# Patient Record
Sex: MALE | Race: OTHER | Employment: UNEMPLOYED | URBAN - METROPOLITAN AREA
[De-identification: names, ages, dates, MRNs, and addresses within clinical notes are randomized per-mention and may not be internally consistent; named-entity substitution may affect disease eponyms.]

---

## 2019-08-27 ENCOUNTER — APPOINTMENT (OUTPATIENT)
Dept: CT IMAGING | Age: 39
End: 2019-08-27

## 2019-08-27 ENCOUNTER — HOSPITAL ENCOUNTER (EMERGENCY)
Age: 39
Discharge: HOME | End: 2019-08-27
Attending: EMERGENCY MEDICINE

## 2019-08-27 VITALS
HEIGHT: 67 IN | BODY MASS INDEX: 31.39 KG/M2 | TEMPERATURE: 98.5 F | SYSTOLIC BLOOD PRESSURE: 128 MMHG | OXYGEN SATURATION: 98 % | DIASTOLIC BLOOD PRESSURE: 86 MMHG | WEIGHT: 200 LBS | HEART RATE: 72 BPM | RESPIRATION RATE: 18 BRPM

## 2019-08-27 DIAGNOSIS — K57.32 DIVERTICULITIS OF COLON: Primary | ICD-10-CM

## 2019-08-27 LAB
ALBUMIN SERPL-MCNC: 4.9 G/DL (ref 3.5–5.2)
ALP BLD-CCNC: 68 U/L (ref 40–129)
ALT SERPL-CCNC: 29 U/L (ref 0–40)
AMYLASE: 53 U/L (ref 20–100)
ANION GAP SERPL CALCULATED.3IONS-SCNC: 13 MMOL/L (ref 7–16)
AST SERPL-CCNC: 24 U/L (ref 0–39)
BACTERIA: NORMAL /HPF
BASOPHILS ABSOLUTE: 0.05 E9/L (ref 0–0.2)
BASOPHILS RELATIVE PERCENT: 0.4 % (ref 0–2)
BILIRUB SERPL-MCNC: 0.4 MG/DL (ref 0–1.2)
BILIRUBIN URINE: ABNORMAL
BLOOD, URINE: ABNORMAL
BUN BLDV-MCNC: 12 MG/DL (ref 6–20)
CALCIUM SERPL-MCNC: 10 MG/DL (ref 8.6–10.2)
CHLORIDE BLD-SCNC: 101 MMOL/L (ref 98–107)
CLARITY: CLEAR
CO2: 27 MMOL/L (ref 22–29)
COLOR: YELLOW
CREAT SERPL-MCNC: 0.9 MG/DL (ref 0.7–1.2)
EOSINOPHILS ABSOLUTE: 0.1 E9/L (ref 0.05–0.5)
EOSINOPHILS RELATIVE PERCENT: 0.7 % (ref 0–6)
EPITHELIAL CELLS, UA: NORMAL /HPF
GFR AFRICAN AMERICAN: >60
GFR NON-AFRICAN AMERICAN: >60 ML/MIN/1.73
GLUCOSE BLD-MCNC: 86 MG/DL (ref 74–99)
GLUCOSE URINE: NEGATIVE MG/DL
HCT VFR BLD CALC: 45.4 % (ref 37–54)
HEMOGLOBIN: 15.2 G/DL (ref 12.5–16.5)
IMMATURE GRANULOCYTES #: 0.08 E9/L
IMMATURE GRANULOCYTES %: 0.6 % (ref 0–5)
KETONES, URINE: NEGATIVE MG/DL
LACTIC ACID: 1.8 MMOL/L (ref 0.5–2.2)
LEUKOCYTE ESTERASE, URINE: NEGATIVE
LIPASE: 29 U/L (ref 13–60)
LYMPHOCYTES ABSOLUTE: 6.35 E9/L (ref 1.5–4)
LYMPHOCYTES RELATIVE PERCENT: 47.6 % (ref 20–42)
MCH RBC QN AUTO: 29.6 PG (ref 26–35)
MCHC RBC AUTO-ENTMCNC: 33.5 % (ref 32–34.5)
MCV RBC AUTO: 88.3 FL (ref 80–99.9)
MONOCYTES ABSOLUTE: 0.77 E9/L (ref 0.1–0.95)
MONOCYTES RELATIVE PERCENT: 5.8 % (ref 2–12)
NEUTROPHILS ABSOLUTE: 6 E9/L (ref 1.8–7.3)
NEUTROPHILS RELATIVE PERCENT: 44.9 % (ref 43–80)
NITRITE, URINE: NEGATIVE
PDW BLD-RTO: 14.7 FL (ref 11.5–15)
PH UA: 6 (ref 5–9)
PLATELET # BLD: 297 E9/L (ref 130–450)
PMV BLD AUTO: 9 FL (ref 7–12)
POTASSIUM SERPL-SCNC: 3.5 MMOL/L (ref 3.5–5)
PROTEIN UA: ABNORMAL MG/DL
RBC # BLD: 5.14 E12/L (ref 3.8–5.8)
RBC UA: NORMAL /HPF (ref 0–2)
SODIUM BLD-SCNC: 141 MMOL/L (ref 132–146)
SPECIFIC GRAVITY UA: >=1.03 (ref 1–1.03)
TOTAL PROTEIN: 8.3 G/DL (ref 6.4–8.3)
UROBILINOGEN, URINE: 0.2 E.U./DL
WBC # BLD: 13.4 E9/L (ref 4.5–11.5)
WBC UA: NORMAL /HPF (ref 0–5)

## 2019-08-27 PROCEDURE — 6370000000 HC RX 637 (ALT 250 FOR IP): Performed by: NURSE PRACTITIONER

## 2019-08-27 PROCEDURE — 74177 CT ABD & PELVIS W/CONTRAST: CPT

## 2019-08-27 PROCEDURE — 82150 ASSAY OF AMYLASE: CPT

## 2019-08-27 PROCEDURE — 83605 ASSAY OF LACTIC ACID: CPT

## 2019-08-27 PROCEDURE — 2580000003 HC RX 258: Performed by: NURSE PRACTITIONER

## 2019-08-27 PROCEDURE — 99284 EMERGENCY DEPT VISIT MOD MDM: CPT

## 2019-08-27 PROCEDURE — 96374 THER/PROPH/DIAG INJ IV PUSH: CPT

## 2019-08-27 PROCEDURE — 6360000004 HC RX CONTRAST MEDICATION: Performed by: RADIOLOGY

## 2019-08-27 PROCEDURE — 81001 URINALYSIS AUTO W/SCOPE: CPT

## 2019-08-27 PROCEDURE — 6360000002 HC RX W HCPCS: Performed by: NURSE PRACTITIONER

## 2019-08-27 PROCEDURE — 80053 COMPREHEN METABOLIC PANEL: CPT

## 2019-08-27 PROCEDURE — 83690 ASSAY OF LIPASE: CPT

## 2019-08-27 PROCEDURE — 96375 TX/PRO/DX INJ NEW DRUG ADDON: CPT

## 2019-08-27 PROCEDURE — 85025 COMPLETE CBC W/AUTO DIFF WBC: CPT

## 2019-08-27 RX ORDER — CEFDINIR 300 MG/1
300 CAPSULE ORAL 2 TIMES DAILY
Qty: 20 CAPSULE | Refills: 0 | Status: SHIPPED | OUTPATIENT
Start: 2019-08-27 | End: 2019-09-06

## 2019-08-27 RX ORDER — KETOROLAC TROMETHAMINE 30 MG/ML
15 INJECTION, SOLUTION INTRAMUSCULAR; INTRAVENOUS ONCE
Status: COMPLETED | OUTPATIENT
Start: 2019-08-27 | End: 2019-08-27

## 2019-08-27 RX ORDER — OXYCODONE HYDROCHLORIDE AND ACETAMINOPHEN 5; 325 MG/1; MG/1
1 TABLET ORAL ONCE
Status: COMPLETED | OUTPATIENT
Start: 2019-08-27 | End: 2019-08-27

## 2019-08-27 RX ORDER — MORPHINE SULFATE 2 MG/ML
2 INJECTION, SOLUTION INTRAMUSCULAR; INTRAVENOUS ONCE
Status: DISCONTINUED | OUTPATIENT
Start: 2019-08-27 | End: 2019-08-27

## 2019-08-27 RX ORDER — METRONIDAZOLE 500 MG/1
500 TABLET ORAL 3 TIMES DAILY
Qty: 30 TABLET | Refills: 0 | Status: SHIPPED | OUTPATIENT
Start: 2019-08-27 | End: 2019-09-06

## 2019-08-27 RX ORDER — 0.9 % SODIUM CHLORIDE 0.9 %
1000 INTRAVENOUS SOLUTION INTRAVENOUS ONCE
Status: COMPLETED | OUTPATIENT
Start: 2019-08-27 | End: 2019-08-27

## 2019-08-27 RX ORDER — HYDROCODONE BITARTRATE AND ACETAMINOPHEN 5; 325 MG/1; MG/1
1 TABLET ORAL EVERY 6 HOURS PRN
Qty: 12 TABLET | Refills: 0 | Status: SHIPPED | OUTPATIENT
Start: 2019-08-27 | End: 2019-08-30

## 2019-08-27 RX ORDER — ONDANSETRON 2 MG/ML
4 INJECTION INTRAMUSCULAR; INTRAVENOUS ONCE
Status: COMPLETED | OUTPATIENT
Start: 2019-08-27 | End: 2019-08-27

## 2019-08-27 RX ORDER — ONDANSETRON 4 MG/1
4 TABLET, ORALLY DISINTEGRATING ORAL EVERY 8 HOURS PRN
Qty: 10 TABLET | Refills: 0 | Status: SHIPPED | OUTPATIENT
Start: 2019-08-27 | End: 2021-10-31

## 2019-08-27 RX ADMIN — SODIUM CHLORIDE 1000 ML: 9 INJECTION, SOLUTION INTRAVENOUS at 17:56

## 2019-08-27 RX ADMIN — OXYCODONE HYDROCHLORIDE AND ACETAMINOPHEN 1 TABLET: 5; 325 TABLET ORAL at 19:43

## 2019-08-27 RX ADMIN — KETOROLAC TROMETHAMINE 15 MG: 30 INJECTION, SOLUTION INTRAMUSCULAR at 18:06

## 2019-08-27 RX ADMIN — ONDANSETRON 4 MG: 2 INJECTION INTRAMUSCULAR; INTRAVENOUS at 18:06

## 2019-08-27 RX ADMIN — IOPAMIDOL 110 ML: 755 INJECTION, SOLUTION INTRAVENOUS at 18:53

## 2019-08-27 ASSESSMENT — PAIN DESCRIPTION - LOCATION: LOCATION: ABDOMEN

## 2019-08-27 ASSESSMENT — PAIN DESCRIPTION - PAIN TYPE: TYPE: ACUTE PAIN

## 2019-08-27 ASSESSMENT — PAIN SCALES - GENERAL: PAINLEVEL_OUTOF10: 4

## 2019-08-27 NOTE — ED PROVIDER NOTES
age.  · HEENT:  NC/NT. PERRLA. Airway patent. · Neck:  Supple. No lymphadenopathy. · Respiratory: Lungs Clear to auscultation and breath sounds equal.  · CV:  Regular rate and rhythm. · GI:  General Appearance: rounded. Bowel sounds: normal bowel sounds. Distension:  Mild. Tenderness: moderate tenderness is present in the LUQ and LLQ, no rebound tenderness, guarding is present in the LLQ, abdominal rigidity is absent. Liver: non-tender. Spleen:  non-tender. Pulsatile Mass: absent. Hernia:  no inguinal or femoral hernias noted. · Back: CVA Tenderness: No.  · Integument:  Normal turgor. Warm, dry, without visible rash, unless noted elsewhere. · Neurological:  Orientation age-appropriate. Motor functions intact.     Lab / Imaging Results   (All laboratory and radiology results have been personally reviewed by myself)  Labs:  Results for orders placed or performed during the hospital encounter of 08/27/19   CBC Auto Differential   Result Value Ref Range    WBC 13.4 (H) 4.5 - 11.5 E9/L    RBC 5.14 3.80 - 5.80 E12/L    Hemoglobin 15.2 12.5 - 16.5 g/dL    Hematocrit 45.4 37.0 - 54.0 %    MCV 88.3 80.0 - 99.9 fL    MCH 29.6 26.0 - 35.0 pg    MCHC 33.5 32.0 - 34.5 %    RDW 14.7 11.5 - 15.0 fL    Platelets 023 204 - 900 E9/L    MPV 9.0 7.0 - 12.0 fL    Neutrophils % 44.9 43.0 - 80.0 %    Immature Granulocytes % 0.6 0.0 - 5.0 %    Lymphocytes % 47.6 (H) 20.0 - 42.0 %    Monocytes % 5.8 2.0 - 12.0 %    Eosinophils % 0.7 0.0 - 6.0 %    Basophils % 0.4 0.0 - 2.0 %    Neutrophils Absolute 6.00 1.80 - 7.30 E9/L    Immature Granulocytes # 0.08 E9/L    Lymphocytes Absolute 6.35 (H) 1.50 - 4.00 E9/L    Monocytes Absolute 0.77 0.10 - 0.95 E9/L    Eosinophils Absolute 0.10 0.05 - 0.50 E9/L    Basophils Absolute 0.05 0.00 - 0.20 E9/L   Comprehensive Metabolic Panel   Result Value Ref Range    Sodium 141 132 - 146 mmol/L    Potassium 3.5 3.5 - 5.0 (110 mLs Intravenous Given 8/27/19 1853)   oxyCODONE-acetaminophen (PERCOCET) 5-325 MG per tablet 1 tablet (1 tablet Oral Given 8/27/19 1943)        Re-Evaluations:  8/27/19      Time: 1915    Patients symptoms are improving. Consultations:             None    Procedures:   none    MDM: Patient presented as above. Patient states he does have a history of diverticulitis and feels that this pain is similar although perhaps early. White count elevated at 13,200 and other labs were unremarkable. CT scan showed mild diverticulosis on the left side with no diverticulitis, however clinically patient was presenting as diverticulitis. With the mildly elevated white count, it is believed that this is early diverticulitis. Will treat with oral Omnicef and Flagyl, as well as Zofran as needed for nausea, and Norco.  Patient was advised not to drive or operate machinery when taking Norco.  The patient states he does not have a family doctor, due to his health insurance status. I advised him when able, to call the [facility] Access line to establish a provider. I also advised him of the importance of becoming established with a GI doctor or surgeon for colonoscopy in the future. He and his girlfriend verbalized understanding. They are advised of the signs and symptoms which would require return to the ED. Discharged in stable and improved condition. Counseling:   I have spoken with the significant other and patient and discussed todays results, in addition to providing specific details for the plan of care and counseling regarding the diagnosis and prognosis and are agreeable with the plan. Assessment      1. Diverticulitis of colon      This patient's ED course included: a personal history and physicial examination, re-evaluation prior to disposition and IV medications  This patient has remained hemodynamically stable and improved during their ED course. Plan   Discharge to home.   Patient condition is

## 2021-10-31 ENCOUNTER — HOSPITAL ENCOUNTER (EMERGENCY)
Age: 41
Discharge: HOME | End: 2021-10-31
Attending: EMERGENCY MEDICINE
Payer: MEDICAID

## 2021-10-31 VITALS
DIASTOLIC BLOOD PRESSURE: 65 MMHG | BODY MASS INDEX: 31.39 KG/M2 | HEART RATE: 80 BPM | TEMPERATURE: 97 F | HEIGHT: 67 IN | OXYGEN SATURATION: 97 % | RESPIRATION RATE: 16 BRPM | SYSTOLIC BLOOD PRESSURE: 116 MMHG | WEIGHT: 200 LBS

## 2021-10-31 DIAGNOSIS — F41.9 ANXIETY: Primary | ICD-10-CM

## 2021-10-31 LAB
ALBUMIN SERPL-MCNC: 4.4 G/DL (ref 3.5–5.2)
ALP BLD-CCNC: 53 U/L (ref 40–129)
ALT SERPL-CCNC: 20 U/L (ref 0–40)
ANION GAP SERPL CALCULATED.3IONS-SCNC: 12 MMOL/L (ref 7–16)
AST SERPL-CCNC: 18 U/L (ref 0–39)
BASOPHILS ABSOLUTE: 0.04 E9/L (ref 0–0.2)
BASOPHILS RELATIVE PERCENT: 0.3 % (ref 0–2)
BILIRUB SERPL-MCNC: 0.3 MG/DL (ref 0–1.2)
BUN BLDV-MCNC: 15 MG/DL (ref 6–20)
CALCIUM SERPL-MCNC: 9.9 MG/DL (ref 8.6–10.2)
CHLORIDE BLD-SCNC: 104 MMOL/L (ref 98–107)
CO2: 21 MMOL/L (ref 22–29)
CREAT SERPL-MCNC: 0.9 MG/DL (ref 0.7–1.2)
EOSINOPHILS ABSOLUTE: 0.01 E9/L (ref 0.05–0.5)
EOSINOPHILS RELATIVE PERCENT: 0.1 % (ref 0–6)
GFR AFRICAN AMERICAN: >60
GFR NON-AFRICAN AMERICAN: >60 ML/MIN/1.73
GLUCOSE BLD-MCNC: 120 MG/DL (ref 74–99)
HCT VFR BLD CALC: 43.2 % (ref 37–54)
HEMOGLOBIN: 14.6 G/DL (ref 12.5–16.5)
IMMATURE GRANULOCYTES #: 0.05 E9/L
IMMATURE GRANULOCYTES %: 0.4 % (ref 0–5)
LYMPHOCYTES ABSOLUTE: 2.16 E9/L (ref 1.5–4)
LYMPHOCYTES RELATIVE PERCENT: 16.7 % (ref 20–42)
MCH RBC QN AUTO: 30.2 PG (ref 26–35)
MCHC RBC AUTO-ENTMCNC: 33.8 % (ref 32–34.5)
MCV RBC AUTO: 89.4 FL (ref 80–99.9)
MONOCYTES ABSOLUTE: 0.41 E9/L (ref 0.1–0.95)
MONOCYTES RELATIVE PERCENT: 3.2 % (ref 2–12)
NEUTROPHILS ABSOLUTE: 10.3 E9/L (ref 1.8–7.3)
NEUTROPHILS RELATIVE PERCENT: 79.3 % (ref 43–80)
PDW BLD-RTO: 13.3 FL (ref 11.5–15)
PLATELET # BLD: 234 E9/L (ref 130–450)
PMV BLD AUTO: 9 FL (ref 7–12)
POTASSIUM REFLEX MAGNESIUM: 4.1 MMOL/L (ref 3.5–5)
RBC # BLD: 4.83 E12/L (ref 3.8–5.8)
SODIUM BLD-SCNC: 137 MMOL/L (ref 132–146)
TOTAL PROTEIN: 7.5 G/DL (ref 6.4–8.3)
TSH SERPL DL<=0.05 MIU/L-ACNC: 2.13 UIU/ML (ref 0.27–4.2)
WBC # BLD: 13 E9/L (ref 4.5–11.5)

## 2021-10-31 PROCEDURE — 80053 COMPREHEN METABOLIC PANEL: CPT

## 2021-10-31 PROCEDURE — 96372 THER/PROPH/DIAG INJ SC/IM: CPT

## 2021-10-31 PROCEDURE — 84443 ASSAY THYROID STIM HORMONE: CPT

## 2021-10-31 PROCEDURE — 85025 COMPLETE CBC W/AUTO DIFF WBC: CPT

## 2021-10-31 PROCEDURE — 6360000002 HC RX W HCPCS: Performed by: STUDENT IN AN ORGANIZED HEALTH CARE EDUCATION/TRAINING PROGRAM

## 2021-10-31 PROCEDURE — 99284 EMERGENCY DEPT VISIT MOD MDM: CPT

## 2021-10-31 RX ORDER — HYDROXYZINE HYDROCHLORIDE 50 MG/ML
50 INJECTION, SOLUTION INTRAMUSCULAR ONCE
Status: COMPLETED | OUTPATIENT
Start: 2021-10-31 | End: 2021-10-31

## 2021-10-31 RX ORDER — HYDROXYZINE 50 MG/1
50 TABLET, FILM COATED ORAL EVERY 8 HOURS PRN
Qty: 30 TABLET | Refills: 0 | Status: SHIPPED | OUTPATIENT
Start: 2021-10-31 | End: 2021-11-10

## 2021-10-31 RX ADMIN — HYDROXYZINE HYDROCHLORIDE 50 MG: 50 INJECTION, SOLUTION INTRAMUSCULAR at 19:38

## 2021-10-31 ASSESSMENT — ENCOUNTER SYMPTOMS
WHEEZING: 0
SINUS PRESSURE: 0
SHORTNESS OF BREATH: 1
EYE REDNESS: 0
SORE THROAT: 0
ABDOMINAL PAIN: 0
NAUSEA: 0
DIARRHEA: 0
COUGH: 0
VOMITING: 0
BACK PAIN: 0
EYE DISCHARGE: 0
EYE PAIN: 0

## 2021-10-31 NOTE — ED PROVIDER NOTES
Patient presents with anxiety and panic attacks. He is currently asymptomatic. Patient states that he has been having these intermittently for over the past couple of days. He states that during these episodes he feels a little lightheaded and that his heart races. He also states that he will start to breathe heavily and sometimes have periods of emesis. Patient has not done anything to make these better nor worse. He mentions that he has had the symptoms before long time ago however they had resolved on their own. Patient states they have been under a lot of stress lately in addition his sister has been going through health issues of her own which she has been helping take care of her. Patient denies any fevers, chest pain, cough, neck pain, neck masses, or abdominal pain. He also endorses marijuana usage. The history is provided by the patient. No  was used. Review of Systems   Constitutional: Negative for chills and fever. HENT: Negative for ear pain, sinus pressure and sore throat. Eyes: Negative for pain, discharge and redness. Respiratory: Positive for shortness of breath. Negative for cough and wheezing. Cardiovascular: Negative for chest pain. Gastrointestinal: Negative for abdominal pain, diarrhea, nausea and vomiting. Genitourinary: Negative for dysuria and frequency. Musculoskeletal: Negative for arthralgias and back pain. Skin: Negative for rash and wound. Neurological: Positive for dizziness and light-headedness. Negative for weakness and headaches. Hematological: Negative for adenopathy. Psychiatric/Behavioral: The patient is nervous/anxious. All other systems reviewed and are negative. Physical Exam  Vitals and nursing note reviewed. Constitutional:       General: He is not in acute distress. Appearance: He is well-developed. He is obese. HENT:      Head: Normocephalic and atraumatic.    Eyes:      Conjunctiva/sclera: Conjunctivae normal.   Cardiovascular:      Rate and Rhythm: Normal rate and regular rhythm. Heart sounds: Normal heart sounds. No murmur heard. Pulmonary:      Effort: Pulmonary effort is normal. No respiratory distress. Breath sounds: Normal breath sounds. No wheezing or rales. Abdominal:      General: Bowel sounds are normal.      Palpations: Abdomen is soft. Tenderness: There is no abdominal tenderness. There is no guarding or rebound. Musculoskeletal:         General: No tenderness or deformity. Cervical back: Normal range of motion and neck supple. Skin:     General: Skin is warm and dry. Neurological:      General: No focal deficit present. Mental Status: He is alert and oriented to person, place, and time. Cranial Nerves: No cranial nerve deficit. Sensory: No sensory deficit. Motor: No weakness. Coordination: Coordination normal.          Procedures     MDM  Number of Diagnoses or Management Options  Anxiety  Diagnosis management comments: Patient presents with anxiety and panic attacks. Hydroxyzine was given with improvement. CBC was obtained and did show leukocytosis at 13.0 but was otherwise unremarkable. CMP was also unremarkable. TSH was obtained and was within normal limits. Patient was informed the results and plan and is agreeable. Patient be discharged with instructions to follow-up with his PCP for further evaluation and care. Patient's questions are answered at this time. Patient discharged home. Amount and/or Complexity of Data Reviewed  Clinical lab tests: reviewed         ED Course as of Oct 31 2031   [facility] Oct 31, 2021   2028 Patient reevaluated. He states that he does feel improved at this time. [BB]      ED Course User Index  [BB] Nubia Dickeyson, DO          ED Course as of Oct 31 2031   [facility] Oct 31, 2021   2028 Patient reevaluated. He states that he does feel improved at this time.     [BB]      ED Course User Index  [BB] Fidelia Mai DO       --------------------------------------------- PAST HISTORY ---------------------------------------------  Past Medical History:  has no past medical history on file. Past Surgical History:  has no past surgical history on file. Social History:  reports that he has been smoking cigarettes. He has been smoking about 0.50 packs per day. He has never used smokeless tobacco. He reports that he does not drink alcohol and does not use drugs. Family History: family history is not on file. The patients home medications have been reviewed. Allergies: Patient has no known allergies.     -------------------------------------------------- RESULTS -------------------------------------------------  Labs:  Results for orders placed or performed during the hospital encounter of 10/31/21   CBC Auto Differential   Result Value Ref Range    WBC 13.0 (H) 4.5 - 11.5 E9/L    RBC 4.83 3.80 - 5.80 E12/L    Hemoglobin 14.6 12.5 - 16.5 g/dL    Hematocrit 43.2 37.0 - 54.0 %    MCV 89.4 80.0 - 99.9 fL    MCH 30.2 26.0 - 35.0 pg    MCHC 33.8 32.0 - 34.5 %    RDW 13.3 11.5 - 15.0 fL    Platelets 425 622 - 216 E9/L    MPV 9.0 7.0 - 12.0 fL    Neutrophils % 79.3 43.0 - 80.0 %    Immature Granulocytes % 0.4 0.0 - 5.0 %    Lymphocytes % 16.7 (L) 20.0 - 42.0 %    Monocytes % 3.2 2.0 - 12.0 %    Eosinophils % 0.1 0.0 - 6.0 %    Basophils % 0.3 0.0 - 2.0 %    Neutrophils Absolute 10.30 (H) 1.80 - 7.30 E9/L    Immature Granulocytes # 0.05 E9/L    Lymphocytes Absolute 2.16 1.50 - 4.00 E9/L    Monocytes Absolute 0.41 0.10 - 0.95 E9/L    Eosinophils Absolute 0.01 (L) 0.05 - 0.50 E9/L    Basophils Absolute 0.04 0.00 - 0.20 E9/L   Comprehensive Metabolic Panel w/ Reflex to MG   Result Value Ref Range    Sodium 137 132 - 146 mmol/L    Potassium reflex Magnesium 4.1 3.5 - 5.0 mmol/L    Chloride 104 98 - 107 mmol/L    CO2 21 (L) 22 - 29 mmol/L    Anion Gap 12 7 - 16 mmol/L    Glucose 120 (H) 74 - 99 mg/dL importance of follow-up. New Prescriptions    HYDROXYZINE (ATARAX) 50 MG TABLET    Take 1 tablet by mouth every 8 hours as needed for Itching       Diagnosis:  1. Anxiety        Disposition:  Patient's disposition: Discharge to home  Patient's condition is stable.     Patient was seen and evaluated by both myself and Garrett Ortiz, [id number], DO  Resident  10/31/21 [id number]

## 2021-11-22 ENCOUNTER — APPOINTMENT (OUTPATIENT)
Dept: GENERAL RADIOLOGY | Age: 41
End: 2021-11-22
Payer: MEDICAID

## 2021-11-22 ENCOUNTER — HOSPITAL ENCOUNTER (EMERGENCY)
Age: 41
Discharge: HOME | End: 2021-11-22
Payer: MEDICAID

## 2021-11-22 ENCOUNTER — APPOINTMENT (OUTPATIENT)
Dept: ULTRASOUND IMAGING | Age: 41
End: 2021-11-22
Payer: MEDICAID

## 2021-11-22 VITALS
OXYGEN SATURATION: 99 % | HEIGHT: 67 IN | DIASTOLIC BLOOD PRESSURE: 82 MMHG | TEMPERATURE: 97.6 F | BODY MASS INDEX: 31.39 KG/M2 | SYSTOLIC BLOOD PRESSURE: 117 MMHG | RESPIRATION RATE: 14 BRPM | HEART RATE: 64 BPM | WEIGHT: 200 LBS

## 2021-11-22 DIAGNOSIS — M53.82 MUSCULOSKELETAL DISORDER INVOLVING STERNOCLEIDOMASTOID: Primary | ICD-10-CM

## 2021-11-22 PROCEDURE — 76536 US EXAM OF HEAD AND NECK: CPT

## 2021-11-22 PROCEDURE — 70360 X-RAY EXAM OF NECK: CPT

## 2021-11-22 PROCEDURE — 99284 EMERGENCY DEPT VISIT MOD MDM: CPT

## 2021-11-22 PROCEDURE — 93005 ELECTROCARDIOGRAM TRACING: CPT

## 2021-11-22 PROCEDURE — 6370000000 HC RX 637 (ALT 250 FOR IP): Performed by: PHYSICIAN ASSISTANT

## 2021-11-22 RX ORDER — METAXALONE 800 MG/1
800 TABLET ORAL 3 TIMES DAILY
Qty: 15 TABLET | Refills: 0 | Status: SHIPPED | OUTPATIENT
Start: 2021-11-22 | End: 2021-11-27

## 2021-11-22 RX ORDER — IBUPROFEN 800 MG/1
800 TABLET ORAL EVERY 6 HOURS PRN
Qty: 20 TABLET | Refills: 0 | Status: SHIPPED | OUTPATIENT
Start: 2021-11-22 | End: 2021-11-27

## 2021-11-22 RX ORDER — IBUPROFEN 800 MG/1
800 TABLET ORAL ONCE
Status: COMPLETED | OUTPATIENT
Start: 2021-11-22 | End: 2021-11-22

## 2021-11-22 RX ADMIN — IBUPROFEN 800 MG: 800 TABLET, FILM COATED ORAL at 23:17

## 2021-11-22 ASSESSMENT — PAIN SCALES - GENERAL
PAINLEVEL_OUTOF10: 6
PAINLEVEL_OUTOF10: 6

## 2021-11-22 ASSESSMENT — PAIN DESCRIPTION - PAIN TYPE: TYPE: ACUTE PAIN

## 2021-11-22 ASSESSMENT — PAIN DESCRIPTION - DESCRIPTORS: DESCRIPTORS: ACHING

## 2021-11-22 ASSESSMENT — PAIN DESCRIPTION - ORIENTATION: ORIENTATION: LEFT

## 2021-11-22 ASSESSMENT — PAIN DESCRIPTION - ONSET: ONSET: PROGRESSIVE

## 2021-11-22 ASSESSMENT — PAIN DESCRIPTION - LOCATION: LOCATION: NECK

## 2021-11-26 LAB
EKG ATRIAL RATE: 66 BPM
EKG P AXIS: 25 DEGREES
EKG P-R INTERVAL: 132 MS
EKG Q-T INTERVAL: 402 MS
EKG QRS DURATION: 92 MS
EKG QTC CALCULATION (BAZETT): 421 MS
EKG R AXIS: 23 DEGREES
EKG T AXIS: 38 DEGREES
EKG VENTRICULAR RATE: 66 BPM

## 2021-11-27 NOTE — ED PROVIDER NOTES
[facility]  Department of Emergency Medicine   ED  Encounter Note  Admit Date/RoomTime: 2021  9:15 PM  ED Room:     NAME: Francisco Huff  : 1980  MRN: [id number]     Chief Complaint:  Neck Swelling (left onset approx 30 minutes PTA)    History of Present Illness        Francisco Huff is a 42 y.o. old male who presents to the emergency department by private vehicle, for non-traumatic anterior left neck pain which began 1 hour(s) prior to arrival.   The pain was caused by no known cause--patient was sitting watching TV when he noticed some pain when he turned his head to the right and looked and saw some swelling. He has no prior neck problems. Since onset the symptoms have been remaining constant. The pain is associated with none and denies headaches, numbness or tingling of hands, muscle weakness or fever, body aches, chills, sore throat, cough, difficulty swallowing. His symptoms are aggraveated by turning right and relieved by nothing, as no treatment has been provided prior to this visit. ROS   Pertinent positives and negatives are stated within HPI, all other systems reviewed and are negative. Past Medical History:  has no past medical history on file. Surgical History:  has no past surgical history on file. Social History:  reports that he has been smoking cigarettes. He has been smoking about 0.50 packs per day. He has never used smokeless tobacco. He reports that he does not drink alcohol and does not use drugs. Family History: family history is not on file. Allergies: Patient has no known allergies. Physical Exam   Oxygen Saturation Interpretation: Normal.        ED Triage Vitals [21]   BP Temp Temp src Pulse Resp SpO2 Height Weight   (!) 145/101 97.6 °F (36.4 °C) -- 72 14 99 % 5' 7\" (1.702 m) 200 lb (90.7 kg)         Constitutional:  Alert, development consistent with age. HEENT:  NC/NT.   Airway normal soft tissue neck. ED Course / Medical Decision Making     Medications   ibuprofen (ADVIL;MOTRIN) tablet 800 mg (800 mg Oral Given 11/22/21 6401)        Re-examination:  11/28/21     patient is comfortable at rest.    Consult(s):   none. Procedure(s):   none. MDM:    Imaging was obtained based on moderate suspicion for thyroid nodule as per history/physical findings. Findings show no abnormality of the thyroid. There is some swelling reported at the sternocleidomastoid muscle on the ultrasound. Patient has no other upper respiratory type symptoms. At this point time advised patient to continue with NSAIDs and ice packs. Follow-up with primary care for recheck. Patient reports he does not have a primary care doctor and I gave him referral to the [address] [place] [address]. Plan of Care/Counseling:  Tom Brito PA-C reviewed today's visit with the patient in addition to providing specific details for the plan of care and counseling regarding the diagnosis and prognosis. Questions are answered at this time and are agreeable with the plan. Assessment     1. Musculoskeletal disorder involving sternocleidomastoid      Plan   Discharged home. Patient condition is good    New Medications     Discharge Medication List as of 11/22/2021 11:27 PM      START taking these medications    Details   ibuprofen (ADVIL;MOTRIN) 800 MG tablet Take 1 tablet by mouth every 6 hours as needed for Pain, Disp-20 tablet, R-0Print      metaxalone (SKELAXIN) 800 MG tablet Take 1 tablet by mouth 3 times daily for 5 days, Disp-15 tablet, R-0Print           Electronically signed by Tom Brito PA-C   DD: 11/28/21  **This report was transcribed using voice recognition software. Every effort was made to ensure accuracy; however, inadvertent computerized transcription errors may be present.   END OF ED PROVIDER NOTE       Tom Brito PA-C  11/28/21 [id number]

## 2024-04-03 ENCOUNTER — APPOINTMENT (OUTPATIENT)
Dept: GENERAL RADIOLOGY | Age: 44
End: 2024-04-03
Payer: MEDICAID

## 2024-04-03 ENCOUNTER — HOSPITAL ENCOUNTER (EMERGENCY)
Age: 44
Discharge: HOME | End: 2024-04-03
Attending: EMERGENCY MEDICINE
Payer: MEDICAID

## 2024-04-03 VITALS
TEMPERATURE: 98.2 F | OXYGEN SATURATION: 98 % | HEART RATE: 91 BPM | SYSTOLIC BLOOD PRESSURE: 145 MMHG | HEIGHT: 67 IN | DIASTOLIC BLOOD PRESSURE: 88 MMHG | BODY MASS INDEX: 31.39 KG/M2 | WEIGHT: 200 LBS | RESPIRATION RATE: 14 BRPM

## 2024-04-03 DIAGNOSIS — S39.012A STRAIN OF LUMBAR REGION, INITIAL ENCOUNTER: Primary | ICD-10-CM

## 2024-04-03 PROCEDURE — 73502 X-RAY EXAM HIP UNI 2-3 VIEWS: CPT

## 2024-04-03 PROCEDURE — 72110 X-RAY EXAM L-2 SPINE 4/>VWS: CPT

## 2024-04-03 PROCEDURE — 6360000002 HC RX W HCPCS: Performed by: EMERGENCY MEDICINE

## 2024-04-03 PROCEDURE — 99283 EMERGENCY DEPT VISIT LOW MDM: CPT

## 2024-04-03 PROCEDURE — 6370000000 HC RX 637 (ALT 250 FOR IP): Performed by: EMERGENCY MEDICINE

## 2024-04-03 PROCEDURE — 6370000000 HC RX 637 (ALT 250 FOR IP): Performed by: STUDENT IN AN ORGANIZED HEALTH CARE EDUCATION/TRAINING PROGRAM

## 2024-04-03 RX ORDER — HYDROCODONE BITARTRATE AND ACETAMINOPHEN 5; 325 MG/1; MG/1
1 TABLET ORAL ONCE
Status: COMPLETED | OUTPATIENT
Start: 2024-04-03 | End: 2024-04-03

## 2024-04-03 RX ORDER — METHOCARBAMOL 500 MG/1
1000 TABLET, FILM COATED ORAL ONCE
Status: COMPLETED | OUTPATIENT
Start: 2024-04-03 | End: 2024-04-03

## 2024-04-03 RX ORDER — KETOROLAC TROMETHAMINE 30 MG/ML
30 INJECTION, SOLUTION INTRAMUSCULAR; INTRAVENOUS ONCE
Status: DISCONTINUED | OUTPATIENT
Start: 2024-04-03 | End: 2024-04-03

## 2024-04-03 RX ORDER — ORPHENADRINE CITRATE 30 MG/ML
60 INJECTION INTRAMUSCULAR; INTRAVENOUS ONCE
Status: DISCONTINUED | OUTPATIENT
Start: 2024-04-03 | End: 2024-04-03

## 2024-04-03 RX ORDER — NAPROXEN 500 MG/1
500 TABLET ORAL 2 TIMES DAILY PRN
Qty: 28 TABLET | Refills: 0 | Status: SHIPPED | OUTPATIENT
Start: 2024-04-03

## 2024-04-03 RX ORDER — NAPROXEN 500 MG/1
500 TABLET ORAL ONCE
Status: COMPLETED | OUTPATIENT
Start: 2024-04-03 | End: 2024-04-03

## 2024-04-03 RX ADMIN — HYDROCODONE BITARTRATE AND ACETAMINOPHEN 1 TABLET: 5; 325 TABLET ORAL at 09:28

## 2024-04-03 RX ADMIN — HYDROCODONE BITARTRATE AND ACETAMINOPHEN 1 TABLET: 5; 325 TABLET ORAL at 07:41

## 2024-04-03 RX ADMIN — METHOCARBAMOL 1000 MG: 500 TABLET ORAL at 08:14

## 2024-04-03 RX ADMIN — NAPROXEN 500 MG: 500 TABLET ORAL at 09:28

## 2024-04-03 ASSESSMENT — ENCOUNTER SYMPTOMS
COUGH: 0
ABDOMINAL PAIN: 0
RHINORRHEA: 0
VOMITING: 0
NAUSEA: 0
SHORTNESS OF BREATH: 0
DIARRHEA: 0
CONSTIPATION: 0
BACK PAIN: 1

## 2024-04-03 ASSESSMENT — PAIN - FUNCTIONAL ASSESSMENT: PAIN_FUNCTIONAL_ASSESSMENT: 0-10

## 2024-04-03 ASSESSMENT — PAIN DESCRIPTION - DESCRIPTORS: DESCRIPTORS: SHARP

## 2024-04-03 ASSESSMENT — LIFESTYLE VARIABLES
HOW OFTEN DO YOU HAVE A DRINK CONTAINING ALCOHOL: NEVER
HOW MANY STANDARD DRINKS CONTAINING ALCOHOL DO YOU HAVE ON A TYPICAL DAY: PATIENT DOES NOT DRINK

## 2024-04-03 ASSESSMENT — PAIN SCALES - GENERAL: PAINLEVEL_OUTOF10: 10

## 2024-04-03 ASSESSMENT — PAIN DESCRIPTION - LOCATION: LOCATION: BACK

## 2024-04-03 NOTE — ED PROVIDER NOTES
[facility] EMERGENCY DEPARTMENT  EMERGENCY DEPARTMENT ENCOUNTER        Pt Name: Joseph Bond  MRN: [id number]  Birthdate 1980  Date of evaluation: 4/3/2024  Provider: Rianna Nick MD  PCP: No primary care provider on file.  Note Started: 7:46 AM EDT 4/3/24    CHIEF COMPLAINT       Chief Complaint   Patient presents with    Back Pain     PT reports lower back pain starting last night.  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        HISTORY OF PRESENT ILLNESS: 1 or more Elements   History From: Indra Bond is a 43 y.o. male who presents with right hip/buttock pain that started last night and persisted this morning prompting ED visit. Pain is sharp 10/10 does not radiate down leg. Denies numbness, difficulty with bladder and BM control. He does have weakness on the right LE mainly limited due to pain. Pain worse with movement of both hip joints. Has been taking ibuprofen 800 mg at most once a day for the past couple of days but doesn't relieve pain. He had the same pain 2 months ago which lasted for 1 week and partially relieved by ibuprofen but eventually spontaneously resolved. Denies falls and trauma.     Review of Systems   Constitutional:  Negative for chills and fever.   HENT:  Negative for rhinorrhea.    Respiratory:  Negative for cough and shortness of breath.    Cardiovascular:  Negative for chest pain and leg swelling.   Gastrointestinal:  Negative for abdominal pain, constipation, diarrhea, nausea and vomiting.   Genitourinary:  Negative for difficulty urinating and frequency.   Musculoskeletal:  Positive for arthralgias, back pain and gait problem. Negative for joint swelling.   Neurological:  Negative for dizziness, syncope, light-headedness and headaches.         Nursing Notes were all reviewed and agreed with or any disagreements were addressed in the HPI.    REVIEW OF SYSTEMS :      Positives and Pertinent negatives as per HPI.

## 2024-05-01 ENCOUNTER — HOSPITAL ENCOUNTER (EMERGENCY)
Age: 44
Discharge: HOME | End: 2024-05-01
Payer: MEDICAID

## 2024-05-01 VITALS
DIASTOLIC BLOOD PRESSURE: 100 MMHG | HEART RATE: 90 BPM | TEMPERATURE: 97.8 F | OXYGEN SATURATION: 98 % | SYSTOLIC BLOOD PRESSURE: 134 MMHG | RESPIRATION RATE: 18 BRPM

## 2024-05-01 DIAGNOSIS — S29.019A THORACIC MYOFASCIAL STRAIN, INITIAL ENCOUNTER: Primary | ICD-10-CM

## 2024-05-01 DIAGNOSIS — M62.838 SPASM OF MUSCLE: ICD-10-CM

## 2024-05-01 PROCEDURE — 6370000000 HC RX 637 (ALT 250 FOR IP): Performed by: PHYSICIAN ASSISTANT

## 2024-05-01 PROCEDURE — 99283 EMERGENCY DEPT VISIT LOW MDM: CPT

## 2024-05-01 RX ORDER — PREDNISONE 20 MG/1
40 TABLET ORAL DAILY
Qty: 10 TABLET | Refills: 0 | Status: SHIPPED | OUTPATIENT
Start: 2024-05-01 | End: 2024-05-06

## 2024-05-01 RX ORDER — HYDROCODONE BITARTRATE AND ACETAMINOPHEN 5; 325 MG/1; MG/1
1 TABLET ORAL EVERY 6 HOURS PRN
Qty: 12 TABLET | Refills: 0 | Status: SHIPPED | OUTPATIENT
Start: 2024-05-01 | End: 2024-05-04

## 2024-05-01 RX ORDER — CYCLOBENZAPRINE HCL 10 MG
10 TABLET ORAL ONCE
Status: COMPLETED | OUTPATIENT
Start: 2024-05-01 | End: 2024-05-01

## 2024-05-01 RX ORDER — KETOROLAC TROMETHAMINE 30 MG/ML
60 INJECTION, SOLUTION INTRAMUSCULAR; INTRAVENOUS ONCE
Status: DISCONTINUED | OUTPATIENT
Start: 2024-05-01 | End: 2024-05-01 | Stop reason: HOSPADM

## 2024-05-01 RX ORDER — PREDNISONE 20 MG/1
60 TABLET ORAL ONCE
Status: COMPLETED | OUTPATIENT
Start: 2024-05-01 | End: 2024-05-01

## 2024-05-01 RX ORDER — TIZANIDINE 4 MG/1
2-4 TABLET ORAL EVERY 8 HOURS PRN
Qty: 30 TABLET | Refills: 0 | Status: SHIPPED | OUTPATIENT
Start: 2024-05-01

## 2024-05-01 RX ORDER — OXYCODONE HYDROCHLORIDE AND ACETAMINOPHEN 5; 325 MG/1; MG/1
1 TABLET ORAL ONCE
Status: COMPLETED | OUTPATIENT
Start: 2024-05-01 | End: 2024-05-01

## 2024-05-01 RX ADMIN — PREDNISONE 60 MG: 20 TABLET ORAL at 08:31

## 2024-05-01 RX ADMIN — OXYCODONE HYDROCHLORIDE AND ACETAMINOPHEN 1 TABLET: 5; 325 TABLET ORAL at 08:30

## 2024-05-01 RX ADMIN — CYCLOBENZAPRINE 10 MG: 10 TABLET, FILM COATED ORAL at 08:31

## 2024-05-01 ASSESSMENT — LIFESTYLE VARIABLES
HOW MANY STANDARD DRINKS CONTAINING ALCOHOL DO YOU HAVE ON A TYPICAL DAY: PATIENT DOES NOT DRINK
HOW OFTEN DO YOU HAVE A DRINK CONTAINING ALCOHOL: NEVER

## 2024-05-01 ASSESSMENT — PAIN - FUNCTIONAL ASSESSMENT: PAIN_FUNCTIONAL_ASSESSMENT: 0-10

## 2024-05-01 ASSESSMENT — PAIN SCALES - GENERAL: PAINLEVEL_OUTOF10: 9

## 2024-05-01 NOTE — ED PROVIDER NOTES
increased back pain and shoulder blade pain , states he does a lot of strenuous work )    This is a 43-year-old male presents emergency department with left upper back pain that has been ongoing for the past couple days.  Patient denies any trauma.  He states he does do a lot of heavy lifting as he is the sole caregiver of a demented patient.  He has no chest pain or shortness of breath.  PERC negative.  Patient will be treated for muscle strain and spasm.  He was advised to get a massage if possible due to his tense muscles.  He was educated on signs symptoms that would require his emergent return to the ED.  He was in no distress at discharge and able to ambulate out of department no difficulty.  Vital stable.    Patient was given the following medications:  Medications   ketorolac (TORADOL) injection 60 mg (60 mg IntraMUSCular Not Given 5/1/24 0910)   oxyCODONE-acetaminophen (PERCOCET) 5-325 MG per tablet 1 tablet (1 tablet Oral Given 5/1/24 0830)   cyclobenzaprine (FLEXERIL) tablet 10 mg (10 mg Oral Given 5/1/24 0831)   predniSONE (DELTASONE) tablet 60 mg (60 mg Oral Given 5/1/24 0831)      Differential diagnoses included but not limited to thoracic strain, trapezius strain, shoulder tendinitis    Reassessment:  Patient continues to be non-toxic on re-evaluation.     Chronic Conditions:   No past medical history on file.    ED COURSE:      Any labs and imaging were reviewed by myself.     Consultations:  None  Discussion with Other Profesionals : None    COUNSELING:   I have spoken with the patient/caregiver and discussed today’s results, in addition to providing specific details for the plan of care and counseling regarding the diagnosis and prognosis and are agreeable with the plan. All results reviewed with pt and all questions answered.  I discussed the differential, results and discharge plan with the patient and/or family/friend/caregiver if present.  I emphasized the importance of follow-up with the

## 2024-06-22 ENCOUNTER — HOSPITAL ENCOUNTER (EMERGENCY)
Age: 44
Discharge: HOME | End: 2024-06-22
Payer: MEDICAID

## 2024-06-22 ENCOUNTER — APPOINTMENT (OUTPATIENT)
Dept: GENERAL RADIOLOGY | Age: 44
End: 2024-06-22
Payer: MEDICAID

## 2024-06-22 VITALS
SYSTOLIC BLOOD PRESSURE: 120 MMHG | DIASTOLIC BLOOD PRESSURE: 82 MMHG | OXYGEN SATURATION: 97 % | RESPIRATION RATE: 18 BRPM | HEART RATE: 80 BPM | TEMPERATURE: 98.3 F

## 2024-06-22 DIAGNOSIS — M54.50 ACUTE EXACERBATION OF CHRONIC LOW BACK PAIN: ICD-10-CM

## 2024-06-22 DIAGNOSIS — S22.32XA CLOSED FRACTURE OF ONE RIB OF LEFT SIDE, INITIAL ENCOUNTER: Primary | ICD-10-CM

## 2024-06-22 DIAGNOSIS — G89.29 ACUTE EXACERBATION OF CHRONIC LOW BACK PAIN: ICD-10-CM

## 2024-06-22 DIAGNOSIS — R93.89 ABNORMAL FINDING ON RADIOLOGY EXAM: ICD-10-CM

## 2024-06-22 DIAGNOSIS — M54.32 SCIATICA OF LEFT SIDE: ICD-10-CM

## 2024-06-22 PROCEDURE — 99283 EMERGENCY DEPT VISIT LOW MDM: CPT

## 2024-06-22 PROCEDURE — 72100 X-RAY EXAM L-S SPINE 2/3 VWS: CPT

## 2024-06-22 PROCEDURE — 6370000000 HC RX 637 (ALT 250 FOR IP): Performed by: PHYSICIAN ASSISTANT

## 2024-06-22 PROCEDURE — 71101 X-RAY EXAM UNILAT RIBS/CHEST: CPT

## 2024-06-22 RX ORDER — KETOROLAC TROMETHAMINE 30 MG/ML
30 INJECTION, SOLUTION INTRAMUSCULAR; INTRAVENOUS ONCE
Status: DISCONTINUED | OUTPATIENT
Start: 2024-06-22 | End: 2024-06-22 | Stop reason: HOSPADM

## 2024-06-22 RX ORDER — OXYCODONE HYDROCHLORIDE AND ACETAMINOPHEN 5; 325 MG/1; MG/1
1 TABLET ORAL ONCE
Status: COMPLETED | OUTPATIENT
Start: 2024-06-22 | End: 2024-06-22

## 2024-06-22 RX ORDER — OXYCODONE HYDROCHLORIDE AND ACETAMINOPHEN 5; 325 MG/1; MG/1
1 TABLET ORAL EVERY 6 HOURS PRN
Qty: 12 TABLET | Refills: 0 | Status: SHIPPED | OUTPATIENT
Start: 2024-06-22 | End: 2024-06-25

## 2024-06-22 RX ORDER — CYCLOBENZAPRINE HCL 10 MG
10 TABLET ORAL ONCE
Status: COMPLETED | OUTPATIENT
Start: 2024-06-22 | End: 2024-06-22

## 2024-06-22 RX ORDER — ACETAMINOPHEN 500 MG
500 TABLET ORAL EVERY 6 HOURS PRN
COMMUNITY

## 2024-06-22 RX ADMIN — CYCLOBENZAPRINE 10 MG: 10 TABLET, FILM COATED ORAL at 10:00

## 2024-06-22 RX ADMIN — OXYCODONE HYDROCHLORIDE AND ACETAMINOPHEN 1 TABLET: 5; 325 TABLET ORAL at 10:00

## 2024-06-22 ASSESSMENT — ENCOUNTER SYMPTOMS
CONSTIPATION: 0
SHORTNESS OF BREATH: 0
BACK PAIN: 1
COUGH: 0
COLOR CHANGE: 0
VOMITING: 0
DIARRHEA: 0
NAUSEA: 0
CHEST TIGHTNESS: 0
ABDOMINAL PAIN: 0

## 2024-06-22 ASSESSMENT — PAIN SCALES - GENERAL: PAINLEVEL_OUTOF10: 10

## 2024-06-22 NOTE — DISCHARGE INSTRUCTIONS
Please establish care with PCP and follow up to have CT imaging of the chest regarding right paratracheal prominence.

## 2024-06-22 NOTE — ED PROVIDER NOTES
Independent [facility] Visit.        Department of Emergency Medicine   ED  Provider Note  Admit Date/RoomTime: 6/22/2024 12:02 PM  ED Room: PR1/PR1  HPI:  6/22/24, Time: 10:05 AM EDT      The patient is a 43-year-old male with no significant past medical history presenting to the emergency department with left-sided lower back pain and left-sided rib pain.  Patient states he has chronic low back pain at baseline from constant heavy lifting.  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  He states over the last week the pain has been getting much worse and is now radiating into his left buttock and down his left leg.  He cannot think of any specific injury but has been having to pick her up off the floor.  Patient states he also felt something pop in the left side of his ribs from lifting.  He now has pain with movement on the left side.  He has been taking Tylenol and ibuprofen without relief.  Patient denies any chest pain, shortness of breath, fevers or chills, cough, lower extremity numbness/tingling/weakness, saddle anesthesia.    The history is provided by the patient. No  was used.           REVIEW OF SYSTEMS:  Review of Systems   Constitutional:  Negative for activity change, chills, fatigue and fever.   Respiratory:  Negative for cough, chest tightness and shortness of breath.    Cardiovascular:  Negative for chest pain, palpitations and leg swelling.   Gastrointestinal:  Negative for abdominal pain, constipation, diarrhea, nausea and vomiting.   Genitourinary:  Negative for dysuria, flank pain, frequency and hematuria.   Musculoskeletal:  Positive for back pain. Negative for neck pain and neck stiffness.        Left sided rib pain   Skin:  Negative for color change, pallor and rash.   Neurological:  Negative for dizziness, light-headedness and headaches.   Psychiatric/Behavioral:  Negative for agitation, behavioral problems and confusion.       Pertinent

## 2024-06-24 ENCOUNTER — APPOINTMENT (OUTPATIENT)
Dept: CT IMAGING | Age: 44
End: 2024-06-24
Payer: MEDICAID

## 2024-06-24 ENCOUNTER — HOSPITAL ENCOUNTER (EMERGENCY)
Age: 44
Discharge: HOME | End: 2024-06-25
Attending: STUDENT IN AN ORGANIZED HEALTH CARE EDUCATION/TRAINING PROGRAM
Payer: MEDICAID

## 2024-06-24 DIAGNOSIS — R59.1 LYMPHADENOPATHY: ICD-10-CM

## 2024-06-24 DIAGNOSIS — J90 PLEURAL EFFUSION: ICD-10-CM

## 2024-06-24 DIAGNOSIS — S22.32XA CLOSED FRACTURE OF ONE RIB OF LEFT SIDE, INITIAL ENCOUNTER: Primary | ICD-10-CM

## 2024-06-24 DIAGNOSIS — R31.29 MICROSCOPIC HEMATURIA: ICD-10-CM

## 2024-06-24 LAB
ALBUMIN SERPL-MCNC: 4.6 G/DL (ref 3.5–5.2)
ALP SERPL-CCNC: 150 U/L (ref 40–129)
ALT SERPL-CCNC: 58 U/L (ref 0–40)
ANION GAP SERPL CALCULATED.3IONS-SCNC: 15 MMOL/L (ref 7–16)
AST SERPL-CCNC: 70 U/L (ref 0–39)
BACTERIA URNS QL MICRO: ABNORMAL
BASOPHILS # BLD: 0.05 K/UL (ref 0–0.2)
BASOPHILS NFR BLD: 1 % (ref 0–2)
BILIRUB SERPL-MCNC: 0.5 MG/DL (ref 0–1.2)
BILIRUB UR QL STRIP: NEGATIVE
BUN SERPL-MCNC: 14 MG/DL (ref 6–20)
CALCIUM SERPL-MCNC: 10 MG/DL (ref 8.6–10.2)
CHLORIDE SERPL-SCNC: 97 MMOL/L (ref 98–107)
CLARITY UR: CLEAR
CO2 SERPL-SCNC: 25 MMOL/L (ref 22–29)
COLOR UR: YELLOW
CREAT SERPL-MCNC: 1 MG/DL (ref 0.7–1.2)
EOSINOPHIL # BLD: 0.02 K/UL (ref 0.05–0.5)
EOSINOPHILS RELATIVE PERCENT: 0 % (ref 0–6)
EPI CELLS #/AREA URNS HPF: ABNORMAL /HPF
ERYTHROCYTE [DISTWIDTH] IN BLOOD BY AUTOMATED COUNT: 14 % (ref 11.5–15)
GFR, ESTIMATED: >90 ML/MIN/1.73M2
GLUCOSE SERPL-MCNC: 93 MG/DL (ref 74–99)
GLUCOSE UR STRIP-MCNC: NEGATIVE MG/DL
HCT VFR BLD AUTO: 38.4 % (ref 37–54)
HGB BLD-MCNC: 12.6 G/DL (ref 12.5–16.5)
HGB UR QL STRIP.AUTO: ABNORMAL
IMM GRANULOCYTES # BLD AUTO: 0.1 K/UL (ref 0–0.58)
IMM GRANULOCYTES NFR BLD: 1 % (ref 0–5)
KETONES UR STRIP-MCNC: NEGATIVE MG/DL
LEUKOCYTE ESTERASE UR QL STRIP: NEGATIVE
LIPASE SERPL-CCNC: 14 U/L (ref 13–60)
LYMPHOCYTES NFR BLD: 2.26 K/UL (ref 1.5–4)
LYMPHOCYTES RELATIVE PERCENT: 21 % (ref 20–42)
MCH RBC QN AUTO: 28.8 PG (ref 26–35)
MCHC RBC AUTO-ENTMCNC: 32.8 G/DL (ref 32–34.5)
MCV RBC AUTO: 87.7 FL (ref 80–99.9)
MONOCYTES NFR BLD: 0.98 K/UL (ref 0.1–0.95)
MONOCYTES NFR BLD: 9 % (ref 2–12)
NEUTROPHILS NFR BLD: 68 % (ref 43–80)
NEUTS SEG NFR BLD: 7.32 K/UL (ref 1.8–7.3)
NITRITE UR QL STRIP: NEGATIVE
PH UR STRIP: 6 [PH] (ref 5–9)
PLATELET # BLD AUTO: 102 K/UL (ref 130–450)
PMV BLD AUTO: 9.8 FL (ref 7–12)
POTASSIUM SERPL-SCNC: 4 MMOL/L (ref 3.5–5)
PROT SERPL-MCNC: 8.3 G/DL (ref 6.4–8.3)
PROT UR STRIP-MCNC: NEGATIVE MG/DL
RBC # BLD AUTO: 4.38 M/UL (ref 3.8–5.8)
RBC #/AREA URNS HPF: ABNORMAL /HPF
SODIUM SERPL-SCNC: 137 MMOL/L (ref 132–146)
SP GR UR STRIP: 1.02 (ref 1–1.03)
TROPONIN I SERPL HS-MCNC: <6 NG/L (ref 0–11)
UROBILINOGEN UR STRIP-ACNC: 0.2 EU/DL (ref 0–1)
WBC #/AREA URNS HPF: ABNORMAL /HPF
WBC OTHER # BLD: 10.7 K/UL (ref 4.5–11.5)

## 2024-06-24 PROCEDURE — 80053 COMPREHEN METABOLIC PANEL: CPT

## 2024-06-24 PROCEDURE — 74177 CT ABD & PELVIS W/CONTRAST: CPT

## 2024-06-24 PROCEDURE — 71275 CT ANGIOGRAPHY CHEST: CPT

## 2024-06-24 PROCEDURE — 96374 THER/PROPH/DIAG INJ IV PUSH: CPT

## 2024-06-24 PROCEDURE — 84484 ASSAY OF TROPONIN QUANT: CPT

## 2024-06-24 PROCEDURE — 96372 THER/PROPH/DIAG INJ SC/IM: CPT

## 2024-06-24 PROCEDURE — 99285 EMERGENCY DEPT VISIT HI MDM: CPT

## 2024-06-24 PROCEDURE — 6360000002 HC RX W HCPCS: Performed by: NURSE PRACTITIONER

## 2024-06-24 PROCEDURE — 6360000004 HC RX CONTRAST MEDICATION: Performed by: RADIOLOGY

## 2024-06-24 PROCEDURE — 83690 ASSAY OF LIPASE: CPT

## 2024-06-24 PROCEDURE — 96375 TX/PRO/DX INJ NEW DRUG ADDON: CPT

## 2024-06-24 PROCEDURE — 81001 URINALYSIS AUTO W/SCOPE: CPT

## 2024-06-24 PROCEDURE — 85025 COMPLETE CBC W/AUTO DIFF WBC: CPT

## 2024-06-24 PROCEDURE — 93005 ELECTROCARDIOGRAM TRACING: CPT | Performed by: NURSE PRACTITIONER

## 2024-06-24 RX ORDER — KETOROLAC TROMETHAMINE 30 MG/ML
15 INJECTION, SOLUTION INTRAMUSCULAR; INTRAVENOUS ONCE
Status: COMPLETED | OUTPATIENT
Start: 2024-06-24 | End: 2024-06-24

## 2024-06-24 RX ORDER — ONDANSETRON 2 MG/ML
4 INJECTION INTRAMUSCULAR; INTRAVENOUS ONCE
Status: COMPLETED | OUTPATIENT
Start: 2024-06-24 | End: 2024-06-24

## 2024-06-24 RX ORDER — MORPHINE SULFATE 4 MG/ML
4 INJECTION, SOLUTION INTRAMUSCULAR; INTRAVENOUS ONCE
Status: COMPLETED | OUTPATIENT
Start: 2024-06-24 | End: 2024-06-24

## 2024-06-24 RX ADMIN — KETOROLAC TROMETHAMINE 15 MG: 30 INJECTION, SOLUTION INTRAMUSCULAR at 19:17

## 2024-06-24 RX ADMIN — MORPHINE SULFATE 4 MG: 4 INJECTION, SOLUTION INTRAMUSCULAR; INTRAVENOUS at 21:29

## 2024-06-24 RX ADMIN — ONDANSETRON 4 MG: 2 INJECTION INTRAMUSCULAR; INTRAVENOUS at 19:17

## 2024-06-24 RX ADMIN — IOPAMIDOL 75 ML: 755 INJECTION, SOLUTION INTRAVENOUS at 23:09

## 2024-06-24 ASSESSMENT — LIFESTYLE VARIABLES: HOW OFTEN DO YOU HAVE A DRINK CONTAINING ALCOHOL: NEVER

## 2024-06-24 NOTE — ED PROVIDER NOTES
Shared [facility]-ED Attending Visit.  CC: No         [facility] EMERGENCY DEPARTMENT  ED  Encounter Note  Admit Date/RoomTime: 2024  5:45 PM  ED Room:   NAME: Joseph Bond  : 1980  MRN: [id number]  PCP: No primary care provider on file.    CHIEF COMPLAINT     Shortness of Breath, Rib Pain (injury) (Pt states he was just discharged from hospital. Broken ribs and back pain. Shortness of breath started this morning. Pain is worsening. ), and Back Pain    HISTORY OF PRESENT ILLNESS        Joseph Bond is a 43 y.o. male who presents to the ED via private vehicle with left-sided anterior rib pain as well as left mid and lower back pain that has been getting progressively worse.  He was seen in the emergency department 2 days ago for this and diagnosed with a fractured rib and chronic low back pain.  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  He did not have any other injury other than lifting and caregiving.  He does report he now has increasing shortness of breath.  He did take the Percocet that was prescribed to him however it has been ineffective.  He does report in the emergency department this last visit there was an abnormality on his chest x-ray recommending a CT scan which at the time he declined because he had to return home to continue his caregiver role.  Pain is moderate persistent because his daughter is able to help him now he is presented for evaluation  REVIEW OF SYSTEMS     Pertinent positives and negatives are stated within HPI, all other systems reviewed and are negative.    Past Medical History:  has no past medical history on file.  Surgical History:  has no past surgical history on file.  Social History:  reports that he has quit smoking. His smoking use included cigarettes. He started smoking about 10 years ago. He has a 2.9 pack-year smoking history. He has never used smokeless tobacco.

## 2024-06-25 VITALS
RESPIRATION RATE: 16 BRPM | DIASTOLIC BLOOD PRESSURE: 94 MMHG | OXYGEN SATURATION: 99 % | WEIGHT: 192 LBS | HEIGHT: 67 IN | TEMPERATURE: 97.8 F | BODY MASS INDEX: 30.13 KG/M2 | HEART RATE: 99 BPM | SYSTOLIC BLOOD PRESSURE: 112 MMHG

## 2024-06-25 LAB
EKG ATRIAL RATE: 102 BPM
EKG P AXIS: 17 DEGREES
EKG P-R INTERVAL: 114 MS
EKG Q-T INTERVAL: 358 MS
EKG QRS DURATION: 88 MS
EKG QTC CALCULATION (BAZETT): 466 MS
EKG R AXIS: 15 DEGREES
EKG T AXIS: 16 DEGREES
EKG VENTRICULAR RATE: 102 BPM

## 2024-06-25 PROCEDURE — 6370000000 HC RX 637 (ALT 250 FOR IP): Performed by: NURSE PRACTITIONER

## 2024-06-25 PROCEDURE — 93010 ELECTROCARDIOGRAM REPORT: CPT | Performed by: INTERNAL MEDICINE

## 2024-06-25 RX ORDER — OXYCODONE HYDROCHLORIDE AND ACETAMINOPHEN 5; 325 MG/1; MG/1
2 TABLET ORAL ONCE
Status: COMPLETED | OUTPATIENT
Start: 2024-06-25 | End: 2024-06-25

## 2024-06-25 RX ORDER — LIDOCAINE 50 MG/G
1 PATCH TOPICAL DAILY
Qty: 10 PATCH | Refills: 0 | Status: SHIPPED | OUTPATIENT
Start: 2024-06-25 | End: 2024-07-05

## 2024-06-25 RX ORDER — OXYCODONE HYDROCHLORIDE AND ACETAMINOPHEN 5; 325 MG/1; MG/1
1 TABLET ORAL EVERY 6 HOURS PRN
Qty: 12 TABLET | Refills: 0 | Status: SHIPPED | OUTPATIENT
Start: 2024-06-25 | End: 2024-06-28

## 2024-06-25 RX ORDER — LIDOCAINE 4 G/G
1 PATCH TOPICAL ONCE
Status: DISCONTINUED | OUTPATIENT
Start: 2024-06-25 | End: 2024-06-25 | Stop reason: HOSPADM

## 2024-06-25 RX ADMIN — OXYCODONE HYDROCHLORIDE AND ACETAMINOPHEN 2 TABLET: 5; 325 TABLET ORAL at 01:01

## 2024-06-25 ASSESSMENT — PAIN SCALES - GENERAL: PAINLEVEL_OUTOF10: 8

## 2024-06-25 ASSESSMENT — PAIN DESCRIPTION - DESCRIPTORS: DESCRIPTORS: ACHING;STABBING;SHOOTING

## 2024-06-25 ASSESSMENT — PAIN DESCRIPTION - LOCATION: LOCATION: BACK;RIB CAGE

## 2024-06-25 ASSESSMENT — PAIN DESCRIPTION - ORIENTATION: ORIENTATION: LEFT

## 2024-06-25 NOTE — DISCHARGE INSTRUCTIONS
Continue incentive spirometer  Very important you follow up with primary care regarding lymph nodes  Ct chest as follows:  IMPRESSION:  1. No evidence of pulmonary embolism.  2. Extensive mediastinal/paratracheal adenopathy measuring up to 3.0 cm.  3. Fracture left 8th rib.  4. Small left pleural effusion.

## 2024-07-01 NOTE — PROGRESS NOTES
[facility]  FAMILY MEDICINE RESIDENCY PROGRAM  DATE OF VISIT : 24       PATIENT:Joseph Bond    AGE:43 y.o.     :1980      MRN:[id number]   ___________________________________________________________________________________________________________________________________________________    ASSESSMENT AND PLAN    Lymphadenopathy  CTA chest with extensive mediastinal/paratracheal adenopathy measuring up to 3.0 cm.  Given this abnormal finding, I believe further workup is necessary including a biopsy.  Will refer to pulmonology, [facility]    Enlarged prostate  CT abdomen pelvis with prostatomegaly.  Patient endorsed having changes in urinary habits and frequency.  Given this abnormal finding PSA was ordered.  Patient also to be referred to urology for further evaluation and treatment recommendations.  PSA screening 150  Will refer to urology, Ryan Santana MD, Urology, [place]    Closed fracture of one rib of left side, initial encounter  Reassured patient.  Recommended that he continue lidocaine patches and over-the-counter pain analgesics such as Tylenol and/or ibuprofen.  Will prescribe patient naproxen (NAPROSYN) 250 MG tablet; Take 1 tablet by mouth 2 times daily (with meals)  Dispense: 60 tablet; Refill: 0    Lipid screening  Lipids abnormal  Order lipid panel.      Constipation, unspecified constipation type  CT abdomen pelvis with moderate fecal retention.  Recommended that the patient follow a healthier diet with increased water, fruits, vegetables.  Will order docusate sodium (COLACE) 100 MG capsule; Take 1 capsule by mouth daily  Dispense: 30 capsule; Refill: 0    Primary hypertension  Blood pressure not controlled at this time.  Patient declining medication management at this time.  Will recheck patient's blood pressure in 1 month.  If still elevated then will start on amlodipine or hydrochlorothiazide.  Advised to follow a

## 2024-07-02 ENCOUNTER — OFFICE VISIT (OUTPATIENT)
Dept: FAMILY MEDICINE CLINIC | Age: 44
End: 2024-07-02
Payer: MEDICAID

## 2024-07-02 VITALS
OXYGEN SATURATION: 96 % | SYSTOLIC BLOOD PRESSURE: 159 MMHG | RESPIRATION RATE: 18 BRPM | HEIGHT: 67 IN | DIASTOLIC BLOOD PRESSURE: 95 MMHG | BODY MASS INDEX: 30.76 KG/M2 | WEIGHT: 196 LBS | TEMPERATURE: 97.7 F | HEART RATE: 92 BPM

## 2024-07-02 DIAGNOSIS — Z13.220 LIPID SCREENING: ICD-10-CM

## 2024-07-02 DIAGNOSIS — Z87.891 HISTORY OF TOBACCO USE: ICD-10-CM

## 2024-07-02 DIAGNOSIS — K59.00 CONSTIPATION, UNSPECIFIED CONSTIPATION TYPE: Primary | ICD-10-CM

## 2024-07-02 DIAGNOSIS — E55.9 VITAMIN D DEFICIENCY: ICD-10-CM

## 2024-07-02 DIAGNOSIS — N40.0 ENLARGED PROSTATE: ICD-10-CM

## 2024-07-02 DIAGNOSIS — F19.91 HISTORY OF DRUG USE: ICD-10-CM

## 2024-07-02 DIAGNOSIS — R53.83 OTHER FATIGUE: ICD-10-CM

## 2024-07-02 DIAGNOSIS — E78.89 LIPIDS ABNORMAL: ICD-10-CM

## 2024-07-02 DIAGNOSIS — D69.6 THROMBOCYTOPENIA (HCC): ICD-10-CM

## 2024-07-02 DIAGNOSIS — R59.1 LYMPHADENOPATHY: ICD-10-CM

## 2024-07-02 DIAGNOSIS — R74.8 ELEVATED LIVER ENZYMES: ICD-10-CM

## 2024-07-02 DIAGNOSIS — I10 PRIMARY HYPERTENSION: ICD-10-CM

## 2024-07-02 DIAGNOSIS — Z76.89 ENCOUNTER TO ESTABLISH CARE: ICD-10-CM

## 2024-07-02 DIAGNOSIS — S22.31XA CLOSED FRACTURE OF ONE RIB OF RIGHT SIDE, INITIAL ENCOUNTER: ICD-10-CM

## 2024-07-02 LAB
CHOLESTEROL, TOTAL: 176 MG/DL
FOLATE: 15.6 NG/ML (ref 4.8–24.2)
HCT VFR BLD CALC: 36.7 % (ref 37–54)
HDLC SERPL-MCNC: 46 MG/DL
HEMOGLOBIN: 12.1 G/DL (ref 12.5–16.5)
LDL CHOLESTEROL: 103 MG/DL
MCH RBC QN AUTO: 28.8 PG (ref 26–35)
MCHC RBC AUTO-ENTMCNC: 33 G/DL (ref 32–34.5)
MCV RBC AUTO: 87.4 FL (ref 80–99.9)
PDW BLD-RTO: 13.5 % (ref 11.5–15)
PLATELET # BLD: 138 K/UL (ref 130–450)
PMV BLD AUTO: 9.3 FL (ref 7–12)
PROSTATE SPECIFIC ANTIGEN: 149.7 NG/ML (ref 0–4)
RBC # BLD: 4.2 M/UL (ref 3.8–5.8)
TRIGL SERPL-MCNC: 133 MG/DL
TSH SERPL DL<=0.05 MIU/L-ACNC: 2.84 UIU/ML (ref 0.27–4.2)
VITAMIN B-12: 470 PG/ML (ref 211–946)
VITAMIN D 25-HYDROXY: 15.2 NG/ML (ref 30–100)
VLDLC SERPL CALC-MCNC: 27 MG/DL
WBC # BLD: 7.6 K/UL (ref 4.5–11.5)

## 2024-07-02 PROCEDURE — 36415 COLL VENOUS BLD VENIPUNCTURE: CPT | Performed by: FAMILY MEDICINE

## 2024-07-02 PROCEDURE — G8417 CALC BMI ABV UP PARAM F/U: HCPCS

## 2024-07-02 PROCEDURE — 1036F TOBACCO NON-USER: CPT

## 2024-07-02 PROCEDURE — 3080F DIAST BP >= 90 MM HG: CPT

## 2024-07-02 PROCEDURE — 99213 OFFICE O/P EST LOW 20 MIN: CPT

## 2024-07-02 PROCEDURE — 3077F SYST BP >= 140 MM HG: CPT

## 2024-07-02 PROCEDURE — G8427 DOCREV CUR MEDS BY ELIG CLIN: HCPCS

## 2024-07-02 RX ORDER — NAPROXEN 250 MG/1
250 TABLET ORAL 2 TIMES DAILY WITH MEALS
Qty: 60 TABLET | Refills: 0 | Status: ON HOLD | OUTPATIENT
Start: 2024-07-02

## 2024-07-02 RX ORDER — DOCUSATE SODIUM 100 MG/1
100 CAPSULE, LIQUID FILLED ORAL DAILY
Qty: 30 CAPSULE | Refills: 0 | Status: ON HOLD | OUTPATIENT
Start: 2024-07-02 | End: 2024-08-01

## 2024-07-02 SDOH — ECONOMIC STABILITY: FOOD INSECURITY: WITHIN THE PAST 12 MONTHS, THE FOOD YOU BOUGHT JUST DIDN'T LAST AND YOU DIDN'T HAVE MONEY TO GET MORE.: OFTEN TRUE

## 2024-07-02 SDOH — ECONOMIC STABILITY: FOOD INSECURITY: WITHIN THE PAST 12 MONTHS, YOU WORRIED THAT YOUR FOOD WOULD RUN OUT BEFORE YOU GOT MONEY TO BUY MORE.: OFTEN TRUE

## 2024-07-02 SDOH — HEALTH STABILITY: PHYSICAL HEALTH: ON AVERAGE, HOW MANY DAYS PER WEEK DO YOU ENGAGE IN MODERATE TO STRENUOUS EXERCISE (LIKE A BRISK WALK)?: 0 DAYS

## 2024-07-02 SDOH — ECONOMIC STABILITY: HOUSING INSECURITY
IN THE LAST 12 MONTHS, WAS THERE A TIME WHEN YOU DID NOT HAVE A STEADY PLACE TO SLEEP OR SLEPT IN A SHELTER (INCLUDING NOW)?: NO

## 2024-07-02 SDOH — ECONOMIC STABILITY: INCOME INSECURITY: HOW HARD IS IT FOR YOU TO PAY FOR THE VERY BASICS LIKE FOOD, HOUSING, MEDICAL CARE, AND HEATING?: HARD

## 2024-07-02 ASSESSMENT — PATIENT HEALTH QUESTIONNAIRE - PHQ9
SUM OF ALL RESPONSES TO PHQ QUESTIONS 1-9: 1
SUM OF ALL RESPONSES TO PHQ QUESTIONS 1-9: 1
2. FEELING DOWN, DEPRESSED OR HOPELESS: SEVERAL DAYS
1. LITTLE INTEREST OR PLEASURE IN DOING THINGS: NOT AT ALL
SUM OF ALL RESPONSES TO PHQ9 QUESTIONS 1 & 2: 1
SUM OF ALL RESPONSES TO PHQ QUESTIONS 1-9: 1
SUM OF ALL RESPONSES TO PHQ QUESTIONS 1-9: 1

## 2024-07-02 NOTE — PATIENT INSTRUCTIONS
2 business day notice      No cost   May be limited due to funding  Phone:  [phone] ([facility])                             INDIVIDUALS WITH MEDICAID MANAGED CARE PLANS ([facility], [place], [employer], [facility], [facility], [facility] [employer], [employer], [facility]): call the member services phone number on your medical card and request transportation at least 3 days in advance                           INDIVIDUALS WITH SOME MEDICARE ADVANTAGE PLANS OR MEDICARE AND MEDICAID PLANS ALSO HAVE TRANSPORTATION AVAILABLE: CALL THE MEMBER SERVICES NUMBER ON YOUR INSURANCE CARD AND INQUIRE IF HAVE TRANSPORTATION BENEFIT

## 2024-07-02 NOTE — PROGRESS NOTES
Attending Physician Statement    S:   Chief Complaint   Patient presents with    Establish Care    Rib Injury    Back Pain      PMH - substance abuse (quit 5 years ago)   No other chronic medical conditions   Rib fracture, nondisplaced after injury, also some chronic low back pain.  Has significant pain persistent   CT in ED revealed the rib fracture, but also adenopathy  O: Blood pressure (!) 159/95, pulse 92, temperature 97.7 °F (36.5 °C), temperature source Temporal, resp. rate 18, height 1.702 m (5' 7\"), weight 88.9 kg (196 lb), SpO2 96 %.   Exam:   Heart - RRR   Lungs - clear   Chest wall - left sided tenderness anterior axillary near 8th rib   Abdomen- nontender  A: As above  P:  Encouraged to use NSAID (naproxen) for pain   Refer to pulmonology   Labs   Follow-up as ordered    I have discussed the case, including pertinent history and exam findings with the resident.  I also have personally seen and examined the patient.  I agree with the documented assessment and plan.    Jerod Kruse MD

## 2024-07-03 LAB — HEPATITIS C ANTIBODY: NONREACTIVE

## 2024-07-05 ENCOUNTER — TELEPHONE (OUTPATIENT)
Dept: FAMILY MEDICINE CLINIC | Age: 44
End: 2024-07-05

## 2024-07-05 NOTE — TELEPHONE ENCOUNTER
Called patient , explained to him that he would need to be seen  in person , he is requesting percocet.  He  has an appointment at the end of the month I offered a sooner appointment  but he declined , I explained that residents  do not do virtual appointments.  Please advise.

## 2024-07-05 NOTE — TELEPHONE ENCOUNTER
----- Message from Jv Singh sent at 7/5/2024  8:52 AM EDT -----  Regarding: ECC Appointment Request  ECC Appointment Request    Patient needs appointment for ECC Appointment Type: Existing Condition Follow Up.    Patient Requested Dates(s):July 5 2024  Patient Requested Time:Anytime in this day  Provider Name:Ty Shaikh,    Reason for Appointment Request: Established Patient - Available appointments did not meet patient need  Additional  Information: Patient want a today appointment  a virtual visit regarding his medication that he would like to get a new prescription a strong medication cause he's taking right now is not working to him and also want  to send it to his preferred pharmacy [facility]  --------------------------------------------------------------------------------------------------------------------------    Relationship to Patient: Self     Call Back Information: OK to leave message on voicemail  Preferred Call Back Number: [phone]

## 2024-07-09 ENCOUNTER — APPOINTMENT (OUTPATIENT)
Dept: CT IMAGING | Age: 44
DRG: 500 | End: 2024-07-09
Payer: MEDICAID

## 2024-07-09 ENCOUNTER — APPOINTMENT (OUTPATIENT)
Dept: GENERAL RADIOLOGY | Age: 44
DRG: 500 | End: 2024-07-09
Payer: MEDICAID

## 2024-07-09 ENCOUNTER — HOSPITAL ENCOUNTER (INPATIENT)
Age: 44
LOS: 8 days | Discharge: HOME | DRG: 500 | End: 2024-07-18
Attending: EMERGENCY MEDICINE | Admitting: FAMILY MEDICINE
Payer: MEDICAID

## 2024-07-09 DIAGNOSIS — R59.0 RETROPERITONEAL LYMPHADENOPATHY: ICD-10-CM

## 2024-07-09 DIAGNOSIS — C79.51 PROSTATE CANCER METASTATIC TO BONE (HCC): Primary | ICD-10-CM

## 2024-07-09 DIAGNOSIS — C61 PROSTATE CANCER METASTATIC TO BONE (HCC): Primary | ICD-10-CM

## 2024-07-09 DIAGNOSIS — R93.89 ABNORMAL CT SCAN: ICD-10-CM

## 2024-07-09 DIAGNOSIS — R59.0 MEDIASTINAL LYMPHADENOPATHY: ICD-10-CM

## 2024-07-09 DIAGNOSIS — R59.1 LYMPHADENOPATHY: ICD-10-CM

## 2024-07-09 LAB
ALBUMIN SERPL-MCNC: 4.9 G/DL (ref 3.5–5.2)
ALP SERPL-CCNC: 200 U/L (ref 40–129)
ALT SERPL-CCNC: 36 U/L (ref 0–40)
ANION GAP SERPL CALCULATED.3IONS-SCNC: 17 MMOL/L (ref 7–16)
AST SERPL-CCNC: 76 U/L (ref 0–39)
BASOPHILS # BLD: 0.09 K/UL (ref 0–0.2)
BASOPHILS NFR BLD: 1 % (ref 0–2)
BILIRUB SERPL-MCNC: 0.3 MG/DL (ref 0–1.2)
BUN SERPL-MCNC: 28 MG/DL (ref 6–20)
CALCIUM SERPL-MCNC: 10.5 MG/DL (ref 8.6–10.2)
CHLORIDE SERPL-SCNC: 100 MMOL/L (ref 98–107)
CO2 SERPL-SCNC: 23 MMOL/L (ref 22–29)
CREAT SERPL-MCNC: 0.9 MG/DL (ref 0.7–1.2)
EOSINOPHIL # BLD: 0.06 K/UL (ref 0.05–0.5)
EOSINOPHILS RELATIVE PERCENT: 1 % (ref 0–6)
ERYTHROCYTE [DISTWIDTH] IN BLOOD BY AUTOMATED COUNT: 14.4 % (ref 11.5–15)
GFR, ESTIMATED: >90 ML/MIN/1.73M2
GLUCOSE SERPL-MCNC: 88 MG/DL (ref 74–99)
HCT VFR BLD AUTO: 37.2 % (ref 37–54)
HGB BLD-MCNC: 12.3 G/DL (ref 12.5–16.5)
IMM GRANULOCYTES # BLD AUTO: 0.39 K/UL (ref 0–0.58)
IMM GRANULOCYTES NFR BLD: 4 % (ref 0–5)
LACTATE BLDV-SCNC: 1.6 MMOL/L (ref 0.5–2.2)
LIPASE SERPL-CCNC: 20 U/L (ref 13–60)
LYMPHOCYTES NFR BLD: 3.27 K/UL (ref 1.5–4)
LYMPHOCYTES RELATIVE PERCENT: 30 % (ref 20–42)
MCH RBC QN AUTO: 28.6 PG (ref 26–35)
MCHC RBC AUTO-ENTMCNC: 33.1 G/DL (ref 32–34.5)
MCV RBC AUTO: 86.5 FL (ref 80–99.9)
MONOCYTES NFR BLD: 0.72 K/UL (ref 0.1–0.95)
MONOCYTES NFR BLD: 7 % (ref 2–12)
NEUTROPHILS NFR BLD: 59 % (ref 43–80)
NEUTS SEG NFR BLD: 6.39 K/UL (ref 1.8–7.3)
PLATELET # BLD AUTO: 116 K/UL (ref 130–450)
PMV BLD AUTO: 10.4 FL (ref 7–12)
POTASSIUM SERPL-SCNC: 4 MMOL/L (ref 3.5–5)
PROT SERPL-MCNC: 8.5 G/DL (ref 6.4–8.3)
RBC # BLD AUTO: 4.3 M/UL (ref 3.8–5.8)
SODIUM SERPL-SCNC: 140 MMOL/L (ref 132–146)
TROPONIN I SERPL HS-MCNC: <6 NG/L (ref 0–11)
WBC OTHER # BLD: 10.9 K/UL (ref 4.5–11.5)

## 2024-07-09 PROCEDURE — 83605 ASSAY OF LACTIC ACID: CPT

## 2024-07-09 PROCEDURE — 72131 CT LUMBAR SPINE W/O DYE: CPT

## 2024-07-09 PROCEDURE — 96361 HYDRATE IV INFUSION ADD-ON: CPT

## 2024-07-09 PROCEDURE — 83690 ASSAY OF LIPASE: CPT

## 2024-07-09 PROCEDURE — 2580000003 HC RX 258: Performed by: NURSE PRACTITIONER

## 2024-07-09 PROCEDURE — 99285 EMERGENCY DEPT VISIT HI MDM: CPT

## 2024-07-09 PROCEDURE — 71045 X-RAY EXAM CHEST 1 VIEW: CPT

## 2024-07-09 PROCEDURE — 80307 DRUG TEST PRSMV CHEM ANLYZR: CPT

## 2024-07-09 PROCEDURE — 93005 ELECTROCARDIOGRAM TRACING: CPT | Performed by: NURSE PRACTITIONER

## 2024-07-09 PROCEDURE — 6370000000 HC RX 637 (ALT 250 FOR IP): Performed by: NURSE PRACTITIONER

## 2024-07-09 PROCEDURE — 71275 CT ANGIOGRAPHY CHEST: CPT

## 2024-07-09 PROCEDURE — 80053 COMPREHEN METABOLIC PANEL: CPT

## 2024-07-09 PROCEDURE — 85025 COMPLETE CBC W/AUTO DIFF WBC: CPT

## 2024-07-09 PROCEDURE — 96360 HYDRATION IV INFUSION INIT: CPT

## 2024-07-09 PROCEDURE — 84484 ASSAY OF TROPONIN QUANT: CPT

## 2024-07-09 PROCEDURE — 51798 US URINE CAPACITY MEASURE: CPT

## 2024-07-09 PROCEDURE — 81001 URINALYSIS AUTO W/SCOPE: CPT

## 2024-07-09 RX ORDER — 0.9 % SODIUM CHLORIDE 0.9 %
1000 INTRAVENOUS SOLUTION INTRAVENOUS ONCE
Status: COMPLETED | OUTPATIENT
Start: 2024-07-09 | End: 2024-07-10

## 2024-07-09 RX ORDER — OXYCODONE HYDROCHLORIDE AND ACETAMINOPHEN 5; 325 MG/1; MG/1
1 TABLET ORAL ONCE
Status: COMPLETED | OUTPATIENT
Start: 2024-07-09 | End: 2024-07-09

## 2024-07-09 RX ADMIN — SODIUM CHLORIDE 1000 ML: 9 INJECTION, SOLUTION INTRAVENOUS at 20:46

## 2024-07-09 RX ADMIN — OXYCODONE HYDROCHLORIDE AND ACETAMINOPHEN 1 TABLET: 5; 325 TABLET ORAL at 20:46

## 2024-07-09 ASSESSMENT — PAIN - FUNCTIONAL ASSESSMENT: PAIN_FUNCTIONAL_ASSESSMENT: 0-10

## 2024-07-09 ASSESSMENT — PAIN DESCRIPTION - ORIENTATION
ORIENTATION: LEFT
ORIENTATION: LEFT

## 2024-07-09 ASSESSMENT — PAIN DESCRIPTION - LOCATION
LOCATION: RIB CAGE;PELVIS
LOCATION: BACK

## 2024-07-09 ASSESSMENT — PAIN DESCRIPTION - DESCRIPTORS
DESCRIPTORS: THROBBING
DESCRIPTORS: ACHING

## 2024-07-09 ASSESSMENT — PAIN SCALES - GENERAL
PAINLEVEL_OUTOF10: 10
PAINLEVEL_OUTOF10: 10

## 2024-07-09 NOTE — ED PROVIDER NOTES
ED physician   HPI:  7/9/24, Time: 7:23 PM EDT         Joseph Bond is a 43 y.o. male presenting to the ED for continued pain to his ribs as well as lower lumbar back and pelvic region.  Patient was actually seen in the emergency department on 6/22 as well as 6/24 and was found to have a rib fracture.  Patient reports that he was on pain meds but now his doctor has him on Naprosyn which is not at all controlling his pain.  He also reports that he is been having issues urinating stating that he was told that he has an enlarged prostate but is not on any meds for this and at times having trouble initiating a stream.  Patient denies any new fall or injury.  Patient noted to be tachycardic heart rate 116.  Does report having chest pain as well which he attributes it to his fractured rib.  Patient denies any actual vomiting or diarrhea.  States that he does have upcoming appointment with pulmonology as well as specialists but that it is not till the end of July and he states he has been taking the over-the-counter meds without any effect related to all of this pain.  Patient denies any fall or injury.  States the initial injury was from lifting his girlfriend up who is bedbound but currently now in a nursing home because he cannot take care of her.  Patient denies any saddle anesthesia or any unexplained incontinence.  Patient also without fevers or chills.  No cough or congestion.    Review of Systems:   A complete review of systems was performed and pertinent positives and negatives are stated within HPI, all other systems reviewed and are negative.          --------------------------------------------- PAST HISTORY ---------------------------------------------  Past Medical History:  has a past medical history of Diverticulosis, History of opioid abuse (HCC), and Hypertension.    Past Surgical History:  has no past surgical history on file.    Social History:  reports that he has quit smoking. His smoking use  (HCC), and Hypertension.    CONSULTS: PCP    Discussion with Other Profesionals : None    Social Determinants : None    Records Reviewed : Source patient and Inpatient Notes epic medical records        Disposition Considerations (Tests not ordered but considered, Shared Decision Making, Pt Expectation of Test or Tx.):   Appropriate for outpatient management yes and Evaluation by myself and discharge recommended.      I am the Primary Clinician of Record.    Counseling:   The emergency provider has spoken with the patient and discussed today’s results, in addition to providing specific details for the plan of care and counseling regarding the diagnosis and prognosis.  Questions are answered at this time and they are agreeable with the plan.      --------------------------------- IMPRESSION AND DISPOSITION ---------------------------------    IMPRESSION  1. Retroperitoneal lymphadenopathy    2. Mediastinal lymphadenopathy    3. Abnormal CT scan        DISPOSITION  Disposition: Admission to medical surgical unit  Patient condition is stable      NOTE: This report was transcribed using voice recognition software. Every effort was made to ensure accuracy; however, inadvertent computerized transcription errors may be present     ATTENDING PROVIDER ATTESTATION:     I have personally performed and/or participated in the history, exam, medical decision making, and procedures and agree with all pertinent clinical information.      I have also reviewed and agree with the past medical, family and social history unless otherwise noted.    My findings/Plan: I was the primary provider for patient.  Patient with history of diverticulosis history of opiate abuse history of hypertension presenting here because of rib pain and lower back pain.  Patient reports being seen for rib pain and rib fracture on 622 and 624.  Patient reporting no abdominal pain he does report some pelvic pain.  Patient also reports lower back pain.  Patient

## 2024-07-10 PROBLEM — M54.42 CHRONIC MIDLINE LOW BACK PAIN WITH LEFT-SIDED SCIATICA: Status: ACTIVE | Noted: 2024-07-10

## 2024-07-10 PROBLEM — R97.20 ELEVATED PSA: Status: ACTIVE | Noted: 2024-07-10

## 2024-07-10 PROBLEM — R39.11 URINARY HESITANCY: Status: ACTIVE | Noted: 2024-07-10

## 2024-07-10 PROBLEM — G89.29 CHRONIC MIDLINE LOW BACK PAIN WITH LEFT-SIDED SCIATICA: Status: ACTIVE | Noted: 2024-07-10

## 2024-07-10 PROBLEM — R59.0 RETROPERITONEAL LYMPHADENOPATHY: Status: ACTIVE | Noted: 2024-07-10

## 2024-07-10 PROBLEM — R10.2 PELVIC PAIN: Status: ACTIVE | Noted: 2024-07-10

## 2024-07-10 PROBLEM — M85.88 OSTEOPENIA OF LUMBAR SPINE: Status: ACTIVE | Noted: 2024-07-10

## 2024-07-10 PROBLEM — M54.40 ACUTE MIDLINE LOW BACK PAIN WITH SCIATICA: Status: ACTIVE | Noted: 2024-07-10

## 2024-07-10 LAB
25(OH)D3 SERPL-MCNC: 14.7 NG/ML (ref 30–100)
AMPHET UR QL SCN: NEGATIVE
ANION GAP SERPL CALCULATED.3IONS-SCNC: 14 MMOL/L (ref 7–16)
BACTERIA URNS QL MICRO: ABNORMAL
BARBITURATES UR QL SCN: NEGATIVE
BASOPHILS # BLD: 0.08 K/UL (ref 0–0.2)
BASOPHILS NFR BLD: 1 % (ref 0–2)
BENZODIAZ UR QL: NEGATIVE
BILIRUB UR QL STRIP: NEGATIVE
BUN SERPL-MCNC: 22 MG/DL (ref 6–20)
BUPRENORPHINE UR QL: NEGATIVE
CA-I BLD-SCNC: 1.26 MMOL/L (ref 1.15–1.33)
CALCIUM SERPL-MCNC: 10.1 MG/DL (ref 8.6–10.2)
CALCIUM UR-MCNC: 33.5 MG/DL
CANNABINOIDS UR QL SCN: NEGATIVE
CHLORIDE SERPL-SCNC: 101 MMOL/L (ref 98–107)
CLARITY UR: CLEAR
CO2 SERPL-SCNC: 25 MMOL/L (ref 22–29)
COCAINE UR QL SCN: NEGATIVE
COLOR UR: YELLOW
CREAT SERPL-MCNC: 0.8 MG/DL (ref 0.7–1.2)
CREAT UR-MCNC: 90.8 MG/DL (ref 40–278)
EKG ATRIAL RATE: 107 BPM
EKG P AXIS: 36 DEGREES
EKG P-R INTERVAL: 128 MS
EKG Q-T INTERVAL: 334 MS
EKG QRS DURATION: 88 MS
EKG QTC CALCULATION (BAZETT): 445 MS
EKG R AXIS: 35 DEGREES
EKG T AXIS: 25 DEGREES
EKG VENTRICULAR RATE: 107 BPM
EOSINOPHIL # BLD: 0.05 K/UL (ref 0.05–0.5)
EOSINOPHILS RELATIVE PERCENT: 1 % (ref 0–6)
ERYTHROCYTE [DISTWIDTH] IN BLOOD BY AUTOMATED COUNT: 14.5 % (ref 11.5–15)
FENTANYL UR QL: NEGATIVE
GFR, ESTIMATED: >90 ML/MIN/1.73M2
GGT SERPL-CCNC: 48 U/L (ref 10–71)
GLUCOSE SERPL-MCNC: 92 MG/DL (ref 74–99)
GLUCOSE UR STRIP-MCNC: NEGATIVE MG/DL
HCT VFR BLD AUTO: 33.7 % (ref 37–54)
HGB BLD-MCNC: 11.2 G/DL (ref 12.5–16.5)
HGB UR QL STRIP.AUTO: ABNORMAL
IMM GRANULOCYTES # BLD AUTO: 0.33 K/UL (ref 0–0.58)
IMM GRANULOCYTES NFR BLD: 4 % (ref 0–5)
KETONES UR STRIP-MCNC: NEGATIVE MG/DL
LDH SERPL-CCNC: 615 U/L (ref 135–225)
LEUKOCYTE ESTERASE UR QL STRIP: NEGATIVE
LYMPHOCYTES NFR BLD: 2.2 K/UL (ref 1.5–4)
LYMPHOCYTES RELATIVE PERCENT: 26 % (ref 20–42)
MCH RBC QN AUTO: 29 PG (ref 26–35)
MCHC RBC AUTO-ENTMCNC: 33.2 G/DL (ref 32–34.5)
MCV RBC AUTO: 87.3 FL (ref 80–99.9)
METHADONE UR QL: NEGATIVE
MICROALBUMIN UR-MCNC: <12 MG/L (ref 0–19)
MICROALBUMIN/CREAT UR-RTO: NORMAL MCG/MG CREAT (ref 0–30)
MONOCYTES NFR BLD: 0.52 K/UL (ref 0.1–0.95)
MONOCYTES NFR BLD: 6 % (ref 2–12)
MUCOUS THREADS URNS QL MICRO: PRESENT
NEUTROPHILS NFR BLD: 62 % (ref 43–80)
NEUTS SEG NFR BLD: 5.16 K/UL (ref 1.8–7.3)
NITRITE UR QL STRIP: NEGATIVE
OPIATES UR QL SCN: NEGATIVE
OXYCODONE UR QL SCN: POSITIVE
PCP UR QL SCN: NEGATIVE
PH UR STRIP: 5.5 [PH] (ref 5–9)
PLATELET # BLD AUTO: 100 K/UL (ref 130–450)
PMV BLD AUTO: 10.4 FL (ref 7–12)
POTASSIUM SERPL-SCNC: 3.6 MMOL/L (ref 3.5–5)
PROT UR STRIP-MCNC: ABNORMAL MG/DL
PTH-INTACT SERPL-MCNC: 20.1 PG/ML (ref 15–65)
RBC # BLD AUTO: 3.86 M/UL (ref 3.8–5.8)
RBC #/AREA URNS HPF: ABNORMAL /HPF
SODIUM SERPL-SCNC: 140 MMOL/L (ref 132–146)
SP GR UR STRIP: >1.03 (ref 1–1.03)
TEST INFORMATION: ABNORMAL
UROBILINOGEN UR STRIP-ACNC: 0.2 EU/DL (ref 0–1)
WBC #/AREA URNS HPF: ABNORMAL /HPF
WBC OTHER # BLD: 8.3 K/UL (ref 4.5–11.5)

## 2024-07-10 PROCEDURE — 82787 IGG 1 2 3 OR 4 EACH: CPT

## 2024-07-10 PROCEDURE — 84155 ASSAY OF PROTEIN SERUM: CPT

## 2024-07-10 PROCEDURE — 82784 ASSAY IGA/IGD/IGG/IGM EACH: CPT

## 2024-07-10 PROCEDURE — 82330 ASSAY OF CALCIUM: CPT

## 2024-07-10 PROCEDURE — 97161 PT EVAL LOW COMPLEX 20 MIN: CPT

## 2024-07-10 PROCEDURE — 84075 ASSAY ALKALINE PHOSPHATASE: CPT

## 2024-07-10 PROCEDURE — 97530 THERAPEUTIC ACTIVITIES: CPT

## 2024-07-10 PROCEDURE — 87389 HIV-1 AG W/HIV-1&-2 AB AG IA: CPT

## 2024-07-10 PROCEDURE — 6370000000 HC RX 637 (ALT 250 FOR IP): Performed by: NURSE PRACTITIONER

## 2024-07-10 PROCEDURE — 1200000000 HC SEMI PRIVATE

## 2024-07-10 PROCEDURE — 82977 ASSAY OF GGT: CPT

## 2024-07-10 PROCEDURE — 86360 T CELL ABSOLUTE COUNT/RATIO: CPT

## 2024-07-10 PROCEDURE — 51798 US URINE CAPACITY MEASURE: CPT

## 2024-07-10 PROCEDURE — 83615 LACTATE (LD) (LDH) ENZYME: CPT

## 2024-07-10 PROCEDURE — 82043 UR ALBUMIN QUANTITATIVE: CPT

## 2024-07-10 PROCEDURE — 2580000003 HC RX 258

## 2024-07-10 PROCEDURE — 6370000000 HC RX 637 (ALT 250 FOR IP)

## 2024-07-10 PROCEDURE — 86359 T CELLS TOTAL COUNT: CPT

## 2024-07-10 PROCEDURE — 83970 ASSAY OF PARATHORMONE: CPT

## 2024-07-10 PROCEDURE — 84080 ASSAY ALKALINE PHOSPHATASES: CPT

## 2024-07-10 PROCEDURE — 97165 OT EVAL LOW COMPLEX 30 MIN: CPT

## 2024-07-10 PROCEDURE — 97535 SELF CARE MNGMENT TRAINING: CPT

## 2024-07-10 PROCEDURE — 93010 ELECTROCARDIOGRAM REPORT: CPT | Performed by: INTERNAL MEDICINE

## 2024-07-10 PROCEDURE — 99223 1ST HOSP IP/OBS HIGH 75: CPT | Performed by: FAMILY MEDICINE

## 2024-07-10 PROCEDURE — 80074 ACUTE HEPATITIS PANEL: CPT

## 2024-07-10 PROCEDURE — 85025 COMPLETE CBC W/AUTO DIFF WBC: CPT

## 2024-07-10 PROCEDURE — 99222 1ST HOSP IP/OBS MODERATE 55: CPT | Performed by: NEUROLOGICAL SURGERY

## 2024-07-10 PROCEDURE — 84165 PROTEIN E-PHORESIS SERUM: CPT

## 2024-07-10 PROCEDURE — 80048 BASIC METABOLIC PNL TOTAL CA: CPT

## 2024-07-10 PROCEDURE — 82306 VITAMIN D 25 HYDROXY: CPT

## 2024-07-10 PROCEDURE — 36415 COLL VENOUS BLD VENIPUNCTURE: CPT

## 2024-07-10 PROCEDURE — 82340 ASSAY OF CALCIUM IN URINE: CPT

## 2024-07-10 PROCEDURE — 82570 ASSAY OF URINE CREATININE: CPT

## 2024-07-10 RX ORDER — SODIUM CHLORIDE 9 MG/ML
INJECTION, SOLUTION INTRAVENOUS PRN
Status: DISCONTINUED | OUTPATIENT
Start: 2024-07-10 | End: 2024-07-18 | Stop reason: HOSPADM

## 2024-07-10 RX ORDER — ONDANSETRON 2 MG/ML
4 INJECTION INTRAMUSCULAR; INTRAVENOUS EVERY 6 HOURS PRN
Status: DISCONTINUED | OUTPATIENT
Start: 2024-07-10 | End: 2024-07-18 | Stop reason: HOSPADM

## 2024-07-10 RX ORDER — SODIUM CHLORIDE 0.9 % (FLUSH) 0.9 %
5-40 SYRINGE (ML) INJECTION EVERY 12 HOURS SCHEDULED
Status: DISCONTINUED | OUTPATIENT
Start: 2024-07-10 | End: 2024-07-18 | Stop reason: HOSPADM

## 2024-07-10 RX ORDER — OXYCODONE HYDROCHLORIDE AND ACETAMINOPHEN 5; 325 MG/1; MG/1
1 TABLET ORAL ONCE
Status: COMPLETED | OUTPATIENT
Start: 2024-07-10 | End: 2024-07-10

## 2024-07-10 RX ORDER — SODIUM CHLORIDE 0.9 % (FLUSH) 0.9 %
5-40 SYRINGE (ML) INJECTION PRN
Status: DISCONTINUED | OUTPATIENT
Start: 2024-07-10 | End: 2024-07-18 | Stop reason: HOSPADM

## 2024-07-10 RX ORDER — OXYCODONE HYDROCHLORIDE AND ACETAMINOPHEN 5; 325 MG/1; MG/1
1 TABLET ORAL EVERY 4 HOURS PRN
Status: DISCONTINUED | OUTPATIENT
Start: 2024-07-10 | End: 2024-07-11

## 2024-07-10 RX ORDER — ENOXAPARIN SODIUM 100 MG/ML
40 INJECTION SUBCUTANEOUS DAILY
Status: DISCONTINUED | OUTPATIENT
Start: 2024-07-10 | End: 2024-07-18 | Stop reason: HOSPADM

## 2024-07-10 RX ORDER — POLYETHYLENE GLYCOL 3350 17 G/17G
17 POWDER, FOR SOLUTION ORAL DAILY PRN
Status: DISCONTINUED | OUTPATIENT
Start: 2024-07-10 | End: 2024-07-13

## 2024-07-10 RX ORDER — OXYCODONE HYDROCHLORIDE AND ACETAMINOPHEN 5; 325 MG/1; MG/1
1 TABLET ORAL EVERY 4 HOURS PRN
Status: DISCONTINUED | OUTPATIENT
Start: 2024-07-10 | End: 2024-07-10

## 2024-07-10 RX ORDER — METHOCARBAMOL 500 MG/1
500 TABLET, FILM COATED ORAL 4 TIMES DAILY
Status: DISCONTINUED | OUTPATIENT
Start: 2024-07-10 | End: 2024-07-18 | Stop reason: HOSPADM

## 2024-07-10 RX ORDER — ONDANSETRON 4 MG/1
4 TABLET, ORALLY DISINTEGRATING ORAL EVERY 8 HOURS PRN
Status: DISCONTINUED | OUTPATIENT
Start: 2024-07-10 | End: 2024-07-18 | Stop reason: HOSPADM

## 2024-07-10 RX ORDER — HYDROMORPHONE HYDROCHLORIDE 2 MG/1
1 TABLET ORAL EVERY 4 HOURS PRN
Status: DISCONTINUED | OUTPATIENT
Start: 2024-07-10 | End: 2024-07-18 | Stop reason: HOSPADM

## 2024-07-10 RX ADMIN — SODIUM CHLORIDE, PRESERVATIVE FREE 10 ML: 5 INJECTION INTRAVENOUS at 10:27

## 2024-07-10 RX ADMIN — OXYCODONE HYDROCHLORIDE AND ACETAMINOPHEN 1 TABLET: 5; 325 TABLET ORAL at 09:50

## 2024-07-10 RX ADMIN — METHOCARBAMOL 500 MG: 500 TABLET ORAL at 21:11

## 2024-07-10 RX ADMIN — POLYETHYLENE GLYCOL 3350 17 G: 17 POWDER, FOR SOLUTION ORAL at 10:41

## 2024-07-10 RX ADMIN — OXYCODONE HYDROCHLORIDE AND ACETAMINOPHEN 1 TABLET: 5; 325 TABLET ORAL at 04:43

## 2024-07-10 RX ADMIN — OXYCODONE HYDROCHLORIDE AND ACETAMINOPHEN 1 TABLET: 5; 325 TABLET ORAL at 14:00

## 2024-07-10 RX ADMIN — SODIUM CHLORIDE, PRESERVATIVE FREE 10 ML: 5 INJECTION INTRAVENOUS at 21:11

## 2024-07-10 RX ADMIN — METHOCARBAMOL 500 MG: 500 TABLET ORAL at 16:25

## 2024-07-10 RX ADMIN — HYDROMORPHONE HYDROCHLORIDE 1 MG: 2 TABLET ORAL at 16:25

## 2024-07-10 RX ADMIN — OXYCODONE HYDROCHLORIDE AND ACETAMINOPHEN 1 TABLET: 5; 325 TABLET ORAL at 18:49

## 2024-07-10 RX ADMIN — OXYCODONE HYDROCHLORIDE AND ACETAMINOPHEN 1 TABLET: 5; 325 TABLET ORAL at 00:49

## 2024-07-10 RX ADMIN — OXYCODONE HYDROCHLORIDE AND ACETAMINOPHEN 1 TABLET: 5; 325 TABLET ORAL at 23:00

## 2024-07-10 RX ADMIN — HYDROMORPHONE HYDROCHLORIDE 1 MG: 2 TABLET ORAL at 21:11

## 2024-07-10 RX ADMIN — DICLOFENAC SODIUM 2 G: 10 GEL TOPICAL at 21:12

## 2024-07-10 ASSESSMENT — PAIN DESCRIPTION - LOCATION
LOCATION: RIB CAGE;PELVIS
LOCATION: PELVIS;RIB CAGE
LOCATION: BACK;PELVIS;RIB CAGE
LOCATION: BACK;PELVIS;RIB CAGE
LOCATION: CHEST;PELVIS
LOCATION: BACK;PELVIS;RIB CAGE
LOCATION: PELVIS;RIB CAGE;BACK
LOCATION: BACK;PELVIS;RIB CAGE

## 2024-07-10 ASSESSMENT — PAIN SCALES - GENERAL
PAINLEVEL_OUTOF10: 8
PAINLEVEL_OUTOF10: 9
PAINLEVEL_OUTOF10: 5
PAINLEVEL_OUTOF10: 10
PAINLEVEL_OUTOF10: 0
PAINLEVEL_OUTOF10: 10
PAINLEVEL_OUTOF10: 7
PAINLEVEL_OUTOF10: 0
PAINLEVEL_OUTOF10: 10
PAINLEVEL_OUTOF10: 6
PAINLEVEL_OUTOF10: 10
PAINLEVEL_OUTOF10: 9
PAINLEVEL_OUTOF10: 6
PAINLEVEL_OUTOF10: 9
PAINLEVEL_OUTOF10: 7
PAINLEVEL_OUTOF10: 9
PAINLEVEL_OUTOF10: 10

## 2024-07-10 ASSESSMENT — PAIN DESCRIPTION - FREQUENCY
FREQUENCY: CONTINUOUS

## 2024-07-10 ASSESSMENT — PAIN DESCRIPTION - PAIN TYPE
TYPE: ACUTE PAIN

## 2024-07-10 ASSESSMENT — PAIN DESCRIPTION - DESCRIPTORS
DESCRIPTORS: SHOOTING;ACHING;BURNING
DESCRIPTORS: ACHING;SHOOTING;THROBBING
DESCRIPTORS: ACHING;SHOOTING;THROBBING
DESCRIPTORS: ACHING;SHOOTING;SORE
DESCRIPTORS: SHOOTING;ACHING;DISCOMFORT
DESCRIPTORS: ACHING;SHOOTING;THROBBING
DESCRIPTORS: ACHING;SHOOTING;SORE

## 2024-07-10 ASSESSMENT — PAIN DESCRIPTION - ONSET
ONSET: ON-GOING

## 2024-07-10 ASSESSMENT — PAIN DESCRIPTION - DIRECTION
RADIATING_TOWARDS: BILATERAL LEGS
RADIATING_TOWARDS: BILATERAL LEGS

## 2024-07-10 ASSESSMENT — PAIN DESCRIPTION - ORIENTATION
ORIENTATION: LEFT

## 2024-07-10 NOTE — PROGRESS NOTES
4 Eyes Skin Assessment     NAME:  Joseph Bond  YOB: 1980  MEDICAL RECORD NUMBER:  [id number]    The patient is being assessed for  Admission    I agree that at least one RN has performed a thorough Head to Toe Skin Assessment on the patient. ALL assessment sites listed below have been assessed.      Areas assessed by both nurses:    Head, Face, Ears, Shoulders, Back, Chest, Arms, Elbows, Hands, Sacrum. Buttock, Coccyx, Ischium, and Legs. Feet and Heels        Does the Patient have a Wound? No noted wound(s)       Simon Prevention initiated by RN: No  Wound Care Orders initiated by RN: No    Pressure Injury (Stage 3,4, Unstageable, DTI, NWPT, and Complex wounds) if present, place Wound referral order by RN under : No    New Ostomies, if present place, Ostomy referral order under : No     Nurse 1 eSignature: Electronically signed by Cielo Tavares RN on 7/10/24 at 6:59 AM EDT    **SHARE this note so that the co-signing nurse can place an eSignature**    Nurse 2 eSignature: Electronically signed by Christel Zuñiga on 7/10/24 at 7:00 AM EDT

## 2024-07-10 NOTE — CARE COORDINATION
Patient presented to Ed with c/o continued rib pain. Lumbar and pelvic region. Alk Phos over 200 Imaging showed mediastinal lymphadenopathy. And CT spine Findings aresuggestive of diffuse osseous metastatic disease process.  MRI ordered.. Urology  and pulmonary consulted. Oncology consulted. Met with patient at bedside to discuss transition of care. Patient lives alone on a 1st floor apartment. Patient was independent PTA.PT 18/24 OT 19/24  DR Warner s his PCP  NO discharge needs identified at this time. Patient will call his step daughter for a rode when medically cleared. If she s unable to transport can call [employer] at 1 [phone]. CM/S khang continue to follow.  Case Management Assessment  Initial Evaluation    Date/Time of Evaluation: 7/10/2024 4:01 PM  Assessment Completed by: Kirsten Matos RN    If patient is discharged prior to next notation, then this note serves as note for discharge by case management.    Patient Name: Joseph Bond                   YOB: 1980  Diagnosis: Mediastinal lymphadenopathy [R59.0]  Abnormal CT scan [R93.89]  Retroperitoneal lymphadenopathy [R59.0]                   Date / Time: 7/9/2024  7:14 PM    Patient Admission Status: Inpatient   Readmission Risk (Low < 19, Mod (19-27), High > 27): Readmission Risk Score: 10    Current PCP: Ty Shaikh MD  PCP verified by ? Yes    Chart Reviewed: Yes      History Provided by: Patient  Patient Orientation: Alert and Oriented    Patient Cognition: Alert    Hospitalization in the last 30 days (Readmission):  No    If yes, Readmission Assessment in  Navigator will be completed.    Advance Directives:      Code Status: Full Code   Patient's Primary Decision Maker is: Legal Next of Kin      Discharge Planning:    Patient lives with: Alone Type of Home: Apartment  Primary Care Giver: Self  Patient Support Systems include: Family Members   Current Financial resources:    Current community resources:     Current services prior to admission: None            Current DME:              Type of Home Care services:  None    ADLS  Prior functional level: Independent in ADLs/IADLs  Current functional level: Independent in ADLs/IADLs    PT AM-PAC: 17 /24  OT AM-PAC: 19 /24    Family can provide assistance at DC: No  Would you like Case Management to discuss the discharge plan with any other family members/significant others, and if so, who? No  Plans to Return to Present Housing: Unknown at present  Other Identified Issues/Barriers to RETURNING to current housing:   Potential Assistance needed at discharge: N/A            Potential DME:    Patient expects to discharge to: Apartment  Plan for transportation at discharge:      Financial    Payor: [employer] [place] MEDICAID / Plan: [employer] [place] MEDICA / Product Type: *No Product type* /     Does insurance require precert for SNF: Yes    Potential assistance Purchasing Medications: No  Meds-to-Beds request:        [facility] [address] [place] [address] - [address] - P [phone] - F [phone]  [address]  [facility] [address]  Phone: [phone] Fax: [phone]      Notes:    Factors facilitating achievement of predicted outcomes: Pleasant    Barriers to discharge: Limited family support    Additional Case Management Notes:   Patient presented to Ed with c/o continued rib pain. Lumbar and pelvic region. Alk Phos over 200 Imaging showed mediastinal lymphadenopathy. And CT spine Findings aresuggestive of diffuse osseous metastatic disease process.  MRI ordered.. Urology  and pulmonary consulted. Oncology consulted. Met with patient at bedside to discuss transition of care. Patient lives alone on a 1st floor apartment. Patient was independent PTA.PT 18/24 OT 19/24  DR Warner s his PCP  NO discharge needs identified at this time. Patient will call his step daughter for a rode when medically cleared. If she s unable to transport can call [employer]

## 2024-07-10 NOTE — PROGRESS NOTES
Occupational Therapy  OCCUPATIONAL THERAPY INITIAL EVALUATION    [facility]  [address] [place], [place]      Date:7/10/2024                                                Patient Name: Joseph Bond  MRN: [id number]  : 1980  Room: [address] [place] [address]    Evaluating OT:Alpa Charlton OTR/L #[id number]    Referring Provider: aNsima Phelps Chi, MD   Specific Provider Orders/Date: OT eval and treat 07/10/24     Diagnosis: Mediastinal lymphadenopathy [R59.0]  Abnormal CT scan [R93.89]  Retroperitoneal lymphadenopathy [R59.0]   Pt admitted to hospital on 24 for low back and pelvic pain    Pertinent Medical History:  has a past medical history of Diverticulosis, History of opioid abuse (HCC), and Hypertension.       Precautions:  Fall Risk, recent L 8th rib fx (- admission)    Assessment of current deficits    [x] Functional mobility  [x]ADLs  [x] Strength               []Cognition    [x] Functional transfers   [x] IADLs         [x] Safety Awareness   [x]Endurance    [] Fine Coordination              [x] Balance      [] Vision/perception   []Sensation     []Gross Motor Coordination  [] ROM  [] Delirium                   [] Motor Control     OT PLAN OF CARE   OT POC based on physician orders, patient diagnosis and results of clinical assessment    Frequency/Duration 1-3 days/wk for 2 weeks PRN   Specific OT Treatment Interventions to include:   * Instruction/training on adapted ADL techniques and AE recommendations to increase functional independence within precautions       * Training on energy conservation strategies, correct breathing pattern and techniques to improve independence/tolerance for self-care routine  * Functional transfer/mobility training/DME recommendations for increased independence, safety, and fall prevention  * Patient/Family education to increase follow through with safety techniques and functional independence  * Recommendation of

## 2024-07-10 NOTE — PROGRESS NOTES
[facility]  Family Medicine Attending    Hospital Course: 43 y.o. male with PMHx of polysubstance abuse including opioids and tobacco, abstinent for 5 years, diverticulitis who presents with rib pain and LLE pain. He also has recently noted difficulty w urination. ALP elevated 200, AST 76, Calcium 10.5 Hgb 12.3, Platelets 116.  . CT chest concerning for mediastinal LAD. CT Lumbar concerning for metastatic disease & retroperitoneal LAD.     S: Mild improvement in pain with percocet.     O: VS- Blood pressure 115/84, pulse 77, temperature 96.9 °F (36.1 °C), temperature source Temporal, resp. rate 18, height 1.702 m (5' 7\"), weight 88.5 kg (195 lb), SpO2 100 %.  Exam is as noted by resident with the following changes, additions or corrections:  Gen: NAD AAOx3  HEENT NCAT EOMI Anicterici. No facial droop.   CV RRR   Resp CTAB tender over left chest wall ~ribs 4-6.   Abd S NT ND no HSM.   Back: TTP over lower lumbar area. Limited ROM.     Impressions:   Principal Problem:    Retroperitoneal lymphadenopathy  Active Problems:    Chronic midline low back pain with left-sided sciatica    Elevated PSA    Urinary hesitancy    Pelvic pain    Osteopenia of lumbar spine  Resolved Problems:    * No resolved hospital problems. *      Plan:     1) Enlarged Prostate w/ elevated PSA - concerning for prostate cancer w metastatic lesions on CT. Pending Lumbar MRI w/wo contrast. Consult Urology/pulm for mediastinal LAD.     2) Elevated calcium - PTHrp,    See resident note for further details.        Attending Physician Statement  I have reviewed the chart and seen the patient with the resident(s).  I personally reviewed images, EKG's and similar tests, if present.  I personally reviewed and performed key elements of the history and exam.  I have reviewed and confirmed the Family Medicine admitting team resident history and exam with changes as indicated above.  I agree with the assessment, plan, and orders as

## 2024-07-10 NOTE — CONSULTS
[facility]  Hematology/Oncology  Consult  Dr. Aaron Matson M.D.    Patient Name: Joseph Bond  YOB: 1980  PCP: Ty Shaikh MD   Referring Provider:      Reason for Consultation:   Chief Complaint   Patient presents with    Rib Pain (injury)     Pain in rib, pelvis, and chest pain that is chronic    Hip Pain        History of Present Illness:  43 years old male came to the ER complaining of low back pain radiating down the left leg.  Labs on admission were remarkable for calcium of 10.5 alk phos 200 platelet count 116, .  CT lumbar spine showed findings suggestive of diffuse osseous metastasis along with retroperitoneal lymphadenopathy.  CT chest revealed mediastinal lymphadenopathy up to 2.5 cm paratracheal AP window subcarinal lymph nodes.  CT abdomen pelvis from 2 weeks ago revealed aortocaval lymphadenopathy prostatomegaly.    Review of systems: Over 10 systems were reviewed and all were negative except as mention above.      Diagnostic Data:     Past Medical History:   Diagnosis Date    Diverticulosis     History of opioid abuse (HCC)     Hypertension        Patient Active Problem List    Diagnosis Date Noted    Retroperitoneal lymphadenopathy 07/10/2024    Chronic midline low back pain with left-sided sciatica 07/10/2024    Elevated PSA 07/10/2024    Urinary hesitancy 07/10/2024    Pelvic pain 07/10/2024    Osteopenia of lumbar spine 07/10/2024        No past surgical history on file.    Family History  No family history on file.    Social History  Social History     Socioeconomic History    Marital status: Single     Spouse name: Not on file    Number of children: Not on file    Years of education: Not on file    Highest education level: Not on file   Occupational History    Not on file   Tobacco Use    Smoking status: Former     Average packs/day: 0.6 packs/day for 10.3 years (5.7 ttl pk-yrs)     Types: Cigarettes     Start date: 4/3/2014    Smokeless  use.    Home Medications  Prior to Admission medications    Medication Sig Start Date End Date Taking? Authorizing Provider   naproxen (NAPROSYN) 250 MG tablet Take 1 tablet by mouth 2 times daily (with meals) 7/2/24   Ty Shaikh MD   docusate sodium (COLACE) 100 MG capsule Take 1 capsule by mouth daily 7/2/24 8/1/24  Ty Shaikh MD   acetaminophen (TYLENOL) 500 MG tablet Take 1 tablet by mouth every 6 hours as needed for Pain    ProviderJohnson MD       Allergies  No Known Allergies        Objective  /84   Pulse 77   Temp 96.9 °F (36.1 °C) (Temporal)   Resp 18   Ht 1.702 m (5' 7\")   Wt 88.5 kg (195 lb)   SpO2 100%   BMI 30.54 kg/m²     Physical Exam:   Performance Status:  General: AAO to person, place, time, in no acute distress, pleasant   Head and neck : PERRLA, EOMI . Sclera non icteric.  Oropharynx : Clear  Neck: no JVD,  no adenopathy, no carotid bruit  LYMPHATICS : no peripheral lymphadenopathy  Heart: Regular rate and regular rhythm, no murmur  Lungs: Clear to auscultation   Extremities: No edema,no cyanosis, no clubbing.   Abdomen: Soft, non-tender;no masses, no organomegaly  Skin:  No rash.      Neurologic:Cranial nerves grossly intact. No focal motor or sensory deficits .    Recent Laboratory Data-   Lab Results   Component Value Date    WBC 8.3 07/10/2024    HGB 11.2 (L) 07/10/2024    HCT 33.7 (L) 07/10/2024    MCV 87.3 07/10/2024     (L) 07/10/2024    LYMPHOPCT 26 07/10/2024    RBC 3.86 07/10/2024    MCH 29.0 07/10/2024    MCHC 33.2 07/10/2024    RDW 14.5 07/10/2024    NEUTOPHILPCT 62 07/10/2024    MONOPCT 6 07/10/2024    EOSPCT 1 07/10/2024    BASOPCT 1 07/10/2024    NEUTROABS 5.16 07/10/2024    LYMPHSABS 2.20 07/10/2024    MONOSABS 0.52 07/10/2024    EOSABS 0.05 07/10/2024    BASOSABS 0.08 07/10/2024       Lab Results   Component Value Date     07/10/2024    K 3.6 07/10/2024     07/10/2024    CO2 25 07/10/2024    BUN 22 (H) 07/10/2024

## 2024-07-10 NOTE — CONSULTS
[facility]  Department of Internal Medicine  Division of Pulmonary, Critical Care and Sleep Medicine  Consult Note    Ed Cornejo DO, MPH, [facility], FACOI, FACP  Kassandra Kendrick DO, [facility]  Parish Zaldivar MD, MS Gladis Mcadams, APRN-CNP    Patient: Joseph Bond  MRN: [id number]  : 1980    Encounter Time: 3:26 PM     Date of Admission: 2024  7:14 PM    Primary Care Physician: Ty Shaikh MD    Reason for Consultation: Adenopathy     HISTORY OF PRESENT ILLNESS : Joseph Bond 43 y.o. male was seen in consultation regarding the above chief compliant.    Patient was in the ED on  for rib pain and low back pain.  Onset several weeks ago, progressively getting worse.  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  He had to lift her for bathing, feeding and transferring to bed.  Recently, he is unable to handle that and she is currently in nursing home.  2024, patient woke up at 3 AM because of worsening lower back pain and LLE pain.  Pain is 10/10, radiating down LLE towards toes.  In the morning, he had tried to call urology to set up for an appointment per PCPs recommendation.  When he told nurse over the phone about radicular pain, he was advised to go to the emergency department. He was referred to pulmonology for mediastinal lymphadenopathy and urology for Prostamegaly seen on CT abdomen pelvis and also PSA of 155.     Abuse history include opioid use and Xanax when he was younger, abstain for 5 years.  Also stopped smoking tobacco at that time.  Prior to that he had 1 PPD smoking history.  Never had a colonoscopy, not even after diverticulitis when he was diagnosed at 32.       PAST MEDICAL HISTORY:  has a past medical history of Diverticulosis, History of opioid abuse (HCC), and Hypertension.    SURGICAL HISTORY:  has no past  inferior to the aortic bifurcation.     1. Patchy nodular osteopenia through the lower thoracic and lumbar spinal segments, most significantly through the L3 and L5 segments. No overt pathological fracture. No significant soft tissue mass. Findings are suggestive of diffuse osseous metastatic disease process. Recommend additional characterization by MRI. 2. Retroperitoneal adenopathy. RECOMMENDATIONS: MRI of the lumbar spine recommended.     CTA CHEST W CONTRAST    Result Date: 7/10/2024  EXAMINATION: CTA OF THE CHEST 7/9/2024 11:26 pm TECHNIQUE: CTA of the chest was performed after the administration of intravenous contrast.  Multiplanar reformatted images are provided for review.  MIP images are provided for review. Automated exposure control, iterative reconstruction, and/or weight based adjustment of the mA/kV was utilized to reduce the radiation dose to as low as reasonably achievable. COMPARISON: None. HISTORY: ORDERING SYSTEM PROVIDED HISTORY: r/o PE, rib fx, now sob and tachycardic TECHNOLOGIST PROVIDED HISTORY: Reason for exam:->r/o PE, rib fx, now sob and tachycardic Additional Contrast?->1 What reading provider will be dictating this exam?->CRC FINDINGS: Pulmonary Arteries: Pulmonary arteries are adequately opacified for evaluation.  No evidence of intraluminal filling defect to suggest pulmonary embolism.  Main pulmonary artery is normal in caliber. Mediastinum: Mediastinal lymphadenopathy up to 2.5 cm right paratracheal, 12 mm AP window, and 17 mm subcarinal.  The heart and pericardium demonstrate no acute abnormality.  There is no acute abnormality of the thoracic aorta. Lungs/pleura: The lungs are without acute process.  No focal consolidation or pulmonary edema.  No evidence of pleural effusion or pneumothorax. Upper Abdomen: Limited images of the upper abdomen are unremarkable. Soft Tissues/Bones: No acute bone or soft tissue abnormality. Subacute/Chronic mild fracture deformity anterior left 8th

## 2024-07-10 NOTE — PROGRESS NOTES
Physical Therapy Initial Evaluation    Name: Joseph Bond  : 1980  MRN: [id number]      Date of Service: 7/10/2024    Evaluating PT:  Jasbir Villegas, PT [id number]    Referring provider/PT Order:  PT Eval and Treat   07/10/24 0600  PT evaluation and treat  Start:  07/10/24 06,   End:  07/10/24 06,   ONE TIME,   Standing Count:  1 Occurrences,   R         Nasima Phelps Chi, MD     Room #:  8421/8421-A  Diagnosis:  Mediastinal lymphadenopathy [R59.0]  Abnormal CT scan [R93.89]  Retroperitoneal lymphadenopathy [R59.0]  PMHx/PSHx:     has a past medical history of Diverticulosis, History of opioid abuse (HCC), and Hypertension.    has no past surgical history on file.     Procedure/Surgery:  None  Precautions:  FWB, L 8th Rib Fracture  Equipment Needs: None    SUBJECTIVE:    Patient lives alone  in a  1st floor apartment  with 4 steps to enter without Rail.  Bed is on 1 floor and bath is on 1 floor.  Patient ambulated independently  PTA. Equipment owned: None    OBJECTIVE:   Initial Evaluation  Date: 7/10/24 Treatment Short Term/ Long Term   Goals   AM-PAC 6 Clicks      Was pt agreeable to Eval/treatment? Yes      Does pt have pain? 10/10  L ribs  LBP and L Pelvic/Hip     Bed Mobility  Rolling: NT  Supine to sit:   NT   Sit to supine: NT   Scooting: NT   Rolling: Ind  Supine to sit: Ind  Sit to supine: Ind  Scooting: Ind   Transfers Sit to stand: SBA to None  Stand to sit: SBA  Stand pivot: SBA with None  Sit to stand: Ind to None  Stand to sit: Ind   Stand pivot: Ind with None   Ambulation    100 feet x2 with None   SBA for safety  150+ feet with Ind    Stair negotiation: ascended and descended  NT  4 steps with Ind and 0 rail     Strength/ROM:   See OT note for BUE ROM and strength  RLE grossly 5-/5  LLE grossly 4+/5  RLE AROM WFL  LLE AROM WFL    Balance:     Static Sitting: SBA  Dynamic Sitting: SBA  Static Standing: SBA with None  Dynamic Standing: SBA with None      Patient is Alert & Oriented x  home and/or into the community when appropriate  [] Positioning to prevent skin breakdown and contractures  [x] Safety and Education Training   [] Patient/Caregiver Education   [] HEP  [] Gait Team to be added to POC  [] Other     PT long term treatment goals are located in above grid    Frequency of treatments: 2-5x/week x 1-2 weeks.    Time in  0955  Time out  1020    Total Treatment Time  10 minutes     Evaluation Time includes thorough review of current medical information, gathering information on past medical history/social history and prior level of function, completion of standardized testing/informal observation of tasks, assessment of data and education on plan of care and goals.    CPT codes:  [x] Low Complexity PT evaluation 32252  [] Moderate Complexity PT evaluation 80014  [] High Complexity PT evaluation 97301  [] PT Re-evaluation 11706  [] Gait training 87126  minutes  [] Manual therapy 25988  minutes  [x] Therapeutic activities 60008 10 minutes  [] Therapeutic exercises 95139  minutes  [] Neuromuscular reeducation 77356 minutes     Jamila Galindo, SPT  Jasbir Villegas, PT [id number]

## 2024-07-10 NOTE — H&P
[facility]  Family Medicine Residency Program  History and Physical    Patient:  Joseph Bond 43 y.o. male MRN: [id number]     Date of Service: 7/10/2024    Hospital Day: 2      Chief complaint: had concerns including Rib Pain (injury) (Pain in rib, pelvis, and chest pain that is chronic) and Hip Pain.    History of Present Illness   The patient is a 43 y.o. male with PMHx of polysubstance abuse including opioids and tobacco, abstinent for 5 years, diverticulitis who presents with rib pain and LLE pain. History was obtained from Patient.  He is coming from home.    Prior to admission:  Patient was in the ED on 6/24 for rib pain and low back pain.  Onset several weeks ago, progressively getting worse.  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  He had to lift her for bathing, feeding and transferring to bed.  Recently, he is unable to handle that and she is currently in nursing home.  7/8/2024, patient woke up at 3 AM because of worsening lower back pain and LLE pain.  Pain is 10/10, radiating down LLE towards toes.  In the morning, he had tried to call urology to set up for an appointment per PCPs recommendation.  When he told nurse over the phone about radicular pain, he was advised to go to the emergency department.    Patient also complains of weak urinary stream, onset several months.  He also reports pelvic pain.  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 Of note, just established with PCP a week ago.  He was referred to pulmonology for mediastinal lymphadenopathy and urology for Prosta megaly seen on CT abdomen pelvis and also PSA of 155.    Abuse history include opioid use and Xanax when he was younger, abstain for 5 years.  Also stopped smoking tobacco at that time.  Prior to that he had 1 PPD smoking history.  Never had a colonoscopy, not even after  FINDINGS: Pulmonary Arteries: Pulmonary arteries are adequately opacified for evaluation.  No evidence of intraluminal filling defect to suggest pulmonary embolism.  Main pulmonary artery is normal in caliber. Mediastinum: Extensive mediastinal/paratracheal adenopathy measuring up to 3.0 cm..  The heart and pericardium demonstrate no acute abnormality.  There is no acute abnormality of the thoracic aorta. Lungs/pleura: The lungs are without acute process.  No focal consolidation or pulmonary edema.  Small left pleural effusion.  No pneumothorax. Upper Abdomen: Limited images of the upper abdomen are unremarkable. Soft Tissues/Bones: Fracture left 8th rib.     1. No evidence of pulmonary embolism. 2. Extensive mediastinal/paratracheal adenopathy measuring up to 3.0 cm. 3. Fracture left 8th rib. 4. Small left pleural effusion.     XR LUMBAR SPINE (2-3 VIEWS)    Result Date: 6/22/2024  EXAMINATION: 4 XRAY VIEWS OF THE LEFT RIBS WITH FRONTAL XRAY VIEW OF THE CHEST; 3 XRAY VIEWS OF THE LUMBAR SPINE 6/22/2024 11:01 am COMPARISON: None. HISTORY: ORDERING SYSTEM PROVIDED HISTORY: left sided rib pain TECHNOLOGIST PROVIDED HISTORY: Reason for exam:->left sided rib pain; ORDERING SYSTEM PROVIDED HISTORY: low back pain TECHNOLOGIST PROVIDED HISTORY: Reason for exam:->low back pain FINDINGS: AP, lateral, and spot views of the lumbar spine reveal minimal multilevel degenerative changes seen throughout the lumbar spine.  Vertebral body heights and disc spaces are preserved.  Facets are well aligned.  Pedicles are intact. Frontal view of the chest reveals cardiac and mediastinal silhouettes within normal limits.  There is prominence identified the right paratracheal region. Consider CT imaging of the chest for further evaluation if clinically indicated.  Nondisplaced fracture involving the left anterior 8th rib.     1. Minimal multilevel degenerative changes seen throughout the lumbar spine with no acute fracture or malalignment.

## 2024-07-10 NOTE — CONSULTS
Non-contributory to this Urological problem  family history is not on file.    Review of Systems:  Constitutional: No fever or chills   Respiratory: negative for cough and hemoptysis  Cardiovascular: negative for chest pain and dyspnea  Gastrointestinal: negative for abdominal pain, diarrhea, nausea and vomiting   Derm: negative for rash and skin lesion(s)  Neurological: negative for seizures and tremors  Musculoskeletal: Negative    Psychiatric: Negative   : As above in the HPI, otherwise negative  All other reviews are negative    Physical Exam:     Vitals:  /84   Pulse 77   Temp 96.9 °F (36.1 °C) (Temporal)   Resp 18   Ht 1.702 m (5' 7\")   Wt 88.5 kg (195 lb)   SpO2 100%   BMI 30.54 kg/m²     General:  Awake, alert, oriented X 3. No apparent distress.  HEENT:  Normocephalic, atraumatic.  Lungs:  Respirations symmetric and non-labored.  Abdomen:  soft, nontender, no masses  Extremities:  No clubbing, cyanosis, or edema  Skin:  Warm and dry, no open lesions or rashes  Neuro: There are no motor or sensory deficits in the 4 quadrant extremities   Rectal: deferred  Genitourinary: No Dia    Labs:     Recent Labs     07/09/24  1930 07/10/24  0748   WBC 10.9 8.3   RBC 4.30 3.86   HGB 12.3* 11.2*   HCT 37.2 33.7*   MCV 86.5 87.3   MCH 28.6 29.0   MCHC 33.1 33.2   RDW 14.4 14.5   * 100*   MPV 10.4 10.4         Recent Labs     07/09/24  1930 07/10/24  0748   CREATININE 0.9 0.8       Lab Results   Component Value Date    .70 (H) 07/02/2024       Imaging:   XAMINATION:  CT OF THE ABDOMEN AND PELVIS WITH CONTRAST 6/24/2024 11:09 pm     TECHNIQUE:  CT of the abdomen and pelvis was performed with the administration of  intravenous contrast. Multiplanar reformatted images are provided for review.  Automated exposure control, iterative reconstruction, and/or weight based  adjustment of the mA/kV was utilized to reduce the radiation dose to as low  as reasonably achievable.     COMPARISON:  None.    segments, most significantly through the L3  and L5 segments.  No overt pathological fracture.  No significant soft tissue  mass.  Findings are suggestive of diffuse osseous metastatic disease process.  Recommend additional characterization by MRI.  Mild superior endplate  Schmorl's nodes at L1, L3 and L5 noted.     DEGENERATIVE CHANGES: No significant degenerative changes of the lumbar spine.     SOFT TISSUES/RETROPERITONEUM: Periaortic lymph adenopathy up to 2.7 cm left  Morena renal and 1.7 cm inferior to the aortic bifurcation.     IMPRESSION:  1. Patchy nodular osteopenia through the lower thoracic and lumbar spinal  segments, most significantly through the L3 and L5 segments. No overt  pathological fracture. No significant soft tissue mass. Findings are  suggestive of diffuse osseous metastatic disease process. Recommend  additional characterization by MRI.  2. Retroperitoneal adenopathy.     RECOMMENDATIONS:  MRI of the lumbar spine recommended.        Assessment/plan:  Elevated PSA  CT imaging suggestive of diffuse osseous metastatic disease  Prostatomegaly    Likely metastatic prostate cancer  Recommend oncology consultation  Recommend neurosurgery consultation with concern for cauda equina syndrome  Check a bone scan  Check a PVR  Will follow      Electronically signed by Ricci Mayberry PA-C on 7/10/2024 at 1:55 PM  [facility]   Agree with above assessment and plan

## 2024-07-10 NOTE — PLAN OF CARE
Problem: Musculoskeletal - Adult  Goal: Return mobility to safest level of function  7/10/2024 1119 by Jesus Mariee RN  Outcome: Progressing     Problem: Musculoskeletal - Adult  Goal: Maintain proper alignment of affected body part  Outcome: Progressing     Problem: Pain  Goal: Verbalizes/displays adequate comfort level or baseline comfort level  Outcome: Progressing      Pt is a 38 y/o F nonsmoker PMHx thyroid nodule, gallstones, s/p thyroidectomy 3 days ago p/w tingling, numbness and cramping today.  Pt noted gradual onset numbness to face and muscle cramps to bilateral forearms, hands, arms, chest wall this morning.  Pt notes not having similar symptoms before.  Pt denies any fevers, chills, palpitations, lightheadedness, headache, visual/auditory disturbances.  Pt notes she has been compliant with calcium supplement since surgery with Dr. Hogan.  In ED, pt found to have hypocalcemia with serum calcium of 7.2 mg/dL.  Dr. Hogan was notified who recommended supplemental calcium and calcitriol.  Pt sent to CDU for calcium repletion, reassessment, and repeat labs.  Presently, pt notes significant improvement in muscle cramps and numbness.

## 2024-07-11 ENCOUNTER — APPOINTMENT (OUTPATIENT)
Dept: NUCLEAR MEDICINE | Age: 44
DRG: 500 | End: 2024-07-11
Payer: MEDICAID

## 2024-07-11 ENCOUNTER — ANESTHESIA (OUTPATIENT)
Dept: ENDOSCOPY | Age: 44
End: 2024-07-11
Payer: MEDICAID

## 2024-07-11 ENCOUNTER — ANESTHESIA EVENT (OUTPATIENT)
Dept: ENDOSCOPY | Age: 44
End: 2024-07-11
Payer: MEDICAID

## 2024-07-11 LAB
ANION GAP SERPL CALCULATED.3IONS-SCNC: 15 MMOL/L (ref 7–16)
BASOPHILS # BLD: 0.06 K/UL (ref 0–0.2)
BASOPHILS NFR BLD: 1 % (ref 0–2)
BILIRUB UR QL STRIP: NEGATIVE
BUN SERPL-MCNC: 17 MG/DL (ref 6–20)
CALCIUM SERPL-MCNC: 9.7 MG/DL (ref 8.6–10.2)
CHLORIDE SERPL-SCNC: 101 MMOL/L (ref 98–107)
CLARITY UR: CLEAR
CO2 SERPL-SCNC: 23 MMOL/L (ref 22–29)
COLOR UR: YELLOW
CREAT SERPL-MCNC: 0.8 MG/DL (ref 0.7–1.2)
EOSINOPHIL # BLD: 0.06 K/UL (ref 0.05–0.5)
EOSINOPHILS RELATIVE PERCENT: 1 % (ref 0–6)
ERYTHROCYTE [DISTWIDTH] IN BLOOD BY AUTOMATED COUNT: 14.6 % (ref 11.5–15)
GFR, ESTIMATED: >90 ML/MIN/1.73M2
GLUCOSE SERPL-MCNC: 89 MG/DL (ref 74–99)
GLUCOSE UR STRIP-MCNC: NEGATIVE MG/DL
HAV IGM SERPL QL IA: NONREACTIVE
HBV CORE IGM SERPL QL IA: NONREACTIVE
HBV SURFACE AG SERPL QL IA: NONREACTIVE
HCT VFR BLD AUTO: 30.9 % (ref 37–54)
HCV AB SERPL QL IA: NONREACTIVE
HGB BLD-MCNC: 10 G/DL (ref 12.5–16.5)
HGB UR QL STRIP.AUTO: ABNORMAL
HIV 1+2 AB+HIV1 P24 AG SERPL QL IA: NONREACTIVE
IGG SERPL-MCNC: 908 MG/DL (ref 700–1600)
IMM GRANULOCYTES # BLD AUTO: 0.31 K/UL (ref 0–0.58)
IMM GRANULOCYTES NFR BLD: 4 % (ref 0–5)
INR PPP: 1.2
KETONES UR STRIP-MCNC: NEGATIVE MG/DL
LEUKOCYTE ESTERASE UR QL STRIP: NEGATIVE
LYMPHOCYTES NFR BLD: 1.89 K/UL (ref 1.5–4)
LYMPHOCYTES RELATIVE PERCENT: 26 % (ref 20–42)
MCH RBC QN AUTO: 27.9 PG (ref 26–35)
MCHC RBC AUTO-ENTMCNC: 32.4 G/DL (ref 32–34.5)
MCV RBC AUTO: 86.1 FL (ref 80–99.9)
MONOCYTES NFR BLD: 0.54 K/UL (ref 0.1–0.95)
MONOCYTES NFR BLD: 8 % (ref 2–12)
NEUTROPHILS NFR BLD: 60 % (ref 43–80)
NEUTS SEG NFR BLD: 4.33 K/UL (ref 1.8–7.3)
NITRITE UR QL STRIP: NEGATIVE
PATH REV BLD -IMP: NORMAL
PH UR STRIP: 6 [PH] (ref 5–9)
PLATELET # BLD AUTO: 91 K/UL (ref 130–450)
PLATELET CONFIRMATION: NORMAL
PMV BLD AUTO: 9.9 FL (ref 7–12)
POTASSIUM SERPL-SCNC: 3.8 MMOL/L (ref 3.5–5)
PROT UR STRIP-MCNC: NEGATIVE MG/DL
PROTHROMBIN TIME: 13.1 SEC (ref 9.3–12.4)
PSA SERPL-MCNC: 143.3 NG/ML (ref 0–4)
RBC # BLD AUTO: 3.59 M/UL (ref 3.8–5.8)
RBC #/AREA URNS HPF: ABNORMAL /HPF
SODIUM SERPL-SCNC: 139 MMOL/L (ref 132–146)
SP GR UR STRIP: 1.02 (ref 1–1.03)
UROBILINOGEN UR STRIP-ACNC: 0.2 EU/DL (ref 0–1)
WBC #/AREA URNS HPF: ABNORMAL /HPF
WBC OTHER # BLD: 7.2 K/UL (ref 4.5–11.5)

## 2024-07-11 PROCEDURE — A9503 TC99M MEDRONATE: HCPCS | Performed by: RADIOLOGY

## 2024-07-11 PROCEDURE — 2580000003 HC RX 258: Performed by: INTERNAL MEDICINE

## 2024-07-11 PROCEDURE — 2580000003 HC RX 258

## 2024-07-11 PROCEDURE — 2709999900 HC NON-CHARGEABLE SUPPLY: Performed by: INTERNAL MEDICINE

## 2024-07-11 PROCEDURE — 6370000000 HC RX 637 (ALT 250 FOR IP): Performed by: INTERNAL MEDICINE

## 2024-07-11 PROCEDURE — 3700000001 HC ADD 15 MINUTES (ANESTHESIA): Performed by: INTERNAL MEDICINE

## 2024-07-11 PROCEDURE — 7100000001 HC PACU RECOVERY - ADDTL 15 MIN: Performed by: INTERNAL MEDICINE

## 2024-07-11 PROCEDURE — 88173 CYTOPATH EVAL FNA REPORT: CPT

## 2024-07-11 PROCEDURE — 6360000002 HC RX W HCPCS: Performed by: NURSE ANESTHETIST, CERTIFIED REGISTERED

## 2024-07-11 PROCEDURE — 3609020000 HC BRONCHOSCOPY W/EBUS FNA: Performed by: INTERNAL MEDICINE

## 2024-07-11 PROCEDURE — 7100000000 HC PACU RECOVERY - FIRST 15 MIN: Performed by: INTERNAL MEDICINE

## 2024-07-11 PROCEDURE — 6370000000 HC RX 637 (ALT 250 FOR IP)

## 2024-07-11 PROCEDURE — 85610 PROTHROMBIN TIME: CPT

## 2024-07-11 PROCEDURE — 99232 SBSQ HOSP IP/OBS MODERATE 35: CPT

## 2024-07-11 PROCEDURE — 88342 IMHCHEM/IMCYTCHM 1ST ANTB: CPT

## 2024-07-11 PROCEDURE — 85025 COMPLETE CBC W/AUTO DIFF WBC: CPT

## 2024-07-11 PROCEDURE — 1200000000 HC SEMI PRIVATE

## 2024-07-11 PROCEDURE — 88172 CYTP DX EVAL FNA 1ST EA SITE: CPT

## 2024-07-11 PROCEDURE — 0BD78ZX EXTRACTION OF LEFT MAIN BRONCHUS, VIA NATURAL OR ARTIFICIAL OPENING ENDOSCOPIC, DIAGNOSTIC: ICD-10-PCS | Performed by: INTERNAL MEDICINE

## 2024-07-11 PROCEDURE — 86481 TB AG RESPONSE T-CELL SUSP: CPT

## 2024-07-11 PROCEDURE — 3700000000 HC ANESTHESIA ATTENDED CARE: Performed by: INTERNAL MEDICINE

## 2024-07-11 PROCEDURE — 2500000003 HC RX 250 WO HCPCS: Performed by: NURSE ANESTHETIST, CERTIFIED REGISTERED

## 2024-07-11 PROCEDURE — 3430000000 HC RX DIAGNOSTIC RADIOPHARMACEUTICAL: Performed by: RADIOLOGY

## 2024-07-11 PROCEDURE — 3609027000 HC BRONCHOSCOPY: Performed by: INTERNAL MEDICINE

## 2024-07-11 PROCEDURE — 36415 COLL VENOUS BLD VENIPUNCTURE: CPT

## 2024-07-11 PROCEDURE — 82542 COL CHROMOTOGRAPHY QUAL/QUAN: CPT

## 2024-07-11 PROCEDURE — G0103 PSA SCREENING: HCPCS

## 2024-07-11 PROCEDURE — 88305 TISSUE EXAM BY PATHOLOGIST: CPT

## 2024-07-11 PROCEDURE — 78306 BONE IMAGING WHOLE BODY: CPT

## 2024-07-11 PROCEDURE — 80048 BASIC METABOLIC PNL TOTAL CA: CPT

## 2024-07-11 PROCEDURE — 81001 URINALYSIS AUTO W/SCOPE: CPT

## 2024-07-11 PROCEDURE — 88341 IMHCHEM/IMCYTCHM EA ADD ANTB: CPT

## 2024-07-11 PROCEDURE — 99232 SBSQ HOSP IP/OBS MODERATE 35: CPT | Performed by: INTERNAL MEDICINE

## 2024-07-11 RX ORDER — MIDAZOLAM HYDROCHLORIDE 1 MG/ML
INJECTION INTRAMUSCULAR; INTRAVENOUS PRN
Status: DISCONTINUED | OUTPATIENT
Start: 2024-07-11 | End: 2024-07-11 | Stop reason: SDUPTHER

## 2024-07-11 RX ORDER — DEXAMETHASONE SODIUM PHOSPHATE 10 MG/ML
INJECTION INTRAMUSCULAR; INTRAVENOUS PRN
Status: DISCONTINUED | OUTPATIENT
Start: 2024-07-11 | End: 2024-07-11 | Stop reason: SDUPTHER

## 2024-07-11 RX ORDER — OXYCODONE HYDROCHLORIDE 10 MG/1
10 TABLET ORAL
Status: DISCONTINUED | OUTPATIENT
Start: 2024-07-11 | End: 2024-07-18 | Stop reason: HOSPADM

## 2024-07-11 RX ORDER — BICALUTAMIDE 50 MG/1
50 TABLET, FILM COATED ORAL DAILY
Status: DISCONTINUED | OUTPATIENT
Start: 2024-07-11 | End: 2024-07-18 | Stop reason: HOSPADM

## 2024-07-11 RX ORDER — SODIUM CHLORIDE 9 MG/ML
INJECTION, SOLUTION INTRAVENOUS CONTINUOUS
Status: DISCONTINUED | OUTPATIENT
Start: 2024-07-11 | End: 2024-07-11

## 2024-07-11 RX ORDER — ONDANSETRON 2 MG/ML
INJECTION INTRAMUSCULAR; INTRAVENOUS PRN
Status: DISCONTINUED | OUTPATIENT
Start: 2024-07-11 | End: 2024-07-11 | Stop reason: SDUPTHER

## 2024-07-11 RX ORDER — FENTANYL CITRATE 50 UG/ML
INJECTION, SOLUTION INTRAMUSCULAR; INTRAVENOUS PRN
Status: DISCONTINUED | OUTPATIENT
Start: 2024-07-11 | End: 2024-07-11 | Stop reason: SDUPTHER

## 2024-07-11 RX ORDER — PROPOFOL 10 MG/ML
INJECTION, EMULSION INTRAVENOUS PRN
Status: DISCONTINUED | OUTPATIENT
Start: 2024-07-11 | End: 2024-07-11 | Stop reason: SDUPTHER

## 2024-07-11 RX ORDER — ROCURONIUM BROMIDE 10 MG/ML
INJECTION, SOLUTION INTRAVENOUS PRN
Status: DISCONTINUED | OUTPATIENT
Start: 2024-07-11 | End: 2024-07-11 | Stop reason: SDUPTHER

## 2024-07-11 RX ORDER — ERGOCALCIFEROL 1.25 MG/1
50000 CAPSULE ORAL WEEKLY
Status: DISCONTINUED | OUTPATIENT
Start: 2024-07-11 | End: 2024-07-18 | Stop reason: HOSPADM

## 2024-07-11 RX ORDER — SUCCINYLCHOLINE/SOD CL,ISO/PF 100 MG/5ML
SYRINGE (ML) INTRAVENOUS PRN
Status: DISCONTINUED | OUTPATIENT
Start: 2024-07-11 | End: 2024-07-11 | Stop reason: SDUPTHER

## 2024-07-11 RX ORDER — TC 99M MEDRONATE 20 MG/10ML
27 INJECTION, POWDER, LYOPHILIZED, FOR SOLUTION INTRAVENOUS ONCE
Status: COMPLETED | OUTPATIENT
Start: 2024-07-11 | End: 2024-07-11

## 2024-07-11 RX ORDER — LIDOCAINE HYDROCHLORIDE 20 MG/ML
INJECTION, SOLUTION INTRAVENOUS PRN
Status: DISCONTINUED | OUTPATIENT
Start: 2024-07-11 | End: 2024-07-11 | Stop reason: SDUPTHER

## 2024-07-11 RX ORDER — CHOLECALCIFEROL (VITAMIN D3) 50 MCG
2000 TABLET ORAL DAILY
Status: DISCONTINUED | OUTPATIENT
Start: 2024-07-11 | End: 2024-07-18 | Stop reason: HOSPADM

## 2024-07-11 RX ADMIN — FENTANYL CITRATE 100 MCG: 50 INJECTION, SOLUTION INTRAMUSCULAR; INTRAVENOUS at 15:47

## 2024-07-11 RX ADMIN — METHOCARBAMOL 500 MG: 500 TABLET ORAL at 08:57

## 2024-07-11 RX ADMIN — DEXAMETHASONE SODIUM PHOSPHATE 10 MG: 10 INJECTION INTRAMUSCULAR; INTRAVENOUS at 15:50

## 2024-07-11 RX ADMIN — OXYCODONE HYDROCHLORIDE 10 MG: 10 TABLET ORAL at 11:31

## 2024-07-11 RX ADMIN — HYDROMORPHONE HYDROCHLORIDE 1 MG: 2 TABLET ORAL at 02:37

## 2024-07-11 RX ADMIN — Medication 2000 UNITS: at 11:31

## 2024-07-11 RX ADMIN — METHOCARBAMOL 500 MG: 500 TABLET ORAL at 13:10

## 2024-07-11 RX ADMIN — PHENYLEPHRINE HYDROCHLORIDE 200 MCG: 10 INJECTION INTRAVENOUS at 15:51

## 2024-07-11 RX ADMIN — MIDAZOLAM 2 MG: 1 INJECTION INTRAMUSCULAR; INTRAVENOUS at 15:40

## 2024-07-11 RX ADMIN — OXYCODONE HYDROCHLORIDE 10 MG: 10 TABLET ORAL at 18:02

## 2024-07-11 RX ADMIN — ERGOCALCIFEROL 50000 UNITS: 1.25 CAPSULE ORAL at 20:49

## 2024-07-11 RX ADMIN — ROCURONIUM BROMIDE 20 MG: 10 INJECTION, SOLUTION INTRAVENOUS at 15:51

## 2024-07-11 RX ADMIN — SODIUM CHLORIDE, PRESERVATIVE FREE 10 ML: 5 INJECTION INTRAVENOUS at 08:58

## 2024-07-11 RX ADMIN — SODIUM CHLORIDE: 9 INJECTION, SOLUTION INTRAVENOUS at 15:40

## 2024-07-11 RX ADMIN — LIDOCAINE HYDROCHLORIDE 100 MG: 20 INJECTION, SOLUTION INTRAVENOUS at 15:47

## 2024-07-11 RX ADMIN — SUGAMMADEX 266 MG: 100 INJECTION, SOLUTION INTRAVENOUS at 16:15

## 2024-07-11 RX ADMIN — OXYCODONE HYDROCHLORIDE 10 MG: 10 TABLET ORAL at 20:50

## 2024-07-11 RX ADMIN — BICALUTAMIDE 50 MG: 50 TABLET, FILM COATED ORAL at 10:16

## 2024-07-11 RX ADMIN — DICLOFENAC SODIUM 2 G: 10 GEL TOPICAL at 20:51

## 2024-07-11 RX ADMIN — TC 99M MEDRONATE 27 MILLICURIE: 20 INJECTION, POWDER, LYOPHILIZED, FOR SOLUTION INTRAVENOUS at 09:59

## 2024-07-11 RX ADMIN — OXYCODONE HYDROCHLORIDE AND ACETAMINOPHEN 1 TABLET: 5; 325 TABLET ORAL at 08:57

## 2024-07-11 RX ADMIN — PROPOFOL 180 MG: 10 INJECTION, EMULSION INTRAVENOUS at 15:47

## 2024-07-11 RX ADMIN — METHOCARBAMOL 500 MG: 500 TABLET ORAL at 18:02

## 2024-07-11 RX ADMIN — SODIUM CHLORIDE, PRESERVATIVE FREE 10 ML: 5 INJECTION INTRAVENOUS at 20:50

## 2024-07-11 RX ADMIN — DICLOFENAC SODIUM 2 G: 10 GEL TOPICAL at 08:58

## 2024-07-11 RX ADMIN — METHOCARBAMOL 500 MG: 500 TABLET ORAL at 20:50

## 2024-07-11 RX ADMIN — SODIUM CHLORIDE: 9 INJECTION, SOLUTION INTRAVENOUS at 10:22

## 2024-07-11 RX ADMIN — OXYCODONE HYDROCHLORIDE AND ACETAMINOPHEN 1 TABLET: 5; 325 TABLET ORAL at 04:56

## 2024-07-11 RX ADMIN — Medication 140 MG: at 15:47

## 2024-07-11 RX ADMIN — ONDANSETRON HYDROCHLORIDE 4 MG: 2 SOLUTION INTRAMUSCULAR; INTRAVENOUS at 16:12

## 2024-07-11 ASSESSMENT — PAIN DESCRIPTION - DESCRIPTORS
DESCRIPTORS: ACHING;SORE;THROBBING

## 2024-07-11 ASSESSMENT — PAIN - FUNCTIONAL ASSESSMENT
PAIN_FUNCTIONAL_ASSESSMENT: PREVENTS OR INTERFERES SOME ACTIVE ACTIVITIES AND ADLS
PAIN_FUNCTIONAL_ASSESSMENT: ADULT NONVERBAL PAIN SCALE (NPVS)

## 2024-07-11 ASSESSMENT — PAIN SCALES - GENERAL
PAINLEVEL_OUTOF10: 6
PAINLEVEL_OUTOF10: 9
PAINLEVEL_OUTOF10: 9
PAINLEVEL_OUTOF10: 10
PAINLEVEL_OUTOF10: 10
PAINLEVEL_OUTOF10: 9
PAINLEVEL_OUTOF10: 8
PAINLEVEL_OUTOF10: 9

## 2024-07-11 ASSESSMENT — PAIN DESCRIPTION - ONSET
ONSET: ON-GOING

## 2024-07-11 ASSESSMENT — PAIN DESCRIPTION - PAIN TYPE
TYPE: ACUTE PAIN

## 2024-07-11 ASSESSMENT — PAIN DESCRIPTION - FREQUENCY
FREQUENCY: CONTINUOUS

## 2024-07-11 ASSESSMENT — PAIN DESCRIPTION - ORIENTATION
ORIENTATION: LEFT

## 2024-07-11 ASSESSMENT — PAIN DESCRIPTION - LOCATION
LOCATION: BACK;RIB CAGE;PELVIS
LOCATION: BACK;RIB CAGE;PELVIS
LOCATION: BACK;PELVIS;RIB CAGE
LOCATION: BACK;RIB CAGE;PELVIS

## 2024-07-11 NOTE — CARE COORDINATION
CM update note.  Patient plans to return to home when medically ready.  His step daughter will provide transport.  If she is unable, will need to use [employer] at [phone].   Bronchoscopy planned for today.  MRI Lumbar ordered.  NM Bone scan completed.  CM/TREY to follow.   Edwardo Leiva RN [phone].

## 2024-07-11 NOTE — PROGRESS NOTES
[facility]  Department of Internal Medicine  Division of Pulmonary, Critical Care and Sleep Medicine  Progress     Patient: Joseph Bond  MRN: [id number]  : 1980    Encounter Time: 12:08 PM     Date of Admission: 2024  7:14 PM    Primary Care Physician: Ty Shaikh MD    Reason for Consultation: Adenopathy  SUBJECTIVE:    Bronchoscopy today  HIV and Hep negative  PSA elevated  Bone scan bending      OBJECTIVE:     PHYSICAL EXAM:   VITALS:   Vitals:    07/10/24 2300 24 0237 24 0456 24 0738   BP:    111/72   Pulse:    85   Resp: 16 16 16 18   Temp:    98.3 °F (36.8 °C)   TempSrc:    Oral   SpO2:    97%   Weight:       Height:            Intake/Output Summary (Last 24 hours) at 2024 1208  Last data filed at 7/10/2024 2302  Gross per 24 hour   Intake 240 ml   Output 200 ml   Net 40 ml        CONSTITUTIONAL:   A&O x 3, NAD  SKIN:     No rash, No suspicious lesions, No skin discoloration  HEENT:     EOMI, MMM, No thrush  NECK:    No bruits, No JVP appreciated  CV:      Sinus,  No murmur, No rubs, No gallops  PULMONARY:   Couse BS,  No Wheezing, No Rales, No Rhonchi      No noted egophony  ABDOMEN:     Soft, non-tender. BS normal. No R/R/G  EXT:    No deformities .  No clubbing.       No lower extremity edema, No venous stasis  PULSE:   Appears equal and palpable.  PSYCHIATRIC:  Seems appropriate, No acute psychosis  MS:    No fractures, No gross weakness  NEUROLOGIC:   The clinical assessment is non-focal     DATA: IMAGING & TESTING:     LABORATORY TESTS:    CBC:   Lab Results   Component Value Date/Time    WBC 7.2 2024 04:34 AM    RBC 3.59 2024 04:34 AM    HGB 10.0 2024 04:34 AM    HCT 30.9 2024 04:34 AM    MCV 86.1 2024 04:34 AM    MCH 27.9 2024 04:34 AM    MCHC 32.4 2024 04:34 AM    RDW 14.6 2024 04:34 AM    PLT 91 2024 04:34 AM    MPV 9.9 2024 04:34 AM     CMP:    Lab Results    Component Value Date/Time     07/11/2024 04:34 AM    K 3.8 07/11/2024 04:34 AM    K 4.1 10/31/2021 07:33 PM     07/11/2024 04:34 AM    CO2 23 07/11/2024 04:34 AM    BUN 17 07/11/2024 04:34 AM    CREATININE 0.8 07/11/2024 04:34 AM    GFRAA >60 10/31/2021 07:33 PM    LABGLOM >90 07/11/2024 04:34 AM    GLUCOSE 89 07/11/2024 04:34 AM    CALCIUM 9.7 07/11/2024 04:34 AM    BILITOT 0.3 07/09/2024 07:30 PM    ALKPHOS 200 07/09/2024 07:30 PM    AST 76 07/09/2024 07:30 PM    ALT 36 07/09/2024 07:30 PM     Hepatic Function Panel:    Lab Results   Component Value Date/Time    ALKPHOS 200 07/09/2024 07:30 PM    ALT 36 07/09/2024 07:30 PM    AST 76 07/09/2024 07:30 PM    BILITOT 0.3 07/09/2024 07:30 PM       IMAGING:  Imaging tests were completed and reviewed and discussed radiology and care team involved and reveals   CT LUMBAR SPINE WO CONTRAST  Result Date: 7/10/2024  FINDINGS: BONES/ALIGNMENT: There is normal alignment of the spine. The vertebral body heights are maintained.  Patchy nodular osteopenia is present through the lower thoracic and lumbar spinal segments, most significantly through the L3 and L5 segments.  No overt pathological fracture.  No significant soft tissue mass.  Findings are suggestive of diffuse osseous metastatic disease process. Recommend additional characterization by MRI.  Mild superior endplate Schmorl's nodes at L1, L3 and L5 noted. DEGENERATIVE CHANGES: No significant degenerative changes of the lumbar spine. SOFT TISSUES/RETROPERITONEUM: Periaortic lymph adenopathy up to 2.7 cm left Morena renal and 1.7 cm inferior to the aortic bifurcation.     1. Patchy nodular osteopenia through the lower thoracic and lumbar spinal segments, most significantly through the L3 and L5 segments. No overt pathological fracture. No significant soft tissue mass. Findings are suggestive of diffuse osseous metastatic disease process.     CTA CHEST W CONTRAST  Result Date: 7/10/2024  FINDINGS: Pulmonary

## 2024-07-11 NOTE — CONSULTS
[facility]              [address] [place] [address]                              CONSULTATION      PATIENT NAME: BRIAN ANTHONY              : 1980  MED REC NO: [id number]                        ROOM: 8421  ACCOUNT NO: [id number]                       ADMIT DATE: 2024  PROVIDER: Ann Silva MD    NEUROSURGERY CONSULT    CONSULT DATE: 07/10/2024      REASON FOR CONSULT:  Back pain.    HISTORY OF PRESENT ILLNESS:  The patient is a 43-year-old gentleman who presented to the hospital with back pain for several weeks now.  He describes the pain as a sharp stabbing pain, states that it occasionally radiates into his left lower extremity.  Denies any numbness or tingling.  Admits to left lower extremity weakness.  Denies any loss of control of bowel or bladder function.  He subsequently had a CT scan of his lumbar spine that showed possible metastatic lesions to the spine for which Neurosurgery Service was consulted.    PAST MEDICAL HISTORY:  Positive for history of opioid abuse, hypertension, diverticulosis.    PAST SURGICAL HISTORY:  Negative.    SOCIAL HISTORY:  He is an ex-smoker and does not consume any alcoholic beverages.    ALLERGIES:  HE HAS NO KNOWN DRUG ALLERGIES.      HOME MEDICATIONS:  Include naproxen and Colace.    REVIEW OF SYSTEMS:  HEENT:  Negative for headache, double vision, or blurred vision.  CARDIOVASCULAR:  Positive for some chest pain.  RESPIRATORY:  Positive for intermittent shortness of breath.  GASTROINTESTINAL:  Positive for constipation.  GENITOURINARY:  Negative for hematuria or dysuria.  HEMATOLOGIC:  Negative for easy bleeding or bruising.  INFECTIOUS:  Negative for any recent infection.  MUSCULOSKELETAL:  Negative for joint stiffness or swelling.  PSYCHIATRIC:  Negative for anxiety or depression.  NEUROLOGIC:  Negative for seizure or stroke.  ENDOCRINE:  Negative for thyroid disorder or diabetes.    PHYSICAL

## 2024-07-11 NOTE — ANESTHESIA PRE PROCEDURE
and Nursing notes reviewed   no history of anesthetic complications:   Airway: Mallampati: II          Dental: normal exam         Pulmonary:normal exam  breath sounds clear to auscultation                            ROS comment: H/O smoking   Cardiovascular:    (+) hypertension:      ECG reviewed               Beta Blocker:  Not on Beta Blocker         Neuro/Psych:   (+) neuromuscular disease:, psychiatric history:depression/anxiety             GI/Hepatic/Renal:            ROS comment: diverticuli.   Endo/Other:    (+) : arthritis: OA., electrolyte abnormalities.                 Abdominal: normal exam            Vascular:          Other Findings:         Anesthesia Plan      general     ASA 2       Induction: intravenous.  continuous noninvasive hemodynamic monitor  MIPS: Prophylactic antiemetics administered.  Anesthetic plan and risks discussed with patient.      Plan discussed with attending.                  Jena Munroe, APRN - CRNA   7/11/2024

## 2024-07-11 NOTE — OP NOTE
[facility]  Department of Pulmonary, Critical Care and Sleep Medicine  [facility]  Department of Internal Medicine    BRONCHOSCOPY PROCEDURE NOTE    DATE OF PROCEDURE: 7/11/2024      INDICATIONS & HISTORY:  Joseph Bond is a 43 y.o. male with abnormal imaging.  Joseph Bond has a abnormal imaging test as was sent for evaluation in which we discussed treatments including bronchoscopy with/out biopsies. We discussed the risks, benefits, complications, treatment options and expected outcomes to the patient and/or appropriate POA/care givers.  The possibilities of reaction to medication, pulmonary aspiration, perforation of an organ, bleeding, failure to diagnose a condition and creating a complication requiring transfusion or operation were discussed with the patient/POA who freely signed the informed consent.      PREOPERATIVE DIAGNOSIS:    [x] Lung Cancer Evaluation,  [x] Adenopathy,  [x] Abnormal Imaging CXR/CT scan,     POSTOPERATIVE DIAGNOSES:  [x] Normal endobronchial exam,  [x] Normal Anatomy,  [x] Adenopathy,        PROCEDURE PERFORMED:   [x] Diagnotic, Fiberoptic Bronchoscopy  [] Threapeutic, Fiberoptic Bronchoscopy     [] Electro-navigational Bronchoscopy  [] Endobronchial Ultrasound  [x] EBUS Lymph Node Biospy  [x] BARAJAS lymph node sampling  [] Bronchoalveolar Lavage [BAL]  [] Transbronchial Biopsy  [] Endobronchial Biopsy,   [] Bronchial Wash   [] Bronchial [place] Sample   [] Other       SURGEON:    Ed Cornejo DO, DO, MPH, FCCP, FACP, MACOI    ASSISTANT:   Procedural Nursing & Technical Team    SEDATION:  [x]  General Anesthesia  []  Regional Anesthesia  []  LMAC via Anesthesiology Team,     ANESTHESIA:    [] Lidocaine 2% via intranasal instillation,   [] Epinephrine 1:10,000 diluted endobronchial administration    ANESTHESIOLOGIST:  Per EMR note    SPECIMENS:   [x] Bronchial sample(s) for :    [x] Biospies for path and

## 2024-07-11 NOTE — ANESTHESIA POSTPROCEDURE EVALUATION
Department of Anesthesiology  Postprocedure Note    Patient: Joseph Bond  MRN: [id number]  YOB: 1980  Date of evaluation: 7/11/2024    Procedure Summary       Date: 07/11/24 Room / Location: [place] [address] / [facility]    Anesthesia Start: 1540 Anesthesia Stop: 1629    Procedures:       BRONCHOSCOPY ENDOBRONCHIAL ULTRASOUND FINE NEEDLE ASPIRATION      BRONCHOSCOPY DIAGNOSTIC OR CELL WASH ONLY Diagnosis:       Lymphadenopathy      (Lymphadenopathy [R59.1])    Surgeons: Ed Cornejo DO Responsible Provider: Ramiro Melissa MD    Anesthesia Type: General ASA Status: 2            Anesthesia Type: General    Sherita Phase I: Sherita Score: 10    Sherita Phase II:      Anesthesia Post Evaluation    Patient location during evaluation: PACU  Patient participation: complete - patient participated  Level of consciousness: awake and alert  Airway patency: patent  Nausea & Vomiting: no nausea and no vomiting  Cardiovascular status: hemodynamically stable  Respiratory status: acceptable  Hydration status: euvolemic  There was medical reason for not using a multimodal analgesia pain management approach.Pain management: adequate    No notable events documented.

## 2024-07-11 NOTE — PROGRESS NOTES
[facility] - Family Medicine Inpatient   Resident Progress Note    S:  Hospital day: 1   Brief Synopsis: Joseph Bond is a 43 y.o. male with a PMH of  polysubstance abuse including opioids and tobacco, abstinent for 5 years, diverticulitis who presents with rib pain and LLE pain. Patient was in the ED on 6/24 for rib pain and low back pain.  Onset several weeks ago, progressively getting worse.  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  He had to lift her for bathing, feeding and transferring to bed.  Recently, he is unable to handle that and she is currently in nursing home.  7/8/2024, patient woke up at 3 AM because of worsening lower back pain and LLE pain.  Pain is 10/10, radiating down LLE towards toes.  In the morning, he had tried to call urology to set up for an appointment per PCPs recommendation.  When he told nurse over the phone about radicular pain, he was advised to go to the emergency department. Patient also complains of weak urinary stream, onset several months. He also reports pelvic pain. 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 Of note, just established with PCP a week ago. He was referred to pulmonology for mediastinal lymphadenopathy and urology for Prosta megaly seen on CT abdomen pelvis and also PSA of 155. Abuse history include opioid use and Xanax when he was younger, abstain for 5 years. Also stopped smoking tobacco at that time. Prior to that he had 1 PPD smoking history. Never had a colonoscopy, not even after diverticulitis when he was diagnosed at 32. CMP significant for elevated alk phos at 200 and AST 76.  BUN 28.  Calcium 10.5, albumin 4.9.  CBC significant for hemoglobin 12.3, platelets 116. UDS positive for oxycodone that he was given in the ED. UA with high specific gravity and trace protein, RBC 10-20CT lumbar spine ordered for lumbar back pain, consistent

## 2024-07-11 NOTE — PLAN OF CARE
Problem: Pain  Goal: Verbalizes/displays adequate comfort level or baseline comfort level  7/11/2024 0932 by Halle Villalobos RN  Outcome: Not Progressing  7/11/2024 0049 by Anuja Rico RN  Outcome: Progressing     Problem: Pain  Goal: Verbalizes/displays adequate comfort level or baseline comfort level  7/11/2024 0932 by Halle Villalobos RN  Outcome: Not Progressing  7/11/2024 0049 by Anuja Rico RN  Outcome: Progressing

## 2024-07-11 NOTE — PROGRESS NOTES
SUBJECTIVE:    Pvr  minimal  Bone scan pending    OBJECTIVE:    /72   Pulse 85   Temp 98.3 °F (36.8 °C) (Oral)   Resp 18   Ht 1.702 m (5' 7\")   Wt 88.5 kg (195 lb)   SpO2 97%   BMI 30.54 kg/m²     PHYSICAL EXAMINATION:  Skin: dry, without rashes  Respirations: non-labored, intact  Abdomen: soft, non-tender, non-distended  Prostate rock hard and nodular      Lab Results   Component Value Date    WBC 7.2 07/11/2024    HGB 10.0 (L) 07/11/2024    HCT 30.9 (L) 07/11/2024    MCV 86.1 07/11/2024    PLT 91 (L) 07/11/2024       Lab Results   Component Value Date    CREATININE 0.8 07/11/2024       Lab Results   Component Value Date    .70 (H) 07/02/2024       REVIEW OF SYSTEMS:    CONSTITUTIONAL: negative  HEENT: negative  HEMATOLOGIC: negative  ENDOCRINE: negative  RESPIRATORY: negative  CV: negative  GI: negative  NEURO: negative  ORTHOPEDICS: negative  PSYCHIATRIC: negative  : as above    PAST FAMILY HISTORY:  No family history on file.  PAST SOCIAL HISTORY:    Social History     Socioeconomic History    Marital status: Single     Spouse name: None    Number of children: None    Years of education: None    Highest education level: None   Tobacco Use    Smoking status: Former     Average packs/day: 0.6 packs/day for 10.3 years (5.7 ttl pk-yrs)     Types: Cigarettes     Start date: 4/3/2014    Smokeless tobacco: Never   Substance and Sexual Activity    Alcohol use: No    Drug use: No    Sexual activity: Not Currently     Partners: Female     Social Determinants of Health     Financial Resource Strain: High Risk (7/2/2024)    Overall Financial Resource Strain (CARDIA)     Difficulty of Paying Living Expenses: Hard   Food Insecurity: Patient Declined (7/10/2024)    Hunger Vital Sign     Worried About Running Out of Food in the Last Year: Patient declined     Ran Out of Food in the Last Year: Patient declined   Recent Concern:

## 2024-07-12 ENCOUNTER — APPOINTMENT (OUTPATIENT)
Dept: MRI IMAGING | Age: 44
DRG: 500 | End: 2024-07-12
Payer: MEDICAID

## 2024-07-12 LAB
ALBUMIN SERPL-MCNC: 3.3 G/DL (ref 3.5–4.7)
ALPHA1 GLOB SERPL ELPH-MCNC: 0.4 G/DL (ref 0.2–0.4)
ALPHA2 GLOB SERPL ELPH-MCNC: 1 G/DL (ref 0.5–1)
ANION GAP SERPL CALCULATED.3IONS-SCNC: 14 MMOL/L (ref 7–16)
ANNOTATION COMMENT IMP: NORMAL
B-GLOBULIN SERPL ELPH-MCNC: 1.3 G/DL (ref 0.8–1.3)
BASOPHILS # BLD: 0.03 K/UL (ref 0–0.2)
BASOPHILS NFR BLD: 0 % (ref 0–2)
BUN SERPL-MCNC: 15 MG/DL (ref 6–20)
CALCIUM SERPL-MCNC: 9.8 MG/DL (ref 8.6–10.2)
CD3 CELLS # BLD: 1539 CELLS/UL (ref 570–2400)
CD3+CD4+ CELLS # BLD: 1173 CELLS/UL (ref 430–1800)
CD3+CD4+ CELLS/CD3+CD8+ CLL BLD: 3.59 RATIO (ref 0.8–3.9)
CD3+CD8+ CELLS # BLD: 327 CELLS/UL (ref 210–1200)
CHLORIDE SERPL-SCNC: 99 MMOL/L (ref 98–107)
CO2 SERPL-SCNC: 23 MMOL/L (ref 22–29)
CREAT SERPL-MCNC: 0.7 MG/DL (ref 0.7–1.2)
EOSINOPHIL # BLD: 0 K/UL (ref 0.05–0.5)
EOSINOPHILS RELATIVE PERCENT: 0 % (ref 0–6)
ERYTHROCYTE [DISTWIDTH] IN BLOOD BY AUTOMATED COUNT: 14.2 % (ref 11.5–15)
GAMMA GLOB SERPL ELPH-MCNC: 1 G/DL (ref 0.7–1.6)
GFR, ESTIMATED: >90 ML/MIN/1.73M2
GLUCOSE SERPL-MCNC: 137 MG/DL (ref 74–99)
HCT VFR BLD AUTO: 33.6 % (ref 37–54)
HGB BLD-MCNC: 11.3 G/DL (ref 12.5–16.5)
IMM GRANULOCYTES # BLD AUTO: 0.2 K/UL (ref 0–0.58)
IMM GRANULOCYTES NFR BLD: 2 % (ref 0–5)
LYMPHOCYTES NFR BLD: 1.14 K/UL (ref 1.5–4)
LYMPHOCYTES RELATIVE PERCENT: 13 % (ref 20–42)
MCH RBC QN AUTO: 29.2 PG (ref 26–35)
MCHC RBC AUTO-ENTMCNC: 33.6 G/DL (ref 32–34.5)
MCV RBC AUTO: 86.8 FL (ref 80–99.9)
MONOCYTES NFR BLD: 0.33 K/UL (ref 0.1–0.95)
MONOCYTES NFR BLD: 4 % (ref 2–12)
NEUTROPHILS NFR BLD: 81 % (ref 43–80)
NEUTS SEG NFR BLD: 7.11 K/UL (ref 1.8–7.3)
PATHOLOGIST: ABNORMAL
PLATELET # BLD AUTO: 101 K/UL (ref 130–450)
PMV BLD AUTO: 10.4 FL (ref 7–12)
POTASSIUM SERPL-SCNC: 4.2 MMOL/L (ref 3.5–5)
PROT PATTERN SERPL ELPH-IMP: ABNORMAL
PROT SERPL-MCNC: 7.1 G/DL (ref 6.4–8.3)
RBC # BLD AUTO: 3.87 M/UL (ref 3.8–5.8)
SODIUM SERPL-SCNC: 136 MMOL/L (ref 132–146)
WBC OTHER # BLD: 8.8 K/UL (ref 4.5–11.5)

## 2024-07-12 PROCEDURE — 6360000004 HC RX CONTRAST MEDICATION: Performed by: RADIOLOGY

## 2024-07-12 PROCEDURE — 72158 MRI LUMBAR SPINE W/O & W/DYE: CPT

## 2024-07-12 PROCEDURE — 6370000000 HC RX 637 (ALT 250 FOR IP): Performed by: INTERNAL MEDICINE

## 2024-07-12 PROCEDURE — A9579 GAD-BASE MR CONTRAST NOS,1ML: HCPCS | Performed by: RADIOLOGY

## 2024-07-12 PROCEDURE — 36415 COLL VENOUS BLD VENIPUNCTURE: CPT

## 2024-07-12 PROCEDURE — 1200000000 HC SEMI PRIVATE

## 2024-07-12 PROCEDURE — 2580000003 HC RX 258: Performed by: INTERNAL MEDICINE

## 2024-07-12 PROCEDURE — 80048 BASIC METABOLIC PNL TOTAL CA: CPT

## 2024-07-12 PROCEDURE — 99232 SBSQ HOSP IP/OBS MODERATE 35: CPT | Performed by: INTERNAL MEDICINE

## 2024-07-12 PROCEDURE — 99232 SBSQ HOSP IP/OBS MODERATE 35: CPT

## 2024-07-12 PROCEDURE — 85025 COMPLETE CBC W/AUTO DIFF WBC: CPT

## 2024-07-12 RX ADMIN — OXYCODONE HYDROCHLORIDE 10 MG: 10 TABLET ORAL at 08:25

## 2024-07-12 RX ADMIN — Medication 2000 UNITS: at 08:26

## 2024-07-12 RX ADMIN — OXYCODONE HYDROCHLORIDE 10 MG: 10 TABLET ORAL at 04:45

## 2024-07-12 RX ADMIN — GADOTERIDOL 18 ML: 279.3 INJECTION, SOLUTION INTRAVENOUS at 15:49

## 2024-07-12 RX ADMIN — OXYCODONE HYDROCHLORIDE 10 MG: 10 TABLET ORAL at 20:36

## 2024-07-12 RX ADMIN — METHOCARBAMOL 500 MG: 500 TABLET ORAL at 08:26

## 2024-07-12 RX ADMIN — METHOCARBAMOL 500 MG: 500 TABLET ORAL at 16:40

## 2024-07-12 RX ADMIN — SODIUM CHLORIDE, PRESERVATIVE FREE 5 ML: 5 INJECTION INTRAVENOUS at 08:30

## 2024-07-12 RX ADMIN — SODIUM CHLORIDE, PRESERVATIVE FREE 10 ML: 5 INJECTION INTRAVENOUS at 20:37

## 2024-07-12 RX ADMIN — OXYCODONE HYDROCHLORIDE 10 MG: 10 TABLET ORAL at 00:33

## 2024-07-12 RX ADMIN — OXYCODONE HYDROCHLORIDE 10 MG: 10 TABLET ORAL at 12:37

## 2024-07-12 RX ADMIN — DICLOFENAC SODIUM 2 G: 10 GEL TOPICAL at 20:39

## 2024-07-12 RX ADMIN — METHOCARBAMOL 500 MG: 500 TABLET ORAL at 20:36

## 2024-07-12 RX ADMIN — OXYCODONE HYDROCHLORIDE 10 MG: 10 TABLET ORAL at 16:40

## 2024-07-12 RX ADMIN — METHOCARBAMOL 500 MG: 500 TABLET ORAL at 12:37

## 2024-07-12 ASSESSMENT — PAIN DESCRIPTION - FREQUENCY
FREQUENCY: CONTINUOUS

## 2024-07-12 ASSESSMENT — PAIN DESCRIPTION - LOCATION
LOCATION: BACK;RIB CAGE;PELVIS

## 2024-07-12 ASSESSMENT — PAIN DESCRIPTION - ORIENTATION
ORIENTATION: LEFT

## 2024-07-12 ASSESSMENT — PAIN DESCRIPTION - DESCRIPTORS
DESCRIPTORS: ACHING;SORE
DESCRIPTORS: ACHING;SORE
DESCRIPTORS: ACHING
DESCRIPTORS: ACHING;DISCOMFORT

## 2024-07-12 ASSESSMENT — PAIN DESCRIPTION - ONSET
ONSET: ON-GOING

## 2024-07-12 ASSESSMENT — PAIN DESCRIPTION - PAIN TYPE
TYPE: ACUTE PAIN

## 2024-07-12 ASSESSMENT — PAIN - FUNCTIONAL ASSESSMENT
PAIN_FUNCTIONAL_ASSESSMENT: PREVENTS OR INTERFERES WITH ALL ACTIVE AND SOME PASSIVE ACTIVITIES
PAIN_FUNCTIONAL_ASSESSMENT: PREVENTS OR INTERFERES SOME ACTIVE ACTIVITIES AND ADLS
PAIN_FUNCTIONAL_ASSESSMENT: PREVENTS OR INTERFERES SOME ACTIVE ACTIVITIES AND ADLS

## 2024-07-12 ASSESSMENT — PAIN SCALES - GENERAL
PAINLEVEL_OUTOF10: 7
PAINLEVEL_OUTOF10: 10
PAINLEVEL_OUTOF10: 8
PAINLEVEL_OUTOF10: 10
PAINLEVEL_OUTOF10: 8
PAINLEVEL_OUTOF10: 8

## 2024-07-12 NOTE — PROGRESS NOTES
7/12/2024 3:39 PM  Joseph Bond  [id number]    Subjective:    He is awake and alert  I discussed the Casodex with him and it's role of blocking androgen to help stop the spread of cancer  He is not interested in medications at this time   He still has back pain  Feels that he is urinating with a better stream   He is awaiting MRI     Review of Systems  Constitutional: No fever or chills   Respiratory: negative for cough and hemoptysis  Cardiovascular: negative for chest pain and dyspnea  Gastrointestinal: negative for abdominal pain, diarrhea, nausea and vomiting   : See above  Derm: negative for rash and skin lesion(s)  Neurological: negative for seizures and tremors  Musculoskeletal: positive for back pain   Psychiatric: Negative   All other reviews are negative      Scheduled Meds:   bicalutamide  50 mg Oral Daily    vitamin D  50,000 Units Oral Weekly    vitamin D  2,000 Units Oral Daily    oxyCODONE  10 mg Oral 6 times per day    sodium chloride flush  5-40 mL IntraVENous 2 times per day    enoxaparin  40 mg SubCUTAneous Daily    diclofenac sodium  2 g Topical BID    methocarbamol  500 mg Oral 4x Daily       Objective:  Vitals:    07/12/24 0754   BP: 139/87   Pulse: 87   Resp: 17   Temp: 96.9 °F (36.1 °C)   SpO2: 97%         Allergies: Patient has no known allergies.    General Appearance: alert and oriented to person, place and time and in no acute distress  Skin: no rash or erythema  Head: normocephalic and atraumatic  Pulmonary/Chest: normal air movement, no respiratory distress  Abdomen: soft, non-tender, non-distended  Genitourinary: no jett   Extremities: no cyanosis, clubbing or edema         Labs:     Recent Labs     07/12/24  0455      K 4.2   CL 99   CO2 23   BUN 15   CREATININE 0.7   GLUCOSE 137*   CALCIUM 9.8       Lab Results   Component Value Date/Time    HGB 11.3 07/12/2024 04:55 AM    HCT 33.6 07/12/2024 04:55 AM       Lab Results   Component Value Date    .30 (H) 07/11/2024

## 2024-07-12 NOTE — CARE COORDINATION
CM update note.   Discharge plan is home when medically ready.  He reports that his car is in the ED parking lot and he plans to drive himself home on day of discharge.   S/p bronchoscopy on 7/11.  PFT ordered.  MRI pending.  Possible prostate biopsy on Monday.  Patient wants to think it over.  CM/SW to follow.  Edwardo Leiva RN [phone].

## 2024-07-12 NOTE — PROGRESS NOTES
[facility]  Family Medicine Attending    Hospital Course: 43 y.o. male with PMHx of polysubstance abuse including opioids and tobacco, abstinent for 5 years, diverticulitis who presents with rib pain and LLE pain. He also has recently noted difficulty w urination. ALP elevated 200, AST 76, Calcium 10.5 Hgb 12.3, Platelets 116.  . CT chest concerning for mediastinal LAD. CT Lumbar concerning for metastatic disease & retroperitoneal LAD. EBUS done 7/11 w/ +Cancer on initial path.     S: Resting in bed comfortable, reports pain control is better.     O: VS- Blood pressure 139/87, pulse 87, temperature 96.9 °F (36.1 °C), temperature source Temporal, resp. rate 17, height 1.702 m (5' 7\"), weight 88.5 kg (195 lb), SpO2 97 %.  Exam is as noted by resident with the following changes, additions or corrections:  Gen: NAD AAOx3  HEENT NCAT EOMI Anicterici. No facial droop.   CV RRR   Resp CTAB tender over left chest wall ~ribs 4-6.   Abd S NT ND   Back: TTP over lower lumbar area. Limited ROM.   Ext: Left Lower 4/5 strength prox, 5/5 distal. RLE 5/5 prox-distal. Less pain in left hip.     Whole Body bone scan 7/11:   IMPRESSION:  Extensive osseous metastatic uptake involving the spine, ribcage, pelvis,  humeri and femurs.    Impressions:   Principal Problem:    Retroperitoneal lymphadenopathy  Active Problems:    Chronic midline low back pain with left-sided sciatica    Elevated PSA    Urinary hesitancy    Pelvic pain    Osteopenia of lumbar spine    Acute midline low back pain with sciatica  Resolved Problems:    * No resolved hospital problems. *      Plan:     1) Enlarged Prostate w/ elevated PSA - concerning for prostate cancer w metastatic lesions on CT. Pending Lumbar MRI w/wo contrast. Casodex per Urology but patient wants to hold off until final path is reported it seems.     2) Elevated calcium - improved.    3) Mediastinal LAD - Pulm consulted, labs, s/p bronch per pulm w/ bx. Initial path

## 2024-07-12 NOTE — PROGRESS NOTES
[facility]  Department of Internal Medicine  Division of Pulmonary, Critical Care and Sleep Medicine  Progress     Patient: Joseph Bond  MRN: [id number]  : 1980    Encounter Time: 9:41 AM     Date of Admission: 2024  7:14 PM    Primary Care Physician: Ty Shaikh MD    Reason for Consultation: Adenopathy  SUBJECTIVE:    Bronchoscopy today  HIV and Hep negative  PSA elevated  Bone scan bending      OBJECTIVE:     PHYSICAL EXAM:   VITALS:   Vitals:    24 0033 24 0445 24 0754   BP: 122/77   139/87   Pulse: (!) 103   87   Resp: 16 16 18 17   Temp: (!) 96.1 °F (35.6 °C)   96.9 °F (36.1 °C)   TempSrc: Temporal   Temporal   SpO2: 94%   97%   Weight:       Height:            Intake/Output Summary (Last 24 hours) at 2024 0941  Last data filed at 2024 0626  Gross per 24 hour   Intake 1427.29 ml   Output 450 ml   Net 977.29 ml          CONSTITUTIONAL:   A&O x 3, NAD  SKIN:     No rash, No suspicious lesions, No skin discoloration  HEENT:     EOMI, MMM, No thrush  NECK:    No bruits, No JVP appreciated  CV:      Sinus,  No murmur, No rubs, No gallops  PULMONARY:   Couse BS,  No Wheezing, No Rales, No Rhonchi      No noted egophony  ABDOMEN:     Soft, non-tender. BS normal. No R/R/G  EXT:    No deformities .  No clubbing.       No lower extremity edema, No venous stasis  PULSE:   Appears equal and palpable.  PSYCHIATRIC:  Seems appropriate, No acute psychosis  MS:    No fractures, No gross weakness  NEUROLOGIC:   The clinical assessment is non-focal     DATA: IMAGING & TESTING:     LABORATORY TESTS:    CBC:   Lab Results   Component Value Date/Time    WBC 8.8 2024 04:55 AM    RBC 3.87 2024 04:55 AM    HGB 11.3 2024 04:55 AM    HCT 33.6 2024 04:55 AM    MCV 86.8 2024 04:55 AM    MCH 29.2 2024 04:55 AM    MCHC 33.6 2024 04:55 AM    RDW 14.2 2024 04:55 AM     2024 04:55

## 2024-07-12 NOTE — PROGRESS NOTES
[facility] - Family Medicine Inpatient   Resident Progress Note    S:  Hospital day: 2   Brief Synopsis: Joseph Bond is a 43 y.o. male with a PMH of  polysubstance abuse including opioids and tobacco, abstinent for 5 years, diverticulitis who presents with rib pain and LLE pain. Patient was in the ED on 6/24 for rib pain and low back pain.  Onset several weeks ago, progressively getting worse.  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  He had to lift her for bathing, feeding and transferring to bed.  Recently, he is unable to handle that and she is currently in nursing home.  7/8/2024, patient woke up at 3 AM because of worsening lower back pain and LLE pain.  Pain is 10/10, radiating down LLE towards toes.  In the morning, he had tried to call urology to set up for an appointment per PCPs recommendation.  When he told nurse over the phone about radicular pain, he was advised to go to the emergency department. Patient also complains of weak urinary stream, onset several months. He also reports pelvic pain. 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 Of note, just established with PCP a week ago. He was referred to pulmonology for mediastinal lymphadenopathy and urology for Prosta megaly seen on CT abdomen pelvis and also PSA of 155. Abuse history include opioid use and Xanax when he was younger, abstain for 5 years. Also stopped smoking tobacco at that time. Prior to that he had 1 PPD smoking history. Never had a colonoscopy, not even after diverticulitis when he was diagnosed at 32. CMP significant for elevated alk phos at 200 and AST 76.  BUN 28.  Calcium 10.5, albumin 4.9. CBC significant for hemoglobin 12.3, platelets 116. UDS positive for oxycodone that he was given in the ED. UA with high specific gravity and trace protein, RBC 10-20CT lumbar spine ordered for lumbar back pain, consistent

## 2024-07-12 NOTE — PLAN OF CARE
Problem: Musculoskeletal - Adult  Goal: Return mobility to safest level of function  7/12/2024 1112 by Halle Villalobos RN  Outcome: Progressing  7/12/2024 0355 by Anuja Rico RN  Outcome: Progressing  Goal: Maintain proper alignment of affected body part  7/12/2024 1112 by Halle Villalobos RN  Outcome: Progressing  7/12/2024 0355 by Anuja Rico RN  Outcome: Progressing     Problem: Pain  Goal: Verbalizes/displays adequate comfort level or baseline comfort level  7/12/2024 1112 by Halle Villalobos RN  Outcome: Progressing  7/12/2024 0355 by Anuja Rico RN  Outcome: Progressing

## 2024-07-12 NOTE — PROGRESS NOTES
Pt refusing Casodex. States that he doesn't feel like he needs a chemotherapy medication, and that nobody talked to him about chemotherapy, only that \"inflammation\" was mentioned regarding this medication.

## 2024-07-13 LAB
ALK PHOS BONE SPECIFIC: 127 U/L (ref 0–55)
ALK PHOS OTHER CALC: 0 U/L
ALK PHOSPHATASE: 199 U/L (ref 40–120)
ALKALINE PHOSPHATASE LIVER FRACTION: 72 U/L (ref 0–94)
ANION GAP SERPL CALCULATED.3IONS-SCNC: 17 MMOL/L (ref 7–16)
BASOPHILS # BLD: 0.04 K/UL (ref 0–0.2)
BASOPHILS # BLD: 0.05 K/UL (ref 0–0.2)
BASOPHILS NFR BLD: 0 % (ref 0–2)
BASOPHILS NFR BLD: 0 % (ref 0–2)
BUN SERPL-MCNC: 20 MG/DL (ref 6–20)
CALCIUM SERPL-MCNC: 9.5 MG/DL (ref 8.6–10.2)
CHLORIDE SERPL-SCNC: 98 MMOL/L (ref 98–107)
CO2 SERPL-SCNC: 21 MMOL/L (ref 22–29)
CREAT SERPL-MCNC: 0.8 MG/DL (ref 0.7–1.2)
EOSINOPHIL # BLD: 0.03 K/UL (ref 0.05–0.5)
EOSINOPHIL # BLD: 0.03 K/UL (ref 0.05–0.5)
EOSINOPHILS RELATIVE PERCENT: 0 % (ref 0–6)
EOSINOPHILS RELATIVE PERCENT: 0 % (ref 0–6)
ERYTHROCYTE [DISTWIDTH] IN BLOOD BY AUTOMATED COUNT: 14.6 % (ref 11.5–15)
ERYTHROCYTE [DISTWIDTH] IN BLOOD BY AUTOMATED COUNT: 14.6 % (ref 11.5–15)
GFR, ESTIMATED: >90 ML/MIN/1.73M2
GLUCOSE SERPL-MCNC: 86 MG/DL (ref 74–99)
HCT VFR BLD AUTO: 33 % (ref 37–54)
HCT VFR BLD AUTO: 34.9 % (ref 37–54)
HGB BLD-MCNC: 10.5 G/DL (ref 12.5–16.5)
HGB BLD-MCNC: 11.1 G/DL (ref 12.5–16.5)
IGG 1: 478 MG/DL (ref 240–1118)
IGG 2: 228 MG/DL (ref 124–549)
IGG 3: 16 MG/DL (ref 21–134)
IGG4 SER-MCNC: 61 MG/DL (ref 1–123)
IMM GRANULOCYTES # BLD AUTO: 0.31 K/UL (ref 0–0.58)
IMM GRANULOCYTES # BLD AUTO: 0.32 K/UL (ref 0–0.58)
IMM GRANULOCYTES NFR BLD: 3 % (ref 0–5)
IMM GRANULOCYTES NFR BLD: 3 % (ref 0–5)
LYMPHOCYTES NFR BLD: 3.06 K/UL (ref 1.5–4)
LYMPHOCYTES NFR BLD: 3.16 K/UL (ref 1.5–4)
LYMPHOCYTES RELATIVE PERCENT: 27 % (ref 20–42)
LYMPHOCYTES RELATIVE PERCENT: 30 % (ref 20–42)
MCH RBC QN AUTO: 27.7 PG (ref 26–35)
MCH RBC QN AUTO: 28.2 PG (ref 26–35)
MCHC RBC AUTO-ENTMCNC: 31.8 G/DL (ref 32–34.5)
MCHC RBC AUTO-ENTMCNC: 31.8 G/DL (ref 32–34.5)
MCV RBC AUTO: 87 FL (ref 80–99.9)
MCV RBC AUTO: 88.5 FL (ref 80–99.9)
MONOCYTES NFR BLD: 0.46 K/UL (ref 0.1–0.95)
MONOCYTES NFR BLD: 0.89 K/UL (ref 0.1–0.95)
MONOCYTES NFR BLD: 5 % (ref 2–12)
MONOCYTES NFR BLD: 8 % (ref 2–12)
NEUTROPHILS NFR BLD: 61 % (ref 43–80)
NEUTROPHILS NFR BLD: 62 % (ref 43–80)
NEUTS SEG NFR BLD: 6.18 K/UL (ref 1.8–7.3)
NEUTS SEG NFR BLD: 7.21 K/UL (ref 1.8–7.3)
PLATELET # BLD AUTO: 87 K/UL (ref 130–450)
PLATELET # BLD AUTO: 88 K/UL (ref 130–450)
PLATELET CONFIRMATION: NORMAL
PLATELET CONFIRMATION: NORMAL
PMV BLD AUTO: 10 FL (ref 7–12)
PMV BLD AUTO: 10.9 FL (ref 7–12)
POTASSIUM SERPL-SCNC: 4.7 MMOL/L (ref 3.5–5)
RBC # BLD AUTO: 3.73 M/UL (ref 3.8–5.8)
RBC # BLD AUTO: 4.01 M/UL (ref 3.8–5.8)
SODIUM SERPL-SCNC: 136 MMOL/L (ref 132–146)
WBC OTHER # BLD: 10.1 K/UL (ref 4.5–11.5)
WBC OTHER # BLD: 11.7 K/UL (ref 4.5–11.5)

## 2024-07-13 PROCEDURE — 6370000000 HC RX 637 (ALT 250 FOR IP)

## 2024-07-13 PROCEDURE — 99232 SBSQ HOSP IP/OBS MODERATE 35: CPT | Performed by: NEUROLOGICAL SURGERY

## 2024-07-13 PROCEDURE — 36415 COLL VENOUS BLD VENIPUNCTURE: CPT

## 2024-07-13 PROCEDURE — 85025 COMPLETE CBC W/AUTO DIFF WBC: CPT

## 2024-07-13 PROCEDURE — 6370000000 HC RX 637 (ALT 250 FOR IP): Performed by: INTERNAL MEDICINE

## 2024-07-13 PROCEDURE — 80048 BASIC METABOLIC PNL TOTAL CA: CPT

## 2024-07-13 PROCEDURE — 1200000000 HC SEMI PRIVATE

## 2024-07-13 PROCEDURE — 2580000003 HC RX 258: Performed by: INTERNAL MEDICINE

## 2024-07-13 PROCEDURE — 99232 SBSQ HOSP IP/OBS MODERATE 35: CPT | Performed by: FAMILY MEDICINE

## 2024-07-13 RX ORDER — POLYETHYLENE GLYCOL 3350 17 G/17G
17 POWDER, FOR SOLUTION ORAL DAILY
Status: DISCONTINUED | OUTPATIENT
Start: 2024-07-14 | End: 2024-07-14

## 2024-07-13 RX ORDER — LIDOCAINE 4 G/G
1 PATCH TOPICAL DAILY
Status: DISCONTINUED | OUTPATIENT
Start: 2024-07-14 | End: 2024-07-18 | Stop reason: HOSPADM

## 2024-07-13 RX ORDER — LIDOCAINE 4 G/G
1 PATCH TOPICAL DAILY
Status: DISCONTINUED | OUTPATIENT
Start: 2024-07-14 | End: 2024-07-13

## 2024-07-13 RX ORDER — SENNOSIDES A AND B 8.6 MG/1
1 TABLET, FILM COATED ORAL NIGHTLY
Status: DISCONTINUED | OUTPATIENT
Start: 2024-07-13 | End: 2024-07-14

## 2024-07-13 RX ADMIN — METHOCARBAMOL 500 MG: 500 TABLET ORAL at 08:56

## 2024-07-13 RX ADMIN — SENNOSIDES 8.6 MG: 8.6 TABLET, FILM COATED ORAL at 22:50

## 2024-07-13 RX ADMIN — OXYCODONE HYDROCHLORIDE 10 MG: 10 TABLET ORAL at 06:04

## 2024-07-13 RX ADMIN — SODIUM CHLORIDE, PRESERVATIVE FREE 10 ML: 5 INJECTION INTRAVENOUS at 22:51

## 2024-07-13 RX ADMIN — OXYCODONE HYDROCHLORIDE 10 MG: 10 TABLET ORAL at 00:54

## 2024-07-13 RX ADMIN — METHOCARBAMOL 500 MG: 500 TABLET ORAL at 18:35

## 2024-07-13 RX ADMIN — Medication 2000 UNITS: at 08:56

## 2024-07-13 RX ADMIN — OXYCODONE HYDROCHLORIDE 10 MG: 10 TABLET ORAL at 14:39

## 2024-07-13 RX ADMIN — OXYCODONE HYDROCHLORIDE 10 MG: 10 TABLET ORAL at 10:05

## 2024-07-13 RX ADMIN — METHOCARBAMOL 500 MG: 500 TABLET ORAL at 14:39

## 2024-07-13 RX ADMIN — POLYETHYLENE GLYCOL 3350 17 G: 17 POWDER, FOR SOLUTION ORAL at 09:07

## 2024-07-13 RX ADMIN — OXYCODONE HYDROCHLORIDE 10 MG: 10 TABLET ORAL at 22:49

## 2024-07-13 RX ADMIN — OXYCODONE HYDROCHLORIDE 10 MG: 10 TABLET ORAL at 18:35

## 2024-07-13 RX ADMIN — METHOCARBAMOL 500 MG: 500 TABLET ORAL at 22:49

## 2024-07-13 ASSESSMENT — PAIN SCALES - GENERAL
PAINLEVEL_OUTOF10: 9
PAINLEVEL_OUTOF10: 10
PAINLEVEL_OUTOF10: 10
PAINLEVEL_OUTOF10: 8
PAINLEVEL_OUTOF10: 9
PAINLEVEL_OUTOF10: 10
PAINLEVEL_OUTOF10: 8
PAINLEVEL_OUTOF10: 9
PAINLEVEL_OUTOF10: 10
PAINLEVEL_OUTOF10: 8

## 2024-07-13 ASSESSMENT — PAIN DESCRIPTION - DESCRIPTORS
DESCRIPTORS: ACHING;DISCOMFORT;SORE
DESCRIPTORS: ACHING;SORE;DISCOMFORT
DESCRIPTORS: ACHING;DISCOMFORT;SORE
DESCRIPTORS: ACHING;TENDER
DESCRIPTORS: ACHING;DISCOMFORT;TENDER
DESCRIPTORS: ACHING;DISCOMFORT;SORE
DESCRIPTORS: ACHING;SORE
DESCRIPTORS: ACHING;THROBBING;TENDER;SHARP

## 2024-07-13 ASSESSMENT — PAIN DESCRIPTION - LOCATION
LOCATION: PELVIS;CHEST
LOCATION: PELVIS;RIB CAGE
LOCATION: PELVIS
LOCATION: LEG;RIB CAGE
LOCATION: LEG;RIB CAGE
LOCATION: PELVIS;RIB CAGE
LOCATION: LEG;RIB CAGE

## 2024-07-13 ASSESSMENT — PAIN DESCRIPTION - ORIENTATION
ORIENTATION: LEFT

## 2024-07-13 ASSESSMENT — PAIN - FUNCTIONAL ASSESSMENT
PAIN_FUNCTIONAL_ASSESSMENT: PREVENTS OR INTERFERES SOME ACTIVE ACTIVITIES AND ADLS

## 2024-07-13 NOTE — PLAN OF CARE
Problem: Musculoskeletal - Adult  Goal: Return mobility to safest level of function  7/13/2024 0026 by Lorena Gomez RN  Outcome: Progressing  7/12/2024 1112 by Halle Villalobos RN  Outcome: Progressing  Goal: Maintain proper alignment of affected body part  7/12/2024 1112 by Halle Villalobos RN  Outcome: Progressing     Problem: Pain  Goal: Verbalizes/displays adequate comfort level or baseline comfort level  7/13/2024 0026 by Lorena Gomez RN  Outcome: Progressing  7/12/2024 1112 by Halle Villalobos RN  Outcome: Progressing

## 2024-07-13 NOTE — PROGRESS NOTES
[facility]  [address] [id number]  [phone]  FAX [phone]      CHIEF UROLOGIC COMPLAINT: Presumed metastatic prostate cancer    HISTORY OF PRESENT ILLNESS:  Patient without new complaints.  Struggling with which approach he would like to take whether naturopathic or traditional medical approach.    REVIEW OF SYSTEMS:   CONSTITUTIONAL: negative  HEENT: negative  HEMATOLOGIC: negative  ENDOCRINE: negative  RESPIRATORY: negative  CV: negative  GI: negative  NEURO: negative  ORTHOPEDICS: Severe back pain  PSYCHIATRIC: negative  : as above    PAST FAMILY HISTORY:  No family history on file.  PAST SOCIAL HISTORY:    Social History     Socioeconomic History    Marital status: Single     Spouse name: None    Number of children: None    Years of education: None    Highest education level: None   Tobacco Use    Smoking status: Former     Average packs/day: 0.6 packs/day for 10.3 years (5.7 ttl pk-yrs)     Types: Cigarettes     Start date: 4/3/2014    Smokeless tobacco: Never   Substance and Sexual Activity    Alcohol use: No    Drug use: No    Sexual activity: Not Currently     Partners: Female     Social Determinants of Health     Financial Resource Strain: High Risk (7/2/2024)    Overall Financial Resource Strain (CARDIA)     Difficulty of Paying Living Expenses: Hard   Food Insecurity: Patient Declined (7/10/2024)    Hunger Vital Sign     Worried About Running Out of Food in the Last Year: Patient declined     Ran Out of Food in the Last Year: Patient declined   Recent Concern: Food Insecurity - Food Insecurity Present (7/2/2024)    Hunger Vital Sign     Worried About Running Out of Food in the Last Year: Often true     Ran Out of Food in the Last Year: Often true   Transportation Needs: Patient Declined (7/10/2024)    PRAPARE - Transportation     Lack of Transportation (Medical): Patient declined     Lack of Transportation (Non-Medical): Patient declined

## 2024-07-13 NOTE — PROGRESS NOTES
Department of Neurosurgery  Attending Progress Note    CHIEF COMPLAINT:    SUBJECTIVE:  patient seen today for spine metastasis    ROS:  HEENT: negative for headache  CVS: negative for chest pain  Resp: negative for SOB    OBJECTIVE  Physical  VITALS:  /75   Pulse 78   Temp 96.8 °F (36 °C) (Temporal)   Resp 18   Ht 1.702 m (5' 7\")   Wt 88.5 kg (195 lb)   SpO2 97%   BMI 30.54 kg/m²   NEUROLOGIC:  Mental Status Exam:  Level of Alertness:   awake  Orientation:   person, place, time  Motor Exam:  Motor exam is 5 out of 5 all extremities with the exception of 4/5 in LLE  Sensory:  Sensory intact    Data  CBC:   Lab Results   Component Value Date/Time    WBC 11.7 07/13/2024 04:28 AM    RBC 3.73 07/13/2024 04:28 AM    HGB 10.5 07/13/2024 04:28 AM    HCT 33.0 07/13/2024 04:28 AM    MCV 88.5 07/13/2024 04:28 AM    MCH 28.2 07/13/2024 04:28 AM    MCHC 31.8 07/13/2024 04:28 AM    RDW 14.6 07/13/2024 04:28 AM    PLT 87 07/13/2024 04:28 AM    MPV 10.9 07/13/2024 04:28 AM     BMP:    Lab Results   Component Value Date/Time     07/13/2024 04:28 AM    K 4.7 07/13/2024 04:28 AM    K 4.1 10/31/2021 07:33 PM    CL 98 07/13/2024 04:28 AM    CO2 21 07/13/2024 04:28 AM    BUN 20 07/13/2024 04:28 AM    CREATININE 0.8 07/13/2024 04:28 AM    CALCIUM 9.5 07/13/2024 04:28 AM    GFRAA >60 10/31/2021 07:33 PM    LABGLOM >90 07/13/2024 04:28 AM    GLUCOSE 86 07/13/2024 04:28 AM     Current Inpatient Medications  Current Facility-Administered Medications: bicalutamide (CASODEX) chemo tablet 50 mg, 50 mg, Oral, Daily  vitamin D (ERGOCALCIFEROL) capsule 50,000 Units, 50,000 Units, Oral, Weekly  vitamin D (CHOLECALCIFEROL) tablet 2,000 Units, 2,000 Units, Oral, Daily  oxyCODONE HCl (OXY-IR) immediate release tablet 10 mg, 10 mg, Oral, 6 times per day  sodium chloride flush 0.9 % injection 5-40 mL, 5-40 mL, IntraVENous, 2 times per day  sodium chloride flush 0.9 % injection 5-40 mL, 5-40 mL, IntraVENous, PRN  0.9 % sodium chloride

## 2024-07-13 NOTE — PROGRESS NOTES
[facility]  Family Medicine Attending    Hospital Course: 43 y.o. male with PMHx of polysubstance abuse including opioids and tobacco, abstinent for 5 years, diverticulitis who presents with rib pain and LLE pain. He also has recently noted difficulty w urination. ALP elevated 200, AST 76, Calcium 10.5 Hgb 12.3, Platelets 116.  . CT chest concerning for mediastinal LAD. CT Lumbar concerning for metastatic disease & retroperitoneal LAD.     S: Today, pain is controlled overall but still bothersome.  No new symptoms or concerns.  No questions at this time.  Considering his options but has been declining diagnostic and treatment options.      O: VS- Blood pressure 109/75, pulse 78, temperature 96.8 °F (36 °C), temperature source Temporal, resp. rate 18, height 1.702 m (5' 7\"), weight 88.5 kg (195 lb), SpO2 97 %.  Exam is as noted by resident with the following changes, additions or corrections:  Gen: NAD AAOx3  HEENT NCAT EOMI.    CV RRR   Resp CTAB tender over left chest wall mid ribs.   Ext: no edema, no tenderness     Impressions:   Principal Problem:    Retroperitoneal lymphadenopathy  Active Problems:    Chronic midline low back pain with left-sided sciatica    Elevated PSA    Urinary hesitancy    Pelvic pain    Osteopenia of lumbar spine    Acute midline low back pain with sciatica  Resolved Problems:    * No resolved hospital problems. *      Plan:     1) Enlarged Prostate w/ elevated PSA - concerning for prostate cancer w metastatic lesions on CT. MRI with diffuse mets.  Casodex per Urology; patient declines.     Neurosurgery recommendations appreciated.    Urology recommendations appreciated.      2) Elevated calcium - improved. Follow PTHrp    3) Mediastinal LAD - Pulm consulted, labs, bronch per pulm w/ bx.     Pulm management appreciated.  Await path.     4) Vitamin D - replacement.     See resident note for further details.        Attending Physician Statement  I have reviewed the

## 2024-07-13 NOTE — PROGRESS NOTES
[facility] - Family Medicine Inpatient   Resident Progress Note    S:  Hospital day: 3   Brief Synopsis: Joseph Bond is a 43 y.o. male with a PMH of polysubstance use who presented to the ED with severe low back and rib pain. Work-up revealed prostatomegaly and concern for metastases in the lumbar spine. Urology was consulted, and REAGAN performed was consistent with prostate cancer. Patient was initially started on Casodex but declined d/t preference for naturopathic remedies. NM Bone scan showed extensive mets in spine, ribs, humeri, femurs, and pelvis. NSY was consulted and recommended radiotherapy, which the patient declined and preferred homeopathic methods. Oncology was consulted, and consulted pulmonology for EBUS for mediastinal lymphadenopathy. On-site pathology of EBUS bx was positive for cancer. Patient also had lumbar MRI that showed extensive metastases    Overnight/interim:   Patient reports that since his MRI yesterday, he has had worsening of the pain in his back and hip. He states he often has worsening pain when he receives contrast  Denies fever/chills/chest pain/shortness of breath  He states he has not had a bowel movement in a day and is amenable to trying MiraLax   States he is not having any difficulty urinating, no dysuria/hematuria          Cont meds:    sodium chloride       Scheduled meds:    bicalutamide  50 mg Oral Daily    vitamin D  50,000 Units Oral Weekly    vitamin D  2,000 Units Oral Daily    oxyCODONE  10 mg Oral 6 times per day    sodium chloride flush  5-40 mL IntraVENous 2 times per day    enoxaparin  40 mg SubCUTAneous Daily    diclofenac sodium  2 g Topical BID    methocarbamol  500 mg Oral 4x Daily     PRN meds: sodium chloride flush, sodium chloride, ondansetron **OR** ondansetron, polyethylene glycol, HYDROmorphone, iopamidol     I reviewed the patient's past medical and surgical history, Medications and Allergies.    O:  /75   Pulse 78   Temp 96.8 °F (36 °C)  develop infectious sxs     Anemia/thrombocytopenia  Monitor CBC, Hb stable today    Hx HTN  Monitor BP while in patient  Current controlled off meds    Vitamin D deficiency  Vitamin D supplementation    DVT/GI ppx: Lovenox / not indicated        Electronically signed by Herbert Barreto MD on 7/13/2024 at 10:14 AM  Attending physician: Dr. Damon

## 2024-07-13 NOTE — PLAN OF CARE
Problem: Musculoskeletal - Adult  Goal: Return mobility to safest level of function  7/13/2024 1019 by Coco Thorne, RN  Outcome: Progressing  7/13/2024 0026 by Lorena Gomez, RN  Outcome: Progressing  Goal: Maintain proper alignment of affected body part  Outcome: Progressing     Problem: Pain  Goal: Verbalizes/displays adequate comfort level or baseline comfort level  7/13/2024 1019 by Coco Thorne RN  Outcome: Progressing  7/13/2024 0026 by Lorena Gomez, RN  Outcome: Progressing

## 2024-07-14 LAB
ANION GAP SERPL CALCULATED.3IONS-SCNC: 16 MMOL/L (ref 7–16)
BASOPHILS # BLD: 0.08 K/UL (ref 0–0.2)
BASOPHILS NFR BLD: 1 % (ref 0–2)
BUN SERPL-MCNC: 21 MG/DL (ref 6–20)
CALCIUM SERPL-MCNC: 9.8 MG/DL (ref 8.6–10.2)
CHLORIDE SERPL-SCNC: 99 MMOL/L (ref 98–107)
CO2 SERPL-SCNC: 22 MMOL/L (ref 22–29)
CREAT SERPL-MCNC: 0.8 MG/DL (ref 0.7–1.2)
EOSINOPHIL # BLD: 0.07 K/UL (ref 0.05–0.5)
EOSINOPHILS RELATIVE PERCENT: 1 % (ref 0–6)
ERYTHROCYTE [DISTWIDTH] IN BLOOD BY AUTOMATED COUNT: 14.8 % (ref 11.5–15)
GFR, ESTIMATED: >90 ML/MIN/1.73M2
GLUCOSE SERPL-MCNC: 83 MG/DL (ref 74–99)
HCT VFR BLD AUTO: 34.3 % (ref 37–54)
HGB BLD-MCNC: 10.8 G/DL (ref 12.5–16.5)
IMM GRANULOCYTES # BLD AUTO: 0.37 K/UL (ref 0–0.58)
IMM GRANULOCYTES NFR BLD: 4 % (ref 0–5)
LYMPHOCYTES NFR BLD: 3.27 K/UL (ref 1.5–4)
LYMPHOCYTES RELATIVE PERCENT: 35 % (ref 20–42)
MCH RBC QN AUTO: 27.8 PG (ref 26–35)
MCHC RBC AUTO-ENTMCNC: 31.5 G/DL (ref 32–34.5)
MCV RBC AUTO: 88.2 FL (ref 80–99.9)
MONOCYTES NFR BLD: 0.64 K/UL (ref 0.1–0.95)
MONOCYTES NFR BLD: 7 % (ref 2–12)
NEUTROPHILS NFR BLD: 52 % (ref 43–80)
NEUTS SEG NFR BLD: 4.87 K/UL (ref 1.8–7.3)
PLATELET # BLD AUTO: 79 K/UL (ref 130–450)
PLATELET CONFIRMATION: NORMAL
PMV BLD AUTO: 10.6 FL (ref 7–12)
POTASSIUM SERPL-SCNC: 4.1 MMOL/L (ref 3.5–5)
RBC # BLD AUTO: 3.89 M/UL (ref 3.8–5.8)
SODIUM SERPL-SCNC: 137 MMOL/L (ref 132–146)
T SPOT TB TEST: NORMAL
WBC OTHER # BLD: 9.3 K/UL (ref 4.5–11.5)

## 2024-07-14 PROCEDURE — 6370000000 HC RX 637 (ALT 250 FOR IP)

## 2024-07-14 PROCEDURE — 99232 SBSQ HOSP IP/OBS MODERATE 35: CPT | Performed by: FAMILY MEDICINE

## 2024-07-14 PROCEDURE — 80048 BASIC METABOLIC PNL TOTAL CA: CPT

## 2024-07-14 PROCEDURE — 2580000003 HC RX 258: Performed by: INTERNAL MEDICINE

## 2024-07-14 PROCEDURE — 6370000000 HC RX 637 (ALT 250 FOR IP): Performed by: INTERNAL MEDICINE

## 2024-07-14 PROCEDURE — 1200000000 HC SEMI PRIVATE

## 2024-07-14 PROCEDURE — 36415 COLL VENOUS BLD VENIPUNCTURE: CPT

## 2024-07-14 PROCEDURE — 85025 COMPLETE CBC W/AUTO DIFF WBC: CPT

## 2024-07-14 RX ORDER — POLYETHYLENE GLYCOL 3350 17 G/17G
17 POWDER, FOR SOLUTION ORAL 2 TIMES DAILY
Status: DISCONTINUED | OUTPATIENT
Start: 2024-07-14 | End: 2024-07-18 | Stop reason: HOSPADM

## 2024-07-14 RX ORDER — SENNOSIDES A AND B 8.6 MG/1
1 TABLET, FILM COATED ORAL 2 TIMES DAILY
Status: DISCONTINUED | OUTPATIENT
Start: 2024-07-14 | End: 2024-07-18 | Stop reason: HOSPADM

## 2024-07-14 RX ADMIN — SENNOSIDES 8.6 MG: 8.6 TABLET, FILM COATED ORAL at 09:00

## 2024-07-14 RX ADMIN — METHOCARBAMOL 500 MG: 500 TABLET ORAL at 20:34

## 2024-07-14 RX ADMIN — OXYCODONE HYDROCHLORIDE 10 MG: 10 TABLET ORAL at 08:38

## 2024-07-14 RX ADMIN — OXYCODONE HYDROCHLORIDE 10 MG: 10 TABLET ORAL at 16:31

## 2024-07-14 RX ADMIN — OXYCODONE HYDROCHLORIDE 10 MG: 10 TABLET ORAL at 23:57

## 2024-07-14 RX ADMIN — METHOCARBAMOL 500 MG: 500 TABLET ORAL at 16:31

## 2024-07-14 RX ADMIN — METHOCARBAMOL 500 MG: 500 TABLET ORAL at 08:38

## 2024-07-14 RX ADMIN — SENNOSIDES 8.6 MG: 8.6 TABLET, FILM COATED ORAL at 20:32

## 2024-07-14 RX ADMIN — OXYCODONE HYDROCHLORIDE 10 MG: 10 TABLET ORAL at 02:58

## 2024-07-14 RX ADMIN — Medication 2000 UNITS: at 08:38

## 2024-07-14 RX ADMIN — OXYCODONE HYDROCHLORIDE 10 MG: 10 TABLET ORAL at 20:32

## 2024-07-14 RX ADMIN — METHOCARBAMOL 500 MG: 500 TABLET ORAL at 12:28

## 2024-07-14 RX ADMIN — OXYCODONE HYDROCHLORIDE 10 MG: 10 TABLET ORAL at 12:28

## 2024-07-14 RX ADMIN — SODIUM CHLORIDE, PRESERVATIVE FREE 10 ML: 5 INJECTION INTRAVENOUS at 08:42

## 2024-07-14 RX ADMIN — POLYETHYLENE GLYCOL 3350 17 G: 17 POWDER, FOR SOLUTION ORAL at 08:38

## 2024-07-14 RX ADMIN — POLYETHYLENE GLYCOL 3350 17 G: 17 POWDER, FOR SOLUTION ORAL at 20:32

## 2024-07-14 ASSESSMENT — PAIN DESCRIPTION - PAIN TYPE: TYPE: CHRONIC PAIN

## 2024-07-14 ASSESSMENT — PAIN SCALES - GENERAL
PAINLEVEL_OUTOF10: 9
PAINLEVEL_OUTOF10: 8
PAINLEVEL_OUTOF10: 7
PAINLEVEL_OUTOF10: 9
PAINLEVEL_OUTOF10: 9
PAINLEVEL_OUTOF10: 10
PAINLEVEL_OUTOF10: 9
PAINLEVEL_OUTOF10: 8

## 2024-07-14 ASSESSMENT — PAIN DESCRIPTION - ORIENTATION
ORIENTATION: RIGHT;LEFT
ORIENTATION: LEFT

## 2024-07-14 ASSESSMENT — PAIN DESCRIPTION - FREQUENCY: FREQUENCY: CONTINUOUS

## 2024-07-14 ASSESSMENT — PAIN DESCRIPTION - LOCATION
LOCATION: LEG;PELVIS;RIB CAGE
LOCATION: PELVIS
LOCATION: LEG;PELVIS
LOCATION: PELVIS;RIB CAGE
LOCATION: HIP;RIB CAGE

## 2024-07-14 ASSESSMENT — PAIN DESCRIPTION - DESCRIPTORS
DESCRIPTORS: ACHING;SORE;SHARP
DESCRIPTORS: ACHING;DISCOMFORT;SORE
DESCRIPTORS: ACHING;DISCOMFORT;SORE
DESCRIPTORS: ACHING;TENDER;DISCOMFORT

## 2024-07-14 ASSESSMENT — PAIN - FUNCTIONAL ASSESSMENT
PAIN_FUNCTIONAL_ASSESSMENT: ACTIVITIES ARE NOT PREVENTED
PAIN_FUNCTIONAL_ASSESSMENT: PREVENTS OR INTERFERES SOME ACTIVE ACTIVITIES AND ADLS

## 2024-07-14 ASSESSMENT — PAIN DESCRIPTION - ONSET: ONSET: ON-GOING

## 2024-07-14 NOTE — PLAN OF CARE
Problem: Musculoskeletal - Adult  Goal: Return mobility to safest level of function  7/14/2024 0934 by Coco Thorne, RN  Outcome: Progressing  7/14/2024 0153 by Lorena Gomez RN  Outcome: Progressing  Goal: Maintain proper alignment of affected body part  Outcome: Progressing     Problem: Pain  Goal: Verbalizes/displays adequate comfort level or baseline comfort level  7/14/2024 0934 by Coco Thorne RN  Outcome: Progressing  7/14/2024 0153 by Lorena Gomez, RN  Outcome: Progressing

## 2024-07-14 NOTE — PROGRESS NOTES
[facility]  Family Medicine Attending    Hospital Course: 43 y.o. male with PMHx of polysubstance abuse including opioids and tobacco, abstinent for 5 years, diverticulitis who presents with rib pain and LLE pain. He also has recently noted difficulty w urination. ALP elevated 200, AST 76, Calcium 10.5 Hgb 12.3, Platelets 116.  . CT chest concerning for mediastinal LAD. CT Lumbar concerning for metastatic disease & retroperitoneal LAD.     S: Today, pain remains controlled overall.  No new symptoms or concerns.  Has continued to decline medical interventions.      O: VS- Blood pressure 125/86, pulse 88, temperature 97.2 °F (36.2 °C), temperature source Temporal, resp. rate 16, height 1.702 m (5' 7\"), weight 88.5 kg (195 lb), SpO2 98 %.  Exam is as noted by resident with the following changes, additions or corrections:  Gen: NAD AAOx3  HEENT NCAT EOMI.    CV RRR   Resp CTAB   Ext: no edema, no tenderness     Impressions:   Principal Problem:    Retroperitoneal lymphadenopathy  Active Problems:    Chronic midline low back pain with left-sided sciatica    Elevated PSA    Urinary hesitancy    Pelvic pain    Osteopenia of lumbar spine    Acute midline low back pain with sciatica  Resolved Problems:    * No resolved hospital problems. *      Plan:     1) Enlarged Prostate w/ elevated PSA - concerning for prostate cancer w metastatic lesions on CT. MRI with diffuse mets.  Casodex per Urology; patient declines.     Neurosurgery recommendations appreciated.    Urology recommendations appreciated.     We discussed again the recommendations from his specialists for the evaluation and management of his likely metastatic malignancy.  Discussed that, without treatments that have been shown to have efficacy, the likely cancer is expected to spread and worsen, leading to death.  He expressed understanding but does not wish to pursue biopsy, medication, or radiation treatments.  We discussed the role for

## 2024-07-14 NOTE — PROGRESS NOTES
[facility] - Family Medicine Inpatient   Resident Progress Note    S:  Hospital day: 4   Brief Synopsis: Joseph Bond is a 43 y.o. male with a PMH of  polysubstance abuse including opioids and tobacco, abstinent for 5 years, diverticulitis who presents with rib pain and LLE pain. Patient was in the ED on 6/24 for rib pain and low back pain.  Onset several weeks ago, progressively getting worse.  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  He had to lift her for bathing, feeding and transferring to bed.  Recently, he is unable to handle that and she is currently in nursing home.  7/8/2024, patient woke up at 3 AM because of worsening lower back pain and LLE pain.  Pain is 10/10, radiating down LLE towards toes.  In the morning, he had tried to call urology to set up for an appointment per PCPs recommendation.  When he told nurse over the phone about radicular pain, he was advised to go to the emergency department. Patient also complains of weak urinary stream, onset several months. He also reports pelvic pain. 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 Of note, just established with PCP a week ago. He was referred to pulmonology for mediastinal lymphadenopathy and urology for Prosta megaly seen on CT abdomen pelvis and also PSA of 155. Abuse history include opioid use and Xanax when he was younger, abstain for 5 years. Also stopped smoking tobacco at that time. Prior to that he had 1 PPD smoking history. Never had a colonoscopy, not even after diverticulitis when he was diagnosed at 32. CMP significant for elevated alk phos at 200 and AST 76.  BUN 28.  Calcium 10.5, albumin 4.9. CBC significant for hemoglobin 12.3, platelets 116. UDS positive for oxycodone that he was given in the ED. UA with high specific gravity and trace protein, RBC 10-20CT lumbar spine ordered for lumbar back pain, consistent

## 2024-07-14 NOTE — PROGRESS NOTES
[facility]  [address] [id number]  [phone]  FAX [phone]      CHIEF UROLOGIC COMPLAINT: Probable metastatic prostate cancer    HISTORY OF PRESENT ILLNESS:  Patient without new complaints.  Having severe back pain.    REVIEW OF SYSTEMS:   CONSTITUTIONAL: negative  HEENT: negative  HEMATOLOGIC: negative  ENDOCRINE: negative  RESPIRATORY: negative  CV: negative  GI: negative  NEURO: negative  ORTHOPEDICS: As above  PSYCHIATRIC: negative  : as above    PAST FAMILY HISTORY:  No family history on file.  PAST SOCIAL HISTORY:    Social History     Socioeconomic History    Marital status: Single     Spouse name: None    Number of children: None    Years of education: None    Highest education level: None   Tobacco Use    Smoking status: Former     Average packs/day: 0.6 packs/day for 10.3 years (5.7 ttl pk-yrs)     Types: Cigarettes     Start date: 4/3/2014    Smokeless tobacco: Never   Substance and Sexual Activity    Alcohol use: No    Drug use: No    Sexual activity: Not Currently     Partners: Female     Social Determinants of Health     Financial Resource Strain: High Risk (7/2/2024)    Overall Financial Resource Strain (CARDIA)     Difficulty of Paying Living Expenses: Hard   Food Insecurity: Patient Declined (7/10/2024)    Hunger Vital Sign     Worried About Running Out of Food in the Last Year: Patient declined     Ran Out of Food in the Last Year: Patient declined   Recent Concern: Food Insecurity - Food Insecurity Present (7/2/2024)    Hunger Vital Sign     Worried About Running Out of Food in the Last Year: Often true     Ran Out of Food in the Last Year: Often true   Transportation Needs: Patient Declined (7/10/2024)    PRAPARE - Transportation     Lack of Transportation (Medical): Patient declined     Lack of Transportation (Non-Medical): Patient declined   Recent Concern: Transportation Needs - Unmet Transportation Needs (7/2/2024)    PRAPARE -

## 2024-07-15 LAB
ANION GAP SERPL CALCULATED.3IONS-SCNC: 17 MMOL/L (ref 7–16)
BASOPHILS # BLD: 0.06 K/UL (ref 0–0.2)
BASOPHILS NFR BLD: 1 % (ref 0–2)
BUN SERPL-MCNC: 20 MG/DL (ref 6–20)
CALCIUM SERPL-MCNC: 10.1 MG/DL (ref 8.6–10.2)
CHLORIDE SERPL-SCNC: 98 MMOL/L (ref 98–107)
CO2 SERPL-SCNC: 24 MMOL/L (ref 22–29)
CREAT SERPL-MCNC: 0.9 MG/DL (ref 0.7–1.2)
EOSINOPHIL # BLD: 0.06 K/UL (ref 0.05–0.5)
EOSINOPHILS RELATIVE PERCENT: 1 % (ref 0–6)
ERYTHROCYTE [DISTWIDTH] IN BLOOD BY AUTOMATED COUNT: 14.6 % (ref 11.5–15)
GFR, ESTIMATED: >90 ML/MIN/1.73M2
GLUCOSE BLD-MCNC: 93 MG/DL (ref 74–99)
GLUCOSE SERPL-MCNC: 98 MG/DL (ref 74–99)
HCT VFR BLD AUTO: 32.7 % (ref 37–54)
HGB BLD-MCNC: 10.6 G/DL (ref 12.5–16.5)
IMM GRANULOCYTES # BLD AUTO: 0.37 K/UL (ref 0–0.58)
IMM GRANULOCYTES NFR BLD: 5 % (ref 0–5)
LYMPHOCYTES NFR BLD: 2.74 K/UL (ref 1.5–4)
LYMPHOCYTES RELATIVE PERCENT: 34 % (ref 20–42)
MCH RBC QN AUTO: 28.2 PG (ref 26–35)
MCHC RBC AUTO-ENTMCNC: 32.4 G/DL (ref 32–34.5)
MCV RBC AUTO: 87 FL (ref 80–99.9)
MONOCYTES NFR BLD: 0.58 K/UL (ref 0.1–0.95)
MONOCYTES NFR BLD: 7 % (ref 2–12)
NEUTROPHILS NFR BLD: 52 % (ref 43–80)
NEUTS SEG NFR BLD: 4.17 K/UL (ref 1.8–7.3)
PLATELET # BLD AUTO: 84 K/UL (ref 130–450)
PLATELET CONFIRMATION: NORMAL
PMV BLD AUTO: 11.1 FL (ref 7–12)
POTASSIUM SERPL-SCNC: 4 MMOL/L (ref 3.5–5)
RBC # BLD AUTO: 3.76 M/UL (ref 3.8–5.8)
SODIUM SERPL-SCNC: 139 MMOL/L (ref 132–146)
WBC OTHER # BLD: 8 K/UL (ref 4.5–11.5)

## 2024-07-15 PROCEDURE — 6370000000 HC RX 637 (ALT 250 FOR IP): Performed by: INTERNAL MEDICINE

## 2024-07-15 PROCEDURE — 94729 DIFFUSING CAPACITY: CPT

## 2024-07-15 PROCEDURE — 82962 GLUCOSE BLOOD TEST: CPT

## 2024-07-15 PROCEDURE — 99223 1ST HOSP IP/OBS HIGH 75: CPT | Performed by: NURSE PRACTITIONER

## 2024-07-15 PROCEDURE — 94375 RESPIRATORY FLOW VOLUME LOOP: CPT

## 2024-07-15 PROCEDURE — 2580000003 HC RX 258: Performed by: INTERNAL MEDICINE

## 2024-07-15 PROCEDURE — 85025 COMPLETE CBC W/AUTO DIFF WBC: CPT

## 2024-07-15 PROCEDURE — 94726 PLETHYSMOGRAPHY LUNG VOLUMES: CPT

## 2024-07-15 PROCEDURE — 80048 BASIC METABOLIC PNL TOTAL CA: CPT

## 2024-07-15 PROCEDURE — 99232 SBSQ HOSP IP/OBS MODERATE 35: CPT | Performed by: FAMILY MEDICINE

## 2024-07-15 PROCEDURE — 6370000000 HC RX 637 (ALT 250 FOR IP)

## 2024-07-15 PROCEDURE — 1200000000 HC SEMI PRIVATE

## 2024-07-15 PROCEDURE — 36415 COLL VENOUS BLD VENIPUNCTURE: CPT

## 2024-07-15 RX ORDER — TIZANIDINE 4 MG/1
2 TABLET ORAL ONCE
Status: COMPLETED | OUTPATIENT
Start: 2024-07-15 | End: 2024-07-15

## 2024-07-15 RX ADMIN — TIZANIDINE 2 MG: 4 TABLET ORAL at 23:57

## 2024-07-15 RX ADMIN — DICLOFENAC SODIUM 2 G: 10 GEL TOPICAL at 20:25

## 2024-07-15 RX ADMIN — OXYCODONE HYDROCHLORIDE 10 MG: 10 TABLET ORAL at 16:57

## 2024-07-15 RX ADMIN — SENNOSIDES 8.6 MG: 8.6 TABLET, FILM COATED ORAL at 20:25

## 2024-07-15 RX ADMIN — SODIUM CHLORIDE, PRESERVATIVE FREE 10 ML: 5 INJECTION INTRAVENOUS at 20:26

## 2024-07-15 RX ADMIN — POLYETHYLENE GLYCOL 3350 17 G: 17 POWDER, FOR SOLUTION ORAL at 08:06

## 2024-07-15 RX ADMIN — SODIUM CHLORIDE, PRESERVATIVE FREE 10 ML: 5 INJECTION INTRAVENOUS at 08:04

## 2024-07-15 RX ADMIN — Medication 2000 UNITS: at 08:07

## 2024-07-15 RX ADMIN — METHOCARBAMOL 500 MG: 500 TABLET ORAL at 20:25

## 2024-07-15 RX ADMIN — OXYCODONE HYDROCHLORIDE 10 MG: 10 TABLET ORAL at 23:57

## 2024-07-15 RX ADMIN — OXYCODONE HYDROCHLORIDE 10 MG: 10 TABLET ORAL at 08:07

## 2024-07-15 RX ADMIN — OXYCODONE HYDROCHLORIDE 10 MG: 10 TABLET ORAL at 12:28

## 2024-07-15 RX ADMIN — OXYCODONE HYDROCHLORIDE 10 MG: 10 TABLET ORAL at 20:25

## 2024-07-15 RX ADMIN — METHOCARBAMOL 500 MG: 500 TABLET ORAL at 12:28

## 2024-07-15 RX ADMIN — POLYETHYLENE GLYCOL 3350 17 G: 17 POWDER, FOR SOLUTION ORAL at 20:25

## 2024-07-15 RX ADMIN — METHOCARBAMOL 500 MG: 500 TABLET ORAL at 08:07

## 2024-07-15 RX ADMIN — OXYCODONE HYDROCHLORIDE 10 MG: 10 TABLET ORAL at 03:43

## 2024-07-15 RX ADMIN — SENNOSIDES 8.6 MG: 8.6 TABLET, FILM COATED ORAL at 08:07

## 2024-07-15 RX ADMIN — METHOCARBAMOL 500 MG: 500 TABLET ORAL at 16:57

## 2024-07-15 ASSESSMENT — PAIN - FUNCTIONAL ASSESSMENT: PAIN_FUNCTIONAL_ASSESSMENT: ACTIVITIES ARE NOT PREVENTED

## 2024-07-15 ASSESSMENT — PAIN SCALES - GENERAL
PAINLEVEL_OUTOF10: 8
PAINLEVEL_OUTOF10: 6
PAINLEVEL_OUTOF10: 10

## 2024-07-15 ASSESSMENT — PAIN DESCRIPTION - LOCATION: LOCATION: HIP;RIB CAGE

## 2024-07-15 ASSESSMENT — PAIN DESCRIPTION - ONSET: ONSET: ON-GOING

## 2024-07-15 ASSESSMENT — PAIN DESCRIPTION - ORIENTATION: ORIENTATION: LEFT

## 2024-07-15 ASSESSMENT — PAIN DESCRIPTION - PAIN TYPE: TYPE: CHRONIC PAIN

## 2024-07-15 ASSESSMENT — PAIN DESCRIPTION - FREQUENCY: FREQUENCY: CONTINUOUS

## 2024-07-15 NOTE — PROGRESS NOTES
[facility] - Family Medicine Inpatient   Resident Progress Note    S:  Hospital day: 5   Brief Synopsis: Joseph Bond is a 43 y.o. male with a PMH of  polysubstance abuse including opioids and tobacco, abstinent for 5 years, diverticulitis who presents with rib pain and LLE pain. Patient was in the ED on 6/24 for rib pain and low back pain.  Onset several weeks ago, progressively getting worse.  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  He had to lift her for bathing, feeding and transferring to bed.  Recently, he is unable to handle that and she is currently in nursing home.  7/8/2024, patient woke up at 3 AM because of worsening lower back pain and LLE pain.  Pain is 10/10, radiating down LLE towards toes.  In the morning, he had tried to call urology to set up for an appointment per PCPs recommendation.  When he told nurse over the phone about radicular pain, he was advised to go to the emergency department. Patient also complains of weak urinary stream, onset several months. He also reports pelvic pain. 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 Of note, just established with PCP a week ago. He was referred to pulmonology for mediastinal lymphadenopathy and urology for Prosta megaly seen on CT abdomen pelvis and also PSA of 155. Abuse history include opioid use and Xanax when he was younger, abstain for 5 years. Also stopped smoking tobacco at that time. Prior to that he had 1 PPD smoking history. Never had a colonoscopy, not even after diverticulitis when he was diagnosed at 32. CMP significant for elevated alk phos at 200 and AST 76.  BUN 28.  Calcium 10.5, albumin 4.9. CBC significant for hemoglobin 12.3, platelets 116. UDS positive for oxycodone that he was given in the ED. UA with high specific gravity and trace protein, RBC 10-20CT lumbar spine ordered for lumbar back pain, consistent      PRN meds: sodium chloride flush, sodium chloride, ondansetron **OR** ondansetron, HYDROmorphone, iopamidol     I reviewed the patient's past medical and surgical history, Medications and Allergies.    O:  /83   Pulse 96   Temp 97.1 °F (36.2 °C) (Temporal)   Resp 18   Ht 1.702 m (5' 7\")   Wt 88.5 kg (195 lb)   SpO2 97%   BMI 30.54 kg/m²   24 hour I&O: I/O last 3 completed shifts:  In: -   Out: 200 [Urine:200]  No intake/output data recorded.       Physical Exam  Constitutional:       Appearance: Normal appearance.   HENT:      Head: Normocephalic and atraumatic.      Mouth/Throat:      Mouth: Mucous membranes are moist.      Pharynx: Oropharynx is clear.   Eyes:      Extraocular Movements: Extraocular movements intact.      Conjunctiva/sclera: Conjunctivae normal.      Pupils: Pupils are equal, round, and reactive to light.   Cardiovascular:      Rate and Rhythm: Normal rate and regular rhythm.      Pulses: Normal pulses.      Heart sounds: Normal heart sounds.   Pulmonary:      Effort: Pulmonary effort is normal.      Breath sounds: Normal breath sounds.   Abdominal:      General: Bowel sounds are normal.      Palpations: Abdomen is soft.      Tenderness: There is no abdominal tenderness.   Musculoskeletal:         General: Tenderness (along 9-10th ribs, R chest wall) present. No deformity or signs of injury.      Lumbar back: Bony tenderness (L3-L5) present. Decreased range of motion.      Left hip: Bony tenderness present.   Skin:     General: Skin is warm and dry.   Neurological:      Mental Status: He is alert.      Cranial Nerves: No cranial nerve deficit.      Sensory: No sensory deficit.      Motor: Weakness (3/5 strength in his LLE, 5/5 strength in other extremities) present.           Labs:  Na/K/Cl/CO2:  139/4.0/98/24 (07/15 0534)  BUN/Cr/glu/ALT/AST/amyl/lip:  20/0.9/--/--/--/--/-- (07/15 0534)  WBC/Hgb/Hct/Plts:  8.0/10.6/32.7/84 (07/15 0534)  estimated creatinine clearance is 111 mL/min

## 2024-07-15 NOTE — CARE COORDINATION
Social Work/Case Management Transition of Care Planning (Nguyen Bahena [phone]):  Per report and chart review, patient declined biopsy of the prostate.  Palliative care has been consulted for goals of care. Met with patient at bedside. He asked if SW was going to set up a caregiver for him at home.  Patient is ambulating independently in his room.  Explained if skilled services were needed, HHC could be arranged.  Patient denied need for HHC.  Discussed disability application process and instructed patient on how to follow up with this.  Discharge plan is to home with palliative care.  Referral information provided to [facility].  Await response.  Patient will drive himself home. CM/TREY will  follow. Nguyen Bahena, BERNADETTE  7/15/2024

## 2024-07-15 NOTE — PROGRESS NOTES
7/15/2024 1:40 PM  Joseph Bond  [id number]    Subjective:    Awake and alert  Sitting up in chair  States palliative care was recently in the room.  He denies any new complaints today and is still undecided on whether or not he would like to pursue biopsy.  We discussed all options and that there was no way to be definitive without a biopsy.  I did review his results of the bone scan that suggest metastatic disease.  He is still questioning why there is no definitive answer    Review of Systems  Constitutional: No fever or chills   Respiratory: negative for cough and hemoptysis  Cardiovascular: negative for chest pain and dyspnea  Gastrointestinal: negative for abdominal pain, diarrhea, nausea and vomiting   : See above  Derm: negative for rash and skin lesion(s)  Neurological: negative for seizures and tremors  Musculoskeletal: Negative    Psychiatric: Negative   All other reviews are negative      Scheduled Meds:   senna  1 tablet Oral BID    polyethylene glycol  17 g Oral BID    lidocaine  1 patch TransDERmal Daily    bicalutamide  50 mg Oral Daily    vitamin D  50,000 Units Oral Weekly    vitamin D  2,000 Units Oral Daily    oxyCODONE  10 mg Oral 6 times per day    sodium chloride flush  5-40 mL IntraVENous 2 times per day    enoxaparin  40 mg SubCUTAneous Daily    diclofenac sodium  2 g Topical BID    methocarbamol  500 mg Oral 4x Daily       Objective:  Vitals:    07/15/24 0715   BP: 112/68   Pulse: 91   Resp: 16   Temp: 97.1 °F (36.2 °C)   SpO2: 96%         Allergies: Patient has no known allergies.    General Appearance: alert and oriented to person, place and time and in no acute distress  Skin: no rash or erythema  Head: normocephalic and atraumatic  Pulmonary/Chest: normal air movement, no respiratory distress  Abdomen: soft, non-tender, non-distended  Genitourinary: No Dia  Extremities: no cyanosis, clubbing or edema         Labs:     Recent Labs     07/15/24  0534      K 4.0   CL 98    CO2 24   BUN 20   CREATININE 0.9   GLUCOSE 98   CALCIUM 10.1       Lab Results   Component Value Date/Time    HGB 10.6 07/15/2024 05:34 AM    HCT 32.7 07/15/2024 05:34 AM       Lab Results   Component Value Date    .30 (H) 07/11/2024    .70 (H) 07/02/2024         Assessment/Plan:  Probable metastatic prostate cancer    Patient is still on decide on whether or not he would like to pursue biopsy or treatment at this time.  Palliative care has been in to address goals of care.  Still recommend social work to see patient as he has financial questions and concerns.      Ricci Mayberry PA-C   [facility]  Urology

## 2024-07-15 NOTE — PROGRESS NOTES
[facility]  Hematology/Oncology  Consult  Dr. Aaron Matson M.D.    Patient Name: Joseph Bond  YOB: 1980  PCP: Ty Shaikh MD   Referring Provider:      Reason for Consultation:   Chief Complaint   Patient presents with    Rib Pain (injury)     Pain in rib, pelvis, and chest pain that is chronic    Hip Pain        History of Present Illness:  43 years old male came to the ER complaining of low back pain radiating down the left leg.  Labs on admission were remarkable for calcium of 10.5 alk phos 200 platelet count 116, .  CT lumbar spine showed findings suggestive of diffuse osseous metastasis along with retroperitoneal lymphadenopathy.  CT chest revealed mediastinal lymphadenopathy up to 2.5 cm paratracheal AP window subcarinal lymph nodes.  CT abdomen pelvis from 2 weeks ago revealed aortocaval lymphadenopathy prostatomegaly.    Review of systems: Over 10 systems were reviewed and all were negative except as mention above.      Diagnostic Data:     Past Medical History:   Diagnosis Date    Diverticulosis     History of opioid abuse (HCC)     Hypertension        Patient Active Problem List    Diagnosis Date Noted    Retroperitoneal lymphadenopathy 07/10/2024    Chronic midline low back pain with left-sided sciatica 07/10/2024    Elevated PSA 07/10/2024    Urinary hesitancy 07/10/2024    Pelvic pain 07/10/2024    Osteopenia of lumbar spine 07/10/2024    Acute midline low back pain with sciatica 07/10/2024        Past Surgical History:   Procedure Laterality Date    BRONCHOSCOPY N/A 7/11/2024    BRONCHOSCOPY ENDOBRONCHIAL ULTRASOUND FINE NEEDLE ASPIRATION [place] [facility] [place]    BRONCHOSCOPY  7/11/2024    BRONCHOSCOPY DIAGNOSTIC OR CELL WASH ONLY [place]jo DO [place] [facility] [place]       Family History  No family history on file.    Social History  Social History     Socioeconomic History    Marital status: Single  EOSPCT 1 07/15/2024    BASOPCT 1 07/15/2024    NEUTROABS 4.17 07/15/2024    LYMPHSABS 2.74 07/15/2024    MONOSABS 0.58 07/15/2024    EOSABS 0.06 07/15/2024    BASOSABS 0.06 07/15/2024       Lab Results   Component Value Date     07/15/2024    K 4.0 07/15/2024    CL 98 07/15/2024    CO2 24 07/15/2024    BUN 20 07/15/2024    CREATININE 0.9 07/15/2024    GLUCOSE 98 07/15/2024    CALCIUM 10.1 07/15/2024    BILITOT 0.3 07/09/2024    ALKPHOS 199 (H) 07/10/2024    AST 76 (H) 07/09/2024    ALT 36 07/09/2024    LABGLOM >90 07/15/2024    GFRAA >60 10/31/2021       No results found for: \"IRON\", \"TIBC\", \"FERRITIN\"        Radiology-    MRI LUMBAR SPINE W WO CONTRAST   Final Result   1. Extensive metastatic disease throughout the visualized bones of the spine.   2. Mild pathological fracture along the superior endplate of the L2 vertebral   body.   3. Small volume tumor extending from the posterior wall of the T12 and L4   vertebral bodies in the anterior epidural space.   4. No significant central canal or neural foraminal stenoses.   5. Enlarged retroperitoneal lymph nodes, likely metastatic.         NM BONE SCAN WHOLE BODY   Final Result   Extensive osseous metastatic uptake involving the spine, ribcage, pelvis,   humeri and femurs.         CTA CHEST W CONTRAST   Final Result   1. No evidence of pulmonary embolism or acute pulmonary abnormality.   2. Mediastinal lymphadenopathy.         CT LUMBAR SPINE WO CONTRAST   Final Result   1. Patchy nodular osteopenia through the lower thoracic and lumbar spinal   segments, most significantly through the L3 and L5 segments. No overt   pathological fracture. No significant soft tissue mass. Findings are   suggestive of diffuse osseous metastatic disease process. Recommend   additional characterization by MRI.   2. Retroperitoneal adenopathy.      RECOMMENDATIONS:   MRI of the lumbar spine recommended.         XR CHEST PORTABLE   Final Result   1. No acute cardiopulmonary process.

## 2024-07-15 NOTE — CONSULTS
Palliative Care Department  [phone]  Palliative Care Initial Consult  Provider Kylee Tellez, APRN - CNP      PATIENT: Joseph Bond  : 1980  MRN: [id number]  ADMISSION DATE: 2024  7:14 PM  Referring Provider: Srini Smith MD     Palliative Medicine was consulted on hospital day 6 for assistance with Goals of care: Help establish care with naturopathic doctor,  Symptom management     HPI:     Clinical Summary:Joseph Bond is a 43 y.o. y/o male with a history of polysubstance abuse including opioids and tobacco, abstinent for 5 years, diverticulitis who presents with rib pain and LLE pain  who presented to [facility] on 2024 with rib and back pain.  He was recently found to have an elevated PSA and enlarged prostate.  CT of the chest showed mediastinal lymphadenopathy.  MRI of the spine showed extensive metastatic disease and a mild pathologic fracture of the L2 vertebrae and the small tumor extending from the posterior wall of the T12 and L4 vertebral bodies, and enlarged retroperitoneal lymph nodes.  Bone scan showed extensive osseous metastatic uptake involving the spine, rib cage, pelvis, humerus and femurs.  Patient is refusing biopsy or oncology treatment and wants naturopathic remedies.    ASSESSMENT/PLAN:     Pertinent Hospital Diagnoses     Probable metastatic prostate cancer  Bone metastasis  Pain due to neoplasm    Symptom management    Pain due to neoplasm  -Oxycodone 10 mg every 4 hours as needed  -Robaxin 500 mg 4 times daily    Palliative Care Encounter / Counseling Regarding Goals of Care  Please see detailed goals of care discussion as below  At this time, Joseph Bond, Does have capacity for medical decision-making.  Capacity is time limited and situation/question specific  Outcome of goals of care meeting:  Goals are to live longer and get better  He believes that holistic medicine will cure his cancer  He is adamant for CODE STATUS to remain a full code  Code  status Full Code  Advanced Directives: no POA or living will in [facility]  Surrogate/Legal NOK:  Gaurav Womack (cousin) [phone]    Spiritual assessment: no spiritual distress identified  Bereavement and grief: to be determined  Referrals to: none today    Thank you for the opportunity to participate in the care of Joseph Bond.     Kylee Tellez, APRN - CNP   Palliative Medicine     SUBJECTIVE:     Details of Conversation: Chart reviewed and met with the patient at the bedside.  He is alert and oriented and able to express his wishes.  I introduced palliative medicine.  He explains that the doctor's believe he has prostate cancer.  He understands that it is likely in his bones.  He expresses that he does not want radiation or chemotherapy as he does not believe his body would handle it.  He stated that holistic medicine has cured people of their cancer and that is what he plans to do.  Much explanation that there is no evidence to holistic medicine and my concern that he is going to return home decline and come back into the hospital in a worse condition.  He expresses that his main goal is to get better and live longer.  We discussed CODE STATUS he expressed that he would absolutely want to be resuscitated.  I explained that that contradicts natural and holistic medicine and that he would likely be connected to a mechanical ventilator and artificial nutrition and chance of any meaningful recovery would be very poor.  I encouraged him to talk with all the doctors especially the oncologist about the treatments and medications.  Considering his goals are to live longer and improve his health they do not align with foregoing treatment.  He does believe in these holistic medicines, which I explained can be taken in conjunction with oncology treatments.  I explained his prognosis will be poor if he foregoes treatment..    He does express some financial stressors.  He denies finances being a reason that he would not seek

## 2024-07-15 NOTE — PROGRESS NOTES
Palliative Medicine Social Work     Patient Name: Joseph Bond    Age: 43 y.o.    Marital Status: single     Status: no    Next of Kin:Gaurav Womack cousin [phone]    Additional Support: brothers live in [place], adult step children. Pt reports limited contact with his siblings and even less with his step children    Minor Children: no    Advanced Directives: none prior, information provided for pt to review.    Confirm Code Status: full    Current Goals of Care: pt is would like time to consider the medical options which have been presented to him. He is unsure if he wants a biopsy. He is also consider naturopathic treatments. We discussed how these may compliment or complicate each other.     Mental Health History: denies    Substance Abuse:history of polysubstance abuse 5 years ago    Indications of Abuse/Neglect: no    Financial Concerns: pt has no income since his significant other was placed in a nursing facility. He has enough saved to pay for [employer].  We discussed applying for SSD but that would require a definitive diagnosis to get a compassionate approval for what is appearing like metastatic cancer    Living Situation: lives in an apartment, was independent and drives    Physical Care Needs Met: yes    Emotional Needs Met: time spent exploring pts thoughts and feelings, providing support through active listening and validation of feelings.    Assessment: 42 yo single male admitted with  rib pain and LLE pain. He also has recently noted difficulty w urination. Work up reveals he likely metastatic malignancy but isundecided on whether or not he would like to pursue biopsy. He is also considering homeopathic treatment as well.  We discussed advance directives and SSD.

## 2024-07-15 NOTE — PROGRESS NOTES
[facility]  Family Medicine Attending    Hospital Course: 43 y.o. male with PMHx of polysubstance abuse including opioids and tobacco, abstinent for 5 years, diverticulitis who presents with rib pain and LLE pain. He also has recently noted difficulty w urination. ALP elevated 200, AST 76, Calcium 10.5 Hgb 12.3, Platelets 116.  . CT chest concerning for mediastinal LAD. CT Lumbar concerning for metastatic disease & retroperitoneal LAD.     S: Today, pain remains controlled overall.  He notes that at rest his pain is 4/10, but increases significantly with movement of his legs.  He has to help move his right leg with his upper extremities.  No new symptoms or concerns.  Has continued to decline medical interventions.      New information.  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  He was never given anything to monitor potential exposure to radiation while there.  He last worked for [employer] about 5 years ago.     He denies any other industrial exposures.  He states that there is essentially no family history of cancer.      O: VS- Blood pressure 112/68, pulse 91, temperature 97.1 °F (36.2 °C), temperature source Temporal, resp. rate 16, height 1.702 m (5' 7\"), weight 88.5 kg (195 lb), SpO2 96 %.  Exam is as noted by resident with the following changes, additions or corrections:  Gen: NAD AAOx3  HEENT NCAT EOMI.    CV regular rate and rhythm   Resp no tachypnea  Ext: no edema      .3  Peripheral smear states unclear reason for low platelets and anemia.    Impressions:   Principal Problem:    Retroperitoneal lymphadenopathy  Active Problems:    Chronic midline low back pain with left-sided sciatica    Elevated PSA    Urinary hesitancy    Pelvic pain    Osteopenia of lumbar spine    Acute midline low back pain with  sciatica  Resolved Problems:    * No resolved hospital problems. *      Plan:     1) Enlarged Prostate w/ elevated PSA - concerning for prostate cancer w metastatic lesions on CT. MRI with diffuse mets.  Casodex per Urology-patient declines, without knowing a true diagnosis.  Discussed the option of prostate biopsy again with the patient.  Requested that he reconsider so that he might get a true diagnosis.  Still also awaiting the path report from the lymph node biopsy.  Patient preferring natural remedies--he is very nervous about side effects from chemotherapy.  Discussed treatment at length and how even natural remedies can have side effects.  Explained that if this is cancer, our goal may not even be cure, but to prolong life.  The sooner we define the cancer and start treatment the longer he might survive.      Neurosurgery recommendations appreciated.    Urology recommendations appreciated.     Over the weekend the FM team discussed the recommendations from his specialists for the evaluation and management of his likely metastatic malignancy.  Discussed that, without treatments that have been shown to have efficacy, the likely cancer is expected to spread and worsen, leading to death.  He expressed understanding but does not wish to pursue biopsy, medication, or radiation treatments.  We discussed the role for natural remedies he prefers in combination with evidence-based therapies as appropriate, but he is declining the evaluations and treatments that have been advised at this time.      I asked him today what he understood.   He is still struggling with not having a definite diagnosis.  I have asked that he reconsider the prostate biopsy.       Plan for SW to discuss resources; he is a caregiver for SO.  He is worried about losing his apartment.  He is concerned about how he will get around.  Feels he will likely need a wheelchair.  He has some family support, but it is unclear how much help he will

## 2024-07-15 NOTE — PLAN OF CARE
Final Pathology results pending. Pulmonary will see PRN.     Please call with any acute isssues.     Kassandra Kendrick, DO

## 2024-07-16 LAB
ANION GAP SERPL CALCULATED.3IONS-SCNC: 15 MMOL/L (ref 7–16)
ANION GAP SERPL CALCULATED.3IONS-SCNC: 15 MMOL/L (ref 7–16)
BASOPHILS # BLD: 0.06 K/UL (ref 0–0.2)
BASOPHILS NFR BLD: 1 % (ref 0–2)
BUN SERPL-MCNC: 21 MG/DL (ref 6–20)
BUN SERPL-MCNC: 21 MG/DL (ref 6–20)
CA-I BLD-SCNC: 1.24 MMOL/L (ref 1.15–1.33)
CALCIUM SERPL-MCNC: 10.1 MG/DL (ref 8.6–10.2)
CALCIUM SERPL-MCNC: 9.7 MG/DL (ref 8.6–10.2)
CHLORIDE SERPL-SCNC: 94 MMOL/L (ref 98–107)
CHLORIDE SERPL-SCNC: 97 MMOL/L (ref 98–107)
CO2 SERPL-SCNC: 23 MMOL/L (ref 22–29)
CO2 SERPL-SCNC: 27 MMOL/L (ref 22–29)
CREAT SERPL-MCNC: 0.9 MG/DL (ref 0.7–1.2)
CREAT SERPL-MCNC: 1 MG/DL (ref 0.7–1.2)
EOSINOPHIL # BLD: 0.06 K/UL (ref 0.05–0.5)
EOSINOPHILS RELATIVE PERCENT: 1 % (ref 0–6)
ERYTHROCYTE [DISTWIDTH] IN BLOOD BY AUTOMATED COUNT: 14.5 % (ref 11.5–15)
GFR, ESTIMATED: >90 ML/MIN/1.73M2
GFR, ESTIMATED: >90 ML/MIN/1.73M2
GLUCOSE SERPL-MCNC: 106 MG/DL (ref 74–99)
GLUCOSE SERPL-MCNC: 92 MG/DL (ref 74–99)
HCT VFR BLD AUTO: 34.9 % (ref 37–54)
HGB BLD-MCNC: 11.7 G/DL (ref 12.5–16.5)
IMM GRANULOCYTES # BLD AUTO: 0.31 K/UL (ref 0–0.58)
IMM GRANULOCYTES NFR BLD: 4 % (ref 0–5)
LYMPHOCYTES NFR BLD: 2.91 K/UL (ref 1.5–4)
LYMPHOCYTES RELATIVE PERCENT: 35 % (ref 20–42)
MAGNESIUM SERPL-MCNC: 2.3 MG/DL (ref 1.6–2.6)
MCH RBC QN AUTO: 29 PG (ref 26–35)
MCHC RBC AUTO-ENTMCNC: 33.5 G/DL (ref 32–34.5)
MCV RBC AUTO: 86.4 FL (ref 80–99.9)
MONOCYTES NFR BLD: 0.64 K/UL (ref 0.1–0.95)
MONOCYTES NFR BLD: 8 % (ref 2–12)
NEUTROPHILS NFR BLD: 53 % (ref 43–80)
NEUTS SEG NFR BLD: 4.42 K/UL (ref 1.8–7.3)
NON-GYN CYTOLOGY REPORT: NORMAL
PHOSPHATE SERPL-MCNC: 4.6 MG/DL (ref 2.5–4.5)
PHOSPHATE SERPL-MCNC: 5.2 MG/DL (ref 2.5–4.5)
PHOSPHATE SERPL-MCNC: 5.7 MG/DL (ref 2.5–4.5)
PLATELET # BLD AUTO: 79 K/UL (ref 130–450)
PLATELET CONFIRMATION: NORMAL
PMV BLD AUTO: 10.5 FL (ref 7–12)
POTASSIUM SERPL-SCNC: 4.4 MMOL/L (ref 3.5–5)
POTASSIUM SERPL-SCNC: 4.5 MMOL/L (ref 3.5–5)
RBC # BLD AUTO: 4.04 M/UL (ref 3.8–5.8)
SODIUM SERPL-SCNC: 135 MMOL/L (ref 132–146)
SODIUM SERPL-SCNC: 136 MMOL/L (ref 132–146)
URATE SERPL-MCNC: 8.9 MG/DL (ref 3.4–7)
URATE SERPL-MCNC: 9.3 MG/DL (ref 3.4–7)
WBC OTHER # BLD: 8.4 K/UL (ref 4.5–11.5)

## 2024-07-16 PROCEDURE — 6370000000 HC RX 637 (ALT 250 FOR IP): Performed by: INTERNAL MEDICINE

## 2024-07-16 PROCEDURE — 80048 BASIC METABOLIC PNL TOTAL CA: CPT

## 2024-07-16 PROCEDURE — 99232 SBSQ HOSP IP/OBS MODERATE 35: CPT | Performed by: NURSE PRACTITIONER

## 2024-07-16 PROCEDURE — 6360000002 HC RX W HCPCS: Performed by: INTERNAL MEDICINE

## 2024-07-16 PROCEDURE — 6370000000 HC RX 637 (ALT 250 FOR IP)

## 2024-07-16 PROCEDURE — 82330 ASSAY OF CALCIUM: CPT

## 2024-07-16 PROCEDURE — 1200000000 HC SEMI PRIVATE

## 2024-07-16 PROCEDURE — 83735 ASSAY OF MAGNESIUM: CPT

## 2024-07-16 PROCEDURE — 99232 SBSQ HOSP IP/OBS MODERATE 35: CPT | Performed by: FAMILY MEDICINE

## 2024-07-16 PROCEDURE — 85025 COMPLETE CBC W/AUTO DIFF WBC: CPT

## 2024-07-16 PROCEDURE — 84550 ASSAY OF BLOOD/URIC ACID: CPT

## 2024-07-16 PROCEDURE — 2580000003 HC RX 258: Performed by: INTERNAL MEDICINE

## 2024-07-16 PROCEDURE — 36415 COLL VENOUS BLD VENIPUNCTURE: CPT

## 2024-07-16 PROCEDURE — 2580000003 HC RX 258

## 2024-07-16 PROCEDURE — 84100 ASSAY OF PHOSPHORUS: CPT

## 2024-07-16 RX ORDER — SODIUM CHLORIDE 9 MG/ML
INJECTION, SOLUTION INTRAVENOUS CONTINUOUS
Status: DISCONTINUED | OUTPATIENT
Start: 2024-07-16 | End: 2024-07-18 | Stop reason: HOSPADM

## 2024-07-16 RX ORDER — TIZANIDINE 4 MG/1
2 TABLET ORAL ONCE
Status: COMPLETED | OUTPATIENT
Start: 2024-07-16 | End: 2024-07-16

## 2024-07-16 RX ADMIN — OXYCODONE HYDROCHLORIDE 10 MG: 10 TABLET ORAL at 16:07

## 2024-07-16 RX ADMIN — TIZANIDINE 2 MG: 4 TABLET ORAL at 23:52

## 2024-07-16 RX ADMIN — OXYCODONE HYDROCHLORIDE 10 MG: 10 TABLET ORAL at 04:02

## 2024-07-16 RX ADMIN — Medication 2000 UNITS: at 08:23

## 2024-07-16 RX ADMIN — SODIUM CHLORIDE: 9 INJECTION, SOLUTION INTRAVENOUS at 19:04

## 2024-07-16 RX ADMIN — METHOCARBAMOL 500 MG: 500 TABLET ORAL at 16:07

## 2024-07-16 RX ADMIN — OXYCODONE HYDROCHLORIDE 10 MG: 10 TABLET ORAL at 23:52

## 2024-07-16 RX ADMIN — SENNOSIDES 8.6 MG: 8.6 TABLET, FILM COATED ORAL at 20:30

## 2024-07-16 RX ADMIN — SODIUM CHLORIDE, PRESERVATIVE FREE 10 ML: 5 INJECTION INTRAVENOUS at 08:24

## 2024-07-16 RX ADMIN — METHOCARBAMOL 500 MG: 500 TABLET ORAL at 08:19

## 2024-07-16 RX ADMIN — OXYCODONE HYDROCHLORIDE 10 MG: 10 TABLET ORAL at 12:02

## 2024-07-16 RX ADMIN — POLYETHYLENE GLYCOL 3350 17 G: 17 POWDER, FOR SOLUTION ORAL at 20:30

## 2024-07-16 RX ADMIN — SENNOSIDES 8.6 MG: 8.6 TABLET, FILM COATED ORAL at 08:20

## 2024-07-16 RX ADMIN — METHOCARBAMOL 500 MG: 500 TABLET ORAL at 12:02

## 2024-07-16 RX ADMIN — OXYCODONE HYDROCHLORIDE 10 MG: 10 TABLET ORAL at 08:19

## 2024-07-16 RX ADMIN — ENOXAPARIN SODIUM 40 MG: 100 INJECTION SUBCUTANEOUS at 08:23

## 2024-07-16 RX ADMIN — METHOCARBAMOL 500 MG: 500 TABLET ORAL at 20:30

## 2024-07-16 RX ADMIN — HYDROMORPHONE HYDROCHLORIDE 1 MG: 2 TABLET ORAL at 10:53

## 2024-07-16 RX ADMIN — OXYCODONE HYDROCHLORIDE 10 MG: 10 TABLET ORAL at 20:30

## 2024-07-16 RX ADMIN — DICLOFENAC SODIUM 2 G: 10 GEL TOPICAL at 20:30

## 2024-07-16 RX ADMIN — POLYETHYLENE GLYCOL 3350 17 G: 17 POWDER, FOR SOLUTION ORAL at 08:20

## 2024-07-16 ASSESSMENT — PAIN SCALES - GENERAL
PAINLEVEL_OUTOF10: 8
PAINLEVEL_OUTOF10: 9

## 2024-07-16 ASSESSMENT — PAIN DESCRIPTION - ORIENTATION
ORIENTATION: LOWER
ORIENTATION: LOWER

## 2024-07-16 ASSESSMENT — PAIN DESCRIPTION - LOCATION
LOCATION: BACK;RIB CAGE
LOCATION: BACK

## 2024-07-16 ASSESSMENT — PAIN DESCRIPTION - ONSET: ONSET: ON-GOING

## 2024-07-16 ASSESSMENT — PAIN DESCRIPTION - DESCRIPTORS
DESCRIPTORS: ACHING;DULL;DISCOMFORT
DESCRIPTORS: ACHING;DISCOMFORT;SORE

## 2024-07-16 ASSESSMENT — PAIN DESCRIPTION - PAIN TYPE: TYPE: CHRONIC PAIN

## 2024-07-16 ASSESSMENT — PAIN - FUNCTIONAL ASSESSMENT: PAIN_FUNCTIONAL_ASSESSMENT: ACTIVITIES ARE NOT PREVENTED

## 2024-07-16 ASSESSMENT — PAIN SCALES - WONG BAKER: WONGBAKER_NUMERICALRESPONSE: HURTS A LITTLE BIT

## 2024-07-16 ASSESSMENT — PAIN DESCRIPTION - FREQUENCY: FREQUENCY: CONTINUOUS

## 2024-07-16 NOTE — CARE COORDINATION
Social Work/Case Management Transition of Care Planning (Nguyen Bahena -[phone]):  Per report and chart review, patient was seen by palliative care.  He remains a full code.  He has not agreed to any treatment for probable metastatic prostate cancer with bone metastasis.  Referral was made to [facility] for palliative care.  Discharge plan is to home with [facility] for palliative care.  Patient will drive himself home or have family drive him home. CM/SW will  follow.   BERNADETTE Doyle  7/16/2024

## 2024-07-16 NOTE — PROGRESS NOTES
SUBJECTIVE:  pt is now considering rx and will consider a bx which is needed to establish dx and allow for rx  Pt voiding with some ease  OBJECTIVE:    /81   Pulse 77   Temp 97.4 °F (36.3 °C) (Temporal)   Resp 18   Ht 1.702 m (5' 7\")   Wt 88.5 kg (195 lb)   SpO2 96%   BMI 30.54 kg/m²     PHYSICAL EXAMINATION:  Skin: dry, without rashes  Respirations: non-labored, intact  Abdomen: soft, non-tender, non-distended      Lab Results   Component Value Date    WBC 8.4 07/16/2024    HGB 11.7 (L) 07/16/2024    HCT 34.9 (L) 07/16/2024    MCV 86.4 07/16/2024    PLT 79 (L) 07/16/2024       Lab Results   Component Value Date    CREATININE 1.0 07/16/2024       Lab Results   Component Value Date    .30 (H) 07/11/2024    .70 (H) 07/02/2024       REVIEW OF SYSTEMS:    CONSTITUTIONAL: negative  HEENT: negative  HEMATOLOGIC: negative  ENDOCRINE: negative  RESPIRATORY: negative  CV: negative  GI: negative  NEURO: negative  ORTHOPEDICS: negative  PSYCHIATRIC: negative  : as above    PAST FAMILY HISTORY:  No family history on file.  PAST SOCIAL HISTORY:    Social History     Socioeconomic History    Marital status: Single     Spouse name: None    Number of children: None    Years of education: None    Highest education level: None   Tobacco Use    Smoking status: Former     Average packs/day: 0.6 packs/day for 10.3 years (5.7 ttl pk-yrs)     Types: Cigarettes     Start date: 4/3/2014    Smokeless tobacco: Never   Substance and Sexual Activity    Alcohol use: No    Drug use: No    Sexual activity: Not Currently     Partners: Female     Social Determinants of Health     Financial Resource Strain: High Risk (7/2/2024)    Overall Financial Resource Strain (CARDIA)     Difficulty of Paying Living Expenses: Hard   Food Insecurity: Patient Declined (7/10/2024)    Hunger Vital Sign     Worried About Running Out of Food in the Last Year:  Patient declined     Ran Out of Food in the Last Year: Patient declined   Recent Concern: Food Insecurity - Food Insecurity Present (7/2/2024)    Hunger Vital Sign     Worried About Running Out of Food in the Last Year: Often true     Ran Out of Food in the Last Year: Often true   Transportation Needs: Patient Declined (7/10/2024)    PRAPARE - Transportation     Lack of Transportation (Medical): Patient declined     Lack of Transportation (Non-Medical): Patient declined   Recent Concern: Transportation Needs - Unmet Transportation Needs (7/2/2024)    PRAPARE - Transportation     Lack of Transportation (Non-Medical): Yes   Physical Activity: Unknown (7/2/2024)    Exercise Vital Sign     Days of Exercise per Week: 0 days   Housing Stability: Patient Declined (7/10/2024)    Housing Stability Vital Sign     Unable to Pay for Housing in the Last Year: Patient declined     Number of Places Lived in the Last Year: 1     Unstable Housing in the Last Year: Patient declined       Scheduled Meds:   senna  1 tablet Oral BID    polyethylene glycol  17 g Oral BID    lidocaine  1 patch TransDERmal Daily    bicalutamide  50 mg Oral Daily    vitamin D  50,000 Units Oral Weekly    vitamin D  2,000 Units Oral Daily    oxyCODONE  10 mg Oral 6 times per day    sodium chloride flush  5-40 mL IntraVENous 2 times per day    enoxaparin  40 mg SubCUTAneous Daily    diclofenac sodium  2 g Topical BID    methocarbamol  500 mg Oral 4x Daily     Continuous Infusions:   sodium chloride       PRN Meds:.sodium chloride flush, sodium chloride, ondansetron **OR** ondansetron, HYDROmorphone, iopamidol    ASSESSMENT:      Patient Active Problem List   Diagnosis    Retroperitoneal lymphadenopathy    Chronic midline low back pain with left-sided sciatica    Elevated PSA    Urinary hesitancy    Pelvic pain    Osteopenia of lumbar spine    Acute midline low back pain with sciatica       PLAN:  Plan bx of prostate under sedation thurs if pt allows    Estuardo

## 2024-07-16 NOTE — PROGRESS NOTES
cranial nerve deficit.      Sensory: No sensory deficit.      Motor: Weakness (3/5 strength in his LLE, 5/5 strength in other extremities) present.       Labs:  Na/K/Cl/CO2:  139/4.0/98/24 (07/15 0534)  BUN/Cr/glu/ALT/AST/amyl/lip:  20/0.9/--/--/--/--/-- (07/15 0534)  WBC/Hgb/Hct/Plts:  8.0/10.6/32.7/84 (07/15 0534)  estimated creatinine clearance is 111 mL/min (based on SCr of 0.9 mg/dL).  Other pertinent labs as noted below    Radiology:  MRI LUMBAR SPINE W WO CONTRAST   Final Result   1. Extensive metastatic disease throughout the visualized bones of the spine.   2. Mild pathological fracture along the superior endplate of the L2 vertebral   body.   3. Small volume tumor extending from the posterior wall of the T12 and L4   vertebral bodies in the anterior epidural space.   4. No significant central canal or neural foraminal stenoses.   5. Enlarged retroperitoneal lymph nodes, likely metastatic.         NM BONE SCAN WHOLE BODY   Final Result   Extensive osseous metastatic uptake involving the spine, ribcage, pelvis,   humeri and femurs.         CTA CHEST W CONTRAST   Final Result   1. No evidence of pulmonary embolism or acute pulmonary abnormality.   2. Mediastinal lymphadenopathy.         CT LUMBAR SPINE WO CONTRAST   Final Result   1. Patchy nodular osteopenia through the lower thoracic and lumbar spinal   segments, most significantly through the L3 and L5 segments. No overt   pathological fracture. No significant soft tissue mass. Findings are   suggestive of diffuse osseous metastatic disease process. Recommend   additional characterization by MRI.   2. Retroperitoneal adenopathy.      RECOMMENDATIONS:   MRI of the lumbar spine recommended.         XR CHEST PORTABLE   Final Result   1. No acute cardiopulmonary process.   2. Nonspecific right paratracheal mediastinal lymphadenopathy, unchanged.               Resident Assessment and Plan     Prostatomegaly with concern for prostate CA w/ mets  Osteopenia and

## 2024-07-16 NOTE — PROGRESS NOTES
Palliative Care Department  [phone]  Palliative Care Progress Note  Provider Kylee Tellez, APRN - CNP      PATIENT: Joseph Bond  : 1980  MRN: [id number]  ADMISSION DATE: 2024  7:14 PM  Referring Provider: Srini Smith MD     Palliative Medicine was consulted on hospital day 6 for assistance with Goals of care: Help establish care with naturopathic doctor,  Symptom management     HPI:     Clinical Summary:Joseph Bond is a 43 y.o. y/o male with a history of polysubstance abuse including opioids and tobacco, abstinent for 5 years, diverticulitis who presents with rib pain and LLE pain  who presented to [facility] on 2024 with rib and back pain.  He was recently found to have an elevated PSA and enlarged prostate.  CT of the chest showed mediastinal lymphadenopathy.  MRI of the spine showed extensive metastatic disease and a mild pathologic fracture of the L2 vertebrae and the small tumor extending from the posterior wall of the T12 and L4 vertebral bodies, and enlarged retroperitoneal lymph nodes.  Bone scan showed extensive osseous metastatic uptake involving the spine, rib cage, pelvis, humerus and femurs.  Patient is refusing biopsy or oncology treatment and wants naturopathic remedies.    ASSESSMENT/PLAN:     Pertinent Hospital Diagnoses     Probable metastatic prostate cancer  Bone metastasis  Pain due to neoplasm    Symptom management    Pain due to neoplasm  -Oxycodone 10 mg every 4 hours as needed  -Robaxin 500 mg 4 times daily    Palliative Care Encounter / Counseling Regarding Goals of Care  Please see detailed goals of care discussion as below  At this time, Joseph Bond, Does have capacity for medical decision-making.  Capacity is time limited and situation/question specific  Outcome of goals of care meeting:  Goals are to live longer and get better  He is considering biopsy for definitive diagnosis  CODE STATUS to remain a full code  Code status Full

## 2024-07-16 NOTE — PROGRESS NOTES
[facility]  Family Medicine Attending    Hospital Course: 43 y.o. male with PMHx of polysubstance abuse including opioids and tobacco, abstinent for 5 years, diverticulitis who presents with rib pain and LLE pain. He also has recently noted difficulty w urination. ALP elevated 200, AST 76, Calcium 10.5 Hgb 12.3, Platelets 116.  . CT chest concerning for mediastinal LAD. CT Lumbar concerning for metastatic disease & retroperitoneal LAD.     S:   7/15:  pain remains controlled overall.  He notes that at rest his pain is 4/10, but increases significantly with movement of his legs.  He has to help move his right leg with his upper extremities.  No new symptoms or concerns.  Has continued to decline medical interventions.    New information.  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  He was never given anything to monitor potential exposure to radiation while there.  He last worked for [employer] about 5 years ago. He denies any other industrial exposures.  He states that there is essentially no family history of cancer.      7/16: patient still having pain.  No new complaints.  Patient agrees at this time to undergo prostate biopsy.  He is requesting to have his family updated with results and possible management/outcomes.    O: VS- Blood pressure 121/81, pulse 77, temperature 97.4 °F (36.3 °C), temperature source Temporal, resp. rate 18, height 1.702 m (5' 7\"), weight 88.5 kg (195 lb), SpO2 96 %.  Exam is as noted by resident with the following changes, additions or corrections:  Gen: NAD AAOx3  HEENT NCAT EOMI.    CV regular rate and rhythm   Resp no tachypnea  Ext: no edema      .3  Peripheral smear states unclear reason for low platelets and anemia.    Impressions:   Principal Problem:    Retroperitoneal

## 2024-07-16 NOTE — PROGRESS NOTES
2. Nonspecific right paratracheal mediastinal lymphadenopathy, unchanged.               ASSESSMENT/PLAN :  43 years old male with elevated , prostatomegaly, retroperitoneal mediastinal lymphadenopathy diffuse bone metastasis     Suggestive of metastatic prostate CA.  Pulmonology consulted for EBUS/biopsy of mediastinal lymph nodes.  MRI lumbar spine bone scan has been ordered.  Will continue to follow.  Therapy recommendations based on biopsy results.    7/15/24  - Elevated , prostatomegaly, retroperitoneal mediastinal lymphadenopathy diffuse bone metastasis  - Suggestive of metastatic prostate CA  - Pulmonology following. S/p EBUS/biopsy of mediastinal lymph nodes. Pathology pending.  - MRI lumbar spine showed Extensive metastatic disease throughout the visualized bones of the spine. Mild pathological fracture along the superior endplate of the L2 vertebral body. Small volume tumor extending from the posterior wall of the T12 and L4 vertebral bodies in the anterior epidural space. No significant central canal or neural foraminal stenoses Enlarged retroperitoneal lymph nodes, likely metastatic.   - Bone scan showed extensive osseous metastatic uptake involving the spine, ribcage, pelvis,humeri and femurs  - Likely outpt triple therapy with ADT, Nubeqa, Taxotere. Xgeva and Ca and Vit D as well. Pending pathologic confirmation  - We will follow    7/16/24  - Elevated , prostatomegaly, retroperitoneal mediastinal lymphadenopathy diffuse bone metastasis  - Suggestive of metastatic prostate CA  - Pulmonology following. S/p EBUS/biopsy of mediastinal lymph nodes. Pathology pending.  - MRI lumbar spine and bone scan as above.   - Likely outpt triple therapy with ADT, Nubeqa, Taxotere. Xgeva and Ca and Vit D as well. Pending pathologic confirmation  - We will follow    Electronically signed by FANG Aguirre CNP on 7/16/2024 at 12:44 PM  Pt seen and examined. Note updated  Jose Marin,

## 2024-07-17 LAB
ANION GAP SERPL CALCULATED.3IONS-SCNC: 14 MMOL/L (ref 7–16)
BASOPHILS # BLD: 0.04 K/UL (ref 0–0.2)
BASOPHILS NFR BLD: 1 % (ref 0–2)
BUN SERPL-MCNC: 21 MG/DL (ref 6–20)
CALCIUM SERPL-MCNC: 10.1 MG/DL (ref 8.6–10.2)
CHLORIDE SERPL-SCNC: 97 MMOL/L (ref 98–107)
CO2 SERPL-SCNC: 26 MMOL/L (ref 22–29)
CREAT SERPL-MCNC: 0.9 MG/DL (ref 0.7–1.2)
EOSINOPHIL # BLD: 0.09 K/UL (ref 0.05–0.5)
EOSINOPHILS RELATIVE PERCENT: 1 % (ref 0–6)
ERYTHROCYTE [DISTWIDTH] IN BLOOD BY AUTOMATED COUNT: 14.5 % (ref 11.5–15)
GFR, ESTIMATED: >90 ML/MIN/1.73M2
GLUCOSE BLD-MCNC: 129 MG/DL (ref 74–99)
GLUCOSE SERPL-MCNC: 100 MG/DL (ref 74–99)
HCT VFR BLD AUTO: 31.8 % (ref 37–54)
HGB BLD-MCNC: 10.3 G/DL (ref 12.5–16.5)
IMM GRANULOCYTES # BLD AUTO: 0.27 K/UL (ref 0–0.58)
IMM GRANULOCYTES NFR BLD: 4 % (ref 0–5)
LYMPHOCYTES NFR BLD: 3.03 K/UL (ref 1.5–4)
LYMPHOCYTES RELATIVE PERCENT: 40 % (ref 20–42)
MAGNESIUM SERPL-MCNC: 2.2 MG/DL (ref 1.6–2.6)
MAGNESIUM SERPL-MCNC: 2.3 MG/DL (ref 1.6–2.6)
MCH RBC QN AUTO: 27.8 PG (ref 26–35)
MCHC RBC AUTO-ENTMCNC: 32.4 G/DL (ref 32–34.5)
MCV RBC AUTO: 85.7 FL (ref 80–99.9)
MONOCYTES NFR BLD: 0.6 K/UL (ref 0.1–0.95)
MONOCYTES NFR BLD: 8 % (ref 2–12)
NEUTROPHILS NFR BLD: 47 % (ref 43–80)
NEUTS SEG NFR BLD: 3.61 K/UL (ref 1.8–7.3)
PHOSPHATE SERPL-MCNC: 5.5 MG/DL (ref 2.5–4.5)
PLATELET # BLD AUTO: 64 K/UL (ref 130–450)
PLATELET CONFIRMATION: NORMAL
PMV BLD AUTO: 10 FL (ref 7–12)
POTASSIUM SERPL-SCNC: 4.4 MMOL/L (ref 3.5–5)
PTH RELATED PEPTIDE: <2 PMOL/L (ref 0–2.3)
RBC # BLD AUTO: 3.71 M/UL (ref 3.8–5.8)
SODIUM SERPL-SCNC: 137 MMOL/L (ref 132–146)
URATE SERPL-MCNC: 8.2 MG/DL (ref 3.4–7)
URATE SERPL-MCNC: 8.7 MG/DL (ref 3.4–7)
URATE SERPL-MCNC: 8.8 MG/DL (ref 3.4–7)
URATE SERPL-MCNC: 8.9 MG/DL (ref 3.4–7)
WBC OTHER # BLD: 7.6 K/UL (ref 4.5–11.5)

## 2024-07-17 PROCEDURE — 36415 COLL VENOUS BLD VENIPUNCTURE: CPT

## 2024-07-17 PROCEDURE — 99232 SBSQ HOSP IP/OBS MODERATE 35: CPT

## 2024-07-17 PROCEDURE — 82962 GLUCOSE BLOOD TEST: CPT

## 2024-07-17 PROCEDURE — 6370000000 HC RX 637 (ALT 250 FOR IP): Performed by: INTERNAL MEDICINE

## 2024-07-17 PROCEDURE — 84100 ASSAY OF PHOSPHORUS: CPT

## 2024-07-17 PROCEDURE — 99231 SBSQ HOSP IP/OBS SF/LOW 25: CPT | Performed by: NURSE PRACTITIONER

## 2024-07-17 PROCEDURE — 85025 COMPLETE CBC W/AUTO DIFF WBC: CPT

## 2024-07-17 PROCEDURE — 83735 ASSAY OF MAGNESIUM: CPT

## 2024-07-17 PROCEDURE — 6370000000 HC RX 637 (ALT 250 FOR IP)

## 2024-07-17 PROCEDURE — 1200000000 HC SEMI PRIVATE

## 2024-07-17 PROCEDURE — 84550 ASSAY OF BLOOD/URIC ACID: CPT

## 2024-07-17 PROCEDURE — 80048 BASIC METABOLIC PNL TOTAL CA: CPT

## 2024-07-17 RX ORDER — TIZANIDINE 4 MG/1
2 TABLET ORAL ONCE
Status: COMPLETED | OUTPATIENT
Start: 2024-07-17 | End: 2024-07-18

## 2024-07-17 RX ADMIN — HYDROMORPHONE HYDROCHLORIDE 1 MG: 2 TABLET ORAL at 10:28

## 2024-07-17 RX ADMIN — SENNOSIDES 8.6 MG: 8.6 TABLET, FILM COATED ORAL at 08:30

## 2024-07-17 RX ADMIN — OXYCODONE HYDROCHLORIDE 10 MG: 10 TABLET ORAL at 08:31

## 2024-07-17 RX ADMIN — METHOCARBAMOL 500 MG: 500 TABLET ORAL at 20:28

## 2024-07-17 RX ADMIN — SENNOSIDES 8.6 MG: 8.6 TABLET, FILM COATED ORAL at 20:28

## 2024-07-17 RX ADMIN — OXYCODONE HYDROCHLORIDE 10 MG: 10 TABLET ORAL at 04:10

## 2024-07-17 RX ADMIN — HYDROMORPHONE HYDROCHLORIDE 1 MG: 2 TABLET ORAL at 18:31

## 2024-07-17 RX ADMIN — POLYETHYLENE GLYCOL 3350 17 G: 17 POWDER, FOR SOLUTION ORAL at 20:28

## 2024-07-17 RX ADMIN — OXYCODONE HYDROCHLORIDE 10 MG: 10 TABLET ORAL at 20:28

## 2024-07-17 RX ADMIN — Medication 2000 UNITS: at 08:30

## 2024-07-17 RX ADMIN — OXYCODONE HYDROCHLORIDE 10 MG: 10 TABLET ORAL at 12:54

## 2024-07-17 RX ADMIN — METHOCARBAMOL 500 MG: 500 TABLET ORAL at 16:26

## 2024-07-17 RX ADMIN — OXYCODONE HYDROCHLORIDE 10 MG: 10 TABLET ORAL at 16:26

## 2024-07-17 RX ADMIN — METHOCARBAMOL 500 MG: 500 TABLET ORAL at 08:29

## 2024-07-17 RX ADMIN — METHOCARBAMOL 500 MG: 500 TABLET ORAL at 12:54

## 2024-07-17 RX ADMIN — POLYETHYLENE GLYCOL 3350 17 G: 17 POWDER, FOR SOLUTION ORAL at 08:32

## 2024-07-17 RX ADMIN — DICLOFENAC SODIUM 2 G: 10 GEL TOPICAL at 08:38

## 2024-07-17 RX ADMIN — DICLOFENAC SODIUM 2 G: 10 GEL TOPICAL at 20:29

## 2024-07-17 ASSESSMENT — PAIN DESCRIPTION - ORIENTATION
ORIENTATION: MID;LOWER;LEFT
ORIENTATION: LEFT
ORIENTATION: LEFT;MID;LOWER
ORIENTATION: LEFT

## 2024-07-17 ASSESSMENT — PAIN DESCRIPTION - LOCATION
LOCATION: RIB CAGE;PELVIS;BACK
LOCATION: BACK;RIB CAGE
LOCATION: RIB CAGE;PELVIS
LOCATION: BACK;RIB CAGE;PELVIS

## 2024-07-17 ASSESSMENT — PAIN DESCRIPTION - DESCRIPTORS
DESCRIPTORS: ACHING;STABBING;THROBBING
DESCRIPTORS: ACHING;DISCOMFORT;SORE
DESCRIPTORS: SHARP;THROBBING;ACHING;DISCOMFORT

## 2024-07-17 ASSESSMENT — PAIN SCALES - GENERAL
PAINLEVEL_OUTOF10: 8
PAINLEVEL_OUTOF10: 9
PAINLEVEL_OUTOF10: 10
PAINLEVEL_OUTOF10: 8

## 2024-07-17 ASSESSMENT — PAIN DESCRIPTION - FREQUENCY: FREQUENCY: CONTINUOUS

## 2024-07-17 ASSESSMENT — PAIN DESCRIPTION - ONSET: ONSET: ON-GOING

## 2024-07-17 ASSESSMENT — PAIN DESCRIPTION - PAIN TYPE: TYPE: CHRONIC PAIN

## 2024-07-17 ASSESSMENT — PAIN - FUNCTIONAL ASSESSMENT: PAIN_FUNCTIONAL_ASSESSMENT: ACTIVITIES ARE NOT PREVENTED

## 2024-07-17 NOTE — PROGRESS NOTES
[facility] - Family Medicine Inpatient   Resident Progress Note    S:  Hospital day: 7   Brief Synopsis: Joseph Bond is a 43 y.o. male with a PMH of  polysubstance abuse including opioids and tobacco, abstinent for 5 years, diverticulitis who presents with rib pain and LLE pain. Patient was in the ED on 6/24 for rib pain and low back pain.  Onset several weeks ago, progressively getting worse.  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  He had to lift her for bathing, feeding and transferring to bed.  Recently, he is unable to handle that and she is currently in nursing home.  7/8/2024, patient woke up at 3 AM because of worsening lower back pain and LLE pain.  Pain is 10/10, radiating down LLE towards toes.  In the morning, he had tried to call urology to set up for an appointment per PCPs recommendation.  When he told nurse over the phone about radicular pain, he was advised to go to the emergency department. Patient also complains of weak urinary stream, onset several months. He also reports pelvic pain. 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 Of note, just established with PCP a week ago. He was referred to pulmonology for mediastinal lymphadenopathy and urology for Prostamegaly seen on CT abdomen pelvis and also PSA of 155. Abuse history include opioid use and Xanax when he was younger, abstain for 5 years. Also stopped smoking tobacco at that time. Prior to that he had 1 PPD smoking history. Never had a colonoscopy, not even after diverticulitis when he was diagnosed at 32. CMP significant for elevated alk phos at 200 and AST 76.  BUN 28.  Calcium 10.5, albumin 4.9. CBC significant for hemoglobin 12.3, platelets 116. UDS positive for oxycodone that he was given in the ED. UA with high specific gravity and trace protein, RBC 10-20CT lumbar spine ordered for lumbar back pain, consistent  Pathology + adenocarcinoma, consistent with carcinoma of prostatic origin   Urology following. . No biopsy needed at this time, declined advanced prostate cancer medications, in favor of homeopathic methods  HemeOnc following, recommended treatment with Taxotere GnRH agonist Nubeqa along with Xgeva as outpatient, patient still deciding on treatment options, recommended palliative radiation outpatient for impending cord compression for T12 L4 metastasis with epidural extension   Palliative care consulted for goals of care  Continue Oxycodone 10 mg q4, Robaxin 500 mg QID, Dilaudid for breakthrough pain control, Lidocaine patch    Hyperphosphatemia/Hyperuricemia   Likely 2/2 TLS   S/P one dose of Casodex   Continue IVF NS at 100ml/hr   Monitor daily labs     Leukocytosis - resolved  Monitor CBC. Initiate infectious workup if indicated    Anemia/thrombocytopenia  Monitor CBC, Hb stable    Hypercalcemia, elevated BUN  GGT, Ionized Ca, PTH, wnl  Alk phos isoenzymes with bone specific predominance   PTHrP negative     Hx HTN  Monitor BP while in patient  Currently controlled off meds    Vitamin D deficiency  Continue Vitamin D supplementation    DVT/GI ppx: Lovenox/Diet   Diet:Adult Regular   Dispo: continue present management   PT/OT: not indicated at this time   Pain Control: Oxycodone 10mg q 4, Robaxin 500mg QID, IV Dilaudid for breakthrough pain   Telemetry Day: not indicated at this time     Electronically signed by Courtney Rebollar MD on 7/17/2024 at 8:37 AM  Attending physician: Dr. Lan

## 2024-07-17 NOTE — PROGRESS NOTES
[facility]  Hematology/Oncology  Consult  Dr. Aaron Matsno M.D.    Patient Name: Joseph Bond  YOB: 1980  PCP: Ty Shaikh MD   Referring Provider:      Reason for Consultation:   Chief Complaint   Patient presents with    Rib Pain (injury)     Pain in rib, pelvis, and chest pain that is chronic    Hip Pain        History of Present Illness:  43 years old male came to the ER complaining of low back pain radiating down the left leg.  Labs on admission were remarkable for calcium of 10.5 alk phos 200 platelet count 116, .  CT lumbar spine showed findings suggestive of diffuse osseous metastasis along with retroperitoneal lymphadenopathy.  CT chest revealed mediastinal lymphadenopathy up to 2.5 cm paratracheal AP window subcarinal lymph nodes.  CT abdomen pelvis from 2 weeks ago revealed aortocaval lymphadenopathy prostatomegaly.    Review of systems: Over 10 systems were reviewed and all were negative except as mention above.      Diagnostic Data:     Past Medical History:   Diagnosis Date    Diverticulosis     History of opioid abuse (HCC)     Hypertension        Patient Active Problem List    Diagnosis Date Noted    Retroperitoneal lymphadenopathy 07/10/2024    Chronic midline low back pain with left-sided sciatica 07/10/2024    Elevated PSA 07/10/2024    Urinary hesitancy 07/10/2024    Pelvic pain 07/10/2024    Osteopenia of lumbar spine 07/10/2024    Acute midline low back pain with sciatica 07/10/2024        Past Surgical History:   Procedure Laterality Date    BRONCHOSCOPY N/A 7/11/2024    BRONCHOSCOPY ENDOBRONCHIAL ULTRASOUND FINE NEEDLE ASPIRATION performed by Ed Cornejo DO at [facility] ENDOSCOPY    BRONCHOSCOPY  7/11/2024    BRONCHOSCOPY DIAGNOSTIC OR CELL WASH ONLY performed by Ed Cornejo DO at [facility] ENDOSCOPY       Family History  No family history on file.    Social History  Social History     Socioeconomic History    Marital status: Single

## 2024-07-17 NOTE — PROGRESS NOTES
[facility]  Family Medicine Attending    Hospital Course: 43 y.o. male with PMHx of polysubstance abuse including opioids and tobacco, abstinent for 5 years, diverticulitis who presents with rib pain and LLE pain. He also has recently noted difficulty w urination. ALP elevated 200, AST 76, Calcium 10.5 Hgb 12.3, Platelets 116.  . CT chest concerning for mediastinal LAD. CT Lumbar concerning for metastatic disease & retroperitoneal LAD.     S:   7/15:  pain remains controlled overall.  He notes that at rest his pain is 4/10, but increases significantly with movement of his legs.  He has to help move his right leg with his upper extremities.  No new symptoms or concerns.  Has continued to decline medical interventions.    New information.  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  He was never given anything to monitor potential exposure to radiation while there.  He last worked for [employer] about 5 years ago. He denies any other industrial exposures.  He states that there is essentially no family history of cancer.      7/16: patient still having pain.  No new complaints.  Patient agrees at this time to undergo prostate biopsy.  He is requesting to have his family updated with results and possible management/outcomes.    7/17:  discussed biopsy results with patient.  Talked about need for more information from oncology regarding treatment options and side effects of medication.  Patient continues to c/o back pain.    O: VS- Blood pressure (!) 144/85, pulse (!) 103, temperature 98 °F (36.7 °C), temperature source Temporal, resp. rate 18, height 1.702 m (5' 7\"), weight 88.5 kg (195 lb), SpO2 98 %.  Exam is as noted by resident with the following changes, additions or corrections:  Gen: NAD AAOx3  HEENT NCAT EOMI.    CV regular rate

## 2024-07-17 NOTE — PLAN OF CARE
Problem: Musculoskeletal - Adult  Goal: Return mobility to safest level of function  Outcome: Progressing  Goal: Maintain proper alignment of affected body part  Outcome: Progressing     Problem: Pain  Goal: Verbalizes/displays adequate comfort level or baseline comfort level  Outcome: Progressing     Problem: Safety - Adult  Goal: Free from fall injury  Outcome: Progressing

## 2024-07-17 NOTE — CARE COORDINATION
Social Work/Case Management Transition of Care Planning ([facility] [phone]):  Per report and chart review, patient is now willing to have biopsy of the prostate.  This is scheduled for 7/18.  Urology is following.  Hem/Onc is following as well. Met with patient at bedside.  He stated he was told there is no need for prostate biopsy as his cancer has already spread and cannot be treated.  Updated urology NP.  Discharge plan is to home with [place] for palliative care.  Patient will drive himself home or have family drive him home. CM/SW will  follow.

## 2024-07-17 NOTE — PROGRESS NOTES
Discussed biopsy results with patient. Consistent with metastatic prostate cancer.     All questions answered. Currently with no active pulmonary issues.     Pulmonary will sign off.     Kassandra Kendrick, DO      INTERPRETATION     EBUS FINE NEEDLE ASPIRATE, R4:   Satisfactory for evaluation.   POSITIVE FOR MALIGNANCY.   Adenocarcinoma, consistent with carcinoma of prostatic origin, see   comment.   Cellblock is similar.     Comment:   Clusters of malignant cells with round to irregular nuclear contours,   finely distributed chromatin and occasional prominent nucleoli are   present arranged in acinar to cribriform clusters.  On   immunohistochemical stains the tumor cells are strongly positive for   nuclear NKX3.1 expression and PSA immunoreactivity, consistent with   carcinoma of prostatic origin.  Additionally, tumor cells also   demonstrate patchy chromogranin and synaptophysin immunoreactivity,   indicating neuroendocrine expression by immunohistochemistry.   Intradepartmental consultation obtained.

## 2024-07-17 NOTE — PROGRESS NOTES
Palliative Care Department  [phone]  Palliative Care Progress Note  Provider Kylee Tellez, APRN - CNP      PATIENT: Joseph Bond  : 1980  MRN: [id number]  ADMISSION DATE: 2024  7:14 PM  Referring Provider: Srini Smith MD     Palliative Medicine was consulted on hospital day 6 for assistance with Goals of care: Help establish care with naturopathic doctor,  Symptom management     HPI:     Clinical Summary:Joseph Bond is a 43 y.o. y/o male with a history of polysubstance abuse including opioids and tobacco, abstinent for 5 years, diverticulitis who presents with rib pain and LLE pain  who presented to [facility] on 2024 with rib and back pain.  He was recently found to have an elevated PSA and enlarged prostate.  CT of the chest showed mediastinal lymphadenopathy.  MRI of the spine showed extensive metastatic disease and a mild pathologic fracture of the L2 vertebrae and the small tumor extending from the posterior wall of the T12 and L4 vertebral bodies, and enlarged retroperitoneal lymph nodes.  Bone scan showed extensive osseous metastatic uptake involving the spine, rib cage, pelvis, humerus and femurs.  Patient is refusing biopsy or oncology treatment and wants naturopathic remedies.    ASSESSMENT/PLAN:     Pertinent Hospital Diagnoses     Probable metastatic prostate cancer  Bone metastasis  Pain due to neoplasm    Symptom management    Pain due to neoplasm  -Oxycodone 10 mg every 4 hours as needed  -Robaxin 500 mg 4 times daily    Palliative Care Encounter / Counseling Regarding Goals of Care  Please see detailed goals of care discussion as below  At this time, Joseph Bond, Does have capacity for medical decision-making.  Capacity is time limited and situation/question specific  Outcome of goals of care meeting:  Goals are to live longer and get better  He is considering biopsy for definitive diagnosis  CODE STATUS to remain a full code  Code status Full

## 2024-07-17 NOTE — PROGRESS NOTES
Comprehensive Nutrition Assessment    Type and Reason for Visit:  Initial, RD Nutrition Re-Screen/LOS    Nutrition Assessment:    Pt assessed per LOS protocol. Chart reviewed. Pt. tolerating diet currently eating  % at most meals this admit and appears stable from a nutritional standpoint. No nutrition intervention indicated at this time. Will follow per policy. Consult RD if needed.    Michael Rubio RD  Contact: ext [phone]

## 2024-07-17 NOTE — PROGRESS NOTES
Loss peripheral IV access -- patient is a hard stick -- awaiting ICU to come place IV with US if able tonight. Notified FM resident that patient is not able to get the rasburicase dose until new line is placed & IVF are stopped at the moment.

## 2024-07-17 NOTE — PROGRESS NOTES
7/17/2024 2:47 PM  Joseph Bond  [id number]    Subjective:    Awake and alert  His pathology from his bronch is consistent with prostate origin  We discussed cancelling biopsy at this time since we have tissue diagnosis  I also talked to his brother on the phone with the patient's permission  He is still not interested in medications and prefers naturopathic remedies    Review of Systems  Constitutional: No fever or chills   Respiratory: negative for cough and hemoptysis  Cardiovascular: negative for chest pain and dyspnea  Gastrointestinal: negative for abdominal pain, diarrhea, nausea and vomiting   : See above  Derm: negative for rash and skin lesion(s)  Neurological: negative for seizures and tremors  Musculoskeletal: Negative    Psychiatric: Negative   All other reviews are negative      Scheduled Meds:   rasburicase (ELITEK) 3 mg in sodium chloride 0.9 % 50 mL IVPB  3 mg IntraVENous Once    senna  1 tablet Oral BID    polyethylene glycol  17 g Oral BID    lidocaine  1 patch TransDERmal Daily    bicalutamide  50 mg Oral Daily    vitamin D  50,000 Units Oral Weekly    vitamin D  2,000 Units Oral Daily    oxyCODONE  10 mg Oral 6 times per day    sodium chloride flush  5-40 mL IntraVENous 2 times per day    enoxaparin  40 mg SubCUTAneous Daily    diclofenac sodium  2 g Topical BID    methocarbamol  500 mg Oral 4x Daily       Objective:  Vitals:    07/17/24 1324   BP:    Pulse:    Resp: 16   Temp:    SpO2:          Allergies: Patient has no known allergies.    General Appearance: alert and oriented to person, place and time and in no acute distress  Skin: no rash or erythema  Head: normocephalic and atraumatic  Pulmonary/Chest: normal air movement, no respiratory distress  Abdomen: soft, non-tender, non-distended  Genitourinary: no jett   Extremities: no cyanosis, clubbing or edema         Labs:     Recent Labs     07/17/24  0258      K 4.4   CL 97*   CO2 26   BUN 21*   CREATININE 0.9   GLUCOSE 100*

## 2024-07-17 NOTE — DISCHARGE INSTRUCTIONS
Call upon discharge to schedule a follow-up visit with Nydia Davila/Luis/Tavares ([facility]) at [phone]  Call upon discharge to schedule a follow-up visit with Nydia Matson/Hemal at [phone]     =====================================  [facility]  DISCHARGE INSTRUCTIONS  =====================================    Take your medications as directed in this summary    Future scheduled appointments are listed below or recommended appointments are mentioned above.    Future Appointments   Date Time [facility] Center   7/23/2024  9:40 AM Kassandra Kendrick,  ACC Pulm [facility]   7/23/2024  3:40 PM Nasima Phelps Chi, MD [facility]   7/30/2024 10:00 AM Ty Shaikh MD [facility]   8/16/2024  9:45 AM Holly Doyle PA [facility]       Call [phone]  to confirm or schedule your appointment with Dr. Nasima Phelps,  as soon as possible and/or if there are any appointment changes or other issues.    It is important that you follow up with Dr. Nasima Phelps for better monitoring of the reason of your hospitalization. Follow up with the specialists that saw you during your stay as well. If you have any questions call your PCP at [phone].    Once discharged from [facility], you can :     Return to work : Yes, you may return to work  Activity : As tolerated  Stairs : As tolerated  Exercise : As tolerated  Lifting : As tolerated   Sexual activity : Yes  Driving : With seat belt on. NO driving on narcotic pain medication if prescribed   Medications : Always take your medications as prescribed  Wound Care: none needed  Diet : You are asked to make an attempt to improve diet and exercise patterns to aid in medical management of your medical condition/problem.     Call [facility] with any further questions. Return to Emergency Department with any worsening of your condition and/or fever greater than 101 degrees, new weakness, shortness of breath or  chest pain.

## 2024-07-18 VITALS
WEIGHT: 195 LBS | TEMPERATURE: 96 F | BODY MASS INDEX: 30.61 KG/M2 | SYSTOLIC BLOOD PRESSURE: 141 MMHG | OXYGEN SATURATION: 96 % | HEIGHT: 67 IN | DIASTOLIC BLOOD PRESSURE: 84 MMHG | HEART RATE: 108 BPM | RESPIRATION RATE: 18 BRPM

## 2024-07-18 LAB
ANION GAP SERPL CALCULATED.3IONS-SCNC: 20 MMOL/L (ref 7–16)
BASOPHILS # BLD: 0.05 K/UL (ref 0–0.2)
BASOPHILS NFR BLD: 1 % (ref 0–2)
BUN SERPL-MCNC: 18 MG/DL (ref 6–20)
CALCIUM SERPL-MCNC: 10.2 MG/DL (ref 8.6–10.2)
CHLORIDE SERPL-SCNC: 97 MMOL/L (ref 98–107)
CO2 SERPL-SCNC: 22 MMOL/L (ref 22–29)
CREAT SERPL-MCNC: 1 MG/DL (ref 0.7–1.2)
EOSINOPHIL # BLD: 0.07 K/UL (ref 0.05–0.5)
EOSINOPHILS RELATIVE PERCENT: 1 % (ref 0–6)
ERYTHROCYTE [DISTWIDTH] IN BLOOD BY AUTOMATED COUNT: 14.5 % (ref 11.5–15)
GFR, ESTIMATED: >90 ML/MIN/1.73M2
GLUCOSE SERPL-MCNC: 111 MG/DL (ref 74–99)
HCT VFR BLD AUTO: 32.7 % (ref 37–54)
HGB BLD-MCNC: 10.6 G/DL (ref 12.5–16.5)
IMM GRANULOCYTES # BLD AUTO: 0.27 K/UL (ref 0–0.58)
IMM GRANULOCYTES NFR BLD: 4 % (ref 0–5)
LYMPHOCYTES NFR BLD: 2.29 K/UL (ref 1.5–4)
LYMPHOCYTES RELATIVE PERCENT: 33 % (ref 20–42)
MAGNESIUM SERPL-MCNC: 2.2 MG/DL (ref 1.6–2.6)
MCH RBC QN AUTO: 28 PG (ref 26–35)
MCHC RBC AUTO-ENTMCNC: 32.4 G/DL (ref 32–34.5)
MCV RBC AUTO: 86.3 FL (ref 80–99.9)
MONOCYTES NFR BLD: 0.45 K/UL (ref 0.1–0.95)
MONOCYTES NFR BLD: 7 % (ref 2–12)
NEUTROPHILS NFR BLD: 55 % (ref 43–80)
NEUTS SEG NFR BLD: 3.77 K/UL (ref 1.8–7.3)
PHOSPHATE SERPL-MCNC: 5.2 MG/DL (ref 2.5–4.5)
PLATELET # BLD AUTO: 74 K/UL (ref 130–450)
PLATELET CONFIRMATION: NORMAL
PMV BLD AUTO: 11.5 FL (ref 7–12)
POTASSIUM SERPL-SCNC: 3.9 MMOL/L (ref 3.5–5)
RBC # BLD AUTO: 3.79 M/UL (ref 3.8–5.8)
SODIUM SERPL-SCNC: 139 MMOL/L (ref 132–146)
URATE SERPL-MCNC: 9 MG/DL (ref 3.4–7)
WBC OTHER # BLD: 6.9 K/UL (ref 4.5–11.5)

## 2024-07-18 PROCEDURE — 6370000000 HC RX 637 (ALT 250 FOR IP): Performed by: INTERNAL MEDICINE

## 2024-07-18 PROCEDURE — 84100 ASSAY OF PHOSPHORUS: CPT

## 2024-07-18 PROCEDURE — 80048 BASIC METABOLIC PNL TOTAL CA: CPT

## 2024-07-18 PROCEDURE — 99238 HOSP IP/OBS DSCHRG MGMT 30/<: CPT | Performed by: FAMILY MEDICINE

## 2024-07-18 PROCEDURE — 83735 ASSAY OF MAGNESIUM: CPT

## 2024-07-18 PROCEDURE — 6370000000 HC RX 637 (ALT 250 FOR IP)

## 2024-07-18 PROCEDURE — 99231 SBSQ HOSP IP/OBS SF/LOW 25: CPT | Performed by: NURSE PRACTITIONER

## 2024-07-18 PROCEDURE — 85025 COMPLETE CBC W/AUTO DIFF WBC: CPT

## 2024-07-18 PROCEDURE — 36415 COLL VENOUS BLD VENIPUNCTURE: CPT

## 2024-07-18 PROCEDURE — 84550 ASSAY OF BLOOD/URIC ACID: CPT

## 2024-07-18 RX ORDER — OXYCODONE HYDROCHLORIDE 10 MG/1
10 TABLET ORAL EVERY 4 HOURS
Qty: 30 TABLET | Refills: 0 | Status: SHIPPED | OUTPATIENT
Start: 2024-07-18 | End: 2024-07-18

## 2024-07-18 RX ORDER — CHOLECALCIFEROL (VITAMIN D3) 50 MCG
2000 TABLET ORAL DAILY
Qty: 30 TABLET | Refills: 0 | Status: SHIPPED | OUTPATIENT
Start: 2024-07-19 | End: 2024-08-18

## 2024-07-18 RX ORDER — POLYETHYLENE GLYCOL 3350 17 G/17G
17 POWDER, FOR SOLUTION ORAL 2 TIMES DAILY
Qty: 60 PACKET | Refills: 0 | Status: SHIPPED | OUTPATIENT
Start: 2024-07-18 | End: 2024-08-17

## 2024-07-18 RX ORDER — LIDOCAINE 4 G/G
10 PATCH TOPICAL DAILY
Qty: 10 PATCH | Refills: 0 | Status: SHIPPED | OUTPATIENT
Start: 2024-07-19

## 2024-07-18 RX ORDER — ERGOCALCIFEROL 1.25 MG/1
50000 CAPSULE ORAL WEEKLY
Qty: 5 CAPSULE | Refills: 0 | Status: SHIPPED | OUTPATIENT
Start: 2024-07-25 | End: 2024-08-16

## 2024-07-18 RX ORDER — SENNOSIDES A AND B 8.6 MG/1
1 TABLET, FILM COATED ORAL 2 TIMES DAILY
Qty: 60 TABLET | Refills: 0 | Status: SHIPPED | OUTPATIENT
Start: 2024-07-18 | End: 2024-08-17

## 2024-07-18 RX ORDER — HYDROMORPHONE HYDROCHLORIDE 2 MG/1
1 TABLET ORAL EVERY 4 HOURS PRN
Qty: 15 TABLET | Refills: 0 | Status: SHIPPED | OUTPATIENT
Start: 2024-07-18 | End: 2024-07-23

## 2024-07-18 RX ORDER — METHOCARBAMOL 500 MG/1
500 TABLET, FILM COATED ORAL 4 TIMES DAILY
Qty: 24 TABLET | Refills: 0 | Status: SHIPPED | OUTPATIENT
Start: 2024-07-18 | End: 2024-07-24 | Stop reason: SDUPTHER

## 2024-07-18 RX ORDER — OXYCODONE HYDROCHLORIDE 10 MG/1
10 TABLET ORAL EVERY 4 HOURS
Qty: 36 TABLET | Refills: 0 | Status: SHIPPED | OUTPATIENT
Start: 2024-07-18 | End: 2024-07-23 | Stop reason: SDUPTHER

## 2024-07-18 RX ORDER — METHOCARBAMOL 500 MG/1
500 TABLET, FILM COATED ORAL 4 TIMES DAILY
Qty: 20 TABLET | Refills: 0 | Status: SHIPPED | OUTPATIENT
Start: 2024-07-18 | End: 2024-07-18

## 2024-07-18 RX ORDER — HYDROMORPHONE HYDROCHLORIDE 2 MG/1
1 TABLET ORAL EVERY 4 HOURS PRN
Qty: 15 TABLET | Refills: 0 | Status: SHIPPED | OUTPATIENT
Start: 2024-07-18 | End: 2024-07-18

## 2024-07-18 RX ADMIN — TIZANIDINE 2 MG: 4 TABLET ORAL at 00:04

## 2024-07-18 RX ADMIN — OXYCODONE HYDROCHLORIDE 10 MG: 10 TABLET ORAL at 12:14

## 2024-07-18 RX ADMIN — METHOCARBAMOL 500 MG: 500 TABLET ORAL at 08:41

## 2024-07-18 RX ADMIN — POLYETHYLENE GLYCOL 3350 17 G: 17 POWDER, FOR SOLUTION ORAL at 08:41

## 2024-07-18 RX ADMIN — METHOCARBAMOL 500 MG: 500 TABLET ORAL at 12:14

## 2024-07-18 RX ADMIN — SENNOSIDES 8.6 MG: 8.6 TABLET, FILM COATED ORAL at 08:41

## 2024-07-18 RX ADMIN — OXYCODONE HYDROCHLORIDE 10 MG: 10 TABLET ORAL at 17:06

## 2024-07-18 RX ADMIN — OXYCODONE HYDROCHLORIDE 10 MG: 10 TABLET ORAL at 08:42

## 2024-07-18 RX ADMIN — OXYCODONE HYDROCHLORIDE 10 MG: 10 TABLET ORAL at 00:04

## 2024-07-18 RX ADMIN — HYDROMORPHONE HYDROCHLORIDE 1 MG: 2 TABLET ORAL at 07:15

## 2024-07-18 RX ADMIN — Medication 2000 UNITS: at 08:41

## 2024-07-18 RX ADMIN — METHOCARBAMOL 500 MG: 500 TABLET ORAL at 17:07

## 2024-07-18 RX ADMIN — ERGOCALCIFEROL 50000 UNITS: 1.25 CAPSULE ORAL at 08:44

## 2024-07-18 RX ADMIN — OXYCODONE HYDROCHLORIDE 10 MG: 10 TABLET ORAL at 04:06

## 2024-07-18 RX ADMIN — HYDROMORPHONE HYDROCHLORIDE 1 MG: 2 TABLET ORAL at 14:34

## 2024-07-18 ASSESSMENT — PAIN DESCRIPTION - LOCATION
LOCATION: RIB CAGE;BACK;PELVIS
LOCATION: BACK;PELVIS;RIB CAGE
LOCATION: RIB CAGE;BACK

## 2024-07-18 ASSESSMENT — PAIN SCALES - GENERAL
PAINLEVEL_OUTOF10: 10
PAINLEVEL_OUTOF10: 7
PAINLEVEL_OUTOF10: 9
PAINLEVEL_OUTOF10: 9
PAINLEVEL_OUTOF10: 7
PAINLEVEL_OUTOF10: 9

## 2024-07-18 ASSESSMENT — PAIN DESCRIPTION - ORIENTATION
ORIENTATION: RIGHT;LEFT;LOWER
ORIENTATION: RIGHT;LEFT;LOWER;MID
ORIENTATION: LEFT;LOWER

## 2024-07-18 ASSESSMENT — PAIN DESCRIPTION - DESCRIPTORS
DESCRIPTORS: THROBBING;SHARP
DESCRIPTORS: SHARP;STABBING;THROBBING

## 2024-07-18 NOTE — PROGRESS NOTES
Coordination of care discussion and chart review with PM team. LSw met with pt to discuss ongoing goals of care. Pt has information for SSD and plans to apply. He still wants to seek a homeopathic approach for cancer. He is unsure about getting chemotherapy or radiation. Supportive listening provided.

## 2024-07-18 NOTE — PROGRESS NOTES
[facility]  Family Medicine Attending    Hospital Course: 43 y.o. male with PMHx of polysubstance abuse including opioids and tobacco, abstinent for 5 years, diverticulitis who presents with rib pain and LLE pain. He also has recently noted difficulty w urination. ALP elevated 200, AST 76, Calcium 10.5 Hgb 12.3, Platelets 116.  . CT chest concerning for mediastinal LAD. CT Lumbar with metastatic disease & retroperitoneal LAD.     S:   7/15:  pain remains controlled overall.  He notes that at rest his pain is 4/10, but increases significantly with movement of his legs.  He has to help move his right leg with his upper extremities.  No new symptoms or concerns.  Has continued to decline medical interventions.    New information.  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  He was never given anything to monitor potential exposure to radiation while there.  He last worked for [employer] about 5 years ago. He denies any other industrial exposures.  He states that there is essentially no family history of cancer.      7/16: patient still having pain.  No new complaints.  Patient agrees at this time to undergo prostate biopsy.  He is requesting to have his family updated with results and possible management/outcomes.    7/17:  discussed biopsy results with patient.  Talked about need for more information from oncology regarding treatment options and side effects of medication.  Patient continues to c/o back pain.    7/18: Patient is not yet ready to make a decision on his treatment.  He plans to speak with his family more prior to determining whether he will undergo chemo and radiation versus what he considers more natural treatment.  I did discuss with him that herbals and vitamins do not have a lot of objective data to support their  ability to improve cancer.  I also expressed concerns about cord compression if he does not undergo spinal radiation palliative.  Patient understands these risks.  He request that we send him home with a wheelchair and walker.  He will have family at home to help him.  We request that he follow-up in our office as well as with oncology in the next week.  He has been seen by palliative care and they will also follow him through home health.  In the meantime we will cover his pain with oxycodone and Robaxin as suggested by palliative care.    O: VS- Blood pressure (!) 141/84, pulse (!) 108, temperature (!) 96 °F (35.6 °C), temperature source Temporal, resp. rate 16, height 1.702 m (5' 7\"), weight 88.5 kg (195 lb), SpO2 96 %.  Exam is as noted by resident with the following changes, additions or corrections:  Gen: NAD AAOx3  HEENT NCAT EOMI.    CV regular rate and rhythm   Resp no tachypnea  Ext: no edema      .3  Peripheral smear states unclear reason for low platelets and anemia.    Impressions:   Principal Problem:    Retroperitoneal lymphadenopathy  Active Problems:    Chronic midline low back pain with left-sided sciatica    Elevated PSA    Urinary hesitancy    Pelvic pain    Osteopenia of lumbar spine    Acute midline low back pain with sciatica  Resolved Problems:    * No resolved hospital problems. *      Plan:     1) Metastatic Prostate Cancer -   Plan for prostate bx on Thursday per note- unnecessary per oncology    HemeOnc consulted-needs outpatient chemotherapy; patient would like further information on medications.  Palliative Care consulted.      2) Concern for tumor lysis syndrome-elevated phosphorus and uric acid; IVF elitek ordered, however patient currently without IV access.  Patient declining IV at this time.    3) Mediastinal LAD - Pulm consulted, labs, bronch per pulm w/ bx. Bx consistent with prostate ca mets.    4) Vitamin D - replacement.     5)  thrombocytopenia and anemia-monitor; heme

## 2024-07-18 NOTE — PROGRESS NOTES
Palliative Care Department  [phone]  Palliative Care Progress Note  Provider Kylee Tellez, APRN - CNP      PATIENT: Joseph Bond  : 1980  MRN: [id number]  ADMISSION DATE: 2024  7:14 PM  Referring Provider: Srini Smith MD     Palliative Medicine was consulted on hospital day 6 for assistance with Goals of care: Help establish care with naturopathic doctor,  Symptom management     HPI:     Clinical Summary:Joseph Bond is a 43 y.o. y/o male with a history of polysubstance abuse including opioids and tobacco, abstinent for 5 years, diverticulitis who presents with rib pain and LLE pain  who presented to [facility] on 2024 with rib and back pain.  He was recently found to have an elevated PSA and enlarged prostate.  CT of the chest showed mediastinal lymphadenopathy.  MRI of the spine showed extensive metastatic disease and a mild pathologic fracture of the L2 vertebrae and the small tumor extending from the posterior wall of the T12 and L4 vertebral bodies, and enlarged retroperitoneal lymph nodes.  Bone scan showed extensive osseous metastatic uptake involving the spine, rib cage, pelvis, humerus and femurs.  Patient is refusing biopsy or oncology treatment and wants naturopathic remedies.    ASSESSMENT/PLAN:     Pertinent Hospital Diagnoses     Probable metastatic prostate cancer  Bone metastasis  Pain due to neoplasm    Symptom management    Pain due to neoplasm  -Oxycodone 10 mg every 4 hours as needed  -Robaxin 500 mg 4 times daily    Palliative Care Encounter / Counseling Regarding Goals of Care  Please see detailed goals of care discussion as below  At this time, Joseph Bond, Does have capacity for medical decision-making.  Capacity is time limited and situation/question specific  Outcome of goals of care meeting:  Goals are to live longer and get better  CODE STATUS to remain a full code  Wishes to pursue homeopathic medicine  Code status Full Code  Advanced  transcription errors may be present.

## 2024-07-18 NOTE — PROGRESS NOTES
[facility]   Resident Progress Note    S:  Hospital day: 8   Brief Synopsis: Joseph Bond is a 43 y.o. male with a PMH of  polysubstance abuse including opioids and tobacco, abstinent for 5 years, diverticulitis who presents with rib pain and LLE pain. Patient was in the ED on 6/24 for rib pain and low back pain.  Onset several weeks ago, progressively getting worse.  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  He had to lift her for bathing, feeding and transferring to bed.  Recently, he is unable to handle that and she is currently in nursing home.  7/8/2024, patient woke up at 3 AM because of worsening lower back pain and LLE pain.  Pain is 10/10, radiating down LLE towards toes.  In the morning, he had tried to call urology to set up for an appointment per PCPs recommendation.  When he told nurse over the phone about radicular pain, he was advised to go to the emergency department. Patient also complains of weak urinary stream, onset several months. He also reports pelvic pain. 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 Of note, just established with PCP a week ago. He was referred to pulmonology for mediastinal lymphadenopathy and urology for Prostamegaly seen on CT abdomen pelvis and also PSA of 155. Abuse history include opioid use and Xanax when he was younger, abstain for 5 years. Also stopped smoking tobacco at that time. Prior to that he had 1 PPD smoking history. Never had a colonoscopy, not even after diverticulitis when he was diagnosed at 32. CMP significant for elevated alk phos at 200 and AST 76.  BUN 28.  Calcium 10.5, albumin 4.9. CBC significant for hemoglobin 12.3, platelets 116. UDS positive for oxycodone that he was given in the ED. UA with high specific gravity and trace protein, RBC 10-20CT lumbar spine ordered for lumbar back pain, consistent

## 2024-07-18 NOTE — CARE COORDINATION
Social Work/Case Management Transition of Care Planning ([place] [facility] [place] [phone]):  Per report and chart review, biopsy of the prostate was cancelled as the pathology from his bronch is consistent with prostate origin.  Patient is uncertain if he wants any aggressive treatment.  He prefers naturopathy remedies but will discuss with his family. Met with patient at bedside.  He is very indecisive about what treatment he wants.   Discharge plan is to home with [facility] for palliative care.  Patient will drive himself home or have family drive him home. CM/SW will  follow.  BERNADETTE Doyle  7/18/2024      Update:  Per nursing, the resident stated patient will need a wheelchair and a FWW.  Met with patient at bedside.  Explained to him the insurance will only pay for one or the other.  Explained the wheelchair is more expensive than a walker which can be purchased in many placed.  Patient stated he would rather have the walker.  No DME preference.  Call to [facility].  FWW will be delivered to the room prior to discharge. Patient indicated his family will provide transport home tonight.  BERNADETTE Doyle  7/18/2024

## 2024-07-19 NOTE — PROGRESS NOTES
CLINICAL PHARMACY NOTE: MEDS TO BEDS    Total # of Prescriptions Filled: 9   The following medications were delivered to the patient:  Vitamin d2 1.25 mg  Methocarbamol 500 mg  Senna 8.6 mg  Hydromorphone 2 mg  Aspercreme arthritis cream Oxycodone 10 mg  Lidocaine pain 4% patch  Vitamin d3 50 mcg  Peg 3350 17 gm    Additional Documentation:   Delivered to pt

## 2024-07-22 NOTE — PROCEDURES
[facility]              [address] [place] [address]                           PULMONARY FUNCTION      PATIENT NAME: BRIAN ANTHONY              : 1980  MED REC NO: [id number]                        ROOM: [id number]  ACCOUNT NO: [id number]                       ADMIT DATE: 2024  PROVIDER: Ed Cornejo DO      DATE OF PROCEDURE: 2024    INDICATIONS:  A 43-year-old male, 67 inches, 197 pounds, 29 pack year smoking history.    FINDINGS:  Pulmonary function test showed forced vital capacity of 3.13 L, 67% of predicted, and FEV1 of 2.17 L, 58% of predicted with an FEV1/FVC ratio of 69%.  Expiratory flow is 2.86 L/second with a maximum voluntary ventilation of 69 L/minute, 46% of predicted.    Static lung volumes with slow vital capacity of 2.79 L, 60% of predicted.  Inspiratory capacity of 2.40 L, 77% of predicted.  Expiratory reserve volume 0.39 L, 25% of predicted.  Total lung capacity of 3.64 L, 115% of predicted.  Residual volume is 3.24 L, 183% of predicted.  Total lung capacity is 6.03 L, 95% of predicted with an RV to TLC ratio of 192%.  Diffusing capacity is normal at 22.18 mL/minute/mmHg, which is 80% of predicted.    IMPRESSION:  There is moderate airflow obstruction, GOLD II chronic obstructive pulmonary disease with evidence of hyperinflation is noted by increased residual volume and RV to TLC ratio.  There is no restrictive process noted.  Diffusing capacity is normal, indicates no obstruction at the alveolar capillary bed.  Clinical correlation needed.          ED CORNEJO DO      D:  2024 18:09:27     T:  2024 20:45:06     SUZETTE/TEJAS  Job #:  [id number]     Doc#:  [id number]

## 2024-07-23 ENCOUNTER — OFFICE VISIT (OUTPATIENT)
Dept: PULMONOLOGY | Age: 44
End: 2024-07-23
Payer: MEDICAID

## 2024-07-23 ENCOUNTER — OFFICE VISIT (OUTPATIENT)
Dept: FAMILY MEDICINE CLINIC | Age: 44
End: 2024-07-23
Payer: MEDICAID

## 2024-07-23 VITALS
TEMPERATURE: 98.1 F | SYSTOLIC BLOOD PRESSURE: 132 MMHG | HEIGHT: 68 IN | WEIGHT: 193.4 LBS | RESPIRATION RATE: 18 BRPM | BODY MASS INDEX: 29.31 KG/M2 | DIASTOLIC BLOOD PRESSURE: 89 MMHG | OXYGEN SATURATION: 97 % | HEART RATE: 99 BPM

## 2024-07-23 VITALS
HEART RATE: 105 BPM | SYSTOLIC BLOOD PRESSURE: 124 MMHG | WEIGHT: 193 LBS | DIASTOLIC BLOOD PRESSURE: 80 MMHG | HEIGHT: 68 IN | TEMPERATURE: 100 F | RESPIRATION RATE: 18 BRPM | BODY MASS INDEX: 29.25 KG/M2 | OXYGEN SATURATION: 99 %

## 2024-07-23 DIAGNOSIS — T40.2X5A THERAPEUTIC OPIOID-INDUCED CONSTIPATION (OIC): ICD-10-CM

## 2024-07-23 DIAGNOSIS — C79.51 PROSTATE CANCER METASTATIC TO BONE (HCC): Primary | ICD-10-CM

## 2024-07-23 DIAGNOSIS — F41.9 ANXIETY: ICD-10-CM

## 2024-07-23 DIAGNOSIS — C61 PROSTATE CANCER (HCC): Primary | ICD-10-CM

## 2024-07-23 DIAGNOSIS — C61 PROSTATE CANCER METASTATIC TO BONE (HCC): Primary | ICD-10-CM

## 2024-07-23 DIAGNOSIS — K59.03 THERAPEUTIC OPIOID-INDUCED CONSTIPATION (OIC): ICD-10-CM

## 2024-07-23 DIAGNOSIS — K64.9 HEMORRHOIDS, UNSPECIFIED HEMORRHOID TYPE: ICD-10-CM

## 2024-07-23 DIAGNOSIS — Z09 HOSPITAL DISCHARGE FOLLOW-UP: ICD-10-CM

## 2024-07-23 PROCEDURE — G8428 CUR MEDS NOT DOCUMENT: HCPCS | Performed by: INTERNAL MEDICINE

## 2024-07-23 PROCEDURE — 1036F TOBACCO NON-USER: CPT | Performed by: INTERNAL MEDICINE

## 2024-07-23 PROCEDURE — G8417 CALC BMI ABV UP PARAM F/U: HCPCS | Performed by: INTERNAL MEDICINE

## 2024-07-23 PROCEDURE — 1111F DSCHRG MED/CURRENT MED MERGE: CPT

## 2024-07-23 PROCEDURE — 99215 OFFICE O/P EST HI 40 MIN: CPT

## 2024-07-23 PROCEDURE — 99213 OFFICE O/P EST LOW 20 MIN: CPT | Performed by: INTERNAL MEDICINE

## 2024-07-23 PROCEDURE — 1111F DSCHRG MED/CURRENT MED MERGE: CPT | Performed by: INTERNAL MEDICINE

## 2024-07-23 RX ORDER — BENZOCAINE/MENTHOL 6 MG-10 MG
LOZENGE MUCOUS MEMBRANE
Qty: 30 G | Refills: 1 | Status: SHIPPED
Start: 2024-07-23 | End: 2024-07-24

## 2024-07-23 RX ORDER — HYDROXYZINE HYDROCHLORIDE 25 MG/1
25 TABLET, FILM COATED ORAL NIGHTLY PRN
Qty: 30 TABLET | Refills: 0 | Status: SHIPPED | OUTPATIENT
Start: 2024-07-23 | End: 2024-08-22

## 2024-07-23 RX ORDER — OXYCODONE HYDROCHLORIDE 10 MG/1
10 TABLET ORAL EVERY 4 HOURS
Qty: 36 TABLET | Refills: 0 | Status: SHIPPED | OUTPATIENT
Start: 2024-07-23 | End: 2024-07-29

## 2024-07-23 RX ORDER — HYDROMORPHONE HYDROCHLORIDE 2 MG/1
1 TABLET ORAL EVERY 4 HOURS PRN
Qty: 15 TABLET | Refills: 0 | Status: SHIPPED | OUTPATIENT
Start: 2024-07-23 | End: 2024-07-29

## 2024-07-23 NOTE — PROGRESS NOTES
S: 43 y.o. male with metastatic prostate cancer.  Up to this point, he has avoided chemo/radiation treatment.  His family joins him today.  His pain is still present.  He is not sleeping well.  He continues to have constipation.  Using senna and miralax.  Has not tried colace.  Has not seen oncology or urology since discharge from the hospital.  Palliative home care has been consulted for symptom management. Patient presents for pain medication.  We will contact [facility] to confirm they will be prescribing after Monday.  O: VS: /80 (Site: Left Upper Arm, Position: Sitting, Cuff Size: Medium Adult)   Pulse (!) 105   Temp 100 °F (37.8 °C) (Temporal)   Resp 18   Ht 1.715 m (5' 7.5\")   Wt 87.5 kg (193 lb)   SpO2 99%   BMI 29.78 kg/m²    General: NAD, appropriate affect and grooming   Ext:  No edema  Impression/Plan:     Metastatic prostate cancer  Recommend follow up with oncology  Will give short term opiates for symptoms control  Palliative care to take over symptom control from here    2.  Insomnia-due to anxiety about illness-hydroxyzine.        Attending Physician Statement  I have discussed the case, including pertinent history and exam findings with the resident.  I agree with the documented assessment and plan.

## 2024-07-24 ENCOUNTER — TELEPHONE (OUTPATIENT)
Dept: FAMILY MEDICINE CLINIC | Age: 44
End: 2024-07-24

## 2024-07-24 DIAGNOSIS — C79.51 PROSTATE CANCER METASTATIC TO BONE (HCC): Primary | ICD-10-CM

## 2024-07-24 DIAGNOSIS — C61 PROSTATE CANCER METASTATIC TO BONE (HCC): Primary | ICD-10-CM

## 2024-07-24 RX ORDER — NALOXONE HYDROCHLORIDE 4 MG/.1ML
1 SPRAY NASAL PRN
COMMUNITY
Start: 2024-07-23

## 2024-07-24 RX ORDER — METHOCARBAMOL 500 MG/1
500 TABLET, FILM COATED ORAL 4 TIMES DAILY
Qty: 24 TABLET | Refills: 0 | Status: SHIPPED | OUTPATIENT
Start: 2024-07-24 | End: 2024-07-30

## 2024-07-26 NOTE — PROGRESS NOTES
Patient was referred to oncology during office visit.  Patient can follow up with pulmonology on an as needed basis.    
appropriate    DATA: Spirometry not completed    IMPRESSION:       Metastatic prostate cancer status post bronchoscopy with EBUS and biopsy.               PLAN:      Urgent referral placed for oncology follow-up.  The patient will be seeing his primary care doctor today.  Currently he has no pulmonary symptoms.  We will continue to follow-up with him as a as needed basis only.      I hope this updates you on my evaluation and clinical thinking. Thank you for allowing me to participate in his care.     Sincerely,        Kassandra Kendrick   [facility]  Office: [phone]  Fax: [phone]

## 2024-07-29 ENCOUNTER — HOSPITAL ENCOUNTER (EMERGENCY)
Age: 44
Discharge: HOME | End: 2024-07-29
Attending: STUDENT IN AN ORGANIZED HEALTH CARE EDUCATION/TRAINING PROGRAM
Payer: MEDICAID

## 2024-07-29 ENCOUNTER — APPOINTMENT (OUTPATIENT)
Dept: ULTRASOUND IMAGING | Age: 44
End: 2024-07-29
Payer: MEDICAID

## 2024-07-29 VITALS
TEMPERATURE: 98.2 F | DIASTOLIC BLOOD PRESSURE: 87 MMHG | HEART RATE: 95 BPM | SYSTOLIC BLOOD PRESSURE: 120 MMHG | HEIGHT: 67 IN | BODY MASS INDEX: 30.23 KG/M2 | OXYGEN SATURATION: 96 % | RESPIRATION RATE: 18 BRPM

## 2024-07-29 DIAGNOSIS — M79.605 LEFT LEG PAIN: Primary | ICD-10-CM

## 2024-07-29 DIAGNOSIS — M62.838 SPASM OF MUSCLE: ICD-10-CM

## 2024-07-29 PROCEDURE — 93971 EXTREMITY STUDY: CPT

## 2024-07-29 PROCEDURE — 6370000000 HC RX 637 (ALT 250 FOR IP)

## 2024-07-29 PROCEDURE — 99284 EMERGENCY DEPT VISIT MOD MDM: CPT

## 2024-07-29 RX ORDER — HYDROMORPHONE HYDROCHLORIDE 2 MG/1
1 TABLET ORAL ONCE
Status: DISCONTINUED | OUTPATIENT
Start: 2024-07-29 | End: 2024-07-29

## 2024-07-29 RX ORDER — HYDROMORPHONE HYDROCHLORIDE 2 MG/1
0.5 TABLET ORAL ONCE
Status: DISCONTINUED | OUTPATIENT
Start: 2024-07-29 | End: 2024-07-30 | Stop reason: HOSPADM

## 2024-07-29 RX ORDER — ORPHENADRINE CITRATE 30 MG/ML
60 INJECTION INTRAMUSCULAR; INTRAVENOUS ONCE
Status: DISCONTINUED | OUTPATIENT
Start: 2024-07-29 | End: 2024-07-29

## 2024-07-29 RX ORDER — HYDROCODONE BITARTRATE AND ACETAMINOPHEN 5; 325 MG/1; MG/1
1 TABLET ORAL ONCE
Status: COMPLETED | OUTPATIENT
Start: 2024-07-29 | End: 2024-07-29

## 2024-07-29 RX ORDER — CYCLOBENZAPRINE HCL 10 MG
10 TABLET ORAL ONCE
Status: COMPLETED | OUTPATIENT
Start: 2024-07-29 | End: 2024-07-29

## 2024-07-29 RX ADMIN — HYDROCODONE BITARTRATE AND ACETAMINOPHEN 1 TABLET: 5; 325 TABLET ORAL at 22:27

## 2024-07-29 RX ADMIN — CYCLOBENZAPRINE 10 MG: 10 TABLET, FILM COATED ORAL at 20:46

## 2024-07-29 ASSESSMENT — PAIN DESCRIPTION - ORIENTATION
ORIENTATION: UPPER
ORIENTATION: RIGHT

## 2024-07-29 ASSESSMENT — PAIN DESCRIPTION - LOCATION
LOCATION: NECK
LOCATION: NECK
LOCATION: BACK;LEG

## 2024-07-29 ASSESSMENT — PAIN SCALES - GENERAL
PAINLEVEL_OUTOF10: 10
PAINLEVEL_OUTOF10: 8

## 2024-07-29 ASSESSMENT — PAIN DESCRIPTION - DESCRIPTORS
DESCRIPTORS: SHARP
DESCRIPTORS: ACHING;SORE;TENDER
DESCRIPTORS: ACHING

## 2024-07-29 ASSESSMENT — PAIN - FUNCTIONAL ASSESSMENT: PAIN_FUNCTIONAL_ASSESSMENT: 0-10

## 2024-07-29 NOTE — PROGRESS NOTES
[facility]  FAMILY MEDICINE RESIDENCY PROGRAM  DATE OF VISIT : 24       PATIENT:Jospeh Bond    AGE:43 y.o.     :1980      MRN:[id number]   ___________________________________________________________________________________________________________________________________________________    ASSESSMENT AND PLAN    Prostate cancer metastatic to bone (HCC)  Chronic midline low back pain with left-sided sciatica  Very aggressive cancer with mets.   Advised pt to follow up with Oncology, NS, Pulm, and Urology. Pending pt decision on treatment course, chemoradiation palliative vs homeopathic approach.   Referral to Giulia Osorio DO, Palliative Medicine, [place]   Continue Oxycodone for pain control until [facility] starts. They will manage his pain meds.  Norco is present in the home in case of any accidental overdose. Pt aware and knows when to use.   oxycodone HCl (OXY-IR) 10 MG immediate release tablet; Take 1 tablet by mouth every 4 hours for 6 days. Max Daily Amount: 60 mg  Dispense: 36 tablet; Refill: 0  methocarbamol (ROBAXIN) 500 MG tablet; Take 1 tablet by mouth 4 times daily for 6 days  Dispense: 24 tablet; Refill: 0    Anxiety  Secondary to cancer diagnosis. Trial of Lexapro prescribed. Escitalopram (LEXAPRO) 5 MG tablet; Take 1 tablet by mouth daily  Dispense: 30 tablet; Refill: 0  Continue Vistaril 25 mg PRN.      Return to Office: No follow-ups on file.     Ty Shaikh MD PGY-3  This case was discussed with Dr. Jauregui    ______________________________________________________________________________________________________________________    CHIEF COMPLAINT  Chief Complaint   Patient presents with    Medication Refill     Needs medication for the next 2 days until [facility]     Follow-up     ED Follow Up      HPI:  History obtained from the patient. Joseph Bond  is a 43 y.o.  male who presents for follow up. Patient recently diagnosed

## 2024-07-30 ENCOUNTER — OFFICE VISIT (OUTPATIENT)
Dept: FAMILY MEDICINE CLINIC | Age: 44
End: 2024-07-30
Payer: MEDICAID

## 2024-07-30 VITALS
DIASTOLIC BLOOD PRESSURE: 81 MMHG | RESPIRATION RATE: 18 BRPM | BODY MASS INDEX: 30.45 KG/M2 | HEART RATE: 96 BPM | OXYGEN SATURATION: 98 % | HEIGHT: 67 IN | WEIGHT: 194 LBS | TEMPERATURE: 98.1 F | SYSTOLIC BLOOD PRESSURE: 120 MMHG

## 2024-07-30 DIAGNOSIS — C79.51 PROSTATE CANCER METASTATIC TO BONE (HCC): Primary | ICD-10-CM

## 2024-07-30 DIAGNOSIS — F41.9 ANXIETY: ICD-10-CM

## 2024-07-30 DIAGNOSIS — G89.29 CHRONIC MIDLINE LOW BACK PAIN WITH LEFT-SIDED SCIATICA: ICD-10-CM

## 2024-07-30 DIAGNOSIS — M54.42 CHRONIC MIDLINE LOW BACK PAIN WITH LEFT-SIDED SCIATICA: ICD-10-CM

## 2024-07-30 DIAGNOSIS — C61 PROSTATE CANCER METASTATIC TO BONE (HCC): Primary | ICD-10-CM

## 2024-07-30 PROCEDURE — 99214 OFFICE O/P EST MOD 30 MIN: CPT

## 2024-07-30 RX ORDER — METHOCARBAMOL 500 MG/1
500 TABLET, FILM COATED ORAL 4 TIMES DAILY
Qty: 24 TABLET | Refills: 0 | Status: SHIPPED
Start: 2024-07-30 | End: 2024-08-05

## 2024-07-30 RX ORDER — HYDROMORPHONE HYDROCHLORIDE 2 MG/1
1 TABLET ORAL EVERY 4 HOURS PRN
Qty: 15 TABLET | Refills: 0 | Status: CANCELLED | OUTPATIENT
Start: 2024-07-30 | End: 2024-08-05

## 2024-07-30 RX ORDER — OXYCODONE HYDROCHLORIDE 10 MG/1
10 TABLET ORAL EVERY 4 HOURS
Qty: 36 TABLET | Refills: 0 | Status: SHIPPED | OUTPATIENT
Start: 2024-07-30 | End: 2024-08-05

## 2024-07-30 RX ORDER — ESCITALOPRAM OXALATE 5 MG/1
5 TABLET ORAL DAILY
Qty: 30 TABLET | Refills: 0 | Status: SHIPPED | OUTPATIENT
Start: 2024-07-30 | End: 2024-08-29

## 2024-07-30 NOTE — PROGRESS NOTES
Attending Physician Statement    S:   Chief Complaint   Patient presents with    Medication Refill     Needs medication for the next 2 days until [facility]     Follow-up     ED Follow Up      Patient is a 43 year old male with metastatic prostate cancer. Follows with Dr. Matson. Had genetic testing done. He has appointments scheduled. He has [facility].     He is haivng anxiety and depression     O: Blood pressure 120/81, pulse 96, temperature 98.1 °F (36.7 °C), temperature source Temporal, resp. rate 18, height 1.702 m (5' 7\"), weight 88 kg (194 lb), SpO2 98 %.   Exam:   Heart - RRR   Lungs - clear     A: Metastatic prostate cancer to bone, back pain, anxiety   P:  Start lexapro    Referral to palliative care    Keep follow up with specialists    Follow-up as ordered    Attending Attestation   I have discussed the case, including pertinent history and exam findings with the resident.  I also have personally seen and examined the patient.  I agree with the documented assessment and plan.

## 2024-07-30 NOTE — ED PROVIDER NOTES
Professionals:  Consultation with None .      1. Left leg pain    2. Spasm of muscle        Patient has been closely monitored with multiple reevaluations and has remained hemodynamically stable. The patient has clinically improved and through shared decision making, is to be discharged to home.  Patient is understanding and agreeable with this plan.  The patient has been instructed to follow-up with PCP as soon as possible and are provided with strict return precautions as to which should return to the nearest available emergency department- should symptoms change or worsen. Patient acknowledges understanding of all of these instructions and is to be discharged at this time. Patient is agreeable to plan and all questions have been answered at this time.    Discharge Medications:  New Prescriptions    No medications on file       Counseling:   The emergency provider has spoken with the patient and discussed today’s results including the incidental findings if present, in addition to providing specific details for the plan of care and counseling regarding the diagnosis and prognosis.  Questions are answered at this time and they are agreeable with the plan.     --------------------------------- IMPRESSION AND DISPOSITION ---------------------------------    IMPRESSION  1. Left leg pain    2. Spasm of muscle        DISPOSITION  Disposition: Discharge to home  Patient condition is stable    NOTE: This report was transcribed using voice recognition software. Every effort was made to ensure accuracy; however, inadvertent computerized transcription errors may be present

## 2024-08-02 ENCOUNTER — HOSPITAL ENCOUNTER (OUTPATIENT)
Dept: RADIATION ONCOLOGY | Age: 44
Discharge: HOME | End: 2024-08-02
Payer: MEDICAID

## 2024-08-02 VITALS
SYSTOLIC BLOOD PRESSURE: 126 MMHG | TEMPERATURE: 98.1 F | BODY MASS INDEX: 29.6 KG/M2 | RESPIRATION RATE: 18 BRPM | DIASTOLIC BLOOD PRESSURE: 86 MMHG | WEIGHT: 189 LBS | HEART RATE: 90 BPM

## 2024-08-02 DIAGNOSIS — K59.00 CONSTIPATION, UNSPECIFIED CONSTIPATION TYPE: ICD-10-CM

## 2024-08-02 DIAGNOSIS — C79.51 PROSTATE CANCER METASTATIC TO BONE (HCC): Primary | ICD-10-CM

## 2024-08-02 DIAGNOSIS — C61 PROSTATE CANCER METASTATIC TO BONE (HCC): Primary | ICD-10-CM

## 2024-08-02 DIAGNOSIS — E55.9 VITAMIN D DEFICIENCY: Primary | ICD-10-CM

## 2024-08-02 PROCEDURE — 99205 OFFICE O/P NEW HI 60 MIN: CPT

## 2024-08-02 PROCEDURE — 77290 THER RAD SIMULAJ FIELD CPLX: CPT | Performed by: RADIOLOGY

## 2024-08-02 PROCEDURE — 77334 RADIATION TREATMENT AID(S): CPT | Performed by: RADIOLOGY

## 2024-08-02 RX ORDER — POLYETHYLENE GLYCOL 3350 17 G/17G
17 POWDER, FOR SOLUTION ORAL 2 TIMES DAILY
Qty: 60 PACKET | Refills: 0 | Status: SHIPPED | OUTPATIENT
Start: 2024-08-02 | End: 2024-09-01

## 2024-08-02 RX ORDER — ERGOCALCIFEROL 1.25 MG/1
50000 CAPSULE ORAL WEEKLY
Qty: 5 CAPSULE | Refills: 0 | Status: SHIPPED | OUTPATIENT
Start: 2024-08-02 | End: 2024-08-24

## 2024-08-02 RX ORDER — CHOLECALCIFEROL (VITAMIN D3) 50 MCG
2000 TABLET ORAL DAILY
Qty: 30 TABLET | Refills: 0 | Status: SHIPPED | OUTPATIENT
Start: 2024-08-02 | End: 2024-09-01

## 2024-08-02 ASSESSMENT — PAIN DESCRIPTION - LOCATION: LOCATION: BACK;SHOULDER

## 2024-08-02 ASSESSMENT — PAIN SCALES - GENERAL: PAINLEVEL_OUTOF10: 8

## 2024-08-02 NOTE — TELEPHONE ENCOUNTER
Last Appointment:  7/30/2024  Future Appointments   Date Time Provider Department Center   8/16/2024  9:45 AM Holly Doyle PA [facility]   8/30/2024  1:00 PM Ty Shaikh MD [facility] [place] [facility]

## 2024-08-02 NOTE — PROGRESS NOTES
Joseph Bond  1980 43 y.o.      Referring Physician: Dr Matson    PCP: Ty Shaikh MD     There were no vitals filed for this visit.     Wt Readings from Last 3 Encounters:   07/30/24 88 kg (194 lb)   07/23/24 87.5 kg (193 lb)   07/23/24 87.7 kg (193 lb 6.4 oz)        There is no height or weight on file to calculate BMI.               Chief Complaint: No chief complaint on file.         Cancer Staging   No matching staging information was found for the patient.    Prior Radiation Therapy? NO    Concurrent Chemo/radiation? NO    Prior Chemotherapy? NO    Prior Hormonal Therapy? NO    Head and Neck Cancer? No, patient does NOT have HN cancer.        Current Outpatient Medications   Medication Sig Dispense Refill    oxyCODONE HCl (OXY-IR) 10 MG immediate release tablet Take 1 tablet by mouth every 4 hours for 6 days. Max Daily Amount: 60 mg 36 tablet 0    methocarbamol (ROBAXIN) 500 MG tablet Take 1 tablet by mouth 4 times daily for 6 days 24 tablet 0    escitalopram (LEXAPRO) 5 MG tablet Take 1 tablet by mouth daily 30 tablet 0    naloxone 4 MG/0.1ML LIQD nasal spray 1 spray by Nasal route as needed      hydrOXYzine HCl (ATARAX) 25 MG tablet Take 1 tablet by mouth nightly as needed for Anxiety 30 tablet 0    diclofenac sodium (VOLTAREN) 1 % GEL Apply 2 g topically 2 times daily 50 g 0    lidocaine 4 % external patch Place 10 patches onto the skin daily 10 patch 0    polyethylene glycol (GLYCOLAX) 17 g packet Take 1 packet by mouth 2 times daily 60 packet 0    senna (SENOKOT) 8.6 MG tablet Take 1 tablet by mouth 2 times daily 60 tablet 0    vitamin D (CHOLECALCIFEROL) 50 MCG (2000 UT) TABS tablet Take 1 tablet by mouth daily 30 tablet 0    Ergocalciferol (VITAMIN D) 57714 units CAPS Take 50,000 Units by mouth once a week for 4 doses 5 capsule 0    naproxen (NAPROSYN) 250 MG tablet Take 1 tablet by mouth 2 times daily (with meals) 60 tablet 0    acetaminophen (TYLENOL) 500 MG tablet Take 1 tablet by 
is alert, oriented, cooperative and in no apparent distress.    EENT: EOMI EMILIO. No icterus. No conjunctival injections.  External canals are patent and no discharge was appreciated.  .    Neck: Supple without thyromegaly  Respiratory: Chest was symmetrical without dullness to percussion.  Breath sounds bilaterally were clear to auscultation. No wheezes, rhonchi or rales.   Breasts: Deferred    Cardiovascular: Regular without murmur.  Abdomen: Obese, rounded and soft without organomegaly. No rebound, guarding or  rigidity.    Lymphatic: No lymphadenopathy.  Musculoskeletal: Pain upon palpation of the right chest wall, no pain on palpation of the spine, but the patient is on pain medication with major opiates.  Extremities:  No lower extremity edema, ulcerations, tenderness, varicosities or erythema. Coordination appears adequate.  Sensory function appears intact.    Skin:  Warm and dry.  Examination of color, turgor and pigmentation was relatively normal. No bruises or skin rashes.   Neurological/Psychiatric: General behavior, level of consciousness, thought content is normal. The patient's emotional status is normal.  Cranial nerves II-XII are grossly intact.        RADIATION SAFETY AND ONCOLOGIC TREATMENT SUPPORT:    - Previous radiation history:  No  - History of autoimmune or connective tissue disease:  No  - Nutritional support/ PEG:  Not applicable  - Dental evaluation:  Not applicable  -  requested:  Not asked for.  - Oncology Nurse navigator requested:  - Transportation for daily treatment:  Self    TUMOR MARKERS:   Lab Results   Component Value Date/Time    .30 07/11/2024 04:34 AM       IMAGING REVIEWED:  Vascular duplex lower extremity venous left    Result Date: 7/29/2024  No evidence of DVT in the left lower extremity.       PATHOLOGY REVIEWED:      IMPRESSION:   The patient presents with diffuse metastatic prostate cancer to the bones and to the retroperitoneal and mediastinal lymph

## 2024-08-05 DIAGNOSIS — M54.42 CHRONIC MIDLINE LOW BACK PAIN WITH LEFT-SIDED SCIATICA: ICD-10-CM

## 2024-08-05 DIAGNOSIS — C79.51 PROSTATE CANCER METASTATIC TO BONE (HCC): ICD-10-CM

## 2024-08-05 DIAGNOSIS — G89.29 CHRONIC MIDLINE LOW BACK PAIN WITH LEFT-SIDED SCIATICA: ICD-10-CM

## 2024-08-05 DIAGNOSIS — C61 PROSTATE CANCER METASTATIC TO BONE (HCC): ICD-10-CM

## 2024-08-05 RX ORDER — METHOCARBAMOL 500 MG/1
500 TABLET, FILM COATED ORAL 4 TIMES DAILY
Qty: 24 TABLET | Refills: 0 | Status: SHIPPED | OUTPATIENT
Start: 2024-08-05 | End: 2024-08-11

## 2024-08-05 NOTE — TELEPHONE ENCOUNTER
Please refill :)    Last Appointment:  7/30/2024  Future Appointments   Date Time Provider Department Center   8/16/2024  9:45 AM Holly Doyle PA [facility]   8/30/2024  1:00 PM Ty Shaikh MD [facility] [place] [facility]

## 2024-08-06 ENCOUNTER — HOSPITAL ENCOUNTER (OUTPATIENT)
Dept: RADIATION ONCOLOGY | Age: 44
Discharge: HOME | End: 2024-08-06
Payer: MEDICAID

## 2024-08-06 PROCEDURE — 77300 RADIATION THERAPY DOSE PLAN: CPT | Performed by: RADIOLOGY

## 2024-08-06 PROCEDURE — 77334 RADIATION TREATMENT AID(S): CPT | Performed by: RADIOLOGY

## 2024-08-06 PROCEDURE — 77295 3-D RADIOTHERAPY PLAN: CPT | Performed by: RADIOLOGY

## 2024-08-07 ENCOUNTER — HOSPITAL ENCOUNTER (OUTPATIENT)
Dept: RADIATION ONCOLOGY | Age: 44
Discharge: HOME | End: 2024-08-07
Payer: MEDICAID

## 2024-08-07 PROCEDURE — 77280 THER RAD SIMULAJ FIELD SMPL: CPT | Performed by: RADIOLOGY

## 2024-08-08 ENCOUNTER — HOSPITAL ENCOUNTER (OUTPATIENT)
Dept: RADIATION ONCOLOGY | Age: 44
Discharge: HOME | End: 2024-08-08
Payer: MEDICAID

## 2024-08-08 PROCEDURE — 77412 RADIATION TX DELIVERY LVL 3: CPT | Performed by: RADIOLOGY

## 2024-08-08 PROCEDURE — 77387 GUIDANCE FOR RADJ TX DLVR: CPT | Performed by: RADIOLOGY

## 2024-08-09 ENCOUNTER — HOSPITAL ENCOUNTER (OUTPATIENT)
Dept: RADIATION ONCOLOGY | Age: 44
Discharge: HOME | End: 2024-08-09
Payer: MEDICAID

## 2024-08-09 PROCEDURE — 77412 RADIATION TX DELIVERY LVL 3: CPT | Performed by: RADIOLOGY

## 2024-08-09 PROCEDURE — 77387 GUIDANCE FOR RADJ TX DLVR: CPT | Performed by: RADIOLOGY

## 2024-08-12 ENCOUNTER — APPOINTMENT (OUTPATIENT)
Dept: RADIATION ONCOLOGY | Age: 44
End: 2024-08-12
Payer: MEDICAID

## 2024-08-12 DIAGNOSIS — G89.29 CHRONIC MIDLINE LOW BACK PAIN WITH LEFT-SIDED SCIATICA: ICD-10-CM

## 2024-08-12 DIAGNOSIS — C79.51 PROSTATE CANCER METASTATIC TO BONE (HCC): ICD-10-CM

## 2024-08-12 DIAGNOSIS — M54.42 CHRONIC MIDLINE LOW BACK PAIN WITH LEFT-SIDED SCIATICA: ICD-10-CM

## 2024-08-12 DIAGNOSIS — C61 PROSTATE CANCER METASTATIC TO BONE (HCC): ICD-10-CM

## 2024-08-12 DIAGNOSIS — G89.3 CANCER RELATED PAIN: Primary | ICD-10-CM

## 2024-08-12 PROCEDURE — 77387 GUIDANCE FOR RADJ TX DLVR: CPT | Performed by: RADIOLOGY

## 2024-08-12 PROCEDURE — 77412 RADIATION TX DELIVERY LVL 3: CPT | Performed by: RADIOLOGY

## 2024-08-12 PROCEDURE — 77014 CHG CT GUIDANCE RADIATION THERAPY FLDS PLACEMENT: CPT | Performed by: RADIOLOGY

## 2024-08-12 RX ORDER — METHOCARBAMOL 750 MG/1
750 TABLET, FILM COATED ORAL 4 TIMES DAILY
Qty: 40 TABLET | Refills: 1 | Status: SHIPPED | OUTPATIENT
Start: 2024-08-12 | End: 2024-09-01

## 2024-08-12 RX ORDER — HYDROMORPHONE HYDROCHLORIDE 2 MG/1
2 TABLET ORAL EVERY 12 HOURS PRN
Qty: 60 TABLET | Refills: 0 | Status: SHIPPED | OUTPATIENT
Start: 2024-08-12 | End: 2024-09-11

## 2024-08-12 RX ORDER — METHOCARBAMOL 500 MG/1
500 TABLET, FILM COATED ORAL 4 TIMES DAILY
Qty: 24 TABLET | Refills: 0 | OUTPATIENT
Start: 2024-08-12 | End: 2024-08-18

## 2024-08-12 NOTE — TELEPHONE ENCOUNTER
Please refill :)    Last Appointment:  7/30/2024  Future Appointments   Date Time Provider Department Center   8/12/2024  3:00 PM SCHEDULE, [facility]   8/13/2024  3:00 PM SCHEDULE, [facility]   8/14/2024  3:00 PM SCHEDULE, [facility]   8/14/2024  3:00 PM Kaylynn Marie MD [facility]   8/15/2024  3:00 PM SCHEDULE, [facility]   8/16/2024  9:45 AM Holly Doyle PA [facility]   8/16/2024  3:00 PM SCHEDULE, [facility]   8/19/2024  3:00 PM SCHEDULE, [facility]   8/20/2024  3:00 PM SCHEDULE, [facility] [facility]   8/21/2024  3:00 PM SCHEDULE, [facility]   8/30/2024  1:00 PM Ty Shaikh MD [facility] [place] [facility] DE[facility]

## 2024-08-13 ENCOUNTER — APPOINTMENT (OUTPATIENT)
Dept: RADIATION ONCOLOGY | Age: 44
End: 2024-08-13
Payer: MEDICAID

## 2024-08-13 PROCEDURE — 77387 GUIDANCE FOR RADJ TX DLVR: CPT | Performed by: RADIOLOGY

## 2024-08-13 PROCEDURE — 77412 RADIATION TX DELIVERY LVL 3: CPT | Performed by: RADIOLOGY

## 2024-08-13 PROCEDURE — 77014 CHG CT GUIDANCE RADIATION THERAPY FLDS PLACEMENT: CPT | Performed by: RADIOLOGY

## 2024-08-14 ENCOUNTER — APPOINTMENT (OUTPATIENT)
Dept: RADIATION ONCOLOGY | Age: 44
End: 2024-08-14
Payer: MEDICAID

## 2024-08-14 ENCOUNTER — CLINICAL DOCUMENTATION (OUTPATIENT)
Dept: ONCOLOGY | Age: 44
End: 2024-08-14

## 2024-08-14 VITALS
WEIGHT: 180 LBS | TEMPERATURE: 98.1 F | SYSTOLIC BLOOD PRESSURE: 128 MMHG | DIASTOLIC BLOOD PRESSURE: 82 MMHG | BODY MASS INDEX: 28.19 KG/M2 | HEART RATE: 101 BPM | RESPIRATION RATE: 20 BRPM

## 2024-08-14 DIAGNOSIS — C61 PROSTATE CANCER METASTATIC TO BONE (HCC): Primary | ICD-10-CM

## 2024-08-14 DIAGNOSIS — C79.51 PROSTATE CANCER METASTATIC TO BONE (HCC): Primary | ICD-10-CM

## 2024-08-14 PROCEDURE — 77412 RADIATION TX DELIVERY LVL 3: CPT | Performed by: RADIOLOGY

## 2024-08-14 PROCEDURE — 77336 RADIATION PHYSICS CONSULT: CPT | Performed by: RADIOLOGY

## 2024-08-14 PROCEDURE — 77014 CHG CT GUIDANCE RADIATION THERAPY FLDS PLACEMENT: CPT | Performed by: RADIOLOGY

## 2024-08-14 PROCEDURE — 77387 GUIDANCE FOR RADJ TX DLVR: CPT | Performed by: RADIOLOGY

## 2024-08-14 ASSESSMENT — PAIN SCALES - GENERAL: PAINLEVEL_OUTOF10: 6

## 2024-08-14 ASSESSMENT — PAIN DESCRIPTION - DESCRIPTORS: DESCRIPTORS: SHARP;SHOOTING

## 2024-08-14 ASSESSMENT — PAIN DESCRIPTION - LOCATION: LOCATION: NECK;BACK

## 2024-08-14 ASSESSMENT — PAIN DESCRIPTION - ORIENTATION: ORIENTATION: LEFT

## 2024-08-14 ASSESSMENT — PAIN DESCRIPTION - FREQUENCY: FREQUENCY: CONTINUOUS

## 2024-08-14 NOTE — PROGRESS NOTES
Joseph Bond  8/14/2024  Ht Readings from Last 1 Encounters:   07/30/24 1.702 m (5' 7\")     Wt Readings from Last 8 Encounters:   08/14/24 81.6 kg (180 lb)   08/02/24 85.7 kg (189 lb)   07/30/24 88 kg (194 lb)   07/23/24 87.5 kg (193 lb)   07/23/24 87.7 kg (193 lb 6.4 oz)   07/09/24 88.5 kg (195 lb)   07/02/24 88.9 kg (196 lb)   06/24/24 87.1 kg (192 lb)     BMI:28.19    Assessment: Met with Indra and sister during office visit. Patient was pleasant, but appeared fatigued. Referred to patient for recent weight loss and poor appetite. Indra has lost 9 pounds in the past 12 days. He says for last 4-5 days he has been experiencing nausea and vomiting. He attributes this to pain and RT. This has greatly affected his intake of food. He says that he was recommended medication for nausea, but has not taken it yet. Encouraged patient to begin taking medication. Recommended small meals or snacks every 2-3 hours. Recommended sticking to bland foods when nauseous. Provided written resource on high calorie/high protein foods to try to include in diet. Indra seems to have somewhat holistic ideas about diet and nutrition. He avoids sugar as he believes it feeds cancer. He also avoids cows milk because he believes humans were not meant to drink/digest it. Respected patient's beliefs. As he avoids dairy, suggested Ensure Plant Based supplement once or twice daily to supplement intake. Patient was provided with samples. Indra was appreciative of advice and did not have further questions at this time. Will follow.      Weight change: 4.8% loss x 12 days  Appetite: Poor  Nutritional Side Effects: anorexia, emesis, nausea, weight loss  Calculated Needs: 25-28 kcal/kg CBW = 7283-8000 kcal, 1.2-1.4 gm/kg CBW = 100-115 gm pro, 1 ml/kcal = 8652-5642 ml fluids  Malnutrition Status: At risk  Nutrition Diagnosis: Inadequate oral intake r/t nausea AEB patient report of poor intake and weight loss 4.8% x 12 days.      Recommendations: Small

## 2024-08-14 NOTE — PROGRESS NOTES
DEPARTMENT OF RADIATION ONCOLOGY   ON TREATMENT VISIT       8/14/2024      NAME:  Joseph Bond    YOB: 1980    DIAGNOSIS: Adenocarcinoma of the prostate diffusely metastatic to bone (Super[facility]) and to para-aortic and mediastinal lymph nodes       SUBJECTIVE:   Joseph Bond has now received 1500 cGy in 5 fractions directed to the C4-T1 and T11-L4.  Pain score is 8 on a scale of 1-10.  Patient has not made up his mind about systemic treatment yet.  He has lost about 9 pounds over the last 12 days.      Past medical, surgAdenocarcinoma of the prostate diffusely metastatic to bone (SuperScan) and to para-aortic and mediastinal lymph nodes   ical, social and family histories reviewed and updated as indicated.    PAIN: 8 on a scale of 1-10    ALLERGIES:  Patient has no known allergies.         Current Outpatient Medications   Medication Sig Dispense Refill    methocarbamol (ROBAXIN-750) 750 MG tablet Take 1 tablet by mouth 4 times daily for 20 days 40 tablet 1    HYDROmorphone (DILAUDID) 2 MG tablet Take 1 tablet by mouth every 12 hours as needed for Pain for up to 30 days. Max Daily Amount: 4 mg 60 tablet 0    diclofenac sodium (VOLTAREN) 1 % GEL Apply 2 g topically 2 times daily 50 g 0    polyethylene glycol (GLYCOLAX) 17 g packet Take 1 packet by mouth 2 times daily 60 packet 0    Vitamin D, Ergocalciferol, 37937 units CAPS Take 50,000 Units by mouth once a week for 4 doses 5 capsule 0    vitamin D (CHOLECALCIFEROL) 50 MCG (2000 UT) TABS tablet Take 1 tablet by mouth daily 30 tablet 0    escitalopram (LEXAPRO) 5 MG tablet Take 1 tablet by mouth daily 30 tablet 0    naloxone 4 MG/0.1ML LIQD nasal spray 1 spray by Nasal route as needed      hydrOXYzine HCl (ATARAX) 25 MG tablet Take 1 tablet by mouth nightly as needed for Anxiety 30 tablet 0    lidocaine 4 % external patch Place 10 patches onto the skin daily 10 patch 0    senna (SENOKOT) 8.6 MG tablet Take 1 tablet by mouth 2 times daily 60

## 2024-08-14 NOTE — PROGRESS NOTES
Joseph Bond  8/14/2024  Wt Readings from Last 3 Encounters:   08/14/24 81.6 kg (180 lb)   08/02/24 85.7 kg (189 lb)   07/30/24 88 kg (194 lb)     Body mass index is 28.19 kg/m².        Treatment Area:C4-T1 and T11-L4    Patient was seen today for weekly visit.     Comfort Alteration  KPS:70%  Fatigue: Moderate      Nutritional Alteration  Anorexia: Yes, encouraged to eat small portions more often, he voiced understanding    Nausea: No   Vomiting: No     Elimination Alterations  Constipation: no, he is on 2 stool softener  Diarrhea:  no  Bowel Incontinence: No  Urinary Incontinence: No    Skin Alteration   Sensation:no issues    Radiation Dermatitis:  none      Emotional  Coping: effective    Sexuality Alteration  na    Injury, potential bleeding or infection: na        Lab Results   Component Value Date    WBC 6.9 07/18/2024    HGB 10.6 (L) 07/18/2024    HCT 32.7 (L) 07/18/2024    PLT 74 (L) 07/18/2024       /82   Pulse (!) 101   Temp 98.1 °F (36.7 °C) (Temporal)   Resp 20   Wt 81.6 kg (180 lb)   BMI 28.19 kg/m²   BP within normal range? yes       Assessment/Plan: Completed 5/10 fractions; 1500/3000 cGy    Tye Colvin RN

## 2024-08-15 ENCOUNTER — APPOINTMENT (OUTPATIENT)
Dept: RADIATION ONCOLOGY | Age: 44
End: 2024-08-15
Payer: MEDICAID

## 2024-08-15 PROCEDURE — 77412 RADIATION TX DELIVERY LVL 3: CPT | Performed by: RADIOLOGY

## 2024-08-15 PROCEDURE — 77387 GUIDANCE FOR RADJ TX DLVR: CPT | Performed by: RADIOLOGY

## 2024-08-16 ENCOUNTER — APPOINTMENT (OUTPATIENT)
Dept: RADIATION ONCOLOGY | Age: 44
End: 2024-08-16
Payer: MEDICAID

## 2024-08-16 PROCEDURE — 77387 GUIDANCE FOR RADJ TX DLVR: CPT | Performed by: RADIOLOGY

## 2024-08-16 PROCEDURE — 77412 RADIATION TX DELIVERY LVL 3: CPT | Performed by: RADIOLOGY

## 2024-08-17 DIAGNOSIS — K59.00 CONSTIPATION, UNSPECIFIED CONSTIPATION TYPE: ICD-10-CM

## 2024-08-19 ENCOUNTER — APPOINTMENT (OUTPATIENT)
Dept: RADIATION ONCOLOGY | Age: 44
End: 2024-08-19
Payer: MEDICAID

## 2024-08-19 DIAGNOSIS — J02.9 SORE THROAT: Primary | ICD-10-CM

## 2024-08-19 DIAGNOSIS — J02.9 SORE THROAT: ICD-10-CM

## 2024-08-19 PROCEDURE — 77387 GUIDANCE FOR RADJ TX DLVR: CPT | Performed by: RADIOLOGY

## 2024-08-19 PROCEDURE — 77014 CHG CT GUIDANCE RADIATION THERAPY FLDS PLACEMENT: CPT | Performed by: RADIOLOGY

## 2024-08-19 PROCEDURE — 77412 RADIATION TX DELIVERY LVL 3: CPT | Performed by: RADIOLOGY

## 2024-08-19 RX ORDER — DOCUSATE SODIUM 100 MG
100 CAPSULE ORAL DAILY
Qty: 30 CAPSULE | Refills: 0 | Status: SHIPPED | OUTPATIENT
Start: 2024-08-19

## 2024-08-19 RX ORDER — SUCRALFATE ORAL 1 G/10ML
1 SUSPENSION ORAL 4 TIMES DAILY
Qty: 1200 ML | Refills: 3 | Status: SHIPPED | OUTPATIENT
Start: 2024-08-19

## 2024-08-19 NOTE — TELEPHONE ENCOUNTER
Please refill :)    Last Appointment:  7/30/2024  Future Appointments   Date Time Provider Department Center   8/19/2024  3:45 PM SCHEDULE, [facility]   8/20/2024  3:00 PM SCHEDULE, [facility]   8/21/2024  3:00 PM SCHEDULE, [facility]   8/21/2024  3:00 PM Kaylynn Marie MD [facility]   8/30/2024  1:00 PM Ty Shaikh MD [facility] [place] [facility]   9/20/2024  9:45 AM [facility]

## 2024-08-20 ENCOUNTER — APPOINTMENT (OUTPATIENT)
Dept: RADIATION ONCOLOGY | Age: 44
End: 2024-08-20
Payer: MEDICAID

## 2024-08-20 ENCOUNTER — HOSPITAL ENCOUNTER (INPATIENT)
Age: 44
LOS: 4 days | Discharge: HOME | DRG: 500 | End: 2024-08-25
Attending: EMERGENCY MEDICINE | Admitting: INTERNAL MEDICINE
Payer: MEDICAID

## 2024-08-20 DIAGNOSIS — G89.3 CANCER RELATED PAIN: ICD-10-CM

## 2024-08-20 DIAGNOSIS — C61 PRIMARY PROSTATE CANCER WITH METASTASIS FROM PROSTATE TO OTHER SITE (HCC): Primary | ICD-10-CM

## 2024-08-20 DIAGNOSIS — R52 UNCONTROLLED PAIN: ICD-10-CM

## 2024-08-20 DIAGNOSIS — E83.52 HYPERCALCEMIA: ICD-10-CM

## 2024-08-20 LAB
ALBUMIN SERPL-MCNC: 4.8 G/DL (ref 3.5–5.2)
ALP SERPL-CCNC: 236 U/L (ref 40–129)
ALT SERPL-CCNC: 25 U/L (ref 0–40)
ANION GAP SERPL CALCULATED.3IONS-SCNC: 14 MMOL/L (ref 7–16)
AST SERPL-CCNC: 85 U/L (ref 0–39)
ATYPICAL LYMPHOCYTE ABSOLUTE COUNT: 0.04 K/UL (ref 0–0.46)
ATYPICAL LYMPHOCYTES: 1 % (ref 0–4)
BASOPHILS # BLD: 0.08 K/UL (ref 0–0.2)
BASOPHILS NFR BLD: 2 % (ref 0–2)
BILIRUB SERPL-MCNC: 0.7 MG/DL (ref 0–1.2)
BUN SERPL-MCNC: 14 MG/DL (ref 6–20)
CALCIUM SERPL-MCNC: 10.9 MG/DL (ref 8.6–10.2)
CHLORIDE SERPL-SCNC: 96 MMOL/L (ref 98–107)
CO2 SERPL-SCNC: 27 MMOL/L (ref 22–29)
CREAT SERPL-MCNC: 0.8 MG/DL (ref 0.7–1.2)
EOSINOPHIL # BLD: 0 K/UL (ref 0.05–0.5)
EOSINOPHILS RELATIVE PERCENT: 0 % (ref 0–6)
ERYTHROCYTE [DISTWIDTH] IN BLOOD BY AUTOMATED COUNT: 16.1 % (ref 11.5–15)
GFR, ESTIMATED: >90 ML/MIN/1.73M2
GLUCOSE SERPL-MCNC: 97 MG/DL (ref 74–99)
HCT VFR BLD AUTO: 36.7 % (ref 37–54)
HGB BLD-MCNC: 12 G/DL (ref 12.5–16.5)
LYMPHOCYTES NFR BLD: 0.38 K/UL (ref 1.5–4)
LYMPHOCYTES RELATIVE PERCENT: 9 % (ref 20–42)
MAGNESIUM SERPL-MCNC: 2.1 MG/DL (ref 1.6–2.6)
MCH RBC QN AUTO: 28 PG (ref 26–35)
MCHC RBC AUTO-ENTMCNC: 32.7 G/DL (ref 32–34.5)
MCV RBC AUTO: 85.5 FL (ref 80–99.9)
MONOCYTES NFR BLD: 0.42 K/UL (ref 0.1–0.95)
MONOCYTES NFR BLD: 10 % (ref 2–12)
MYELOCYTES ABSOLUTE COUNT: 0.04 K/UL
MYELOCYTES: 1 %
NEUTROPHILS NFR BLD: 78 % (ref 43–80)
NEUTS SEG NFR BLD: 3.44 K/UL (ref 1.8–7.3)
NUCLEATED RED BLOOD CELLS: 4 PER 100 WBC
PLATELET CONFIRMATION: NORMAL
PLATELET, FLUORESCENCE: 57 K/UL (ref 130–450)
PMV BLD AUTO: 9.7 FL (ref 7–12)
POTASSIUM SERPL-SCNC: 4.7 MMOL/L (ref 3.5–5)
PROT SERPL-MCNC: 8.3 G/DL (ref 6.4–8.3)
RBC # BLD AUTO: 4.29 M/UL (ref 3.8–5.8)
SODIUM SERPL-SCNC: 137 MMOL/L (ref 132–146)
WBC OTHER # BLD: 4.4 K/UL (ref 4.5–11.5)

## 2024-08-20 PROCEDURE — 96375 TX/PRO/DX INJ NEW DRUG ADDON: CPT

## 2024-08-20 PROCEDURE — 83735 ASSAY OF MAGNESIUM: CPT

## 2024-08-20 PROCEDURE — 85025 COMPLETE CBC W/AUTO DIFF WBC: CPT

## 2024-08-20 PROCEDURE — 93005 ELECTROCARDIOGRAM TRACING: CPT

## 2024-08-20 PROCEDURE — 6370000000 HC RX 637 (ALT 250 FOR IP)

## 2024-08-20 PROCEDURE — 80053 COMPREHEN METABOLIC PANEL: CPT

## 2024-08-20 PROCEDURE — 2580000003 HC RX 258

## 2024-08-20 PROCEDURE — 96374 THER/PROPH/DIAG INJ IV PUSH: CPT

## 2024-08-20 PROCEDURE — 96376 TX/PRO/DX INJ SAME DRUG ADON: CPT

## 2024-08-20 PROCEDURE — 99285 EMERGENCY DEPT VISIT HI MDM: CPT

## 2024-08-20 PROCEDURE — 6360000002 HC RX W HCPCS

## 2024-08-20 RX ORDER — ONDANSETRON 2 MG/ML
4 INJECTION INTRAMUSCULAR; INTRAVENOUS EVERY 6 HOURS PRN
Status: DISCONTINUED | OUTPATIENT
Start: 2024-08-20 | End: 2024-08-21

## 2024-08-20 RX ORDER — 0.9 % SODIUM CHLORIDE 0.9 %
1000 INTRAVENOUS SOLUTION INTRAVENOUS ONCE
Status: COMPLETED | OUTPATIENT
Start: 2024-08-20 | End: 2024-08-20

## 2024-08-20 RX ORDER — ACETAMINOPHEN 325 MG/1
650 TABLET ORAL ONCE
Status: COMPLETED | OUTPATIENT
Start: 2024-08-20 | End: 2024-08-20

## 2024-08-20 RX ADMIN — ONDANSETRON 4 MG: 2 INJECTION INTRAMUSCULAR; INTRAVENOUS at 18:43

## 2024-08-20 RX ADMIN — HYDROMORPHONE HYDROCHLORIDE 1 MG: 1 INJECTION, SOLUTION INTRAMUSCULAR; INTRAVENOUS; SUBCUTANEOUS at 18:43

## 2024-08-20 RX ADMIN — ACETAMINOPHEN 650 MG: 325 TABLET ORAL at 20:51

## 2024-08-20 RX ADMIN — SODIUM CHLORIDE 1000 ML: 9 INJECTION, SOLUTION INTRAVENOUS at 18:44

## 2024-08-20 RX ADMIN — HYDROMORPHONE HYDROCHLORIDE 1 MG: 1 INJECTION, SOLUTION INTRAMUSCULAR; INTRAVENOUS; SUBCUTANEOUS at 20:52

## 2024-08-20 ASSESSMENT — PAIN DESCRIPTION - ORIENTATION: ORIENTATION: LOWER

## 2024-08-20 ASSESSMENT — PAIN DESCRIPTION - LOCATION
LOCATION: BACK
LOCATION: BACK

## 2024-08-20 ASSESSMENT — PAIN SCALES - GENERAL
PAINLEVEL_OUTOF10: 10
PAINLEVEL_OUTOF10: 10

## 2024-08-20 ASSESSMENT — PAIN DESCRIPTION - PAIN TYPE: TYPE: ACUTE PAIN

## 2024-08-20 ASSESSMENT — PAIN DESCRIPTION - FREQUENCY: FREQUENCY: CONTINUOUS

## 2024-08-20 ASSESSMENT — PAIN DESCRIPTION - DESCRIPTORS
DESCRIPTORS: ACHING
DESCRIPTORS: SHARP

## 2024-08-20 ASSESSMENT — PAIN - FUNCTIONAL ASSESSMENT: PAIN_FUNCTIONAL_ASSESSMENT: 0-10

## 2024-08-20 NOTE — ED PROVIDER NOTES
[facility] EMERGENCY DEPARTMENT  EMERGENCY DEPARTMENT ENCOUNTER        Pt Name: Joseph Bond  MRN: [id number]  Birthdate 1980  Date of evaluation: 8/20/2024  Provider: Constance Aparicio DO  PCP: Ty Shaikh MD  Note Started: 5:45 PM EDT 8/20/24    CHIEF COMPLAINT       Chief Complaint   Patient presents with    Back Pain     Back pain, nausea, and vomiting. Sent in by radiation because he was not able to finish treatment. Patient has stage 4 prostate cancer.    Nausea    Emesis       HISTORY OF PRESENT ILLNESS: 1 or more Elements   History From: Patient    Limitations to history : None    Joseph Bond is a 43 y.o. male with stage IV prostate cancer metastatic to the bones who presents for pain, nausea, and vomiting.  The patient states that around 12:30 today, he started to have extreme pain all over, but mostly located in his back.  The pain is sharp and stabbing in nature.  It is located diffusely throughout his body.  The pain was also associated with nausea and vomiting.  He denies any hematemesis.  He was supposed to get his ninth radiation treatment today, but due to the pain and vomiting, he was told to come to the ER and did not receive his treatment.     Patient denies fever, chills, headache, shortness of breath, diarrhea, lightheadedness, dysuria, hematuria, hematochezia, and melena.    Nursing Notes were all reviewed and agreed with or any disagreements were addressed in the HPI.        REVIEW OF EXTERNAL NOTES :         Reviewed progress note from radiation oncology from August 14, 2024      REVIEW OF SYSTEMS :           Positives and Pertinent negatives as per HPI.     SURGICAL HISTORY     Past Surgical History:   Procedure Laterality Date    BRONCHOSCOPY N/A 7/11/2024    BRONCHOSCOPY ENDOBRONCHIAL ULTRASOUND FINE NEEDLE ASPIRATION performed by Ed Cornejo DO at [facility] ENDOSCOPY    BRONCHOSCOPY  7/11/2024    BRONCHOSCOPY DIAGNOSTIC OR

## 2024-08-20 NOTE — ED NOTES
Department of Emergency Medicine  FIRST PROVIDER TRIAGE NOTE             Independent MLP           8/20/24  5:15 PM EDT    Date of Encounter: 8/20/24   MRN: [id number]      HPI: Joseph Bond is a 43 y.o. male who presents to the ED for Back Pain (Back pain, nausea, and vomiting. Sent in by radiation because he was not able to finish treatment. Patient has stage 4 prostate cancer.), Nausea, and Emesis     Patient is a 39-year-old presenting with severe back pain nausea and vomiting.  Patient states he was unable to finish his radiation treatment today because he is unable to lay down due to the pain and nausea and vomiting.  Patient has stage IV prostate cancer with metastasis    ROS: Negative for cp, sob, urinary complaints, rash, or headache.    PE: Gen Appearance/Constitutional: alert  HEENT: NC/NT. PERRLA,  Airway patent.  Neck: supple     Initial Plan of Care: All treatment areas with department are currently occupied. Plan to order/Initiate the following while awaiting opening in ED: labs and imaging studies.  Initiate Treatment-Testing, Proceed toTreatment Area When Bed Available for ED Attending/MLP to Continue Care    Electronically signed by Naomi Pollack PA-C   DD: 8/20/24       Naomi Pollack PA-C  08/20/24 3100

## 2024-08-21 ENCOUNTER — APPOINTMENT (OUTPATIENT)
Dept: RADIATION ONCOLOGY | Age: 44
End: 2024-08-21
Payer: MEDICAID

## 2024-08-21 ENCOUNTER — HOSPITAL ENCOUNTER (OUTPATIENT)
Dept: RADIATION ONCOLOGY | Age: 44
End: 2024-08-21
Payer: MEDICAID

## 2024-08-21 PROBLEM — R11.2 NAUSEA AND VOMITING: Status: ACTIVE | Noted: 2024-08-21

## 2024-08-21 PROBLEM — C79.51 METASTATIC CANCER TO BONE (HCC): Status: ACTIVE | Noted: 2024-08-21

## 2024-08-21 PROBLEM — C61 PRIMARY PROSTATE CANCER WITH METASTASIS FROM PROSTATE TO OTHER SITE (HCC): Status: ACTIVE | Noted: 2024-08-21

## 2024-08-21 PROBLEM — E44.0 MODERATE PROTEIN-CALORIE MALNUTRITION (HCC): Status: ACTIVE | Noted: 2024-08-21

## 2024-08-21 PROBLEM — M54.9 BACK PAIN: Status: ACTIVE | Noted: 2024-08-21

## 2024-08-21 LAB
ANION GAP SERPL CALCULATED.3IONS-SCNC: 10 MMOL/L (ref 7–16)
BASOPHILS # BLD: 0 K/UL (ref 0–0.2)
BASOPHILS NFR BLD: 0 % (ref 0–2)
BUN SERPL-MCNC: 16 MG/DL (ref 6–20)
CALCIUM SERPL-MCNC: 9.8 MG/DL (ref 8.6–10.2)
CHLORIDE SERPL-SCNC: 101 MMOL/L (ref 98–107)
CO2 SERPL-SCNC: 26 MMOL/L (ref 22–29)
CREAT SERPL-MCNC: 0.7 MG/DL (ref 0.7–1.2)
EKG ATRIAL RATE: 113 BPM
EKG P AXIS: 59 DEGREES
EKG P-R INTERVAL: 122 MS
EKG Q-T INTERVAL: 312 MS
EKG QRS DURATION: 84 MS
EKG QTC CALCULATION (BAZETT): 427 MS
EKG R AXIS: 7 DEGREES
EKG T AXIS: 35 DEGREES
EKG VENTRICULAR RATE: 113 BPM
EOSINOPHIL # BLD: 0 K/UL (ref 0.05–0.5)
EOSINOPHILS RELATIVE PERCENT: 0 % (ref 0–6)
ERYTHROCYTE [DISTWIDTH] IN BLOOD BY AUTOMATED COUNT: 16.1 % (ref 11.5–15)
GFR, ESTIMATED: >90 ML/MIN/1.73M2
GLUCOSE SERPL-MCNC: 95 MG/DL (ref 74–99)
HCT VFR BLD AUTO: 30 % (ref 37–54)
HGB BLD-MCNC: 9.7 G/DL (ref 12.5–16.5)
LYMPHOCYTES NFR BLD: 0.39 K/UL (ref 1.5–4)
LYMPHOCYTES RELATIVE PERCENT: 11 % (ref 20–42)
MCH RBC QN AUTO: 28 PG (ref 26–35)
MCHC RBC AUTO-ENTMCNC: 32.3 G/DL (ref 32–34.5)
MCV RBC AUTO: 86.7 FL (ref 80–99.9)
METAMYELOCYTES ABSOLUTE COUNT: 0.03 K/UL (ref 0–0.12)
METAMYELOCYTES: 1 % (ref 0–1)
MONOCYTES NFR BLD: 0.21 K/UL (ref 0.1–0.95)
MONOCYTES NFR BLD: 6 % (ref 2–12)
NEUTROPHILS NFR BLD: 82 % (ref 43–80)
NEUTS SEG NFR BLD: 2.77 K/UL (ref 1.8–7.3)
PLATELET # BLD AUTO: 42 K/UL (ref 130–450)
PLATELET CONFIRMATION: NORMAL
PMV BLD AUTO: 9.7 FL (ref 7–12)
POTASSIUM SERPL-SCNC: 4 MMOL/L (ref 3.5–5)
RBC # BLD AUTO: 3.46 M/UL (ref 3.8–5.8)
RBC # BLD: ABNORMAL 10*6/UL
SODIUM SERPL-SCNC: 137 MMOL/L (ref 132–146)
WBC OTHER # BLD: 3.4 K/UL (ref 4.5–11.5)

## 2024-08-21 PROCEDURE — 6370000000 HC RX 637 (ALT 250 FOR IP)

## 2024-08-21 PROCEDURE — 93010 ELECTROCARDIOGRAM REPORT: CPT | Performed by: INTERNAL MEDICINE

## 2024-08-21 PROCEDURE — 6360000002 HC RX W HCPCS

## 2024-08-21 PROCEDURE — 6370000000 HC RX 637 (ALT 250 FOR IP): Performed by: NURSE PRACTITIONER

## 2024-08-21 PROCEDURE — 6360000002 HC RX W HCPCS: Performed by: NURSE PRACTITIONER

## 2024-08-21 PROCEDURE — 85025 COMPLETE CBC W/AUTO DIFF WBC: CPT

## 2024-08-21 PROCEDURE — 2580000003 HC RX 258

## 2024-08-21 PROCEDURE — 6360000002 HC RX W HCPCS: Performed by: STUDENT IN AN ORGANIZED HEALTH CARE EDUCATION/TRAINING PROGRAM

## 2024-08-21 PROCEDURE — 96376 TX/PRO/DX INJ SAME DRUG ADON: CPT

## 2024-08-21 PROCEDURE — 99223 1ST HOSP IP/OBS HIGH 75: CPT | Performed by: INTERNAL MEDICINE

## 2024-08-21 PROCEDURE — APPSS45 APP SPLIT SHARED TIME 31-45 MINUTES

## 2024-08-21 PROCEDURE — 99222 1ST HOSP IP/OBS MODERATE 55: CPT | Performed by: NURSE PRACTITIONER

## 2024-08-21 PROCEDURE — 36415 COLL VENOUS BLD VENIPUNCTURE: CPT

## 2024-08-21 PROCEDURE — 2060000000 HC ICU INTERMEDIATE R&B

## 2024-08-21 PROCEDURE — 80048 BASIC METABOLIC PNL TOTAL CA: CPT

## 2024-08-21 RX ORDER — GABAPENTIN 100 MG/1
100 CAPSULE ORAL 2 TIMES DAILY
Status: DISCONTINUED | OUTPATIENT
Start: 2024-08-21 | End: 2024-08-25 | Stop reason: HOSPADM

## 2024-08-21 RX ORDER — ONDANSETRON 2 MG/ML
4 INJECTION INTRAMUSCULAR; INTRAVENOUS EVERY 6 HOURS PRN
Status: DISCONTINUED | OUTPATIENT
Start: 2024-08-21 | End: 2024-08-25 | Stop reason: HOSPADM

## 2024-08-21 RX ORDER — ACETAMINOPHEN 650 MG/1
650 SUPPOSITORY RECTAL EVERY 6 HOURS PRN
Status: DISCONTINUED | OUTPATIENT
Start: 2024-08-21 | End: 2024-08-25 | Stop reason: HOSPADM

## 2024-08-21 RX ORDER — METHOCARBAMOL 500 MG/1
750 TABLET, FILM COATED ORAL 4 TIMES DAILY
Status: DISCONTINUED | OUTPATIENT
Start: 2024-08-21 | End: 2024-08-21 | Stop reason: CLARIF

## 2024-08-21 RX ORDER — MAGNESIUM SULFATE IN WATER 40 MG/ML
2000 INJECTION, SOLUTION INTRAVENOUS PRN
Status: DISCONTINUED | OUTPATIENT
Start: 2024-08-21 | End: 2024-08-25 | Stop reason: HOSPADM

## 2024-08-21 RX ORDER — ENOXAPARIN SODIUM 100 MG/ML
40 INJECTION SUBCUTANEOUS DAILY
Status: DISCONTINUED | OUTPATIENT
Start: 2024-08-21 | End: 2024-08-25 | Stop reason: HOSPADM

## 2024-08-21 RX ORDER — SUCRALFATE 1 G/1
1 TABLET ORAL
Status: DISCONTINUED | OUTPATIENT
Start: 2024-08-21 | End: 2024-08-25 | Stop reason: HOSPADM

## 2024-08-21 RX ORDER — SODIUM CHLORIDE 0.9 % (FLUSH) 0.9 %
5-40 SYRINGE (ML) INJECTION EVERY 12 HOURS SCHEDULED
Status: DISCONTINUED | OUTPATIENT
Start: 2024-08-21 | End: 2024-08-25 | Stop reason: HOSPADM

## 2024-08-21 RX ORDER — POTASSIUM CHLORIDE 7.45 MG/ML
10 INJECTION INTRAVENOUS PRN
Status: DISCONTINUED | OUTPATIENT
Start: 2024-08-21 | End: 2024-08-25 | Stop reason: HOSPADM

## 2024-08-21 RX ORDER — ONDANSETRON 4 MG/1
4 TABLET, ORALLY DISINTEGRATING ORAL EVERY 8 HOURS PRN
Status: DISCONTINUED | OUTPATIENT
Start: 2024-08-21 | End: 2024-08-25 | Stop reason: HOSPADM

## 2024-08-21 RX ORDER — LIDOCAINE 4 G/G
1 PATCH TOPICAL DAILY
Status: DISCONTINUED | OUTPATIENT
Start: 2024-08-21 | End: 2024-08-25 | Stop reason: HOSPADM

## 2024-08-21 RX ORDER — OXYCODONE HYDROCHLORIDE 5 MG/1
10 TABLET ORAL EVERY 4 HOURS PRN
Status: ON HOLD | COMMUNITY
End: 2024-08-25

## 2024-08-21 RX ORDER — ESCITALOPRAM OXALATE 10 MG/1
5 TABLET ORAL DAILY
Status: DISCONTINUED | OUTPATIENT
Start: 2024-08-21 | End: 2024-08-25 | Stop reason: HOSPADM

## 2024-08-21 RX ORDER — POLYETHYLENE GLYCOL 3350 17 G/17G
17 POWDER, FOR SOLUTION ORAL DAILY PRN
Status: DISCONTINUED | OUTPATIENT
Start: 2024-08-21 | End: 2024-08-25 | Stop reason: HOSPADM

## 2024-08-21 RX ORDER — ACETAMINOPHEN 325 MG/1
650 TABLET ORAL EVERY 6 HOURS PRN
Status: DISCONTINUED | OUTPATIENT
Start: 2024-08-21 | End: 2024-08-25 | Stop reason: HOSPADM

## 2024-08-21 RX ORDER — PROMETHAZINE HYDROCHLORIDE 25 MG/ML
6.25 INJECTION, SOLUTION INTRAMUSCULAR; INTRAVENOUS EVERY 6 HOURS PRN
Status: DISCONTINUED | OUTPATIENT
Start: 2024-08-21 | End: 2024-08-25 | Stop reason: HOSPADM

## 2024-08-21 RX ORDER — POTASSIUM CHLORIDE 1500 MG/1
40 TABLET, EXTENDED RELEASE ORAL PRN
Status: DISCONTINUED | OUTPATIENT
Start: 2024-08-21 | End: 2024-08-25 | Stop reason: HOSPADM

## 2024-08-21 RX ORDER — METHOCARBAMOL 500 MG/1
750 TABLET, FILM COATED ORAL 4 TIMES DAILY
Status: DISCONTINUED | OUTPATIENT
Start: 2024-08-21 | End: 2024-08-25 | Stop reason: HOSPADM

## 2024-08-21 RX ORDER — NAPROXEN 250 MG/1
250 TABLET ORAL 2 TIMES DAILY WITH MEALS
Status: DISCONTINUED | OUTPATIENT
Start: 2024-08-21 | End: 2024-08-25 | Stop reason: HOSPADM

## 2024-08-21 RX ORDER — SODIUM CHLORIDE 0.9 % (FLUSH) 0.9 %
5-40 SYRINGE (ML) INJECTION PRN
Status: DISCONTINUED | OUTPATIENT
Start: 2024-08-21 | End: 2024-08-25 | Stop reason: HOSPADM

## 2024-08-21 RX ORDER — VITAMIN B COMPLEX
2000 TABLET ORAL DAILY
Status: DISCONTINUED | OUTPATIENT
Start: 2024-08-21 | End: 2024-08-25 | Stop reason: HOSPADM

## 2024-08-21 RX ORDER — NALOXONE HYDROCHLORIDE 0.4 MG/ML
0.4 INJECTION, SOLUTION INTRAMUSCULAR; INTRAVENOUS; SUBCUTANEOUS PRN
Status: DISCONTINUED | OUTPATIENT
Start: 2024-08-21 | End: 2024-08-25 | Stop reason: HOSPADM

## 2024-08-21 RX ORDER — DOCUSATE SODIUM 100 MG/1
100 CAPSULE, LIQUID FILLED ORAL DAILY
Status: DISCONTINUED | OUTPATIENT
Start: 2024-08-21 | End: 2024-08-25 | Stop reason: HOSPADM

## 2024-08-21 RX ORDER — SODIUM CHLORIDE 9 MG/ML
INJECTION, SOLUTION INTRAVENOUS PRN
Status: DISCONTINUED | OUTPATIENT
Start: 2024-08-21 | End: 2024-08-25 | Stop reason: HOSPADM

## 2024-08-21 RX ORDER — OXYCODONE HYDROCHLORIDE 15 MG/1
15 TABLET ORAL EVERY 4 HOURS PRN
Status: DISCONTINUED | OUTPATIENT
Start: 2024-08-21 | End: 2024-08-24

## 2024-08-21 RX ORDER — HYDROXYZINE PAMOATE 25 MG/1
25 CAPSULE ORAL NIGHTLY PRN
Status: DISCONTINUED | OUTPATIENT
Start: 2024-08-21 | End: 2024-08-25 | Stop reason: HOSPADM

## 2024-08-21 RX ADMIN — ACETAMINOPHEN 650 MG: 325 TABLET ORAL at 21:40

## 2024-08-21 RX ADMIN — SODIUM CHLORIDE, PRESERVATIVE FREE 10 ML: 5 INJECTION INTRAVENOUS at 10:34

## 2024-08-21 RX ADMIN — METHOCARBAMOL TABLETS 750 MG: 500 TABLET, COATED ORAL at 17:20

## 2024-08-21 RX ADMIN — HYDROMORPHONE HYDROCHLORIDE 1 MG: 1 INJECTION, SOLUTION INTRAMUSCULAR; INTRAVENOUS; SUBCUTANEOUS at 09:29

## 2024-08-21 RX ADMIN — METHOCARBAMOL TABLETS 750 MG: 500 TABLET, COATED ORAL at 20:06

## 2024-08-21 RX ADMIN — BENZOCAINE AND MENTHOL 1 LOZENGE: 15; 3.6 LOZENGE ORAL at 21:40

## 2024-08-21 RX ADMIN — OXYCODONE 15 MG: 15 TABLET ORAL at 20:06

## 2024-08-21 RX ADMIN — METHOCARBAMOL TABLETS 750 MG: 500 TABLET, COATED ORAL at 12:25

## 2024-08-21 RX ADMIN — ONDANSETRON 4 MG: 2 INJECTION INTRAMUSCULAR; INTRAVENOUS at 09:32

## 2024-08-21 RX ADMIN — METHOCARBAMOL TABLETS 750 MG: 500 TABLET, COATED ORAL at 05:15

## 2024-08-21 RX ADMIN — HYDROMORPHONE HYDROCHLORIDE 1 MG: 1 INJECTION, SOLUTION INTRAMUSCULAR; INTRAVENOUS; SUBCUTANEOUS at 00:05

## 2024-08-21 RX ADMIN — HYDROMORPHONE HYDROCHLORIDE 1 MG: 1 INJECTION, SOLUTION INTRAMUSCULAR; INTRAVENOUS; SUBCUTANEOUS at 17:20

## 2024-08-21 RX ADMIN — HYDROMORPHONE HYDROCHLORIDE 1 MG: 1 INJECTION, SOLUTION INTRAMUSCULAR; INTRAVENOUS; SUBCUTANEOUS at 02:44

## 2024-08-21 RX ADMIN — DOCUSATE SODIUM 100 MG: 100 CAPSULE, LIQUID FILLED ORAL at 09:53

## 2024-08-21 RX ADMIN — HYDROMORPHONE HYDROCHLORIDE 1 MG: 1 INJECTION, SOLUTION INTRAMUSCULAR; INTRAVENOUS; SUBCUTANEOUS at 21:16

## 2024-08-21 RX ADMIN — HYDROMORPHONE HYDROCHLORIDE 1 MG: 1 INJECTION, SOLUTION INTRAMUSCULAR; INTRAVENOUS; SUBCUTANEOUS at 12:24

## 2024-08-21 RX ADMIN — SODIUM CHLORIDE, PRESERVATIVE FREE 10 ML: 5 INJECTION INTRAVENOUS at 20:07

## 2024-08-21 RX ADMIN — HYDROMORPHONE HYDROCHLORIDE 1 MG: 1 INJECTION, SOLUTION INTRAMUSCULAR; INTRAVENOUS; SUBCUTANEOUS at 06:19

## 2024-08-21 RX ADMIN — Medication 2000 UNITS: at 09:53

## 2024-08-21 RX ADMIN — OXYCODONE 15 MG: 15 TABLET ORAL at 15:56

## 2024-08-21 ASSESSMENT — PAIN SCALES - GENERAL
PAINLEVEL_OUTOF10: 5
PAINLEVEL_OUTOF10: 10
PAINLEVEL_OUTOF10: 9
PAINLEVEL_OUTOF10: 7
PAINLEVEL_OUTOF10: 9
PAINLEVEL_OUTOF10: 10
PAINLEVEL_OUTOF10: 9
PAINLEVEL_OUTOF10: 10

## 2024-08-21 ASSESSMENT — PAIN DESCRIPTION - LOCATION
LOCATION: BACK;PELVIS
LOCATION: BACK
LOCATION: BACK
LOCATION: BACK;PELVIS
LOCATION: BACK;PELVIS
LOCATION: BACK
LOCATION: BACK;PELVIS
LOCATION: THROAT
LOCATION: BACK;ABDOMEN

## 2024-08-21 ASSESSMENT — PAIN DESCRIPTION - ORIENTATION
ORIENTATION: RIGHT;LEFT
ORIENTATION: LOWER
ORIENTATION: LOWER
ORIENTATION: RIGHT;LEFT;UPPER;LOWER
ORIENTATION: LEFT;RIGHT;UPPER;LOWER

## 2024-08-21 ASSESSMENT — PAIN DESCRIPTION - DESCRIPTORS
DESCRIPTORS: THROBBING;SHOOTING
DESCRIPTORS: OTHER (COMMENT)

## 2024-08-21 ASSESSMENT — PAIN DESCRIPTION - PAIN TYPE: TYPE: ACUTE PAIN

## 2024-08-21 ASSESSMENT — PAIN DESCRIPTION - FREQUENCY: FREQUENCY: CONTINUOUS

## 2024-08-21 ASSESSMENT — PAIN DESCRIPTION - ONSET: ONSET: ON-GOING

## 2024-08-21 NOTE — CONSULTS
Palliative Care Department  [phone]  Palliative Care Initial Consult  Provider Kylee Tellez, APRN - CNP      PATIENT: Joseph Bond  : 1980  MRN: [id number]  ADMISSION DATE: 2024  5:25 PM  Referring Provider: Srini Smith MD     Palliative Medicine was consulted on hospital day 6 for assistance with: Symptom management     HPI:     Clinical Summary:Joseph Bond is a 43 y.o. y/o male with a history of prostate cancer with mediastinal lymphadenopathy, extensive metastasis of the spine, rib cage, pelvis, humerus and femurs, polysubstance abuse including opioids and tobacco, abstinent for 5 years, diverticulitis who presents with rib pain and LLE pain  who presented to [facility] on 2024 with rib and back pain.  CMP showed an elevated calcium of 10.9.  He was admitted for further medical management.    ASSESSMENT/PLAN:     Pertinent Hospital Diagnoses     Metastatic prostate cancer  Bone metastasis  Hypercalcemia  Pain due to neoplasm    Symptom management    Pain due to neoplasm  -Restart and increase oxycodone 15 mg every 4 hours as needed  -Robaxin 500 mg 4 times daily  -Dilaudid 1 mg every 3 hours as needed  -Recommended starting long-acting medication, patient resistant    Palliative Care Encounter / Counseling Regarding Goals of Care  Please see detailed goals of care discussion as below  At this time, Joseph Bond, Does have capacity for medical decision-making.  Capacity is time limited and situation/question specific  Outcome of goals of care meeting:  Goals are to live longer and get better  CODE STATUS to remain a full code  Wishes to pursue homeopathic medicine  Code status Full Code  Advanced Directives: no POA or living will in [facility]  Surrogate/Legal NOK:  Margarita oBnd (Sister ) [phone]   Gaurav Womack (cousin) [phone]    Spiritual assessment: no spiritual distress identified  Bereavement and grief: to be determined  Referrals to: none today    Thank  non-tender  Extremities: no clubbing, cyanosis or edema, moving all extremities    Skin: warm, dry without rashes, lesions, bruising  Neuro: awake, alert, oriented x 3, follows commands, no gross neurologic deficit    Objective data reviewed: labs, images, records, medication use, vitals, and chart    Time/Communication  Greater than 50% of time spent, total 55 minutes in counseling and coordination of care at the bedside regarding goals of care.    Thank you for allowing Palliative Medicine to participate in the care of Joseph Bond.    Note: This report was completed using computerAidhenscorner voiced recognition software.  Every effort has been made to ensure accuracy; however, inadvertent computerized transcription errors may be present.

## 2024-08-21 NOTE — PROGRESS NOTES
Call to [facility] to cancel transport to radiation therapy for 2 pm, patient is very nauseated and not able to attend.

## 2024-08-21 NOTE — PROGRESS NOTES
Transport is set up for 8/22/24 at 2 pm for Radiation through [facility]. Radiation is scheduled for 3 pm.

## 2024-08-21 NOTE — ED NOTES
ED to Inpatient Handoff Report    Notified 7S that electronic handoff available and patient ready for transport to room 734.    Safety Risks: Risk of falls    Patient in Restraints: no    Constant Observer or Patient : no    Telemetry Monitoring Ordered: Yes          Order to transfer to unit without monitor:     Last MEWS:  Time completed:     Deterioration Index: 27.06    Vitals:    08/20/24 1718 08/20/24 2055 08/20/24 2259 08/20/24 2346   BP: 105/75 111/76 92/72 107/75   Pulse:  (!) 110 (!) 101 96   Resp:  16 16 18   Temp:       TempSrc:       SpO2:  96% 99% 98%   Weight:       Height:           Opportunity for questions and clarification was provided.

## 2024-08-21 NOTE — PROGRESS NOTES
Comprehensive Nutrition Assessment    Type and Reason for Visit:  Initial, Positive Nutrition Screen    Nutrition Recommendations/Plan:   Continue current diet  Start Gelatein ONS BID  Meets criteria for mod PCM  Will continue to monitor while inpatient     Malnutrition Assessment:  Malnutrition Status:  Moderate malnutrition (08/21/24 1210)    Context:  Chronic Illness     Findings of the 6 clinical characteristics of malnutrition:  Energy Intake:  75% or less estimated energy requirements for 1 month or longer  Weight Loss:  Mild weight loss (specify amount and time period) (3.7% x 1 mo)     Body Fat Loss:  Mild body fat loss Orbital   Muscle Mass Loss:  No significant muscle mass loss    Fluid Accumulation:  No significant fluid accumulation     Strength:  Not Performed    Nutrition Assessment:    Pt presents d/t back pain, N/V. Hx stage IV prostate cancer w/ mets to bone. Pt states he is having difficulty eating d/t pain and nausea. Discussed ONS w/ him, he is agreeable to Gelatein BID. Continue current diet. Will continue to monitor.    Nutrition Related Findings:    A&O x4, abd WDL, no edema, I/O WDL, nausea Wound Type: None       Current Nutrition Intake & Therapies:    Average Meal Intake: 26-50%  Average Supplements Intake: None Ordered  ADULT DIET; Regular; No Pork  ADULT ORAL NUTRITION SUPPLEMENT; Lunch, Dinner; Fortified Gelatin Oral Supplement    Anthropometric Measures:  Height: 170.2 cm (5' 7\")  Ideal Body Weight (IBW): 148 lbs (67 kg)    Admission Body Weight: 84.5 kg (186 lb 3.2 oz) (8/21 bed scale)  Current Body Weight: 84.5 kg (186 lb 3.2 oz) (8/21), 125.8 % IBW. Weight Source: Bed Scale  Current BMI (kg/m2): 29.2  Usual Body Weight: 87.7 kg (193 lb 6 oz) (7/23/24 OV)  % Weight Change (Calculated): -3.7  Weight Adjustment For: No Adjustment        BMI Categories: Overweight (BMI 25.0-29.9)    Estimated Daily Nutrient Needs:  Energy Requirements Based On: Formula  Weight Used for Energy  Requirements: Current  Energy (kcal/day): 1626-0871  Weight Used for Protein Requirements: Ideal  Protein (g/day): 85-95  Method Used for Fluid Requirements: 1 ml/kcal  Fluid (ml/day): 5187-2448 ml    Nutrition Diagnosis:   Moderate malnutrition, In context of chronic illness related to catabolic illness as evidenced by Criteria as identified in malnutrition assessment    Nutrition Interventions:   Food and/or Nutrient Delivery: Continue Current Diet, Start Oral Nutrition Supplement  Nutrition Education/Counseling: No recommendation at this time  Coordination of Nutrition Care: Continue to monitor while inpatient       Goals:     Goals: PO intake 75% or greater, by next RD assessment       Nutrition Monitoring and Evaluation:   Behavioral-Environmental Outcomes: None Identified  Food/Nutrient Intake Outcomes: Food and Nutrient Intake, Supplement Intake  Physical Signs/Symptoms Outcomes: Biochemical Data, GI Status, Fluid Status or Edema, Nutrition Focused Physical Findings, Skin, Weight    Discharge Planning:    Too soon to determine     JULES MERINO MPH, RD, LD  Contact: x [phone]

## 2024-08-21 NOTE — PROGRESS NOTES
Called Radiation oncology and advised that patient is not well enough to go to his appointment. [facility] was also cancelled.

## 2024-08-21 NOTE — ACP (ADVANCE CARE PLANNING)
Advance Care Planning   Healthcare Decision Maker:    Primary Decision Maker: Margarita Bond - Brother/Sister - [phone]    Click here to complete Healthcare Decision Makers including selection of the Healthcare Decision Maker Relationship (ie \"Primary\").

## 2024-08-21 NOTE — PROGRESS NOTES
Oncology radiation called to have patient brought over for treatment at 300pm.  Spoke with [facility] and  scheduled for 2pm.

## 2024-08-21 NOTE — PROGRESS NOTES
Call to Dr. Bar requesting something more/different for patient's pain, she will come evaluate patient soon.

## 2024-08-21 NOTE — H&P
MD Ty   acetaminophen (TYLENOL) 500 MG tablet Take 1 tablet by mouth every 6 hours as needed for Pain    Provider, MD Johnson       Allergies:    Patient has no known allergies.    Social History:    reports that he has quit smoking. His smoking use included cigarettes. He started smoking about 29 years ago. He has a 12 pack-year smoking history. He has never used smokeless tobacco. He reports that he does not currently use drugs. He reports that he does not drink alcohol.    Family History:   family history includes Coronary Art Dis in his maternal grandfather.       PHYSICAL EXAM:  Vitals:  /75   Pulse 96   Temp 98.4 °F (36.9 °C) (Oral)   Resp 18   Ht 1.702 m (5' 7\")   Wt 81.6 kg (180 lb)   SpO2 98%   BMI 28.19 kg/m²     General Appearance: alert and oriented to person, place and time and in no acute distress  Skin: warm and dry, pale  Head: normocephalic and atraumatic  Eyes: pupils equal, round, and reactive to light, conjunctivae normal  Pulmonary/Chest: clear to auscultation bilaterally- no wheezes, rales or rhonchi, normal air movement, no respiratory distress  Cardiovascular: normal rate, normal S1 and S2  Abdomen: soft, non-tender, non-distended, normal bowel sounds, no masses or organomegaly  Extremities: no cyanosis, no clubbing and no edema  Neurologic: speech normal        LABS:  Recent Labs     08/20/24  1838      K 4.7   CL 96*   CO2 27   BUN 14   CREATININE 0.8   GLUCOSE 97   CALCIUM 10.9*       Recent Labs     08/20/24  1838   WBC 4.4*   RBC 4.29   HGB 12.0*   HCT 36.7*   MCV 85.5   MCH 28.0   MCHC 32.7   RDW 16.1*   MPV 9.7       No results for input(s): \"POCGLU\" in the last 72 hours.        Radiology:   No orders to display       EKG: Not available    ASSESSMENT:      Principal Problem:    Metastatic cancer to bone (HCC)  Active Problems:    Chronic midline low back pain with left-sided sciatica    Prostate cancer metastatic to bone (HCC)  Resolved Problems:    * No

## 2024-08-21 NOTE — CARE COORDINATION
Met with patient about diagnosis and discharge plan of care. Pt admit for back pain, nausea/vomiting. Pt has prostate ca with mets to bone. Pt was at radiation and sent in for severe pain. Pt on 9th of 10 radiation treatments at [facility]. Pain control. Plan of treatment today. Lives alone in 1st floor apt. Has help from his sister and mother. Has wheeled walker. No home care. PCP is Dr Liang. Palliative care consult pending. Will follow-mjo

## 2024-08-21 NOTE — PATIENT CARE CONFERENCE
P Quality Flow/Interdisciplinary Rounds Progress Note        Quality Flow Rounds held on August 21, 2024    Disciplines Attending:  Bedside Nurse, , , and Nursing Unit Leadership    Joseph Bond was admitted on 8/20/2024  5:25 PM    Anticipated Discharge Date:       Disposition:    Simon Score:  Simon Scale Score: 19    Readmission Risk              Risk of Unplanned Readmission:  19           Discussed patient goal for the day, patient clinical progression, and barriers to discharge.  The following Goal(s) of the Day/Commitment(s) have been identified:   Discharge planning      Rita Echevarria RN  August 21, 2024

## 2024-08-21 NOTE — PROGRESS NOTES
Patient having nausea and vomiting up what looks like phlegm.  Has hard time swallowing which he states is from radiation treatments.  Refused many of his medications.  He stated the lidocaine patch does not help.  He felt it was not necessary to take the naproxen as he is taking the dilaudid.  He did not want the lexapro, gabapentin and lovenox.

## 2024-08-21 NOTE — PROGRESS NOTES
4 Eyes Skin Assessment     NAME:  Joseph Bond  YOB: 1980  MEDICAL RECORD NUMBER:  [id number]    The patient is being assessed for  Admission    I agree that at least one RN has performed a thorough Head to Toe Skin Assessment on the patient. ALL assessment sites listed below have been assessed.      Areas assessed by both nurses:    Head, Face, Ears, Shoulders, Back, Chest, Arms, Elbows, Hands, Sacrum. Buttock, Coccyx, Ischium, Legs. Feet and Heels, and Under Medical Devices         Does the Patient have a Wound? No noted wound(s)       Simon Prevention initiated by RN: No  Wound Care Orders initiated by RN: No    Pressure Injury (Stage 3,4, Unstageable, DTI, NWPT, and Complex wounds) if present, place Wound referral order by RN under : No    New Ostomies, if present place, Ostomy referral order under : No     Nurse 1 eSignature: Electronically signed by Akua Coombs RN on 8/21/24 at 2:22 AM EDT    **SHARE this note so that the co-signing nurse can place an eSignature**    Nurse 2 eSignature: {Esignature:[id number]}

## 2024-08-21 NOTE — PLAN OF CARE
Patient was admitted this am by the nocturnist.  Chart updated, care reviewed, discussed with nursing, consulted oncology .

## 2024-08-22 ENCOUNTER — HOSPITAL ENCOUNTER (OUTPATIENT)
Dept: RADIATION ONCOLOGY | Age: 44
Discharge: HOME | End: 2024-08-22
Payer: MEDICAID

## 2024-08-22 ENCOUNTER — APPOINTMENT (OUTPATIENT)
Dept: GENERAL RADIOLOGY | Age: 44
DRG: 500 | End: 2024-08-22
Payer: MEDICAID

## 2024-08-22 DIAGNOSIS — C61 PROSTATE CANCER METASTATIC TO BONE (HCC): Primary | ICD-10-CM

## 2024-08-22 DIAGNOSIS — C79.51 PROSTATE CANCER METASTATIC TO BONE (HCC): Primary | ICD-10-CM

## 2024-08-22 LAB
ANION GAP SERPL CALCULATED.3IONS-SCNC: 11 MMOL/L (ref 7–16)
BASOPHILS # BLD: 0.03 K/UL (ref 0–0.2)
BASOPHILS NFR BLD: 1 % (ref 0–2)
BUN SERPL-MCNC: 15 MG/DL (ref 6–20)
CALCIUM SERPL-MCNC: 10.1 MG/DL (ref 8.6–10.2)
CHLORIDE SERPL-SCNC: 98 MMOL/L (ref 98–107)
CO2 SERPL-SCNC: 26 MMOL/L (ref 22–29)
CREAT SERPL-MCNC: 0.8 MG/DL (ref 0.7–1.2)
EOSINOPHIL # BLD: 0.03 K/UL (ref 0.05–0.5)
EOSINOPHILS RELATIVE PERCENT: 1 % (ref 0–6)
ERYTHROCYTE [DISTWIDTH] IN BLOOD BY AUTOMATED COUNT: 16.1 % (ref 11.5–15)
GFR, ESTIMATED: >90 ML/MIN/1.73M2
GLUCOSE SERPL-MCNC: 91 MG/DL (ref 74–99)
HCT VFR BLD AUTO: 29.8 % (ref 37–54)
HGB BLD-MCNC: 9.5 G/DL (ref 12.5–16.5)
LYMPHOCYTES NFR BLD: 0.45 K/UL (ref 1.5–4)
LYMPHOCYTES RELATIVE PERCENT: 16 % (ref 20–42)
MCH RBC QN AUTO: 27.8 PG (ref 26–35)
MCHC RBC AUTO-ENTMCNC: 31.9 G/DL (ref 32–34.5)
MCV RBC AUTO: 87.1 FL (ref 80–99.9)
MONOCYTES NFR BLD: 0.25 K/UL (ref 0.1–0.95)
MONOCYTES NFR BLD: 9 % (ref 2–12)
MYELOCYTES ABSOLUTE COUNT: 0.06 K/UL
MYELOCYTES: 2 %
NEUTROPHILS NFR BLD: 70 % (ref 43–80)
NEUTS SEG NFR BLD: 1.96 K/UL (ref 1.8–7.3)
PLATELET # BLD AUTO: 49 K/UL (ref 130–450)
PLATELET CONFIRMATION: NORMAL
PMV BLD AUTO: 10.7 FL (ref 7–12)
POTASSIUM SERPL-SCNC: 3.9 MMOL/L (ref 3.5–5)
PROMYELOCYTES ABSOLUTE COUNT: 0.03 K/UL
PROMYELOCYTES: 1 %
RBC # BLD AUTO: 3.42 M/UL (ref 3.8–5.8)
RBC # BLD: ABNORMAL 10*6/UL
SODIUM SERPL-SCNC: 135 MMOL/L (ref 132–146)
WBC OTHER # BLD: 2.8 K/UL (ref 4.5–11.5)

## 2024-08-22 PROCEDURE — 2060000000 HC ICU INTERMEDIATE R&B

## 2024-08-22 PROCEDURE — 6370000000 HC RX 637 (ALT 250 FOR IP): Performed by: NURSE PRACTITIONER

## 2024-08-22 PROCEDURE — 99233 SBSQ HOSP IP/OBS HIGH 50: CPT | Performed by: NURSE PRACTITIONER

## 2024-08-22 PROCEDURE — 72170 X-RAY EXAM OF PELVIS: CPT

## 2024-08-22 PROCEDURE — 77387 GUIDANCE FOR RADJ TX DLVR: CPT | Performed by: RADIOLOGY

## 2024-08-22 PROCEDURE — 36415 COLL VENOUS BLD VENIPUNCTURE: CPT

## 2024-08-22 PROCEDURE — 72110 X-RAY EXAM L-2 SPINE 4/>VWS: CPT

## 2024-08-22 PROCEDURE — 77412 RADIATION TX DELIVERY LVL 3: CPT | Performed by: RADIOLOGY

## 2024-08-22 PROCEDURE — 2580000003 HC RX 258

## 2024-08-22 PROCEDURE — 6360000002 HC RX W HCPCS

## 2024-08-22 PROCEDURE — 80048 BASIC METABOLIC PNL TOTAL CA: CPT

## 2024-08-22 PROCEDURE — 85025 COMPLETE CBC W/AUTO DIFF WBC: CPT

## 2024-08-22 PROCEDURE — 6370000000 HC RX 637 (ALT 250 FOR IP)

## 2024-08-22 PROCEDURE — 6360000002 HC RX W HCPCS: Performed by: NURSE PRACTITIONER

## 2024-08-22 PROCEDURE — 99232 SBSQ HOSP IP/OBS MODERATE 35: CPT | Performed by: STUDENT IN AN ORGANIZED HEALTH CARE EDUCATION/TRAINING PROGRAM

## 2024-08-22 RX ORDER — MORPHINE SULFATE 30 MG/1
30 TABLET, FILM COATED, EXTENDED RELEASE ORAL EVERY 12 HOURS SCHEDULED
Status: DISCONTINUED | OUTPATIENT
Start: 2024-08-22 | End: 2024-08-24

## 2024-08-22 RX ADMIN — OXYCODONE 15 MG: 15 TABLET ORAL at 04:11

## 2024-08-22 RX ADMIN — MORPHINE SULFATE 30 MG: 30 TABLET, FILM COATED, EXTENDED RELEASE ORAL at 20:56

## 2024-08-22 RX ADMIN — OXYCODONE 15 MG: 15 TABLET ORAL at 09:48

## 2024-08-22 RX ADMIN — OXYCODONE 15 MG: 15 TABLET ORAL at 13:52

## 2024-08-22 RX ADMIN — SODIUM CHLORIDE, PRESERVATIVE FREE 10 ML: 5 INJECTION INTRAVENOUS at 20:57

## 2024-08-22 RX ADMIN — ONDANSETRON 4 MG: 2 INJECTION INTRAMUSCULAR; INTRAVENOUS at 01:10

## 2024-08-22 RX ADMIN — Medication 2000 UNITS: at 12:53

## 2024-08-22 RX ADMIN — DOCUSATE SODIUM 100 MG: 100 CAPSULE, LIQUID FILLED ORAL at 08:46

## 2024-08-22 RX ADMIN — HYDROMORPHONE HYDROCHLORIDE 1 MG: 1 INJECTION, SOLUTION INTRAMUSCULAR; INTRAVENOUS; SUBCUTANEOUS at 01:01

## 2024-08-22 RX ADMIN — METHOCARBAMOL TABLETS 750 MG: 500 TABLET, COATED ORAL at 08:47

## 2024-08-22 RX ADMIN — METHOCARBAMOL TABLETS 750 MG: 500 TABLET, COATED ORAL at 13:52

## 2024-08-22 RX ADMIN — HYDROMORPHONE HYDROCHLORIDE 1 MG: 1 INJECTION, SOLUTION INTRAMUSCULAR; INTRAVENOUS; SUBCUTANEOUS at 19:10

## 2024-08-22 RX ADMIN — HYDROMORPHONE HYDROCHLORIDE 1 MG: 1 INJECTION, SOLUTION INTRAMUSCULAR; INTRAVENOUS; SUBCUTANEOUS at 08:49

## 2024-08-22 RX ADMIN — SODIUM CHLORIDE, PRESERVATIVE FREE 10 ML: 5 INJECTION INTRAVENOUS at 09:00

## 2024-08-22 RX ADMIN — HYDROMORPHONE HYDROCHLORIDE 1 MG: 1 INJECTION, SOLUTION INTRAMUSCULAR; INTRAVENOUS; SUBCUTANEOUS at 12:48

## 2024-08-22 RX ADMIN — BENZOCAINE AND MENTHOL 1 LOZENGE: 15; 3.6 LOZENGE ORAL at 12:55

## 2024-08-22 RX ADMIN — METHOCARBAMOL TABLETS 750 MG: 500 TABLET, COATED ORAL at 17:53

## 2024-08-22 RX ADMIN — BENZOCAINE AND MENTHOL 1 LOZENGE: 15; 3.6 LOZENGE ORAL at 09:01

## 2024-08-22 RX ADMIN — ONDANSETRON 4 MG: 2 INJECTION INTRAMUSCULAR; INTRAVENOUS at 22:42

## 2024-08-22 RX ADMIN — BENZOCAINE AND MENTHOL 1 LOZENGE: 15; 3.6 LOZENGE ORAL at 04:14

## 2024-08-22 RX ADMIN — ONDANSETRON 4 MG: 2 INJECTION INTRAMUSCULAR; INTRAVENOUS at 09:48

## 2024-08-22 RX ADMIN — OXYCODONE 15 MG: 15 TABLET ORAL at 17:53

## 2024-08-22 RX ADMIN — BENZOCAINE AND MENTHOL 1 LOZENGE: 15; 3.6 LOZENGE ORAL at 19:12

## 2024-08-22 RX ADMIN — HYDROMORPHONE HYDROCHLORIDE 1 MG: 1 INJECTION, SOLUTION INTRAMUSCULAR; INTRAVENOUS; SUBCUTANEOUS at 05:39

## 2024-08-22 RX ADMIN — BENZOCAINE AND MENTHOL 1 LOZENGE: 15; 3.6 LOZENGE ORAL at 01:03

## 2024-08-22 ASSESSMENT — PAIN DESCRIPTION - LOCATION
LOCATION: BACK;PELVIS
LOCATION: BACK;PELVIS
LOCATION: BACK
LOCATION: BACK
LOCATION: BACK;PELVIS

## 2024-08-22 ASSESSMENT — PAIN DESCRIPTION - ORIENTATION
ORIENTATION: LEFT;RIGHT;UPPER;LOWER
ORIENTATION: MID
ORIENTATION: MID;RIGHT;LEFT
ORIENTATION: RIGHT;LEFT;UPPER;LOWER

## 2024-08-22 ASSESSMENT — PAIN SCALES - GENERAL
PAINLEVEL_OUTOF10: 10
PAINLEVEL_OUTOF10: 8
PAINLEVEL_OUTOF10: 7
PAINLEVEL_OUTOF10: 10
PAINLEVEL_OUTOF10: 8

## 2024-08-22 ASSESSMENT — PAIN - FUNCTIONAL ASSESSMENT: PAIN_FUNCTIONAL_ASSESSMENT: ACTIVITIES ARE NOT PREVENTED

## 2024-08-22 ASSESSMENT — PAIN DESCRIPTION - DESCRIPTORS
DESCRIPTORS: ACHING;CRUSHING;DISCOMFORT
DESCRIPTORS: ACHING;STABBING

## 2024-08-22 NOTE — PROGRESS NOTES
[facility] Hospitalist Progress Note    Admitting Date and Time: 8/20/2024  5:25 PM  Admit Dx: Hypercalcemia [E83.52]  Metastatic cancer to bone (HCC) [C79.51]  Primary prostate cancer with metastasis from prostate to other site (HCC) [C61]  Uncontrolled pain [R52]    Subjective:  Patient is being followed for Hypercalcemia [E83.52]  Metastatic cancer to bone (HCC) [C79.51]  Primary prostate cancer with metastasis from prostate to other site (HCC) [C61]  Uncontrolled pain [R52]   Pt feels pain 8/10. Requesting up titration of  analgesics.  Per RN: No major concerns.     ROS: denies fever, chills, cp, sob, n/v, HA unless stated above.      sodium chloride flush  5-40 mL IntraVENous 2 times per day    enoxaparin  40 mg SubCUTAneous Daily    naproxen  250 mg Oral BID WC    sucralfate  1 g Oral 4x Daily AC & HS    Vitamin D  2,000 Units Oral Daily    escitalopram  5 mg Oral Daily    lidocaine  1 patch TransDERmal Daily    docusate sodium  100 mg Oral Daily    gabapentin  100 mg Oral BID    methocarbamol  750 mg Oral 4x Daily     sodium chloride flush, 5-40 mL, PRN  sodium chloride, , PRN  potassium chloride, 40 mEq, PRN   Or  potassium alternative oral replacement, 40 mEq, PRN   Or  potassium chloride, 10 mEq, PRN  magnesium sulfate, 2,000 mg, PRN  ondansetron, 4 mg, Q8H PRN   Or  ondansetron, 4 mg, Q6H PRN  polyethylene glycol, 17 g, Daily PRN  acetaminophen, 650 mg, Q6H PRN   Or  acetaminophen, 650 mg, Q6H PRN  hydrOXYzine pamoate, 25 mg, Nightly PRN  magic (miracle) mouthwash, 5 mL, 4x Daily PRN  naloxone, 0.4 mg, PRN  HYDROmorphone, 1 mg, Q3H PRN  promethazine, 6.25 mg, Q6H PRN  oxyCODONE, 15 mg, Q4H PRN  benzocaine-menthol, 1 lozenge, Q2H PRN         Objective:    /72   Pulse 96   Temp 98.2 °F (36.8 °C) (Oral)   Resp 18   Ht 1.702 m (5' 7\")   Wt 84.5 kg (186 lb 3.2 oz)   SpO2 95%   BMI 29.16 kg/m²     General Appearance: alert and oriented to person, place and time and in no acute

## 2024-08-22 NOTE — PATIENT CARE CONFERENCE
P Quality Flow/Interdisciplinary Rounds Progress Note        Quality Flow Rounds held on August 22, 2024    Disciplines Attending:  Bedside Nurse, , , and Nursing Unit Leadership    Joseph Bond was admitted on 8/20/2024  5:25 PM    Anticipated Discharge Date:       Disposition:    Simon Score:  Simon Scale Score: 20    Readmission Risk              Risk of Unplanned Readmission:  20           Discussed patient goal for the day, patient clinical progression, and barriers to discharge.  The following Goal(s) of the Day/Commitment(s) have been identified:   Discharge planning.      Rita Echevarria RN  August 22, 2024

## 2024-08-22 NOTE — PROGRESS NOTES
Palliative Care Department  [phone]  Palliative Care Progress Note  Provider Kylee Tellez, APRN - CNP      PATIENT: Joseph Bond  : 1980  MRN: [id number]  ADMISSION DATE: 2024  5:25 PM  Referring Provider: Srini Smith MD     Palliative Medicine was consulted on hospital day 6 for assistance with: Symptom management     HPI:     Clinical Summary:Joseph Bond is a 43 y.o. y/o male with a history of prostate cancer with mediastinal lymphadenopathy, extensive metastasis of the spine, rib cage, pelvis, humerus and femurs, polysubstance abuse including opioids and tobacco, abstinent for 5 years, diverticulitis who presents with rib pain and LLE pain  who presented to [facility] on 2024 with rib and back pain.  CMP showed an elevated calcium of 10.9.  He was admitted for further medical management.    ASSESSMENT/PLAN:     Pertinent Hospital Diagnoses     Metastatic prostate cancer  Bone metastasis  Hypercalcemia  Pain due to neoplasm    Symptom management    Pain due to neoplasm  -Start morphine ER 30 mg twice daily  -Continue oxycodone 15 mg every 4 hours as needed (use first)  -Robaxin 500 mg 4 times daily  -Decrease Dilaudid 1 mg every 6 hours as needed, for breakthrough pain only, with plan to stop tomorrow  -Recommended starting long-acting medication, patient resistant    Palliative Care Encounter / Counseling Regarding Goals of Care  Please see detailed goals of care discussion as below  At this time, Joseph Bond, Does have capacity for medical decision-making.  Capacity is time limited and situation/question specific  Outcome of goals of care meeting:  Goals are to live longer and get better  CODE STATUS to remain a full code  Wishes to pursue homeopathic medicine  Code status Full Code  Advanced Directives: no POA or living will in [facility]  Surrogate/Legal NOK:  Margarita Bond (Sister ) [phone]   Gauravernst Womack (cousin) [phone]    Spiritual assessment: no

## 2024-08-22 NOTE — CARE COORDINATION
Updated plan of care. Plan for radiation treatment today, cancelled on 8/20 d/t pain control. Palliative care for pain management. Will continue to follow-heideo

## 2024-08-22 NOTE — PROGRESS NOTES
Jospehjanelle Bond  8/22/2024  Wt Readings from Last 3 Encounters:   08/21/24 84.5 kg (186 lb 3.2 oz)   08/14/24 81.6 kg (180 lb)   08/02/24 85.7 kg (189 lb)     There is no height or weight on file to calculate BMI.        Treatment Area:C4-T1 and T11-L4    Patient was seen today for weekly visit.     Comfort Alteration  KPS:70%  Fatigue: Moderate      Nutritional Alteration  Anorexia: Yes   Nausea: Yes, controlled now while in-patient  Vomiting: Yes, controlled     Elimination Alterations  Constipation: no, he takes stool softener   Diarrhea:  no  Bowel Incontinence: No  Urinary Incontinence: No    Skin Alteration   Sensation:no issues    Radiation Dermatitis:  none      Emotional  Coping: effective    Sexuality Alteration  na    Injury, potential bleeding or infection: na        Lab Results   Component Value Date    WBC 2.8 (L) 08/22/2024    HGB 9.5 (L) 08/22/2024    HCT 29.8 (L) 08/22/2024    PLT 49 (L) 08/22/2024       There were no vitals taken for this visit.  BP within normal range? Yes, in-patient vitals     Assessment/Plan: Completed 9/10 fractions; 2700/3000 cGy    Tye Colvin RN

## 2024-08-22 NOTE — CONSULTS
[facility]  Hematology/Oncology  Consult      Patient Name: Joseph Bond  YOB: 1980  PCP: Ty Shaikh MD   Referring Provider:      Reason for Consultation:   Chief Complaint   Patient presents with    Back Pain     Back pain, nausea, and vomiting. Sent in by radiation because he was not able to finish treatment. Patient has stage 4 prostate cancer.    Nausea    Emesis        History of Present Illness: This is a 43-year-old male patient following with Dr. Matson for stage 4 prostate ca with retroperitoneal mediastinal lymphadenopathy diffuse bone metastasis. . Considering high tumor burden, he is advised treatment with Taxotere, GnRH agonist, Nubeqa along with Xgeva. He was started on Casodex while inpatient recently although has stopped taking it after he read about side effects. He has so far decided not to be on any systemic therapy. MRI lumbar spine showed T12 L4 metastasis with epidural extension. He is scheduled for palliative RT in a week to his spine and will decided after his RT about starting systemic therapy at that point. He has also been looking into homeopathy. Genetic testing also sent.  Follows with palliative care for pain management. Patient presented to the ED for evaluation of rib pain and and back pain. Pelvic and lumbar spine xrays negative for acute bony abnormality. Palliative consulted for symptom management.  CMP unremarkable. LFT's with Alk phos 236, AST 85. CBC with WBC's 2.8, ANC WNL, Hgb 9.5, platelets 49. Consultation for thrombocytopenia.     Review of systems: Over 10 systems were reviewed and all were negative except as mention above      Diagnostic Data:     Past Medical History:   Diagnosis Date    Cancer (HCC)     Diverticulosis     between 2016 to 2017 diverticulitis an diverticulosis    History of opioid abuse (HCC)        Patient Active Problem List    Diagnosis Date Noted    Metastatic cancer to bone (HCC) 08/21/2024    Nausea  and vomiting 08/21/2024    Back pain 08/21/2024    Primary prostate cancer with metastasis from prostate to other site (HCC) 08/21/2024    Moderate protein-calorie malnutrition (HCC) 08/21/2024    Prostate cancer metastatic to bone (HCC) 07/23/2024    Hemorrhoids 07/23/2024    Anxiety 07/23/2024    Retroperitoneal lymphadenopathy 07/10/2024    Chronic midline low back pain with left-sided sciatica 07/10/2024    Elevated PSA 07/10/2024    Urinary hesitancy 07/10/2024    Pelvic pain 07/10/2024    Osteopenia of lumbar spine 07/10/2024    Acute midline low back pain with sciatica 07/10/2024        Past Surgical History:   Procedure Laterality Date    BRONCHOSCOPY N/A 7/11/2024    BRONCHOSCOPY ENDOBRONCHIAL ULTRASOUND FINE NEEDLE ASPIRATION performed by Ed Cornejo DO at [facility]    BRONCHOSCOPY  7/11/2024    BRONCHOSCOPY DIAGNOSTIC OR CELL WASH ONLY performed by Ed Cornejo DO at [facility]       Family History  Family History   Problem Relation Age of Onset    Coronary Art Dis Maternal Grandfather        Social History    TOBACCO:   reports that he has quit smoking. His smoking use included cigarettes. He started smoking about 29 years ago. He has a 12 pack-year smoking history. He has never used smokeless tobacco.  ETOH:   reports no history of alcohol use.    Home Medications  Prior to Admission medications    Medication Sig Start Date End Date Taking? Authorizing Provider   oxyCODONE (ROXICODONE) 5 MG immediate release tablet Take 2 tablets by mouth every 4 hours as needed for Pain. Max Daily Amount: 60 mg   Yes ProviderJohnson MD PHILLIPS STOOL SOFTENER 100 MG capsule Take 1 capsule by mouth once daily 8/19/24  Yes Ty Shaikh MD Magic Mouthwash (MIRACLE MOUTHWASH) Swish and swallow 5 mLs 4 times daily as needed for Irritation Maalax, Nystatin and Lidocaine in equal parts .  Take 20 minutes before meals and at bedtime 8/19/24  Yes Kaylynn Marie MD

## 2024-08-22 NOTE — PLAN OF CARE
Problem: Discharge Planning  Goal: Discharge to home or other facility with appropriate resources  8/22/2024 0300 by Akua Coombs, RN  Outcome: Progressing     Problem: Pain  Goal: Verbalizes/displays adequate comfort level or baseline comfort level  8/22/2024 0300 by Akua Coombs, RN  Outcome: Progressing     Problem: Safety - Adult  Goal: Free from fall injury  8/22/2024 0300 by Akua Coombs, RN  Outcome: Progressing     Problem: ABCDS Injury Assessment  Goal: Absence of physical injury  8/22/2024 0300 by Akua Coombs, RN  Outcome: Progressing     Problem: Nutrition Deficit:  Goal: Optimize nutritional status  8/22/2024 0300 by Akua Coombs, RN  Outcome: Progressing

## 2024-08-23 ENCOUNTER — HOSPITAL ENCOUNTER (OUTPATIENT)
Dept: RADIATION ONCOLOGY | Age: 44
Discharge: HOME | End: 2024-08-23
Payer: MEDICAID

## 2024-08-23 LAB
ANION GAP SERPL CALCULATED.3IONS-SCNC: 10 MMOL/L (ref 7–16)
BASOPHILS # BLD: 0.06 K/UL (ref 0–0.2)
BASOPHILS NFR BLD: 2 % (ref 0–2)
BUN SERPL-MCNC: 15 MG/DL (ref 6–20)
CALCIUM SERPL-MCNC: 10.2 MG/DL (ref 8.6–10.2)
CHLORIDE SERPL-SCNC: 100 MMOL/L (ref 98–107)
CO2 SERPL-SCNC: 27 MMOL/L (ref 22–29)
CREAT SERPL-MCNC: 0.9 MG/DL (ref 0.7–1.2)
EOSINOPHIL # BLD: 0.09 K/UL (ref 0.05–0.5)
EOSINOPHILS RELATIVE PERCENT: 3 % (ref 0–6)
ERYTHROCYTE [DISTWIDTH] IN BLOOD BY AUTOMATED COUNT: 16 % (ref 11.5–15)
GFR, ESTIMATED: >90 ML/MIN/1.73M2
GLUCOSE SERPL-MCNC: 85 MG/DL (ref 74–99)
HCT VFR BLD AUTO: 29.7 % (ref 37–54)
HGB BLD-MCNC: 9.6 G/DL (ref 12.5–16.5)
LYMPHOCYTES NFR BLD: 0.12 K/UL (ref 1.5–4)
LYMPHOCYTES RELATIVE PERCENT: 4 % (ref 20–42)
MCH RBC QN AUTO: 27.7 PG (ref 26–35)
MCHC RBC AUTO-ENTMCNC: 32.3 G/DL (ref 32–34.5)
MCV RBC AUTO: 85.8 FL (ref 80–99.9)
METAMYELOCYTES ABSOLUTE COUNT: 0.12 K/UL (ref 0–0.12)
METAMYELOCYTES: 4 % (ref 0–1)
MONOCYTES NFR BLD: 0.12 K/UL (ref 0.1–0.95)
MONOCYTES NFR BLD: 4 % (ref 2–12)
MYELOCYTES ABSOLUTE COUNT: 0.06 K/UL
MYELOCYTES: 2 %
NEUTROPHILS NFR BLD: 81 % (ref 43–80)
NEUTS SEG NFR BLD: 2.35 K/UL (ref 1.8–7.3)
NUCLEATED RED BLOOD CELLS: 1 PER 100 WBC
PLATELET # BLD AUTO: 51 K/UL (ref 130–450)
PLATELET CONFIRMATION: NORMAL
PMV BLD AUTO: 11.5 FL (ref 7–12)
POTASSIUM SERPL-SCNC: 3.9 MMOL/L (ref 3.5–5)
RBC # BLD AUTO: 3.46 M/UL (ref 3.8–5.8)
RBC # BLD: ABNORMAL 10*6/UL
SODIUM SERPL-SCNC: 137 MMOL/L (ref 132–146)
WBC OTHER # BLD: 2.9 K/UL (ref 4.5–11.5)

## 2024-08-23 PROCEDURE — 2580000003 HC RX 258

## 2024-08-23 PROCEDURE — 99232 SBSQ HOSP IP/OBS MODERATE 35: CPT | Performed by: NURSE PRACTITIONER

## 2024-08-23 PROCEDURE — 6370000000 HC RX 637 (ALT 250 FOR IP): Performed by: NURSE PRACTITIONER

## 2024-08-23 PROCEDURE — 80048 BASIC METABOLIC PNL TOTAL CA: CPT

## 2024-08-23 PROCEDURE — 77387 GUIDANCE FOR RADJ TX DLVR: CPT | Performed by: RADIOLOGY

## 2024-08-23 PROCEDURE — 85025 COMPLETE CBC W/AUTO DIFF WBC: CPT

## 2024-08-23 PROCEDURE — 6360000002 HC RX W HCPCS: Performed by: NURSE PRACTITIONER

## 2024-08-23 PROCEDURE — 2060000000 HC ICU INTERMEDIATE R&B

## 2024-08-23 PROCEDURE — 99232 SBSQ HOSP IP/OBS MODERATE 35: CPT | Performed by: STUDENT IN AN ORGANIZED HEALTH CARE EDUCATION/TRAINING PROGRAM

## 2024-08-23 PROCEDURE — 6360000002 HC RX W HCPCS

## 2024-08-23 PROCEDURE — 77336 RADIATION PHYSICS CONSULT: CPT | Performed by: RADIOLOGY

## 2024-08-23 PROCEDURE — 36415 COLL VENOUS BLD VENIPUNCTURE: CPT

## 2024-08-23 PROCEDURE — 6370000000 HC RX 637 (ALT 250 FOR IP)

## 2024-08-23 PROCEDURE — 77412 RADIATION TX DELIVERY LVL 3: CPT | Performed by: RADIOLOGY

## 2024-08-23 RX ORDER — HYDROMORPHONE HYDROCHLORIDE 2 MG/1
1 TABLET ORAL ONCE
Status: COMPLETED | OUTPATIENT
Start: 2024-08-23 | End: 2024-08-23

## 2024-08-23 RX ADMIN — BENZOCAINE AND MENTHOL 1 LOZENGE: 15; 3.6 LOZENGE ORAL at 09:06

## 2024-08-23 RX ADMIN — METHOCARBAMOL TABLETS 750 MG: 500 TABLET, COATED ORAL at 02:27

## 2024-08-23 RX ADMIN — BENZOCAINE AND MENTHOL 1 LOZENGE: 15; 3.6 LOZENGE ORAL at 22:03

## 2024-08-23 RX ADMIN — OXYCODONE 15 MG: 15 TABLET ORAL at 13:39

## 2024-08-23 RX ADMIN — HYDROMORPHONE HYDROCHLORIDE 1 MG: 1 INJECTION, SOLUTION INTRAMUSCULAR; INTRAVENOUS; SUBCUTANEOUS at 01:20

## 2024-08-23 RX ADMIN — ONDANSETRON 4 MG: 2 INJECTION INTRAMUSCULAR; INTRAVENOUS at 11:27

## 2024-08-23 RX ADMIN — OXYCODONE 15 MG: 15 TABLET ORAL at 21:51

## 2024-08-23 RX ADMIN — METHOCARBAMOL TABLETS 750 MG: 500 TABLET, COATED ORAL at 09:04

## 2024-08-23 RX ADMIN — SODIUM CHLORIDE, PRESERVATIVE FREE 10 ML: 5 INJECTION INTRAVENOUS at 21:54

## 2024-08-23 RX ADMIN — BENZOCAINE AND MENTHOL 1 LOZENGE: 15; 3.6 LOZENGE ORAL at 02:30

## 2024-08-23 RX ADMIN — METHOCARBAMOL TABLETS 750 MG: 500 TABLET, COATED ORAL at 13:39

## 2024-08-23 RX ADMIN — HYDROMORPHONE HYDROCHLORIDE 1 MG: 2 TABLET ORAL at 19:41

## 2024-08-23 RX ADMIN — BENZOCAINE AND MENTHOL 1 LOZENGE: 15; 3.6 LOZENGE ORAL at 19:30

## 2024-08-23 RX ADMIN — Medication 2000 UNITS: at 09:04

## 2024-08-23 RX ADMIN — LIDOCAINE HYDROCHLORIDE 5 ML: 20 SOLUTION ORAL; TOPICAL at 21:56

## 2024-08-23 RX ADMIN — BENZOCAINE AND MENTHOL 1 LOZENGE: 15; 3.6 LOZENGE ORAL at 17:30

## 2024-08-23 RX ADMIN — SODIUM CHLORIDE, PRESERVATIVE FREE 10 ML: 5 INJECTION INTRAVENOUS at 09:06

## 2024-08-23 RX ADMIN — OXYCODONE 15 MG: 15 TABLET ORAL at 17:27

## 2024-08-23 RX ADMIN — HYDROMORPHONE HYDROCHLORIDE 1 MG: 1 INJECTION, SOLUTION INTRAMUSCULAR; INTRAVENOUS; SUBCUTANEOUS at 09:04

## 2024-08-23 RX ADMIN — ONDANSETRON 4 MG: 2 INJECTION INTRAMUSCULAR; INTRAVENOUS at 21:53

## 2024-08-23 RX ADMIN — MORPHINE SULFATE 30 MG: 30 TABLET, FILM COATED, EXTENDED RELEASE ORAL at 11:26

## 2024-08-23 RX ADMIN — METHOCARBAMOL TABLETS 750 MG: 500 TABLET, COATED ORAL at 17:28

## 2024-08-23 RX ADMIN — METHOCARBAMOL TABLETS 750 MG: 500 TABLET, COATED ORAL at 22:56

## 2024-08-23 RX ADMIN — OXYCODONE 15 MG: 15 TABLET ORAL at 02:26

## 2024-08-23 ASSESSMENT — PAIN SCALES - GENERAL
PAINLEVEL_OUTOF10: 7
PAINLEVEL_OUTOF10: 10
PAINLEVEL_OUTOF10: 8
PAINLEVEL_OUTOF10: 7
PAINLEVEL_OUTOF10: 8
PAINLEVEL_OUTOF10: 10
PAINLEVEL_OUTOF10: 9
PAINLEVEL_OUTOF10: 8
PAINLEVEL_OUTOF10: 9
PAINLEVEL_OUTOF10: 9
PAINLEVEL_OUTOF10: 8
PAINLEVEL_OUTOF10: 10

## 2024-08-23 ASSESSMENT — PAIN DESCRIPTION - LOCATION
LOCATION: ABDOMEN;BACK;ARM;LEG
LOCATION: BACK
LOCATION: BACK
LOCATION: BACK;PELVIS
LOCATION: BACK
LOCATION: BACK
LOCATION: PELVIS;BACK;ABDOMEN
LOCATION: BACK
LOCATION: THROAT

## 2024-08-23 ASSESSMENT — PAIN DESCRIPTION - ORIENTATION
ORIENTATION: RIGHT;LEFT
ORIENTATION: RIGHT;LEFT

## 2024-08-23 ASSESSMENT — PAIN DESCRIPTION - FREQUENCY
FREQUENCY: CONTINUOUS
FREQUENCY: CONTINUOUS

## 2024-08-23 ASSESSMENT — PAIN DESCRIPTION - DESCRIPTORS
DESCRIPTORS: ACHING
DESCRIPTORS: ACHING

## 2024-08-23 ASSESSMENT — PAIN - FUNCTIONAL ASSESSMENT
PAIN_FUNCTIONAL_ASSESSMENT: ACTIVITIES ARE NOT PREVENTED

## 2024-08-23 ASSESSMENT — PAIN DESCRIPTION - ONSET
ONSET: ON-GOING
ONSET: ON-GOING

## 2024-08-23 ASSESSMENT — PAIN DESCRIPTION - PAIN TYPE
TYPE: CHRONIC PAIN
TYPE: CHRONIC PAIN

## 2024-08-23 NOTE — PROGRESS NOTES
Joseph Varun  8/23/2024  7:51 AM          No current facility-administered medications for this encounter.     No current outpatient medications on file.     Facility-Administered Medications Ordered in Other Encounters   Medication Dose Route Frequency Provider Last Rate Last Admin    morphine (MS CONTIN) extended release tablet 30 mg  30 mg Oral 2 times per day Kylee Tellez APRN - CNP   30 mg at 08/22/24 2056    HYDROmorphone (DILAUDID) injection 1 mg  1 mg IntraVENous Q6H PRN yKlee Tellez APRN - CNP   1 mg at 08/23/24 0120    sodium chloride flush 0.9 % injection 5-40 mL  5-40 mL IntraVENous 2 times per day Constance Aparicio, DO   10 mL at 08/22/24 2057    sodium chloride flush 0.9 % injection 5-40 mL  5-40 mL IntraVENous PRN Constance Aparicio, DO        0.9 % sodium chloride infusion   IntraVENous PRN Constance Aparicio, DO        potassium chloride (KLOR-CON M) extended release tablet 40 mEq  40 mEq Oral PRN Constance Aparicio, DO        Or    potassium bicarb-citric acid (EFFER-K) effervescent tablet 40 mEq  40 mEq Oral PRN Constance Aparicio, DO        Or    potassium chloride 10 mEq/100 mL IVPB (Peripheral Line)  10 mEq IntraVENous PRN Constance Aparicio DO        magnesium sulfate 2000 mg in 50 mL IVPB premix  2,000 mg IntraVENous PRN Constance Aparicio, DO        enoxaparin (LOVENOX) injection 40 mg  40 mg SubCUTAneous Daily Constance Aparicio DO        ondansetron (ZOFRAN-ODT) disintegrating tablet 4 mg  4 mg Oral Q8H PRN Constance Aapricio,         Or    ondansetron (ZOFRAN) injection 4 mg  4 mg IntraVENous Q6H PRN Constance Aparicio, DO   4 mg at 08/22/24 2242    polyethylene glycol (GLYCOLAX) packet 17 g  17 g Oral Daily PRN Constance Aparicio, DO        acetaminophen (TYLENOL) tablet 650 mg  650 mg Oral Q6H PRN Constance Aparicio, DO   650 mg at 08/21/24 2140    Or    acetaminophen (TYLENOL) suppository 650 mg  650 mg Rectal Q6H PRN Constance Aparicio,         hydrOXYzine pamoate (VISTARIL) capsule 25 mg

## 2024-08-23 NOTE — PROGRESS NOTES
opportunity to participate in the care of Joseph Bond.     Leo Bradley, APRN - CNP   Palliative Medicine     SUBJECTIVE:     Details of Conversation:   Indra is seen at the bedside, no family is present  He is alert and oriented  He states he is in pain currently, rating it as an 8/10  He is also nauseated and had 1 episode of emesis  He has used 1 dose of oxycodone overnight and 2 doses of dilaudid  I again encouraged him to use oral analgesia first, as he could have had oxycodone more frequently if this was required  He complained that his oxy IR is not scheduled, as he states he was told at home to take his medications on a scheduled  I spent much time providing education regarding pain management and I advised the Oxy IR should not be scheduled and explained that his need for around the clock pain control is the reason MS Contin was started  I again explained that if he is in severe pain he needs to communicate this to the nursing staff to receive as needed medications  He also express concern regarding pain when he goes for radiation, as it will not be time for medications if he takes his oxycodone now, which he states he needs  I will provide a 1 time dose of dilaudid IV for his treatment  Otherwise IV dilaudid will be stopped and he can continue with MS Contin and Oxy IR, again explaining he needs to communicate with nursing to receive as needed medications    Prognosis: Guarded    OBJECTIVE:     /62   Pulse (!) 111   Temp 99.1 °F (37.3 °C) (Oral)   Resp 16   Ht 1.702 m (5' 7\")   Wt 84.5 kg (186 lb 3.2 oz)   SpO2 96%   BMI 29.16 kg/m²     Physical Examination:  Gen: thin, NAD, awake, alert   HEENT: normocephalic, atraumatic, PERRL, EOMI,   Neck: trachea midline, no JVD  Lungs: respirations easy and not labored,   Heart: regular rate and rhythm, distant heart tones,   Abdomen: normoactive bowel sounds, soft, non-tender  Extremities: no clubbing, cyanosis or edema, moving all extremities     Skin: warm, dry without rashes, lesions, bruising  Neuro: awake, alert, oriented x 3, follows commands, no gross neurologic deficit    Objective data reviewed: labs, images, records, medication use, vitals, and chart    Time/Communication  Greater than 50% of time spent, total 35 minutes in counseling and coordination of care at the bedside regarding goals of care.    Thank you for allowing Palliative Medicine to participate in the care of Joseph Bond.    Note: This report was completed using [email or web address] voiced recognition software.  Every effort has been made to ensure accuracy; however, inadvertent computerized transcription errors may be present.

## 2024-08-23 NOTE — PROGRESS NOTES
[facility] Hospitalist Progress Note    Admitting Date and Time: 8/20/2024  5:25 PM  Admit Dx: Hypercalcemia [E83.52]  Metastatic cancer to bone (HCC) [C79.51]  Primary prostate cancer with metastasis from prostate to other site (HCC) [C61]  Uncontrolled pain [R52]    Subjective:  Patient is being followed for Hypercalcemia [E83.52]  Metastatic cancer to bone (HCC) [C79.51]  Primary prostate cancer with metastasis from prostate to other site (HCC) [C61]  Uncontrolled pain [R52]   Pt feels pain 8/10. Requesting scheduled pain meds be given iff he is asleep, advised will need pain meds titrated to pain control & B.P .  Per RN: No major concerns.     ROS: denies fever, chills, cp, sob, n/v, HA unless stated above.      HYDROmorphone  1 mg Oral Once    morphine  30 mg Oral 2 times per day    sodium chloride flush  5-40 mL IntraVENous 2 times per day    enoxaparin  40 mg SubCUTAneous Daily    naproxen  250 mg Oral BID WC    sucralfate  1 g Oral 4x Daily AC & HS    Vitamin D  2,000 Units Oral Daily    escitalopram  5 mg Oral Daily    lidocaine  1 patch TransDERmal Daily    docusate sodium  100 mg Oral Daily    gabapentin  100 mg Oral BID    methocarbamol  750 mg Oral 4x Daily     sodium chloride flush, 5-40 mL, PRN  sodium chloride, , PRN  potassium chloride, 40 mEq, PRN   Or  potassium alternative oral replacement, 40 mEq, PRN   Or  potassium chloride, 10 mEq, PRN  magnesium sulfate, 2,000 mg, PRN  ondansetron, 4 mg, Q8H PRN   Or  ondansetron, 4 mg, Q6H PRN  polyethylene glycol, 17 g, Daily PRN  acetaminophen, 650 mg, Q6H PRN   Or  acetaminophen, 650 mg, Q6H PRN  hydrOXYzine pamoate, 25 mg, Nightly PRN  magic (miracle) mouthwash, 5 mL, 4x Daily PRN  naloxone, 0.4 mg, PRN  promethazine, 6.25 mg, Q6H PRN  oxyCODONE, 15 mg, Q4H PRN  benzocaine-menthol, 1 lozenge, Q2H PRN         Objective:    /62   Pulse (!) 111   Temp 99.1 °F (37.3 °C) (Oral)   Resp 16   Ht 1.702 m (5' 7\")   Wt 84.5 kg (186 lb 3.2 oz)    SpO2 96%   BMI 29.16 kg/m²     General Appearance: alert and oriented to person, place and time and in no acute distress  Skin: warm and dry  Head: normocephalic and atraumatic  Eyes: pupils equal, round, and reactive to light, extraocular eye movements intact, conjunctivae normal  Neck: neck supple and non tender without mass   Pulmonary/Chest: clear to auscultation bilaterally- no wheezes, rales or rhonchi, normal air movement, no respiratory distress  Cardiovascular: normal rate, normal S1 and S2 and no carotid bruits  Abdomen: soft, non-tender, non-distended, normal bowel sounds, no masses or organomegaly  Extremities: no cyanosis, no clubbing and no edema  Neurologic: no cranial nerve deficit and speech normal        Recent Labs     08/21/24 0515 08/22/24 0443 08/23/24  0712    135 137   K 4.0 3.9 3.9    98 100   CO2 26 26 27   BUN 16 15 15   CREATININE 0.7 0.8 0.9   GLUCOSE 95 91 85   CALCIUM 9.8 10.1 10.2       Recent Labs     08/21/24 0515 08/22/24 0443 08/23/24  0712   WBC 3.4* 2.8* 2.9*   RBC 3.46* 3.42* 3.46*   HGB 9.7* 9.5* 9.6*   HCT 30.0* 29.8* 29.7*   MCV 86.7 87.1 85.8   MCH 28.0 27.8 27.7   MCHC 32.3 31.9* 32.3   RDW 16.1* 16.1* 16.0*   PLT 42* 49* 51*   MPV 9.7 10.7 11.5       Micro:  No components found for: \"BC)\"    Radiology:   No results found.    Assessment:    Principal Problem:    Cancer, metastatic to bone (HCC)  Active Problems:    Chronic midline low back pain with left-sided sciatica    Prostate cancer metastatic to bone (HCC)    Nausea and vomiting    Back pain    Primary prostate cancer with metastasis from prostate to other site (HCC)    Moderate protein-calorie malnutrition (HCC)  Resolved Problems:    * No resolved hospital problems. *      Plan:  Stage IV prostate cancer, metastatic to bones: Follows with Dr. Matson.  Currently receiving radiation therapy.  Consult palliative care, appreciate recommendations.  Dilaudid and Robaxin as needed.  Continue gabapentin

## 2024-08-23 NOTE — PATIENT CARE CONFERENCE
P Quality Flow/Interdisciplinary Rounds Progress Note        Quality Flow Rounds held on August 23, 2024    Disciplines Attending:  Bedside Nurse, , , and Nursing Unit Leadership    Joseph Bond was admitted on 8/20/2024  5:25 PM    Anticipated Discharge Date:       Disposition:    Simon Score:  Simon Scale Score: 20    Readmission Risk              Risk of Unplanned Readmission:  20           Discussed patient goal for the day, patient clinical progression, and barriers to discharge.  The following Goal(s) of the Day/Commitment(s) have been identified:   Discharge planning.      Rita Echevarria RN  August 23, 2024

## 2024-08-23 NOTE — CARE COORDINATION
Updated plan of care. Pt for radiation again today. Continue pain control. Plan discharge next 24 hrs if pain controlled. Declining needs for home-mjo

## 2024-08-24 LAB
ALBUMIN SERPL-MCNC: 4 G/DL (ref 3.5–5.2)
ALP SERPL-CCNC: 163 U/L (ref 40–129)
ALT SERPL-CCNC: 39 U/L (ref 0–40)
ANION GAP SERPL CALCULATED.3IONS-SCNC: 13 MMOL/L (ref 7–16)
AST SERPL-CCNC: 85 U/L (ref 0–39)
BASOPHILS # BLD: 0 K/UL (ref 0–0.2)
BASOPHILS NFR BLD: 0 % (ref 0–2)
BILIRUB SERPL-MCNC: 0.5 MG/DL (ref 0–1.2)
BUN SERPL-MCNC: 14 MG/DL (ref 6–20)
CALCIUM SERPL-MCNC: 10 MG/DL (ref 8.6–10.2)
CHLORIDE SERPL-SCNC: 99 MMOL/L (ref 98–107)
CO2 SERPL-SCNC: 22 MMOL/L (ref 22–29)
CREAT SERPL-MCNC: 0.7 MG/DL (ref 0.7–1.2)
EOSINOPHIL # BLD: 0.1 K/UL (ref 0.05–0.5)
EOSINOPHILS RELATIVE PERCENT: 4 % (ref 0–6)
ERYTHROCYTE [DISTWIDTH] IN BLOOD BY AUTOMATED COUNT: 16 % (ref 11.5–15)
GFR, ESTIMATED: >90 ML/MIN/1.73M2
GLUCOSE SERPL-MCNC: 90 MG/DL (ref 74–99)
HCT VFR BLD AUTO: 31.7 % (ref 37–54)
HGB BLD-MCNC: 10 G/DL (ref 12.5–16.5)
LYMPHOCYTES NFR BLD: 0.25 K/UL (ref 1.5–4)
LYMPHOCYTES RELATIVE PERCENT: 10 % (ref 20–42)
MCH RBC QN AUTO: 28.1 PG (ref 26–35)
MCHC RBC AUTO-ENTMCNC: 31.5 G/DL (ref 32–34.5)
MCV RBC AUTO: 89 FL (ref 80–99.9)
METAMYELOCYTES ABSOLUTE COUNT: 0.03 K/UL (ref 0–0.12)
METAMYELOCYTES: 1 % (ref 0–1)
MONOCYTES NFR BLD: 0.15 K/UL (ref 0.1–0.95)
MONOCYTES NFR BLD: 6 % (ref 2–12)
MYELOCYTES ABSOLUTE COUNT: 0.03 K/UL
MYELOCYTES: 1 %
NEUTROPHILS NFR BLD: 78 % (ref 43–80)
NEUTS SEG NFR BLD: 1.95 K/UL (ref 1.8–7.3)
NUCLEATED RED BLOOD CELLS: 2 PER 100 WBC
PLATELET CONFIRMATION: NORMAL
PLATELET, FLUORESCENCE: 42 K/UL (ref 130–450)
PMV BLD AUTO: ABNORMAL FL (ref 7–12)
POTASSIUM SERPL-SCNC: 4 MMOL/L (ref 3.5–5)
PROT SERPL-MCNC: 7.2 G/DL (ref 6.4–8.3)
RBC # BLD AUTO: 3.56 M/UL (ref 3.8–5.8)
RBC # BLD: ABNORMAL 10*6/UL
SODIUM SERPL-SCNC: 134 MMOL/L (ref 132–146)
WBC OTHER # BLD: 2.5 K/UL (ref 4.5–11.5)

## 2024-08-24 PROCEDURE — 2060000000 HC ICU INTERMEDIATE R&B

## 2024-08-24 PROCEDURE — 6370000000 HC RX 637 (ALT 250 FOR IP): Performed by: NURSE PRACTITIONER

## 2024-08-24 PROCEDURE — 6370000000 HC RX 637 (ALT 250 FOR IP): Performed by: INTERNAL MEDICINE

## 2024-08-24 PROCEDURE — 6370000000 HC RX 637 (ALT 250 FOR IP)

## 2024-08-24 PROCEDURE — 6360000002 HC RX W HCPCS: Performed by: NURSE PRACTITIONER

## 2024-08-24 PROCEDURE — 6360000002 HC RX W HCPCS

## 2024-08-24 PROCEDURE — 2580000003 HC RX 258

## 2024-08-24 PROCEDURE — 99233 SBSQ HOSP IP/OBS HIGH 50: CPT | Performed by: INTERNAL MEDICINE

## 2024-08-24 PROCEDURE — 80053 COMPREHEN METABOLIC PANEL: CPT

## 2024-08-24 PROCEDURE — 85025 COMPLETE CBC W/AUTO DIFF WBC: CPT

## 2024-08-24 PROCEDURE — 2580000003 HC RX 258: Performed by: INTERNAL MEDICINE

## 2024-08-24 RX ORDER — SODIUM CHLORIDE 9 MG/ML
INJECTION, SOLUTION INTRAVENOUS CONTINUOUS
Status: DISCONTINUED | OUTPATIENT
Start: 2024-08-24 | End: 2024-08-25 | Stop reason: HOSPADM

## 2024-08-24 RX ORDER — MORPHINE SULFATE 30 MG/1
30 TABLET, FILM COATED, EXTENDED RELEASE ORAL EVERY 8 HOURS
Status: DISCONTINUED | OUTPATIENT
Start: 2024-08-24 | End: 2024-08-25 | Stop reason: HOSPADM

## 2024-08-24 RX ORDER — PROCHLORPERAZINE EDISYLATE 5 MG/ML
10 INJECTION INTRAMUSCULAR; INTRAVENOUS ONCE
Status: COMPLETED | OUTPATIENT
Start: 2024-08-24 | End: 2024-08-24

## 2024-08-24 RX ORDER — METOPROLOL TARTRATE 1 MG/ML
2.5 INJECTION, SOLUTION INTRAVENOUS EVERY 4 HOURS PRN
Status: DISCONTINUED | OUTPATIENT
Start: 2024-08-24 | End: 2024-08-25 | Stop reason: HOSPADM

## 2024-08-24 RX ORDER — HYDROMORPHONE HYDROCHLORIDE 2 MG/1
1 TABLET ORAL EVERY 12 HOURS
Status: DISCONTINUED | OUTPATIENT
Start: 2024-08-24 | End: 2024-08-25 | Stop reason: HOSPADM

## 2024-08-24 RX ADMIN — MORPHINE SULFATE 30 MG: 30 TABLET, FILM COATED, EXTENDED RELEASE ORAL at 22:35

## 2024-08-24 RX ADMIN — PROCHLORPERAZINE EDISYLATE 10 MG: 5 INJECTION INTRAMUSCULAR; INTRAVENOUS at 20:34

## 2024-08-24 RX ADMIN — SODIUM CHLORIDE, PRESERVATIVE FREE 10 ML: 5 INJECTION INTRAVENOUS at 09:56

## 2024-08-24 RX ADMIN — DOCUSATE SODIUM 100 MG: 100 CAPSULE, LIQUID FILLED ORAL at 09:49

## 2024-08-24 RX ADMIN — METHOCARBAMOL TABLETS 750 MG: 500 TABLET, COATED ORAL at 09:49

## 2024-08-24 RX ADMIN — BENZOCAINE AND MENTHOL 1 LOZENGE: 15; 3.6 LOZENGE ORAL at 01:07

## 2024-08-24 RX ADMIN — BENZOCAINE AND MENTHOL 1 LOZENGE: 15; 3.6 LOZENGE ORAL at 05:03

## 2024-08-24 RX ADMIN — ONDANSETRON 4 MG: 2 INJECTION INTRAMUSCULAR; INTRAVENOUS at 09:56

## 2024-08-24 RX ADMIN — HYDROMORPHONE HYDROCHLORIDE 1 MG: 1 INJECTION, SOLUTION INTRAMUSCULAR; INTRAVENOUS; SUBCUTANEOUS at 20:34

## 2024-08-24 RX ADMIN — HYDROMORPHONE HYDROCHLORIDE 1 MG: 1 INJECTION, SOLUTION INTRAMUSCULAR; INTRAVENOUS; SUBCUTANEOUS at 23:25

## 2024-08-24 RX ADMIN — SODIUM CHLORIDE, PRESERVATIVE FREE 10 ML: 5 INJECTION INTRAVENOUS at 21:00

## 2024-08-24 RX ADMIN — MORPHINE SULFATE 30 MG: 30 TABLET, FILM COATED, EXTENDED RELEASE ORAL at 01:05

## 2024-08-24 RX ADMIN — Medication 2000 UNITS: at 09:49

## 2024-08-24 RX ADMIN — OXYCODONE 15 MG: 15 TABLET ORAL at 05:15

## 2024-08-24 RX ADMIN — BENZOCAINE AND MENTHOL 1 LOZENGE: 15; 3.6 LOZENGE ORAL at 17:13

## 2024-08-24 RX ADMIN — ONDANSETRON 4 MG: 2 INJECTION INTRAMUSCULAR; INTRAVENOUS at 16:32

## 2024-08-24 RX ADMIN — METHOCARBAMOL TABLETS 750 MG: 500 TABLET, COATED ORAL at 22:35

## 2024-08-24 RX ADMIN — OXYCODONE 15 MG: 15 TABLET ORAL at 09:49

## 2024-08-24 RX ADMIN — BENZOCAINE AND MENTHOL 1 LOZENGE: 15; 3.6 LOZENGE ORAL at 14:27

## 2024-08-24 RX ADMIN — SODIUM CHLORIDE: 9 INJECTION, SOLUTION INTRAVENOUS at 18:42

## 2024-08-24 ASSESSMENT — PAIN DESCRIPTION - LOCATION
LOCATION: BACK
LOCATION: ABDOMEN;BACK;ARM;LEG

## 2024-08-24 ASSESSMENT — PAIN SCALES - GENERAL
PAINLEVEL_OUTOF10: 7
PAINLEVEL_OUTOF10: 9
PAINLEVEL_OUTOF10: 9
PAINLEVEL_OUTOF10: 8

## 2024-08-24 ASSESSMENT — PAIN - FUNCTIONAL ASSESSMENT
PAIN_FUNCTIONAL_ASSESSMENT: ACTIVITIES ARE NOT PREVENTED
PAIN_FUNCTIONAL_ASSESSMENT: ACTIVITIES ARE NOT PREVENTED

## 2024-08-24 ASSESSMENT — PAIN DESCRIPTION - ORIENTATION: ORIENTATION: RIGHT;LEFT;LOWER;UPPER

## 2024-08-24 ASSESSMENT — PAIN DESCRIPTION - ONSET: ONSET: ON-GOING

## 2024-08-24 ASSESSMENT — PAIN DESCRIPTION - PAIN TYPE: TYPE: CHRONIC PAIN

## 2024-08-24 ASSESSMENT — PAIN DESCRIPTION - FREQUENCY: FREQUENCY: CONTINUOUS

## 2024-08-24 NOTE — PROGRESS NOTES
[facility] Hospitalist Progress Note    Admitting Date and Time: 8/20/2024  5:25 PM  Admit Dx: Hypercalcemia [E83.52]  Metastatic cancer to bone (HCC) [C79.51]  Primary prostate cancer with metastasis from prostate to other site (HCC) [C61]  Uncontrolled pain [R52]    Subjective:  Patient is being followed for Hypercalcemia [E83.52]  Metastatic cancer to bone (HCC) [C79.51]  Primary prostate cancer with metastasis from prostate to other site (HCC) [C61]  Uncontrolled pain [R52]     Spoke with patient as well as palliative care nurse    ROS: denies fever, chills, cp, sob, n/v, HA unless stated above.      morphine  30 mg Oral Q8H    HYDROmorphone  1 mg Oral Q12H    sodium chloride flush  5-40 mL IntraVENous 2 times per day    enoxaparin  40 mg SubCUTAneous Daily    naproxen  250 mg Oral BID WC    sucralfate  1 g Oral 4x Daily AC & HS    Vitamin D  2,000 Units Oral Daily    escitalopram  5 mg Oral Daily    lidocaine  1 patch TransDERmal Daily    docusate sodium  100 mg Oral Daily    gabapentin  100 mg Oral BID    methocarbamol  750 mg Oral 4x Daily     metoprolol, 2.5 mg, Q4H PRN  magic (miracle) mouthwash, 5 mL, 4x Daily PRN  sodium chloride flush, 5-40 mL, PRN  sodium chloride, , PRN  potassium chloride, 40 mEq, PRN   Or  potassium alternative oral replacement, 40 mEq, PRN   Or  potassium chloride, 10 mEq, PRN  magnesium sulfate, 2,000 mg, PRN  ondansetron, 4 mg, Q8H PRN   Or  ondansetron, 4 mg, Q6H PRN  polyethylene glycol, 17 g, Daily PRN  acetaminophen, 650 mg, Q6H PRN   Or  acetaminophen, 650 mg, Q6H PRN  hydrOXYzine pamoate, 25 mg, Nightly PRN  naloxone, 0.4 mg, PRN  promethazine, 6.25 mg, Q6H PRN  benzocaine-menthol, 1 lozenge, Q2H PRN         Objective:    BP 98/64   Pulse (!) 112   Temp 98.3 °F (36.8 °C) (Oral)   Resp 16   Ht 1.702 m (5' 7\")   Wt 84.5 kg (186 lb 3.2 oz)   SpO2 95%   BMI 29.16 kg/m²     General Appearance: alert and oriented to person, place and time and in no acute

## 2024-08-24 NOTE — PLAN OF CARE
Problem: Pain  Goal: Verbalizes/displays adequate comfort level or baseline comfort level  8/24/2024 0239 by Idalmis Tejeda, RN  Outcome: Progressing     Problem: Safety - Adult  Goal: Free from fall injury  8/24/2024 0239 by Idalmis Tejeda, RN  Outcome: Progressing     Problem: Discharge Planning  Goal: Discharge to home or other facility with appropriate resources  8/24/2024 0239 by Idalmis Tejeda, RN  Outcome: Progressing     Problem: ABCDS Injury Assessment  Goal: Absence of physical injury  8/24/2024 0239 by Idalmis Tejeda, RN  Outcome: Progressing     Problem: Nutrition Deficit:  Goal: Optimize nutritional status  8/24/2024 0239 by Idalmis Tejeda, RN  Outcome: Progressing

## 2024-08-25 ENCOUNTER — APPOINTMENT (OUTPATIENT)
Dept: CT IMAGING | Age: 44
DRG: 500 | End: 2024-08-25
Payer: MEDICAID

## 2024-08-25 VITALS
TEMPERATURE: 99.2 F | WEIGHT: 186.2 LBS | BODY MASS INDEX: 29.22 KG/M2 | RESPIRATION RATE: 17 BRPM | DIASTOLIC BLOOD PRESSURE: 65 MMHG | SYSTOLIC BLOOD PRESSURE: 111 MMHG | OXYGEN SATURATION: 95 % | HEART RATE: 105 BPM | HEIGHT: 67 IN

## 2024-08-25 PROCEDURE — 99233 SBSQ HOSP IP/OBS HIGH 50: CPT | Performed by: INTERNAL MEDICINE

## 2024-08-25 PROCEDURE — 74176 CT ABD & PELVIS W/O CONTRAST: CPT

## 2024-08-25 PROCEDURE — 6370000000 HC RX 637 (ALT 250 FOR IP): Performed by: INTERNAL MEDICINE

## 2024-08-25 PROCEDURE — 6370000000 HC RX 637 (ALT 250 FOR IP)

## 2024-08-25 PROCEDURE — 6360000002 HC RX W HCPCS: Performed by: NURSE PRACTITIONER

## 2024-08-25 PROCEDURE — 2580000003 HC RX 258: Performed by: INTERNAL MEDICINE

## 2024-08-25 PROCEDURE — 2580000003 HC RX 258

## 2024-08-25 RX ORDER — NALOXONE HYDROCHLORIDE 0.4 MG/ML
0.4 INJECTION, SOLUTION INTRAMUSCULAR; INTRAVENOUS; SUBCUTANEOUS PRN
Qty: 1 ML | Refills: 0 | Status: SHIPPED | OUTPATIENT
Start: 2024-08-25 | End: 2024-08-30

## 2024-08-25 RX ORDER — METHOCARBAMOL 750 MG/1
750 TABLET, FILM COATED ORAL 4 TIMES DAILY
Qty: 20 TABLET | Refills: 0 | Status: SHIPPED | OUTPATIENT
Start: 2024-08-25 | End: 2024-08-30

## 2024-08-25 RX ORDER — HYDROMORPHONE HYDROCHLORIDE 2 MG/1
2 TABLET ORAL EVERY 12 HOURS
Qty: 10 TABLET | Refills: 0 | Status: SHIPPED | OUTPATIENT
Start: 2024-08-25 | End: 2024-08-30

## 2024-08-25 RX ORDER — OXYCODONE HYDROCHLORIDE 5 MG/1
15 TABLET ORAL EVERY 4 HOURS
Qty: 30 TABLET | Refills: 0 | Status: SHIPPED | OUTPATIENT
Start: 2024-08-25 | End: 2024-08-30

## 2024-08-25 RX ORDER — GABAPENTIN 100 MG/1
100 CAPSULE ORAL 2 TIMES DAILY
Qty: 60 CAPSULE | Refills: 0 | Status: SHIPPED | OUTPATIENT
Start: 2024-08-25 | End: 2024-08-25 | Stop reason: HOSPADM

## 2024-08-25 RX ORDER — MORPHINE SULFATE 30 MG/1
30 TABLET, FILM COATED, EXTENDED RELEASE ORAL EVERY 8 HOURS
Qty: 15 TABLET | Refills: 0 | Status: SHIPPED | OUTPATIENT
Start: 2024-08-25 | End: 2024-08-25

## 2024-08-25 RX ORDER — MORPHINE SULFATE 30 MG/1
30 TABLET, FILM COATED, EXTENDED RELEASE ORAL EVERY 8 HOURS
Qty: 15 TABLET | Refills: 0 | Status: SHIPPED | OUTPATIENT
Start: 2024-08-25 | End: 2024-08-30

## 2024-08-25 RX ORDER — ALPRAZOLAM 0.5 MG
0.5 TABLET ORAL 3 TIMES DAILY PRN
Qty: 15 TABLET | Refills: 0 | Status: SHIPPED | OUTPATIENT
Start: 2024-08-25 | End: 2024-08-25 | Stop reason: HOSPADM

## 2024-08-25 RX ORDER — ONDANSETRON 4 MG/1
4 TABLET, ORALLY DISINTEGRATING ORAL EVERY 8 HOURS PRN
Qty: 30 TABLET | Refills: 0 | Status: SHIPPED | OUTPATIENT
Start: 2024-08-25 | End: 2024-09-04

## 2024-08-25 RX ADMIN — Medication 2000 UNITS: at 09:14

## 2024-08-25 RX ADMIN — SODIUM CHLORIDE: 9 INJECTION, SOLUTION INTRAVENOUS at 05:10

## 2024-08-25 RX ADMIN — HYDROMORPHONE HYDROCHLORIDE 1 MG: 1 INJECTION, SOLUTION INTRAMUSCULAR; INTRAVENOUS; SUBCUTANEOUS at 09:15

## 2024-08-25 RX ADMIN — SODIUM CHLORIDE, PRESERVATIVE FREE 10 ML: 5 INJECTION INTRAVENOUS at 13:14

## 2024-08-25 RX ADMIN — SODIUM CHLORIDE, PRESERVATIVE FREE 10 ML: 5 INJECTION INTRAVENOUS at 16:55

## 2024-08-25 RX ADMIN — MORPHINE SULFATE 30 MG: 30 TABLET, FILM COATED, EXTENDED RELEASE ORAL at 06:29

## 2024-08-25 RX ADMIN — METHOCARBAMOL TABLETS 750 MG: 500 TABLET, COATED ORAL at 15:41

## 2024-08-25 RX ADMIN — ONDANSETRON 4 MG: 4 TABLET, ORALLY DISINTEGRATING ORAL at 18:10

## 2024-08-25 RX ADMIN — SODIUM CHLORIDE, PRESERVATIVE FREE 10 ML: 5 INJECTION INTRAVENOUS at 09:17

## 2024-08-25 RX ADMIN — METHOCARBAMOL TABLETS 750 MG: 500 TABLET, COATED ORAL at 09:15

## 2024-08-25 RX ADMIN — DOCUSATE SODIUM 100 MG: 100 CAPSULE, LIQUID FILLED ORAL at 09:14

## 2024-08-25 RX ADMIN — MORPHINE SULFATE 30 MG: 30 TABLET, FILM COATED, EXTENDED RELEASE ORAL at 15:41

## 2024-08-25 RX ADMIN — HYDROMORPHONE HYDROCHLORIDE 1 MG: 1 INJECTION, SOLUTION INTRAMUSCULAR; INTRAVENOUS; SUBCUTANEOUS at 16:54

## 2024-08-25 RX ADMIN — HYDROMORPHONE HYDROCHLORIDE 1 MG: 1 INJECTION, SOLUTION INTRAMUSCULAR; INTRAVENOUS; SUBCUTANEOUS at 04:52

## 2024-08-25 RX ADMIN — HYDROMORPHONE HYDROCHLORIDE 1 MG: 1 INJECTION, SOLUTION INTRAMUSCULAR; INTRAVENOUS; SUBCUTANEOUS at 13:14

## 2024-08-25 ASSESSMENT — PAIN SCALES - GENERAL
PAINLEVEL_OUTOF10: 9
PAINLEVEL_OUTOF10: 8
PAINLEVEL_OUTOF10: 8
PAINLEVEL_OUTOF10: 9

## 2024-08-25 ASSESSMENT — PAIN DESCRIPTION - LOCATION
LOCATION: BACK

## 2024-08-25 NOTE — PROGRESS NOTES
Call to Dr. Matson's answering service re: CT abd results.  returned call - No further imaging needed from hem/onc standpoint.

## 2024-08-25 NOTE — DISCHARGE SUMMARY
[facility] Hospitalist Physician Discharge Summary       No follow-up provider specified.    Activity level: As tolerated     Dispo: Home      Condition on discharge: Stable     Patient ID:  Joseph Bond  [id number]  43 y.o.  1980    Admit date: 8/20/2024    Discharge date and time:  8/25/2024  3:22 PM    Admission Diagnoses: Principal Problem:    Cancer, metastatic to bone (HCC)  Active Problems:    Chronic midline low back pain with left-sided sciatica    Prostate cancer metastatic to bone (HCC)    Nausea and vomiting    Back pain    Primary prostate cancer with metastasis from prostate to other site (HCC)    Moderate protein-calorie malnutrition (HCC)  Resolved Problems:    * No resolved hospital problems. *      Discharge Diagnoses: Principal Problem:    Cancer, metastatic to bone (HCC)  Active Problems:    Chronic midline low back pain with left-sided sciatica    Prostate cancer metastatic to bone (HCC)    Nausea and vomiting    Back pain    Primary prostate cancer with metastasis from prostate to other site (HCC)    Moderate protein-calorie malnutrition (HCC)  Resolved Problems:    * No resolved hospital problems. *      Consults:  IP CONSULT TO PALLIATIVE CARE  IP CONSULT TO ONCOLOGY    Procedures: None    Hospital Course:   Patient Joseph Bond is a 43 y.o. presented with Hypercalcemia [E83.52]  Metastatic cancer to bone (HCC) [C79.51]  Primary prostate cancer with metastasis from prostate to other site (HCC) [C61]  Uncontrolled pain [R52]    This is a 43 year old male with stage IV prostate cancer who presented to the hospital with back pain, nausea, and vomiting. He did have palliative radiation therapy. Patient started on several combination of pain medications until his pain was tolerable. Palliative care was also consulted. Oncology was consulted as well in regard to discussion of systemic therapy. At this time patient has decided to go home and contemplate his options. I have given

## 2024-08-25 NOTE — PLAN OF CARE
Problem: Discharge Planning  Goal: Discharge to home or other facility with appropriate resources  Outcome: Completed     Problem: Pain  Goal: Verbalizes/displays adequate comfort level or baseline comfort level  Outcome: Completed     Problem: Safety - Adult  Goal: Free from fall injury  Outcome: Completed     Problem: ABCDS Injury Assessment  Goal: Absence of physical injury  Outcome: Completed     Problem: Nutrition Deficit:  Goal: Optimize nutritional status  Outcome: Completed

## 2024-08-25 NOTE — CARE COORDINATION
Notified by nursing staff that they were notified by patient's pharmacy that the prescription for MS Contin requires a prior authorization.  Request as submitted via [email or web address] to Luann, patient's pharmacy benefit .  Outcome pending.  Per pharmacist - anticipate cash price at $0.00 if auth is received, otherwise its $38.71.  Bedside nurse updated.  Will continue to follow for outcome and notify nursing if/when auth received.  Lyle Mallory, MSN RN  [facility] Case Management  [phone]

## 2024-09-06 ENCOUNTER — APPOINTMENT (OUTPATIENT)
Dept: CT IMAGING | Age: 44
DRG: 139 | End: 2024-09-06
Payer: MEDICAID

## 2024-09-06 ENCOUNTER — HOSPITAL ENCOUNTER (INPATIENT)
Age: 44
LOS: 1 days | Discharge: LEFT BEFORE DISCHARGE | DRG: 139 | End: 2024-09-07
Attending: EMERGENCY MEDICINE | Admitting: FAMILY MEDICINE
Payer: MEDICAID

## 2024-09-06 ENCOUNTER — APPOINTMENT (OUTPATIENT)
Dept: GENERAL RADIOLOGY | Age: 44
DRG: 139 | End: 2024-09-06
Payer: MEDICAID

## 2024-09-06 DIAGNOSIS — R09.02 HYPOXEMIA: ICD-10-CM

## 2024-09-06 DIAGNOSIS — R10.10 PAIN OF UPPER ABDOMEN: Primary | ICD-10-CM

## 2024-09-06 DIAGNOSIS — D69.6 THROMBOCYTOPENIA (HCC): ICD-10-CM

## 2024-09-06 DIAGNOSIS — R06.02 SHORTNESS OF BREATH: ICD-10-CM

## 2024-09-06 LAB
ALBUMIN SERPL-MCNC: 4.3 G/DL (ref 3.5–5.2)
ALP SERPL-CCNC: 229 U/L (ref 40–129)
ALT SERPL-CCNC: 27 U/L (ref 0–40)
ANION GAP SERPL CALCULATED.3IONS-SCNC: 16 MMOL/L (ref 7–16)
AST SERPL-CCNC: 108 U/L (ref 0–39)
BASOPHILS # BLD: 0 K/UL (ref 0–0.2)
BASOPHILS NFR BLD: 0 % (ref 0–2)
BILIRUB SERPL-MCNC: 0.7 MG/DL (ref 0–1.2)
BILIRUB UR QL STRIP: NEGATIVE
BUN SERPL-MCNC: 14 MG/DL (ref 6–20)
CALCIUM SERPL-MCNC: 10.2 MG/DL (ref 8.6–10.2)
CASTS #/AREA URNS LPF: ABNORMAL /LPF
CHLORIDE SERPL-SCNC: 97 MMOL/L (ref 98–107)
CLARITY UR: CLEAR
CO2 SERPL-SCNC: 24 MMOL/L (ref 22–29)
COLOR UR: YELLOW
CREAT SERPL-MCNC: 0.9 MG/DL (ref 0.7–1.2)
CRYSTALS URNS MICRO: ABNORMAL /HPF
EOSINOPHIL # BLD: 0 K/UL (ref 0.05–0.5)
EOSINOPHILS RELATIVE PERCENT: 0 % (ref 0–6)
ERYTHROCYTE [DISTWIDTH] IN BLOOD BY AUTOMATED COUNT: 16.4 % (ref 11.5–15)
GFR, ESTIMATED: >90 ML/MIN/1.73M2
GLUCOSE SERPL-MCNC: 101 MG/DL (ref 74–99)
GLUCOSE UR STRIP-MCNC: NEGATIVE MG/DL
HCT VFR BLD AUTO: 26.6 % (ref 37–54)
HGB BLD-MCNC: 8.9 G/DL (ref 12.5–16.5)
HGB UR QL STRIP.AUTO: ABNORMAL
KETONES UR STRIP-MCNC: NEGATIVE MG/DL
LACTATE BLDV-SCNC: 2.3 MMOL/L (ref 0.5–2.2)
LEUKOCYTE ESTERASE UR QL STRIP: NEGATIVE
LYMPHOCYTES NFR BLD: 0.24 K/UL (ref 1.5–4)
LYMPHOCYTES RELATIVE PERCENT: 6 % (ref 20–42)
MCH RBC QN AUTO: 28.9 PG (ref 26–35)
MCHC RBC AUTO-ENTMCNC: 33.5 G/DL (ref 32–34.5)
MCV RBC AUTO: 86.4 FL (ref 80–99.9)
MONOCYTES NFR BLD: 0.28 K/UL (ref 0.1–0.95)
MONOCYTES NFR BLD: 7 % (ref 2–12)
MUCOUS THREADS URNS QL MICRO: PRESENT
NEUTROPHILS NFR BLD: 87 % (ref 43–80)
NEUTS SEG NFR BLD: 3.48 K/UL (ref 1.8–7.3)
NITRITE UR QL STRIP: NEGATIVE
PH UR STRIP: 5.5 [PH] (ref 5–9)
PLATELET CONFIRMATION: NORMAL
PLATELET, FLUORESCENCE: 22 K/UL (ref 130–450)
PMV BLD AUTO: ABNORMAL FL (ref 7–12)
POTASSIUM SERPL-SCNC: 3.8 MMOL/L (ref 3.5–5)
PROT SERPL-MCNC: 7.4 G/DL (ref 6.4–8.3)
PROT UR STRIP-MCNC: NEGATIVE MG/DL
RBC # BLD AUTO: 3.08 M/UL (ref 3.8–5.8)
RBC # BLD: ABNORMAL 10*6/UL
RBC #/AREA URNS HPF: ABNORMAL /HPF
SODIUM SERPL-SCNC: 137 MMOL/L (ref 132–146)
SP GR UR STRIP: >1.03 (ref 1–1.03)
TROPONIN I SERPL HS-MCNC: 11 NG/L (ref 0–11)
UROBILINOGEN UR STRIP-ACNC: 0.2 EU/DL (ref 0–1)
WBC #/AREA URNS HPF: ABNORMAL /HPF
WBC OTHER # BLD: 4 K/UL (ref 4.5–11.5)

## 2024-09-06 PROCEDURE — 99285 EMERGENCY DEPT VISIT HI MDM: CPT

## 2024-09-06 PROCEDURE — 71275 CT ANGIOGRAPHY CHEST: CPT

## 2024-09-06 PROCEDURE — 74177 CT ABD & PELVIS W/CONTRAST: CPT

## 2024-09-06 PROCEDURE — 1200000000 HC SEMI PRIVATE

## 2024-09-06 PROCEDURE — 2500000003 HC RX 250 WO HCPCS: Performed by: EMERGENCY MEDICINE

## 2024-09-06 PROCEDURE — 85025 COMPLETE CBC W/AUTO DIFF WBC: CPT

## 2024-09-06 PROCEDURE — 84484 ASSAY OF TROPONIN QUANT: CPT

## 2024-09-06 PROCEDURE — 71045 X-RAY EXAM CHEST 1 VIEW: CPT

## 2024-09-06 PROCEDURE — 83605 ASSAY OF LACTIC ACID: CPT

## 2024-09-06 PROCEDURE — 93005 ELECTROCARDIOGRAM TRACING: CPT | Performed by: EMERGENCY MEDICINE

## 2024-09-06 PROCEDURE — 81001 URINALYSIS AUTO W/SCOPE: CPT

## 2024-09-06 PROCEDURE — 96375 TX/PRO/DX INJ NEW DRUG ADDON: CPT

## 2024-09-06 PROCEDURE — 6360000004 HC RX CONTRAST MEDICATION: Performed by: RADIOLOGY

## 2024-09-06 PROCEDURE — 6360000002 HC RX W HCPCS: Performed by: EMERGENCY MEDICINE

## 2024-09-06 PROCEDURE — 80053 COMPREHEN METABOLIC PANEL: CPT

## 2024-09-06 PROCEDURE — 96374 THER/PROPH/DIAG INJ IV PUSH: CPT

## 2024-09-06 PROCEDURE — 2580000003 HC RX 258: Performed by: RADIOLOGY

## 2024-09-06 PROCEDURE — 83690 ASSAY OF LIPASE: CPT

## 2024-09-06 RX ORDER — HYDROMORPHONE HYDROCHLORIDE 1 MG/ML
1 INJECTION, SOLUTION INTRAMUSCULAR; INTRAVENOUS; SUBCUTANEOUS ONCE
Status: COMPLETED | OUTPATIENT
Start: 2024-09-06 | End: 2024-09-06

## 2024-09-06 RX ORDER — FENTANYL CITRATE 50 UG/ML
25 INJECTION, SOLUTION INTRAMUSCULAR; INTRAVENOUS ONCE
Status: DISCONTINUED | OUTPATIENT
Start: 2024-09-06 | End: 2024-09-07

## 2024-09-06 RX ORDER — SODIUM CHLORIDE 0.9 % (FLUSH) 0.9 %
10 SYRINGE (ML) INJECTION PRN
Status: COMPLETED | OUTPATIENT
Start: 2024-09-06 | End: 2024-09-06

## 2024-09-06 RX ORDER — IOPAMIDOL 755 MG/ML
75 INJECTION, SOLUTION INTRAVASCULAR
Status: COMPLETED | OUTPATIENT
Start: 2024-09-06 | End: 2024-09-06

## 2024-09-06 RX ORDER — ONDANSETRON 2 MG/ML
4 INJECTION INTRAMUSCULAR; INTRAVENOUS EVERY 6 HOURS PRN
Status: DISCONTINUED | OUTPATIENT
Start: 2024-09-06 | End: 2024-09-07 | Stop reason: SDUPTHER

## 2024-09-06 RX ADMIN — ONDANSETRON 4 MG: 2 INJECTION INTRAMUSCULAR; INTRAVENOUS at 22:18

## 2024-09-06 RX ADMIN — SODIUM CHLORIDE, PRESERVATIVE FREE 10 ML: 5 INJECTION INTRAVENOUS at 23:44

## 2024-09-06 RX ADMIN — IOPAMIDOL 75 ML: 755 INJECTION, SOLUTION INTRAVENOUS at 23:43

## 2024-09-06 RX ADMIN — HYDROMORPHONE HYDROCHLORIDE 1 MG: 1 INJECTION, SOLUTION INTRAMUSCULAR; INTRAVENOUS; SUBCUTANEOUS at 22:18

## 2024-09-06 ASSESSMENT — PAIN SCALES - GENERAL
PAINLEVEL_OUTOF10: 5
PAINLEVEL_OUTOF10: 10

## 2024-09-06 ASSESSMENT — PAIN DESCRIPTION - LOCATION: LOCATION: OTHER (COMMENT)

## 2024-09-06 ASSESSMENT — PAIN DESCRIPTION - ORIENTATION: ORIENTATION: OTHER (COMMENT)

## 2024-09-07 VITALS
OXYGEN SATURATION: 94 % | WEIGHT: 186.2 LBS | SYSTOLIC BLOOD PRESSURE: 104 MMHG | DIASTOLIC BLOOD PRESSURE: 78 MMHG | HEART RATE: 100 BPM | BODY MASS INDEX: 29.22 KG/M2 | RESPIRATION RATE: 17 BRPM | TEMPERATURE: 98.4 F | HEIGHT: 67 IN

## 2024-09-07 DIAGNOSIS — J18.9 COMMUNITY ACQUIRED PNEUMONIA, UNSPECIFIED LATERALITY: Primary | ICD-10-CM

## 2024-09-07 PROBLEM — R06.02 SHORTNESS OF BREATH: Status: ACTIVE | Noted: 2024-09-07

## 2024-09-07 LAB
ABO + RH BLD: NORMAL
ANION GAP SERPL CALCULATED.3IONS-SCNC: 11 MMOL/L (ref 7–16)
ARM BAND NUMBER: NORMAL
B PARAP IS1001 DNA NPH QL NAA+NON-PROBE: NOT DETECTED
B PERT DNA SPEC QL NAA+PROBE: NOT DETECTED
BASOPHILS # BLD: 0.06 K/UL (ref 0–0.2)
BASOPHILS NFR BLD: 2 % (ref 0–2)
BLOOD BANK SAMPLE EXPIRATION: NORMAL
BLOOD GROUP ANTIBODIES SERPL: NEGATIVE
BUN SERPL-MCNC: 13 MG/DL (ref 6–20)
C PNEUM DNA NPH QL NAA+NON-PROBE: NOT DETECTED
CALCIUM SERPL-MCNC: 9.4 MG/DL (ref 8.6–10.2)
CHLORIDE SERPL-SCNC: 101 MMOL/L (ref 98–107)
CO2 SERPL-SCNC: 26 MMOL/L (ref 22–29)
CREAT SERPL-MCNC: 0.8 MG/DL (ref 0.7–1.2)
EKG ATRIAL RATE: 94 BPM
EKG P AXIS: 33 DEGREES
EKG P-R INTERVAL: 142 MS
EKG Q-T INTERVAL: 350 MS
EKG QRS DURATION: 88 MS
EKG QTC CALCULATION (BAZETT): 437 MS
EKG R AXIS: 18 DEGREES
EKG T AXIS: 16 DEGREES
EKG VENTRICULAR RATE: 94 BPM
EOSINOPHIL # BLD: 0.03 K/UL (ref 0.05–0.5)
EOSINOPHILS RELATIVE PERCENT: 1 % (ref 0–6)
ERYTHROCYTE [DISTWIDTH] IN BLOOD BY AUTOMATED COUNT: 16.7 % (ref 11.5–15)
FERRITIN SERPL-MCNC: 892 NG/ML
FLUAV RNA NPH QL NAA+NON-PROBE: NOT DETECTED
FLUBV RNA NPH QL NAA+NON-PROBE: NOT DETECTED
FOLATE SERPL-MCNC: 6.8 NG/ML (ref 4.8–24.2)
GFR, ESTIMATED: >90 ML/MIN/1.73M2
GLUCOSE SERPL-MCNC: 98 MG/DL (ref 74–99)
HADV DNA NPH QL NAA+NON-PROBE: NOT DETECTED
HCOV 229E RNA NPH QL NAA+NON-PROBE: NOT DETECTED
HCOV HKU1 RNA NPH QL NAA+NON-PROBE: NOT DETECTED
HCOV NL63 RNA NPH QL NAA+NON-PROBE: NOT DETECTED
HCOV OC43 RNA NPH QL NAA+NON-PROBE: NOT DETECTED
HCT VFR BLD AUTO: 22.7 % (ref 37–54)
HCYS SERPL-SCNC: 14.6 UMOL/L (ref 0–15)
HGB BLD-MCNC: 7.4 G/DL (ref 12.5–16.5)
HMPV RNA NPH QL NAA+NON-PROBE: NOT DETECTED
HPIV1 RNA NPH QL NAA+NON-PROBE: NOT DETECTED
HPIV2 RNA NPH QL NAA+NON-PROBE: NOT DETECTED
HPIV3 RNA NPH QL NAA+NON-PROBE: NOT DETECTED
HPIV4 RNA NPH QL NAA+NON-PROBE: NOT DETECTED
IMM RETICS NFR: 29.9 % (ref 2.3–13.4)
INR PPP: 1.4
IRON SATN MFR SERPL: 46 % (ref 20–55)
IRON SERPL-MCNC: 133 UG/DL (ref 59–158)
L PNEUMO1 AG UR QL IA.RAPID: NEGATIVE
LDH SERPL-CCNC: 900 U/L (ref 135–225)
LIPASE SERPL-CCNC: 9 U/L (ref 13–60)
LYMPHOCYTES NFR BLD: 0.32 K/UL (ref 1.5–4)
LYMPHOCYTES RELATIVE PERCENT: 10 % (ref 20–42)
M PNEUMO DNA NPH QL NAA+NON-PROBE: NOT DETECTED
MCH RBC QN AUTO: 28.1 PG (ref 26–35)
MCHC RBC AUTO-ENTMCNC: 32.6 G/DL (ref 32–34.5)
MCV RBC AUTO: 86.3 FL (ref 80–99.9)
METAMYELOCYTES ABSOLUTE COUNT: 0.03 K/UL (ref 0–0.12)
METAMYELOCYTES: 1 % (ref 0–1)
MONOCYTES NFR BLD: 0.14 K/UL (ref 0.1–0.95)
MONOCYTES NFR BLD: 4 % (ref 2–12)
MYELOCYTES ABSOLUTE COUNT: 0.09 K/UL
MYELOCYTES: 3 %
NEUTROPHILS NFR BLD: 80 % (ref 43–80)
NEUTS SEG NFR BLD: 2.63 K/UL (ref 1.8–7.3)
PARTIAL THROMBOPLASTIN TIME: 27.6 SEC (ref 24.5–35.1)
PLATELET CONFIRMATION: NORMAL
PLATELET, FLUORESCENCE: 18 K/UL (ref 130–450)
PMV BLD AUTO: ABNORMAL FL (ref 7–12)
POTASSIUM SERPL-SCNC: 4 MMOL/L (ref 3.5–5)
PROTHROMBIN TIME: 15.2 SEC (ref 9.3–12.4)
PSA SERPL-MCNC: 140.9 NG/ML (ref 0–4)
RBC # BLD AUTO: 2.63 M/UL (ref 3.8–5.8)
RBC # BLD: ABNORMAL 10*6/UL
RETIC HEMOGLOBIN: 33.6 PG (ref 28.2–36.6)
RETICS # AUTO: 0.08 M/UL
RETICS/RBC NFR AUTO: 2.9 % (ref 0.4–1.9)
RSV RNA NPH QL NAA+NON-PROBE: NOT DETECTED
RV+EV RNA NPH QL NAA+NON-PROBE: NOT DETECTED
S PNEUM AG SPEC QL: NEGATIVE
SARS-COV-2 RNA NPH QL NAA+NON-PROBE: NOT DETECTED
SODIUM SERPL-SCNC: 138 MMOL/L (ref 132–146)
SPECIMEN DESCRIPTION: NORMAL
SPECIMEN SOURCE: NORMAL
TIBC SERPL-MCNC: 292 UG/DL (ref 250–450)
TROPONIN I SERPL HS-MCNC: 10 NG/L (ref 0–11)
VIT B12 SERPL-MCNC: 681 PG/ML (ref 211–946)
WBC OTHER # BLD: 3.3 K/UL (ref 4.5–11.5)

## 2024-09-07 PROCEDURE — 87205 SMEAR GRAM STAIN: CPT

## 2024-09-07 PROCEDURE — 96367 TX/PROPH/DG ADDL SEQ IV INF: CPT

## 2024-09-07 PROCEDURE — 85045 AUTOMATED RETICULOCYTE COUNT: CPT

## 2024-09-07 PROCEDURE — 1200000000 HC SEMI PRIVATE

## 2024-09-07 PROCEDURE — 6370000000 HC RX 637 (ALT 250 FOR IP)

## 2024-09-07 PROCEDURE — 85610 PROTHROMBIN TIME: CPT

## 2024-09-07 PROCEDURE — 82607 VITAMIN B-12: CPT

## 2024-09-07 PROCEDURE — 83615 LACTATE (LD) (LDH) ENZYME: CPT

## 2024-09-07 PROCEDURE — 86900 BLOOD TYPING SEROLOGIC ABO: CPT

## 2024-09-07 PROCEDURE — 86901 BLOOD TYPING SEROLOGIC RH(D): CPT

## 2024-09-07 PROCEDURE — 0202U NFCT DS 22 TRGT SARS-COV-2: CPT

## 2024-09-07 PROCEDURE — 86850 RBC ANTIBODY SCREEN: CPT

## 2024-09-07 PROCEDURE — 93010 ELECTROCARDIOGRAM REPORT: CPT | Performed by: INTERNAL MEDICINE

## 2024-09-07 PROCEDURE — 82746 ASSAY OF FOLIC ACID SERUM: CPT

## 2024-09-07 PROCEDURE — 85025 COMPLETE CBC W/AUTO DIFF WBC: CPT

## 2024-09-07 PROCEDURE — 83550 IRON BINDING TEST: CPT

## 2024-09-07 PROCEDURE — 83921 ORGANIC ACID SINGLE QUANT: CPT

## 2024-09-07 PROCEDURE — 99222 1ST HOSP IP/OBS MODERATE 55: CPT

## 2024-09-07 PROCEDURE — 85730 THROMBOPLASTIN TIME PARTIAL: CPT

## 2024-09-07 PROCEDURE — 6360000002 HC RX W HCPCS: Performed by: EMERGENCY MEDICINE

## 2024-09-07 PROCEDURE — 82728 ASSAY OF FERRITIN: CPT

## 2024-09-07 PROCEDURE — 83540 ASSAY OF IRON: CPT

## 2024-09-07 PROCEDURE — 80048 BASIC METABOLIC PNL TOTAL CA: CPT

## 2024-09-07 PROCEDURE — 83090 ASSAY OF HOMOCYSTEINE: CPT

## 2024-09-07 PROCEDURE — 87040 BLOOD CULTURE FOR BACTERIA: CPT

## 2024-09-07 PROCEDURE — 2500000003 HC RX 250 WO HCPCS: Performed by: EMERGENCY MEDICINE

## 2024-09-07 PROCEDURE — 87070 CULTURE OTHR SPECIMN AEROBIC: CPT

## 2024-09-07 PROCEDURE — 99285 EMERGENCY DEPT VISIT HI MDM: CPT

## 2024-09-07 PROCEDURE — 2580000003 HC RX 258: Performed by: EMERGENCY MEDICINE

## 2024-09-07 PROCEDURE — 87449 NOS EACH ORGANISM AG IA: CPT

## 2024-09-07 PROCEDURE — 6360000002 HC RX W HCPCS

## 2024-09-07 PROCEDURE — 84153 ASSAY OF PSA TOTAL: CPT

## 2024-09-07 PROCEDURE — 87899 AGENT NOS ASSAY W/OPTIC: CPT

## 2024-09-07 PROCEDURE — 84484 ASSAY OF TROPONIN QUANT: CPT

## 2024-09-07 PROCEDURE — 36415 COLL VENOUS BLD VENIPUNCTURE: CPT

## 2024-09-07 RX ORDER — SODIUM CHLORIDE 9 MG/ML
INJECTION, SOLUTION INTRAVENOUS PRN
Status: DISCONTINUED | OUTPATIENT
Start: 2024-09-07 | End: 2024-09-07 | Stop reason: HOSPADM

## 2024-09-07 RX ORDER — POLYETHYLENE GLYCOL 3350 17 G/17G
17 POWDER, FOR SOLUTION ORAL DAILY
Status: ON HOLD | COMMUNITY

## 2024-09-07 RX ORDER — METHOCARBAMOL 500 MG/1
750 TABLET, FILM COATED ORAL 4 TIMES DAILY
Status: DISCONTINUED | OUTPATIENT
Start: 2024-09-07 | End: 2024-09-07 | Stop reason: HOSPADM

## 2024-09-07 RX ORDER — ONDANSETRON 4 MG/1
4 TABLET, ORALLY DISINTEGRATING ORAL EVERY 8 HOURS PRN
Status: DISCONTINUED | OUTPATIENT
Start: 2024-09-07 | End: 2024-09-07 | Stop reason: HOSPADM

## 2024-09-07 RX ORDER — ONDANSETRON 2 MG/ML
4 INJECTION INTRAMUSCULAR; INTRAVENOUS EVERY 6 HOURS PRN
Status: DISCONTINUED | OUTPATIENT
Start: 2024-09-07 | End: 2024-09-07 | Stop reason: HOSPADM

## 2024-09-07 RX ORDER — HYDROMORPHONE HYDROCHLORIDE 2 MG/1
2 TABLET ORAL EVERY 4 HOURS PRN
Status: DISCONTINUED | OUTPATIENT
Start: 2024-09-07 | End: 2024-09-07 | Stop reason: HOSPADM

## 2024-09-07 RX ORDER — 0.9 % SODIUM CHLORIDE 0.9 %
1000 INTRAVENOUS SOLUTION INTRAVENOUS ONCE
Status: COMPLETED | OUTPATIENT
Start: 2024-09-07 | End: 2024-09-07

## 2024-09-07 RX ORDER — HYDROMORPHONE HYDROCHLORIDE 1 MG/ML
1 INJECTION, SOLUTION INTRAMUSCULAR; INTRAVENOUS; SUBCUTANEOUS ONCE
Status: COMPLETED | OUTPATIENT
Start: 2024-09-07 | End: 2024-09-07

## 2024-09-07 RX ORDER — OXYCODONE HYDROCHLORIDE 15 MG/1
15 TABLET ORAL EVERY 4 HOURS PRN
Status: DISCONTINUED | OUTPATIENT
Start: 2024-09-07 | End: 2024-09-07 | Stop reason: HOSPADM

## 2024-09-07 RX ORDER — ENOXAPARIN SODIUM 100 MG/ML
40 INJECTION SUBCUTANEOUS DAILY
Status: DISCONTINUED | OUTPATIENT
Start: 2024-09-07 | End: 2024-09-07

## 2024-09-07 RX ORDER — DOXYCYCLINE 100 MG/1
100 CAPSULE ORAL EVERY 12 HOURS SCHEDULED
Status: DISCONTINUED | OUTPATIENT
Start: 2024-09-07 | End: 2024-09-07 | Stop reason: HOSPADM

## 2024-09-07 RX ORDER — POLYETHYLENE GLYCOL 3350 17 G/17G
17 POWDER, FOR SOLUTION ORAL DAILY
Status: DISCONTINUED | OUTPATIENT
Start: 2024-09-07 | End: 2024-09-07 | Stop reason: HOSPADM

## 2024-09-07 RX ORDER — MORPHINE SULFATE 30 MG/1
30 TABLET, FILM COATED, EXTENDED RELEASE ORAL 2 TIMES DAILY
COMMUNITY
End: 2024-09-26

## 2024-09-07 RX ORDER — PANTOPRAZOLE SODIUM 40 MG/1
40 TABLET, DELAYED RELEASE ORAL
Status: DISCONTINUED | OUTPATIENT
Start: 2024-09-07 | End: 2024-09-07 | Stop reason: HOSPADM

## 2024-09-07 RX ORDER — SODIUM CHLORIDE 0.9 % (FLUSH) 0.9 %
5-40 SYRINGE (ML) INJECTION EVERY 12 HOURS SCHEDULED
Status: DISCONTINUED | OUTPATIENT
Start: 2024-09-07 | End: 2024-09-07 | Stop reason: HOSPADM

## 2024-09-07 RX ORDER — GUAIFENESIN 400 MG/1
400 TABLET ORAL 3 TIMES DAILY
Status: DISCONTINUED | OUTPATIENT
Start: 2024-09-07 | End: 2024-09-07 | Stop reason: HOSPADM

## 2024-09-07 RX ORDER — HYDROMORPHONE HYDROCHLORIDE 2 MG/1
2 TABLET ORAL EVERY 12 HOURS PRN
Status: ON HOLD | COMMUNITY

## 2024-09-07 RX ORDER — SENNOSIDES A AND B 8.6 MG/1
1 TABLET, FILM COATED ORAL DAILY PRN
Status: DISCONTINUED | OUTPATIENT
Start: 2024-09-07 | End: 2024-09-07 | Stop reason: HOSPADM

## 2024-09-07 RX ORDER — ONDANSETRON 8 MG/1
8 TABLET, ORALLY DISINTEGRATING ORAL EVERY 8 HOURS PRN
Status: ON HOLD | COMMUNITY
Start: 2024-08-05

## 2024-09-07 RX ORDER — CEFDINIR 300 MG/1
300 CAPSULE ORAL 2 TIMES DAILY
Qty: 10 CAPSULE | Refills: 0 | Status: SHIPPED | OUTPATIENT
Start: 2024-09-07 | End: 2024-09-12

## 2024-09-07 RX ORDER — SODIUM CHLORIDE 0.9 % (FLUSH) 0.9 %
5-40 SYRINGE (ML) INJECTION PRN
Status: DISCONTINUED | OUTPATIENT
Start: 2024-09-07 | End: 2024-09-07 | Stop reason: HOSPADM

## 2024-09-07 RX ORDER — ACETAMINOPHEN 650 MG/1
650 SUPPOSITORY RECTAL EVERY 6 HOURS PRN
Status: DISCONTINUED | OUTPATIENT
Start: 2024-09-07 | End: 2024-09-07 | Stop reason: HOSPADM

## 2024-09-07 RX ORDER — DOXYCYCLINE HYCLATE 100 MG
100 TABLET ORAL 2 TIMES DAILY
Qty: 10 TABLET | Refills: 0 | Status: SHIPPED | OUTPATIENT
Start: 2024-09-07 | End: 2024-09-12

## 2024-09-07 RX ORDER — ACETAMINOPHEN 325 MG/1
650 TABLET ORAL EVERY 6 HOURS PRN
Status: DISCONTINUED | OUTPATIENT
Start: 2024-09-07 | End: 2024-09-07 | Stop reason: HOSPADM

## 2024-09-07 RX ORDER — OXYCODONE HYDROCHLORIDE 10 MG/1
15 TABLET ORAL EVERY 4 HOURS PRN
Status: ON HOLD | COMMUNITY

## 2024-09-07 RX ORDER — PROCHLORPERAZINE EDISYLATE 5 MG/ML
10 INJECTION INTRAMUSCULAR; INTRAVENOUS ONCE
Status: COMPLETED | OUTPATIENT
Start: 2024-09-07 | End: 2024-09-07

## 2024-09-07 RX ORDER — ESCITALOPRAM OXALATE 10 MG/1
5 TABLET ORAL DAILY
Status: DISCONTINUED | OUTPATIENT
Start: 2024-09-07 | End: 2024-09-07 | Stop reason: HOSPADM

## 2024-09-07 RX ORDER — CHOLECALCIFEROL (VITAMIN D3) 50 MCG
2000 TABLET ORAL DAILY
Status: DISCONTINUED | OUTPATIENT
Start: 2024-09-07 | End: 2024-09-07 | Stop reason: HOSPADM

## 2024-09-07 RX ADMIN — DOXYCYCLINE HYCLATE 100 MG: 100 CAPSULE ORAL at 10:09

## 2024-09-07 RX ADMIN — OXYCODONE HYDROCHLORIDE 15 MG: 15 TABLET ORAL at 11:30

## 2024-09-07 RX ADMIN — DOXYCYCLINE 100 MG: 100 INJECTION, POWDER, LYOPHILIZED, FOR SOLUTION INTRAVENOUS at 03:14

## 2024-09-07 RX ADMIN — Medication 2000 UNITS: at 08:15

## 2024-09-07 RX ADMIN — PROCHLORPERAZINE EDISYLATE 10 MG: 5 INJECTION INTRAMUSCULAR; INTRAVENOUS at 04:06

## 2024-09-07 RX ADMIN — HYDROMORPHONE HYDROCHLORIDE 2 MG: 2 TABLET ORAL at 08:16

## 2024-09-07 RX ADMIN — SODIUM CHLORIDE 1000 ML: 9 INJECTION, SOLUTION INTRAVENOUS at 02:08

## 2024-09-07 RX ADMIN — HYDROMORPHONE HYDROCHLORIDE 1 MG: 1 INJECTION, SOLUTION INTRAMUSCULAR; INTRAVENOUS; SUBCUTANEOUS at 03:02

## 2024-09-07 RX ADMIN — ONDANSETRON 4 MG: 2 INJECTION INTRAMUSCULAR; INTRAVENOUS at 08:24

## 2024-09-07 RX ADMIN — HYDROMORPHONE HYDROCHLORIDE 1 MG: 1 INJECTION, SOLUTION INTRAMUSCULAR; INTRAVENOUS; SUBCUTANEOUS at 00:48

## 2024-09-07 RX ADMIN — WATER 1000 MG: 1 INJECTION INTRAMUSCULAR; INTRAVENOUS; SUBCUTANEOUS at 03:02

## 2024-09-07 RX ADMIN — GUAIFENESIN 400 MG: 400 TABLET ORAL at 08:15

## 2024-09-07 RX ADMIN — HYDROMORPHONE HYDROCHLORIDE 2 MG: 2 TABLET ORAL at 14:32

## 2024-09-07 RX ADMIN — PANTOPRAZOLE SODIUM 40 MG: 40 TABLET, DELAYED RELEASE ORAL at 08:15

## 2024-09-07 ASSESSMENT — PAIN - FUNCTIONAL ASSESSMENT: PAIN_FUNCTIONAL_ASSESSMENT: 0-10

## 2024-09-07 ASSESSMENT — PAIN DESCRIPTION - ORIENTATION: ORIENTATION: RIGHT;LEFT

## 2024-09-07 ASSESSMENT — PAIN SCALES - GENERAL
PAINLEVEL_OUTOF10: 5
PAINLEVEL_OUTOF10: 10
PAINLEVEL_OUTOF10: 5
PAINLEVEL_OUTOF10: 9
PAINLEVEL_OUTOF10: 8
PAINLEVEL_OUTOF10: 9

## 2024-09-07 ASSESSMENT — PAIN DESCRIPTION - DESCRIPTORS
DESCRIPTORS: SHARP;ACHING
DESCRIPTORS: ACHING;DISCOMFORT
DESCRIPTORS: ACHING;SHARP
DESCRIPTORS: DISCOMFORT;DULL

## 2024-09-07 ASSESSMENT — PAIN DESCRIPTION - LOCATION
LOCATION: ABDOMEN;BACK
LOCATION: BACK;RIB CAGE
LOCATION: BACK;RIB CAGE
LOCATION: BACK;ABDOMEN
LOCATION: BACK;RIB CAGE

## 2024-09-07 NOTE — PROGRESS NOTES
Pt spoke with Dr. Delacruz and Oncology who want pt to stay for admission. Pt still requests to leave AMA. Dr. Delacruz notified and asked if pt can have needed medications sent to [facility] on [address] per pt request. RN explained risks of leaving against medical advice and informed to come back if condition worsens. AMA paper work signed and in pt chart.

## 2024-09-07 NOTE — PROGRESS NOTES
Pt taken off O2 and trying ambulation. While sitting up and trying to get out of bed, Pt O2 dropped to 86% on RA. Pt states \"I feel okay and still want to talk with a Dr about leaving\". RN spoke with pt and explained risks and O2 necessity. Pt agreed to stay until Dr saw him. Dr. Jayme mary served with update. Pt placed back on 2L O2.

## 2024-09-07 NOTE — CONSULTS
[facility]  Hematology/Oncology  Consult  Dr. Aaron Matson M.D.    Patient Name: Joseph Bond  YOB: 1980  PCP: Ty Shaikh MD   Referring Provider:      Reason for Consultation:   Chief Complaint   Patient presents with    Shortness of Breath     (+N/V also, started this evening, in process of getting radiation tx for prostate CA) Pain also, patient unable to take pain med tonight due to N/V.        History of Present Illness:   This is a 43-year-old male patient with stage 4 prostate ca with retroperitoneal mediastinal lymphadenopathy diffuse bone metastasis. . Considering high tumor burden, advised treatment with Taxotere, GnRH agonist, Nubeqa along with Xgeva. He was started on Casodex although stopped taking it after he read about side effects. He has so far decided not to be on any systemic therapy. MRI lumbar spine showed T12 L4 metastasis with epidural extension. S/p palliative RT to his spine. He has also been looking into homeopathy. Genetic testing also sent.  Follows with palliative care for pain management.       Patient came to the ER complaining of shortness of breath and hemoptysis.  Labs in the ER remarkable for alk phos 229  hemoglobin 8.9 platelets 22,000.  CT chest abdomen pelvis showed progressive sclerotic/lytic lesions, pelvic and retroperitoneal lymphadenopathy.  Groundglass opacities in right upper lobe.  Patient admitted with pneumonia.  Started on antibiotics.         Review of systems: Over 10 systems were reviewed and all were negative except as mention above      Diagnostic Data:     Past Medical History:   Diagnosis Date    Cancer (HCC)     Diverticulosis     between 2016 to 2017 diverticulitis an diverticulosis    History of opioid abuse (HCC)        Patient Active Problem List    Diagnosis Date Noted    Pneumonia, unspecified organism 09/07/2024    Shortness of breath 09/07/2024    Cancer, metastatic to bone (HCC) 08/21/2024

## 2024-09-07 NOTE — ED NOTES
Pt is refusing to do any respiratory workups at this time, attempted to educate the pt on the importance of getting the results. Pt still declined. Pt stated he is also in pain but is declining any PO medications ordered PRN; attempted to educate pt on the usage of PO medication and helping with breakthrough pain, pt still declined stating \"it will not help\". Message sent to Resident on call.

## 2024-09-07 NOTE — PROGRESS NOTES
Pt stating he wants to go home now and does not want to wait for admission. Rn sent message to Dr Delacruz who states pt would need to leave AMA. Pt requesting Dr grant speak with him when possible. Pt 90-92% on RA at this time

## 2024-09-07 NOTE — ED PROVIDER NOTES
HPI:  9/6/24, Time: 9:10 PM EDT         Joseph Bond is a 43 y.o. male history of prostate cancer history of diverticulosis opiate abuse presenting to the ED for shortness of breath, beginning 1 day ago.  The complaint has been persistent, moderate in severity, and worsened by nothing.  Patient presenting here because of upper abdominal pain with shortness of breath he also reports nausea vomiting as well as cough.  Patient reporting pain started today.  Patient reports he did not take his evening medications.  Patient reporting no fever no chills he reports no leg pain or swelling reports no fall or injury.  Patient reports he recently had radiation therapy for prostate.  Patient reporting coughing up some blood as well.  Patient reporting no calf pain.  Patient was reportedly hypoxic at home pulse ox is 88% does not wear oxygen    ROS:   Pertinent positives and negatives are stated within HPI, all other systems reviewed and are negative.  --------------------------------------------- PAST HISTORY ---------------------------------------------  Past Medical History:  has a past medical history of Cancer (HCC), Diverticulosis, and History of opioid abuse (HCC).    Past Surgical History:  has a past surgical history that includes bronchoscopy (N/A, 7/11/2024) and bronchoscopy (7/11/2024).    Social History:  reports that he has quit smoking. His smoking use included cigarettes. He started smoking about 29 years ago. He has a 12 pack-year smoking history. He has never used smokeless tobacco. He reports that he does not currently use drugs. He reports that he does not drink alcohol.    Family History: family history includes Coronary Art Dis in his maternal grandfather.     The patient’s home medications have been reviewed.    Allergies: Patient has no known allergies.    ---------------------------------------------------PHYSICAL EXAM--------------------------------------    Constitutional/General: Alert and  time and they are agreeable with the plan.       --------------------------------- IMPRESSION AND DISPOSITION ---------------------------------    IMPRESSION  1. Pain of upper abdomen    2. Shortness of breath    3. Hypoxemia    4. Thrombocytopenia (HCC)        DISPOSITION  Disposition: Admit to monitored bed  Patient condition is stable        NOTE: This report was transcribed using voice recognition software. Every effort was made to ensure accuracy; however, inadvertent computerized transcription errors may be present          Bob Rousseau MD  09/07/24 0056       Bob Rousseau MD  09/07/24 0351

## 2024-09-07 NOTE — PROGRESS NOTES
ANDREY mary served Crystal Delacruz with  requesting pt home dose of Oxycodone 15mg IR q4hr PRN be added to his admission med list per pt request. Med rec completed.

## 2024-09-07 NOTE — ED NOTES
Received report from Cielo Mcnair RN; assumed care of pt at this time; resting in bed; waiting to go to CT; call bell in reach; will monitor

## 2024-09-07 NOTE — ED NOTES
Patient requesting something more for pain; verbal order obtained from Dr Rousseau for dilaudid 1mg iv once; order repeated back and verified

## 2024-09-07 NOTE — CONSULTS
[facility]  Department of Internal Medicine  Division of Pulmonary, Critical Care and Sleep Medicine  Consult Note    Ed Cornejo DO, MPH, [facility], FACOI, FACP  Kassandra Kendrick DO, [facility]  Parish Zaldivar MD, MS  Gladis Mcadams, APRN-CNP    Patient: Joseph Bond  MRN: [id number]  : 1980    Encounter Time: 12:42 PM     Date of Admission: 2024  9:26 PM    Primary Care Physician: Ty Shaikh MD    Reason for Consultation: Abnormal CT scan     HISTORY OF PRESENT ILLNESS : Joseph Bond 43 y.o. male was seen in consultation regarding the above chief compliant.    History obtained from patient, family member - sister and mother.  Joseph Bond is a 43 y.o. male with a PMH of Prostate CA with metastasis to the bone s/p palliative radiation therapy and Anxiety  who presents to ED for Shortness of Breath ((+N/V also, started this evening, in process of getting radiation tx for prostate CA) Pain also, patient unable to take pain med tonight due to N/V.)      Patient was in his usual state of health when he started to develop SOB associated with cough productive of blood-tinged sputum around 6:30 pm the evening of his ED presentation. States that at home, he desaturated to 88% on RA. He endorses nausea, constipation(Last BM was 3 days ago), diffuse abdominal pain as well as back and pelvic pain. He denies any known sick contacts or recent illness episodes. Denies Fever, pleuritic chest pain, diarrhea, rash or any new neurologic symptoms.     He was recently admitted at [facility] from 24 - 24 for management of back pain, nausea and vomiting. He was followed by oncology and palliative medicine. He underwent radiation therapy and was discharged on a pain regimen consisting of morphine, robaxin, dilaudid, oxycodone. Recommendations were given for consideration of systemic therapy for management of Stage IV prostate CA. Patient states that he felt very

## 2024-09-07 NOTE — H&P
[facility] - Family Medicine Resident Inpatient  History and Physical    CC: Shortness of breath    HPI: History obtained from patient, family member - sister and mother.  Joseph Bond is a 43 y.o. male with a PMH of Prostate CA with metastasis to the bone s/p palliative radiation therapy and Anxiety  who presents to ED for Shortness of Breath ((+N/V also, started this evening, in process of getting radiation tx for prostate CA) Pain also, patient unable to take pain med tonight due to N/V.)     Patient was in his usual state of health when he started to develop SOB associated with cough productive of blood-tinged sputum around 6:30 pm the evening of his ED presentation. States that at home, he desaturated to 88% on RA. He endorses nausea, constipation(Last BM was 3 days ago), diffuse abdominal pain as well as back and pelvic pain. He denies any known sick contacts or recent illness episodes. Denies Fever, pleuritic chest pain, diarrhea, rash or any new neurologic symptoms.    He was recently admitted at [facility] from 08/20/24 - 08/25/24 for management of back pain, nausea and vomiting. He was followed by oncology and palliative medicine. He underwent radiation therapy and was discharged on a pain regimen consisting of morphine, robaxin, dilaudid, oxycodone. Recommendations were given for consideration of systemic therapy for management of Stage IV prostate CA. Patient states that he felt very drowsy on his pain regimen and so stopped taking the morphine.     ED Course:   Afebrile, Hemodynamically stable, initially hypoxic with SPO2 87% on RA, no saturating appropriately on 2L NC  EKG stable. CMP with elevated AST and ALK phos but otherwise stable electrolytes. CBC with leukopenia, normocytic anemia and thrombocytopenia  UA with small RBC, casts negative for pyuria, nitrites or leukocyte esterase. LA 2.3.  Resp panel pending.  CXR with no acute processes  CTAP with Patchy lytic sclerotic osseous metastatic disease which  Comprehensive Metabolic Panel    Troponin    Lipase    Lactic Acid    Urinalysis    Platelet Confirmation    Microscopic Urinalysis    Troponin    Monitor    Nursing communication    Telemetry monitoring - 72 hour duration    Inpatient consult to Family Practice    EKG 12 Lead    TYPE AND SCREEN    Saline lock IV    ADMIT TO INPATIENT       PMH:  has a past medical history of Cancer (HCC), Diverticulosis, and History of opioid abuse (HCC).    PSH:  has a past surgical history that includes bronchoscopy (N/A, 7/11/2024) and bronchoscopy (7/11/2024).    FH: family history includes Coronary Art Dis in his maternal grandfather.     Social:  reports that he has quit smoking. His smoking use included cigarettes. He started smoking about 29 years ago. He has a 12 pack-year smoking history. He has never used smokeless tobacco. He reports that he does not currently use drugs. He reports that he does not drink alcohol.    Allergies: No Known Allergies     Home Medications:   No current facility-administered medications on file prior to encounter.     Current Outpatient Medications on File Prior to Encounter   Medication Sig Dispense Refill    oxyCODONE HCl (OXY-IR) 10 MG immediate release tablet Take 1.5 tablets by mouth every 4 hours as needed.      ondansetron (ZOFRAN-ODT) 8 MG TBDP disintegrating tablet Place 1 tablet under the tongue every 8 hours as needed for Nausea or Vomiting      HYDROmorphone (DILAUDID) 2 MG tablet Take 1 tablet by mouth every 12 hours as needed for Pain. Max Daily Amount: 4 mg      morphine (MS CONTIN) 30 MG extended release tablet Take 1 tablet by mouth 2 times daily. Max Daily Amount: 60 mg      polyethylene glycol (GLYCOLAX) 17 GM/SCOOP powder Take 17 g by mouth daily      SUN STOOL SOFTENER 100 MG capsule Take 1 capsule by mouth once daily 30 capsule 0    sucralfate (CARAFATE) 1 GM/10ML suspension Take 10 mLs by mouth 4 times daily Take 1 hour before each meal and at bedtime 1200 mL 3

## 2024-09-07 NOTE — PLAN OF CARE
Received message patient wanting to leave AMA. Saw patient at bedside. Had lengthy discussion with patient and his mother. He had de-sat on room air continues to require supplemental oxygen. He is not on oxygen at baseline. I advised he stay until he is seen by specialists as well as stay for further workup and treatment.     Plan: Transfuse 1U platelets if patient amenable to stay, continue current management    Crystal Delacruz DO

## 2024-09-07 NOTE — PROGRESS NOTES
[facility]  Family Medicine Attending    S: 43 y.o. male with metastatic prostate CA, diverticulosis, who presents with dyspnea on 9/6/24 and N/V.  Hemoptysis.  Found to have hypoxia on RA; CTA with RUL infiltrate.  Admitted with concerns for pneumonia vs metastatic disease.      Today, reports that symptoms are stable.  Continues to have chronic pain, especially in his low back.  Dyspnea is stable at rest.      O: VS- Blood pressure 115/72, pulse 100, temperature 98.4 °F (36.9 °C), temperature source Oral, resp. rate 12, height 1.702 m (5' 7\"), weight 84.5 kg (186 lb 3.2 oz), SpO2 97%.  Exam is as noted by resident with the following changes, additions or corrections:  Gen ill-appearing, NAD, A&A, responding to questions well   CV RRR  Resp unlabored at rest, crackles bilateral  Abd softly distended     Impressions:   Principal Problem:    Pneumonia, unspecified organism  Active Problems:    Shortness of breath  Resolved Problems:    * No resolved hospital problems. *      Plan:   Found to have pancytopenia with PLT to 22. Heme/Onc consult placed, follow closely. Holding blood thinners for now.    Given antibiotics for possible pneumonia.    Cultures and evaluation pending.    Pulmonology consult placed; patient is known to [facility] pulm.  Recommendations appreciated.       Attending Physician Statement  I have reviewed the chart and seen the patient with the resident(s).  I personally reviewed images, EKG's and similar tests, if present.  I personally reviewed and performed key elements of the history and exam.  I have reviewed and confirmed the Family Medicine admitting team resident history and exam with changes as indicated above.  I agree with the assessment, plan, and orders as documented by the  admitting team resident Nydia Campa & Juan Francisco.  Please refer to the resident progress note today and admission history and physical  for additional information.      Kylee Damon,

## 2024-09-08 LAB
MICROORGANISM SPEC CULT: NORMAL
MICROORGANISM SPEC CULT: NORMAL
MICROORGANISM/AGENT SPEC: ABNORMAL
SERVICE CMNT-IMP: NORMAL
SERVICE CMNT-IMP: NORMAL
SPECIMEN DESCRIPTION: ABNORMAL
SPECIMEN DESCRIPTION: NORMAL
SPECIMEN DESCRIPTION: NORMAL

## 2024-09-09 LAB — PATH REV BLD -IMP: NORMAL

## 2024-09-10 NOTE — PROGRESS NOTES
Patient left AMA on 9/7/24 at approximately 2:54 PM.      The resident on call arranged for refills and prescriptions to be sent to the pharmacy as requested.

## 2024-09-12 LAB
MICROORGANISM SPEC CULT: NORMAL
MICROORGANISM SPEC CULT: NORMAL
SERVICE CMNT-IMP: NORMAL
SERVICE CMNT-IMP: NORMAL
SPECIMEN DESCRIPTION: NORMAL
SPECIMEN DESCRIPTION: NORMAL

## 2024-09-13 LAB — METHYLMALONATE SERPL-SCNC: <0.1 UMOL/L (ref 0–0.4)

## 2024-09-16 ENCOUNTER — TELEPHONE (OUTPATIENT)
Dept: PALLATIVE CARE | Age: 44
End: 2024-09-16

## 2024-09-20 ENCOUNTER — TELEPHONE (OUTPATIENT)
Dept: PALLATIVE CARE | Age: 44
End: 2024-09-20

## 2024-09-20 DIAGNOSIS — K59.00 CONSTIPATION, UNSPECIFIED CONSTIPATION TYPE: ICD-10-CM

## 2024-09-20 DIAGNOSIS — G89.29 CHRONIC MIDLINE LOW BACK PAIN WITH LEFT-SIDED SCIATICA: ICD-10-CM

## 2024-09-20 DIAGNOSIS — M54.42 CHRONIC MIDLINE LOW BACK PAIN WITH LEFT-SIDED SCIATICA: ICD-10-CM

## 2024-09-20 DIAGNOSIS — G89.3 CANCER RELATED PAIN: ICD-10-CM

## 2024-09-20 DIAGNOSIS — S32.010A CLOSED COMPRESSION FRACTURE OF BODY OF L1 VERTEBRA (HCC): ICD-10-CM

## 2024-09-20 DIAGNOSIS — Z01.818 PRE-OP TESTING: Primary | ICD-10-CM

## 2024-09-20 RX ORDER — DOCUSATE SODIUM 100 MG
100 CAPSULE ORAL DAILY
Qty: 30 CAPSULE | Refills: 0 | Status: SHIPPED | OUTPATIENT
Start: 2024-09-20

## 2024-09-26 ENCOUNTER — APPOINTMENT (OUTPATIENT)
Dept: GENERAL RADIOLOGY | Age: 44
DRG: 500 | End: 2024-09-26
Payer: MEDICAID

## 2024-09-26 ENCOUNTER — HOSPITAL ENCOUNTER (INPATIENT)
Age: 44
LOS: 15 days | Discharge: HOME HEALTH | DRG: 500 | End: 2024-10-11
Attending: EMERGENCY MEDICINE | Admitting: FAMILY MEDICINE
Payer: MEDICAID

## 2024-09-26 ENCOUNTER — APPOINTMENT (OUTPATIENT)
Dept: CT IMAGING | Age: 44
DRG: 500 | End: 2024-09-26
Attending: EMERGENCY MEDICINE
Payer: MEDICAID

## 2024-09-26 DIAGNOSIS — R09.02 HYPOXEMIA: ICD-10-CM

## 2024-09-26 DIAGNOSIS — Z51.5 ENCOUNTER FOR PALLIATIVE CARE: ICD-10-CM

## 2024-09-26 DIAGNOSIS — C61 PRIMARY PROSTATE CANCER WITH METASTASIS FROM PROSTATE TO OTHER SITE (HCC): ICD-10-CM

## 2024-09-26 DIAGNOSIS — D64.9 ANEMIA, UNSPECIFIED TYPE: ICD-10-CM

## 2024-09-26 DIAGNOSIS — R06.02 SHORTNESS OF BREATH: ICD-10-CM

## 2024-09-26 DIAGNOSIS — D69.6 THROMBOCYTOPENIA (HCC): Primary | ICD-10-CM

## 2024-09-26 DIAGNOSIS — Z87.19 HISTORY OF HEMATEMESIS: ICD-10-CM

## 2024-09-26 DIAGNOSIS — G89.3 CANCER-RELATED PAIN: ICD-10-CM

## 2024-09-26 LAB
ALBUMIN SERPL-MCNC: 4.3 G/DL (ref 3.5–5.2)
ALP SERPL-CCNC: 227 U/L (ref 40–129)
ALT SERPL-CCNC: 14 U/L (ref 0–40)
ANION GAP SERPL CALCULATED.3IONS-SCNC: 17 MMOL/L (ref 7–16)
AST SERPL-CCNC: 76 U/L (ref 0–39)
BACTERIA URNS QL MICRO: ABNORMAL
BASOPHILS # BLD: 0.04 K/UL (ref 0–0.2)
BASOPHILS NFR BLD: 1 % (ref 0–2)
BILIRUB SERPL-MCNC: 1 MG/DL (ref 0–1.2)
BILIRUB UR QL STRIP: NEGATIVE
BUN SERPL-MCNC: 11 MG/DL (ref 6–20)
CALCIUM SERPL-MCNC: 9.9 MG/DL (ref 8.6–10.2)
CHLORIDE SERPL-SCNC: 94 MMOL/L (ref 98–107)
CLARITY UR: CLEAR
CO2 SERPL-SCNC: 23 MMOL/L (ref 22–29)
COLOR UR: YELLOW
CREAT SERPL-MCNC: 0.9 MG/DL (ref 0.7–1.2)
EOSINOPHIL # BLD: 0 K/UL (ref 0.05–0.5)
EOSINOPHILS RELATIVE PERCENT: 0 % (ref 0–6)
EPI CELLS #/AREA URNS HPF: ABNORMAL /HPF
ERYTHROCYTE [DISTWIDTH] IN BLOOD BY AUTOMATED COUNT: 23.1 % (ref 11.5–15)
GFR, ESTIMATED: >90 ML/MIN/1.73M2
GLUCOSE SERPL-MCNC: 120 MG/DL (ref 74–99)
GLUCOSE UR STRIP-MCNC: NEGATIVE MG/DL
HCT VFR BLD AUTO: 16.3 % (ref 37–54)
HCT VFR BLD AUTO: 17.8 % (ref 37–54)
HGB BLD-MCNC: 5.3 G/DL (ref 12.5–16.5)
HGB BLD-MCNC: 5.9 G/DL (ref 12.5–16.5)
HGB UR QL STRIP.AUTO: ABNORMAL
KETONES UR STRIP-MCNC: NEGATIVE MG/DL
LACTATE BLDV-SCNC: 2 MMOL/L (ref 0.5–2.2)
LACTATE BLDV-SCNC: 2.3 MMOL/L (ref 0.5–2.2)
LEUKOCYTE ESTERASE UR QL STRIP: NEGATIVE
LIPASE SERPL-CCNC: 8 U/L (ref 13–60)
LYMPHOCYTES NFR BLD: 0.42 K/UL (ref 1.5–4)
LYMPHOCYTES RELATIVE PERCENT: 10 % (ref 20–42)
MCH RBC QN AUTO: 29.9 PG (ref 26–35)
MCHC RBC AUTO-ENTMCNC: 32.5 G/DL (ref 32–34.5)
MCV RBC AUTO: 92.1 FL (ref 80–99.9)
METAMYELOCYTES ABSOLUTE COUNT: 0.15 K/UL (ref 0–0.12)
METAMYELOCYTES: 4 % (ref 0–1)
MONOCYTES NFR BLD: 0.5 K/UL (ref 0.1–0.95)
MONOCYTES NFR BLD: 12 % (ref 2–12)
NEUTROPHILS NFR BLD: 74 % (ref 43–80)
NEUTS SEG NFR BLD: 3.09 K/UL (ref 1.8–7.3)
NITRITE UR QL STRIP: NEGATIVE
NUCLEATED RED BLOOD CELLS: 2 PER 100 WBC
PH UR STRIP: 5.5 [PH] (ref 5–9)
PLATELET CONFIRMATION: NORMAL
PLATELET, FLUORESCENCE: 18 K/UL (ref 130–450)
PMV BLD AUTO: ABNORMAL FL (ref 7–12)
POTASSIUM SERPL-SCNC: 3.5 MMOL/L (ref 3.5–5)
PROT SERPL-MCNC: 7.1 G/DL (ref 6.4–8.3)
PROT UR STRIP-MCNC: NEGATIVE MG/DL
RBC # BLD AUTO: 1.77 M/UL (ref 3.8–5.8)
RBC # BLD: ABNORMAL 10*6/UL
RBC #/AREA URNS HPF: ABNORMAL /HPF
SODIUM SERPL-SCNC: 134 MMOL/L (ref 132–146)
SP GR UR STRIP: 1.02 (ref 1–1.03)
TROPONIN I SERPL HS-MCNC: 24 NG/L (ref 0–11)
UROBILINOGEN UR STRIP-ACNC: 0.2 EU/DL (ref 0–1)
WBC #/AREA URNS HPF: ABNORMAL /HPF
WBC OTHER # BLD: 4.2 K/UL (ref 4.5–11.5)

## 2024-09-26 PROCEDURE — 6360000002 HC RX W HCPCS

## 2024-09-26 PROCEDURE — 6370000000 HC RX 637 (ALT 250 FOR IP)

## 2024-09-26 PROCEDURE — 96361 HYDRATE IV INFUSION ADD-ON: CPT

## 2024-09-26 PROCEDURE — 71045 X-RAY EXAM CHEST 1 VIEW: CPT

## 2024-09-26 PROCEDURE — 2580000003 HC RX 258

## 2024-09-26 PROCEDURE — 86850 RBC ANTIBODY SCREEN: CPT

## 2024-09-26 PROCEDURE — P9016 RBC LEUKOCYTES REDUCED: HCPCS

## 2024-09-26 PROCEDURE — 85014 HEMATOCRIT: CPT

## 2024-09-26 PROCEDURE — 93005 ELECTROCARDIOGRAM TRACING: CPT | Performed by: EMERGENCY MEDICINE

## 2024-09-26 PROCEDURE — 30233N1 TRANSFUSION OF NONAUTOLOGOUS RED BLOOD CELLS INTO PERIPHERAL VEIN, PERCUTANEOUS APPROACH: ICD-10-PCS | Performed by: FAMILY MEDICINE

## 2024-09-26 PROCEDURE — 96375 TX/PRO/DX INJ NEW DRUG ADDON: CPT

## 2024-09-26 PROCEDURE — 83690 ASSAY OF LIPASE: CPT

## 2024-09-26 PROCEDURE — 99222 1ST HOSP IP/OBS MODERATE 55: CPT

## 2024-09-26 PROCEDURE — 99285 EMERGENCY DEPT VISIT HI MDM: CPT

## 2024-09-26 PROCEDURE — 71275 CT ANGIOGRAPHY CHEST: CPT

## 2024-09-26 PROCEDURE — 86901 BLOOD TYPING SEROLOGIC RH(D): CPT

## 2024-09-26 PROCEDURE — 6360000002 HC RX W HCPCS: Performed by: EMERGENCY MEDICINE

## 2024-09-26 PROCEDURE — 81001 URINALYSIS AUTO W/SCOPE: CPT

## 2024-09-26 PROCEDURE — 94664 DEMO&/EVAL PT USE INHALER: CPT

## 2024-09-26 PROCEDURE — 85018 HEMOGLOBIN: CPT

## 2024-09-26 PROCEDURE — 96374 THER/PROPH/DIAG INJ IV PUSH: CPT

## 2024-09-26 PROCEDURE — 86923 COMPATIBILITY TEST ELECTRIC: CPT

## 2024-09-26 PROCEDURE — 84484 ASSAY OF TROPONIN QUANT: CPT

## 2024-09-26 PROCEDURE — 74177 CT ABD & PELVIS W/CONTRAST: CPT

## 2024-09-26 PROCEDURE — 86900 BLOOD TYPING SEROLOGIC ABO: CPT

## 2024-09-26 PROCEDURE — 85025 COMPLETE CBC W/AUTO DIFF WBC: CPT

## 2024-09-26 PROCEDURE — 83605 ASSAY OF LACTIC ACID: CPT

## 2024-09-26 PROCEDURE — 80053 COMPREHEN METABOLIC PANEL: CPT

## 2024-09-26 PROCEDURE — 36430 TRANSFUSION BLD/BLD COMPNT: CPT

## 2024-09-26 PROCEDURE — 6360000004 HC RX CONTRAST MEDICATION: Performed by: RADIOLOGY

## 2024-09-26 PROCEDURE — 2140000000 HC CCU INTERMEDIATE R&B

## 2024-09-26 PROCEDURE — 2580000003 HC RX 258: Performed by: EMERGENCY MEDICINE

## 2024-09-26 RX ORDER — SODIUM CHLORIDE 9 MG/ML
INJECTION, SOLUTION INTRAVENOUS CONTINUOUS
Status: DISCONTINUED | OUTPATIENT
Start: 2024-09-26 | End: 2024-10-10

## 2024-09-26 RX ORDER — OXYCODONE HYDROCHLORIDE 15 MG/1
15 TABLET ORAL EVERY 4 HOURS PRN
Status: DISCONTINUED | OUTPATIENT
Start: 2024-09-26 | End: 2024-10-04

## 2024-09-26 RX ORDER — ACETAMINOPHEN 325 MG/1
650 TABLET ORAL EVERY 6 HOURS PRN
Status: DISCONTINUED | OUTPATIENT
Start: 2024-09-26 | End: 2024-10-11 | Stop reason: HOSPADM

## 2024-09-26 RX ORDER — POTASSIUM CHLORIDE 1500 MG/1
40 TABLET, EXTENDED RELEASE ORAL PRN
Status: DISCONTINUED | OUTPATIENT
Start: 2024-09-26 | End: 2024-10-05

## 2024-09-26 RX ORDER — PROMETHAZINE HYDROCHLORIDE 12.5 MG/1
12.5 TABLET ORAL EVERY 6 HOURS PRN
Status: DISCONTINUED | OUTPATIENT
Start: 2024-09-26 | End: 2024-10-11 | Stop reason: HOSPADM

## 2024-09-26 RX ORDER — SODIUM CHLORIDE 0.9 % (FLUSH) 0.9 %
5-40 SYRINGE (ML) INJECTION EVERY 12 HOURS SCHEDULED
Status: DISCONTINUED | OUTPATIENT
Start: 2024-09-26 | End: 2024-10-11 | Stop reason: HOSPADM

## 2024-09-26 RX ORDER — HYDROMORPHONE HYDROCHLORIDE 1 MG/ML
1 INJECTION, SOLUTION INTRAMUSCULAR; INTRAVENOUS; SUBCUTANEOUS ONCE
Status: COMPLETED | OUTPATIENT
Start: 2024-09-26 | End: 2024-09-26

## 2024-09-26 RX ORDER — ONDANSETRON 2 MG/ML
4 INJECTION INTRAMUSCULAR; INTRAVENOUS EVERY 6 HOURS PRN
Status: DISCONTINUED | OUTPATIENT
Start: 2024-09-26 | End: 2024-10-11 | Stop reason: HOSPADM

## 2024-09-26 RX ORDER — METHOCARBAMOL 750 MG/1
750 TABLET, FILM COATED ORAL 4 TIMES DAILY
COMMUNITY
End: 2024-10-18 | Stop reason: SDUPTHER

## 2024-09-26 RX ORDER — SENNA AND DOCUSATE SODIUM 50; 8.6 MG/1; MG/1
2 TABLET, FILM COATED ORAL DAILY
Status: DISCONTINUED | OUTPATIENT
Start: 2024-09-26 | End: 2024-09-30

## 2024-09-26 RX ORDER — ONDANSETRON 2 MG/ML
4 INJECTION INTRAMUSCULAR; INTRAVENOUS EVERY 6 HOURS PRN
Status: DISCONTINUED | OUTPATIENT
Start: 2024-09-26 | End: 2024-09-26

## 2024-09-26 RX ORDER — SUCRALFATE ORAL 1 G/10ML
1 SUSPENSION ORAL
COMMUNITY

## 2024-09-26 RX ORDER — ACETAMINOPHEN 650 MG/1
650 SUPPOSITORY RECTAL EVERY 6 HOURS PRN
Status: DISCONTINUED | OUTPATIENT
Start: 2024-09-26 | End: 2024-10-11 | Stop reason: HOSPADM

## 2024-09-26 RX ORDER — HYDROMORPHONE HYDROCHLORIDE 2 MG/1
2 TABLET ORAL EVERY 12 HOURS PRN
Status: DISCONTINUED | OUTPATIENT
Start: 2024-09-26 | End: 2024-09-26

## 2024-09-26 RX ORDER — SODIUM CHLORIDE 9 MG/ML
INJECTION, SOLUTION INTRAVENOUS PRN
Status: DISCONTINUED | OUTPATIENT
Start: 2024-09-26 | End: 2024-09-27 | Stop reason: SDUPTHER

## 2024-09-26 RX ORDER — SODIUM CHLORIDE 0.9 % (FLUSH) 0.9 %
5-40 SYRINGE (ML) INJECTION PRN
Status: DISCONTINUED | OUTPATIENT
Start: 2024-09-26 | End: 2024-10-11 | Stop reason: HOSPADM

## 2024-09-26 RX ORDER — BUDESONIDE 0.5 MG/2ML
0.5 INHALANT ORAL
Status: DISCONTINUED | OUTPATIENT
Start: 2024-09-26 | End: 2024-10-11 | Stop reason: HOSPADM

## 2024-09-26 RX ORDER — LIDOCAINE 4 G/G
1 PATCH TOPICAL DAILY
Status: DISCONTINUED | OUTPATIENT
Start: 2024-09-26 | End: 2024-10-11 | Stop reason: HOSPADM

## 2024-09-26 RX ORDER — POTASSIUM CHLORIDE 7.45 MG/ML
10 INJECTION INTRAVENOUS PRN
Status: DISCONTINUED | OUTPATIENT
Start: 2024-09-26 | End: 2024-10-05

## 2024-09-26 RX ORDER — SODIUM CHLORIDE 9 MG/ML
INJECTION, SOLUTION INTRAVENOUS PRN
Status: DISCONTINUED | OUTPATIENT
Start: 2024-09-26 | End: 2024-10-11 | Stop reason: HOSPADM

## 2024-09-26 RX ORDER — DOCUSATE SODIUM 100 MG/1
100 CAPSULE, LIQUID FILLED ORAL DAILY
COMMUNITY

## 2024-09-26 RX ORDER — POLYETHYLENE GLYCOL 3350 17 G/17G
17 POWDER, FOR SOLUTION ORAL DAILY PRN
Status: DISCONTINUED | OUTPATIENT
Start: 2024-09-26 | End: 2024-09-30

## 2024-09-26 RX ORDER — MORPHINE SULFATE 30 MG/1
30 TABLET, FILM COATED, EXTENDED RELEASE ORAL 2 TIMES DAILY PRN
Status: ON HOLD | COMMUNITY
End: 2024-10-11 | Stop reason: HOSPADM

## 2024-09-26 RX ORDER — IOPAMIDOL 755 MG/ML
75 INJECTION, SOLUTION INTRAVASCULAR
Status: COMPLETED | OUTPATIENT
Start: 2024-09-26 | End: 2024-09-26

## 2024-09-26 RX ORDER — ALBUTEROL SULFATE 0.83 MG/ML
2.5 SOLUTION RESPIRATORY (INHALATION) EVERY 6 HOURS PRN
Status: DISCONTINUED | OUTPATIENT
Start: 2024-09-26 | End: 2024-10-11 | Stop reason: HOSPADM

## 2024-09-26 RX ORDER — MORPHINE SULFATE 2 MG/ML
2 INJECTION, SOLUTION INTRAMUSCULAR; INTRAVENOUS ONCE
Status: COMPLETED | OUTPATIENT
Start: 2024-09-26 | End: 2024-09-26

## 2024-09-26 RX ORDER — HYDROMORPHONE HYDROCHLORIDE 1 MG/ML
1 INJECTION, SOLUTION INTRAMUSCULAR; INTRAVENOUS; SUBCUTANEOUS
Status: COMPLETED | OUTPATIENT
Start: 2024-09-26 | End: 2024-10-10

## 2024-09-26 RX ORDER — PANTOPRAZOLE SODIUM 40 MG/10ML
40 INJECTION, POWDER, LYOPHILIZED, FOR SOLUTION INTRAVENOUS ONCE
Status: COMPLETED | OUTPATIENT
Start: 2024-09-26 | End: 2024-09-26

## 2024-09-26 RX ORDER — CHOLECALCIFEROL (VITAMIN D3) 50 MCG
2000 TABLET ORAL DAILY
Status: DISCONTINUED | OUTPATIENT
Start: 2024-09-26 | End: 2024-10-11 | Stop reason: HOSPADM

## 2024-09-26 RX ORDER — ESCITALOPRAM OXALATE 5 MG/1
5 TABLET ORAL DAILY PRN
COMMUNITY

## 2024-09-26 RX ORDER — MAGNESIUM SULFATE IN WATER 40 MG/ML
2000 INJECTION, SOLUTION INTRAVENOUS PRN
Status: DISCONTINUED | OUTPATIENT
Start: 2024-09-26 | End: 2024-10-05

## 2024-09-26 RX ORDER — SODIUM CHLORIDE 0.9 % (FLUSH) 0.9 %
10 SYRINGE (ML) INJECTION
Status: ACTIVE | OUTPATIENT
Start: 2024-09-26 | End: 2024-09-27

## 2024-09-26 RX ADMIN — SODIUM CHLORIDE: 9 INJECTION, SOLUTION INTRAVENOUS at 05:19

## 2024-09-26 RX ADMIN — Medication 2000 UNITS: at 09:53

## 2024-09-26 RX ADMIN — HYDROMORPHONE HYDROCHLORIDE 1 MG: 1 INJECTION, SOLUTION INTRAMUSCULAR; INTRAVENOUS; SUBCUTANEOUS at 06:32

## 2024-09-26 RX ADMIN — ONDANSETRON 4 MG: 2 INJECTION INTRAMUSCULAR; INTRAVENOUS at 05:17

## 2024-09-26 RX ADMIN — OXYCODONE HYDROCHLORIDE 15 MG: 15 TABLET ORAL at 22:47

## 2024-09-26 RX ADMIN — MORPHINE SULFATE 2 MG: 2 INJECTION, SOLUTION INTRAMUSCULAR; INTRAVENOUS at 05:17

## 2024-09-26 RX ADMIN — SODIUM CHLORIDE: 9 INJECTION, SOLUTION INTRAVENOUS at 13:07

## 2024-09-26 RX ADMIN — PANTOPRAZOLE SODIUM 40 MG: 40 INJECTION, POWDER, FOR SOLUTION INTRAVENOUS at 07:01

## 2024-09-26 RX ADMIN — HYDROMORPHONE HYDROCHLORIDE 1 MG: 1 INJECTION, SOLUTION INTRAMUSCULAR; INTRAVENOUS; SUBCUTANEOUS at 11:13

## 2024-09-26 RX ADMIN — IOPAMIDOL 75 ML: 755 INJECTION, SOLUTION INTRAVENOUS at 07:32

## 2024-09-26 RX ADMIN — SODIUM CHLORIDE, PRESERVATIVE FREE 10 ML: 5 INJECTION INTRAVENOUS at 09:55

## 2024-09-26 RX ADMIN — HYDROMORPHONE HYDROCHLORIDE 1 MG: 1 INJECTION, SOLUTION INTRAMUSCULAR; INTRAVENOUS; SUBCUTANEOUS at 14:13

## 2024-09-26 RX ADMIN — PANTOPRAZOLE SODIUM 40 MG: 40 INJECTION, POWDER, FOR SOLUTION INTRAVENOUS at 10:01

## 2024-09-26 RX ADMIN — HYDROMORPHONE HYDROCHLORIDE 1 MG: 1 INJECTION, SOLUTION INTRAMUSCULAR; INTRAVENOUS; SUBCUTANEOUS at 18:05

## 2024-09-26 RX ADMIN — HYDROMORPHONE HYDROCHLORIDE 2 MG: 2 TABLET ORAL at 09:59

## 2024-09-26 RX ADMIN — HYDROMORPHONE HYDROCHLORIDE 1 MG: 1 INJECTION, SOLUTION INTRAMUSCULAR; INTRAVENOUS; SUBCUTANEOUS at 21:27

## 2024-09-26 RX ADMIN — BUDESONIDE 500 MCG: 0.5 INHALANT RESPIRATORY (INHALATION) at 18:18

## 2024-09-26 RX ADMIN — SENNOSIDES AND DOCUSATE SODIUM 2 TABLET: 50; 8.6 TABLET ORAL at 16:49

## 2024-09-26 ASSESSMENT — PAIN DESCRIPTION - LOCATION
LOCATION: BACK;RIB CAGE;PELVIS
LOCATION: BACK
LOCATION: SHOULDER;RIB CAGE
LOCATION: ABDOMEN;BACK
LOCATION: BACK

## 2024-09-26 ASSESSMENT — PAIN SCALES - GENERAL
PAINLEVEL_OUTOF10: 10
PAINLEVEL_OUTOF10: 8
PAINLEVEL_OUTOF10: 9
PAINLEVEL_OUTOF10: 10
PAINLEVEL_OUTOF10: 7

## 2024-09-26 ASSESSMENT — PAIN DESCRIPTION - ORIENTATION
ORIENTATION: LEFT
ORIENTATION: LEFT

## 2024-09-26 ASSESSMENT — PAIN DESCRIPTION - DESCRIPTORS
DESCRIPTORS: ACHING;DISCOMFORT;SORE
DESCRIPTORS: ACHING;DISCOMFORT;SHARP
DESCRIPTORS: SHARP

## 2024-09-26 ASSESSMENT — PAIN - FUNCTIONAL ASSESSMENT
PAIN_FUNCTIONAL_ASSESSMENT: 0-10
PAIN_FUNCTIONAL_ASSESSMENT: PREVENTS OR INTERFERES SOME ACTIVE ACTIVITIES AND ADLS
PAIN_FUNCTIONAL_ASSESSMENT: ACTIVITIES ARE NOT PREVENTED

## 2024-09-26 ASSESSMENT — LIFESTYLE VARIABLES: HOW OFTEN DO YOU HAVE A DRINK CONTAINING ALCOHOL: NEVER

## 2024-09-26 NOTE — CONSULTS
medicine consulted to assist further with goals of care, symptom management.    ASSESSMENT/PLAN:     Pertinent Hospital Diagnoses     Thrombocytopenia  Anemia  Acute respiratory failure  Metastatic prostate cancer    Symptom management     Pain due to neoplasm  -IV Dilaudid 0.5 to 1 mg every 3 hours as needed  -oxycodone 15 mg every 4 hours as needed  -Recommended fentanyl patch, patient declined  -Stopped taking morphine ER due to drowsiness  -Stopped taking Robaxin due to side effects    Nausea and vomiting  -Promethazine 12.5 mg p.o. every 6 hours as needed  -IV Zofran 4 mg every 6 hours as needed    Prophylaxis stool softener  -GlycoLax daily as needed  -Senokot S at     Palliative Care Encounter / Counseling Regarding Goals of Care  Please see detailed goals of care discussion as below  At this time, Joseph Bond, Does Not have capacity for medical decision-making.  Capacity is time limited and situation/question specific  Outcome of goals of care meeting:  Wants to continue to pursue home impacted approach to his cancer, no interested on systemic treatments  Wishes to continue full code, DNR CCA recommended, brochure provided  Goal is to return home at discharge  Per patient, palliative medicine through [facility] are managing his symptoms at home    Code status Full Code  Advanced Directives: no POA or living will in [facility]  Surrogate/Legal NOK:  Margarita Bond (Sister ) [phone]   Gauravernst Womack (cousin) [phone]    Spiritual assessment: no spiritual distress identified  Bereavement and grief: to be determined  Referrals to: none today    Thank you for the opportunity to participate in the care of Joseph Bond.     FANG Denise CNP  Palliative Medicine     SUBJECTIVE:     Details of Conversation:      Chart reviewed.  Patient seen at the bedside, he was awake, alert and oriented and able to participate in the meaningful conversation, he looks ill, weak, and deconditioned, sister Margarita

## 2024-09-26 NOTE — H&P
ng/L   Lactic Acid   Result Value Ref Range    Lactic Acid 2.3 (H) 0.5 - 2.2 mmol/L   Platelet Confirmation   Result Value Ref Range    Platelet Confirmation CONFIRMED    EKG 12 Lead   Result Value Ref Range    Ventricular Rate 117 BPM    Atrial Rate 117 BPM    P-R Interval 142 ms    QRS Duration 88 ms    Q-T Interval 324 ms    QTc Calculation (Bazett) 451 ms    P Axis 40 degrees    R Axis 6 degrees    T Axis -2 degrees   TYPE AND SCREEN   Result Value Ref Range    Blood Bank Sample Expiration 09/29/2024,2359     Arm Band Number [id number]     ABO/Rh A POSITIVE     Antibody Screen NEGATIVE     Unit Number [id number]     Component Leukocyte Reduced Red Cell     Unit Divison 00     Dispense Status Blood Bank ALLOCATED     Transfusion Status OK TO TRANSFUSE     Crossmatch Result COMPATIBLE    PREPARE PLATELETS, 1 Product   Result Value Ref Range    Arm Band Number [id number]     Unit Number [id number]     Component LkPlt PAS Large Volume     Unit Divison 00     Dispense Status Blood Bank ALLOCATED     Transfusion Status OK TO TRANSFUSE        Imaging:  XR CHEST PORTABLE   Final Result   Interval worsening of the patchy airspace disease throughout the lung fields   bilaterally.         CTA CHEST W CONTRAST    (Results Pending)   CT ABDOMEN PELVIS W IV CONTRAST Additional Contrast? None    (Results Pending)       Assessment and Plan  Active Problems:    * No active hospital problems. *  Resolved Problems:    * No resolved hospital problems. *    Nausea and vomiting  Stage IV prostate cancer with mets s/p radiation therapy  Back pain and pelvic pain 2/2 mets  -Patient completed radiation 08/25/2024  -Patient currently refusing chemotherapy.  -Consult to heme-onc  -Consulted palliative care  -Compazine for nausea  -Pain management recommendation by heme-onc  -IV Dilaudid 0.5 to 1 mg every 3 hours as needed  -oxycodone 15 mg every 4 hours as needed  -Recommended fentanyl patch, patient declined  -Stopped taking morphine ER

## 2024-09-26 NOTE — ED PROVIDER NOTES
HPI:  9/26/24, Time: 5:13 AM EDT         Joseph Bond is a 44 y.o. male history of prostate cancer with mets history of diverticulosis history of opioid abuse presenting to the ED for nausea vomiting left-sided rib pain, beginning 1 day ago.  The complaint has been persistent, moderate in severity, and worsened by nothing.  Patient reporting ongoing nausea vomiting for the past day.  Patient reporting left-sided rib pain and abdominal pain.  Patient reports some cough.  Patient reporting no black or tarry stools he does report some blood in his emesis.  Patient reporting no active fever.  Patient reports he did undergo radiation therapy.  Patient reporting no calf pain.  He reports no fall or injury or syncopal event.  Patient reporting no black or tarry stools.    ROS:   Pertinent positives and negatives are stated within HPI, all other systems reviewed and are negative.  --------------------------------------------- PAST HISTORY ---------------------------------------------  Past Medical History:  has a past medical history of Cancer (HCC), Diverticulosis, and History of opioid abuse (HCC).    Past Surgical History:  has a past surgical history that includes bronchoscopy (N/A, 7/11/2024) and bronchoscopy (7/11/2024).    Social History:  reports that he has quit smoking. His smoking use included cigarettes. He started smoking about 29 years ago. He has a 12 pack-year smoking history. He has never used smokeless tobacco. He reports that he does not currently use drugs. He reports that he does not drink alcohol.    Family History: family history includes Coronary Art Dis in his maternal grandfather.     The patient’s home medications have been reviewed.    Allergies: Patient has no known allergies.    ---------------------------------------------------PHYSICAL EXAM--------------------------------------    Constitutional/General: Alert and oriented x3,   Head: Normocephalic and atraumatic  Eyes: PERRL, EOMI  Mouth:

## 2024-09-26 NOTE — CONSULTS
[facility]  Hematology/Oncology  Consult      Patient Name: Joseph Bond  YOB: 1980  PCP: Ty Shaikh MD   Referring Provider:      Reason for Consultation:   Chief Complaint   Patient presents with    Emesis     For 24hrs has hx of prostate cancer        History of Present Illness: This is a 44-year-old male patient with Stage 4 prostate ca with retroperitoneal mediastinal lymphadenopathy diffuse bone metastasis. . Considering high tumor burden, advised treatment with Taxotere, GnRH agonist, Nubeqa along with Xgeva. He was started on Casodex although stopped taking it after he read about side effects. He has so far decided not to be on any systemic therapy. MRI lumbar spine showed T12 L4 metastasis with epidural extension. S/p palliative RT to his spine. He has also been looking into homeopathy. Genetic testing also sent.  Follows with palliative care for pain management. He was last seen while hospitalized earlier this month. Hgb at that was 7.4, platelets were 18.      Patient presented early this morning with complaints of hematemesis. Hgb was found to be 5.3 and 2 units of pRBC's were ordered, platelets were 18. CTA pulmonary and CT A/P showed no evidence of pulmonary embolism. Abnormal patchy ground-glass opacity identified diffusely throughout the lung fields. Findings show progression when compared to the prior study with no definite pleural effusion or pneumothorax. Findings suggesting progression of disease favoring an infectious/inflammatory process or lymphangitic spread of disease given mediastinal adenopathy and abnormal bony metastatic process within the ribs and spine cannot be completely excluded and clinical correlation is needed. Abnormal bony metastatic disease within the spine. There is a bony destructive process involving the left anterior 2nd rib. Associated soft tissue thickening. No acute intra-abdominal or intrapelvic process. Stable adenopathy

## 2024-09-26 NOTE — PROGRESS NOTES
[facility]  Family Medicine Attending    S: 44 y.o. male with nausea and vomiting as well as left sided abdominal pain.  He was found to have hypoxia and severe symptomatic anemia with hgb 5.3.  He has been having nausea with vomiting, pelvic and back pain.  Has not been able to walk due to pain for a couple of weeks.  Goals of care-pain control so that he can walk again.  He would also like more information on side effects from a regimen of chemotherapy drugs from the oncologist.    O: VS- Blood pressure 104/63, pulse 100, temperature 98.8 °F (37.1 °C), temperature source Oral, resp. rate 18, height 1.702 m (5' 7\"), weight 84.4 kg (186 lb), SpO2 98%.  Exam is as noted by resident with the following changes, additions or corrections:  Gen:  AAO x3  CVS: RRR  Lungs: CTAB    Impressions:    Severe Symptomatic Anemia, related to metastatic prostate cancer in the bone  Metastatic prostate cancer  Pain 2/2 metatstatic cancer-s/p palliative radiation treatments.  Nausea    Plan:     Transfusions  Monitor vitals  Monitor labs  Consultation to palliative for recs on pain control   Consultation to heme/onc for further education regarding effects of chemotherapy regimen that would be given if patient would so choose.  PT/OT     Attending Physician Statement  I have reviewed the chart and seen the patient with the resident(s).  I personally reviewed images, EKG's and similar tests, if present.  I personally reviewed and performed key elements of the history and exam.  I have reviewed and confirmed the Family Medicine admitting team resident history and exam with changes as indicated above.  I agree with the assessment, plan, and orders as documented by the  admitting team resident Pricilla/Scooter.  Please refer to the resident progress note today and admission history and physical  for additional information.      GERTRUDE FATIMA MD

## 2024-09-26 NOTE — ED NOTES
Notified CT that patient is ready for scans.  CT states that they will send for patient at this time.

## 2024-09-27 LAB
ALBUMIN SERPL-MCNC: 3.7 G/DL (ref 3.5–5.2)
ALP SERPL-CCNC: 188 U/L (ref 40–129)
ALT SERPL-CCNC: 12 U/L (ref 0–40)
ANION GAP SERPL CALCULATED.3IONS-SCNC: 14 MMOL/L (ref 7–16)
ARM BAND NUMBER: NORMAL
AST SERPL-CCNC: 61 U/L (ref 0–39)
BASOPHILS # BLD: 0.03 K/UL (ref 0–0.2)
BASOPHILS NFR BLD: 1 % (ref 0–2)
BILIRUB SERPL-MCNC: 1.3 MG/DL (ref 0–1.2)
BLOOD BANK BLOOD PRODUCT EXPIRATION DATE: NORMAL
BLOOD BANK DISPENSE STATUS: NORMAL
BLOOD BANK ISBT PRODUCT BLOOD TYPE: 6200
BLOOD BANK PRODUCT CODE: NORMAL
BLOOD BANK UNIT TYPE AND RH: NORMAL
BPU ID: NORMAL
BUN SERPL-MCNC: 11 MG/DL (ref 6–20)
CALCIUM SERPL-MCNC: 9.5 MG/DL (ref 8.6–10.2)
CHLORIDE SERPL-SCNC: 102 MMOL/L (ref 98–107)
CO2 SERPL-SCNC: 23 MMOL/L (ref 22–29)
COMPONENT: NORMAL
CREAT SERPL-MCNC: 0.8 MG/DL (ref 0.7–1.2)
EKG ATRIAL RATE: 117 BPM
EKG P AXIS: 40 DEGREES
EKG P-R INTERVAL: 142 MS
EKG Q-T INTERVAL: 324 MS
EKG QRS DURATION: 88 MS
EKG QTC CALCULATION (BAZETT): 451 MS
EKG R AXIS: 6 DEGREES
EKG T AXIS: -2 DEGREES
EKG VENTRICULAR RATE: 117 BPM
EOSINOPHIL # BLD: 0 K/UL (ref 0.05–0.5)
EOSINOPHILS RELATIVE PERCENT: 0 % (ref 0–6)
ERYTHROCYTE [DISTWIDTH] IN BLOOD BY AUTOMATED COUNT: 19.2 % (ref 11.5–15)
GFR, ESTIMATED: >90 ML/MIN/1.73M2
GLUCOSE SERPL-MCNC: 91 MG/DL (ref 74–99)
HCT VFR BLD AUTO: 22.8 % (ref 37–54)
HCT VFR BLD AUTO: 23.9 % (ref 37–54)
HGB BLD-MCNC: 7.4 G/DL (ref 12.5–16.5)
HGB BLD-MCNC: 7.8 G/DL (ref 12.5–16.5)
LACTATE BLDV-SCNC: 2 MMOL/L (ref 0.5–2.2)
LACTATE BLDV-SCNC: 2.1 MMOL/L (ref 0.5–2.2)
LYMPHOCYTES NFR BLD: 0.32 K/UL (ref 1.5–4)
LYMPHOCYTES RELATIVE PERCENT: 9 % (ref 20–42)
MCH RBC QN AUTO: 30.1 PG (ref 26–35)
MCHC RBC AUTO-ENTMCNC: 32.5 G/DL (ref 32–34.5)
MCV RBC AUTO: 92.7 FL (ref 80–99.9)
METAMYELOCYTES ABSOLUTE COUNT: 0.03 K/UL (ref 0–0.12)
METAMYELOCYTES: 1 % (ref 0–1)
MONOCYTES NFR BLD: 0.13 K/UL (ref 0.1–0.95)
MONOCYTES NFR BLD: 4 % (ref 2–12)
MYELOCYTES ABSOLUTE COUNT: 0.19 K/UL
MYELOCYTES: 5 %
NEUTROPHILS NFR BLD: 81 % (ref 43–80)
NEUTS SEG NFR BLD: 2.99 K/UL (ref 1.8–7.3)
NUCLEATED RED BLOOD CELLS: 2 PER 100 WBC
PLATELET # BLD AUTO: 37 K/UL (ref 130–450)
PLATELET CONFIRMATION: NORMAL
PMV BLD AUTO: 9.6 FL (ref 7–12)
POTASSIUM SERPL-SCNC: 4.2 MMOL/L (ref 3.5–5)
PROT SERPL-MCNC: 6.1 G/DL (ref 6.4–8.3)
RBC # BLD AUTO: 2.46 M/UL (ref 3.8–5.8)
RBC # BLD: ABNORMAL 10*6/UL
SODIUM SERPL-SCNC: 139 MMOL/L (ref 132–146)
TRANSFUSION STATUS: NORMAL
UNIT DIVISION: 0
UNIT ISSUE DATE/TIME: NORMAL
WBC OTHER # BLD: 3.7 K/UL (ref 4.5–11.5)

## 2024-09-27 PROCEDURE — 6360000002 HC RX W HCPCS

## 2024-09-27 PROCEDURE — 2700000000 HC OXYGEN THERAPY PER DAY

## 2024-09-27 PROCEDURE — 6370000000 HC RX 637 (ALT 250 FOR IP)

## 2024-09-27 PROCEDURE — 97165 OT EVAL LOW COMPLEX 30 MIN: CPT

## 2024-09-27 PROCEDURE — P9016 RBC LEUKOCYTES REDUCED: HCPCS

## 2024-09-27 PROCEDURE — 80053 COMPREHEN METABOLIC PANEL: CPT

## 2024-09-27 PROCEDURE — 2580000003 HC RX 258

## 2024-09-27 PROCEDURE — 85014 HEMATOCRIT: CPT

## 2024-09-27 PROCEDURE — 97535 SELF CARE MNGMENT TRAINING: CPT

## 2024-09-27 PROCEDURE — 85018 HEMOGLOBIN: CPT

## 2024-09-27 PROCEDURE — 97161 PT EVAL LOW COMPLEX 20 MIN: CPT

## 2024-09-27 PROCEDURE — 36430 TRANSFUSION BLD/BLD COMPNT: CPT

## 2024-09-27 PROCEDURE — 2140000000 HC CCU INTERMEDIATE R&B

## 2024-09-27 PROCEDURE — 97530 THERAPEUTIC ACTIVITIES: CPT

## 2024-09-27 PROCEDURE — 36415 COLL VENOUS BLD VENIPUNCTURE: CPT

## 2024-09-27 PROCEDURE — 83605 ASSAY OF LACTIC ACID: CPT

## 2024-09-27 PROCEDURE — 93010 ELECTROCARDIOGRAM REPORT: CPT | Performed by: INTERNAL MEDICINE

## 2024-09-27 PROCEDURE — 94640 AIRWAY INHALATION TREATMENT: CPT

## 2024-09-27 PROCEDURE — 85025 COMPLETE CBC W/AUTO DIFF WBC: CPT

## 2024-09-27 PROCEDURE — 99232 SBSQ HOSP IP/OBS MODERATE 35: CPT

## 2024-09-27 RX ORDER — FENTANYL 12.5 UG/1
1 PATCH TRANSDERMAL
Status: DISCONTINUED | OUTPATIENT
Start: 2024-09-27 | End: 2024-10-04

## 2024-09-27 RX ORDER — SODIUM CHLORIDE 9 MG/ML
INJECTION, SOLUTION INTRAVENOUS PRN
Status: DISCONTINUED | OUTPATIENT
Start: 2024-09-27 | End: 2024-10-01

## 2024-09-27 RX ORDER — SUCRALFATE 1 G/1
1 TABLET ORAL EVERY 6 HOURS SCHEDULED
Status: DISCONTINUED | OUTPATIENT
Start: 2024-09-27 | End: 2024-10-11 | Stop reason: HOSPADM

## 2024-09-27 RX ADMIN — OXYCODONE HYDROCHLORIDE 15 MG: 15 TABLET ORAL at 18:44

## 2024-09-27 RX ADMIN — SODIUM CHLORIDE: 9 INJECTION, SOLUTION INTRAVENOUS at 01:55

## 2024-09-27 RX ADMIN — OXYCODONE HYDROCHLORIDE 15 MG: 15 TABLET ORAL at 07:52

## 2024-09-27 RX ADMIN — HYDROMORPHONE HYDROCHLORIDE 1 MG: 1 INJECTION, SOLUTION INTRAMUSCULAR; INTRAVENOUS; SUBCUTANEOUS at 20:33

## 2024-09-27 RX ADMIN — HYDROMORPHONE HYDROCHLORIDE 1 MG: 1 INJECTION, SOLUTION INTRAMUSCULAR; INTRAVENOUS; SUBCUTANEOUS at 00:36

## 2024-09-27 RX ADMIN — HYDROMORPHONE HYDROCHLORIDE 1 MG: 1 INJECTION, SOLUTION INTRAMUSCULAR; INTRAVENOUS; SUBCUTANEOUS at 03:40

## 2024-09-27 RX ADMIN — BUDESONIDE 500 MCG: 0.5 INHALANT RESPIRATORY (INHALATION) at 10:32

## 2024-09-27 RX ADMIN — SUCRALFATE 1 G: 1 TABLET ORAL at 22:40

## 2024-09-27 RX ADMIN — BUDESONIDE 500 MCG: 0.5 INHALANT RESPIRATORY (INHALATION) at 20:55

## 2024-09-27 RX ADMIN — SUCRALFATE 1 G: 1 TABLET ORAL at 17:29

## 2024-09-27 RX ADMIN — HYDROMORPHONE HYDROCHLORIDE 1 MG: 1 INJECTION, SOLUTION INTRAMUSCULAR; INTRAVENOUS; SUBCUTANEOUS at 09:53

## 2024-09-27 RX ADMIN — PANTOPRAZOLE SODIUM 40 MG: 40 INJECTION, POWDER, FOR SOLUTION INTRAVENOUS at 07:53

## 2024-09-27 RX ADMIN — HYDROMORPHONE HYDROCHLORIDE 0.5 MG: 1 INJECTION, SOLUTION INTRAMUSCULAR; INTRAVENOUS; SUBCUTANEOUS at 11:38

## 2024-09-27 RX ADMIN — OXYCODONE HYDROCHLORIDE 15 MG: 15 TABLET ORAL at 02:50

## 2024-09-27 RX ADMIN — HYDROMORPHONE HYDROCHLORIDE 1 MG: 1 INJECTION, SOLUTION INTRAMUSCULAR; INTRAVENOUS; SUBCUTANEOUS at 23:41

## 2024-09-27 RX ADMIN — SENNOSIDES AND DOCUSATE SODIUM 2 TABLET: 50; 8.6 TABLET ORAL at 07:52

## 2024-09-27 RX ADMIN — HYDROMORPHONE HYDROCHLORIDE 1 MG: 1 INJECTION, SOLUTION INTRAMUSCULAR; INTRAVENOUS; SUBCUTANEOUS at 06:48

## 2024-09-27 RX ADMIN — OXYCODONE HYDROCHLORIDE 15 MG: 15 TABLET ORAL at 13:10

## 2024-09-27 RX ADMIN — Medication 2000 UNITS: at 07:52

## 2024-09-27 RX ADMIN — HYDROMORPHONE HYDROCHLORIDE 1 MG: 1 INJECTION, SOLUTION INTRAMUSCULAR; INTRAVENOUS; SUBCUTANEOUS at 17:29

## 2024-09-27 RX ADMIN — SUCRALFATE 1 G: 1 TABLET ORAL at 11:38

## 2024-09-27 RX ADMIN — OXYCODONE HYDROCHLORIDE 15 MG: 15 TABLET ORAL at 22:40

## 2024-09-27 ASSESSMENT — PAIN DESCRIPTION - DESCRIPTORS
DESCRIPTORS: ACHING;DISCOMFORT;SORE
DESCRIPTORS: ACHING;DISCOMFORT;SORE
DESCRIPTORS: ACHING;DISCOMFORT;SHARP
DESCRIPTORS: ACHING;DISCOMFORT;SORE
DESCRIPTORS: ACHING;DISCOMFORT;DULL
DESCRIPTORS: ACHING;CRUSHING;DISCOMFORT
DESCRIPTORS: ACHING;DISCOMFORT;SORE
DESCRIPTORS: ACHING;DISCOMFORT;SORE;TENDER;THROBBING
DESCRIPTORS: ACHING;DISCOMFORT;DULL
DESCRIPTORS: ACHING;DISCOMFORT
DESCRIPTORS: ACHING;CRUSHING;DISCOMFORT;DULL
DESCRIPTORS: ACHING;DISCOMFORT;CRUSHING
DESCRIPTORS: ACHING;DISCOMFORT;DULL
DESCRIPTORS: ACHING;DISCOMFORT;DULL
DESCRIPTORS: ACHING;DISCOMFORT;SORE;TENDER

## 2024-09-27 ASSESSMENT — PAIN - FUNCTIONAL ASSESSMENT
PAIN_FUNCTIONAL_ASSESSMENT: PREVENTS OR INTERFERES SOME ACTIVE ACTIVITIES AND ADLS
PAIN_FUNCTIONAL_ASSESSMENT: ACTIVITIES ARE NOT PREVENTED
PAIN_FUNCTIONAL_ASSESSMENT: PREVENTS OR INTERFERES SOME ACTIVE ACTIVITIES AND ADLS

## 2024-09-27 ASSESSMENT — PAIN DESCRIPTION - LOCATION
LOCATION: BACK;RIB CAGE
LOCATION: BACK;RIB CAGE
LOCATION: GENERALIZED
LOCATION: GENERALIZED
LOCATION: BACK;RIB CAGE
LOCATION: GENERALIZED
LOCATION: BACK;RIB CAGE
LOCATION: GENERALIZED

## 2024-09-27 ASSESSMENT — PAIN DESCRIPTION - ORIENTATION
ORIENTATION: LEFT
ORIENTATION: LEFT
ORIENTATION: RIGHT;LEFT;MID
ORIENTATION: RIGHT;LEFT;MID
ORIENTATION: MID;RIGHT;LEFT
ORIENTATION: LEFT
ORIENTATION: RIGHT;LEFT;MID
ORIENTATION: LEFT
ORIENTATION: RIGHT;LEFT;MID
ORIENTATION: RIGHT;LEFT;MID

## 2024-09-27 ASSESSMENT — PAIN SCALES - GENERAL
PAINLEVEL_OUTOF10: 10
PAINLEVEL_OUTOF10: 10
PAINLEVEL_OUTOF10: 9
PAINLEVEL_OUTOF10: 10
PAINLEVEL_OUTOF10: 9
PAINLEVEL_OUTOF10: 9
PAINLEVEL_OUTOF10: 10
PAINLEVEL_OUTOF10: 10
PAINLEVEL_OUTOF10: 9
PAINLEVEL_OUTOF10: 8
PAINLEVEL_OUTOF10: 10
PAINLEVEL_OUTOF10: 9
PAINLEVEL_OUTOF10: 10

## 2024-09-27 ASSESSMENT — PAIN DESCRIPTION - FREQUENCY
FREQUENCY: CONTINUOUS

## 2024-09-27 ASSESSMENT — PAIN DESCRIPTION - ONSET
ONSET: ON-GOING
ONSET: PROGRESSIVE
ONSET: ON-GOING

## 2024-09-27 ASSESSMENT — PAIN DESCRIPTION - PAIN TYPE
TYPE: CHRONIC PAIN
TYPE: ACUTE PAIN

## 2024-09-27 NOTE — PROGRESS NOTES
Physical Therapy  Physical Therapy Initial Assessment     Name: Joseph Bond  : 1980  MRN: [id number]      Date of Service: 2024    Evaluating PT:  Yeison Nguyen PT, DPT    Room #:  6523/6523-A  Diagnosis:  Hypoxemia [R09.02]  Thrombocytopenia (HCC) [D69.6]  History of hematemesis [Z87.19]  Anemia, unspecified type [D64.9]  PMHx/PSHx:  cancer, diverticulitis, opioid abuse  Procedure/Surgery:  N/A  Precautions:  fall risk  Equipment Owned: wc, walker  Equipment Needs:  TBD    SUBJECTIVE:    Pt lives alone in a 1st floor apartment with 4 steps to enter and no handrail.  Bed/bath is on main floor.  Pt ambulated with wc/walker PTA.    OBJECTIVE:   Initial Evaluation  Date: 24 Treatment Short Term/ Long Term   Goals   AM-PAC 6 Clicks 15/24     Was pt agreeable to Eval/treatment? yes     Does pt have pain? L upper abdominal pain 10/10     Bed Mobility  Rolling: NT  Supine to sit: SBA  Sit to supine:   Scooting: SBA  Rolling: Ind  Supine to sit: Ind  Sit to supine: Ind  Scooting: Ind   Transfers Sit to stand: Yoan  Stand to sit: Yoan  Stand pivot: Yoan with WW  Sit to stand: Ind  Stand to sit: Ind  Stand pivot: Ind with WW   Ambulation    NT  50ft with WW Ind   Stair negotiation: ascended and descended NT  Make goal as appropriate     Strength/ROM:   BLE gross strength 4-?5  BLE ROM WFL    Balance:   Static Sitting: SBA  Dynamic Sitting: SBA  Static Standing: Yoan with WW  Dynamic Standing: Yoan with WW    Pt is A & O x 3  Sensation:  Pt denies numbness and tingling to extremities  Edema:  None ntoed    Vitals:  SpO2 and HR were stable during session    Therapeutic Exercises:    Bed mobility: supine<>sit, cued for EOB positioning  Transfers: STS x2, cued for hand placement and postural correction  BLE AROM    Patient education  Pt educated on role of PT, importance of functional mobility during hospital stay, and safety of functional mobility    Patient response to education:   Pt verbalized

## 2024-09-27 NOTE — PROGRESS NOTES
Pt observed for the first 15 min of second blood transfusion with no adverse reactions noted. Vitals WDL

## 2024-09-27 NOTE — PROGRESS NOTES
[facility]  Hematology/Oncology  Consult      Patient Name: Joseph Bond  YOB: 1980  PCP: Ty Shaikh MD   Referring Provider:      Reason for Consultation:   Chief Complaint   Patient presents with    Emesis     For 24hrs has hx of prostate cancer        History of Present Illness: This is a 44-year-old male patient with Stage 4 prostate ca with retroperitoneal mediastinal lymphadenopathy diffuse bone metastasis. . Considering high tumor burden, advised treatment with Taxotere, GnRH agonist, Nubeqa along with Xgeva. He was started on Casodex although stopped taking it after he read about side effects. He has so far decided not to be on any systemic therapy. MRI lumbar spine showed T12 L4 metastasis with epidural extension. S/p palliative RT to his spine. He has also been looking into homeopathy. Genetic testing also sent.  Follows with palliative care for pain management. He was last seen while hospitalized earlier this month. Hgb at that was 7.4, platelets were 18.      Patient presented early this morning with complaints of hematemesis. Hgb was found to be 5.3 and 2 units of pRBC's were ordered, platelets were 18. CTA pulmonary and CT A/P showed no evidence of pulmonary embolism. Abnormal patchy ground-glass opacity identified diffusely throughout the lung fields. Findings show progression when compared to the prior study with no definite pleural effusion or pneumothorax. Findings suggesting progression of disease favoring an infectious/inflammatory process or lymphangitic spread of disease given mediastinal adenopathy and abnormal bony metastatic process within the ribs and spine cannot be completely excluded and clinical correlation is needed. Abnormal bony metastatic disease within the spine. There is a bony destructive process involving the left anterior 2nd rib. Associated soft tissue thickening. No acute intra-abdominal or intrapelvic process. Stable adenopathy  Considering high tumor burden, advised treatment with Taxotere, GnRH agonist, Nubeqa along with Xgeva. He was started on Casodex although stopped taking it after he read about side effects. He has so far decided not to be on any systemic therapy. MRI lumbar spine showed T12 L4 metastasis with epidural extension. S/p palliative RT to his spine. He has also been looking into homeopathy. Genetic testing also sent.  Follows with palliative care for pain management. He was last seen while hospitalized earlier this month. Hgb at that was 7.4, platelets were 18.    - Hematemesis   - CTA pulmonary and CT A/P showed no evidence of pulmonary embolism. Abnormal patchy ground-glass opacity identified diffusely throughout the lung fields. Findings show progression when compared to the prior study with no definite pleural effusion or pneumothorax. Findings suggesting progression of disease favoring an infectious/inflammatory process or however lymphangitic spread of disease given mediastinal adenopathy and abnormal bony metastatic process within the ribs and spine cannot be completely excluded and clinical correlation is needed. Abnormal bony metastatic disease within the spine. There is a bony destructive process involving the left anterior 2nd rib. Associated soft tissue thickening.  No acute intra-abdominal or intrapelvic process.  Stable adenopathy identified throughout the retroperitoneum and left pelvic sidewall.  Stable bony metastatic disease throughout the spine and pelvis as well as the hips bilaterally  - Palliative consulted for goals of care and symptom management  - CMP with Lactic acid 2.3. ProBNP 900. Troponin 24. LFT's with Alk phos 227, AST 76  - Hgb was found to be 5.3 and 2 units of pRBC's were ordered, platelets were 18. Cytopenias likely from progression of his neoplastic process  - Palliative has seen the patient  - Patient on Homeopathic therapy. Explained clearly to patient this will not help curb the

## 2024-09-27 NOTE — PROGRESS NOTES
Patient observed for the first 15 minutes of blood transfusion with no adverse reactions noted. Vitals WDL.

## 2024-09-27 NOTE — PROGRESS NOTES
[facility] Inpatient   Resident Progress Note    S:  Hospital day: 1   Brief Synopsis: Joseph Bond is a 44 y.o. male with a PMH of Cancer (HCC), Diverticulosis, and History of opioid abuse (HCC). who presents to ED for nausea and vomiting.     Patient complains of nausea and vomiting that started 2 days ago.  Emesis contains phlegm, mucus and streaks of blood.  Patient could not quantify number of times but states that it has been continuous.  States that he had nausea medications but seemed to make his nausea was.  Also endorses chronic pain on his back, abdomen radha LUQ.  Patient was recently diagnosed for pancreatic cancer with mets.  Completed 10th cycle of radiation 1 month ago.  States that since radiation was completed he has not been able to walk due to pain in his pelvis, abdomen and back.  He has been wheelchair-bound for the past 1 month.  Has had poor appetite, blurry vision, dizziness.  Denies nosebleeds, bruising, melena, hematochezia, hematuria.  Patient does not use illicit drugs or drink alcohol.  Quit smoking 7 years ago.  He would like to remain full code.  Hemoglobin was 5.3 and platelets at 18 at the ER.  Patient was transfused 2 units of pRBC.  Post transfusion hemoglobin was 5.9 and patient was transfused another 2 units of PRBC.  Patient continued to experience pain in his back and upper abdomen and pelvis.  Oncology and palliative were consulted.  Pain medication with Dilaudid, oxy and fentanyl patch.  Palliative care managed pain, nausea and vomiting and constipation 2/2 opioids.  Patient was still declining chemotherapy.    Overnight/interim:   Patient still complaining of pain this morning.  States that nausea has subsided with Compazine.  Denies fever, shortness of breath, chest pain, nausea, vomiting, diarrhea.  States he has been able to tolerate diet today.   2 more units of packed red blood cells given to patient overnight due to post transfusion hemoglobin of  pneumothorax. Findings suggesting progression   of disease favoring an infectious/inflammatory process or however   lymphangitic spread of disease given mediastinal adenopathy and abnormal bony   metastatic process within the ribs and spine cannot be completely excluded   and clinical correlation is needed..   3. Abnormal bony metastatic disease within the spine. There is a bony   destructive process involving the left anterior 2nd rib. Associated soft   tissue thickening.   4. No acute intra-abdominal or intrapelvic process.  Stable adenopathy   identified throughout the retroperitoneum and left pelvic sidewall.  Stable   bony Mets metastatic disease throughout the spine and pelvis as well as the   hips bilaterally.         XR CHEST PORTABLE   Final Result   Interval worsening of the patchy airspace disease throughout the lung fields   bilaterally.               Resident Assessment and Plan     Nausea and vomiting  Stage IV prostate cancer with mets s/p radiation therapy  Back pain and pelvic pain 2/2 mets  -Patient completed radiation 08/25/2024  -Patient currently refusing chemotherapy.  -Consult to heme-onc  -Consult to palliative care  -Compazine for nausea  -Pain management recommendation by heme-onc  -IV Dilaudid 0.5 to 1 mg every 3 hours as needed  -oxycodone 15 mg every 4 hours as needed  -Start 12.5 mcg fentanyl patch every 74 hours  -Stopped taking morphine ER due to drowsiness  -Stopped taking Robaxin due to side effects  -GlycoLax and Senokot daily as needed  -Lactic acid within normal limits.  -PT OT     Anemia  Thrombocytopenia  -Hg 5.3 on arrival.  Transfused 2 units pRBC at ER  -Plt 18 on arrival.  Transfused platelets at ER  -Hgb every 8 hours  -Patient received another 2 units of pRBC due to Hgb of 5.9.  -Posttransfusion hemoglobin of 7.8.  -Transfuse Hg <7  -Transfuse platelets <10k      DVT/GI ppx: PCD's/Protonix  Pain: Dilaudid, oxy, fentanyl patch  GI regimen: Senna S scheduled, GlycoLax as

## 2024-09-27 NOTE — PROGRESS NOTES
Contacted palliative care per pt request regarding pain medications being switched from PRN to straight. Per palliative care pt pain medications are to remain PRN and will not be switched to straight

## 2024-09-27 NOTE — ACP (ADVANCE CARE PLANNING)
Advance Care Planning     Palliative Team Advance Care Planning (ACP) Conversation    Date of Conversation: 09/27/24    Individuals present for the conversation: Patient with decision making capacity and Sister Margarita     ACP documents on file prior to discussion:  -None    Previously completed document/s not on file: Patient / participant reports that there are no previously executed ACP documents.    Healthcare Decision Maker:    Primary Decision Maker: Margarita Bond - Brother/Sister - [phone]    Secondary Decision Maker: Jeri Bond - Brother/Sister - [phone]    Supplemental (Other) Decision Maker: BondRyan - Brother/Sister - [phone]     Conversation Summary:  LSW met with pt and his sister to complete HCPOA. Copy place in soft chart, original given to pt and sister,    Resuscitation Status:   Code Status: Full Code     Documentation Completed:  -Power of  for Healthcare    I spent 20 minutes with the patient and/or surrogate decision maker discussing the patient's wishes and goals.      BERNADETTE Plummer

## 2024-09-27 NOTE — PROGRESS NOTES
OCCUPATIONAL THERAPY INITIAL EVALUATION    [facility]  [address], [place]      Date:2024                                                  Patient Name: Joseph oBnd  MRN: [id number]  : 1980  Room: [address] [place] [address]    Evaluating OT: APARNA Weinstein, OTR/L  # [id number]    Referring Provider:  Anastasia Helms MD   Specific Provider Orders:  \"OT Eval and Treat\"  24    Diagnosis: Hypoxemia [R09.02]  Thrombocytopenia (HCC) [D69.6]  History of hematemesis [Z87.19]  Anemia, unspecified type [D64.9]    Pt was admitted w/ Pain Back, Left Rib, Abdomen, hypoxia, severe symptomatic anemia Hgb 5.3, Nausea/Vomiting    Pertinent Medical History:  Pt has a past medical history of Cancer (HCC), Diverticulosis, and History of opioid abuse (HCC).,  has a past surgical history that includes bronchoscopy (N/A, 2024) and bronchoscopy (2024).    Surgeries this admission: None     Precautions:  Fall Risk  3L O2    Assessment of current deficits   [x] Functional mobility  [x]ADLs  [x] Strength               []Cognition   [x] Functional transfers   [x] IADLs         [x] Safety Awareness   [x]Endurance   [] Fine Coordination              [x] Balance     [] Vision/perception   []Sensation    []Gross Motor Coordination  [] ROM  [] Delirium                  [] Motor Control       OT PLAN OF CARE   OT POC based on physician orders, patient diagnosis and results of clinical assessment    Frequency/Duration 1-3 days/wk for 2 weeks PRN   Specific OT Treatment to include:   * Instruction/training on adapted ADL techniques and AE recommendations to increase functional independence within precautions       * Training on energy conservation strategies, correct breathing pattern and techniques to improve independence/tolerance for self-care routine  * Functional transfer/mobility training/DME recommendations for increased independence, safety, and fall

## 2024-09-27 NOTE — PROGRESS NOTES
Palliative Care Department  [phone]  Palliative Care Progress Note  Provider Gely Banuelos, APRN - CNP      PATIENT: Joseph Bond  : 1980  MRN: [id number]  ADMISSION DATE: 2024  4:56 AM  Referring Provider:  Anastasia Helms MD    Palliative Medicine was consulted on hospital day 1 for assistance with Goals of care, Overwhelmed Symptoms    HPI:     Clinical Summary:Joseph Bond is a 44 y.o. y/o male with a history of prostate cancer with mediastinal lymphadenopathy, extensive metastasis of the spine, rib cage, pelvis, humerus and femurs (had refused systemic therapy, pursuing homeopathic), completed palliative radiation treatment to spine, polysubstance abuse including opioids and tobacco, abstinent for 5 years, diverticulitis, with recent ER visit on 2024, due to shortness of breath, was going to be admitted for further medical management however left AMA, was supposed to follow-up outpatient with palliative medicine for symptom management, however missed appointment, did not return phone call to reschedule appointment, who presented to [facility] on 2024 with complaint of nausea, vomiting, left-sided rib pain.  At ED, found hypoxic 78% on room air, placed on 4 L nasal cannula.  Significant laboratory findings showed hemoglobin 5.3, platelets 18, alkaline phosphate 227, AST 76, lipase 8, WBC 4.2.  Received 2 packed red blood cell and sixpack platelets.  CT images showed disease progression when compared to prior images, favoring an infectious/inflammatory process or lymphangitic spread of disease given mediastinal adenopathy and abnormal bony metastatic process within the rib and spine.  Abnormal bony metastatic disease within the spine.  Bony destructive process involving the left anterior second rib.  Stable adenopathy throughout the retroperitoneal and pelvic sidewall,.  Stable bony metastatic disease throughout the spine, pelvis and hip bilaterally.  Palliative medicine  consulted to assist further with goals of care, symptom management.    ASSESSMENT/PLAN:     Pertinent Hospital Diagnoses     Thrombocytopenia  Anemia  Acute respiratory failure  Metastatic prostate cancer    Symptom management     Pain due to neoplasm  -IV Dilaudid 0.5 to 1 mg every 3 hours as needed  -oxycodone 15 mg every 4 hours as needed  -Start 12.5 mcg fentanyl patch every 72 hours  -Stopped taking morphine ER due to drowsiness  -Stopped taking Robaxin due to side effects    Nausea and vomiting  -Promethazine 12.5 mg p.o. every 6 hours as needed  -IV Zofran 4 mg every 6 hours as needed    Prophylaxis stool softener  -GlycoLax daily as needed  -Senokot S at     Palliative Care Encounter / Counseling Regarding Goals of Care  Please see detailed goals of care discussion as below  At this time, Joseph Bond, Does Not have capacity for medical decision-making.  Capacity is time limited and situation/question specific  Outcome of goals of care meeting:  Wants to continue to pursue home impacted approach to his cancer, no interested on systemic treatments  Wishes to continue full code, DNR CCA recommended, brochure provided  Goal is to return home at discharge  Per patient, palliative medicine through [facility] is managing his symptoms at home    Code status Full Code  Advanced Directives: no POA or living will in [facility]  Surrogate/Legal NOK:  Margarita Bond (Sister ) [phone]   Gaurav Womack (cousin) [phone]    Spiritual assessment: no spiritual distress identified  Bereavement and grief: to be determined  Referrals to: none today    Thank you for the opportunity to participate in the care of Joseph Bond.     FANG Denise CNP  Palliative Medicine     SUBJECTIVE:     Details of Conversation:      Chart reviewed.  Patient seen at the bedside, sister Margarita present in the room.  They were able to complete HCPOA papers this morning, appointing Sister Margarita and Jeri and brother Ryan as

## 2024-09-27 NOTE — PATIENT CARE CONFERENCE
[facility] Quality Flow/Interdisciplinary Rounds Progress Note        Quality Flow Rounds held on September 27, 2024    Disciplines Attending:  Bedside Nurse, , , and Nursing Unit Leadership    Joseph Bond was admitted on 9/26/2024  4:56 AM    Anticipated Discharge Date:       Disposition:    Simon Score:  Simon Scale Score: 17    Readmission Risk              Risk of Unplanned Readmission:  33           Discussed patient goal for the day, patient clinical progression, and barriers to discharge.  The following Goal(s) of the Day/Commitment(s) have been identified:    Report labs/diagnostics     Kamala Yanez RN  September 27, 2024

## 2024-09-27 NOTE — FLOWSHEET NOTE
Pt arrived to unit with the following belongings:   09/26/24 2579   Belongings   Dental Appliances None   Vision - Corrective Lenses None   Hearing Aid None   Clothing Pants;Shirt;Socks;Hat;Footwear   Jewelry None   Body Piercings Removed N/A   Electronic Devices Cell Phone;   Weapons (Notify Protective Services/Security) None   Home Medications None   Valuables Given To Patient   Provide Name(s) of Who Valuable(s) Were Given To Indra

## 2024-09-27 NOTE — PROGRESS NOTES
[facility]  Family Medicine Attending    S: 44 y.o. male with nausea and vomiting as well as left sided abdominal pain.  He was found to have hypoxia and severe symptomatic anemia with hgb 5.3.  He has been having nausea with vomiting, pelvic and back pain.  Has not been able to walk due to pain for a couple of weeks.  Goals of care-pain control so that he can walk again.  He wanted more information on side effects from a regimen of chemotherapy drugs from the oncologist.  Oncology was consulted.  Per their note, \"Considering high tumor burden, advised treatment with Taxotere, GnRH agonist, Nubeqa along with Xgeva\"  Side effects of these medications were discussed with the patient by oncology.     Palliative did see the patient yesterday.  He declined fentanyl patch.  He is currently treated with dilauded and oxycodone, but willing to reconsider patch due to pain being very elevated.      O: VS- Blood pressure 102/72, pulse 85, temperature 98.1 °F (36.7 °C), temperature source Temporal, resp. rate 16, height 1.702 m (5' 7\"), weight 78.7 kg (173 lb 8 oz), SpO2 100%.  Exam is as noted by resident with the following changes, additions or corrections:  Gen:  AAO x3  CVS: RRR  Lungs: CTAB    Impressions:    Severe Symptomatic Anemia, related to metastatic prostate cancer in the bone  Metastatic prostate cancer  Pain 2/2 metatstatic cancer-s/p palliative radiation treatments.  Nausea    Plan:     Transfusions as needed  Monitor vitals  Monitor labs  Consultation to palliative for recs on pain control   Consultation to heme/onc for further education regarding effects of chemotherapy regimen that would be given if patient would so choose.  PT/OT     Attending Physician Statement  I have reviewed the chart and seen the patient with the resident(s).  I personally reviewed images, EKG's and similar tests, if present.  I personally reviewed and performed key elements of the history and exam.  I have reviewed

## 2024-09-27 NOTE — CARE COORDINATION
Transition of care: Admitted with anemia. Hem/onc and Palliative Care following. Metastatic prostate cancer. Labs and orders noted. Chart reviewed. Met with pt and pt's sister, Margarita, in room. Pt said he lives alone in a ground floor apartment. Has 4 stairs with no rail down to his apartment. Needs assistance with bathing, dressing and mobility. DME- [facility] and a wheelchair. Pt said he has assistance/support  from Margarita and his mother at home. Plan is to return home when medically ready. They were agreeable to [facility] for skilled nursing ,therapy and home care aide. Referral was made to [facility] and then cancelled due to pt was already active with [facility]. Confirmed this with Melanie with [facility]. Asked Melanie to please add home care aide to pt's home care services. Will need resumption of home health care order and an order for home care aide at discharge.  If home o2 is needed, they had no preference for dme agency and were agreeable to [facility] dme. VM referral was given to Cathy with [facility]. PCP is Dr ALTAGRACIA Shaikh and pharmacy is [facility] on [address]. Cm/sw will follow.     1515  Second vm referral was left with Cathy with [facility] to follow for home o2 if needed.       Case Management Assessment  Initial Evaluation    Date/Time of Evaluation: 9/27/2024 3:32 PM  Assessment Completed by: Ladonna Pulido RN    If patient is discharged prior to next notation, then this note serves as note for discharge by case management.    Patient Name: Joseph Bond                   YOB: 1980  Diagnosis: Hypoxemia [R09.02]  Thrombocytopenia (HCC) [D69.6]  History of hematemesis [Z87.19]  Anemia, unspecified type [D64.9]                   Date / Time: 9/26/2024  4:56 AM    Patient Admission Status: Inpatient   Readmission Risk (Low < 19, Mod (19-27), High > 27): Readmission Risk Score: 29    Current PCP: Ty Shaikh MD  PCP verified by CM? Yes    Chart Reviewed: Yes      History Provided by: Patient  Patient

## 2024-09-27 NOTE — PROGRESS NOTES
4 Eyes Skin Assessment     NAME:  Joseph Bond  YOB: 1980  MEDICAL RECORD NUMBER:  [id number]    The patient is being assessed for  Admission    I agree that at least one RN has performed a thorough Head to Toe Skin Assessment on the patient. ALL assessment sites listed below have been assessed.      Areas assessed by both nurses:    Head, Face, Ears, Shoulders, Back, Chest, Arms, Elbows, Hands, Sacrum. Buttock, Coccyx, Ischium, Legs. Feet and Heels, and Under Medical Devices         Does the Patient have a Wound? No noted wound(s)       Simon Prevention initiated by RN: No  Wound Care Orders initiated by RN: No    Pressure Injury (Stage 3,4, Unstageable, DTI, NWPT, and Complex wounds) if present, place Wound referral order by RN under : No    New Ostomies, if present place, Ostomy referral order under : No     Nurse 1 eSignature: Electronically signed by Mena Schmitt RN on 9/26/24 at 10:08 PM EDT    **SHARE this note so that the co-signing nurse can place an eSignature**    Nurse 2 eSignature: Electronically signed by Debra Walter RN on 9/26/24 at 10:09 PM EDT

## 2024-09-28 LAB
ABO/RH: NORMAL
ALBUMIN SERPL-MCNC: 3.4 G/DL (ref 3.5–5.2)
ALP SERPL-CCNC: 164 U/L (ref 40–129)
ALT SERPL-CCNC: 10 U/L (ref 0–40)
ANION GAP SERPL CALCULATED.3IONS-SCNC: 11 MMOL/L (ref 7–16)
ANTIBODY SCREEN: NEGATIVE
ARM BAND NUMBER: NORMAL
AST SERPL-CCNC: 52 U/L (ref 0–39)
BASOPHILS # BLD: 0 K/UL (ref 0–0.2)
BASOPHILS NFR BLD: 0 % (ref 0–2)
BILIRUB SERPL-MCNC: 0.7 MG/DL (ref 0–1.2)
BLOOD BANK BLOOD PRODUCT EXPIRATION DATE: NORMAL
BLOOD BANK DISPENSE STATUS: NORMAL
BLOOD BANK ISBT PRODUCT BLOOD TYPE: 6200
BLOOD BANK PRODUCT CODE: NORMAL
BLOOD BANK SAMPLE EXPIRATION: NORMAL
BLOOD BANK UNIT TYPE AND RH: NORMAL
BPU ID: NORMAL
BUN SERPL-MCNC: 12 MG/DL (ref 6–20)
CALCIUM SERPL-MCNC: 9.1 MG/DL (ref 8.6–10.2)
CHLORIDE SERPL-SCNC: 102 MMOL/L (ref 98–107)
CO2 SERPL-SCNC: 24 MMOL/L (ref 22–29)
COMPONENT: NORMAL
CREAT SERPL-MCNC: 0.8 MG/DL (ref 0.7–1.2)
CROSSMATCH RESULT: NORMAL
EOSINOPHIL # BLD: 0 K/UL (ref 0.05–0.5)
EOSINOPHILS RELATIVE PERCENT: 0 % (ref 0–6)
ERYTHROCYTE [DISTWIDTH] IN BLOOD BY AUTOMATED COUNT: 19.7 % (ref 11.5–15)
GFR, ESTIMATED: >90 ML/MIN/1.73M2
GLUCOSE SERPL-MCNC: 88 MG/DL (ref 74–99)
HCT VFR BLD AUTO: 21.5 % (ref 37–54)
HGB BLD-MCNC: 7.2 G/DL (ref 12.5–16.5)
LYMPHOCYTES NFR BLD: 0.28 K/UL (ref 1.5–4)
LYMPHOCYTES RELATIVE PERCENT: 8 % (ref 20–42)
MAGNESIUM SERPL-MCNC: 2 MG/DL (ref 1.6–2.6)
MCH RBC QN AUTO: 29.9 PG (ref 26–35)
MCHC RBC AUTO-ENTMCNC: 33.5 G/DL (ref 32–34.5)
MCV RBC AUTO: 89.2 FL (ref 80–99.9)
METAMYELOCYTES ABSOLUTE COUNT: 0.03 K/UL (ref 0–0.12)
METAMYELOCYTES: 1 % (ref 0–1)
MONOCYTES NFR BLD: 0.13 K/UL (ref 0.1–0.95)
MONOCYTES NFR BLD: 4 % (ref 2–12)
MYELOCYTES ABSOLUTE COUNT: 0.22 K/UL
MYELOCYTES: 6 %
NEUTROPHILS NFR BLD: 81 % (ref 43–80)
NEUTS SEG NFR BLD: 2.91 K/UL (ref 1.8–7.3)
NUCLEATED RED BLOOD CELLS: 3 PER 100 WBC
PLATELET CONFIRMATION: NORMAL
PLATELET, FLUORESCENCE: 22 K/UL (ref 130–450)
PMV BLD AUTO: ABNORMAL FL (ref 7–12)
POTASSIUM SERPL-SCNC: 3.5 MMOL/L (ref 3.5–5)
PROMYELOCYTES ABSOLUTE COUNT: 0.03 K/UL
PROMYELOCYTES: 1 %
PROT SERPL-MCNC: 5.6 G/DL (ref 6.4–8.3)
RBC # BLD AUTO: 2.41 M/UL (ref 3.8–5.8)
RBC # BLD: ABNORMAL 10*6/UL
SODIUM SERPL-SCNC: 137 MMOL/L (ref 132–146)
TRANSFUSION STATUS: NORMAL
UNIT DIVISION: 0
UNIT ISSUE DATE/TIME: NORMAL
WBC OTHER # BLD: 3.6 K/UL (ref 4.5–11.5)

## 2024-09-28 PROCEDURE — 85025 COMPLETE CBC W/AUTO DIFF WBC: CPT

## 2024-09-28 PROCEDURE — 2140000000 HC CCU INTERMEDIATE R&B

## 2024-09-28 PROCEDURE — 6370000000 HC RX 637 (ALT 250 FOR IP): Performed by: NURSE PRACTITIONER

## 2024-09-28 PROCEDURE — 6370000000 HC RX 637 (ALT 250 FOR IP)

## 2024-09-28 PROCEDURE — 6360000002 HC RX W HCPCS

## 2024-09-28 PROCEDURE — 36415 COLL VENOUS BLD VENIPUNCTURE: CPT

## 2024-09-28 PROCEDURE — 80053 COMPREHEN METABOLIC PANEL: CPT

## 2024-09-28 PROCEDURE — 2700000000 HC OXYGEN THERAPY PER DAY

## 2024-09-28 PROCEDURE — 94640 AIRWAY INHALATION TREATMENT: CPT

## 2024-09-28 PROCEDURE — 83735 ASSAY OF MAGNESIUM: CPT

## 2024-09-28 PROCEDURE — 2580000003 HC RX 258

## 2024-09-28 PROCEDURE — 99232 SBSQ HOSP IP/OBS MODERATE 35: CPT | Performed by: FAMILY MEDICINE

## 2024-09-28 RX ORDER — METHOCARBAMOL 750 MG/1
750 TABLET, FILM COATED ORAL 4 TIMES DAILY
Status: DISCONTINUED | OUTPATIENT
Start: 2024-09-28 | End: 2024-10-11 | Stop reason: HOSPADM

## 2024-09-28 RX ADMIN — OXYCODONE HYDROCHLORIDE 15 MG: 15 TABLET ORAL at 10:40

## 2024-09-28 RX ADMIN — OXYCODONE HYDROCHLORIDE 15 MG: 15 TABLET ORAL at 06:37

## 2024-09-28 RX ADMIN — HYDROMORPHONE HYDROCHLORIDE 1 MG: 1 INJECTION, SOLUTION INTRAMUSCULAR; INTRAVENOUS; SUBCUTANEOUS at 23:40

## 2024-09-28 RX ADMIN — METHOCARBAMOL TABLETS 750 MG: 750 TABLET, COATED ORAL at 17:07

## 2024-09-28 RX ADMIN — SUCRALFATE 1 G: 1 TABLET ORAL at 17:07

## 2024-09-28 RX ADMIN — PANTOPRAZOLE SODIUM 40 MG: 40 INJECTION, POWDER, FOR SOLUTION INTRAVENOUS at 08:40

## 2024-09-28 RX ADMIN — SUCRALFATE 1 G: 1 TABLET ORAL at 22:40

## 2024-09-28 RX ADMIN — OXYCODONE HYDROCHLORIDE 15 MG: 15 TABLET ORAL at 22:40

## 2024-09-28 RX ADMIN — SUCRALFATE 1 G: 1 TABLET ORAL at 10:40

## 2024-09-28 RX ADMIN — OXYCODONE HYDROCHLORIDE 15 MG: 15 TABLET ORAL at 02:41

## 2024-09-28 RX ADMIN — METHOCARBAMOL TABLETS 750 MG: 750 TABLET, COATED ORAL at 20:04

## 2024-09-28 RX ADMIN — BUDESONIDE 500 MCG: 0.5 INHALANT RESPIRATORY (INHALATION) at 20:52

## 2024-09-28 RX ADMIN — HYDROMORPHONE HYDROCHLORIDE 1 MG: 1 INJECTION, SOLUTION INTRAMUSCULAR; INTRAVENOUS; SUBCUTANEOUS at 20:04

## 2024-09-28 RX ADMIN — OXYCODONE HYDROCHLORIDE 15 MG: 15 TABLET ORAL at 14:41

## 2024-09-28 RX ADMIN — POTASSIUM CHLORIDE 40 MEQ: 1500 TABLET, EXTENDED RELEASE ORAL at 09:13

## 2024-09-28 RX ADMIN — Medication 2000 UNITS: at 08:40

## 2024-09-28 RX ADMIN — HYDROMORPHONE HYDROCHLORIDE 0.5 MG: 1 INJECTION, SOLUTION INTRAMUSCULAR; INTRAVENOUS; SUBCUTANEOUS at 15:41

## 2024-09-28 RX ADMIN — ONDANSETRON 4 MG: 2 INJECTION INTRAMUSCULAR; INTRAVENOUS at 08:53

## 2024-09-28 RX ADMIN — SODIUM CHLORIDE, PRESERVATIVE FREE 10 ML: 5 INJECTION INTRAVENOUS at 20:07

## 2024-09-28 RX ADMIN — METHOCARBAMOL TABLETS 750 MG: 750 TABLET, COATED ORAL at 12:46

## 2024-09-28 RX ADMIN — SUCRALFATE 1 G: 1 TABLET ORAL at 06:37

## 2024-09-28 RX ADMIN — HYDROMORPHONE HYDROCHLORIDE 1 MG: 1 INJECTION, SOLUTION INTRAMUSCULAR; INTRAVENOUS; SUBCUTANEOUS at 03:41

## 2024-09-28 RX ADMIN — HYDROMORPHONE HYDROCHLORIDE 0.5 MG: 1 INJECTION, SOLUTION INTRAMUSCULAR; INTRAVENOUS; SUBCUTANEOUS at 12:17

## 2024-09-28 RX ADMIN — SENNOSIDES AND DOCUSATE SODIUM 2 TABLET: 50; 8.6 TABLET ORAL at 08:40

## 2024-09-28 RX ADMIN — METHOCARBAMOL TABLETS 750 MG: 750 TABLET, COATED ORAL at 08:45

## 2024-09-28 RX ADMIN — OXYCODONE HYDROCHLORIDE 15 MG: 15 TABLET ORAL at 18:39

## 2024-09-28 RX ADMIN — SODIUM CHLORIDE, PRESERVATIVE FREE 10 ML: 5 INJECTION INTRAVENOUS at 08:41

## 2024-09-28 RX ADMIN — HYDROMORPHONE HYDROCHLORIDE 1 MG: 1 INJECTION, SOLUTION INTRAMUSCULAR; INTRAVENOUS; SUBCUTANEOUS at 07:30

## 2024-09-28 ASSESSMENT — PAIN SCALES - WONG BAKER
WONGBAKER_NUMERICALRESPONSE: HURTS A LITTLE BIT

## 2024-09-28 ASSESSMENT — PAIN SCALES - GENERAL
PAINLEVEL_OUTOF10: 8
PAINLEVEL_OUTOF10: 10
PAINLEVEL_OUTOF10: 9
PAINLEVEL_OUTOF10: 6
PAINLEVEL_OUTOF10: 10
PAINLEVEL_OUTOF10: 8
PAINLEVEL_OUTOF10: 9
PAINLEVEL_OUTOF10: 10
PAINLEVEL_OUTOF10: 8
PAINLEVEL_OUTOF10: 8
PAINLEVEL_OUTOF10: 10
PAINLEVEL_OUTOF10: 10
PAINLEVEL_OUTOF10: 8
PAINLEVEL_OUTOF10: 8
PAINLEVEL_OUTOF10: 6
PAINLEVEL_OUTOF10: 8
PAINLEVEL_OUTOF10: 10
PAINLEVEL_OUTOF10: 6
PAINLEVEL_OUTOF10: 9
PAINLEVEL_OUTOF10: 8
PAINLEVEL_OUTOF10: 10
PAINLEVEL_OUTOF10: 10
PAINLEVEL_OUTOF10: 8

## 2024-09-28 ASSESSMENT — PAIN DESCRIPTION - DESCRIPTORS
DESCRIPTORS: ACHING;DISCOMFORT;SHARP
DESCRIPTORS: ACHING;DISCOMFORT;SORE
DESCRIPTORS: ACHING;DISCOMFORT;SORE;TENDER
DESCRIPTORS: ACHING;DISCOMFORT;SORE
DESCRIPTORS: ACHING;DISCOMFORT;SHARP

## 2024-09-28 ASSESSMENT — PAIN DESCRIPTION - LOCATION
LOCATION: GENERALIZED
LOCATION: ABDOMEN
LOCATION: GENERALIZED
LOCATION: ABDOMEN;GENERALIZED
LOCATION: GENERALIZED

## 2024-09-28 ASSESSMENT — PAIN DESCRIPTION - FREQUENCY: FREQUENCY: CONTINUOUS

## 2024-09-28 ASSESSMENT — PAIN DESCRIPTION - ONSET: ONSET: ON-GOING

## 2024-09-28 ASSESSMENT — PAIN - FUNCTIONAL ASSESSMENT
PAIN_FUNCTIONAL_ASSESSMENT: ACTIVITIES ARE NOT PREVENTED

## 2024-09-28 ASSESSMENT — PAIN DESCRIPTION - PAIN TYPE: TYPE: CHRONIC PAIN

## 2024-09-28 NOTE — PLAN OF CARE
Problem: Pain  Goal: Verbalizes/displays adequate comfort level or baseline comfort level  Outcome: Not Progressing     Problem: Discharge Planning  Goal: Discharge to home or other facility with appropriate resources  Outcome: Progressing  Flowsheets (Taken 9/27/2024 2200)  Discharge to home or other facility with appropriate resources: Identify barriers to discharge with patient and caregiver     Problem: Skin/Tissue Integrity  Goal: Absence of new skin breakdown  Description: 1.  Monitor for areas of redness and/or skin breakdown  2.  Assess vascular access sites hourly  3.  Every 4-6 hours minimum:  Change oxygen saturation probe site  4.  Every 4-6 hours:  If on nasal continuous positive airway pressure, respiratory therapy assess nares and determine need for appliance change or resting period.  Outcome: Progressing     Problem: Pain  Goal: Verbalizes/displays adequate comfort level or baseline comfort level  Outcome: Not Progressing

## 2024-09-28 NOTE — PROGRESS NOTES
[facility]  Family Medicine Attending    S: 44 y.o. male with nausea and vomiting as well as left sided abdominal pain.  He was found to have hypoxia and severe symptomatic anemia with hgb 5.3.  He has been having nausea with vomiting, pelvic and back pain.  Has not been able to walk due to pain for a couple of weeks.  Goals of care-pain control so that he can walk again.  He wanted more information on side effects from a regimen of chemotherapy drugs from the oncologist.  Oncology was consulted.  Per their note, \"Considering high tumor burden, advised treatment with Taxotere, GnRH agonist, Nubeqa along with Xgeva\"  Side effects of these medications were discussed with the patient by oncology.     Palliative care following patient.  Pain is still too severe to walk.  [facility] 15/24.  Walk not attempted yesterday.    Hemoglobin dropped from 7.8 down to 7.2 (he did have 3 units PRBCs and 1 unit of platelets on admission).  No signs of active bleeding.  Platelet count down to 22,000.    O: VS- Blood pressure 116/77, pulse 82, temperature 97.6 °F (36.4 °C), temperature source Temporal, resp. rate 18, height 1.702 m (5' 7\"), weight 78.7 kg (173 lb 8 oz), SpO2 98%.  Exam is as noted by resident with the following changes, additions or corrections:  Gen:  AAO x3  CVS: RRR  Lungs: CTAB    Impressions:    Severe Symptomatic Anemia, related to metastatic prostate cancer in the bone  Metastatic prostate cancer  Pain 2/2 metatstatic cancer-s/p palliative radiation treatments.  Nausea    Plan:     Transfusions as needed; continue to monitor H/H-RBC transfusion is hgb <7, and platelets if <10,000 or active bleeding.  Monitor vitals  Monitor labs  Consultation to palliative for recs on pain control; pain medication adjustments per palliative    Currently on fentanyl patch 12 mcg/hr Q72 hours; dilaudid injection 0.5-1 mg IV Q3 prn, and oxy IR 15 mg PO Q4 hr prn.  Consultation to heme/onc for further education

## 2024-09-28 NOTE — PLAN OF CARE
Received perfect serve message stating that patient was to discuss medication regimen.  Patient Sister and mother at bedside.  Patient stated that he was upset with current medication regimen.  Stated that he had fallen asleep and missed his 2 PM as needed Dilaudid dose.  Patient started on 12 mcg fentanyl patch today in addition to oxycodone 15 mg as needed and Dilaudid panel..  Patient stated that he does not understand why medication cannot be scheduled as needed.  Forgot that he was upset that he missed this dose because he was sleeping.  Discussed with patient explained that medication would not be scheduled at this time as per palliative recommendations.  Also discussed concerns for hypotension induced by current pain regimen.  Discussed with patient imaging results showing advancement of metastatic prostate cancer is likely source of pain.  Discussed with patient parameters that we need to follow in the hospital with regards to pain regimen.  Patient stated that he would decide if he would like to be discharged home so he can adhere to outpatient pain regiment that he states has been managing his pain better than the inpatient setting.  Would inform team of decision tomorrow morning during rounds.       Electronically signed by Courtney Rebollar MD on 9/28/2024 at 12:46 AM

## 2024-09-28 NOTE — PROGRESS NOTES
Zora Jacinto NP, notified regarding patient requesting home medication of robaxin to be added to his pain regimen while in hospital.

## 2024-09-28 NOTE — PLAN OF CARE
Problem: Discharge Planning  Goal: Discharge to home or other facility with appropriate resources  9/28/2024 0945 by Latrell Vitale RN  Outcome: Progressing  9/28/2024 0155 by Kerri Spears RN  Outcome: Progressing  Flowsheets (Taken 9/27/2024 2200)  Discharge to home or other facility with appropriate resources: Identify barriers to discharge with patient and caregiver     Problem: Pain  Goal: Verbalizes/displays adequate comfort level or baseline comfort level  9/28/2024 0945 by Latrell Vitale RN  Outcome: Progressing  9/28/2024 0155 by Kerri Spears RN  Outcome: Not Progressing     Problem: Skin/Tissue Integrity  Goal: Absence of new skin breakdown  Description: 1.  Monitor for areas of redness and/or skin breakdown  2.  Assess vascular access sites hourly  3.  Every 4-6 hours minimum:  Change oxygen saturation probe site  4.  Every 4-6 hours:  If on nasal continuous positive airway pressure, respiratory therapy assess nares and determine need for appliance change or resting period.  9/28/2024 0945 by Latrell Vitale RN  Outcome: Progressing  9/28/2024 0155 by Kerri Spears RN  Outcome: Progressing     Problem: Safety - Adult  Goal: Free from fall injury  Outcome: Progressing     Problem: ABCDS Injury Assessment  Goal: Absence of physical injury  Outcome: Progressing     Problem: Pain  Goal: Verbalizes/displays adequate comfort level or baseline comfort level  9/28/2024 0945 by Latrell Vitale RN  Outcome: Progressing  9/28/2024 0155 by Kerri Spears RN  Outcome: Not Progressing

## 2024-09-28 NOTE — PROGRESS NOTES
RN asked Dr. Rebollar to please come evaluate patient regarding increased and unmanaged pain to discuss current pain regimen with patient and family.

## 2024-09-28 NOTE — PROGRESS NOTES
Perfect served hematology/oncology regarding patient wanting to start chemotherapy. Latrell Vitale RN

## 2024-09-28 NOTE — PROGRESS NOTES
[facility] - Family Medicine Inpatient   Resident Progress Note    S:  Hospital day: 2   Brief Synopsis: Joseph Bond is a 44 y.o. male with a PMH of Cancer (HCC), Diverticulosis, and History of opioid abuse (HCC). who presents to ED for nausea and vomiting.     Patient complains of nausea and vomiting that started 2 days ago.  Emesis contains phlegm, mucus and streaks of blood.  Patient could not quantify number of times but states that it has been continuous.  States that he had nausea medications but seemed to make his nausea was.  Also endorses chronic pain on his back, abdomen radha LUQ.  Patient was recently diagnosed for pancreatic cancer with mets.  Completed 10th cycle of radiation 1 month ago.  States that since radiation was completed he has not been able to walk due to pain in his pelvis, abdomen and back.  He has been wheelchair-bound for the past 1 month.  Has had poor appetite, blurry vision, dizziness.  Denies nosebleeds, bruising, melena, hematochezia, hematuria.  Patient does not use illicit drugs or drink alcohol.  Quit smoking 7 years ago.  He would like to remain full code.  Hemoglobin was 5.3 and platelets at 18 at the ER.  Patient was transfused 2 units of pRBC.  Post transfusion hemoglobin was 5.9 and patient was transfused another 2 units of PRBC.  Patient continued to experience pain in his back and upper abdomen and pelvis.  Oncology and palliative were consulted.  Pain medication with Dilaudid, oxy and fentanyl patch.  Palliative care managed pain, nausea and vomiting and constipation 2/2 opioids.  Patient was still declining chemotherapy.     Overnight/interim:   Patient seen and examined at bedside this AM  Extensive discussion regarding pain control overnight. Informed patient that pain regimen cannot be made prn at this time  States that pain well controlled this AM. Is requesting Robaxin to be added back to his pain regimen. Palliative informed   Denies fever, shortness of breath, chest

## 2024-09-28 NOTE — CARE COORDINATION
[facility] Quality Flow/Interdisciplinary Rounds Progress Note        Quality Flow Rounds held on September 28, 2024    Disciplines Attending:  Bedside Nurse and Nursing Unit Leadership    Joseph Bond was admitted on 9/26/2024  4:56 AM    Anticipated Discharge Date:       Disposition:    Simon Score:  Simon Scale Score: 20    Readmission Risk              Risk of Unplanned Readmission:  34           Discussed patient goal for the day, patient clinical progression, and barriers to discharge.  The following Goal(s) of the Day/Commitment(s) have been identified:  Diagnostics - Report Results and Labs - Report Results      Sofia Peñaloza RN  September 28, 2024

## 2024-09-29 ENCOUNTER — APPOINTMENT (OUTPATIENT)
Dept: GENERAL RADIOLOGY | Age: 44
DRG: 500 | End: 2024-09-29
Payer: MEDICAID

## 2024-09-29 PROBLEM — E43 SEVERE PROTEIN-CALORIE MALNUTRITION (HCC): Status: ACTIVE | Noted: 2024-09-29

## 2024-09-29 LAB
ALBUMIN SERPL-MCNC: 3.3 G/DL (ref 3.5–5.2)
ALP SERPL-CCNC: 162 U/L (ref 40–129)
ALT SERPL-CCNC: 12 U/L (ref 0–40)
ANION GAP SERPL CALCULATED.3IONS-SCNC: 14 MMOL/L (ref 7–16)
AST SERPL-CCNC: 64 U/L (ref 0–39)
BASOPHILS # BLD: 0 K/UL (ref 0–0.2)
BASOPHILS # BLD: 0.06 K/UL (ref 0–0.2)
BASOPHILS NFR BLD: 0 % (ref 0–2)
BASOPHILS NFR BLD: 2 % (ref 0–2)
BILIRUB SERPL-MCNC: 0.8 MG/DL (ref 0–1.2)
BUN SERPL-MCNC: 10 MG/DL (ref 6–20)
CALCIUM SERPL-MCNC: 9.2 MG/DL (ref 8.6–10.2)
CHLORIDE SERPL-SCNC: 103 MMOL/L (ref 98–107)
CO2 SERPL-SCNC: 22 MMOL/L (ref 22–29)
CREAT SERPL-MCNC: 0.8 MG/DL (ref 0.7–1.2)
EOSINOPHIL # BLD: 0.03 K/UL (ref 0.05–0.5)
EOSINOPHIL # BLD: 0.03 K/UL (ref 0.05–0.5)
EOSINOPHILS RELATIVE PERCENT: 1 % (ref 0–6)
EOSINOPHILS RELATIVE PERCENT: 1 % (ref 0–6)
ERYTHROCYTE [DISTWIDTH] IN BLOOD BY AUTOMATED COUNT: 18.9 % (ref 11.5–15)
ERYTHROCYTE [DISTWIDTH] IN BLOOD BY AUTOMATED COUNT: 19.6 % (ref 11.5–15)
GFR, ESTIMATED: >90 ML/MIN/1.73M2
GLUCOSE SERPL-MCNC: 88 MG/DL (ref 74–99)
HCT VFR BLD AUTO: 21.1 % (ref 37–54)
HCT VFR BLD AUTO: 21.7 % (ref 37–54)
HGB BLD-MCNC: 7 G/DL (ref 12.5–16.5)
HGB BLD-MCNC: 7.2 G/DL (ref 12.5–16.5)
LYMPHOCYTES NFR BLD: 0.11 K/UL (ref 1.5–4)
LYMPHOCYTES NFR BLD: 0.24 K/UL (ref 1.5–4)
LYMPHOCYTES RELATIVE PERCENT: 4 % (ref 20–42)
LYMPHOCYTES RELATIVE PERCENT: 7 % (ref 20–42)
MCH RBC QN AUTO: 29.5 PG (ref 26–35)
MCH RBC QN AUTO: 29.8 PG (ref 26–35)
MCHC RBC AUTO-ENTMCNC: 33.2 G/DL (ref 32–34.5)
MCHC RBC AUTO-ENTMCNC: 33.2 G/DL (ref 32–34.5)
MCV RBC AUTO: 88.9 FL (ref 80–99.9)
MCV RBC AUTO: 89.8 FL (ref 80–99.9)
METAMYELOCYTES ABSOLUTE COUNT: 0.03 K/UL (ref 0–0.12)
METAMYELOCYTES: 1 % (ref 0–1)
MONOCYTES NFR BLD: 0.06 K/UL (ref 0.1–0.95)
MONOCYTES NFR BLD: 0.19 K/UL (ref 0.1–0.95)
MONOCYTES NFR BLD: 2 % (ref 2–12)
MONOCYTES NFR BLD: 6 % (ref 2–12)
MYELOCYTES ABSOLUTE COUNT: 0.12 K/UL
MYELOCYTES ABSOLUTE COUNT: 0.16 K/UL
MYELOCYTES: 4 %
MYELOCYTES: 5 %
NEUTROPHILS NFR BLD: 84 % (ref 43–80)
NEUTROPHILS NFR BLD: 84 % (ref 43–80)
NEUTS SEG NFR BLD: 2.61 K/UL (ref 1.8–7.3)
NEUTS SEG NFR BLD: 2.87 K/UL (ref 1.8–7.3)
NUCLEATED RED BLOOD CELLS: 4 PER 100 WBC
NUCLEATED RED BLOOD CELLS: 4 PER 100 WBC
PLATELET CONFIRMATION: NORMAL
PLATELET CONFIRMATION: NORMAL
PLATELET, FLUORESCENCE: 14 K/UL (ref 130–450)
PLATELET, FLUORESCENCE: 15 K/UL (ref 130–450)
PMV BLD AUTO: ABNORMAL FL (ref 7–12)
PMV BLD AUTO: ABNORMAL FL (ref 7–12)
POTASSIUM SERPL-SCNC: 3.9 MMOL/L (ref 3.5–5)
PROT SERPL-MCNC: 5.5 G/DL (ref 6.4–8.3)
RBC # BLD AUTO: 2.35 M/UL (ref 3.8–5.8)
RBC # BLD AUTO: 2.44 M/UL (ref 3.8–5.8)
RBC # BLD: ABNORMAL 10*6/UL
SODIUM SERPL-SCNC: 139 MMOL/L (ref 132–146)
WBC OTHER # BLD: 3.1 K/UL (ref 4.5–11.5)
WBC OTHER # BLD: 3.4 K/UL (ref 4.5–11.5)

## 2024-09-29 PROCEDURE — 6370000000 HC RX 637 (ALT 250 FOR IP)

## 2024-09-29 PROCEDURE — 36415 COLL VENOUS BLD VENIPUNCTURE: CPT

## 2024-09-29 PROCEDURE — 2700000000 HC OXYGEN THERAPY PER DAY

## 2024-09-29 PROCEDURE — 85025 COMPLETE CBC W/AUTO DIFF WBC: CPT

## 2024-09-29 PROCEDURE — 6370000000 HC RX 637 (ALT 250 FOR IP): Performed by: INTERNAL MEDICINE

## 2024-09-29 PROCEDURE — 2140000000 HC CCU INTERMEDIATE R&B

## 2024-09-29 PROCEDURE — 80053 COMPREHEN METABOLIC PANEL: CPT

## 2024-09-29 PROCEDURE — 94640 AIRWAY INHALATION TREATMENT: CPT

## 2024-09-29 PROCEDURE — 6370000000 HC RX 637 (ALT 250 FOR IP): Performed by: NURSE PRACTITIONER

## 2024-09-29 PROCEDURE — 2580000003 HC RX 258

## 2024-09-29 PROCEDURE — 99232 SBSQ HOSP IP/OBS MODERATE 35: CPT | Performed by: FAMILY MEDICINE

## 2024-09-29 PROCEDURE — 72072 X-RAY EXAM THORAC SPINE 3VWS: CPT

## 2024-09-29 PROCEDURE — 6360000002 HC RX W HCPCS

## 2024-09-29 PROCEDURE — 99231 SBSQ HOSP IP/OBS SF/LOW 25: CPT

## 2024-09-29 RX ORDER — BICALUTAMIDE 50 MG/1
50 TABLET, FILM COATED ORAL DAILY
Status: DISCONTINUED | OUTPATIENT
Start: 2024-09-29 | End: 2024-10-11 | Stop reason: HOSPADM

## 2024-09-29 RX ADMIN — METHOCARBAMOL TABLETS 750 MG: 750 TABLET, COATED ORAL at 15:38

## 2024-09-29 RX ADMIN — Medication 2000 UNITS: at 07:44

## 2024-09-29 RX ADMIN — METHOCARBAMOL TABLETS 750 MG: 750 TABLET, COATED ORAL at 11:39

## 2024-09-29 RX ADMIN — METHOCARBAMOL TABLETS 750 MG: 750 TABLET, COATED ORAL at 19:51

## 2024-09-29 RX ADMIN — METHOCARBAMOL TABLETS 750 MG: 750 TABLET, COATED ORAL at 07:44

## 2024-09-29 RX ADMIN — OXYCODONE HYDROCHLORIDE 15 MG: 15 TABLET ORAL at 18:46

## 2024-09-29 RX ADMIN — SUCRALFATE 1 G: 1 TABLET ORAL at 22:57

## 2024-09-29 RX ADMIN — HYDROMORPHONE HYDROCHLORIDE 1 MG: 1 INJECTION, SOLUTION INTRAMUSCULAR; INTRAVENOUS; SUBCUTANEOUS at 11:39

## 2024-09-29 RX ADMIN — ALBUTEROL SULFATE 2.5 MG: 2.5 SOLUTION RESPIRATORY (INHALATION) at 10:52

## 2024-09-29 RX ADMIN — SODIUM CHLORIDE, PRESERVATIVE FREE 10 ML: 5 INJECTION INTRAVENOUS at 07:44

## 2024-09-29 RX ADMIN — HYDROMORPHONE HYDROCHLORIDE 1 MG: 1 INJECTION, SOLUTION INTRAMUSCULAR; INTRAVENOUS; SUBCUTANEOUS at 19:51

## 2024-09-29 RX ADMIN — PANTOPRAZOLE SODIUM 40 MG: 40 INJECTION, POWDER, FOR SOLUTION INTRAVENOUS at 07:43

## 2024-09-29 RX ADMIN — OXYCODONE HYDROCHLORIDE 15 MG: 15 TABLET ORAL at 06:42

## 2024-09-29 RX ADMIN — SUCRALFATE 1 G: 1 TABLET ORAL at 16:44

## 2024-09-29 RX ADMIN — SUCRALFATE 1 G: 1 TABLET ORAL at 10:48

## 2024-09-29 RX ADMIN — HYDROMORPHONE HYDROCHLORIDE 1 MG: 1 INJECTION, SOLUTION INTRAMUSCULAR; INTRAVENOUS; SUBCUTANEOUS at 22:57

## 2024-09-29 RX ADMIN — SODIUM CHLORIDE: 9 INJECTION, SOLUTION INTRAVENOUS at 06:44

## 2024-09-29 RX ADMIN — OXYCODONE HYDROCHLORIDE 15 MG: 15 TABLET ORAL at 10:48

## 2024-09-29 RX ADMIN — HYDROMORPHONE HYDROCHLORIDE 1 MG: 1 INJECTION, SOLUTION INTRAMUSCULAR; INTRAVENOUS; SUBCUTANEOUS at 15:39

## 2024-09-29 RX ADMIN — HYDROMORPHONE HYDROCHLORIDE 1 MG: 1 INJECTION, SOLUTION INTRAMUSCULAR; INTRAVENOUS; SUBCUTANEOUS at 07:44

## 2024-09-29 RX ADMIN — BICALUTAMIDE 50 MG: 50 TABLET, FILM COATED ORAL at 13:49

## 2024-09-29 RX ADMIN — SUCRALFATE 1 G: 1 TABLET ORAL at 06:42

## 2024-09-29 RX ADMIN — HYDROMORPHONE HYDROCHLORIDE 1 MG: 1 INJECTION, SOLUTION INTRAMUSCULAR; INTRAVENOUS; SUBCUTANEOUS at 03:43

## 2024-09-29 RX ADMIN — SODIUM CHLORIDE: 9 INJECTION, SOLUTION INTRAVENOUS at 15:40

## 2024-09-29 RX ADMIN — BUDESONIDE 500 MCG: 0.5 INHALANT RESPIRATORY (INHALATION) at 10:52

## 2024-09-29 RX ADMIN — POLYETHYLENE GLYCOL 3350 17 G: 17 POWDER, FOR SOLUTION ORAL at 16:44

## 2024-09-29 RX ADMIN — OXYCODONE HYDROCHLORIDE 15 MG: 15 TABLET ORAL at 14:47

## 2024-09-29 RX ADMIN — SENNOSIDES AND DOCUSATE SODIUM 2 TABLET: 50; 8.6 TABLET ORAL at 07:44

## 2024-09-29 RX ADMIN — OXYCODONE HYDROCHLORIDE 15 MG: 15 TABLET ORAL at 02:42

## 2024-09-29 RX ADMIN — BUDESONIDE 500 MCG: 0.5 INHALANT RESPIRATORY (INHALATION) at 20:18

## 2024-09-29 ASSESSMENT — PAIN SCALES - GENERAL
PAINLEVEL_OUTOF10: 7
PAINLEVEL_OUTOF10: 8
PAINLEVEL_OUTOF10: 10
PAINLEVEL_OUTOF10: 10
PAINLEVEL_OUTOF10: 9
PAINLEVEL_OUTOF10: 10
PAINLEVEL_OUTOF10: 8
PAINLEVEL_OUTOF10: 9
PAINLEVEL_OUTOF10: 9
PAINLEVEL_OUTOF10: 10
PAINLEVEL_OUTOF10: 9
PAINLEVEL_OUTOF10: 5
PAINLEVEL_OUTOF10: 6
PAINLEVEL_OUTOF10: 10
PAINLEVEL_OUTOF10: 10
PAINLEVEL_OUTOF10: 7
PAINLEVEL_OUTOF10: 9
PAINLEVEL_OUTOF10: 10
PAINLEVEL_OUTOF10: 10
PAINLEVEL_OUTOF10: 9
PAINLEVEL_OUTOF10: 10

## 2024-09-29 ASSESSMENT — PAIN DESCRIPTION - LOCATION
LOCATION: GENERALIZED

## 2024-09-29 ASSESSMENT — PAIN SCALES - WONG BAKER
WONGBAKER_NUMERICALRESPONSE: HURTS A LITTLE BIT

## 2024-09-29 ASSESSMENT — PAIN DESCRIPTION - DESCRIPTORS
DESCRIPTORS: ACHING;DISCOMFORT;SHARP
DESCRIPTORS: DISCOMFORT;ACHING;SHARP
DESCRIPTORS: SORE;DULL;DISCOMFORT
DESCRIPTORS: ACHING;DISCOMFORT;SHARP

## 2024-09-29 ASSESSMENT — PAIN DESCRIPTION - FREQUENCY: FREQUENCY: CONTINUOUS

## 2024-09-29 ASSESSMENT — PAIN DESCRIPTION - ONSET: ONSET: ON-GOING

## 2024-09-29 ASSESSMENT — PAIN - FUNCTIONAL ASSESSMENT: PAIN_FUNCTIONAL_ASSESSMENT: PREVENTS OR INTERFERES SOME ACTIVE ACTIVITIES AND ADLS

## 2024-09-29 ASSESSMENT — PAIN DESCRIPTION - PAIN TYPE: TYPE: CHRONIC PAIN

## 2024-09-29 ASSESSMENT — PAIN DESCRIPTION - ORIENTATION: ORIENTATION: RIGHT;LEFT

## 2024-09-29 NOTE — PROGRESS NOTES
Comprehensive Nutrition Assessment    Type and Reason for Visit:  Initial, Positive Nutrition Screen    Nutrition Recommendations/Plan:   Continue Diet.    Will Modify Current ONS and monitor.       Malnutrition Assessment:  Malnutrition Status:  Severe malnutrition (09/29/24 1218)    Context:  Chronic Illness     Findings of the 6 clinical characteristics of malnutrition:  Energy Intake:  75% or less estimated energy requirements for 1 month or longer  Weight Loss:  Greater than 5% over 1 month (~10% x ~2 months)     Body Fat Loss:  Unable to assess (2/2 SAURAV for radiology at this time)     Muscle Mass Loss:  Unable to assess    Fluid Accumulation:  Unable to assess (2/2 multifactorial at this time)     Strength:  Not Performed    Nutrition Assessment:    Pt adm w/ N/V, abd pain and Lt rib pain x ~1-2d pta.  PMHx diverticulitis, hx opioid abuse, RP lymphedenopathy, Stage IV prostate CA w/ widespread bone mets s/p XRT (7/23), moderate malnutrition (8/21), recent PNA (9/7).  Adm w/ anemia, hypoxia, thrombocytopenia and concern for hematemesis.  At risk d/t pt currently meets criteria for Severe Malnutrition w/ increased needs 2/2 CA w/ XRT and plans for chemo.  Will Modify Current ONS and monitor.    Nutrition Related Findings:    A&O, dentition WNL, tender/non-distended Abd, hypo BS, non-pitting edema, I/O's WNL, elevated LFTs Wound Type: None       Current Nutrition Intake & Therapies:    Average Meal Intake: 51-75%  Average Supplements Intake: Unable to assess  ADULT DIET; Regular  ADULT ORAL NUTRITION SUPPLEMENT; HS Snack; Standard High Calorie/High Protein Oral Supplement    Anthropometric Measures:  Height: 170.2 cm (5' 7\")  Ideal Body Weight (IBW): 148 lbs (67 kg)    Admission Body Weight: 78.5 kg (173 lb) (bed 9/26)  Current Body Weight: 78.5 kg (173 lb) (bed 9/26),   IBW. Weight Source: Bed Scale  Current BMI (kg/m2): 27.1  Usual Body Weight: 87.5 kg (193 lb) (actual 7/23/24 per EMR; ~10% x ~2months)  %

## 2024-09-29 NOTE — PLAN OF CARE
Problem: Discharge Planning  Goal: Discharge to home or other facility with appropriate resources  9/29/2024 0928 by Latrell Vitale RN  Outcome: Progressing  9/28/2024 2015 by Kofi Barajas RN  Outcome: Progressing     Problem: Pain  Goal: Verbalizes/displays adequate comfort level or baseline comfort level  9/29/2024 0928 by Latrell Vitale RN  Outcome: Progressing  9/28/2024 2015 by Kofi Barajas RN  Outcome: Progressing     Problem: Skin/Tissue Integrity  Goal: Absence of new skin breakdown  Description: 1.  Monitor for areas of redness and/or skin breakdown  2.  Assess vascular access sites hourly  3.  Every 4-6 hours minimum:  Change oxygen saturation probe site  4.  Every 4-6 hours:  If on nasal continuous positive airway pressure, respiratory therapy assess nares and determine need for appliance change or resting period.  9/29/2024 0928 by Latrell Vitale RN  Outcome: Progressing  9/28/2024 2015 by Kofi Barajas RN  Outcome: Progressing     Problem: Safety - Adult  Goal: Free from fall injury  9/29/2024 0928 by Latrell Vitale RN  Outcome: Progressing  9/28/2024 2015 by Kofi Barajas RN  Outcome: Progressing     Problem: ABCDS Injury Assessment  Goal: Absence of physical injury  9/29/2024 0928 by Latrell Vitale RN  Outcome: Progressing  9/28/2024 2015 by Kofi Barajas RN  Outcome: Progressing

## 2024-09-29 NOTE — CARE COORDINATION
[facility] Quality Flow/Interdisciplinary Rounds Progress Note        Quality Flow Rounds held on September 29, 2024    Disciplines Attending:  Bedside Nurse and Nursing Unit Leadership    Joseph Bond was admitted on 9/26/2024  4:56 AM    Anticipated Discharge Date:       Disposition:    Simon Score:  Simon Scale Score: 20    Readmission Risk              Risk of Unplanned Readmission:  34           Discussed patient goal for the day, patient clinical progression, and barriers to discharge.  The following Goal(s) of the Day/Commitment(s) have been identified:  Diagnostics - Report Results and Labs - Report Results      Sofia Peñaloza RN  September 29, 2024

## 2024-09-29 NOTE — PLAN OF CARE
Problem: Discharge Planning  Goal: Discharge to home or other facility with appropriate resources  9/28/2024 2015 by Kofi Barajas, RN  Outcome: Progressing     Problem: Pain  Goal: Verbalizes/displays adequate comfort level or baseline comfort level  9/28/2024 2015 by Kofi Barajas, RN  Outcome: Progressing     Problem: Skin/Tissue Integrity  Goal: Absence of new skin breakdown  Description: 1.  Monitor for areas of redness and/or skin breakdown  2.  Assess vascular access sites hourly  3.  Every 4-6 hours minimum:  Change oxygen saturation probe site  4.  Every 4-6 hours:  If on nasal continuous positive airway pressure, respiratory therapy assess nares and determine need for appliance change or resting period.  9/28/2024 2015 by Kofi Barajas, RN  Outcome: Progressing     Problem: Safety - Adult  Goal: Free from fall injury  9/28/2024 2015 by Kofi Barajas, RN  Outcome: Progressing     Problem: ABCDS Injury Assessment  Goal: Absence of physical injury  9/28/2024 2015 by Kofi Barajas, RN  Outcome: Progressing

## 2024-09-29 NOTE — PROGRESS NOTES
[facility] - Family Medicine Inpatient   Resident Progress Note    S:  Hospital day: 3   Brief Synopsis: Joseph Bond is a 44 y.o. male with a PMH of Cancer (HCC), Diverticulosis, and History of opioid abuse (HCC). who presents to ED for nausea and vomiting.     Patient complains of nausea and vomiting that started 2 days ago.  Emesis contains phlegm, mucus and streaks of blood.  Patient could not quantify number of times but states that it has been continuous.  States that he had nausea medications but seemed to make his nausea was.  Also endorses chronic pain on his back, abdomen radha LUQ.  Patient was recently diagnosed for pancreatic cancer with mets.  Completed 10th cycle of radiation 1 month ago.  States that since radiation was completed he has not been able to walk due to pain in his pelvis, abdomen and back.  He has been wheelchair-bound for the past 1 month.  Has had poor appetite, blurry vision, dizziness.  Denies nosebleeds, bruising, melena, hematochezia, hematuria.  Patient does not use illicit drugs or drink alcohol.  Quit smoking 7 years ago.  He would like to remain full code.  Hemoglobin was 5.3 and platelets at 18 at the ER.  Patient was transfused 2 units of pRBC.  Post transfusion hemoglobin was 5.9 and patient was transfused another 2 units of PRBC.  Patient continued to experience pain in his back and upper abdomen and pelvis.  Oncology and palliative were consulted.  Pain medication with Dilaudid, oxy and fentanyl patch.  Palliative care managed pain, nausea and vomiting and constipation 2/2 opioids.  He decided to proceed with chemotherapy     Overnight/interim:   Patient seen and examined at bedside this AM  Pain controlled overnight. States regimen can typically bring his pain down from a 9 to a 4  Denies fever, shortness of breath, chest pain, nausea, vomiting, diarrhea.    Cont meds:    sodium chloride      sodium chloride 100 mL/hr at 09/29/24 0644    sodium chloride      sodium chloride    admission. AM Hb 7.0.   -Plt 18 on arrival.  Transfused platelets at ER. AM Platelet count 14  -Monitor CBC daily   -Transfuse Hg <7  -Transfuse platelets <10k      DVT/GI ppx: PCD's/Protonix  Pain: Dilaudid, oxy, fentanyl patch  GI regimen: Senna S scheduled, GlycoLax as needed  PT/OT: 15/24  Dispo: Continue present management   Telemetry Day: 3, reordered for additional 72 hours     Electronically signed by Courtney Rebollar MD on 9/29/2024 at 7:59 AM  Attending physician: Dr. Lan

## 2024-09-29 NOTE — PROGRESS NOTES
disease such as lymphangitic spread given patient's history of malignancy cannot be excluded and clinical correlation is needed. Degenerative changes seen within the spine.     Interval worsening of the patchy airspace disease throughout the lung fields bilaterally.     CTA CHEST W CONTRAST    Result Date: 9/7/2024  EXAMINATION: CTA OF THE CHEST 9/6/2024 11:27 pm TECHNIQUE: CTA of the chest was performed after the administration of intravenous contrast.  Multiplanar reformatted images are provided for review.  MIP images are provided for review. Automated exposure control, iterative reconstruction, and/or weight based adjustment of the mA/kV was utilized to reduce the radiation dose to as low as reasonably achievable. COMPARISON: July 9, 2024 HISTORY: ORDERING SYSTEM PROVIDED HISTORY: Shortness of breath upper abdominal pain TECHNOLOGIST PROVIDED HISTORY: Reason for exam:->Shortness of breath upper abdominal pain Additional Contrast?->1 What reading provider will be dictating this exam?->CRC FINDINGS: Pulmonary Arteries: Pulmonary arteries are adequately opacified for evaluation.  No evidence of intraluminal filling defect to suggest pulmonary embolism.  Main pulmonary artery is normal in caliber. Mediastinum: No evidence of mediastinal lymphadenopathy.  The heart and pericardium demonstrate no acute abnormality.  There is no acute abnormality of the thoracic aorta. Lungs/pleura: Patchy nodular ground-glass infiltrates through the anterior right upper lobe.  Diffuse geographic ground-glass infiltrates.  Nonspecific mild multi lobar airspace disease process suspected. Upper Abdomen: Limited images of the upper abdomen are unremarkable. Soft Tissues/Bones: Progressive lytic/sclerotic osseous metastatic disease process, interim mild biconcave compression deformity T1, mild superior endplate compression deformity T4, mild biconcave compression deformity T6, mild wedge compression deformity T7, mild biconcave compression  diffuse bone metastasis. . Considering high tumor burden, advised treatment with Taxotere, GnRH agonist, Nubeqa along with Xgeva. He was started on Casodex although stopped taking it after he read about side effects. He has so far decided not to be on any systemic therapy. MRI lumbar spine showed T12 L4 metastasis with epidural extension. S/p palliative RT to his spine. He has also been looking into homeopathy. Genetic testing also sent.  Follows with palliative care for pain management. He was last seen while hospitalized earlier this month. Hgb at that was 7.4, platelets were 18.    - Hematemesis   - CTA pulmonary and CT A/P showed no evidence of pulmonary embolism. Abnormal patchy ground-glass opacity identified diffusely throughout the lung fields. Findings show progression when compared to the prior study with no definite pleural effusion or pneumothorax. Findings suggesting progression of disease favoring an infectious/inflammatory process or however lymphangitic spread of disease given mediastinal adenopathy and abnormal bony metastatic process within the ribs and spine cannot be completely excluded and clinical correlation is needed. Abnormal bony metastatic disease within the spine. There is a bony destructive process involving the left anterior 2nd rib. Associated soft tissue thickening.  No acute intra-abdominal or intrapelvic process.  Stable adenopathy identified throughout the retroperitoneum and left pelvic sidewall.  Stable bony metastatic disease throughout the spine and pelvis as well as the hips bilaterally  - Palliative consulted for goals of care and symptom management  - CMP with Lactic acid 2.3. ProBNP 900. Troponin 24. LFT's with Alk phos 227, AST 76  - Hgb was found to be 5.3 and 2 units of pRBC's were ordered, platelets were 18. Cytopenias likely from progression of his neoplastic process  - Palliative has seen the patient  - Patient on Homeopathic therapy. Explained clearly to

## 2024-09-29 NOTE — PROGRESS NOTES
[facility]  Family Medicine Attending    S: 44 y.o. male with nausea and vomiting as well as left sided abdominal pain.  He was found to have hypoxia and severe symptomatic anemia with hgb 5.3.  He has been having nausea with vomiting, pelvic and back pain.  Has not been able to walk due to pain for a couple of weeks.  Goals of care-pain control so that he can walk again.  He wanted more information on side effects from a regimen of chemotherapy drugs from the oncologist.  Oncology was consulted.  Per their note, \"Considering high tumor burden, advised treatment with Taxotere, GnRH agonist, Nubeqa along with Xgeva\"  Side effects of these medications were discussed with the patient by oncology.     Patient now thinking he would like to move forward with chemotherapy.    Palliative care following patient.  Pain is still too severe to walk.  [facility] 15/24.    He also states that when he tries to stand up the pain is mostly in a band around his upper abdomen.  He says he has no difficulty with his legs when standing.    Hemoglobin dropped from 7.8 down to 7.0 (he did have 3 units PRBCs and 1 unit of platelets on admission).  No signs of active bleeding.  Platelet count down to 14,000.  Patients notes some small amounts of blood in his mucous if he coughs.    O: VS- Blood pressure 112/81, pulse (!) 103, temperature 97.2 °F (36.2 °C), temperature source Temporal, resp. rate 18, height 1.702 m (5' 7\"), weight 78.7 kg (173 lb 8 oz), SpO2 93%.  Exam is as noted by resident with the following changes, additions or corrections:  Gen:  AAO x3  CVS: RRR  Lungs: CTAB  Ext no CCE    Impressions:    Severe Symptomatic Anemia, related to metastatic prostate cancer in the bone  Metastatic prostate cancer  Pain 2/2 metatstatic cancer-s/p palliative radiation treatments.  Nausea  Tachycardia  Acute hypoxic respiratory failure on 4L O2 via NC-no PE on CTA chest, there is progression of metastatic disease with lymphangitic  spread and mediastinal adenopathy.      Plan:     Transfusions as needed; continue to monitor H/H-RBC transfusion is hgb <7, and platelets if <10,000 or active bleeding.  Monitor vitals-tachycardia may be related to patient's anemia  Monitor labs  Consultation to palliative for recs on pain control; pain medication adjustments per palliative    Currently on fentanyl patch 12 mcg/hr Q72 hours; dilaudid injection 0.5-1 mg IV Q3 prn, and oxy IR 15 mg PO Q4 hr prn. Robaxin resumed as well.    Consultation to heme/onc for possible initiation of chemo    Patient also considering moving back to [place] and would like to know if oncology has any recommendations for physicians in that area.    PT/OT-[facility] 15/24; patient was unable to walk due to pain.    Checked thoracic spine xrays 9/29/24  FINDINGS:  Unchanged biconcave deformities within the midthoracic spine.  No new acute  high-grade compression fracture.  Please see CT chest from 09/26/2024. Marrow  density is somewhat heterogeneous, compatible with known osseous metastatic  disease.     Lung parenchyma is heterogeneous compatible with ground-glass opacities  identified on the recent CT.  No significant interval change.  No  pneumothorax.     IMPRESSION:  1. No acute abnormality of the thoracic spine.  2. Unchanged biconcave deformities within the midthoracic spine.  3. Heterogeneous marrow density, compatible with known osseous metastatic  disease.  4. Heterogeneous lung parenchyma compatible with ground-glass opacities  identified on the recent CT.  No acute abnormality of the thoracic spine    Dispo:  patient needs better pain control so that hopefully he can walk with a WW.  Plan is for discharge home as soon as pain is managed well.  Chemotherapy per oncology.  Pain control per palliative     Attending Physician Statement  I have reviewed the chart and seen the patient with the resident(s).  I personally reviewed images, EKG's and similar tests, if present.

## 2024-09-29 NOTE — PROGRESS NOTES
Palliative Care Department  [phone]  Palliative Care Progress Note  Provider Gely Banuelos, APRN - CNP      PATIENT: Joseph Bond  : 1980  MRN: [id number]  ADMISSION DATE: 2024  4:56 AM  Referring Provider:  Anastasia Helms MD    Palliative Medicine was consulted on hospital day 3 for assistance with Goals of care, Overwhelmed Symptoms    HPI:     Clinical Summary:Joseph Bond is a 44 y.o. y/o male with a history of prostate cancer with mediastinal lymphadenopathy, extensive metastasis of the spine, rib cage, pelvis, humerus and femurs (had refused systemic therapy, pursuing homeopathic), completed palliative radiation treatment to spine, polysubstance abuse including opioids and tobacco, abstinent for 5 years, diverticulitis, with recent ER visit on 2024, due to shortness of breath, was going to be admitted for further medical management however left AMA, was supposed to follow-up outpatient with palliative medicine for symptom management, however missed appointment, did not return phone call to reschedule appointment, who presented to [facility] on 2024 with complaint of nausea, vomiting, left-sided rib pain.  At ED, found hypoxic 78% on room air, placed on 4 L nasal cannula.  Significant laboratory findings showed hemoglobin 5.3, platelets 18, alkaline phosphate 227, AST 76, lipase 8, WBC 4.2.  Received 2 packed red blood cell and sixpack platelets.  CT images showed disease progression when compared to prior images, favoring an infectious/inflammatory process or lymphangitic spread of disease given mediastinal adenopathy and abnormal bony metastatic process within the rib and spine.  Abnormal bony metastatic disease within the spine.  Bony destructive process involving the left anterior second rib.  Stable adenopathy throughout the retroperitoneal and pelvic sidewall,.  Stable bony metastatic disease throughout the spine, pelvis and hip bilaterally.  Palliative medicine  78.7 kg (173 lb 8 oz)   SpO2 94%   BMI 27.17 kg/m²     Physical Examination:  Gen: Ill-appearing, weak,  Lungs: respirations easy nasal cannula  Heart: regular rate   Abdomen: soft, non-tender  Extremities: no clubbing, cyanosis or edema, moving all extremities    Skin: warm, dry without rashes, lesions, bruising  Neuro: awake, alert, oriented x 3, follows commands, no gross neurologic deficit    Objective data reviewed: labs, images, records, medication use, vitals, and chart    Time/Communication  Greater than 50% of time spent, total 25 minutes in counseling and coordination of care at the bedside regarding goals of care and symptom management.    Thank you for allowing Palliative Medicine to participate in the care of Joseph Bond.    Note: This report was completed using computerCircular voiced recognition software.  Every effort has been made to ensure accuracy; however, inadvertent computerized transcription errors may be present.

## 2024-09-30 ENCOUNTER — APPOINTMENT (OUTPATIENT)
Dept: GENERAL RADIOLOGY | Age: 44
DRG: 500 | End: 2024-09-30
Payer: MEDICAID

## 2024-09-30 PROBLEM — D69.6 THROMBOCYTOPENIA (HCC): Status: ACTIVE | Noted: 2024-09-30

## 2024-09-30 PROBLEM — R04.2 HEMOPTYSIS: Status: ACTIVE | Noted: 2024-09-30

## 2024-09-30 LAB
ALBUMIN SERPL-MCNC: 3.1 G/DL (ref 3.5–5.2)
ALP SERPL-CCNC: 162 U/L (ref 40–129)
ALT SERPL-CCNC: 12 U/L (ref 0–40)
ANION GAP SERPL CALCULATED.3IONS-SCNC: 13 MMOL/L (ref 7–16)
AST SERPL-CCNC: 65 U/L (ref 0–39)
B PARAP IS1001 DNA NPH QL NAA+NON-PROBE: NOT DETECTED
B PERT DNA SPEC QL NAA+PROBE: NOT DETECTED
BASOPHILS # BLD: 0 K/UL (ref 0–0.2)
BASOPHILS NFR BLD: 0 % (ref 0–2)
BILIRUB SERPL-MCNC: 0.8 MG/DL (ref 0–1.2)
BUN SERPL-MCNC: 10 MG/DL (ref 6–20)
C PNEUM DNA NPH QL NAA+NON-PROBE: NOT DETECTED
CALCIUM SERPL-MCNC: 8.8 MG/DL (ref 8.6–10.2)
CHLORIDE SERPL-SCNC: 102 MMOL/L (ref 98–107)
CO2 SERPL-SCNC: 23 MMOL/L (ref 22–29)
CREAT SERPL-MCNC: 0.7 MG/DL (ref 0.7–1.2)
EOSINOPHIL # BLD: 0 K/UL (ref 0.05–0.5)
EOSINOPHILS RELATIVE PERCENT: 0 % (ref 0–6)
ERYTHROCYTE [DISTWIDTH] IN BLOOD BY AUTOMATED COUNT: 18.8 % (ref 11.5–15)
FLUAV RNA NPH QL NAA+NON-PROBE: NOT DETECTED
FLUBV RNA NPH QL NAA+NON-PROBE: NOT DETECTED
GFR, ESTIMATED: >90 ML/MIN/1.73M2
GLUCOSE SERPL-MCNC: 95 MG/DL (ref 74–99)
HADV DNA NPH QL NAA+NON-PROBE: NOT DETECTED
HCOV 229E RNA NPH QL NAA+NON-PROBE: NOT DETECTED
HCOV HKU1 RNA NPH QL NAA+NON-PROBE: NOT DETECTED
HCOV NL63 RNA NPH QL NAA+NON-PROBE: NOT DETECTED
HCOV OC43 RNA NPH QL NAA+NON-PROBE: NOT DETECTED
HCT VFR BLD AUTO: 19.9 % (ref 37–54)
HCT VFR BLD AUTO: 25.5 % (ref 37–54)
HGB BLD-MCNC: 6.5 G/DL (ref 12.5–16.5)
HGB BLD-MCNC: 8.5 G/DL (ref 12.5–16.5)
HMPV RNA NPH QL NAA+NON-PROBE: NOT DETECTED
HPIV1 RNA NPH QL NAA+NON-PROBE: NOT DETECTED
HPIV2 RNA NPH QL NAA+NON-PROBE: NOT DETECTED
HPIV3 RNA NPH QL NAA+NON-PROBE: NOT DETECTED
HPIV4 RNA NPH QL NAA+NON-PROBE: NOT DETECTED
LYMPHOCYTES NFR BLD: 0.18 K/UL (ref 1.5–4)
LYMPHOCYTES RELATIVE PERCENT: 5 % (ref 20–42)
M PNEUMO DNA NPH QL NAA+NON-PROBE: NOT DETECTED
MCH RBC QN AUTO: 29.5 PG (ref 26–35)
MCHC RBC AUTO-ENTMCNC: 32.7 G/DL (ref 32–34.5)
MCV RBC AUTO: 90.5 FL (ref 80–99.9)
METAMYELOCYTES ABSOLUTE COUNT: 0.21 K/UL (ref 0–0.12)
METAMYELOCYTES: 6 % (ref 0–1)
MONOCYTES NFR BLD: 0.12 K/UL (ref 0.1–0.95)
MONOCYTES NFR BLD: 4 % (ref 2–12)
MYELOCYTES ABSOLUTE COUNT: 0.03 K/UL
MYELOCYTES: 1 %
NEUTROPHILS NFR BLD: 84 % (ref 43–80)
NEUTS SEG NFR BLD: 2.86 K/UL (ref 1.8–7.3)
NUCLEATED RED BLOOD CELLS: 4 PER 100 WBC
PLATELET CONFIRMATION: NORMAL
PLATELET, FLUORESCENCE: 12 K/UL (ref 130–450)
PMV BLD AUTO: ABNORMAL FL (ref 7–12)
POTASSIUM SERPL-SCNC: 3.8 MMOL/L (ref 3.5–5)
PROCALCITONIN SERPL-MCNC: 0.3 NG/ML (ref 0–0.08)
PROT SERPL-MCNC: 5.4 G/DL (ref 6.4–8.3)
RBC # BLD AUTO: 2.2 M/UL (ref 3.8–5.8)
RBC # BLD: ABNORMAL 10*6/UL
RSV RNA NPH QL NAA+NON-PROBE: NOT DETECTED
RV+EV RNA NPH QL NAA+NON-PROBE: NOT DETECTED
SARS-COV-2 RNA NPH QL NAA+NON-PROBE: NOT DETECTED
SODIUM SERPL-SCNC: 138 MMOL/L (ref 132–146)
SPECIMEN DESCRIPTION: NORMAL
WBC OTHER # BLD: 3.4 K/UL (ref 4.5–11.5)

## 2024-09-30 PROCEDURE — P9073 PLATELETS PHERESIS PATH REDU: HCPCS

## 2024-09-30 PROCEDURE — 2580000003 HC RX 258

## 2024-09-30 PROCEDURE — 36415 COLL VENOUS BLD VENIPUNCTURE: CPT

## 2024-09-30 PROCEDURE — 86850 RBC ANTIBODY SCREEN: CPT

## 2024-09-30 PROCEDURE — 6370000000 HC RX 637 (ALT 250 FOR IP): Performed by: NURSE PRACTITIONER

## 2024-09-30 PROCEDURE — 84100 ASSAY OF PHOSPHORUS: CPT

## 2024-09-30 PROCEDURE — 94640 AIRWAY INHALATION TREATMENT: CPT

## 2024-09-30 PROCEDURE — 87449 NOS EACH ORGANISM AG IA: CPT

## 2024-09-30 PROCEDURE — 85014 HEMATOCRIT: CPT

## 2024-09-30 PROCEDURE — 6370000000 HC RX 637 (ALT 250 FOR IP)

## 2024-09-30 PROCEDURE — 2700000000 HC OXYGEN THERAPY PER DAY

## 2024-09-30 PROCEDURE — 6370000000 HC RX 637 (ALT 250 FOR IP): Performed by: INTERNAL MEDICINE

## 2024-09-30 PROCEDURE — 2500000003 HC RX 250 WO HCPCS

## 2024-09-30 PROCEDURE — 99232 SBSQ HOSP IP/OBS MODERATE 35: CPT

## 2024-09-30 PROCEDURE — 86923 COMPATIBILITY TEST ELECTRIC: CPT

## 2024-09-30 PROCEDURE — 30233R1 TRANSFUSION OF NONAUTOLOGOUS PLATELETS INTO PERIPHERAL VEIN, PERCUTANEOUS APPROACH: ICD-10-PCS | Performed by: FAMILY MEDICINE

## 2024-09-30 PROCEDURE — 84145 PROCALCITONIN (PCT): CPT

## 2024-09-30 PROCEDURE — 86900 BLOOD TYPING SEROLOGIC ABO: CPT

## 2024-09-30 PROCEDURE — 99254 IP/OBS CNSLTJ NEW/EST MOD 60: CPT | Performed by: INTERNAL MEDICINE

## 2024-09-30 PROCEDURE — P9016 RBC LEUKOCYTES REDUCED: HCPCS

## 2024-09-30 PROCEDURE — 86901 BLOOD TYPING SEROLOGIC RH(D): CPT

## 2024-09-30 PROCEDURE — 87899 AGENT NOS ASSAY W/OPTIC: CPT

## 2024-09-30 PROCEDURE — 99231 SBSQ HOSP IP/OBS SF/LOW 25: CPT

## 2024-09-30 PROCEDURE — 87070 CULTURE OTHR SPECIMN AEROBIC: CPT

## 2024-09-30 PROCEDURE — 36430 TRANSFUSION BLD/BLD COMPNT: CPT

## 2024-09-30 PROCEDURE — 0202U NFCT DS 22 TRGT SARS-COV-2: CPT

## 2024-09-30 PROCEDURE — 71045 X-RAY EXAM CHEST 1 VIEW: CPT

## 2024-09-30 PROCEDURE — 5A0955A ASSISTANCE WITH RESPIRATORY VENTILATION, GREATER THAN 96 CONSECUTIVE HOURS, HIGH NASAL FLOW/VELOCITY: ICD-10-PCS | Performed by: FAMILY MEDICINE

## 2024-09-30 PROCEDURE — 87205 SMEAR GRAM STAIN: CPT

## 2024-09-30 PROCEDURE — 85025 COMPLETE CBC W/AUTO DIFF WBC: CPT

## 2024-09-30 PROCEDURE — 2140000000 HC CCU INTERMEDIATE R&B

## 2024-09-30 PROCEDURE — 6360000002 HC RX W HCPCS

## 2024-09-30 PROCEDURE — 85018 HEMOGLOBIN: CPT

## 2024-09-30 PROCEDURE — 80053 COMPREHEN METABOLIC PANEL: CPT

## 2024-09-30 RX ORDER — IPRATROPIUM BROMIDE AND ALBUTEROL SULFATE 2.5; .5 MG/3ML; MG/3ML
1 SOLUTION RESPIRATORY (INHALATION)
Status: DISCONTINUED | OUTPATIENT
Start: 2024-09-30 | End: 2024-10-10

## 2024-09-30 RX ORDER — LACTULOSE 10 G/15ML
20 SOLUTION ORAL ONCE
Status: COMPLETED | OUTPATIENT
Start: 2024-09-30 | End: 2024-09-30

## 2024-09-30 RX ORDER — SODIUM CHLORIDE 9 MG/ML
INJECTION, SOLUTION INTRAVENOUS PRN
Status: DISCONTINUED | OUTPATIENT
Start: 2024-09-30 | End: 2024-10-11 | Stop reason: HOSPADM

## 2024-09-30 RX ORDER — BISACODYL 10 MG
10 SUPPOSITORY, RECTAL RECTAL DAILY PRN
Status: DISCONTINUED | OUTPATIENT
Start: 2024-09-30 | End: 2024-10-11 | Stop reason: HOSPADM

## 2024-09-30 RX ORDER — SENNA AND DOCUSATE SODIUM 50; 8.6 MG/1; MG/1
2 TABLET, FILM COATED ORAL 2 TIMES DAILY
Status: DISCONTINUED | OUTPATIENT
Start: 2024-09-30 | End: 2024-10-11 | Stop reason: HOSPADM

## 2024-09-30 RX ORDER — SODIUM CHLORIDE 9 MG/ML
INJECTION, SOLUTION INTRAVENOUS PRN
Status: DISCONTINUED | OUTPATIENT
Start: 2024-09-30 | End: 2024-10-01

## 2024-09-30 RX ORDER — POLYETHYLENE GLYCOL 3350 17 G/17G
17 POWDER, FOR SOLUTION ORAL DAILY
Status: DISCONTINUED | OUTPATIENT
Start: 2024-09-30 | End: 2024-10-11 | Stop reason: HOSPADM

## 2024-09-30 RX ADMIN — SUCRALFATE 1 G: 1 TABLET ORAL at 04:08

## 2024-09-30 RX ADMIN — METHOCARBAMOL TABLETS 750 MG: 750 TABLET, COATED ORAL at 12:47

## 2024-09-30 RX ADMIN — BUDESONIDE 500 MCG: 0.5 INHALANT RESPIRATORY (INHALATION) at 09:18

## 2024-09-30 RX ADMIN — SENNOSIDES AND DOCUSATE SODIUM 2 TABLET: 50; 8.6 TABLET ORAL at 08:28

## 2024-09-30 RX ADMIN — HYDROMORPHONE HYDROCHLORIDE 1 MG: 1 INJECTION, SOLUTION INTRAMUSCULAR; INTRAVENOUS; SUBCUTANEOUS at 09:47

## 2024-09-30 RX ADMIN — OXYCODONE HYDROCHLORIDE 15 MG: 15 TABLET ORAL at 18:12

## 2024-09-30 RX ADMIN — IPRATROPIUM BROMIDE AND ALBUTEROL SULFATE 1 DOSE: 2.5; .5 SOLUTION RESPIRATORY (INHALATION) at 14:49

## 2024-09-30 RX ADMIN — ONDANSETRON 4 MG: 2 INJECTION INTRAMUSCULAR; INTRAVENOUS at 12:08

## 2024-09-30 RX ADMIN — SODIUM CHLORIDE, PRESERVATIVE FREE 10 ML: 5 INJECTION INTRAVENOUS at 15:54

## 2024-09-30 RX ADMIN — BICALUTAMIDE 50 MG: 50 TABLET, FILM COATED ORAL at 08:29

## 2024-09-30 RX ADMIN — BUDESONIDE 500 MCG: 0.5 INHALANT RESPIRATORY (INHALATION) at 20:12

## 2024-09-30 RX ADMIN — HYDROMORPHONE HYDROCHLORIDE 1 MG: 1 INJECTION, SOLUTION INTRAMUSCULAR; INTRAVENOUS; SUBCUTANEOUS at 05:30

## 2024-09-30 RX ADMIN — SUCRALFATE 1 G: 1 TABLET ORAL at 17:56

## 2024-09-30 RX ADMIN — HYDROMORPHONE HYDROCHLORIDE 1 MG: 1 INJECTION, SOLUTION INTRAMUSCULAR; INTRAVENOUS; SUBCUTANEOUS at 23:23

## 2024-09-30 RX ADMIN — POLYETHYLENE GLYCOL 3350 17 G: 17 POWDER, FOR SOLUTION ORAL at 12:01

## 2024-09-30 RX ADMIN — OXYCODONE HYDROCHLORIDE 15 MG: 15 TABLET ORAL at 08:29

## 2024-09-30 RX ADMIN — HYDROMORPHONE HYDROCHLORIDE 1 MG: 1 INJECTION, SOLUTION INTRAMUSCULAR; INTRAVENOUS; SUBCUTANEOUS at 19:22

## 2024-09-30 RX ADMIN — LACTULOSE 20 G: 20 SOLUTION ORAL at 12:47

## 2024-09-30 RX ADMIN — OXYCODONE HYDROCHLORIDE 15 MG: 15 TABLET ORAL at 00:02

## 2024-09-30 RX ADMIN — METHOCARBAMOL TABLETS 750 MG: 750 TABLET, COATED ORAL at 20:29

## 2024-09-30 RX ADMIN — HYDROMORPHONE HYDROCHLORIDE 1 MG: 1 INJECTION, SOLUTION INTRAMUSCULAR; INTRAVENOUS; SUBCUTANEOUS at 15:52

## 2024-09-30 RX ADMIN — SUCRALFATE 1 G: 1 TABLET ORAL at 12:00

## 2024-09-30 RX ADMIN — OXYCODONE HYDROCHLORIDE 15 MG: 15 TABLET ORAL at 13:47

## 2024-09-30 RX ADMIN — SUCRALFATE 1 G: 1 TABLET ORAL at 22:16

## 2024-09-30 RX ADMIN — OXYCODONE HYDROCHLORIDE 15 MG: 15 TABLET ORAL at 22:16

## 2024-09-30 RX ADMIN — SODIUM CHLORIDE, PRESERVATIVE FREE 10 ML: 5 INJECTION INTRAVENOUS at 19:24

## 2024-09-30 RX ADMIN — SENNOSIDES AND DOCUSATE SODIUM 2 TABLET: 50; 8.6 TABLET ORAL at 20:29

## 2024-09-30 RX ADMIN — OXYCODONE HYDROCHLORIDE 15 MG: 15 TABLET ORAL at 04:08

## 2024-09-30 RX ADMIN — PANTOPRAZOLE SODIUM 40 MG: 40 INJECTION, POWDER, FOR SOLUTION INTRAVENOUS at 08:28

## 2024-09-30 RX ADMIN — HYDROMORPHONE HYDROCHLORIDE 1 MG: 1 INJECTION, SOLUTION INTRAMUSCULAR; INTRAVENOUS; SUBCUTANEOUS at 02:02

## 2024-09-30 RX ADMIN — SODIUM CHLORIDE: 9 INJECTION, SOLUTION INTRAVENOUS at 17:57

## 2024-09-30 RX ADMIN — HYDROMORPHONE HYDROCHLORIDE 1 MG: 1 INJECTION, SOLUTION INTRAMUSCULAR; INTRAVENOUS; SUBCUTANEOUS at 12:46

## 2024-09-30 RX ADMIN — ALBUTEROL SULFATE 2.5 MG: 2.5 SOLUTION RESPIRATORY (INHALATION) at 00:25

## 2024-09-30 RX ADMIN — IPRATROPIUM BROMIDE AND ALBUTEROL SULFATE 1 DOSE: 2.5; .5 SOLUTION RESPIRATORY (INHALATION) at 20:12

## 2024-09-30 RX ADMIN — Medication 2000 UNITS: at 08:29

## 2024-09-30 RX ADMIN — SODIUM CHLORIDE: 9 INJECTION, SOLUTION INTRAVENOUS at 00:50

## 2024-09-30 ASSESSMENT — PAIN SCALES - GENERAL
PAINLEVEL_OUTOF10: 10
PAINLEVEL_OUTOF10: 8
PAINLEVEL_OUTOF10: 0
PAINLEVEL_OUTOF10: 8
PAINLEVEL_OUTOF10: 10
PAINLEVEL_OUTOF10: 6
PAINLEVEL_OUTOF10: 8
PAINLEVEL_OUTOF10: 10
PAINLEVEL_OUTOF10: 0
PAINLEVEL_OUTOF10: 10
PAINLEVEL_OUTOF10: 0
PAINLEVEL_OUTOF10: 5
PAINLEVEL_OUTOF10: 5
PAINLEVEL_OUTOF10: 10

## 2024-09-30 ASSESSMENT — PAIN DESCRIPTION - ORIENTATION
ORIENTATION: LEFT;RIGHT
ORIENTATION: LEFT;RIGHT
ORIENTATION: RIGHT;LEFT
ORIENTATION: LEFT;RIGHT
ORIENTATION: RIGHT;LEFT;MID

## 2024-09-30 ASSESSMENT — PAIN DESCRIPTION - DESCRIPTORS
DESCRIPTORS: THROBBING;SQUEEZING;STABBING
DESCRIPTORS: POUNDING;DISCOMFORT;THROBBING
DESCRIPTORS: ACHING;SHARP;SORE
DESCRIPTORS: ACHING;SHARP;SORE
DESCRIPTORS: THROBBING;POUNDING;ACHING
DESCRIPTORS: DISCOMFORT;ACHING;SORE
DESCRIPTORS: ACHING;DISCOMFORT;SORE

## 2024-09-30 ASSESSMENT — PAIN DESCRIPTION - LOCATION
LOCATION: RIB CAGE;BACK
LOCATION: CHEST;BACK
LOCATION: CHEST;BACK
LOCATION: GENERALIZED
LOCATION: CHEST;BACK
LOCATION: GENERALIZED
LOCATION: RIB CAGE;BACK

## 2024-09-30 NOTE — CONSENT
Informed Consent for Blood Component Transfusion Note    I have discussed with the patient the rationale for blood component transfusion; its benefits in treating or preventing fatigue, organ damage, or death; and its risk which includes mild transfusion reactions, rare risk of blood borne infection, or more serious but rare reactions. I have discussed the alternatives to transfusion, including the risk and consequences of not receiving transfusion. The patient had an opportunity to ask questions and had agreed to proceed with transfusion of blood components.    Electronically signed by Apryl Pleitez MD on 9/30/24 at 1:54 PM EDT

## 2024-09-30 NOTE — CARE COORDINATION
Social Work Discharge Planning:  Transition of care update: Plan is home with [facility] , will need MOLLY order at discharge and if O2 is needed prefers [facility], VM left by ABHISHEK on  9/27. TREY will continue to follow.  Electronically signed by BERNADETTE Wright on 9/30/2024 at 2:49 PM

## 2024-09-30 NOTE — PLAN OF CARE
Patient requesting pain medication before due and only wants IV dilaudid  Problem: Discharge Planning  Goal: Discharge to home or other facility with appropriate resources  Outcome: Not Progressing     Problem: Pain  Goal: Verbalizes/displays adequate comfort level or baseline comfort level  Outcome: Not Progressing  Flowsheets (Taken 9/30/2024 0400 by Cheryl Stone, RN)  Verbalizes/displays adequate comfort level or baseline comfort level: Encourage patient to monitor pain and request assistance

## 2024-09-30 NOTE — PATIENT CARE CONFERENCE
[facility] Quality Flow/Interdisciplinary Rounds Progress Note        Quality Flow Rounds held on September 30, 2024    Disciplines Attending:  Bedside Nurse, , , and Nursing Unit Leadership    Joseph Bond was admitted on 9/26/2024  4:56 AM    Anticipated Discharge Date:       Disposition:    Simon Score:  Simon Scale Score: 20    Readmission Risk              Risk of Unplanned Readmission:  35           Discussed patient goal for the day, patient clinical progression, and barriers to discharge.  The following Goal(s) of the Day/Commitment(s) have been identified:  downgrade/discharge plan      Kamala Yanez RN  September 30, 2024

## 2024-09-30 NOTE — PROGRESS NOTES
Occupational Therapy  OT BEDSIDE TREATMENT NOTE   [facility]  [address], [place]      Date:2024  Patient Name: Joseph Bond  MRN: [id number]  : 1980  Room: [address] [place] [address]A       Attempted OT session this date:    [] unavailable due to other medical staff currently with pt   [] on hold per nursing staff   [x] on hold per nursing staff secondary to lab results hgb 6.6, receiving blood at this time, will re attempt    [] declined treatment  this date due to ____.  Benefits of participation in therapy reviewed with pt.    [] off unit   [] Other:      Continue with current OT P.O.C      Gayle CRUZ/L [id number]

## 2024-09-30 NOTE — PROGRESS NOTES
[facility]  Family Medicine Attending    S: 44 y.o. male with a history of prostate cancer, anxiety, opiate abuse who presented with nausea and vomiting as well as left sided abdominal pain.  He was found to have hypoxia and severe symptomatic anemia with hgb 5.3.  He has been having nausea with vomiting, pelvic and back pain.  Has not been able to walk due to pain for a couple of weeks.  Goals of care-pain control so that he can walk again.  He wanted more information on side effects from a regimen of chemotherapy drugs from the oncologist.  Oncology was consulted.  Per their note, \"Considering high tumor burden, advised treatment with Taxotere, GnRH agonist, Nubeqa along with Xgeva\"  Side effects of these medications were discussed with the patient by oncology.     Patient now thinking he would like to move forward with chemotherapy.    Palliative care following patient.  Pain is still too severe to walk.    He also states that when he tries to stand up the pain is mostly in a band around his upper abdomen.  He says he has no difficulty with his legs when standing. Patients notes some small amounts of blood in his mucous if he coughs.      9/30-patient reports that he is not feeling well but not willing to elaborate.  To start transfusion for hemoglobin of 6.5. No change in breathing. Denies any active bleeding today. Rib pain where mets are located.    O: VS- Blood pressure 111/77, pulse (!) 113, temperature 97.2 °F (36.2 °C), resp. rate 16, height 1.702 m (5' 7\"), weight 78.7 kg (173 lb 8 oz), SpO2 95%.  Exam is as noted by resident with the following changes, additions or corrections:  Gen:  AAO x3 5L NC, flat affect  CVS: tachy  Lungs: Coarse bs b/l, left base with rhonchi  Ext no CCE    Impressions:    Severe Symptomatic Anemia, related to metastatic prostate cancer in the bone including the spine and lungs  Metastatic prostate cancer  Pain 2/2 metatstatic cancer-s/p palliative radiation

## 2024-09-30 NOTE — PROGRESS NOTES
Palliative Care Department  [phone]  Palliative Care Progress Note  Provider Gely Banuelos, APRN - CNP      PATIENT: Joseph Bond  : 1980  MRN: [id number]  ADMISSION DATE: 2024  4:56 AM  Referring Provider:  Anastasia Helms MD    Palliative Medicine was consulted on hospital day 4 for assistance with Goals of care, Overwhelmed Symptoms    HPI:     Clinical Summary:Joseph Bond is a 44 y.o. y/o male with a history of prostate cancer with mediastinal lymphadenopathy, extensive metastasis of the spine, rib cage, pelvis, humerus and femurs (had refused systemic therapy, pursuing homeopathic), completed palliative radiation treatment to spine, polysubstance abuse including opioids and tobacco, abstinent for 5 years, diverticulitis, with recent ER visit on 2024, due to shortness of breath, was going to be admitted for further medical management however left AMA, was supposed to follow-up outpatient with palliative medicine for symptom management, however missed appointment, did not return phone call to reschedule appointment, who presented to [facility] on 2024 with complaint of nausea, vomiting, left-sided rib pain.  At ED, found hypoxic 78% on room air, placed on 4 L nasal cannula.  Significant laboratory findings showed hemoglobin 5.3, platelets 18, alkaline phosphate 227, AST 76, lipase 8, WBC 4.2.  Received 2 packed red blood cell and sixpack platelets.  CT images showed disease progression when compared to prior images, favoring an infectious/inflammatory process or lymphangitic spread of disease given mediastinal adenopathy and abnormal bony metastatic process within the rib and spine.  Abnormal bony metastatic disease within the spine.  Bony destructive process involving the left anterior second rib.  Stable adenopathy throughout the retroperitoneal and pelvic sidewall,.  Stable bony metastatic disease throughout the spine, pelvis and hip bilaterally.  Palliative medicine  consulted to assist further with goals of care, symptom management.    ASSESSMENT/PLAN:     Pertinent Hospital Diagnoses     Thrombocytopenia  Anemia  Acute respiratory failure  Metastatic prostate cancer    Symptom management     Pain due to neoplasm  -IV Dilaudid 0.5 to 1 mg every 3 hours as needed  -oxycodone 15 mg every 4 hours as needed  -Start 12.5 mcg fentanyl patch every 72 hours  -Stopped taking morphine ER due to drowsiness  - Robaxin 750 mg 4 times a day    Nausea and vomiting  -Promethazine 12.5 mg p.o. every 6 hours as needed  -IV Zofran 4 mg every 6 hours as needed    Prophylaxis stool softener  -GlycoLax daily as needed  -Senokot S twice daily  -Lactulose once    Palliative Care Encounter / Counseling Regarding Goals of Care  Please see detailed goals of care discussion as below  At this time, Joseph Bond, Does Not have capacity for medical decision-making.  Capacity is time limited and situation/question specific  Outcome of goals of care meeting:  Following for symptom management    Code status Full Code  Advanced Directives: no POA or living will in epic  Surrogate/Legal NOK:  Margarita Bond (Sister ) [phone]   Gaurav Vu (cousin) [phone]    Spiritual assessment: no spiritual distress identified  Bereavement and grief: to be determined  Referrals to: none today    Thank you for the opportunity to participate in the care of Joseph Bond.     FANG Denise CNP  Palliative Medicine     SUBJECTIVE:     Details of Conversation:    Chart reviewed.  Received message from bedside nurse, patient requesting to have as needed pain medication changed to schedule.  Patient seen at the bedside, he was stoic, upset that he is missing pain medication when he is sleeping, or is not given when is due, wanting medication to be scheduled so he received medications around-the-clock.  He tells me, current regimen is working, his pain is constant, however manageable, as long as he is gets

## 2024-09-30 NOTE — CONSULTS
R/R/G  EXT:    No deformities .  No clubbing.       no lower extremity edema, No venous stasis  PULSE:   Appears equal and palpable.  PSYCHIATRIC:  Seems appropriate, No acute psychosis  MS:    No fractures, No gross weakness  NEUROLOGIC:   The clinical assessment is non-focal     DATA: IMAGING & TESTING:     LABORATORY TESTS:        PRO-BNP: No results found for: \"PROBNP\"   ABGs: No results found for: \"PH\", \"PO2\", \"PCO2\"  Hemoglobin A1C: No components found for: \"HGBA1C\"    IMAGING:  Imaging tests were completed and reviewed and discussed radiology and care team involved and reveals   XR THORACIC SPINE (3 VIEWS)  Result Date: 9/29/2024  1. No acute abnormality of the thoracic spine. 2. Unchanged biconcave deformities within the midthoracic spine. 3. Heterogeneous marrow density, compatible with known osseous metastatic disease. 4. Heterogeneous lung parenchyma compatible with ground-glass opacities identified on the recent CT. No acute abnormality of the thoracic spine     CTA CHEST W CONTRAST  Result Date: 9/26/2024  1. No evidence of pulmonary embolism. 2. Abnormal patchy ground-glass opacity identified diffusely throughout the lung fields. Findings show progression when compared to the prior study with no definite pleural effusion or pneumothorax. Findings suggesting progression of disease favoring an infectious/inflammatory process or however lymphangitic spread of disease given mediastinal adenopathy and abnormal bony metastatic process within the ribs and spine cannot be completely excluded and clinical correlation is needed.. 3. Abnormal bony metastatic disease within the spine. There is a bony destructive process involving the left anterior 2nd rib. Associated soft tissue thickening. 4. No acute intra-abdominal or intrapelvic process.  Stable adenopathy identified throughout the retroperitoneum and left pelvic sidewall.  Stable bony Mets metastatic disease throughout the spine and pelvis as well as the hips  bilaterally.     CT ABDOMEN PELVIS W IV CONTRAST Additional Contrast? None  Result Date: 9/26/2024  1. No evidence of pulmonary embolism. 2. Abnormal patchy ground-glass opacity identified diffusely throughout the lung fields. Findings show progression when compared to the prior study with no definite pleural effusion or pneumothorax. Findings suggesting progression of disease favoring an infectious/inflammatory process or however lymphangitic spread of disease given mediastinal adenopathy and abnormal bony metastatic process within the ribs and spine cannot be completely excluded and clinical correlation is needed.. 3. Abnormal bony metastatic disease within the spine. There is a bony destructive process involving the left anterior 2nd rib. Associated soft tissue thickening. 4. No acute intra-abdominal or intrapelvic process.  Stable adenopathy identified throughout the retroperitoneum and left pelvic sidewall.  Stable bony Mets metastatic disease throughout the spine and pelvis as well as the hips bilaterally.     XR CHEST PORTABLE  Result Date: 9/26/2024  Interval worsening of the patchy airspace disease throughout the lung fields bilaterally.           Assessment:     Acute hypoxic respiratory failure  Hemoptysis  GGO seen on CT- progression of disease vs infectious  Mediastinal lymphadenopathy    Metastatic prostate cancer   Chronic pain  Pancytopenia  Nausea and vomiting         Plan:     Supplemental oxygen for sats 88 to 94%  Check for home oxygen requirements prior to discharge  Scheduled budesonide and DuoNebs  Check Pro-Polo, RVP, pneumonia workup, resp culture, urine antigens   Incentive spirometry, flutter valve  Pain control per primary team  Transfuse per primary   Consider bronchoscopy pending clinical course, high risk with plts 12 - will defer to Dr. Kendrick   Oncology consulted, patient is now agreeable for chemotherapy   DVT prophylaxis with SCDs        FANG Jerry - NP        I saw and

## 2024-09-30 NOTE — PROGRESS NOTES
FM resident msg via perfect serve re: am labs Hgb 6.5 and plts 12. Will await return call or new orders.Cheryl Stone RN

## 2024-09-30 NOTE — PROGRESS NOTES
[facility] - Family Medicine Inpatient   Resident Progress Note    S:  Hospital day: 4   Brief Synopsis: Joseph Bond is a 44 y.o. male with a PMH of Cancer (HCC), Diverticulosis, and History of opioid abuse (HCC). who presents to ED for nausea and vomiting.     Patient complains of nausea and vomiting that started 2 days ago.  Emesis contains phlegm, mucus and streaks of blood.  Patient could not quantify number of times but states that it has been continuous.  States that he had nausea medications but seemed to make his nausea was.  Also endorses chronic pain on his back, abdomen radha LUQ.  Patient was recently diagnosed for pancreatic cancer with mets.  Completed 10th cycle of radiation 1 month ago.  States that since radiation was completed he has not been able to walk due to pain in his pelvis, abdomen and back.  He has been wheelchair-bound for the past 1 month.  Has had poor appetite, blurry vision, dizziness.  Denies nosebleeds, bruising, melena, hematochezia, hematuria.  Patient does not use illicit drugs or drink alcohol.  Quit smoking 7 years ago.  He would like to remain full code.  Hemoglobin was 5.3 and platelets at 18 at the ER.  Patient was transfused 2 units of pRBC.  Post transfusion hemoglobin was 5.9 and patient was transfused another 2 units of PRBC.  Patient continued to experience pain in his back and upper abdomen and pelvis.  Oncology and palliative were consulted.  Pain medication with Dilaudid, oxy and fentanyl patch.  Palliative care managed pain, nausea and vomiting and constipation 2/2 opioids.  He decided to proceed with chemotherapy     Overnight/interim:   Hg this morning 6.5. One unit pRBC transfused.  Patient states that pain is well controlled this morning.  Started on casodex 50 mg daily.    Cont meds:    sodium chloride      sodium chloride      sodium chloride 100 mL/hr at 09/30/24 0050    sodium chloride      sodium chloride       Scheduled meds:    bicalutamide  50 mg Oral Daily  limits  -[facility] 15/24, goal to ambulate with walker   -Started on Casodex 50mg daily   -Monitor electrolytes daily      Anemia  Thrombocytopenia  -Hg 5.3 on arrival.  Transfused 5 units total since admission   -Plt 18 on arrival.  Transfused platelets at ER. AM Platelet count 14  -Monitor CBC daily   -Transfuse Hg <7  -Transfuse platelets <10k      DVT/GI ppx: PCD's/Protonix  Pain: Dilaudid, oxy, fentanyl patch  GI regimen: Senna S scheduled, GlycoLax as needed  PT/OT: 15/24  Dispo: Continue present management   Telemetry Day: 09/29 since last renewal      Electronically signed by Anastasia Helms MD on 9/30/2024 at 7:26 AM  Attending physician: Dr. Avila

## 2024-10-01 ENCOUNTER — APPOINTMENT (OUTPATIENT)
Dept: GENERAL RADIOLOGY | Age: 44
DRG: 500 | End: 2024-10-01
Payer: MEDICAID

## 2024-10-01 LAB
ALBUMIN SERPL-MCNC: 3.2 G/DL (ref 3.5–5.2)
ALP SERPL-CCNC: 160 U/L (ref 40–129)
ALT SERPL-CCNC: 14 U/L (ref 0–40)
ANION GAP SERPL CALCULATED.3IONS-SCNC: 14 MMOL/L (ref 7–16)
ARM BAND NUMBER: NORMAL
AST SERPL-CCNC: 61 U/L (ref 0–39)
BASOPHILS # BLD: 0.04 K/UL (ref 0–0.2)
BASOPHILS # BLD: 0.04 K/UL (ref 0–0.2)
BASOPHILS NFR BLD: 1 % (ref 0–2)
BASOPHILS NFR BLD: 1 % (ref 0–2)
BILIRUB SERPL-MCNC: 1.3 MG/DL (ref 0–1.2)
BLOOD BANK BLOOD PRODUCT EXPIRATION DATE: NORMAL
BLOOD BANK DISPENSE STATUS: NORMAL
BLOOD BANK ISBT PRODUCT BLOOD TYPE: 6200
BLOOD BANK PRODUCT CODE: NORMAL
BLOOD BANK UNIT TYPE AND RH: NORMAL
BNP SERPL-MCNC: 1170 PG/ML (ref 0–125)
BPU ID: NORMAL
BUN SERPL-MCNC: 10 MG/DL (ref 6–20)
CALCIUM SERPL-MCNC: 8.9 MG/DL (ref 8.6–10.2)
CHLORIDE SERPL-SCNC: 98 MMOL/L (ref 98–107)
CO2 SERPL-SCNC: 23 MMOL/L (ref 22–29)
COMPONENT: NORMAL
CREAT SERPL-MCNC: 0.7 MG/DL (ref 0.7–1.2)
EOSINOPHIL # BLD: 0 K/UL (ref 0.05–0.5)
EOSINOPHIL # BLD: 0.04 K/UL (ref 0.05–0.5)
EOSINOPHILS RELATIVE PERCENT: 0 % (ref 0–6)
EOSINOPHILS RELATIVE PERCENT: 1 % (ref 0–6)
ERYTHROCYTE [DISTWIDTH] IN BLOOD BY AUTOMATED COUNT: 17.4 % (ref 11.5–15)
ERYTHROCYTE [DISTWIDTH] IN BLOOD BY AUTOMATED COUNT: 17.6 % (ref 11.5–15)
GFR, ESTIMATED: >90 ML/MIN/1.73M2
GLUCOSE SERPL-MCNC: 97 MG/DL (ref 74–99)
HCT VFR BLD AUTO: 21.8 % (ref 37–54)
HCT VFR BLD AUTO: 23.7 % (ref 37–54)
HGB BLD-MCNC: 7.2 G/DL (ref 12.5–16.5)
HGB BLD-MCNC: 7.8 G/DL (ref 12.5–16.5)
L PNEUMO1 AG UR QL IA.RAPID: NEGATIVE
LYMPHOCYTES NFR BLD: 0.18 K/UL (ref 1.5–4)
LYMPHOCYTES NFR BLD: 0.18 K/UL (ref 1.5–4)
LYMPHOCYTES RELATIVE PERCENT: 4 % (ref 20–42)
LYMPHOCYTES RELATIVE PERCENT: 4 % (ref 20–42)
MCH RBC QN AUTO: 29.8 PG (ref 26–35)
MCH RBC QN AUTO: 29.8 PG (ref 26–35)
MCHC RBC AUTO-ENTMCNC: 32.9 G/DL (ref 32–34.5)
MCHC RBC AUTO-ENTMCNC: 33 G/DL (ref 32–34.5)
MCV RBC AUTO: 90.1 FL (ref 80–99.9)
MCV RBC AUTO: 90.5 FL (ref 80–99.9)
METAMYELOCYTES ABSOLUTE COUNT: 0.07 K/UL (ref 0–0.12)
METAMYELOCYTES: 2 % (ref 0–1)
MICROORGANISM SPEC CULT: NORMAL
MICROORGANISM/AGENT SPEC: NORMAL
MONOCYTES NFR BLD: 0.04 K/UL (ref 0.1–0.95)
MONOCYTES NFR BLD: 0.22 K/UL (ref 0.1–0.95)
MONOCYTES NFR BLD: 1 % (ref 2–12)
MONOCYTES NFR BLD: 5 % (ref 2–12)
NEUTROPHILS NFR BLD: 89 % (ref 43–80)
NEUTROPHILS NFR BLD: 92 % (ref 43–80)
NEUTS SEG NFR BLD: 3.72 K/UL (ref 1.8–7.3)
NEUTS SEG NFR BLD: 3.78 K/UL (ref 1.8–7.3)
NUCLEATED RED BLOOD CELLS: 2 PER 100 WBC
NUCLEATED RED BLOOD CELLS: 3 PER 100 WBC
PHOSPHATE SERPL-MCNC: 4.8 MG/DL (ref 2.5–4.5)
PHOSPHATE SERPL-MCNC: 5.1 MG/DL (ref 2.5–4.5)
PLATELET CONFIRMATION: NORMAL
PLATELET CONFIRMATION: NORMAL
PLATELET, FLUORESCENCE: 16 K/UL (ref 130–450)
PLATELET, FLUORESCENCE: 17 K/UL (ref 130–450)
PMV BLD AUTO: ABNORMAL FL (ref 7–12)
PMV BLD AUTO: ABNORMAL FL (ref 7–12)
POTASSIUM SERPL-SCNC: 4 MMOL/L (ref 3.5–5)
PROCALCITONIN SERPL-MCNC: 0.23 NG/ML (ref 0–0.08)
PROT SERPL-MCNC: 5.6 G/DL (ref 6.4–8.3)
RBC # BLD AUTO: 2.42 M/UL (ref 3.8–5.8)
RBC # BLD AUTO: 2.62 M/UL (ref 3.8–5.8)
RBC # BLD: ABNORMAL 10*6/UL
S PNEUM AG SPEC QL: NEGATIVE
SERVICE CMNT-IMP: NORMAL
SODIUM SERPL-SCNC: 135 MMOL/L (ref 132–146)
SPECIMEN DESCRIPTION: NORMAL
SPECIMEN SOURCE: NORMAL
TRANSFUSION STATUS: NORMAL
UNIT DIVISION: 0
UNIT ISSUE DATE/TIME: NORMAL
WBC OTHER # BLD: 4.1 K/UL (ref 4.5–11.5)
WBC OTHER # BLD: 4.2 K/UL (ref 4.5–11.5)

## 2024-10-01 PROCEDURE — 99232 SBSQ HOSP IP/OBS MODERATE 35: CPT

## 2024-10-01 PROCEDURE — 36415 COLL VENOUS BLD VENIPUNCTURE: CPT

## 2024-10-01 PROCEDURE — 2580000003 HC RX 258

## 2024-10-01 PROCEDURE — 6360000002 HC RX W HCPCS

## 2024-10-01 PROCEDURE — 85025 COMPLETE CBC W/AUTO DIFF WBC: CPT

## 2024-10-01 PROCEDURE — 80053 COMPREHEN METABOLIC PANEL: CPT

## 2024-10-01 PROCEDURE — 94640 AIRWAY INHALATION TREATMENT: CPT

## 2024-10-01 PROCEDURE — 6370000000 HC RX 637 (ALT 250 FOR IP): Performed by: NURSE PRACTITIONER

## 2024-10-01 PROCEDURE — 99231 SBSQ HOSP IP/OBS SF/LOW 25: CPT

## 2024-10-01 PROCEDURE — 83880 ASSAY OF NATRIURETIC PEPTIDE: CPT

## 2024-10-01 PROCEDURE — 6360000002 HC RX W HCPCS: Performed by: NURSE PRACTITIONER

## 2024-10-01 PROCEDURE — 6370000000 HC RX 637 (ALT 250 FOR IP)

## 2024-10-01 PROCEDURE — 99233 SBSQ HOSP IP/OBS HIGH 50: CPT | Performed by: INTERNAL MEDICINE

## 2024-10-01 PROCEDURE — 84100 ASSAY OF PHOSPHORUS: CPT

## 2024-10-01 PROCEDURE — 6370000000 HC RX 637 (ALT 250 FOR IP): Performed by: INTERNAL MEDICINE

## 2024-10-01 PROCEDURE — 2140000000 HC CCU INTERMEDIATE R&B

## 2024-10-01 PROCEDURE — 2700000000 HC OXYGEN THERAPY PER DAY

## 2024-10-01 PROCEDURE — 2580000003 HC RX 258: Performed by: NURSE PRACTITIONER

## 2024-10-01 PROCEDURE — 71046 X-RAY EXAM CHEST 2 VIEWS: CPT

## 2024-10-01 RX ADMIN — SODIUM CHLORIDE: 9 INJECTION, SOLUTION INTRAVENOUS at 11:41

## 2024-10-01 RX ADMIN — SENNOSIDES AND DOCUSATE SODIUM 2 TABLET: 50; 8.6 TABLET ORAL at 07:40

## 2024-10-01 RX ADMIN — POLYETHYLENE GLYCOL 3350 17 G: 17 POWDER, FOR SOLUTION ORAL at 07:40

## 2024-10-01 RX ADMIN — OXYCODONE HYDROCHLORIDE 15 MG: 15 TABLET ORAL at 02:27

## 2024-10-01 RX ADMIN — OXYCODONE HYDROCHLORIDE 15 MG: 15 TABLET ORAL at 22:39

## 2024-10-01 RX ADMIN — HYDROMORPHONE HYDROCHLORIDE 1 MG: 1 INJECTION, SOLUTION INTRAMUSCULAR; INTRAVENOUS; SUBCUTANEOUS at 07:41

## 2024-10-01 RX ADMIN — BICALUTAMIDE 50 MG: 50 TABLET, FILM COATED ORAL at 07:41

## 2024-10-01 RX ADMIN — IPRATROPIUM BROMIDE AND ALBUTEROL SULFATE 1 DOSE: 2.5; .5 SOLUTION RESPIRATORY (INHALATION) at 16:31

## 2024-10-01 RX ADMIN — METHOCARBAMOL TABLETS 750 MG: 750 TABLET, COATED ORAL at 15:38

## 2024-10-01 RX ADMIN — HYDROMORPHONE HYDROCHLORIDE 1 MG: 1 INJECTION, SOLUTION INTRAMUSCULAR; INTRAVENOUS; SUBCUTANEOUS at 15:39

## 2024-10-01 RX ADMIN — IPRATROPIUM BROMIDE AND ALBUTEROL SULFATE 1 DOSE: 2.5; .5 SOLUTION RESPIRATORY (INHALATION) at 08:42

## 2024-10-01 RX ADMIN — OXYCODONE HYDROCHLORIDE 15 MG: 15 TABLET ORAL at 14:36

## 2024-10-01 RX ADMIN — HYDROMORPHONE HYDROCHLORIDE 1 MG: 1 INJECTION, SOLUTION INTRAMUSCULAR; INTRAVENOUS; SUBCUTANEOUS at 19:54

## 2024-10-01 RX ADMIN — Medication 2000 UNITS: at 07:41

## 2024-10-01 RX ADMIN — METHOCARBAMOL TABLETS 750 MG: 750 TABLET, COATED ORAL at 11:38

## 2024-10-01 RX ADMIN — METHOCARBAMOL TABLETS 750 MG: 750 TABLET, COATED ORAL at 07:40

## 2024-10-01 RX ADMIN — HYDROMORPHONE HYDROCHLORIDE 1 MG: 1 INJECTION, SOLUTION INTRAMUSCULAR; INTRAVENOUS; SUBCUTANEOUS at 03:32

## 2024-10-01 RX ADMIN — BUDESONIDE 500 MCG: 0.5 INHALANT RESPIRATORY (INHALATION) at 20:19

## 2024-10-01 RX ADMIN — IPRATROPIUM BROMIDE AND ALBUTEROL SULFATE 1 DOSE: 2.5; .5 SOLUTION RESPIRATORY (INHALATION) at 20:19

## 2024-10-01 RX ADMIN — SUCRALFATE 1 G: 1 TABLET ORAL at 06:35

## 2024-10-01 RX ADMIN — SODIUM CHLORIDE, PRESERVATIVE FREE 10 ML: 5 INJECTION INTRAVENOUS at 19:54

## 2024-10-01 RX ADMIN — OXYCODONE HYDROCHLORIDE 15 MG: 15 TABLET ORAL at 18:28

## 2024-10-01 RX ADMIN — BUDESONIDE 500 MCG: 0.5 INHALANT RESPIRATORY (INHALATION) at 08:42

## 2024-10-01 RX ADMIN — HYDROMORPHONE HYDROCHLORIDE 1 MG: 1 INJECTION, SOLUTION INTRAMUSCULAR; INTRAVENOUS; SUBCUTANEOUS at 23:41

## 2024-10-01 RX ADMIN — OXYCODONE HYDROCHLORIDE 15 MG: 15 TABLET ORAL at 06:35

## 2024-10-01 RX ADMIN — SUCRALFATE 1 G: 1 TABLET ORAL at 22:37

## 2024-10-01 RX ADMIN — SUCRALFATE 1 G: 1 TABLET ORAL at 10:37

## 2024-10-01 RX ADMIN — HYDROMORPHONE HYDROCHLORIDE 1 MG: 1 INJECTION, SOLUTION INTRAMUSCULAR; INTRAVENOUS; SUBCUTANEOUS at 11:37

## 2024-10-01 RX ADMIN — SENNOSIDES AND DOCUSATE SODIUM 2 TABLET: 50; 8.6 TABLET ORAL at 19:53

## 2024-10-01 RX ADMIN — METHYLPREDNISOLONE SODIUM SUCCINATE 60 MG: 125 INJECTION, POWDER, LYOPHILIZED, FOR SOLUTION INTRAMUSCULAR; INTRAVENOUS at 19:53

## 2024-10-01 RX ADMIN — SODIUM CHLORIDE: 9 INJECTION, SOLUTION INTRAVENOUS at 03:33

## 2024-10-01 RX ADMIN — OXYCODONE HYDROCHLORIDE 15 MG: 15 TABLET ORAL at 10:37

## 2024-10-01 RX ADMIN — IPRATROPIUM BROMIDE AND ALBUTEROL SULFATE 1 DOSE: 2.5; .5 SOLUTION RESPIRATORY (INHALATION) at 14:37

## 2024-10-01 RX ADMIN — PANTOPRAZOLE SODIUM 40 MG: 40 INJECTION, POWDER, FOR SOLUTION INTRAVENOUS at 07:40

## 2024-10-01 RX ADMIN — SUCRALFATE 1 G: 1 TABLET ORAL at 17:28

## 2024-10-01 RX ADMIN — METHOCARBAMOL TABLETS 750 MG: 750 TABLET, COATED ORAL at 19:53

## 2024-10-01 RX ADMIN — METHYLPREDNISOLONE SODIUM SUCCINATE 60 MG: 125 INJECTION, POWDER, LYOPHILIZED, FOR SOLUTION INTRAMUSCULAR; INTRAVENOUS at 15:39

## 2024-10-01 ASSESSMENT — PAIN SCALES - GENERAL
PAINLEVEL_OUTOF10: 9
PAINLEVEL_OUTOF10: 8
PAINLEVEL_OUTOF10: 10
PAINLEVEL_OUTOF10: 8
PAINLEVEL_OUTOF10: 6
PAINLEVEL_OUTOF10: 6
PAINLEVEL_OUTOF10: 10
PAINLEVEL_OUTOF10: 8
PAINLEVEL_OUTOF10: 10
PAINLEVEL_OUTOF10: 9
PAINLEVEL_OUTOF10: 8
PAINLEVEL_OUTOF10: 10
PAINLEVEL_OUTOF10: 8

## 2024-10-01 ASSESSMENT — PAIN DESCRIPTION - LOCATION
LOCATION: GENERALIZED
LOCATION: RIB CAGE
LOCATION: RIB CAGE
LOCATION: GENERALIZED
LOCATION: RIB CAGE

## 2024-10-01 ASSESSMENT — PAIN SCALES - WONG BAKER
WONGBAKER_NUMERICALRESPONSE: HURTS A LITTLE BIT

## 2024-10-01 ASSESSMENT — PAIN - FUNCTIONAL ASSESSMENT
PAIN_FUNCTIONAL_ASSESSMENT: ACTIVITIES ARE NOT PREVENTED

## 2024-10-01 ASSESSMENT — PAIN DESCRIPTION - ORIENTATION
ORIENTATION: RIGHT;LEFT
ORIENTATION: RIGHT;LEFT
ORIENTATION: RIGHT
ORIENTATION: RIGHT;LEFT

## 2024-10-01 ASSESSMENT — PAIN DESCRIPTION - DESCRIPTORS
DESCRIPTORS: ACHING;DISCOMFORT;SORE

## 2024-10-01 NOTE — PROGRESS NOTES
Palliative Care Department  [phone]  Palliative Care Progress Note  Provider Gely Banuelos, APRN - CNP      PATIENT: Joseph Bond  : 1980  MRN: [id number]  ADMISSION DATE: 2024  4:56 AM  Referring Provider:  Anastasia Helms MD    Palliative Medicine was consulted on hospital day 5 for assistance with Goals of care, Overwhelmed Symptoms    HPI:     Clinical Summary:Joseph Bond is a 44 y.o. y/o male with a history of prostate cancer with mediastinal lymphadenopathy, extensive metastasis of the spine, rib cage, pelvis, humerus and femurs (had refused systemic therapy, pursuing homeopathic), completed palliative radiation treatment to spine, polysubstance abuse including opioids and tobacco, abstinent for 5 years, diverticulitis, with recent ER visit on 2024, due to shortness of breath, was going to be admitted for further medical management however left AMA, was supposed to follow-up outpatient with palliative medicine for symptom management, however missed appointment, did not return phone call to reschedule appointment, who presented to [facility] on 2024 with complaint of nausea, vomiting, left-sided rib pain.  At ED, found hypoxic 78% on room air, placed on 4 L nasal cannula.  Significant laboratory findings showed hemoglobin 5.3, platelets 18, alkaline phosphate 227, AST 76, lipase 8, WBC 4.2.  Received 2 packed red blood cell and sixpack platelets.  CT images showed disease progression when compared to prior images, favoring an infectious/inflammatory process or lymphangitic spread of disease given mediastinal adenopathy and abnormal bony metastatic process within the rib and spine.  Abnormal bony metastatic disease within the spine.  Bony destructive process involving the left anterior second rib.  Stable adenopathy throughout the retroperitoneal and pelvic sidewall,.  Stable bony metastatic disease throughout the spine, pelvis and hip bilaterally.  Palliative medicine

## 2024-10-01 NOTE — PROGRESS NOTES
Physician Progress Note      PATIENT:               BRIAN ANTHONY  [facility] #:                  [id number]  :                       1980  ADMIT DATE:       2024 4:56 AM  DISCH DATE:  RESPONDING  PROVIDER #:        Marilee Avila MD          QUERY TEXT:    Patient admitted with nausea/vomitting and anemia related to neoplastic   disease. Noted documentation of acute respiratory failure in palliative care   consult and progress notes. In order to support the diagnosis of acute   respiratory failure, please include additional clinical indicators in your   documentation.  Or please document if the diagnosis of acute respiratory   failure has been ruled out after further study.    The medical record reflects the following:  Risk Factors: Metastatic prostate cancer, anemia  Clinical Indicators: Per palliative consult note on  \"...At ED, found   hypoxic 78% on room air, placed on 4 L nasal cannula...Acute respiratory   failure...respirations easy nasal cannula...\", RR 13-24 in ED, Per nursing   flowsheets breathing unlabored  Treatment: Oxygen therapy up to 4L NC, CXR, CTA chest    Acute Respiratory Failure Clinical Indicators per [employer] [employer] Training Guide and   Quick Reference Guide:  Supplemental oxygen of 40% or more  Presence of respiratory distress, tachypnea, dyspnea, shortness of breath,   wheezing  Unable to speak in complete sentences  Use of accessory muscles to breathe  Extreme anxiety and feeling of impending doom  Tripod position  Confusion/altered mental status/obtunded    Thank you,  Courtney Curran, RN, BSN, CDIS  Clinical Documentation Integrity  [email or web address]  Options provided:  -- Acute Respiratory Failure as evidenced by, Please document evidence.  -- Acute Respiratory Failure ruled out after study  -- Other - I will add my own diagnosis  -- Disagree - Not applicable / Not valid  -- Disagree - Clinically unable to determine / Unknown  -- Refer to Clinical Documentation

## 2024-10-01 NOTE — PROGRESS NOTES
trachea midline, no JVD. Thyroid non tender, no obvious masses. No cervical lymphadenopathy.   Chest: Tenderness to palpation of bilateral anterolateral chest wall, full & symmetric excursion  Lung: On 8 L of oxygen, respirations unlabored  Heart: RRR, S1 and S2 normal, +murmur, rub or gallop. DP pulses 2/4  Abdomen: TTP to LUQ, no masses, no organomegaly, no guarding, rebound or rigidity.   Extremities:  Extremities normal, atraumatic, trace ankle edema bilaterally. Distal pulses equal bilaterally  Skin: Skin color, texture, turgor normal, no rashes or lesions, hyperpigmentation on back 2/2 heating pad  Musculoskeletal: No joint swelling, no muscle tenderness. Normal ROM in extremities.   Neurologic:  Oriented x 3; CNII-XII intact; Normal and symmetric strength in UEs and LEs; Sensation intact  Psychiatric: appropriate affect. Intact judgment and insight.    Labs:  Na/K/Cl/CO2:  135/4.0/98/23 (10/01 0558)  BUN/Cr/glu/ALT/AST/amyl/lip:  10/0.7/--/14/61/--/-- (10/01 0558)  WBC/Hgb/Hct/Plts:  4.1/7.8/23.7/-- (10/01 0558)  estimated creatinine clearance is 125 mL/min (based on SCr of 0.7 mg/dL).  Other pertinent labs as noted below    Radiology:  XR CHEST PORTABLE   Final Result   Interval progression of bilateral diffuse patchy pulmonary alveolar   infiltrates.         XR THORACIC SPINE (3 VIEWS)   Final Result   1. No acute abnormality of the thoracic spine.   2. Unchanged biconcave deformities within the midthoracic spine.   3. Heterogeneous marrow density, compatible with known osseous metastatic   disease.   4. Heterogeneous lung parenchyma compatible with ground-glass opacities   identified on the recent CT.   No acute abnormality of the thoracic spine         CTA CHEST W CONTRAST   Final Result   1. No evidence of pulmonary embolism.   2. Abnormal patchy ground-glass opacity identified diffusely throughout the   lung fields. Findings show progression when compared to the prior study with   no definite pleural  effusion or pneumothorax. Findings suggesting progression   of disease favoring an infectious/inflammatory process or however   lymphangitic spread of disease given mediastinal adenopathy and abnormal bony   metastatic process within the ribs and spine cannot be completely excluded   and clinical correlation is needed..   3. Abnormal bony metastatic disease within the spine. There is a bony   destructive process involving the left anterior 2nd rib. Associated soft   tissue thickening.   4. No acute intra-abdominal or intrapelvic process.  Stable adenopathy   identified throughout the retroperitoneum and left pelvic sidewall.  Stable   bony Mets metastatic disease throughout the spine and pelvis as well as the   hips bilaterally.         CT ABDOMEN PELVIS W IV CONTRAST Additional Contrast? None   Final Result   1. No evidence of pulmonary embolism.   2. Abnormal patchy ground-glass opacity identified diffusely throughout the   lung fields. Findings show progression when compared to the prior study with   no definite pleural effusion or pneumothorax. Findings suggesting progression   of disease favoring an infectious/inflammatory process or however   lymphangitic spread of disease given mediastinal adenopathy and abnormal bony   metastatic process within the ribs and spine cannot be completely excluded   and clinical correlation is needed..   3. Abnormal bony metastatic disease within the spine. There is a bony   destructive process involving the left anterior 2nd rib. Associated soft   tissue thickening.   4. No acute intra-abdominal or intrapelvic process.  Stable adenopathy   identified throughout the retroperitoneum and left pelvic sidewall.  Stable   bony Mets metastatic disease throughout the spine and pelvis as well as the   hips bilaterally.         XR CHEST PORTABLE   Final Result   Interval worsening of the patchy airspace disease throughout the lung fields   bilaterally.               Resident Assessment

## 2024-10-01 NOTE — PROGRESS NOTES
Physical Therapy    Medical chart reviewed for PT treatment 10/1. Pt declined due to pain and dyspnea. Pt requested PT re-attempt tomorrow. Will re-attempt at a later date. Thank you.    Roger Cook, PT, DPT  [id number]

## 2024-10-01 NOTE — CARE COORDINATION
CM Update: Met with patient at bedside to discuss transition of care plan. Discharge plan is Home with [facility] care/palliative care, and possible Oxygen through [facility]. Patient came to hospital with nausea, vomiting, abdominal pain, and left rib pain. Found to have Anemia/Thrombocytopenia. Hbg 5.3, and Plt 18 on presentation to ED. PRBCs and platelets infused in ED. 8 liters high flow nasal cannula. CM/SW to follow. MT

## 2024-10-01 NOTE — PATIENT CARE CONFERENCE
[facility] Quality Flow/Interdisciplinary Rounds Progress Note        Quality Flow Rounds held on October 1, 2024    Disciplines Attending:  Bedside Nurse, , , and Nursing Unit Leadership    Joseph Bond was admitted on 9/26/2024  4:56 AM    Anticipated Discharge Date:       Disposition:    Simon Score:  Simon Scale Score: 20    Readmission Risk              Risk of Unplanned Readmission:  39           Discussed patient goal for the day, patient clinical progression, and barriers to discharge.  The following Goal(s) of the Day/Commitment(s) have been identified:  downgrade/discharge planning       Kamala Yanez RN  October 1, 2024

## 2024-10-01 NOTE — PROGRESS NOTES
[facility]  Family Medicine Attending    S: 44 y.o. male with a history of prostate cancer, anxiety, opiate abuse who presented with nausea and vomiting as well as left sided abdominal pain.  He was found to have hypoxia and severe symptomatic anemia with hgb 5.3.  He has been having nausea with vomiting, pelvic and back pain.  Has not been able to walk due to pain for a couple of weeks.  Goals of care-pain control so that he can walk again.  He wanted more information on side effects from a regimen of chemotherapy drugs from the oncologist.  Oncology was consulted.  Per their note, \"Considering high tumor burden, advised treatment with Taxotere, GnRH agonist, Nubeqa along with Xgeva\"  Side effects of these medications were discussed with the patient by oncology.     Patient now thinking he would like to move forward with chemotherapy.    Palliative care following patient.  Pain is still too severe to walk.    He also states that when he tries to stand up the pain is mostly in a band around his upper abdomen.  He says he has no difficulty with his legs when standing. Patients notes some small amounts of blood in his mucous if he coughs.      9/30-patient reports that he is not feeling well but not willing to elaborate.  To start transfusion for hemoglobin of 6.5. No change in breathing. Denies any active bleeding today. Rib pain where mets are located.    10/1-s/p 2 units PRBCS yesterday.  This morning stating that oxygen was increased overnight to 11L HF NC, now at 10. But now denying dyspnea, some mucus with blood upon coughing. He denies that he is having depressive symptoms.    O: VS- Blood pressure 105/66, pulse 83, temperature 98 °F (36.7 °C), temperature source Temporal, resp. rate 18, height 1.702 m (5' 7\"), weight 78.7 kg (173 lb 8 oz), SpO2 94%.  Exam is as noted by resident with the following changes, additions or corrections:  Gen:  AAO x3 10L HF NC  CVS: tachy  Lungs: Coarse bs b/l  Ext

## 2024-10-01 NOTE — PROGRESS NOTES
[facility]  Department of Internal Medicine  Division of Pulmonary, Critical Care and Sleep Medicine  Progress Note      Ed AMIE Ottosydney DO Kofi Mcadams, APRN-CNP  Zonia Nataliia, APRN-CNP        SUBJECTIVE:  Patient seen and examined.  He was seen on 8 L high flow nasal cannula.  He denies any worsening shortness of breath.  He reports less hemoptysis.  Mother is at the bedside.      OBJECTIVE:     PHYSICAL EXAM:   VITALS:   Vitals:    10/01/24 1130 10/01/24 1207 10/01/24 1436 10/01/24 1437   BP: 105/66      Pulse: 83      Resp: 18 18 18    Temp: 98 °F (36.7 °C)      TempSrc: Temporal      SpO2: 94%  99% 99%   Weight:       Height:            Intake/Output Summary (Last 24 hours) at 10/1/2024 1446  Last data filed at 10/1/2024 0638  Gross per 24 hour   Intake 613.25 ml   Output 1250 ml   Net -636.75 ml        CONSTITUTIONAL:   Ill-appearing, weak, A&O x 3, NAD  SKIN:     No rash, No suspicious lesions, No skin discoloration  HEENT:     EOMI, MMM, No thrush  NECK:    No bruits, No JVP appreciated  CV:      Sinus,  No murmur, No rubs, No gallops  PULMONARY:   Bilateral Rales,  No Wheezing, No Rhonchi      No noted egophony  ABDOMEN:     Soft, non-tender. BS normal. No R/R/G  EXT:    No deformities .  No clubbing.       no lower extremity edema, No venous stasis  PULSE:   Appears equal and palpable.  PSYCHIATRIC:  Seems appropriate, No acute psychosis  MS:    No fractures, No gross weakness  NEUROLOGIC:   The clinical assessment is non-focal     DATA: IMAGING & TESTING:     LABORATORY TESTS:        PRO-BNP:   Lab Results   Component Value Date    PROBNP 1,170 (H) 10/01/2024      ABGs: No results found for: \"PH\", \"PO2\", \"PCO2\"  Hemoglobin A1C: No components found for: \"HGBA1C\"    IMAGING:  Imaging tests were completed and reviewed and discussed radiology and

## 2024-10-01 NOTE — PLAN OF CARE
Problem: Discharge Planning  Goal: Discharge to home or other facility with appropriate resources  9/30/2024 2119 by Kim Contreras RN  Outcome: Progressing     Problem: Pain  Goal: Verbalizes/displays adequate comfort level or baseline comfort level  9/30/2024 2119 by Kim Contreras RN  Outcome: Progressing     Problem: Skin/Tissue Integrity  Goal: Absence of new skin breakdown  Description: 1.  Monitor for areas of redness and/or skin breakdown  2.  Assess vascular access sites hourly  3.  Every 4-6 hours minimum:  Change oxygen saturation probe site  4.  Every 4-6 hours:  If on nasal continuous positive airway pressure, respiratory therapy assess nares and determine need for appliance change or resting period.  Outcome: Progressing     Problem: Safety - Adult  Goal: Free from fall injury  9/30/2024 2119 by Kim Contreras RN  Outcome: Progressing     Problem: ABCDS Injury Assessment  Goal: Absence of physical injury  Outcome: Progressing     Problem: Nutrition Deficit:  Goal: Optimize nutritional status  Outcome: Progressing     Problem: Discharge Planning  Goal: Discharge to home or other facility with appropriate resources  9/30/2024 2119 by Kim Contreras RN  Outcome: Progressing  9/30/2024 1502 by Josephine Hernandez RN  Outcome: Not Progressing     Problem: Pain  Goal: Verbalizes/displays adequate comfort level or baseline comfort level  9/30/2024 2119 by Kim Contreras RN  Outcome: Progressing  9/30/2024 1502 by Josephine Hernandez RN  Outcome: Not Progressing  Flowsheets (Taken 9/30/2024 0400 by Cheryl Stone, RN)  Verbalizes/displays adequate comfort level or baseline comfort level: Encourage patient to monitor pain and request assistance

## 2024-10-01 NOTE — PROGRESS NOTES
Occupational Therapy  OT BEDSIDE TREATMENT NOTE   [facility]  [address], [place]      Date:10/1/2024  Patient Name: Joseph Bond  MRN: [id number]  : 1980  Room: [address] [place] [address]-A       Attempted OT session this date:    [] unavailable due to other medical staff currently with pt   [] on hold per nursing staff   [] on hold per nursing staff secondary to lab results hgb 6.6, receiving blood at this time, will re attempt    [] declined treatment  this date due to ____.  Benefits of participation in therapy reviewed with pt.    [] off unit   [x] Treatment held this date due to pt presenting with moderate SOB at rest semi supine in bed on 8L hi flow, HR 120s at rest. Will re attempt when appropriate      Continue with current OT P.O.C      Gayle CRUZ/L [id number]

## 2024-10-02 LAB
ALBUMIN SERPL-MCNC: 3.2 G/DL (ref 3.5–5.2)
ALP SERPL-CCNC: 179 U/L (ref 40–129)
ALT SERPL-CCNC: 15 U/L (ref 0–40)
ANION GAP SERPL CALCULATED.3IONS-SCNC: 13 MMOL/L (ref 7–16)
AST SERPL-CCNC: 60 U/L (ref 0–39)
BASOPHILS # BLD: 0 K/UL (ref 0–0.2)
BASOPHILS NFR BLD: 0 % (ref 0–2)
BILIRUB SERPL-MCNC: 1 MG/DL (ref 0–1.2)
BUN SERPL-MCNC: 11 MG/DL (ref 6–20)
CALCIUM SERPL-MCNC: 9.1 MG/DL (ref 8.6–10.2)
CHLORIDE SERPL-SCNC: 103 MMOL/L (ref 98–107)
CO2 SERPL-SCNC: 23 MMOL/L (ref 22–29)
CREAT SERPL-MCNC: 0.6 MG/DL (ref 0.7–1.2)
EOSINOPHIL # BLD: 0 K/UL (ref 0.05–0.5)
EOSINOPHILS RELATIVE PERCENT: 0 % (ref 0–6)
ERYTHROCYTE [DISTWIDTH] IN BLOOD BY AUTOMATED COUNT: 17.5 % (ref 11.5–15)
GFR, ESTIMATED: >90 ML/MIN/1.73M2
GLUCOSE SERPL-MCNC: 129 MG/DL (ref 74–99)
HCT VFR BLD AUTO: 20.9 % (ref 37–54)
HCT VFR BLD AUTO: 21.3 % (ref 37–54)
HCT VFR BLD AUTO: 29.6 % (ref 37–54)
HGB BLD-MCNC: 6.9 G/DL (ref 12.5–16.5)
HGB BLD-MCNC: 7.1 G/DL (ref 12.5–16.5)
HGB BLD-MCNC: 9.7 G/DL (ref 12.5–16.5)
LYMPHOCYTES NFR BLD: 0.21 K/UL (ref 1.5–4)
LYMPHOCYTES RELATIVE PERCENT: 4 % (ref 20–42)
MCH RBC QN AUTO: 30 PG (ref 26–35)
MCHC RBC AUTO-ENTMCNC: 33 G/DL (ref 32–34.5)
MCV RBC AUTO: 90.9 FL (ref 80–99.9)
MONOCYTES NFR BLD: 0.21 K/UL (ref 0.1–0.95)
MONOCYTES NFR BLD: 4 % (ref 2–12)
MYELOCYTES ABSOLUTE COUNT: 0.04 K/UL
MYELOCYTES: 1 %
NEUTROPHILS NFR BLD: 90 % (ref 43–80)
NEUTS SEG NFR BLD: 4.43 K/UL (ref 1.8–7.3)
PHOSPHATE SERPL-MCNC: 3.6 MG/DL (ref 2.5–4.5)
PLATELET CONFIRMATION: NORMAL
PLATELET, FLUORESCENCE: 15 K/UL (ref 130–450)
PMV BLD AUTO: ABNORMAL FL (ref 7–12)
POTASSIUM SERPL-SCNC: 4.1 MMOL/L (ref 3.5–5)
PROT SERPL-MCNC: 6 G/DL (ref 6.4–8.3)
RBC # BLD AUTO: 2.3 M/UL (ref 3.8–5.8)
RBC # BLD: ABNORMAL 10*6/UL
SODIUM SERPL-SCNC: 139 MMOL/L (ref 132–146)
WBC OTHER # BLD: 4.9 K/UL (ref 4.5–11.5)

## 2024-10-02 PROCEDURE — 85014 HEMATOCRIT: CPT

## 2024-10-02 PROCEDURE — 85018 HEMOGLOBIN: CPT

## 2024-10-02 PROCEDURE — 6360000002 HC RX W HCPCS

## 2024-10-02 PROCEDURE — 99232 SBSQ HOSP IP/OBS MODERATE 35: CPT

## 2024-10-02 PROCEDURE — P9073 PLATELETS PHERESIS PATH REDU: HCPCS

## 2024-10-02 PROCEDURE — 2580000003 HC RX 258: Performed by: NURSE PRACTITIONER

## 2024-10-02 PROCEDURE — 2580000003 HC RX 258

## 2024-10-02 PROCEDURE — 99231 SBSQ HOSP IP/OBS SF/LOW 25: CPT

## 2024-10-02 PROCEDURE — 6370000000 HC RX 637 (ALT 250 FOR IP)

## 2024-10-02 PROCEDURE — 84100 ASSAY OF PHOSPHORUS: CPT

## 2024-10-02 PROCEDURE — 94640 AIRWAY INHALATION TREATMENT: CPT

## 2024-10-02 PROCEDURE — 6370000000 HC RX 637 (ALT 250 FOR IP): Performed by: NURSE PRACTITIONER

## 2024-10-02 PROCEDURE — 85025 COMPLETE CBC W/AUTO DIFF WBC: CPT

## 2024-10-02 PROCEDURE — 99233 SBSQ HOSP IP/OBS HIGH 50: CPT | Performed by: INTERNAL MEDICINE

## 2024-10-02 PROCEDURE — 2500000003 HC RX 250 WO HCPCS: Performed by: NURSE PRACTITIONER

## 2024-10-02 PROCEDURE — P9016 RBC LEUKOCYTES REDUCED: HCPCS

## 2024-10-02 PROCEDURE — 36415 COLL VENOUS BLD VENIPUNCTURE: CPT

## 2024-10-02 PROCEDURE — 2500000003 HC RX 250 WO HCPCS

## 2024-10-02 PROCEDURE — 2140000000 HC CCU INTERMEDIATE R&B

## 2024-10-02 PROCEDURE — 2700000000 HC OXYGEN THERAPY PER DAY

## 2024-10-02 PROCEDURE — 6360000002 HC RX W HCPCS: Performed by: NURSE PRACTITIONER

## 2024-10-02 PROCEDURE — 36430 TRANSFUSION BLD/BLD COMPNT: CPT

## 2024-10-02 PROCEDURE — 80053 COMPREHEN METABOLIC PANEL: CPT

## 2024-10-02 PROCEDURE — 6370000000 HC RX 637 (ALT 250 FOR IP): Performed by: INTERNAL MEDICINE

## 2024-10-02 RX ORDER — TRANEXAMIC ACID 100 MG/ML
500 INJECTION, SOLUTION INTRAVENOUS
Status: DISPENSED | OUTPATIENT
Start: 2024-10-02 | End: 2024-10-07

## 2024-10-02 RX ORDER — SODIUM CHLORIDE 9 MG/ML
INJECTION, SOLUTION INTRAVENOUS PRN
Status: DISCONTINUED | OUTPATIENT
Start: 2024-10-02 | End: 2024-10-11 | Stop reason: HOSPADM

## 2024-10-02 RX ADMIN — OXYCODONE HYDROCHLORIDE 15 MG: 15 TABLET ORAL at 23:32

## 2024-10-02 RX ADMIN — OXYCODONE HYDROCHLORIDE 15 MG: 15 TABLET ORAL at 19:35

## 2024-10-02 RX ADMIN — SUCRALFATE 1 G: 1 TABLET ORAL at 06:48

## 2024-10-02 RX ADMIN — METHYLPREDNISOLONE SODIUM SUCCINATE 60 MG: 125 INJECTION, POWDER, LYOPHILIZED, FOR SOLUTION INTRAMUSCULAR; INTRAVENOUS at 19:37

## 2024-10-02 RX ADMIN — IPRATROPIUM BROMIDE AND ALBUTEROL SULFATE 1 DOSE: 2.5; .5 SOLUTION RESPIRATORY (INHALATION) at 17:17

## 2024-10-02 RX ADMIN — METHOCARBAMOL TABLETS 750 MG: 750 TABLET, COATED ORAL at 19:35

## 2024-10-02 RX ADMIN — HYDROMORPHONE HYDROCHLORIDE 1 MG: 1 INJECTION, SOLUTION INTRAMUSCULAR; INTRAVENOUS; SUBCUTANEOUS at 08:01

## 2024-10-02 RX ADMIN — IPRATROPIUM BROMIDE AND ALBUTEROL SULFATE 1 DOSE: 2.5; .5 SOLUTION RESPIRATORY (INHALATION) at 22:08

## 2024-10-02 RX ADMIN — BUDESONIDE 500 MCG: 0.5 INHALANT RESPIRATORY (INHALATION) at 22:07

## 2024-10-02 RX ADMIN — HYDROMORPHONE HYDROCHLORIDE 1 MG: 1 INJECTION, SOLUTION INTRAMUSCULAR; INTRAVENOUS; SUBCUTANEOUS at 20:31

## 2024-10-02 RX ADMIN — BICALUTAMIDE 50 MG: 50 TABLET, FILM COATED ORAL at 09:47

## 2024-10-02 RX ADMIN — SODIUM CHLORIDE, PRESERVATIVE FREE 10 ML: 5 INJECTION INTRAVENOUS at 19:37

## 2024-10-02 RX ADMIN — HYDROMORPHONE HYDROCHLORIDE 1 MG: 1 INJECTION, SOLUTION INTRAMUSCULAR; INTRAVENOUS; SUBCUTANEOUS at 16:54

## 2024-10-02 RX ADMIN — SENNOSIDES AND DOCUSATE SODIUM 2 TABLET: 50; 8.6 TABLET ORAL at 19:35

## 2024-10-02 RX ADMIN — SODIUM CHLORIDE 950 ML: 9 INJECTION, SOLUTION INTRAVENOUS at 07:59

## 2024-10-02 RX ADMIN — OXYCODONE HYDROCHLORIDE 15 MG: 15 TABLET ORAL at 15:33

## 2024-10-02 RX ADMIN — BUDESONIDE 500 MCG: 0.5 INHALANT RESPIRATORY (INHALATION) at 10:25

## 2024-10-02 RX ADMIN — TRANEXAMIC ACID 500 MG: 100 INJECTION, SOLUTION INTRAVENOUS at 22:16

## 2024-10-02 RX ADMIN — POLYETHYLENE GLYCOL 3350 17 G: 17 POWDER, FOR SOLUTION ORAL at 09:46

## 2024-10-02 RX ADMIN — HYDROMORPHONE HYDROCHLORIDE 1 MG: 1 INJECTION, SOLUTION INTRAMUSCULAR; INTRAVENOUS; SUBCUTANEOUS at 12:15

## 2024-10-02 RX ADMIN — Medication 2000 UNITS: at 09:47

## 2024-10-02 RX ADMIN — SUCRALFATE 1 G: 1 TABLET ORAL at 23:32

## 2024-10-02 RX ADMIN — SUCRALFATE 1 G: 1 TABLET ORAL at 12:15

## 2024-10-02 RX ADMIN — HYDROMORPHONE HYDROCHLORIDE 1 MG: 1 INJECTION, SOLUTION INTRAMUSCULAR; INTRAVENOUS; SUBCUTANEOUS at 03:50

## 2024-10-02 RX ADMIN — TRANEXAMIC ACID 500 MG: 100 INJECTION, SOLUTION INTRAVENOUS at 17:29

## 2024-10-02 RX ADMIN — METHYLPREDNISOLONE SODIUM SUCCINATE 60 MG: 125 INJECTION, POWDER, LYOPHILIZED, FOR SOLUTION INTRAMUSCULAR; INTRAVENOUS at 09:58

## 2024-10-02 RX ADMIN — SUCRALFATE 1 G: 1 TABLET ORAL at 17:58

## 2024-10-02 RX ADMIN — OXYCODONE HYDROCHLORIDE 15 MG: 15 TABLET ORAL at 02:45

## 2024-10-02 RX ADMIN — SENNOSIDES AND DOCUSATE SODIUM 2 TABLET: 50; 8.6 TABLET ORAL at 09:47

## 2024-10-02 RX ADMIN — METHYLPREDNISOLONE SODIUM SUCCINATE 60 MG: 125 INJECTION, POWDER, LYOPHILIZED, FOR SOLUTION INTRAMUSCULAR; INTRAVENOUS at 02:45

## 2024-10-02 RX ADMIN — IPRATROPIUM BROMIDE AND ALBUTEROL SULFATE 1 DOSE: 2.5; .5 SOLUTION RESPIRATORY (INHALATION) at 10:25

## 2024-10-02 RX ADMIN — OXYCODONE HYDROCHLORIDE 15 MG: 15 TABLET ORAL at 10:59

## 2024-10-02 RX ADMIN — PANTOPRAZOLE SODIUM 40 MG: 40 INJECTION, POWDER, FOR SOLUTION INTRAVENOUS at 09:46

## 2024-10-02 RX ADMIN — METHYLPREDNISOLONE SODIUM SUCCINATE 60 MG: 125 INJECTION, POWDER, LYOPHILIZED, FOR SOLUTION INTRAMUSCULAR; INTRAVENOUS at 15:32

## 2024-10-02 RX ADMIN — OXYCODONE HYDROCHLORIDE 15 MG: 15 TABLET ORAL at 06:48

## 2024-10-02 ASSESSMENT — PAIN DESCRIPTION - LOCATION
LOCATION: ABDOMEN;FLANK
LOCATION: GENERALIZED
LOCATION: RIB CAGE
LOCATION: RIB CAGE;BACK
LOCATION: ABDOMEN;FLANK
LOCATION: RIB CAGE
LOCATION: ABDOMEN;FLANK
LOCATION: CHEST

## 2024-10-02 ASSESSMENT — PAIN DESCRIPTION - DESCRIPTORS
DESCRIPTORS: ACHING;DISCOMFORT;DULL
DESCRIPTORS: ACHING;DISCOMFORT;SORE
DESCRIPTORS: ACHING;SORE;DULL
DESCRIPTORS: ACHING;THROBBING
DESCRIPTORS: ACHING;DISCOMFORT;SORE
DESCRIPTORS: ACHING;SQUEEZING;THROBBING
DESCRIPTORS: ACHING;DISCOMFORT;SORE
DESCRIPTORS: ACHING;THROBBING;SQUEEZING
DESCRIPTORS: ACHING;SQUEEZING
DESCRIPTORS: ACHING

## 2024-10-02 ASSESSMENT — PAIN - FUNCTIONAL ASSESSMENT
PAIN_FUNCTIONAL_ASSESSMENT: ACTIVITIES ARE NOT PREVENTED

## 2024-10-02 ASSESSMENT — PAIN SCALES - GENERAL
PAINLEVEL_OUTOF10: 10
PAINLEVEL_OUTOF10: 7
PAINLEVEL_OUTOF10: 10
PAINLEVEL_OUTOF10: 10
PAINLEVEL_OUTOF10: 6
PAINLEVEL_OUTOF10: 9
PAINLEVEL_OUTOF10: 6
PAINLEVEL_OUTOF10: 10

## 2024-10-02 ASSESSMENT — PAIN DESCRIPTION - ORIENTATION
ORIENTATION: RIGHT;LEFT

## 2024-10-02 NOTE — PROGRESS NOTES
[facility] - Family Medicine Inpatient   Resident Progress Note    S:  Hospital day: 6   Brief Synopsis: Joseph Bond is a 44 y.o. male with a PMH of Cancer (HCC), Diverticulosis, and History of opioid abuse (HCC). who presents to ED for nausea and vomiting.     Patient complains of nausea and vomiting that started 2 days ago.  Emesis contains phlegm, mucus and streaks of blood.  Patient could not quantify number of times but states that it has been continuous.  States that he had nausea medications but seemed to make his nausea was.  Also endorses chronic pain on his back, abdomen radha LUQ.  Patient was recently diagnosed for pancreatic cancer with mets.  Completed 10th cycle of radiation 1 month ago.  States that since radiation was completed he has not been able to walk due to pain in his pelvis, abdomen and back.  He has been wheelchair-bound for the past 1 month.  Has had poor appetite, blurry vision, dizziness.  Denies nosebleeds, bruising, melena, hematochezia, hematuria.  Patient does not use illicit drugs or drink alcohol.  Quit smoking 7 years ago.  He would like to remain full code.  Hemoglobin was 5.3 and platelets at 18 at the ER.  Patient was transfused 2 units of pRBC.  Post transfusion hemoglobin was 5.9 and patient was transfused another 2 units of PRBC.  Patient continued to experience pain in his back and upper abdomen and pelvis.  Oncology and palliative were consulted.  Pain medication with Dilaudid, oxy and fentanyl patch.  Palliative care managed pain, nausea and vomiting and constipation 2/2 opioids.  He decided to proceed with chemotherapy. Pulmonology was consulted for increased oxygen demand and hemoptysis. They recommended a bronchoscopy but held off due to thrombocytopenia. Patient was started on solumedrol 40mg and inhaled TXA.     Overnight/interim:   Patient seen and evaluated at bedside. States that his breathing is getting worse. He is currently on 10L of oxygen.   Still experiencing  study with   no definite pleural effusion or pneumothorax. Findings suggesting progression   of disease favoring an infectious/inflammatory process or however   lymphangitic spread of disease given mediastinal adenopathy and abnormal bony   metastatic process within the ribs and spine cannot be completely excluded   and clinical correlation is needed..   3. Abnormal bony metastatic disease within the spine. There is a bony   destructive process involving the left anterior 2nd rib. Associated soft   tissue thickening.   4. No acute intra-abdominal or intrapelvic process.  Stable adenopathy   identified throughout the retroperitoneum and left pelvic sidewall.  Stable   bony Mets metastatic disease throughout the spine and pelvis as well as the   hips bilaterally.         CT ABDOMEN PELVIS W IV CONTRAST Additional Contrast? None   Final Result   1. No evidence of pulmonary embolism.   2. Abnormal patchy ground-glass opacity identified diffusely throughout the   lung fields. Findings show progression when compared to the prior study with   no definite pleural effusion or pneumothorax. Findings suggesting progression   of disease favoring an infectious/inflammatory process or however   lymphangitic spread of disease given mediastinal adenopathy and abnormal bony   metastatic process within the ribs and spine cannot be completely excluded   and clinical correlation is needed..   3. Abnormal bony metastatic disease within the spine. There is a bony   destructive process involving the left anterior 2nd rib. Associated soft   tissue thickening.   4. No acute intra-abdominal or intrapelvic process.  Stable adenopathy   identified throughout the retroperitoneum and left pelvic sidewall.  Stable   bony Mets metastatic disease throughout the spine and pelvis as well as the   hips bilaterally.         XR CHEST PORTABLE   Final Result   Interval worsening of the patchy airspace disease throughout the lung fields   bilaterally.

## 2024-10-02 NOTE — PROGRESS NOTES
Palliative Care Department  [phone]  Palliative Care Progress Note  Provider Gely Banuelos, APRN - CNP      PATIENT: Joseph Bond  : 1980  MRN: [id number]  ADMISSION DATE: 2024  4:56 AM  Referring Provider:  Anastasia Helms MD    Palliative Medicine was consulted on hospital day 6 for assistance with Goals of care, Overwhelmed Symptoms    HPI:     Clinical Summary:Joseph Bond is a 44 y.o. y/o male with a history of prostate cancer with mediastinal lymphadenopathy, extensive metastasis of the spine, rib cage, pelvis, humerus and femurs (had refused systemic therapy, pursuing homeopathic), completed palliative radiation treatment to spine, polysubstance abuse including opioids and tobacco, abstinent for 5 years, diverticulitis, with recent ER visit on 2024, due to shortness of breath, was going to be admitted for further medical management however left AMA, was supposed to follow-up outpatient with palliative medicine for symptom management, however missed appointment, did not return phone call to reschedule appointment, who presented to [facility] on 2024 with complaint of nausea, vomiting, left-sided rib pain.  At ED, found hypoxic 78% on room air, placed on 4 L nasal cannula.  Significant laboratory findings showed hemoglobin 5.3, platelets 18, alkaline phosphate 227, AST 76, lipase 8, WBC 4.2.  Received 2 packed red blood cell and sixpack platelets.  CT images showed disease progression when compared to prior images, favoring an infectious/inflammatory process or lymphangitic spread of disease given mediastinal adenopathy and abnormal bony metastatic process within the rib and spine.  Abnormal bony metastatic disease within the spine.  Bony destructive process involving the left anterior second rib.  Stable adenopathy throughout the retroperitoneal and pelvic sidewall,.  Stable bony metastatic disease throughout the spine, pelvis and hip bilaterally.  Palliative medicine

## 2024-10-02 NOTE — PLAN OF CARE
Problem: Discharge Planning  Goal: Discharge to home or other facility with appropriate resources  10/1/2024 2329 by Kim Contreras, RN  Outcome: Progressing     Problem: Pain  Goal: Verbalizes/displays adequate comfort level or baseline comfort level  10/1/2024 2329 by Kim Contreras, RN  Outcome: Progressing     Problem: Skin/Tissue Integrity  Goal: Absence of new skin breakdown  Description: 1.  Monitor for areas of redness and/or skin breakdown  2.  Assess vascular access sites hourly  3.  Every 4-6 hours minimum:  Change oxygen saturation probe site  4.  Every 4-6 hours:  If on nasal continuous positive airway pressure, respiratory therapy assess nares and determine need for appliance change or resting period.  10/1/2024 2329 by Kim Contreras, RN  Outcome: Progressing     Problem: Safety - Adult  Goal: Free from fall injury  10/1/2024 2329 by Kim Contreras, RN  Outcome: Progressing     Problem: ABCDS Injury Assessment  Goal: Absence of physical injury  10/1/2024 2329 by Kim Contreras, RN  Outcome: Progressing     Problem: Nutrition Deficit:  Goal: Optimize nutritional status  10/1/2024 2329 by Kim Contreras, RN  Outcome: Progressing

## 2024-10-02 NOTE — PROGRESS NOTES
[facility]  Department of Internal Medicine  Division of Pulmonary, Critical Care and Sleep Medicine  Progress Note      Ed AMIE Mcadams, APRN-CNP  Zonia Lozanodeliomykel, APRN-CNP        SUBJECTIVE:  Patient seen and examined.  He was seen up to 10 L high flow nasal cannula.  He reports some shortness of breath this morning and is still coughing up small blood clots with mucus.  Hemoglobin 6.9 and platelets 15 he is getting transfusion with platelets and 1 unit of blood.  Discussed case with RN at the bedside.       OBJECTIVE:     PHYSICAL EXAM:   VITALS:   Vitals:    10/02/24 1025 10/02/24 1035 10/02/24 1054 10/02/24 1110   BP:  105/70 105/70 111/82   Pulse: (!) 108 100 100 (!) 110   Resp: 19 20 20 20   Temp:  97.8 °F (36.6 °C) 97.8 °F (36.6 °C) 98.1 °F (36.7 °C)   TempSrc:   Temporal    SpO2: 98% 98% 98% 98%   Weight:       Height:            Intake/Output Summary (Last 24 hours) at 10/2/2024 1206  Last data filed at 10/2/2024 1033  Gross per 24 hour   Intake 632.7 ml   Output 1200 ml   Net -567.3 ml        CONSTITUTIONAL:   Ill-appearing, weak, A&O x 3, NAD  SKIN:     No rash, No suspicious lesions, No skin discoloration  HEENT:     EOMI, MMM, No thrush  NECK:    No bruits, No JVP appreciated  CV:      Sinus,  No murmur, No rubs, No gallops  PULMONARY:   Course, bilateral Rales,  No Wheezing, No Rhonchi      No noted egophony  ABDOMEN:     Soft, non-tender. BS normal. No R/R/G  EXT:    No deformities .  No clubbing.       no lower extremity edema, No venous stasis  PULSE:   Appears equal and palpable.  PSYCHIATRIC:  Seems appropriate, No acute psychosis  MS:    No fractures, No gross weakness  NEUROLOGIC:   The clinical assessment is non-focal     DATA: IMAGING & TESTING:     LABORATORY TESTS:        PRO-BNP:   Lab Results   Component Value

## 2024-10-02 NOTE — CARE COORDINATION
CM Update: Met with patient at bedside to discuss transition of care plan. Discharge plan is Home with [facility] care/palliative care, and possible Oxygen through [facility]. Will need MOLLY order for home health care and appropriate O2 order. Encouraged patient to work with PT/OT to ensure discharge plan is safe. Patient came to hospital with nausea, vomiting, abdominal pain, and left rib pain. Found to have Anemia/Thrombocytopenia. Hbg 5.3, and Plt 18 on presentation to ED. PRBCs and platelets infused in ED. 12 liters high flow nasal cannula. PT/OT to see patient. CM/SW to follow. MT

## 2024-10-02 NOTE — PROGRESS NOTES
HYDROmorphone, oxyCODONE, albuterol     I reviewed the patient's past medical and surgical history, Medications and Allergies.    O:  /66   Pulse 99   Temp 98.5 °F (36.9 °C) (Temporal)   Resp 18   Ht 1.702 m (5' 7\")   Wt 78.7 kg (173 lb 8 oz)   SpO2 98%   BMI 27.17 kg/m²   24 hour I&O: I/O last 3 completed shifts:  In: -   Out: 2000 [Urine:2000]  No intake/output data recorded.       Physical Exam  Constitutional:       Appearance: Normal appearance. He is ill-appearing.   HENT:      Head: Normocephalic.   Cardiovascular:      Rate and Rhythm: Normal rate and regular rhythm.   Pulmonary:      Effort: Pulmonary effort is normal.      Breath sounds: Normal breath sounds.      Comments: On 10L of oxygen  Neurological:      Mental Status: He is alert.           Labs:  Na/K/Cl/CO2:  139/4.1/103/23 (10/02 0544)  BUN/Cr/glu/ALT/AST/amyl/lip:  11/0.6/--/15/60/--/-- (10/02 0544)  WBC/Hgb/Hct/Plts:  4.9/6.9/20.9/-- (10/02 0544)  estimated creatinine clearance is 145 mL/min (A) (based on SCr of 0.6 mg/dL (L)).  Other pertinent labs as noted below    Radiology:  XR CHEST (2 VW)   Final Result   Interval worsening of bilateral diffuse patchy airspace opacities now with   bilateral pleural effusions.  No pneumothorax.         XR CHEST PORTABLE   Final Result   Interval progression of bilateral diffuse patchy pulmonary alveolar   infiltrates.         XR THORACIC SPINE (3 VIEWS)   Final Result   1. No acute abnormality of the thoracic spine.   2. Unchanged biconcave deformities within the midthoracic spine.   3. Heterogeneous marrow density, compatible with known osseous metastatic   disease.   4. Heterogeneous lung parenchyma compatible with ground-glass opacities   identified on the recent CT.   No acute abnormality of the thoracic spine         CTA CHEST W CONTRAST   Final Result   1. No evidence of pulmonary embolism.   2. Abnormal patchy ground-glass opacity identified diffusely throughout the   lung fields.  disease throughout the lung fields   bilaterally.               Resident Assessment and Plan       Anemia   Transfusion with hemoglobin below 7 or platelets below 10.    Trend hemoglobin every 8 hours.     2. Prostate cancer with metastasis   Casodex started   Symptomatic management      3. Hemoptysis  Pulmonary consulted   Monitor hemoglobin levels   4. Shortness of breath   On 10 L of oxygen    Work with physical therapy to help patient ambulate         Electronically signed by Jasbir Street on 10/2/2024 at 8:21 AM  Attending physician: Dr. Avila

## 2024-10-02 NOTE — PROGRESS NOTES
distended.  The prostate is heterogeneous and mildly enlarged..  Correlation to PSA levels is recommended on a nonemergent basis. Retroperitoneum/peritoneum: There is adenopathy identified along the left pelvic sidewall short axis dimension measuring 1.5 cm stable and unchanged. Adenopathy identified along the left para aortic region and left iliac region suggesting metastatic disease. Bones/soft tissues: There is sclerotic and lytic disease seen diffusely throughout the bony pelvis and spine.  No significant progression when compared to the prior study.     1. No evidence of pulmonary embolism. 2. Abnormal patchy ground-glass opacity identified diffusely throughout the lung fields. Findings show progression when compared to the prior study with no definite pleural effusion or pneumothorax. Findings suggesting progression of disease favoring an infectious/inflammatory process or however lymphangitic spread of disease given mediastinal adenopathy and abnormal bony metastatic process within the ribs and spine cannot be completely excluded and clinical correlation is needed.. 3. Abnormal bony metastatic disease within the spine. There is a bony destructive process involving the left anterior 2nd rib. Associated soft tissue thickening. 4. No acute intra-abdominal or intrapelvic process.  Stable adenopathy identified throughout the retroperitoneum and left pelvic sidewall.  Stable bony Mets metastatic disease throughout the spine and pelvis as well as the hips bilaterally.     XR CHEST PORTABLE    Result Date: 9/26/2024  EXAMINATION: ONE XRAY VIEW OF THE CHEST 9/26/2024 7:00 am COMPARISON: 09/06/2024 HISTORY: ORDERING SYSTEM PROVIDED HISTORY: vomiting TECHNOLOGIST PROVIDED HISTORY: Reason for exam:->vomiting FINDINGS: Portable chest reveals heart to be borderline enlarged.  Interval increase seen in the patchy opacities diffusely throughout the lung fields bilaterally.  Findings concerning for either worsening infectious  patient systemic therapy.   - We will follow   Attending addendum:  Patient is agreeable now to standard therapy and will resume today Casodex 50 mg daily and he will be recommended as soon as he is discharged to start chemotherapy with Taxotere along with Nubeqa and prednisone and he would also benefit from LHRH agonist and Xgeva for palliation of skeletal metastasis.    10/2/2024  Stage IV prostate CA with retroperitoneal mediastinal lymphadenopathy and diffuse bone mets as above.  Initially diagnosed in July 2024.  Patient refused treatment but has finally agreed.  Start Casodex 50 mg daily 5 days ago.  Hemoptysis.  Pulmonology on board.  Cytopenias-hemoglobin 6.9, platelet count 15,000.  Likely due to bone marrow involvement by prostrate CA.  Trend CBC transfuse for hemoglobin less than 7, platelets for platelet count less than 10,000 or active bleeding.  Nubeqa LHRH agonist as an outpatient.  Severe cytopenias probably prohibitive for chemotherapy.   Will continue to follow    Aaron Matson MD  Electronically signed 10/2/2024 at 4:11 PM

## 2024-10-02 NOTE — PLAN OF CARE
Problem: Discharge Planning  Goal: Discharge to home or other facility with appropriate resources  10/2/2024 1014 by Oriana Barillas RN  Outcome: Progressing  10/1/2024 2329 by Kim Contreras RN  Outcome: Progressing     Problem: Pain  Goal: Verbalizes/displays adequate comfort level or baseline comfort level  10/2/2024 1014 by Oriana Barillas RN  Outcome: Progressing  10/1/2024 2329 by Kim Contreras RN  Outcome: Progressing     Problem: Skin/Tissue Integrity  Goal: Absence of new skin breakdown  Description: 1.  Monitor for areas of redness and/or skin breakdown  2.  Assess vascular access sites hourly  3.  Every 4-6 hours minimum:  Change oxygen saturation probe site  4.  Every 4-6 hours:  If on nasal continuous positive airway pressure, respiratory therapy assess nares and determine need for appliance change or resting period.  10/2/2024 1014 by Oriana Barillas RN  Outcome: Progressing  10/1/2024 2329 by Kim Contreras RN  Outcome: Progressing     Problem: Safety - Adult  Goal: Free from fall injury  10/2/2024 1014 by Oriana Barillas RN  Outcome: Progressing  10/1/2024 2329 by Kim Contreras RN  Outcome: Progressing     Problem: ABCDS Injury Assessment  Goal: Absence of physical injury  10/2/2024 1014 by Oriana Barillas RN  Outcome: Progressing  10/1/2024 2329 by Kim Contreras RN  Outcome: Progressing     Problem: Nutrition Deficit:  Goal: Optimize nutritional status  10/2/2024 1014 by Oriana Barillas RN  Outcome: Progressing  10/1/2024 2329 by Kim Contreras RN  Outcome: Progressing

## 2024-10-02 NOTE — PROGRESS NOTES
[facility]  Family Medicine Attending    S: 44 y.o. male with a history of prostate cancer, anxiety, opiate abuse who presented with nausea and vomiting as well as left sided abdominal pain.  He was found to have hypoxia and severe symptomatic anemia with hgb 5.3.  He has been having nausea with vomiting, pelvic and back pain.  Has not been able to walk due to pain for a couple of weeks.  Goals of care-pain control so that he can walk again.  He wanted more information on side effects from a regimen of chemotherapy drugs from the oncologist.  Oncology was consulted.  Per their note, \"Considering high tumor burden, advised treatment with Taxotere, GnRH agonist, Nubeqa along with Xgeva\"  Side effects of these medications were discussed with the patient by oncology.     Patient now thinking he would like to move forward with chemotherapy.    Palliative care following patient.  Pain is still too severe to walk.    He also states that when he tries to stand up the pain is mostly in a band around his upper abdomen.  He says he has no difficulty with his legs when standing. Patients notes some small amounts of blood in his mucous if he coughs.      9/30-patient reports that he is not feeling well but not willing to elaborate.  To start transfusion for hemoglobin of 6.5. No change in breathing. Denies any active bleeding today. Rib pain where mets are located.    10/1-s/p 2 units PRBCS yesterday.  This morning stating that oxygen was increased overnight to 11L HF NC, now at 10. But now denying dyspnea, some mucus with blood upon coughing. He denies that he is having depressive symptoms.    10/2-Patient reports he is feeling short of breath today. Coughing up some blood tinged mucus.     O: VS- Blood pressure 110/84, pulse (!) 104, temperature 97.8 °F (36.6 °C), temperature source Tympanic, resp. rate 20, height 1.702 m (5' 7\"), weight 78.7 kg (173 lb 8 oz), SpO2 97%.  Exam is as noted by resident with the

## 2024-10-02 NOTE — PROGRESS NOTES
[facility] Quality Flow/Interdisciplinary Rounds Progress Note        Quality Flow Rounds held on October 2, 2024    Disciplines Attending:  Bedside Nurse, , , and Nursing Unit Leadership    Joseph Bond was admitted on 9/26/2024  4:56 AM    Anticipated Discharge Date:       Disposition:    Simon Score:  Simon Scale Score: 20    Readmission Risk              Risk of Unplanned Readmission:  42           Discussed patient goal for the day, patient clinical progression, and barriers to discharge.  The following Goal(s) of the Day/Commitment(s) have been identified:  Diagnostics - Report Results and Labs - Report Results      Lesley Iverson RN  October 2, 2024

## 2024-10-03 ENCOUNTER — APPOINTMENT (OUTPATIENT)
Dept: GENERAL RADIOLOGY | Age: 44
DRG: 500 | End: 2024-10-03
Payer: MEDICAID

## 2024-10-03 LAB
ABO/RH: NORMAL
ALBUMIN SERPL-MCNC: 3.4 G/DL (ref 3.5–5.2)
ALP SERPL-CCNC: 176 U/L (ref 40–129)
ALT SERPL-CCNC: 23 U/L (ref 0–40)
ANION GAP SERPL CALCULATED.3IONS-SCNC: 12 MMOL/L (ref 7–16)
ANION GAP SERPL CALCULATED.3IONS-SCNC: 14 MMOL/L (ref 7–16)
ANTIBODY SCREEN: NEGATIVE
ARM BAND NUMBER: NORMAL
ARM BAND NUMBER: NORMAL
AST SERPL-CCNC: 65 U/L (ref 0–39)
B.E.: 0.2 MMOL/L (ref -3–3)
BASOPHILS # BLD: 0.06 K/UL (ref 0–0.2)
BASOPHILS NFR BLD: 1 % (ref 0–2)
BILIRUB SERPL-MCNC: 1 MG/DL (ref 0–1.2)
BLOOD BANK BLOOD PRODUCT EXPIRATION DATE: NORMAL
BLOOD BANK DISPENSE STATUS: NORMAL
BLOOD BANK ISBT PRODUCT BLOOD TYPE: 6200
BLOOD BANK PRODUCT CODE: NORMAL
BLOOD BANK SAMPLE EXPIRATION: NORMAL
BLOOD BANK UNIT TYPE AND RH: NORMAL
BPU ID: NORMAL
BUN SERPL-MCNC: 13 MG/DL (ref 6–20)
BUN SERPL-MCNC: 15 MG/DL (ref 6–20)
CA-I BLD-SCNC: 1.22 MMOL/L (ref 1.15–1.33)
CALCIUM SERPL-MCNC: 8.7 MG/DL (ref 8.6–10.2)
CALCIUM SERPL-MCNC: 8.7 MG/DL (ref 8.6–10.2)
CHLORIDE SERPL-SCNC: 103 MMOL/L (ref 98–107)
CHLORIDE SERPL-SCNC: 107 MMOL/L (ref 98–107)
CO2 SERPL-SCNC: 22 MMOL/L (ref 22–29)
CO2 SERPL-SCNC: 25 MMOL/L (ref 22–29)
COHB: 1.6 % (ref 0–1.5)
COMPONENT: NORMAL
CREAT SERPL-MCNC: 0.5 MG/DL (ref 0.7–1.2)
CREAT SERPL-MCNC: 0.6 MG/DL (ref 0.7–1.2)
CRITICAL NOTIFICATION DATE/TIME: NORMAL
CRITICAL: ABNORMAL
CROSSMATCH RESULT: NORMAL
DATE ANALYZED: ABNORMAL
DATE OF COLLECTION: ABNORMAL
EOSINOPHIL # BLD: 0 K/UL (ref 0.05–0.5)
EOSINOPHILS RELATIVE PERCENT: 0 % (ref 0–6)
ERYTHROCYTE [DISTWIDTH] IN BLOOD BY AUTOMATED COUNT: 16.8 % (ref 11.5–15)
FLOW RATE: 10
GFR, ESTIMATED: >90 ML/MIN/1.73M2
GFR, ESTIMATED: >90 ML/MIN/1.73M2
GLUCOSE SERPL-MCNC: 111 MG/DL (ref 74–99)
GLUCOSE SERPL-MCNC: 138 MG/DL (ref 74–99)
HCO3: 24.2 MMOL/L (ref 22–26)
HCT VFR BLD AUTO: 23.6 % (ref 37–54)
HCT VFR BLD AUTO: 23.9 % (ref 37–54)
HGB BLD-MCNC: 7.7 G/DL (ref 12.5–16.5)
HGB BLD-MCNC: 7.7 G/DL (ref 12.5–16.5)
HHB: 32.9 % (ref 0–5)
LAB: ABNORMAL
LYMPHOCYTES NFR BLD: 0.18 K/UL (ref 1.5–4)
LYMPHOCYTES RELATIVE PERCENT: 3 % (ref 20–42)
Lab: 1733
MAGNESIUM SERPL-MCNC: 2.3 MG/DL (ref 1.6–2.6)
MCH RBC QN AUTO: 29.5 PG (ref 26–35)
MCHC RBC AUTO-ENTMCNC: 32.6 G/DL (ref 32–34.5)
MCV RBC AUTO: 90.4 FL (ref 80–99.9)
METAMYELOCYTES ABSOLUTE COUNT: 0.18 K/UL (ref 0–0.12)
METAMYELOCYTES: 3 % (ref 0–1)
METHB: 1 % (ref 0–1.5)
MODE: ABNORMAL
MONOCYTES NFR BLD: 0.42 K/UL (ref 0.1–0.95)
MONOCYTES NFR BLD: 6 % (ref 2–12)
MYELOCYTES ABSOLUTE COUNT: 0.06 K/UL
MYELOCYTES: 1 %
NEUTROPHILS NFR BLD: 87 % (ref 43–80)
NEUTS SEG NFR BLD: 6 K/UL (ref 1.8–7.3)
O2 DELIVERY DEVICE: ABNORMAL
O2 SATURATION: 66.2 % (ref 92–98.5)
O2HB: 64.5 % (ref 94–97)
OPERATOR ID: 5134
PATIENT TEMP: 37 C
PCO2: 36.4 MMHG (ref 35–45)
PH BLOOD GAS: 7.44 (ref 7.35–7.45)
PHOSPHATE SERPL-MCNC: 2.2 MG/DL (ref 2.5–4.5)
PLATELET CONFIRMATION: NORMAL
PLATELET, FLUORESCENCE: 28 K/UL (ref 130–450)
PMV BLD AUTO: 13.3 FL (ref 7–12)
PO2: 34.3 MMHG (ref 75–100)
POC HCO3: 24 MMOL/L (ref 22–26)
POC O2 SATURATION: 99.8 % (ref 92–98.5)
POC PCO2: 26.9 MM HG (ref 35–45)
POC PH: 7.56 (ref 7.35–7.45)
POC PO2: 184.5 MM HG (ref 80–100)
POSITIVE BASE EXCESS, ART: 1.7 MMOL/L (ref 0–3)
POTASSIUM SERPL-SCNC: 3.3 MMOL/L (ref 3.5–5)
POTASSIUM SERPL-SCNC: 4 MMOL/L (ref 3.5–5)
PROT SERPL-MCNC: 6.1 G/DL (ref 6.4–8.3)
RBC # BLD AUTO: 2.61 M/UL (ref 3.8–5.8)
RBC # BLD: ABNORMAL 10*6/UL
SODIUM SERPL-SCNC: 140 MMOL/L (ref 132–146)
SODIUM SERPL-SCNC: 143 MMOL/L (ref 132–146)
SOURCE, BLOOD GAS: ABNORMAL
THB: 8.7 G/DL (ref 11.5–16.5)
TIME ANALYZED: 1736
TRANSFUSION STATUS: NORMAL
UNIT DIVISION: 0
UNIT ISSUE DATE/TIME: NORMAL
URATE SERPL-MCNC: 6.2 MG/DL (ref 3.4–7)
WBC # BLD: ABNORMAL 10*3/UL
WBC # BLD: ABNORMAL 10*3/UL
WBC OTHER # BLD: 6.9 K/UL (ref 4.5–11.5)

## 2024-10-03 PROCEDURE — 2580000003 HC RX 258

## 2024-10-03 PROCEDURE — 2500000003 HC RX 250 WO HCPCS

## 2024-10-03 PROCEDURE — 2700000000 HC OXYGEN THERAPY PER DAY

## 2024-10-03 PROCEDURE — 6370000000 HC RX 637 (ALT 250 FOR IP): Performed by: NURSE PRACTITIONER

## 2024-10-03 PROCEDURE — 6360000002 HC RX W HCPCS: Performed by: NURSE PRACTITIONER

## 2024-10-03 PROCEDURE — 2580000003 HC RX 258: Performed by: NURSE PRACTITIONER

## 2024-10-03 PROCEDURE — 36415 COLL VENOUS BLD VENIPUNCTURE: CPT

## 2024-10-03 PROCEDURE — 6370000000 HC RX 637 (ALT 250 FOR IP): Performed by: INTERNAL MEDICINE

## 2024-10-03 PROCEDURE — 6370000000 HC RX 637 (ALT 250 FOR IP)

## 2024-10-03 PROCEDURE — 2140000000 HC CCU INTERMEDIATE R&B

## 2024-10-03 PROCEDURE — 85014 HEMATOCRIT: CPT

## 2024-10-03 PROCEDURE — 99232 SBSQ HOSP IP/OBS MODERATE 35: CPT

## 2024-10-03 PROCEDURE — 84550 ASSAY OF BLOOD/URIC ACID: CPT

## 2024-10-03 PROCEDURE — 97530 THERAPEUTIC ACTIVITIES: CPT

## 2024-10-03 PROCEDURE — 71045 X-RAY EXAM CHEST 1 VIEW: CPT

## 2024-10-03 PROCEDURE — 84100 ASSAY OF PHOSPHORUS: CPT

## 2024-10-03 PROCEDURE — 82803 BLOOD GASES ANY COMBINATION: CPT

## 2024-10-03 PROCEDURE — 84105 ASSAY OF URINE PHOSPHORUS: CPT

## 2024-10-03 PROCEDURE — 94640 AIRWAY INHALATION TREATMENT: CPT

## 2024-10-03 PROCEDURE — 82805 BLOOD GASES W/O2 SATURATION: CPT

## 2024-10-03 PROCEDURE — 6360000002 HC RX W HCPCS

## 2024-10-03 PROCEDURE — 83735 ASSAY OF MAGNESIUM: CPT

## 2024-10-03 PROCEDURE — 85025 COMPLETE CBC W/AUTO DIFF WBC: CPT

## 2024-10-03 PROCEDURE — 6360000002 HC RX W HCPCS: Performed by: INTERNAL MEDICINE

## 2024-10-03 PROCEDURE — 85018 HEMOGLOBIN: CPT

## 2024-10-03 PROCEDURE — 99231 SBSQ HOSP IP/OBS SF/LOW 25: CPT | Performed by: NURSE PRACTITIONER

## 2024-10-03 PROCEDURE — 99233 SBSQ HOSP IP/OBS HIGH 50: CPT | Performed by: INTERNAL MEDICINE

## 2024-10-03 PROCEDURE — 82542 COL CHROMOTOGRAPHY QUAL/QUAN: CPT

## 2024-10-03 PROCEDURE — 80048 BASIC METABOLIC PNL TOTAL CA: CPT

## 2024-10-03 PROCEDURE — 2500000003 HC RX 250 WO HCPCS: Performed by: NURSE PRACTITIONER

## 2024-10-03 PROCEDURE — 82330 ASSAY OF CALCIUM: CPT

## 2024-10-03 PROCEDURE — 82652 VIT D 1 25-DIHYDROXY: CPT

## 2024-10-03 PROCEDURE — 80053 COMPREHEN METABOLIC PANEL: CPT

## 2024-10-03 RX ORDER — METHOCARBAMOL 750 MG/1
750 TABLET, FILM COATED ORAL 4 TIMES DAILY
Qty: 40 TABLET | Refills: 0 | OUTPATIENT
Start: 2024-10-03 | End: 2024-10-23

## 2024-10-03 RX ORDER — PANTOPRAZOLE SODIUM 40 MG/1
40 TABLET, DELAYED RELEASE ORAL
Status: DISCONTINUED | OUTPATIENT
Start: 2024-10-04 | End: 2024-10-11 | Stop reason: HOSPADM

## 2024-10-03 RX ORDER — FUROSEMIDE 10 MG/ML
20 INJECTION INTRAMUSCULAR; INTRAVENOUS ONCE
Status: COMPLETED | OUTPATIENT
Start: 2024-10-03 | End: 2024-10-03

## 2024-10-03 RX ADMIN — IPRATROPIUM BROMIDE AND ALBUTEROL SULFATE 1 DOSE: 2.5; .5 SOLUTION RESPIRATORY (INHALATION) at 16:17

## 2024-10-03 RX ADMIN — METHOCARBAMOL TABLETS 750 MG: 750 TABLET, COATED ORAL at 20:03

## 2024-10-03 RX ADMIN — POTASSIUM PHOSPHATE, MONOBASIC POTASSIUM PHOSPHATE, DIBASIC 10 MMOL: 224; 236 INJECTION, SOLUTION, CONCENTRATE INTRAVENOUS at 08:58

## 2024-10-03 RX ADMIN — HYDROMORPHONE HYDROCHLORIDE 1 MG: 1 INJECTION, SOLUTION INTRAMUSCULAR; INTRAVENOUS; SUBCUTANEOUS at 08:51

## 2024-10-03 RX ADMIN — OXYCODONE HYDROCHLORIDE 15 MG: 15 TABLET ORAL at 20:02

## 2024-10-03 RX ADMIN — METHOCARBAMOL TABLETS 750 MG: 750 TABLET, COATED ORAL at 08:41

## 2024-10-03 RX ADMIN — OXYCODONE HYDROCHLORIDE 15 MG: 15 TABLET ORAL at 16:01

## 2024-10-03 RX ADMIN — OXYCODONE HYDROCHLORIDE 15 MG: 15 TABLET ORAL at 11:59

## 2024-10-03 RX ADMIN — TRANEXAMIC ACID 500 MG: 100 INJECTION, SOLUTION INTRAVENOUS at 08:12

## 2024-10-03 RX ADMIN — SENNOSIDES AND DOCUSATE SODIUM 2 TABLET: 50; 8.6 TABLET ORAL at 20:03

## 2024-10-03 RX ADMIN — METHYLPREDNISOLONE SODIUM SUCCINATE 60 MG: 125 INJECTION, POWDER, LYOPHILIZED, FOR SOLUTION INTRAMUSCULAR; INTRAVENOUS at 08:42

## 2024-10-03 RX ADMIN — SUCRALFATE 1 G: 1 TABLET ORAL at 11:59

## 2024-10-03 RX ADMIN — HYDROMORPHONE HYDROCHLORIDE 1 MG: 1 INJECTION, SOLUTION INTRAMUSCULAR; INTRAVENOUS; SUBCUTANEOUS at 21:29

## 2024-10-03 RX ADMIN — POLYETHYLENE GLYCOL 3350 17 G: 17 POWDER, FOR SOLUTION ORAL at 08:47

## 2024-10-03 RX ADMIN — OXYCODONE HYDROCHLORIDE 15 MG: 15 TABLET ORAL at 23:58

## 2024-10-03 RX ADMIN — SODIUM CHLORIDE, PRESERVATIVE FREE 10 ML: 5 INJECTION INTRAVENOUS at 08:54

## 2024-10-03 RX ADMIN — TRANEXAMIC ACID 500 MG: 100 INJECTION, SOLUTION INTRAVENOUS at 12:38

## 2024-10-03 RX ADMIN — HYDROMORPHONE HYDROCHLORIDE 1 MG: 1 INJECTION, SOLUTION INTRAMUSCULAR; INTRAVENOUS; SUBCUTANEOUS at 13:03

## 2024-10-03 RX ADMIN — POTASSIUM BICARBONATE 40 MEQ: 782 TABLET, EFFERVESCENT ORAL at 08:40

## 2024-10-03 RX ADMIN — METHOCARBAMOL TABLETS 750 MG: 750 TABLET, COATED ORAL at 13:29

## 2024-10-03 RX ADMIN — IPRATROPIUM BROMIDE AND ALBUTEROL SULFATE 1 DOSE: 2.5; .5 SOLUTION RESPIRATORY (INHALATION) at 07:52

## 2024-10-03 RX ADMIN — OXYCODONE HYDROCHLORIDE 15 MG: 15 TABLET ORAL at 07:36

## 2024-10-03 RX ADMIN — FUROSEMIDE 20 MG: 10 INJECTION, SOLUTION INTRAMUSCULAR; INTRAVENOUS at 23:59

## 2024-10-03 RX ADMIN — OXYCODONE HYDROCHLORIDE 15 MG: 15 TABLET ORAL at 03:36

## 2024-10-03 RX ADMIN — HYDROMORPHONE HYDROCHLORIDE 1 MG: 1 INJECTION, SOLUTION INTRAMUSCULAR; INTRAVENOUS; SUBCUTANEOUS at 04:34

## 2024-10-03 RX ADMIN — SENNOSIDES AND DOCUSATE SODIUM 2 TABLET: 50; 8.6 TABLET ORAL at 08:41

## 2024-10-03 RX ADMIN — METHYLPREDNISOLONE SODIUM SUCCINATE 60 MG: 125 INJECTION, POWDER, LYOPHILIZED, FOR SOLUTION INTRAMUSCULAR; INTRAVENOUS at 03:35

## 2024-10-03 RX ADMIN — SUCRALFATE 1 G: 1 TABLET ORAL at 04:34

## 2024-10-03 RX ADMIN — BUDESONIDE 500 MCG: 0.5 INHALANT RESPIRATORY (INHALATION) at 07:52

## 2024-10-03 RX ADMIN — PANTOPRAZOLE SODIUM 40 MG: 40 INJECTION, POWDER, FOR SOLUTION INTRAVENOUS at 08:42

## 2024-10-03 RX ADMIN — Medication 2000 UNITS: at 08:41

## 2024-10-03 RX ADMIN — HYDROMORPHONE HYDROCHLORIDE 1 MG: 1 INJECTION, SOLUTION INTRAMUSCULAR; INTRAVENOUS; SUBCUTANEOUS at 18:29

## 2024-10-03 RX ADMIN — HYDROMORPHONE HYDROCHLORIDE 1 MG: 1 INJECTION, SOLUTION INTRAMUSCULAR; INTRAVENOUS; SUBCUTANEOUS at 00:32

## 2024-10-03 RX ADMIN — IPRATROPIUM BROMIDE AND ALBUTEROL SULFATE 1 DOSE: 2.5; .5 SOLUTION RESPIRATORY (INHALATION) at 12:36

## 2024-10-03 RX ADMIN — BICALUTAMIDE 50 MG: 50 TABLET, FILM COATED ORAL at 08:41

## 2024-10-03 ASSESSMENT — PAIN DESCRIPTION - ONSET
ONSET: ON-GOING
ONSET: ON-GOING

## 2024-10-03 ASSESSMENT — PAIN DESCRIPTION - ORIENTATION
ORIENTATION: RIGHT;LEFT
ORIENTATION: RIGHT;LEFT;MID;POSTERIOR
ORIENTATION: RIGHT;LEFT;MID;POSTERIOR
ORIENTATION: MID
ORIENTATION: MID
ORIENTATION: RIGHT;LEFT
ORIENTATION: MID
ORIENTATION: RIGHT;LEFT
ORIENTATION: RIGHT;LEFT
ORIENTATION: RIGHT;LEFT;MID;POSTERIOR

## 2024-10-03 ASSESSMENT — PAIN SCALES - WONG BAKER: WONGBAKER_NUMERICALRESPONSE: HURTS A LITTLE BIT

## 2024-10-03 ASSESSMENT — PAIN DESCRIPTION - LOCATION
LOCATION: ABDOMEN
LOCATION: RIB CAGE;BACK
LOCATION: ABDOMEN
LOCATION: GENERALIZED
LOCATION: RIB CAGE;BACK
LOCATION: RIB CAGE;BACK
LOCATION: GENERALIZED
LOCATION: RIB CAGE;BACK
LOCATION: GENERALIZED
LOCATION: GENERALIZED
LOCATION: ABDOMEN;FLANK
LOCATION: ABDOMEN;FLANK

## 2024-10-03 ASSESSMENT — PAIN DESCRIPTION - DESCRIPTORS
DESCRIPTORS: NAGGING;DISCOMFORT;SORE
DESCRIPTORS: ACHING;THROBBING
DESCRIPTORS: ACHING;DISCOMFORT;DULL
DESCRIPTORS: ACHING;DISCOMFORT;SORE
DESCRIPTORS: ACHING;DISCOMFORT;DULL
DESCRIPTORS: DISCOMFORT;NAGGING;SORE
DESCRIPTORS: DISCOMFORT;NAGGING;SORE
DESCRIPTORS: ACHING;DISCOMFORT;DULL
DESCRIPTORS: ACHING;DISCOMFORT;DULL
DESCRIPTORS: ACHING

## 2024-10-03 ASSESSMENT — PAIN SCALES - GENERAL
PAINLEVEL_OUTOF10: 10
PAINLEVEL_OUTOF10: 9
PAINLEVEL_OUTOF10: 7
PAINLEVEL_OUTOF10: 9
PAINLEVEL_OUTOF10: 10
PAINLEVEL_OUTOF10: 10
PAINLEVEL_OUTOF10: 9
PAINLEVEL_OUTOF10: 10
PAINLEVEL_OUTOF10: 9
PAINLEVEL_OUTOF10: 10
PAINLEVEL_OUTOF10: 7
PAINLEVEL_OUTOF10: 10
PAINLEVEL_OUTOF10: 9
PAINLEVEL_OUTOF10: 10
PAINLEVEL_OUTOF10: 10
PAINLEVEL_OUTOF10: 9
PAINLEVEL_OUTOF10: 9
PAINLEVEL_OUTOF10: 8
PAINLEVEL_OUTOF10: 8
PAINLEVEL_OUTOF10: 9

## 2024-10-03 ASSESSMENT — PAIN DESCRIPTION - FREQUENCY
FREQUENCY: CONTINUOUS
FREQUENCY: CONTINUOUS

## 2024-10-03 ASSESSMENT — PAIN - FUNCTIONAL ASSESSMENT
PAIN_FUNCTIONAL_ASSESSMENT: PREVENTS OR INTERFERES SOME ACTIVE ACTIVITIES AND ADLS
PAIN_FUNCTIONAL_ASSESSMENT: PREVENTS OR INTERFERES SOME ACTIVE ACTIVITIES AND ADLS
PAIN_FUNCTIONAL_ASSESSMENT: ACTIVITIES ARE NOT PREVENTED
PAIN_FUNCTIONAL_ASSESSMENT: PREVENTS OR INTERFERES SOME ACTIVE ACTIVITIES AND ADLS

## 2024-10-03 ASSESSMENT — PAIN DESCRIPTION - PAIN TYPE
TYPE: ACUTE PAIN
TYPE: ACUTE PAIN

## 2024-10-03 NOTE — PATIENT CARE CONFERENCE
[facility] Quality Flow/Interdisciplinary Rounds Progress Note        Quality Flow Rounds held on October 3, 2024    Disciplines Attending:  Bedside Nurse, , , and Nursing Unit Leadership    Joseph Bond was admitted on 9/26/2024  4:56 AM    Anticipated Discharge Date:       Disposition:    Simon Score:  Simon Scale Score: 21    Readmission Risk              Risk of Unplanned Readmission:  43           Discussed patient goal for the day, patient clinical progression, and barriers to discharge.  The following Goal(s) of the Day/Commitment(s) have been identified:  Diagnostics - Report Results and Labs - Report Results      Glendy Hoffmann RN  October 3, 2024

## 2024-10-03 NOTE — PROGRESS NOTES
[facility]  Family Medicine Attending    S: 44 y.o. male with a history of prostate cancer, anxiety, opiate abuse who presented with nausea and vomiting as well as left sided abdominal pain.  He was found to have hypoxia and severe symptomatic anemia with hgb 5.3.  He has been having nausea with vomiting, pelvic and back pain.  Has not been able to walk due to pain for a couple of weeks.  Goals of care-pain control so that he can walk again.  He wanted more information on side effects from a regimen of chemotherapy drugs from the oncologist.  Oncology was consulted.  Per their note, \"Considering high tumor burden, advised treatment with Taxotere, GnRH agonist, Nubeqa along with Xgeva\"  Side effects of these medications were discussed with the patient by oncology.     Patient now thinking he would like to move forward with chemotherapy.    Palliative care following patient.  Pain is still too severe to walk.    He also states that when he tries to stand up the pain is mostly in a band around his upper abdomen.  He says he has no difficulty with his legs when standing. Patients notes some small amounts of blood in his mucous if he coughs.      9/30-patient reports that he is not feeling well but not willing to elaborate.  To start transfusion for hemoglobin of 6.5. No change in breathing. Denies any active bleeding today. Rib pain where mets are located.    10/1-s/p 2 units PRBCS yesterday.  This morning stating that oxygen was increased overnight to 11L HF NC, now at 10. But now denying dyspnea, some mucus with blood upon coughing. He denies that he is having depressive symptoms.    10/2-Patient reports he is feeling short of breath today. Coughing up some blood tinged mucus.     10/3-Patient reports ongoing shortness of breath but believes it is better. Some chills.  He is trying to do incentive spirometry.  Less blood in his sputum. Worked wit pt/ot and walked. Improved mentally.    O: VS- Blood

## 2024-10-03 NOTE — PROGRESS NOTES
Dilaudid given to patient per request. Patient confused as to how PRN pain medications work. Patient educated on the need to ask for the pain medication in order to receive the as needed medication. Patient states that if his Dilaudid is scheduled every three hours he should be able to get the next dose at 4PM. Explained to patient that if pain medication was needed at 4PM that he would receive the as needed oxy. Explained to patient that  the policy is to administer oral medications before IV medications. Patient states that makes no sense that if 3 hours has gone by he should get the dilaudid if that's what he wants. Explained again to patient that oral medication has to be administered before IV pain medication.

## 2024-10-03 NOTE — PROGRESS NOTES
[facility] - Family Medicine Inpatient   Resident Progress Note    S:  Hospital day: 7   Brief Synopsis: Joseph Bond is a 44 y.o. male with a PMH of recent diagnosis of prostate cancer with metastasis presents with symptomatic anemia with nausea and vomiting that began three days ago. Patient states he has not noticed the mucous or hemoptysis he previously was experiencing. He does state; however, he overall feels \"worse today\", stating he is weak and more short of breath today. Patient also admits to chills but denies fever or chest pain. He has no other questions or concerns at this time.     Overnight/interim:   Hemoglobin 9.7, dropped this morning to 7.7.           Cont meds:    sodium chloride      sodium chloride      sodium chloride      sodium chloride 950 mL (10/02/24 0759)    sodium chloride      sodium chloride       Scheduled meds:    potassium bicarb-citric acid  40 mEq Oral Once    potassium phosphate IVPB (PERIPHERAL LINE)  10 mmol IntraVENous Once    tranexamic acid inj 100 mg/mL for  500 mg Nebulization TID RT    methylPREDNISolone  60 mg IntraVENous Q6H    sennosides-docusate sodium  2 tablet Oral BID    polyethylene glycol  17 g Oral Daily    ipratropium 0.5 mg-albuterol 2.5 mg  1 Dose Inhalation Q4H WA RT    bicalutamide  50 mg Oral Daily    methocarbamol  750 mg Oral 4x Daily    sucralfate  1 g Oral 4 times per day    fentaNYL  1 patch TransDERmal Q72H    vitamin D  2,000 Units Oral Daily    sodium chloride flush  5-40 mL IntraVENous 2 times per day    pantoprazole (PROTONIX) 40 mg in sodium chloride (PF) 0.9 % 10 mL injection  40 mg IntraVENous Daily    lidocaine  1 patch TransDERmal Daily    budesonide  0.5 mg Nebulization BID RT     PRN meds: sodium chloride, sodium chloride, bisacodyl, sodium chloride, sodium chloride, sodium chloride, sodium chloride flush, sodium chloride, potassium chloride **OR** potassium alternative oral replacement **OR** potassium chloride, magnesium sulfate,  abnormality of the thoracic spine         CTA CHEST W CONTRAST   Final Result   1. No evidence of pulmonary embolism.   2. Abnormal patchy ground-glass opacity identified diffusely throughout the   lung fields. Findings show progression when compared to the prior study with   no definite pleural effusion or pneumothorax. Findings suggesting progression   of disease favoring an infectious/inflammatory process or however   lymphangitic spread of disease given mediastinal adenopathy and abnormal bony   metastatic process within the ribs and spine cannot be completely excluded   and clinical correlation is needed..   3. Abnormal bony metastatic disease within the spine. There is a bony   destructive process involving the left anterior 2nd rib. Associated soft   tissue thickening.   4. No acute intra-abdominal or intrapelvic process.  Stable adenopathy   identified throughout the retroperitoneum and left pelvic sidewall.  Stable   bony Mets metastatic disease throughout the spine and pelvis as well as the   hips bilaterally.         CT ABDOMEN PELVIS W IV CONTRAST Additional Contrast? None   Final Result   1. No evidence of pulmonary embolism.   2. Abnormal patchy ground-glass opacity identified diffusely throughout the   lung fields. Findings show progression when compared to the prior study with   no definite pleural effusion or pneumothorax. Findings suggesting progression   of disease favoring an infectious/inflammatory process or however   lymphangitic spread of disease given mediastinal adenopathy and abnormal bony   metastatic process within the ribs and spine cannot be completely excluded   and clinical correlation is needed..   3. Abnormal bony metastatic disease within the spine. There is a bony   destructive process involving the left anterior 2nd rib. Associated soft   tissue thickening.   4. No acute intra-abdominal or intrapelvic process.  Stable adenopathy   identified throughout the retroperitoneum and

## 2024-10-03 NOTE — PROGRESS NOTES
Palliative Care Department  [phone]  Palliative Care Progress Note  Provider Zora Jacinto, APRN - CNP      PATIENT: Joseph Bond  : 1980  MRN: [id number]  ADMISSION DATE: 2024  4:56 AM  Referring Provider:  Anastasia Helms MD    Palliative Medicine was consulted on hospital day 7 for assistance with Goals of care, Overwhelmed Symptoms    HPI:     Clinical Summary:Joseph Bond is a 44 y.o. y/o male with a history of prostate cancer with mediastinal lymphadenopathy, extensive metastasis of the spine, rib cage, pelvis, humerus and femurs (had refused systemic therapy, pursuing homeopathic), completed palliative radiation treatment to spine, polysubstance abuse including opioids and tobacco, abstinent for 5 years, diverticulitis, with recent ER visit on 2024, due to shortness of breath, was going to be admitted for further medical management however left AMA, was supposed to follow-up outpatient with palliative medicine for symptom management, however missed appointment, did not return phone call to reschedule appointment, who presented to [facility] on 2024 with complaint of nausea, vomiting, left-sided rib pain.  At ED, found hypoxic 78% on room air, placed on 4 L nasal cannula.  Significant laboratory findings showed hemoglobin 5.3, platelets 18, alkaline phosphate 227, AST 76, lipase 8, WBC 4.2.  Received 2 packed red blood cell and sixpack platelets.  CT images showed disease progression when compared to prior images, favoring an infectious/inflammatory process or lymphangitic spread of disease given mediastinal adenopathy and abnormal bony metastatic process within the rib and spine.  Abnormal bony metastatic disease within the spine.  Bony destructive process involving the left anterior second rib.  Stable adenopathy throughout the retroperitoneal and pelvic sidewall,.  Stable bony metastatic disease throughout the spine, pelvis and hip bilaterally.  Palliative medicine  consulted to assist further with goals of care, symptom management.    ASSESSMENT/PLAN:     Pertinent Hospital Diagnoses     Thrombocytopenia  Anemia  Acute respiratory failure  Metastatic prostate cancer    Symptom management     Pain due to neoplasm  -IV Dilaudid 0.5 to 1 mg every 3 hours as needed  -oxycodone 15 mg every 4 hours as needed  - refused  fentanyl patch  -Stopped taking morphine ER due to drowsiness  -Refusing Robaxin 750 mg 4 times a day    Nausea and vomiting  -Promethazine 12.5 mg p.o. every 6 hours as needed  -IV Zofran 4 mg every 6 hours as needed    Prophylaxis stool softener  -GlycoLax daily as needed  -Senokot S twice daily    Palliative Care Encounter / Counseling Regarding Goals of Care  Please see detailed goals of care discussion as below  At this time, Joseph Bond, Does Not have capacity for medical decision-making.  Capacity is time limited and situation/question specific  Outcome of goals of care meeting:  Following for symptom management    Code status Full Code  Advanced Directives: no POA or living will in [facility]  Surrogate/Legal NOK:  aVrunMargarita (Sister ) [phone]   Gaurav Womack (cousin) [phone]    Spiritual assessment: no spiritual distress identified  Bereavement and grief: to be determined  Referrals to: none today    Thank you for the opportunity to participate in the care of Joseph Bond.     SUBJECTIVE:     Details of Conversation:    Chart reviewed.  Met with patient and mother at the bedside.  The patient has received 3 doses of oxycodone and 3 doses of IV Dilaudid in the last 24 hours.  He states that his pain has been tolerable with pain medication regimen.  We will be decreasing IV Dilaudid as patient becomes more stable for discharge.  We reviewed CODE STATUS.  He wishes to remain a full CODE STATUS and states to continue treatment and heroic measures until \" the wheels fall off\".  Palliative medicine will continue to follow.    Prognosis:

## 2024-10-03 NOTE — PROGRESS NOTES
Physical Therapy  Treatment Note     Name: Joseph Bond  : 1980  MRN: [id number]        Date of Service: 2024     Evaluating PT:  Yeison Nguyen PT, DPT     Room #:  6523/6523-A  Diagnosis:  Hypoxemia [R09.02]  Thrombocytopenia (HCC) [D69.6]  History of hematemesis [Z87.19]  Anemia, unspecified type [D64.9]  PMHx/PSHx:  cancer, diverticulitis, opioid abuse  Procedure/Surgery:  N/A  Precautions:  fall risk  Equipment Owned: wc, walker  Equipment Needs:  TBD     SUBJECTIVE:     Pt lives alone in a 1st floor apartment with 4 steps to enter and no handrail.  Bed/bath is on main floor.  Pt ambulated with wc/walker PTA.     OBJECTIVE:    Initial Evaluation  Date: 24 Treatment  Date: 10/3/24 Short Term/ Long Term   Goals   AM-PAC 6 Clicks 15/24 10/24      Was pt agreeable to Eval/treatment? yes yes      Does pt have pain? L upper abdominal pain 10/10 Upper abdomen and back pain 10/10      Bed Mobility  Rolling: NT  Supine to sit: SBA  Sit to supine: NT  Scooting: SBA Rolling: SBA  Supine to sit: SBA  Sit to supine: SBA  Scooting: SBA  Rolling: Ind  Supine to sit: Ind  Sit to supine: Ind  Scooting: Ind   Transfers Sit to stand: Yoan  Stand to sit: Yoan  Stand pivot: Yoan with WW  Sit to stand: NT  Stand to sit: NT  Stand pivot: NT Sit to stand: Ind  Stand to sit: Ind  Stand pivot: Ind with WW   Ambulation    NT  NT 50ft with WW Ind   Stair negotiation: ascended and descended NT  NT Make goal as appropriate      Strength/ROM:   BLE gross strength 4-/5  BLE ROM WFL     Balance:   Static Sitting: SBA  Dynamic Sitting: SBA  Static Standing: NT  Dynamic Standing: NT     Pt is A & O x 3  Sensation:  Pt denies numbness and tingling to extremities  Edema:  None noted    Vitals:  HR was stable during session  SpO2 85% transferring to EOB, recovered to 91% sitting EOB on 10L O2    Therapeutic Exercises:    Bed mobility: supine<>sit, cued for positioning at EOB  Balance: sitting EOB ~20'  BLE AROM    Patient

## 2024-10-03 NOTE — PROGRESS NOTES
RN entered patients room to do bedside report with nightshift nurse. Patient following RN around room with phone. When I asked patient if he was recording me, he stated, \"I am recording everything.\" RN asked patient to not record her but could record conversation with his phone down if he deemed that to be appropriate. Patient placed phone in his lap. RN proceeded to give bedside report to nightshift RN.     Glendy Hoffmann RN

## 2024-10-03 NOTE — CARE COORDINATION
CM Update: Met with patient at bedside to discuss transition of care plan. Discharge plan is Home with [facility] care/palliative care, and possible Oxygen through [facility]. Will need MOLLY order for home health care and appropriate O2 order. Back Door Referral called to Jony with [facility]. Encouraged patient to work with PT/OT to ensure discharge plan is safe. Patient came to hospital with nausea, vomiting, abdominal pain, and left rib pain. Found to have Anemia/Thrombocytopenia. Hbg 5.3, and Plt 18 on presentation to ED. PRBCs and platelets infused in ED. 12 liters high flow nasal cannula. PT/OT to see patient. CM/SW to follow. MT

## 2024-10-03 NOTE — PROGRESS NOTES
Call received from [facility] patients oxygen level has dropped to 86% on 10L with a HR of 130 that is sustaining. Went to patients room to assess the situation. Patient states that he didn't call me how did I know to come to the room. Patient informed that his heart monitor reports at the nurses station as well. Patient looked at mother and said \"see mom I told You\"  Patients oxygen was gradually increased to 14L to get sats above 90%. Patient states that he needs his pain medication. Explained to patient that we needed to work on his oxygen level at this time. Patient states that I need to work on his pain. Informed patient that a call will need placed to the doctor to inform of the breathing situation. Perfect serve message sent to Dr. Helms to inform of the above situation. An order was placed for STAT ABGS and a STAT chest xray. Request received from Dr. Helms to notify pulmonology as well. Patient informed of doctors requests. Patient told this nurse that he wanted his pain medication. This nurse informed patient that pain medication would be given as soon as his breathing was under control. Patient states \"get me a new nurse\".

## 2024-10-03 NOTE — PROGRESS NOTES
[facility] - Family Medicine Inpatient   Resident Progress Note    S:  Hospital day: 7   Brief Synopsis: Joseph Bond is a 44 y.o. male with a PMH of Cancer (HCC), Diverticulosis, and History of opioid abuse (HCC). who presents to ED for nausea and vomiting.     Patient complains of nausea and vomiting that started 2 days ago.  Emesis contains phlegm, mucus and streaks of blood.  Patient could not quantify number of times but states that it has been continuous.  States that he had nausea medications but seemed to make his nausea was.  Also endorses chronic pain on his back, abdomen radha LUQ.  Patient was recently diagnosed for pancreatic cancer with mets.  Completed 10th cycle of radiation 1 month ago.  States that since radiation was completed he has not been able to walk due to pain in his pelvis, abdomen and back.  He has been wheelchair-bound for the past 1 month.  Has had poor appetite, blurry vision, dizziness.  Denies nosebleeds, bruising, melena, hematochezia, hematuria.  Patient does not use illicit drugs or drink alcohol.  Quit smoking 7 years ago.  He would like to remain full code.  Hemoglobin was 5.3 and platelets at 18 at the ER.  Patient was transfused 2 units of pRBC.  Post transfusion hemoglobin was 5.9 and patient was transfused another 2 units of PRBC.  Patient continued to experience pain in his back and upper abdomen and pelvis.  Oncology and palliative were consulted.  Pain medication with Dilaudid, oxy and fentanyl patch.  Palliative care managed pain, nausea and vomiting and constipation 2/2 opioids.  He decided to proceed with chemotherapy. Pulmonology was consulted for increased oxygen demand and hemoptysis. They recommended a bronchoscopy but held off due to thrombocytopenia. Patient was started on solumedrol 40mg and inhaled TXA.     Overnight/interim:   Patient seen and evaluated at bedside. Feels better this morning compared to yesterday. States that nebulizer have been helping.   Started  the thoracic spine         CTA CHEST W CONTRAST   Final Result   1. No evidence of pulmonary embolism.   2. Abnormal patchy ground-glass opacity identified diffusely throughout the   lung fields. Findings show progression when compared to the prior study with   no definite pleural effusion or pneumothorax. Findings suggesting progression   of disease favoring an infectious/inflammatory process or however   lymphangitic spread of disease given mediastinal adenopathy and abnormal bony   metastatic process within the ribs and spine cannot be completely excluded   and clinical correlation is needed..   3. Abnormal bony metastatic disease within the spine. There is a bony   destructive process involving the left anterior 2nd rib. Associated soft   tissue thickening.   4. No acute intra-abdominal or intrapelvic process.  Stable adenopathy   identified throughout the retroperitoneum and left pelvic sidewall.  Stable   bony Mets metastatic disease throughout the spine and pelvis as well as the   hips bilaterally.         CT ABDOMEN PELVIS W IV CONTRAST Additional Contrast? None   Final Result   1. No evidence of pulmonary embolism.   2. Abnormal patchy ground-glass opacity identified diffusely throughout the   lung fields. Findings show progression when compared to the prior study with   no definite pleural effusion or pneumothorax. Findings suggesting progression   of disease favoring an infectious/inflammatory process or however   lymphangitic spread of disease given mediastinal adenopathy and abnormal bony   metastatic process within the ribs and spine cannot be completely excluded   and clinical correlation is needed..   3. Abnormal bony metastatic disease within the spine. There is a bony   destructive process involving the left anterior 2nd rib. Associated soft   tissue thickening.   4. No acute intra-abdominal or intrapelvic process.  Stable adenopathy   identified throughout the retroperitoneum and left pelvic

## 2024-10-03 NOTE — PROGRESS NOTES
[facility]  Department of Internal Medicine  Division of Pulmonary, Critical Care and Sleep Medicine  Progress Note      Ed AMIE Ruiz Mireille DO Kofi Mcadams, APRN-CNP  Zonia Lozanodeliomykel, APRN-CNP        SUBJECTIVE:  Patient seen and examined.  He remains on 10 L high flow nasal cannula.  He is using incentive spirometry.  TXA nebs were ordered yesterday and he reports hemoptysis is less.  He denies any complaints to me during my examination.  Mother remains at the bedside.      OBJECTIVE:     PHYSICAL EXAM:   VITALS:   Vitals:    10/03/24 0300 10/03/24 0806 10/03/24 0921 10/03/24 1131   BP: 112/77 115/77  119/84   Pulse: (!) 102 92  99   Resp: 29 22 22 22   Temp: 97.8 °F (36.6 °C) 97.8 °F (36.6 °C)  97 °F (36.1 °C)   TempSrc: Oral Temporal  Temporal   SpO2: 95% 100%  94%   Weight:       Height:            Intake/Output Summary (Last 24 hours) at 10/3/2024 1137  Last data filed at 10/3/2024 1032  Gross per 24 hour   Intake 82889.78 ml   Output 600 ml   Net 69916.78 ml        CONSTITUTIONAL:   Ill-appearing, pale, weak, A&O x 3, NAD  SKIN:     No rash, No suspicious lesions, No skin discoloration  HEENT:     EOMI, MMM, No thrush  NECK:    No bruits, No JVP appreciated  CV:      Sinus,  No murmur, No rubs, No gallops  PULMONARY:   Coarse, bilateral Rales,  No Wheezing, No Rhonchi      No noted egophony  ABDOMEN:     Soft, non-tender. BS normal. No R/R/G  EXT:    No deformities .  No clubbing.       no lower extremity edema, No venous stasis  PULSE:   Appears equal and palpable.  PSYCHIATRIC:  Seems appropriate, No acute psychosis  MS:    No fractures, No gross weakness  NEUROLOGIC:   The clinical assessment is non-focal     DATA: IMAGING & TESTING:     LABORATORY TESTS:        PRO-BNP:   Lab Results   Component Value Date    PROBNP 1,170 (H) 10/01/2024      ABGs: No

## 2024-10-03 NOTE — PLAN OF CARE
Problem: Discharge Planning  Goal: Discharge to home or other facility with appropriate resources  10/3/2024 1004 by Oriana Barillas RN  Outcome: Progressing  10/3/2024 0110 by Rosalia Escamilla RN  Outcome: Progressing     Problem: Pain  Goal: Verbalizes/displays adequate comfort level or baseline comfort level  10/3/2024 1004 by Oriana Barillas RN  Outcome: Progressing  10/3/2024 0110 by Rosalia Escamilla RN  Outcome: Progressing     Problem: Skin/Tissue Integrity  Goal: Absence of new skin breakdown  Description: 1.  Monitor for areas of redness and/or skin breakdown  2.  Assess vascular access sites hourly  3.  Every 4-6 hours minimum:  Change oxygen saturation probe site  4.  Every 4-6 hours:  If on nasal continuous positive airway pressure, respiratory therapy assess nares and determine need for appliance change or resting period.  10/3/2024 1004 by Oriana Barillas RN  Outcome: Progressing  10/3/2024 0110 by Rosalia Escamilla RN  Outcome: Progressing     Problem: Safety - Adult  Goal: Free from fall injury  10/3/2024 1004 by Oriana Barillas RN  Outcome: Progressing  10/3/2024 0110 by Rosalia Escamilla RN  Outcome: Progressing     Problem: ABCDS Injury Assessment  Goal: Absence of physical injury  Outcome: Progressing     Problem: Nutrition Deficit:  Goal: Optimize nutritional status  10/3/2024 1004 by Oriana Barillas RN  Outcome: Progressing  10/3/2024 0110 by Rosalia Escamilla RN  Outcome: Progressing

## 2024-10-03 NOTE — PROGRESS NOTES
Occupational Therapy  OT BEDSIDE TREATMENT NOTE   [facility]  [address], [place]      Date:10/3/2024  Patient Name: Joseph Bond  MRN: [id number]  : 1980  Room: [address] [place] [address]     Evaluating OT: APARNA Weinstein, OTR/L  # [id number]     Referring Provider:  Anastasia Helms MD   Specific Provider Orders:  \"OT Eval and Treat\"  24     Diagnosis: Hypoxemia [R09.02]  Thrombocytopenia (HCC) [D69.6]  History of hematemesis [Z87.19]  Anemia, unspecified type [D64.9]     Pt was admitted w/ Pain Back, Left Rib, Abdomen, hypoxia, severe symptomatic anemia Hgb 5.3, Nausea/Vomiting     Pertinent Medical History:  Pt has a past medical history of Cancer (HCC), Diverticulosis, and History of opioid abuse (HCC).,  has a past surgical history that includes bronchoscopy (N/A, 2024) and bronchoscopy (2024).     Surgeries this admission: None      Precautions:  Fall Risk  3L O2     Assessment of current deficits   [x] Functional mobility             [x]ADLs           [x] Strength                  []Cognition   [x] Functional transfers           [x] IADLs         [x] Safety Awareness   [x]Endurance   [] Fine Coordination              [x] Balance     [] Vision/perception    []Sensation     []Gross Motor Coordination  [] ROM           [] Delirium                  [] Motor Control         OT PLAN OF CARE   OT POC based on physician orders, patient diagnosis and results of clinical assessment     Frequency/Duration 1-3 days/wk for 2 weeks PRN   Specific OT Treatment to include:   * Instruction/training on adapted ADL techniques and AE recommendations to increase functional independence within precautions       * Training on energy conservation strategies, correct breathing pattern and techniques to improve independence/tolerance for self-care routine  * Functional transfer/mobility training/DME recommendations for increased independence,  supine:  NT      VCs for safety, techs for bracing Ribs    Supine to sit: SBA   Sit to supine:  SBA      VCs for safety, techs for bracing Ribs  Supine to sit: IND  Sit to supine: IND      Functional Transfers Min A     EOB 2x, Chair  Pt ed for safety/hand placement    NT  Mod I      Functional Mobility Min A w/ WW     Only able to tolerate short distance b/t surfaces only  Pt ed for safety/improved safety awareness, walker safety    NT  Mod I      Balance Sitting:     Static:  SUP EOB, Remote SUP chair    Dynamic:  SBA w/ functional ax Unsupported at EOB      Standing:     Static:  Min A w/ WW    Dynamic:  Min A w/ functional ax/mobility w/ WW     Sitting:     Static:  SUP EOB, Remote SUP chair    Dynamic:  SBA w/ functional ax Unsupported at EOB      Standing:     Static:  NT    Dynamic:  NT Sitting:     Static:  IND    Dynamic:  IND w/ functional ax     Standing:     Static:  Mod I w/ AD PRN    Dynamic:  Mod I w/ functional ax/mobility w/ AD PRN   Activity Tolerance Fair     Sitting Tolerance w/ light ax ~ 15 mins EOB, in chair at end of session  Standing Tolerance w/ light ax ~ 2 mins     poor+  Sitting Tolerance w/ light ax ~ 20 mins EOB    Standing deferred due to O2 desat while seated and moderate SOB    O2 dropping as low as 85% on 10 L with bed mob   O2 87-91% sitting EOB on 10 L      HR 96-117bpm Good(-)   Visual/  Perceptual     Hearing: WNL   Glasses: No     WFL   Hearing Aids:  No                       Hand Dominance: Right    AROM Strength Additional Info:    RUE  WFL WNL - observed, NT d/t pain  WNL ;    WNL FMC/dexterity noted during ADL tasks      LUE WFL WNL - observed, NT d/t pain  WNL ;     WNL FMC/dexterity noted during ADL tasks         Sensation:  Denies numbness or tingling Lul UEs   Tone: WFL Lul UEs   Edema: None Noted Lul UEs     Comments: Upon arrival pt supine in bed. ADL retraining to increase safety and indep in dressing tasks, balance training to increase participation in EOB

## 2024-10-04 ENCOUNTER — APPOINTMENT (OUTPATIENT)
Dept: GENERAL RADIOLOGY | Age: 44
DRG: 500 | End: 2024-10-04
Payer: MEDICAID

## 2024-10-04 ENCOUNTER — APPOINTMENT (OUTPATIENT)
Age: 44
DRG: 500 | End: 2024-10-04
Payer: MEDICAID

## 2024-10-04 LAB
ALBUMIN SERPL-MCNC: 3.4 G/DL (ref 3.5–5.2)
ALP SERPL-CCNC: 169 U/L (ref 40–129)
ALT SERPL-CCNC: 27 U/L (ref 0–40)
ANION GAP SERPL CALCULATED.3IONS-SCNC: 13 MMOL/L (ref 7–16)
AST SERPL-CCNC: 59 U/L (ref 0–39)
B.E.: 2.1 MMOL/L (ref -3–3)
BASOPHILS # BLD: 0 K/UL (ref 0–0.2)
BASOPHILS NFR BLD: 0 % (ref 0–2)
BILIRUB DIRECT SERPL-MCNC: 0.7 MG/DL (ref 0–0.3)
BILIRUB INDIRECT SERPL-MCNC: 0.9 MG/DL (ref 0–1)
BILIRUB SERPL-MCNC: 1.6 MG/DL (ref 0–1.2)
BILIRUB SERPL-MCNC: 1.6 MG/DL (ref 0–1.2)
BNP SERPL-MCNC: 2232 PG/ML (ref 0–125)
BUN SERPL-MCNC: 17 MG/DL (ref 6–20)
CALCIUM SERPL-MCNC: 9 MG/DL (ref 8.6–10.2)
CHLORIDE SERPL-SCNC: 102 MMOL/L (ref 98–107)
CO2 SERPL-SCNC: 24 MMOL/L (ref 22–29)
COHB: 1.2 % (ref 0–1.5)
CREAT SERPL-MCNC: 0.6 MG/DL (ref 0.7–1.2)
CRITICAL: ABNORMAL
DATE ANALYZED: ABNORMAL
DATE OF COLLECTION: ABNORMAL
ECHO AO ASC DIAM: 3 CM
ECHO AO ASCENDING AORTA INDEX: 1.58 CM/M2
ECHO AV AREA PEAK VELOCITY: 2.8 CM2
ECHO AV AREA VTI: 3 CM2
ECHO AV AREA/BSA PEAK VELOCITY: 1.5 CM2/M2
ECHO AV AREA/BSA VTI: 1.6 CM2/M2
ECHO AV CUSP MM: 2 CM
ECHO AV MEAN GRADIENT: 7 MMHG
ECHO AV MEAN VELOCITY: 1.2 M/S
ECHO AV PEAK GRADIENT: 12 MMHG
ECHO AV PEAK VELOCITY: 1.7 M/S
ECHO AV VELOCITY RATIO: 0.76
ECHO AV VTI: 27.5 CM
ECHO BSA: 1.93 M2
ECHO EST RA PRESSURE: 3 MMHG
ECHO LA DIAMETER INDEX: 1.84 CM/M2
ECHO LA DIAMETER: 3.5 CM
ECHO LA VOL A-L A2C: 26 ML (ref 18–58)
ECHO LA VOL A-L A4C: 35 ML (ref 18–58)
ECHO LA VOL MOD A2C: 24 ML (ref 18–58)
ECHO LA VOL MOD A4C: 33 ML (ref 18–58)
ECHO LA VOLUME AREA LENGTH: 32 ML
ECHO LA VOLUME INDEX A-L A2C: 14 ML/M2 (ref 16–34)
ECHO LA VOLUME INDEX A-L A4C: 18 ML/M2 (ref 16–34)
ECHO LA VOLUME INDEX AREA LENGTH: 17 ML/M2 (ref 16–34)
ECHO LA VOLUME INDEX MOD A2C: 13 ML/M2 (ref 16–34)
ECHO LA VOLUME INDEX MOD A4C: 17 ML/M2 (ref 16–34)
ECHO LV EF PHYSICIAN: 60 %
ECHO LV FRACTIONAL SHORTENING: 36 % (ref 28–44)
ECHO LV INTERNAL DIMENSION DIASTOLE INDEX: 2.47 CM/M2
ECHO LV INTERNAL DIMENSION DIASTOLIC: 4.7 CM (ref 4.2–5.9)
ECHO LV INTERNAL DIMENSION SYSTOLIC INDEX: 1.58 CM/M2
ECHO LV INTERNAL DIMENSION SYSTOLIC: 3 CM
ECHO LV ISOVOLUMETRIC RELAXATION TIME (IVRT): 60 MS
ECHO LV IVSD: 0.9 CM (ref 0.6–1)
ECHO LV IVSS: 1.3 CM
ECHO LV MASS 2D: 142.7 G (ref 88–224)
ECHO LV MASS INDEX 2D: 75.1 G/M2 (ref 49–115)
ECHO LV POSTERIOR WALL DIASTOLIC: 0.9 CM (ref 0.6–1)
ECHO LV POSTERIOR WALL SYSTOLIC: 1.1 CM
ECHO LV RELATIVE WALL THICKNESS RATIO: 0.38
ECHO LVOT AREA: 3.8 CM2
ECHO LVOT AV VTI INDEX: 0.82
ECHO LVOT DIAM: 2.2 CM
ECHO LVOT MEAN GRADIENT: 4 MMHG
ECHO LVOT PEAK GRADIENT: 7 MMHG
ECHO LVOT PEAK VELOCITY: 1.3 M/S
ECHO LVOT STROKE VOLUME INDEX: 45 ML/M2
ECHO LVOT SV: 85.5 ML
ECHO LVOT VTI: 22.5 CM
ECHO MV A VELOCITY: 0.76 M/S
ECHO MV AREA PHT: 5.4 CM2
ECHO MV AREA VTI: 4.9 CM2
ECHO MV E DECELERATION TIME (DT): 160.9 MS
ECHO MV E VELOCITY: 0.87 M/S
ECHO MV E/A RATIO: 1.14
ECHO MV LVOT VTI INDEX: 0.77
ECHO MV MAX VELOCITY: 0.8 M/S
ECHO MV MEAN GRADIENT: 2 MMHG
ECHO MV MEAN VELOCITY: 0.7 M/S
ECHO MV PEAK GRADIENT: 3 MMHG
ECHO MV PRESSURE HALF TIME (PHT): 40.9 MS
ECHO MV VTI: 17.3 CM
ECHO PULMONARY ARTERY END DIASTOLIC PRESSURE: 12 MMHG
ECHO PV MAX VELOCITY: 1 M/S
ECHO PV MEAN GRADIENT: 3 MMHG
ECHO PV MEAN VELOCITY: 0.8 M/S
ECHO PV PEAK GRADIENT: 4 MMHG
ECHO PV REGURGITANT MAX VELOCITY: 1.7 M/S
ECHO PV VTI: 20.1 CM
ECHO RIGHT VENTRICULAR SYSTOLIC PRESSURE (RVSP): 42 MMHG
ECHO RV INTERNAL DIMENSION: 3.1 CM
ECHO RV TAPSE: 2.5 CM (ref 1.7–?)
ECHO TV REGURGITANT MAX VELOCITY: 3.12 M/S
ECHO TV REGURGITANT PEAK GRADIENT: 39 MMHG
EOSINOPHIL # BLD: 0 K/UL (ref 0.05–0.5)
EOSINOPHILS RELATIVE PERCENT: 0 % (ref 0–6)
ERYTHROCYTE [DISTWIDTH] IN BLOOD BY AUTOMATED COUNT: 16.9 % (ref 11.5–15)
GFR, ESTIMATED: >90 ML/MIN/1.73M2
GLUCOSE SERPL-MCNC: 92 MG/DL (ref 74–99)
HCO3: 26.1 MMOL/L (ref 22–26)
HCT VFR BLD AUTO: 23.7 % (ref 37–54)
HGB BLD-MCNC: 7.8 G/DL (ref 12.5–16.5)
HHB: 7.4 % (ref 0–5)
LAB: ABNORMAL
LYMPHOCYTES NFR BLD: 0.18 K/UL (ref 1.5–4)
LYMPHOCYTES RELATIVE PERCENT: 4 % (ref 20–42)
Lab: 1005
MAGNESIUM SERPL-MCNC: 2.1 MG/DL (ref 1.6–2.6)
MCH RBC QN AUTO: 29.7 PG (ref 26–35)
MCHC RBC AUTO-ENTMCNC: 32.9 G/DL (ref 32–34.5)
MCV RBC AUTO: 90.1 FL (ref 80–99.9)
METHB: 0.4 % (ref 0–1.5)
MODE: ABNORMAL
MONOCYTES NFR BLD: 0.14 K/UL (ref 0.1–0.95)
MONOCYTES NFR BLD: 3 % (ref 2–12)
MYELOCYTES ABSOLUTE COUNT: 0.05 K/UL
MYELOCYTES: 1 %
NEUTROPHILS NFR BLD: 93 % (ref 43–80)
NEUTS SEG NFR BLD: 4.93 K/UL (ref 1.8–7.3)
NUCLEATED RED BLOOD CELLS: 4 PER 100 WBC
O2 SATURATION: 92.5 % (ref 92–98.5)
O2HB: 91 % (ref 94–97)
OPERATOR ID: ABNORMAL
PATIENT TEMP: 37 C
PCO2: 38.2 MMHG (ref 35–45)
PH BLOOD GAS: 7.45 (ref 7.35–7.45)
PHOSPHORUS,UR: 81.5 MG/DL (ref 40–136)
PLATELET CONFIRMATION: NORMAL
PLATELET, FLUORESCENCE: 17 K/UL (ref 130–450)
PMV BLD AUTO: ABNORMAL FL (ref 7–12)
PO2: 66.2 MMHG (ref 75–100)
POTASSIUM SERPL-SCNC: 3.5 MMOL/L (ref 3.5–5)
PROT SERPL-MCNC: 5.9 G/DL (ref 6.4–8.3)
PTH-INTACT SERPL-MCNC: 43.3 PG/ML (ref 15–65)
RBC # BLD AUTO: 2.63 M/UL (ref 3.8–5.8)
RBC # BLD: ABNORMAL 10*6/UL
SODIUM SERPL-SCNC: 139 MMOL/L (ref 132–146)
SOURCE, BLOOD GAS: ABNORMAL
THB: 9.1 G/DL (ref 11.5–16.5)
TIME ANALYZED: 1013
WBC OTHER # BLD: 5.3 K/UL (ref 4.5–11.5)

## 2024-10-04 PROCEDURE — 99233 SBSQ HOSP IP/OBS HIGH 50: CPT | Performed by: INTERNAL MEDICINE

## 2024-10-04 PROCEDURE — 82805 BLOOD GASES W/O2 SATURATION: CPT

## 2024-10-04 PROCEDURE — 2580000003 HC RX 258

## 2024-10-04 PROCEDURE — 71045 X-RAY EXAM CHEST 1 VIEW: CPT

## 2024-10-04 PROCEDURE — 6370000000 HC RX 637 (ALT 250 FOR IP): Performed by: INTERNAL MEDICINE

## 2024-10-04 PROCEDURE — 99231 SBSQ HOSP IP/OBS SF/LOW 25: CPT | Performed by: NURSE PRACTITIONER

## 2024-10-04 PROCEDURE — 6370000000 HC RX 637 (ALT 250 FOR IP): Performed by: NURSE PRACTITIONER

## 2024-10-04 PROCEDURE — 6360000002 HC RX W HCPCS

## 2024-10-04 PROCEDURE — 83970 ASSAY OF PARATHORMONE: CPT

## 2024-10-04 PROCEDURE — 82248 BILIRUBIN DIRECT: CPT

## 2024-10-04 PROCEDURE — C8929 TTE W OR WO FOL WCON,DOPPLER: HCPCS

## 2024-10-04 PROCEDURE — 94669 MECHANICAL CHEST WALL OSCILL: CPT

## 2024-10-04 PROCEDURE — 6370000000 HC RX 637 (ALT 250 FOR IP)

## 2024-10-04 PROCEDURE — 85025 COMPLETE CBC W/AUTO DIFF WBC: CPT

## 2024-10-04 PROCEDURE — 83880 ASSAY OF NATRIURETIC PEPTIDE: CPT

## 2024-10-04 PROCEDURE — 36600 WITHDRAWAL OF ARTERIAL BLOOD: CPT

## 2024-10-04 PROCEDURE — 83735 ASSAY OF MAGNESIUM: CPT

## 2024-10-04 PROCEDURE — 2500000003 HC RX 250 WO HCPCS: Performed by: NURSE PRACTITIONER

## 2024-10-04 PROCEDURE — 94640 AIRWAY INHALATION TREATMENT: CPT

## 2024-10-04 PROCEDURE — 2140000000 HC CCU INTERMEDIATE R&B

## 2024-10-04 PROCEDURE — 93306 TTE W/DOPPLER COMPLETE: CPT | Performed by: INTERNAL MEDICINE

## 2024-10-04 PROCEDURE — 36415 COLL VENOUS BLD VENIPUNCTURE: CPT

## 2024-10-04 PROCEDURE — 99232 SBSQ HOSP IP/OBS MODERATE 35: CPT

## 2024-10-04 PROCEDURE — 80053 COMPREHEN METABOLIC PANEL: CPT

## 2024-10-04 PROCEDURE — 2700000000 HC OXYGEN THERAPY PER DAY

## 2024-10-04 PROCEDURE — 6360000004 HC RX CONTRAST MEDICATION

## 2024-10-04 RX ORDER — FUROSEMIDE 10 MG/ML
20 INJECTION INTRAMUSCULAR; INTRAVENOUS 2 TIMES DAILY
Status: COMPLETED | OUTPATIENT
Start: 2024-10-05 | End: 2024-10-05

## 2024-10-04 RX ORDER — OXYCODONE HYDROCHLORIDE 15 MG/1
15 TABLET ORAL
Status: DISCONTINUED | OUTPATIENT
Start: 2024-10-04 | End: 2024-10-10

## 2024-10-04 RX ORDER — FUROSEMIDE 10 MG/ML
20 INJECTION INTRAMUSCULAR; INTRAVENOUS 2 TIMES DAILY
Status: COMPLETED | OUTPATIENT
Start: 2024-10-04 | End: 2024-10-04

## 2024-10-04 RX ADMIN — BUDESONIDE 500 MCG: 0.5 INHALANT RESPIRATORY (INHALATION) at 08:24

## 2024-10-04 RX ADMIN — SUCRALFATE 1 G: 1 TABLET ORAL at 12:14

## 2024-10-04 RX ADMIN — SENNOSIDES AND DOCUSATE SODIUM 2 TABLET: 50; 8.6 TABLET ORAL at 19:45

## 2024-10-04 RX ADMIN — SODIUM CHLORIDE, PRESERVATIVE FREE 10 ML: 5 INJECTION INTRAVENOUS at 08:50

## 2024-10-04 RX ADMIN — TRANEXAMIC ACID 500 MG: 100 INJECTION, SOLUTION INTRAVENOUS at 14:09

## 2024-10-04 RX ADMIN — HYDROMORPHONE HYDROCHLORIDE 1 MG: 1 INJECTION, SOLUTION INTRAMUSCULAR; INTRAVENOUS; SUBCUTANEOUS at 09:48

## 2024-10-04 RX ADMIN — SUCRALFATE 1 G: 1 TABLET ORAL at 00:02

## 2024-10-04 RX ADMIN — PANTOPRAZOLE SODIUM 40 MG: 40 TABLET, DELAYED RELEASE ORAL at 05:03

## 2024-10-04 RX ADMIN — IPRATROPIUM BROMIDE AND ALBUTEROL SULFATE 1 DOSE: 2.5; .5 SOLUTION RESPIRATORY (INHALATION) at 17:16

## 2024-10-04 RX ADMIN — BUDESONIDE 500 MCG: 0.5 INHALANT RESPIRATORY (INHALATION) at 21:38

## 2024-10-04 RX ADMIN — OXYCODONE HYDROCHLORIDE 15 MG: 15 TABLET ORAL at 22:53

## 2024-10-04 RX ADMIN — OXYCODONE HYDROCHLORIDE 15 MG: 15 TABLET ORAL at 16:46

## 2024-10-04 RX ADMIN — ALBUTEROL SULFATE 2.5 MG: 2.5 SOLUTION RESPIRATORY (INHALATION) at 01:14

## 2024-10-04 RX ADMIN — METHOCARBAMOL TABLETS 750 MG: 750 TABLET, COATED ORAL at 19:45

## 2024-10-04 RX ADMIN — IPRATROPIUM BROMIDE AND ALBUTEROL SULFATE 1 DOSE: 2.5; .5 SOLUTION RESPIRATORY (INHALATION) at 08:23

## 2024-10-04 RX ADMIN — ONDANSETRON 4 MG: 2 INJECTION INTRAMUSCULAR; INTRAVENOUS at 06:58

## 2024-10-04 RX ADMIN — OXYCODONE HYDROCHLORIDE 15 MG: 15 TABLET ORAL at 19:45

## 2024-10-04 RX ADMIN — SODIUM CHLORIDE, PRESERVATIVE FREE 10 ML: 5 INJECTION INTRAVENOUS at 19:46

## 2024-10-04 RX ADMIN — SUCRALFATE 1 G: 1 TABLET ORAL at 17:54

## 2024-10-04 RX ADMIN — OXYCODONE HYDROCHLORIDE 15 MG: 15 TABLET ORAL at 04:03

## 2024-10-04 RX ADMIN — TRANEXAMIC ACID 500 MG: 100 INJECTION, SOLUTION INTRAVENOUS at 08:51

## 2024-10-04 RX ADMIN — FUROSEMIDE 20 MG: 10 INJECTION, SOLUTION INTRAMUSCULAR; INTRAVENOUS at 07:46

## 2024-10-04 RX ADMIN — IPRATROPIUM BROMIDE AND ALBUTEROL SULFATE 1 DOSE: 2.5; .5 SOLUTION RESPIRATORY (INHALATION) at 12:24

## 2024-10-04 RX ADMIN — HYDROMORPHONE HYDROCHLORIDE 1 MG: 1 INJECTION, SOLUTION INTRAMUSCULAR; INTRAVENOUS; SUBCUTANEOUS at 01:25

## 2024-10-04 RX ADMIN — METHOCARBAMOL TABLETS 750 MG: 750 TABLET, COATED ORAL at 08:49

## 2024-10-04 RX ADMIN — HYDROMORPHONE HYDROCHLORIDE 1 MG: 1 INJECTION, SOLUTION INTRAMUSCULAR; INTRAVENOUS; SUBCUTANEOUS at 05:00

## 2024-10-04 RX ADMIN — HYDROMORPHONE HYDROCHLORIDE 0.5 MG: 1 INJECTION, SOLUTION INTRAMUSCULAR; INTRAVENOUS; SUBCUTANEOUS at 07:47

## 2024-10-04 RX ADMIN — HYDROMORPHONE HYDROCHLORIDE 1 MG: 1 INJECTION, SOLUTION INTRAMUSCULAR; INTRAVENOUS; SUBCUTANEOUS at 13:53

## 2024-10-04 RX ADMIN — SENNOSIDES AND DOCUSATE SODIUM 2 TABLET: 50; 8.6 TABLET ORAL at 08:45

## 2024-10-04 RX ADMIN — IPRATROPIUM BROMIDE AND ALBUTEROL SULFATE 1 DOSE: 2.5; .5 SOLUTION RESPIRATORY (INHALATION) at 21:38

## 2024-10-04 RX ADMIN — HYDROMORPHONE HYDROCHLORIDE 1 MG: 1 INJECTION, SOLUTION INTRAMUSCULAR; INTRAVENOUS; SUBCUTANEOUS at 21:45

## 2024-10-04 RX ADMIN — BICALUTAMIDE 50 MG: 50 TABLET, FILM COATED ORAL at 08:45

## 2024-10-04 RX ADMIN — POLYETHYLENE GLYCOL 3350 17 G: 17 POWDER, FOR SOLUTION ORAL at 08:49

## 2024-10-04 RX ADMIN — Medication 2000 UNITS: at 08:45

## 2024-10-04 RX ADMIN — OXYCODONE HYDROCHLORIDE 15 MG: 15 TABLET ORAL at 12:44

## 2024-10-04 RX ADMIN — FUROSEMIDE 20 MG: 10 INJECTION, SOLUTION INTRAMUSCULAR; INTRAVENOUS at 17:54

## 2024-10-04 RX ADMIN — TRANEXAMIC ACID 500 MG: 100 INJECTION, SOLUTION INTRAVENOUS at 21:38

## 2024-10-04 RX ADMIN — OXYCODONE HYDROCHLORIDE 15 MG: 15 TABLET ORAL at 08:46

## 2024-10-04 RX ADMIN — SUCRALFATE 1 G: 1 TABLET ORAL at 05:03

## 2024-10-04 RX ADMIN — ACETAMINOPHEN 650 MG: 325 TABLET ORAL at 05:12

## 2024-10-04 RX ADMIN — PERFLUTREN 1.5 ML: 6.52 INJECTION, SUSPENSION INTRAVENOUS at 15:35

## 2024-10-04 RX ADMIN — HYDROMORPHONE HYDROCHLORIDE 1 MG: 1 INJECTION, SOLUTION INTRAMUSCULAR; INTRAVENOUS; SUBCUTANEOUS at 18:45

## 2024-10-04 ASSESSMENT — PAIN DESCRIPTION - ORIENTATION
ORIENTATION: LEFT;RIGHT;MID;INNER
ORIENTATION: RIGHT;LEFT
ORIENTATION: LEFT;RIGHT;MID;INNER
ORIENTATION: RIGHT;LEFT;INNER;MID
ORIENTATION: RIGHT;LEFT
ORIENTATION: LEFT;RIGHT;MID;INNER
ORIENTATION: RIGHT;LEFT;MID;INNER
ORIENTATION: LEFT
ORIENTATION: RIGHT;LEFT

## 2024-10-04 ASSESSMENT — PAIN DESCRIPTION - LOCATION
LOCATION: BACK;RIB CAGE
LOCATION: RIB CAGE
LOCATION: RIB CAGE
LOCATION: GENERALIZED
LOCATION: BACK;RIB CAGE
LOCATION: RIB CAGE
LOCATION: CHEST;RIB CAGE
LOCATION: ABDOMEN;RIB CAGE
LOCATION: RIB CAGE
LOCATION: RIB CAGE;BACK

## 2024-10-04 ASSESSMENT — PAIN SCALES - GENERAL
PAINLEVEL_OUTOF10: 10
PAINLEVEL_OUTOF10: 9
PAINLEVEL_OUTOF10: 9
PAINLEVEL_OUTOF10: 10
PAINLEVEL_OUTOF10: 9
PAINLEVEL_OUTOF10: 9
PAINLEVEL_OUTOF10: 6
PAINLEVEL_OUTOF10: 10
PAINLEVEL_OUTOF10: 10
PAINLEVEL_OUTOF10: 8
PAINLEVEL_OUTOF10: 4
PAINLEVEL_OUTOF10: 10
PAINLEVEL_OUTOF10: 10
PAINLEVEL_OUTOF10: 9
PAINLEVEL_OUTOF10: 9
PAINLEVEL_OUTOF10: 8
PAINLEVEL_OUTOF10: 10
PAINLEVEL_OUTOF10: 9
PAINLEVEL_OUTOF10: 8
PAINLEVEL_OUTOF10: 3
PAINLEVEL_OUTOF10: 9
PAINLEVEL_OUTOF10: 9
PAINLEVEL_OUTOF10: 10
PAINLEVEL_OUTOF10: 6
PAINLEVEL_OUTOF10: 9
PAINLEVEL_OUTOF10: 6

## 2024-10-04 ASSESSMENT — PAIN - FUNCTIONAL ASSESSMENT
PAIN_FUNCTIONAL_ASSESSMENT: PREVENTS OR INTERFERES SOME ACTIVE ACTIVITIES AND ADLS
PAIN_FUNCTIONAL_ASSESSMENT: ACTIVITIES ARE NOT PREVENTED
PAIN_FUNCTIONAL_ASSESSMENT: PREVENTS OR INTERFERES SOME ACTIVE ACTIVITIES AND ADLS
PAIN_FUNCTIONAL_ASSESSMENT: ACTIVITIES ARE NOT PREVENTED
PAIN_FUNCTIONAL_ASSESSMENT: ACTIVITIES ARE NOT PREVENTED
PAIN_FUNCTIONAL_ASSESSMENT: PREVENTS OR INTERFERES SOME ACTIVE ACTIVITIES AND ADLS
PAIN_FUNCTIONAL_ASSESSMENT: PREVENTS OR INTERFERES SOME ACTIVE ACTIVITIES AND ADLS

## 2024-10-04 ASSESSMENT — PAIN SCALES - WONG BAKER
WONGBAKER_NUMERICALRESPONSE: NO HURT

## 2024-10-04 ASSESSMENT — PAIN DESCRIPTION - DESCRIPTORS
DESCRIPTORS: ACHING;DISCOMFORT;SORE
DESCRIPTORS: ACHING;DISCOMFORT;SORE
DESCRIPTORS: ACHING;DISCOMFORT;NAGGING
DESCRIPTORS: ACHING;DISCOMFORT;SORE
DESCRIPTORS: ACHING;DISCOMFORT;NAGGING
DESCRIPTORS: ACHING;DISCOMFORT;SORE
DESCRIPTORS: DISCOMFORT;NAGGING;SORE
DESCRIPTORS: DISCOMFORT;NAGGING;SORE
DESCRIPTORS: ACHING;DISCOMFORT;SORE
DESCRIPTORS: DISCOMFORT;NAGGING;SORE

## 2024-10-04 NOTE — PROGRESS NOTES
below    Radiology:  XR CHEST PORTABLE   Final Result   Stable generalized airspace opacities throughout both lungs suggestive of   bilateral pneumonia or interstitial pulmonary edema with probable right   pleural effusion.         XR CHEST (2 VW)   Final Result   Interval worsening of bilateral diffuse patchy airspace opacities now with   bilateral pleural effusions.  No pneumothorax.         XR CHEST PORTABLE   Final Result   Interval progression of bilateral diffuse patchy pulmonary alveolar   infiltrates.         XR THORACIC SPINE (3 VIEWS)   Final Result   1. No acute abnormality of the thoracic spine.   2. Unchanged biconcave deformities within the midthoracic spine.   3. Heterogeneous marrow density, compatible with known osseous metastatic   disease.   4. Heterogeneous lung parenchyma compatible with ground-glass opacities   identified on the recent CT.   No acute abnormality of the thoracic spine         CTA CHEST W CONTRAST   Final Result   1. No evidence of pulmonary embolism.   2. Abnormal patchy ground-glass opacity identified diffusely throughout the   lung fields. Findings show progression when compared to the prior study with   no definite pleural effusion or pneumothorax. Findings suggesting progression   of disease favoring an infectious/inflammatory process or however   lymphangitic spread of disease given mediastinal adenopathy and abnormal bony   metastatic process within the ribs and spine cannot be completely excluded   and clinical correlation is needed..   3. Abnormal bony metastatic disease within the spine. There is a bony   destructive process involving the left anterior 2nd rib. Associated soft   tissue thickening.   4. No acute intra-abdominal or intrapelvic process.  Stable adenopathy   identified throughout the retroperitoneum and left pelvic sidewall.  Stable   bony Mets metastatic disease throughout the spine and pelvis as well as the   hips bilaterally.         CT ABDOMEN PELVIS W  Nubeqa LHRH agonist as an outpatient  -Monitor electrolytes daily      Bilateral Leg Edema   -Lasix 3 doses  -Follow ProBNP   -IV fluids dc'd    Acute hypoxic respiratory failure  Hemoptysis  Mediastinal LAD  Pleural effusion   -Patient not on oxygen at home. Now on 8L of oxygen   -Pulmonology consulted  -Continue BT   -Possible bronchoscopy but on hold due to thrombocytopenia.  -On inhaled Txa.   -Completed solumedrol.   -Chest Xray 10/04 - Mild improvement compared to 10/03  -Lasix 20 Iv 3 doses.   -ABG's and chest Xray  -Consider CTA pulm.     Anemia - stable   Thrombocytopenia - improved   -Hg 5.3 on arrival.  Transfused 6 units total since admission   -Transfused platelets 3x.   -Platelet transfused 09/30/2024  -Monitor CBC daily   -Transfuse Hg <7  -Transfuse platelets <10k or active bleeding   -Trend H&H q12    Hypokalemia   Hypophos  -replete as needed  -Pending PTH, PTHrp, vitamin D, uric acid, ionized calcium, urine phos  -Repeat BMP at 3 pm      DVT/GI ppx: PCD's/Protonix  Pain: Dilaudid, oxy,   GI regimen: Senna S & GlycoLax scheduled  PT/OT: 15/24, not able to tolerat PT at this time  Dispo: Continue present management   Telemetry Day: 10/02 since last renewal      Electronically signed by Anastasia Helms MD on 10/4/2024 at 8:17 AM  Attending physician: Dr. Avila

## 2024-10-04 NOTE — PROGRESS NOTES
Palliative Care Department  [phone]  Palliative Care Progress Note  Provider Zora Jacinto, APRN - CNP      PATIENT: Joseph Bond  : 1980  MRN: [id number]  ADMISSION DATE: 2024  4:56 AM  Referring Provider:  Anastasia Helms MD    Palliative Medicine was consulted on hospital day 8 for assistance with Goals of care, Overwhelmed Symptoms    HPI:     Clinical Summary:Joseph Bond is a 44 y.o. y/o male with a history of prostate cancer with mediastinal lymphadenopathy, extensive metastasis of the spine, rib cage, pelvis, humerus and femurs (had refused systemic therapy, pursuing homeopathic), completed palliative radiation treatment to spine, polysubstance abuse including opioids and tobacco, abstinent for 5 years, diverticulitis, with recent ER visit on 2024, due to shortness of breath, was going to be admitted for further medical management however left AMA, was supposed to follow-up outpatient with palliative medicine for symptom management, however missed appointment, did not return phone call to reschedule appointment, who presented to [facility] on 2024 with complaint of nausea, vomiting, left-sided rib pain.  At ED, found hypoxic 78% on room air, placed on 4 L nasal cannula.  Significant laboratory findings showed hemoglobin 5.3, platelets 18, alkaline phosphate 227, AST 76, lipase 8, WBC 4.2.  Received 2 packed red blood cell and sixpack platelets.  CT images showed disease progression when compared to prior images, favoring an infectious/inflammatory process or lymphangitic spread of disease given mediastinal adenopathy and abnormal bony metastatic process within the rib and spine.  Abnormal bony metastatic disease within the spine.  Bony destructive process involving the left anterior second rib.  Stable adenopathy throughout the retroperitoneal and pelvic sidewall,.  Stable bony metastatic disease throughout the spine, pelvis and hip bilaterally.  Palliative medicine

## 2024-10-04 NOTE — PROGRESS NOTES
[facility]  Family Medicine Attending    S: 44 y.o. male with a history of prostate cancer, anxiety, opiate abuse who presented with nausea and vomiting as well as left sided abdominal pain.  He was found to have hypoxia and severe symptomatic anemia with hgb 5.3.  He has been having nausea with vomiting, pelvic and back pain.  Has not been able to walk due to pain for a couple of weeks.  Goals of care-pain control so that he can walk again.  He wanted more information on side effects from a regimen of chemotherapy drugs from the oncologist.  Oncology was consulted.  Per their note, \"Considering high tumor burden, advised treatment with Taxotere, GnRH agonist, Nubeqa along with Xgeva\"  Side effects of these medications were discussed with the patient by oncology.     Patient now thinking he would like to move forward with chemotherapy.    Palliative care following patient.  Pain is still too severe to walk.    He also states that when he tries to stand up the pain is mostly in a band around his upper abdomen.  He says he has no difficulty with his legs when standing. Patients notes some small amounts of blood in his mucous if he coughs.      9/30-patient reports that he is not feeling well but not willing to elaborate.  To start transfusion for hemoglobin of 6.5. No change in breathing. Denies any active bleeding today. Rib pain where mets are located.    10/1-s/p 2 units PRBCS yesterday.  This morning stating that oxygen was increased overnight to 11L HF NC, now at 10. But now denying dyspnea, some mucus with blood upon coughing. He denies that he is having depressive symptoms.    10/2-Patient reports he is feeling short of breath today. Coughing up some blood tinged mucus.     10/3-Patient reports ongoing shortness of breath but believes it is better. Some chills.  He is trying to do incentive spirometry.  Less blood in his sputum. Worked wit pt/ot and walked. Improved mentally.    10/4-Pt had

## 2024-10-04 NOTE — PROGRESS NOTES
Attempted to wean patient off non-rebreather to 14L high flow nasal canula. Patient SpO2 dropped quickly to 86%. Placed patient back on non-rebreather and SpO2 improved to 96%.     Robby Marquez RN

## 2024-10-04 NOTE — PLAN OF CARE
Problem: Discharge Planning  Goal: Discharge to home or other facility with appropriate resources  10/4/2024 0956 by Laurie Jaquez, RN  Outcome: Progressing     Problem: Pain  Goal: Verbalizes/displays adequate comfort level or baseline comfort level  10/4/2024 0956 by Laurie Jaquez, RN  Outcome: Progressing     Problem: Safety - Adult  Goal: Free from fall injury  10/4/2024 0956 by Laurie Jaquez, RN  Outcome: Progressing     Problem: ABCDS Injury Assessment  Goal: Absence of physical injury  10/4/2024 0956 by Laurie Jaquez, RN  Outcome: Progressing     Problem: Nutrition Deficit:  Goal: Optimize nutritional status  10/4/2024 0956 by Laurie Jaquez, RN  Outcome: Progressing

## 2024-10-04 NOTE — PROGRESS NOTES
Sent message to Dr. Helms regarding patient need for more O2 as patient is currently on non-rebreather mask. Patient was given 20mg Lasix but is also on 100mL/hr IV fluid. Asked provider if fluids should be held for now.    Robby Marquez RN

## 2024-10-04 NOTE — PROGRESS NOTES
Dr. Kendrick notified of [facility] this morning.   Mart-1 - Negative Histology Text: MART-1 staining demonstrates a normal density and pattern of melanocytes along the dermal-epidermal junction. The surgical margins are negative for tumor cells.

## 2024-10-04 NOTE — PROGRESS NOTES
[facility]  Department of Internal Medicine  Division of Pulmonary, Critical Care and Sleep Medicine  Progress Note      Ed AMIE Pinedakevin Kendrick DO Kofi Mcadams, APRN-CNP  Zonia Lozanodeliomykel, APRN-CNP        SUBJECTIVE:  Patient seen and examined.  He was seen on 15 L high flow nasal cannula.  He reports he had a terrible night with worsening pain and increased shortness of breath with increased oxygen requirements.  ABGs noted.  Lasix was started.  Chest x-ray was reviewed from this morning and shows improvement.  Family remains at the bedside.  They are asking for olive oil for his chapped lips.      OBJECTIVE:     PHYSICAL EXAM:   VITALS:   Vitals:    10/04/24 0916 10/04/24 0948 10/04/24 1008 10/04/24 1018   BP:       Pulse:       Resp: 22 22  28   Temp:       TempSrc:       SpO2:       Weight:       Height:   1.702 m (5' 7\")         Intake/Output Summary (Last 24 hours) at 10/4/2024 1121  Last data filed at 10/4/2024 0949  Gross per 24 hour   Intake 120 ml   Output 1550 ml   Net -1430 ml        CONSTITUTIONAL:   Ill-appearing, pale, weak, A&O x 3, NAD  SKIN:     No rash, No suspicious lesions, No skin discoloration  HEENT:     EOMI, MMM, No thrush  NECK:    No bruits, No JVP appreciated  CV:      Sinus,  No murmur, No rubs, No gallops  PULMONARY:   Coarse, bilateral Rales,  No Wheezing, No Rhonchi      No noted egophony  ABDOMEN:     Soft, non-tender. BS normal. No R/R/G  EXT:    No deformities .  No clubbing.       1-2+ lower extremity edema, No venous stasis  PULSE:   Appears equal and palpable.  PSYCHIATRIC:  Seems appropriate, No acute psychosis  MS:    No fractures, No gross weakness  NEUROLOGIC:   The clinical assessment is non-focal     DATA: IMAGING & TESTING:     LABORATORY TESTS:        PRO-BNP:   Lab Results   Component Value Date    PROBNP 1,170 (H)

## 2024-10-04 NOTE — PROGRESS NOTES
Comprehensive Nutrition Assessment    Type and Reason for Visit:  Reassess    Nutrition Recommendations/Plan:   Recommend to modify ONS.    Recommend and start Zahida TaskEasy organic nutritional supplement BID and Gelatein high protein supplement BID to help meet increased nutritional needs and d/t decreased po intake of meals.         Malnutrition Assessment:  Malnutrition Status:  Severe malnutrition (09/29/24 1218)    Context:  Chronic Illness     Findings of the 6 clinical characteristics of malnutrition:  Energy Intake:  75% or less estimated energy requirements for 1 month or longer  Weight Loss:  Greater than 5% over 1 month (~10% x ~2 months)     Body Fat Loss:  Unable to assess (2/2 SAURAV for radiology at this time)     Muscle Mass Loss:  Unable to assess    Fluid Accumulation:  Unable to assess (2/2 multifactorial at this time)     Strength:  Not Performed    Nutrition Assessment:    Patients po intake has been sporadic and decreased, averaging 25-50% of meals served ; adm w/ abd pain and N/V ; noted hemoptysis ; hx of stage IV prostate CA w/ widespread bone mets s/p XRT (7/23) ; noted mediastinal lymphadenopathy ; noted acute hypoxic respiratory failure with tachycardia ; hx of diverticulitis/opioid abuse/lymphedenopathy ; hx of malnutrition ; pt does meet criteria for severe malnutrition ; noted thrombocytopenia/anemia/SOB/leg swelling ; will provide updated recommendations    Nutrition Related Findings:    +I&Os (+10.9 L), trace edema, A&O x 4, active BS, decreased appetite, tenderness to abd, elevated LFT's ; Wound Type: None       Current Nutrition Intake & Therapies:    Average Meal Intake: 26-50%  Average Supplements Intake: 26-50%  ADULT ORAL NUTRITION SUPPLEMENT; Breakfast, Dinner; Standard High Calorie/High Protein Oral Supplement  ADULT ORAL NUTRITION SUPPLEMENT; Lunch; Frozen Oral Supplement  ADULT DIET; Regular; NO PORK    Anthropometric Measures:  Height: 170.2 cm (5' 7\")  Ideal Body Weight

## 2024-10-04 NOTE — PROGRESS NOTES
medical and surgical history, Medications and Allergies.    O:  /72   Pulse (!) 115   Temp 97.3 °F (36.3 °C) (Temporal)   Resp (!) 37   Ht 1.702 m (5' 7\")   Wt 78.7 kg (173 lb 8 oz)   SpO2 96%   BMI 27.17 kg/m²   24 hour I&O: I/O last 3 completed shifts:  In: 09631.3 [P.O.:240; I.V.:29920.3]  Out: 950 [Urine:950]  No intake/output data recorded.       Physical Exam  Constitutional:       Appearance: Normal appearance.   HENT:      Head: Normocephalic.   Cardiovascular:      Rate and Rhythm: Normal rate and regular rhythm.   Pulmonary:      Effort: Accessory muscle usage and respiratory distress present.      Breath sounds: Normal breath sounds.      Comments: On 15L of oxygen.   Neurological:      Mental Status: He is alert.           Labs:  Na/K/Cl/CO2:  139/3.5/102/24 (10/04 0639)  BUN/Cr/glu/ALT/AST/amyl/lip:  17/0.6/--/27/59/--/-- (10/04 0639)  WBC/Hgb/Hct/Plts:  5.3/7.8/23.7/-- (10/04 0639)  estimated creatinine clearance is 145 mL/min (A) (based on SCr of 0.6 mg/dL (L)).  Other pertinent labs as noted below    Radiology:  XR CHEST PORTABLE   Final Result   Stable generalized airspace opacities throughout both lungs suggestive of   bilateral pneumonia or interstitial pulmonary edema with probable right   pleural effusion.         XR CHEST (2 VW)   Final Result   Interval worsening of bilateral diffuse patchy airspace opacities now with   bilateral pleural effusions.  No pneumothorax.         XR CHEST PORTABLE   Final Result   Interval progression of bilateral diffuse patchy pulmonary alveolar   infiltrates.         XR THORACIC SPINE (3 VIEWS)   Final Result   1. No acute abnormality of the thoracic spine.   2. Unchanged biconcave deformities within the midthoracic spine.   3. Heterogeneous marrow density, compatible with known osseous metastatic   disease.   4. Heterogeneous lung parenchyma compatible with ground-glass opacities   identified on the recent CT.   No acute abnormality of the thoracic  spine         CTA CHEST W CONTRAST   Final Result   1. No evidence of pulmonary embolism.   2. Abnormal patchy ground-glass opacity identified diffusely throughout the   lung fields. Findings show progression when compared to the prior study with   no definite pleural effusion or pneumothorax. Findings suggesting progression   of disease favoring an infectious/inflammatory process or however   lymphangitic spread of disease given mediastinal adenopathy and abnormal bony   metastatic process within the ribs and spine cannot be completely excluded   and clinical correlation is needed..   3. Abnormal bony metastatic disease within the spine. There is a bony   destructive process involving the left anterior 2nd rib. Associated soft   tissue thickening.   4. No acute intra-abdominal or intrapelvic process.  Stable adenopathy   identified throughout the retroperitoneum and left pelvic sidewall.  Stable   bony Mets metastatic disease throughout the spine and pelvis as well as the   hips bilaterally.         CT ABDOMEN PELVIS W IV CONTRAST Additional Contrast? None   Final Result   1. No evidence of pulmonary embolism.   2. Abnormal patchy ground-glass opacity identified diffusely throughout the   lung fields. Findings show progression when compared to the prior study with   no definite pleural effusion or pneumothorax. Findings suggesting progression   of disease favoring an infectious/inflammatory process or however   lymphangitic spread of disease given mediastinal adenopathy and abnormal bony   metastatic process within the ribs and spine cannot be completely excluded   and clinical correlation is needed..   3. Abnormal bony metastatic disease within the spine. There is a bony   destructive process involving the left anterior 2nd rib. Associated soft   tissue thickening.   4. No acute intra-abdominal or intrapelvic process.  Stable adenopathy   identified throughout the retroperitoneum and left pelvic sidewall.  Stable

## 2024-10-04 NOTE — CARE COORDINATION
Care Coordination:  Following for discharge planning.  Chart reviewed.  Met with patient and mother and sister at bedside.  Weaning oxygen, now on 12 L .  Palliative following for pain management.  IV pain meds at times.  Patient and family state goal is home when medically stable.  Sister will be staying with him as full time caregiver.  [place] home care and palliative care will resume.  They have a hospital bed and wheelchair , .  Need oxygen set up when stable for home delivery of oxygen.  [facility] is following.  Anticipate need for possible ambulance transport home.  Will follow.

## 2024-10-04 NOTE — PATIENT CARE CONFERENCE
[facility] Quality Flow/Interdisciplinary Rounds Progress Note        Quality Flow Rounds held on October 4, 2024    Disciplines Attending:  Bedside Nurse, , , and Nursing Unit Leadership    Joseph Bond was admitted on 9/26/2024  4:56 AM    Anticipated Discharge Date:       Disposition:    Simon Score:  Simon Scale Score: 19    Readmission Risk              Risk of Unplanned Readmission:  39           Discussed patient goal for the day, patient clinical progression, and barriers to discharge.  The following Goal(s) of the Day/Commitment(s) have been identified:  Diagnostics - Report Results and Labs - Report Results and manage pain      Josephine Hatfield RN  October 4, 2024

## 2024-10-04 NOTE — PROGRESS NOTES
Notified attending that pt. resp rate is still in the 30s. His complexion is gray and he is not having any relief from pain. Asked if she is able to come assess.

## 2024-10-04 NOTE — PROGRESS NOTES
Called Xray department about change from 2 view Xray to portable Xray STAT.     Robby Marquez RN

## 2024-10-05 ENCOUNTER — APPOINTMENT (OUTPATIENT)
Dept: CT IMAGING | Age: 44
DRG: 500 | End: 2024-10-05
Payer: MEDICAID

## 2024-10-05 LAB
ALBUMIN SERPL-MCNC: 2.9 G/DL (ref 3.5–5.2)
ALP SERPL-CCNC: 144 U/L (ref 40–129)
ALT SERPL-CCNC: 21 U/L (ref 0–40)
ANION GAP SERPL CALCULATED.3IONS-SCNC: 10 MMOL/L (ref 7–16)
AST SERPL-CCNC: 39 U/L (ref 0–39)
BASOPHILS # BLD: 0 K/UL (ref 0–0.2)
BASOPHILS NFR BLD: 0 % (ref 0–2)
BILIRUB SERPL-MCNC: 1.5 MG/DL (ref 0–1.2)
BUN SERPL-MCNC: 16 MG/DL (ref 6–20)
CALCIUM SERPL-MCNC: 8.6 MG/DL (ref 8.6–10.2)
CHLORIDE SERPL-SCNC: 101 MMOL/L (ref 98–107)
CO2 SERPL-SCNC: 28 MMOL/L (ref 22–29)
CREAT SERPL-MCNC: 0.6 MG/DL (ref 0.7–1.2)
EOSINOPHIL # BLD: 0 K/UL (ref 0.05–0.5)
EOSINOPHILS RELATIVE PERCENT: 0 % (ref 0–6)
ERYTHROCYTE [DISTWIDTH] IN BLOOD BY AUTOMATED COUNT: 17.1 % (ref 11.5–15)
GFR, ESTIMATED: >90 ML/MIN/1.73M2
GLUCOSE SERPL-MCNC: 90 MG/DL (ref 74–99)
HCT VFR BLD AUTO: 20.2 % (ref 37–54)
HCT VFR BLD AUTO: 23 % (ref 37–54)
HGB BLD-MCNC: 6.4 G/DL (ref 12.5–16.5)
HGB BLD-MCNC: 7.6 G/DL (ref 12.5–16.5)
LYMPHOCYTES NFR BLD: 0.25 K/UL (ref 1.5–4)
LYMPHOCYTES RELATIVE PERCENT: 6 % (ref 20–42)
MAGNESIUM SERPL-MCNC: 2.2 MG/DL (ref 1.6–2.6)
MCH RBC QN AUTO: 28.8 PG (ref 26–35)
MCHC RBC AUTO-ENTMCNC: 31.7 G/DL (ref 32–34.5)
MCV RBC AUTO: 91 FL (ref 80–99.9)
METAMYELOCYTES ABSOLUTE COUNT: 0.04 K/UL (ref 0–0.12)
METAMYELOCYTES: 1 % (ref 0–1)
MONOCYTES NFR BLD: 0.21 K/UL (ref 0.1–0.95)
MONOCYTES NFR BLD: 5 % (ref 2–12)
MYELOCYTES ABSOLUTE COUNT: 0.04 K/UL
MYELOCYTES: 1 %
NEUTROPHILS NFR BLD: 87 % (ref 43–80)
NEUTS SEG NFR BLD: 3.57 K/UL (ref 1.8–7.3)
NUCLEATED RED BLOOD CELLS: 4 PER 100 WBC
PHOSPHATE SERPL-MCNC: 3.3 MG/DL (ref 2.5–4.5)
PLATELET CONFIRMATION: NORMAL
PLATELET, FLUORESCENCE: 15 K/UL (ref 130–450)
PMV BLD AUTO: ABNORMAL FL (ref 7–12)
POTASSIUM SERPL-SCNC: 3.2 MMOL/L (ref 3.5–5)
POTASSIUM SERPL-SCNC: 3.4 MMOL/L (ref 3.5–5)
PROT SERPL-MCNC: 5.1 G/DL (ref 6.4–8.3)
RBC # BLD AUTO: 2.22 M/UL (ref 3.8–5.8)
RBC # BLD: ABNORMAL 10*6/UL
SODIUM SERPL-SCNC: 139 MMOL/L (ref 132–146)
WBC OTHER # BLD: 4.1 K/UL (ref 4.5–11.5)

## 2024-10-05 PROCEDURE — 86850 RBC ANTIBODY SCREEN: CPT

## 2024-10-05 PROCEDURE — 86923 COMPATIBILITY TEST ELECTRIC: CPT

## 2024-10-05 PROCEDURE — P9040 RBC LEUKOREDUCED IRRADIATED: HCPCS

## 2024-10-05 PROCEDURE — 6360000002 HC RX W HCPCS

## 2024-10-05 PROCEDURE — 36415 COLL VENOUS BLD VENIPUNCTURE: CPT

## 2024-10-05 PROCEDURE — 85018 HEMOGLOBIN: CPT

## 2024-10-05 PROCEDURE — 36410 VNPNXR 3YR/> PHY/QHP DX/THER: CPT

## 2024-10-05 PROCEDURE — 05HA33Z INSERTION OF INFUSION DEVICE INTO LEFT BRACHIAL VEIN, PERCUTANEOUS APPROACH: ICD-10-PCS | Performed by: FAMILY MEDICINE

## 2024-10-05 PROCEDURE — 6370000000 HC RX 637 (ALT 250 FOR IP): Performed by: INTERNAL MEDICINE

## 2024-10-05 PROCEDURE — 94669 MECHANICAL CHEST WALL OSCILL: CPT

## 2024-10-05 PROCEDURE — 83735 ASSAY OF MAGNESIUM: CPT

## 2024-10-05 PROCEDURE — 76937 US GUIDE VASCULAR ACCESS: CPT

## 2024-10-05 PROCEDURE — 2140000000 HC CCU INTERMEDIATE R&B

## 2024-10-05 PROCEDURE — 6370000000 HC RX 637 (ALT 250 FOR IP)

## 2024-10-05 PROCEDURE — 99232 SBSQ HOSP IP/OBS MODERATE 35: CPT | Performed by: FAMILY MEDICINE

## 2024-10-05 PROCEDURE — 84132 ASSAY OF SERUM POTASSIUM: CPT

## 2024-10-05 PROCEDURE — 2580000003 HC RX 258

## 2024-10-05 PROCEDURE — 86901 BLOOD TYPING SEROLOGIC RH(D): CPT

## 2024-10-05 PROCEDURE — 85014 HEMATOCRIT: CPT

## 2024-10-05 PROCEDURE — 84100 ASSAY OF PHOSPHORUS: CPT

## 2024-10-05 PROCEDURE — 80053 COMPREHEN METABOLIC PANEL: CPT

## 2024-10-05 PROCEDURE — 94640 AIRWAY INHALATION TREATMENT: CPT

## 2024-10-05 PROCEDURE — 2500000003 HC RX 250 WO HCPCS: Performed by: NURSE PRACTITIONER

## 2024-10-05 PROCEDURE — 36430 TRANSFUSION BLD/BLD COMPNT: CPT

## 2024-10-05 PROCEDURE — 6370000000 HC RX 637 (ALT 250 FOR IP): Performed by: NURSE PRACTITIONER

## 2024-10-05 PROCEDURE — 86900 BLOOD TYPING SEROLOGIC ABO: CPT

## 2024-10-05 PROCEDURE — C1751 CATH, INF, PER/CENT/MIDLINE: HCPCS

## 2024-10-05 PROCEDURE — 85025 COMPLETE CBC W/AUTO DIFF WBC: CPT

## 2024-10-05 PROCEDURE — 99231 SBSQ HOSP IP/OBS SF/LOW 25: CPT | Performed by: NURSE PRACTITIONER

## 2024-10-05 PROCEDURE — 2500000003 HC RX 250 WO HCPCS

## 2024-10-05 RX ORDER — SODIUM CHLORIDE 9 MG/ML
INJECTION, SOLUTION INTRAVENOUS PRN
Status: DISCONTINUED | OUTPATIENT
Start: 2024-10-05 | End: 2024-10-11 | Stop reason: HOSPADM

## 2024-10-05 RX ORDER — SODIUM CHLORIDE 0.9 % (FLUSH) 0.9 %
5-40 SYRINGE (ML) INJECTION PRN
Status: DISCONTINUED | OUTPATIENT
Start: 2024-10-05 | End: 2024-10-11 | Stop reason: HOSPADM

## 2024-10-05 RX ORDER — LIDOCAINE HYDROCHLORIDE 10 MG/ML
50 INJECTION, SOLUTION EPIDURAL; INFILTRATION; INTRACAUDAL; PERINEURAL ONCE
Status: DISCONTINUED | OUTPATIENT
Start: 2024-10-05 | End: 2024-10-11 | Stop reason: HOSPADM

## 2024-10-05 RX ORDER — IOPAMIDOL 755 MG/ML
60 INJECTION, SOLUTION INTRAVASCULAR
Status: DISCONTINUED | OUTPATIENT
Start: 2024-10-05 | End: 2024-10-11 | Stop reason: HOSPADM

## 2024-10-05 RX ORDER — LORAZEPAM 0.5 MG/1
0.5 TABLET ORAL
Status: COMPLETED | OUTPATIENT
Start: 2024-10-05 | End: 2024-10-05

## 2024-10-05 RX ORDER — SODIUM CHLORIDE 0.9 % (FLUSH) 0.9 %
5-40 SYRINGE (ML) INJECTION EVERY 12 HOURS SCHEDULED
Status: DISCONTINUED | OUTPATIENT
Start: 2024-10-05 | End: 2024-10-11 | Stop reason: HOSPADM

## 2024-10-05 RX ADMIN — METHOCARBAMOL TABLETS 750 MG: 750 TABLET, COATED ORAL at 19:45

## 2024-10-05 RX ADMIN — FUROSEMIDE 20 MG: 10 INJECTION, SOLUTION INTRAMUSCULAR; INTRAVENOUS at 08:01

## 2024-10-05 RX ADMIN — HYDROMORPHONE HYDROCHLORIDE 1 MG: 1 INJECTION, SOLUTION INTRAMUSCULAR; INTRAVENOUS; SUBCUTANEOUS at 23:53

## 2024-10-05 RX ADMIN — OXYCODONE HYDROCHLORIDE 15 MG: 15 TABLET ORAL at 13:20

## 2024-10-05 RX ADMIN — METHOCARBAMOL TABLETS 750 MG: 750 TABLET, COATED ORAL at 13:20

## 2024-10-05 RX ADMIN — OXYCODONE HYDROCHLORIDE 15 MG: 15 TABLET ORAL at 16:45

## 2024-10-05 RX ADMIN — LORAZEPAM 0.5 MG: 0.5 TABLET ORAL at 16:30

## 2024-10-05 RX ADMIN — HYDROMORPHONE HYDROCHLORIDE 1 MG: 1 INJECTION, SOLUTION INTRAMUSCULAR; INTRAVENOUS; SUBCUTANEOUS at 06:46

## 2024-10-05 RX ADMIN — FUROSEMIDE 20 MG: 10 INJECTION, SOLUTION INTRAMUSCULAR; INTRAVENOUS at 17:25

## 2024-10-05 RX ADMIN — TRANEXAMIC ACID 500 MG: 100 INJECTION, SOLUTION INTRAVENOUS at 19:34

## 2024-10-05 RX ADMIN — OXYCODONE HYDROCHLORIDE 15 MG: 15 TABLET ORAL at 01:52

## 2024-10-05 RX ADMIN — SUCRALFATE 1 G: 1 TABLET ORAL at 17:25

## 2024-10-05 RX ADMIN — HYDROMORPHONE HYDROCHLORIDE 1 MG: 1 INJECTION, SOLUTION INTRAMUSCULAR; INTRAVENOUS; SUBCUTANEOUS at 03:47

## 2024-10-05 RX ADMIN — SENNOSIDES AND DOCUSATE SODIUM 2 TABLET: 50; 8.6 TABLET ORAL at 19:46

## 2024-10-05 RX ADMIN — SENNOSIDES AND DOCUSATE SODIUM 2 TABLET: 50; 8.6 TABLET ORAL at 08:00

## 2024-10-05 RX ADMIN — TRANEXAMIC ACID 500 MG: 100 INJECTION, SOLUTION INTRAVENOUS at 08:40

## 2024-10-05 RX ADMIN — IPRATROPIUM BROMIDE AND ALBUTEROL SULFATE 1 DOSE: 2.5; .5 SOLUTION RESPIRATORY (INHALATION) at 08:40

## 2024-10-05 RX ADMIN — BICALUTAMIDE 50 MG: 50 TABLET, FILM COATED ORAL at 08:01

## 2024-10-05 RX ADMIN — BUDESONIDE 500 MCG: 0.5 INHALANT RESPIRATORY (INHALATION) at 08:40

## 2024-10-05 RX ADMIN — OXYCODONE HYDROCHLORIDE 15 MG: 15 TABLET ORAL at 22:45

## 2024-10-05 RX ADMIN — IPRATROPIUM BROMIDE AND ALBUTEROL SULFATE 1 DOSE: 2.5; .5 SOLUTION RESPIRATORY (INHALATION) at 16:45

## 2024-10-05 RX ADMIN — OXYCODONE HYDROCHLORIDE 15 MG: 15 TABLET ORAL at 04:55

## 2024-10-05 RX ADMIN — SUCRALFATE 1 G: 1 TABLET ORAL at 13:20

## 2024-10-05 RX ADMIN — METHOCARBAMOL TABLETS 750 MG: 750 TABLET, COATED ORAL at 08:00

## 2024-10-05 RX ADMIN — TRANEXAMIC ACID 500 MG: 100 INJECTION, SOLUTION INTRAVENOUS at 16:38

## 2024-10-05 RX ADMIN — HYDROMORPHONE HYDROCHLORIDE 1 MG: 1 INJECTION, SOLUTION INTRAMUSCULAR; INTRAVENOUS; SUBCUTANEOUS at 17:53

## 2024-10-05 RX ADMIN — METHOCARBAMOL TABLETS 750 MG: 750 TABLET, COATED ORAL at 17:25

## 2024-10-05 RX ADMIN — HYDROMORPHONE HYDROCHLORIDE 1 MG: 1 INJECTION, SOLUTION INTRAMUSCULAR; INTRAVENOUS; SUBCUTANEOUS at 10:10

## 2024-10-05 RX ADMIN — SUCRALFATE 1 G: 1 TABLET ORAL at 22:46

## 2024-10-05 RX ADMIN — SODIUM CHLORIDE, PRESERVATIVE FREE 10 ML: 5 INJECTION INTRAVENOUS at 19:46

## 2024-10-05 RX ADMIN — SUCRALFATE 1 G: 1 TABLET ORAL at 00:46

## 2024-10-05 RX ADMIN — SUCRALFATE 1 G: 1 TABLET ORAL at 06:46

## 2024-10-05 RX ADMIN — OXYCODONE HYDROCHLORIDE 15 MG: 15 TABLET ORAL at 19:45

## 2024-10-05 RX ADMIN — HYDROMORPHONE HYDROCHLORIDE 1 MG: 1 INJECTION, SOLUTION INTRAMUSCULAR; INTRAVENOUS; SUBCUTANEOUS at 00:47

## 2024-10-05 RX ADMIN — POTASSIUM BICARBONATE 40 MEQ: 782 TABLET, EFFERVESCENT ORAL at 13:19

## 2024-10-05 RX ADMIN — PANTOPRAZOLE SODIUM 40 MG: 40 TABLET, DELAYED RELEASE ORAL at 06:47

## 2024-10-05 RX ADMIN — IPRATROPIUM BROMIDE AND ALBUTEROL SULFATE 1 DOSE: 2.5; .5 SOLUTION RESPIRATORY (INHALATION) at 19:34

## 2024-10-05 RX ADMIN — HYDROMORPHONE HYDROCHLORIDE 1 MG: 1 INJECTION, SOLUTION INTRAMUSCULAR; INTRAVENOUS; SUBCUTANEOUS at 20:53

## 2024-10-05 RX ADMIN — Medication 2000 UNITS: at 08:01

## 2024-10-05 RX ADMIN — OXYCODONE HYDROCHLORIDE 15 MG: 15 TABLET ORAL at 08:00

## 2024-10-05 RX ADMIN — BUDESONIDE 500 MCG: 0.5 INHALANT RESPIRATORY (INHALATION) at 19:34

## 2024-10-05 RX ADMIN — HYDROMORPHONE HYDROCHLORIDE 1 MG: 1 INJECTION, SOLUTION INTRAMUSCULAR; INTRAVENOUS; SUBCUTANEOUS at 14:26

## 2024-10-05 RX ADMIN — SODIUM CHLORIDE, PRESERVATIVE FREE 10 ML: 5 INJECTION INTRAVENOUS at 08:01

## 2024-10-05 RX ADMIN — HYDROMORPHONE HYDROCHLORIDE 0.5 MG: 1 INJECTION, SOLUTION INTRAMUSCULAR; INTRAVENOUS; SUBCUTANEOUS at 11:46

## 2024-10-05 ASSESSMENT — PAIN DESCRIPTION - DESCRIPTORS
DESCRIPTORS: ACHING;DISCOMFORT;SORE
DESCRIPTORS: ACHING;DISCOMFORT
DESCRIPTORS: THROBBING;POUNDING;STABBING
DESCRIPTORS: ACHING;DISCOMFORT;NAGGING
DESCRIPTORS: ACHING;DISCOMFORT;NAGGING
DESCRIPTORS: ACHING;DISCOMFORT
DESCRIPTORS: ACHING;DISCOMFORT;THROBBING
DESCRIPTORS: DISCOMFORT
DESCRIPTORS: ACHING;DISCOMFORT;SORE
DESCRIPTORS: ACHING;DISCOMFORT;SORE
DESCRIPTORS: ACHING;DISCOMFORT;NAGGING
DESCRIPTORS: ACHING;DISCOMFORT
DESCRIPTORS: ACHING;DISCOMFORT
DESCRIPTORS: ACHING;DISCOMFORT;NAGGING
DESCRIPTORS: ACHING;DISCOMFORT;NAGGING
DESCRIPTORS: ACHING;DISCOMFORT;SORE

## 2024-10-05 ASSESSMENT — PAIN SCALES - WONG BAKER
WONGBAKER_NUMERICALRESPONSE: HURTS WHOLE LOT

## 2024-10-05 ASSESSMENT — PAIN DESCRIPTION - LOCATION
LOCATION: CHEST;BACK
LOCATION: GENERALIZED
LOCATION: RIB CAGE;CHEST;BACK
LOCATION: CHEST;BACK
LOCATION: BACK;RIB CAGE
LOCATION: GENERALIZED
LOCATION: CHEST;BACK
LOCATION: GENERALIZED
LOCATION: RIB CAGE;BACK
LOCATION: GENERALIZED
LOCATION: BACK;RIB CAGE
LOCATION: GENERALIZED
LOCATION: RIB CAGE;BACK
LOCATION: GENERALIZED
LOCATION: ABDOMEN;PELVIS;BACK
LOCATION: RIB CAGE;BACK

## 2024-10-05 ASSESSMENT — PAIN SCALES - GENERAL
PAINLEVEL_OUTOF10: 10
PAINLEVEL_OUTOF10: 9
PAINLEVEL_OUTOF10: 10
PAINLEVEL_OUTOF10: 9
PAINLEVEL_OUTOF10: 9
PAINLEVEL_OUTOF10: 10
PAINLEVEL_OUTOF10: 7
PAINLEVEL_OUTOF10: 10
PAINLEVEL_OUTOF10: 10
PAINLEVEL_OUTOF10: 8
PAINLEVEL_OUTOF10: 8
PAINLEVEL_OUTOF10: 9
PAINLEVEL_OUTOF10: 10
PAINLEVEL_OUTOF10: 8
PAINLEVEL_OUTOF10: 10
PAINLEVEL_OUTOF10: 10
PAINLEVEL_OUTOF10: 9
PAINLEVEL_OUTOF10: 10
PAINLEVEL_OUTOF10: 8
PAINLEVEL_OUTOF10: 10
PAINLEVEL_OUTOF10: 9
PAINLEVEL_OUTOF10: 9
PAINLEVEL_OUTOF10: 10

## 2024-10-05 ASSESSMENT — PAIN - FUNCTIONAL ASSESSMENT
PAIN_FUNCTIONAL_ASSESSMENT: PREVENTS OR INTERFERES SOME ACTIVE ACTIVITIES AND ADLS

## 2024-10-05 ASSESSMENT — PAIN DESCRIPTION - ORIENTATION
ORIENTATION: RIGHT;LEFT;MID
ORIENTATION: RIGHT;LEFT;MID
ORIENTATION: RIGHT;LEFT;MID;INNER
ORIENTATION: LEFT;RIGHT;ANTERIOR;POSTERIOR
ORIENTATION: ANTERIOR;MID
ORIENTATION: LEFT;RIGHT;MID
ORIENTATION: RIGHT;LEFT;MID
ORIENTATION: ANTERIOR;MID
ORIENTATION: RIGHT;LEFT;MID;INNER
ORIENTATION: RIGHT;LEFT;MID
ORIENTATION: RIGHT;LEFT;MID;INNER
ORIENTATION: RIGHT;LEFT;MID;INNER
ORIENTATION: MID;ANTERIOR
ORIENTATION: ANTERIOR;POSTERIOR
ORIENTATION: RIGHT;LEFT;MID;INNER
ORIENTATION: RIGHT;LEFT;MID

## 2024-10-05 ASSESSMENT — PAIN DESCRIPTION - PAIN TYPE: TYPE: ACUTE PAIN

## 2024-10-05 NOTE — PROGRESS NOTES
[facility] - Family Medicine Inpatient   Resident Progress Note    S:  Hospital day: 9   Brief Synopsis: Joseph Bond is a 44 y.o. male with a PMH of Cancer (HCC), Diverticulosis, and History of opioid abuse (HCC). who presents to ED for nausea and vomiting.     Patient complains of nausea and vomiting that started 2 days ago.  Emesis contains phlegm, mucus and streaks of blood.  Patient could not quantify number of times but states that it has been continuous.  States that he had nausea medications but seemed to make his nausea was.  Also endorses chronic pain on his back, abdomen radha LUQ.  Patient was recently diagnosed for pancreatic cancer with mets.  Completed 10th cycle of radiation 1 month ago.  States that since radiation was completed he has not been able to walk due to pain in his pelvis, abdomen and back.  He has been wheelchair-bound for the past 1 month.  Has had poor appetite, blurry vision, dizziness.  Denies nosebleeds, bruising, melena, hematochezia, hematuria.  Patient does not use illicit drugs or drink alcohol.  Quit smoking 7 years ago.  He would like to remain full code.  Hemoglobin was 5.3 and platelets at 18 at the ER.  Patient was transfused 2 units of pRBC.  Post transfusion hemoglobin was 5.9 and patient was transfused another 2 units of PRBC.  Patient continued to experience pain in his back and upper abdomen and pelvis.  Oncology and palliative were consulted.  Pain medication with Dilaudid, oxy and fentanyl patch.  Palliative care managed pain, nausea and vomiting and constipation 2/2 opioids.  He decided to proceed with chemotherapy. Pulmonology was consulted for increased oxygen demand and hemoptysis. They recommended a bronchoscopy but held off due to thrombocytopenia. Patient was started on solumedrol 40mg and inhaled TXA.     On 10/4 patient with noted moderate respiratory distress on non-rebreather therefore Lasix IV increased and IVF dcd.     Overnight/interim:   No overnight  intake/output     Anemia - stable   Thrombocytopenia - improved   -Hg 5.3 on arrival.  Transfused 5 units total since admission   -Transfused platelets 3x.   -Platelet transfused 09/30/2024  -Monitor CBC daily   -Transfuse Hg <7  -Transfuse platelets <10k or active bleeding   -Trend H&H q12    Hypokalemia   Hypophos  - Likely worsened 2/2 IV Lasix   -replete as needed  -Pending PTH, PTHrp, vitamin D, uric acid, ionized calcium, urine phos        DVT/GI ppx: PCD's/Protonix  Pain: Dilaudid, oxy  GI regimen: Senna S & GlycoLax scheduled  PT/OT: 15/24, not able to tolerat PT at this time  Dispo: Continue present management   Telemetry Day: 10/02 since last renewal      Electronically signed by Crystal Delacruz DO on 10/5/2024 at 6:37 AM  Attending physician: Dr. Zhang

## 2024-10-05 NOTE — PLAN OF CARE
PS message received regarding lab results notable for Hb of 6.4.   MOLLY discussed labs at bedside with patient and need of blood transfusion. Patient amenable to being transfused. 1 unit PRBC ordered. Follow Post transfusion H&H.

## 2024-10-05 NOTE — PLAN OF CARE
Problem: Discharge Planning  Goal: Discharge to home or other facility with appropriate resources  10/5/2024 0755 by Sofia Peñaloza, RN  Outcome: Progressing     Problem: Pain  Goal: Verbalizes/displays adequate comfort level or baseline comfort level  10/5/2024 0755 by Sofia Peñaloza, RN  Outcome: Progressing     Problem: Skin/Tissue Integrity  Goal: Absence of new skin breakdown  Description: 1.  Monitor for areas of redness and/or skin breakdown  2.  Assess vascular access sites hourly  3.  Every 4-6 hours minimum:  Change oxygen saturation probe site  4.  Every 4-6 hours:  If on nasal continuous positive airway pressure, respiratory therapy assess nares and determine need for appliance change or resting period.  10/5/2024 0755 by Sofia Peñaloza, RN  Outcome: Progressing     Problem: Safety - Adult  Goal: Free from fall injury  10/5/2024 0755 by Sofia Peñaloza, RN  Outcome: Progressing     Problem: ABCDS Injury Assessment  Goal: Absence of physical injury  10/5/2024 0755 by Sofia Peñaloza, RN  Outcome: Progressing     Problem: Nutrition Deficit:  Goal: Optimize nutritional status  10/5/2024 0755 by Sofia Peñaloza, RN  Outcome: Progressing

## 2024-10-05 NOTE — PATIENT CARE CONFERENCE
P Quality Flow/Interdisciplinary Rounds Progress Note        Quality Flow Rounds held on October 5, 2024    Disciplines Attending:  Bedside Nurse and Nursing Unit Leadership    Joseph Bond was admitted on 9/26/2024  4:56 AM    Anticipated Discharge Date:       Disposition:    Simon Score:  Simon Scale Score: 20    Readmission Risk              Risk of Unplanned Readmission:  39           Discussed patient goal for the day, patient clinical progression, and barriers to discharge.  The following Goal(s) of the Day/Commitment(s) have been identified:  Labs - Report Results and manage pain      Josephine Hatfield RN  October 5, 2024

## 2024-10-05 NOTE — PROGRESS NOTES
Palliative Care Department  [phone]  Palliative Care Progress Note  Provider Kylee Tellez, APRN - CNP      PATIENT: Joseph Bond  : 1980  MRN: [id number]  ADMISSION DATE: 2024  4:56 AM  Referring Provider:  Anastasia Helms MD    Palliative Medicine was consulted on hospital day 9 for assistance with Goals of care, Overwhelmed Symptoms    HPI:     Clinical Summary:Joseph Bond is a 44 y.o. y/o male with a history of prostate cancer with mediastinal lymphadenopathy, extensive metastasis of the spine, rib cage, pelvis, humerus and femurs (had refused systemic therapy, pursuing homeopathic), completed palliative radiation treatment to spine, polysubstance abuse including opioids and tobacco, abstinent for 5 years, diverticulitis, with recent ER visit on 2024, due to shortness of breath, was going to be admitted for further medical management however left AMA, was supposed to follow-up outpatient with palliative medicine for symptom management, however missed appointment, did not return phone call to reschedule appointment, who presented to [facility] on 2024 with complaint of nausea, vomiting, left-sided rib pain.  At ED, found hypoxic 78% on room air, placed on 4 L nasal cannula.  Significant laboratory findings showed hemoglobin 5.3, platelets 18, alkaline phosphate 227, AST 76, lipase 8, WBC 4.2.  Received 2 packed red blood cell and sixpack platelets.  CT images showed disease progression when compared to prior images, favoring an infectious/inflammatory process or lymphangitic spread of disease given mediastinal adenopathy and abnormal bony metastatic process within the rib and spine.  Abnormal bony metastatic disease within the spine.  Bony destructive process involving the left anterior second rib.  Stable adenopathy throughout the retroperitoneal and pelvic sidewall,.  Stable bony metastatic disease throughout the spine, pelvis and hip bilaterally.  Palliative medicine

## 2024-10-05 NOTE — PROGRESS NOTES
Power midline  Placement 10/5/2024    Product number: [id number]   Lot Number: [id number]      Ultrasound: yes   Left Brachial vein:                Upper Arm Circumference: (CM) 28cm    Size:(FR)/GUAGE 4.5frsl    Exposed Length: (CM) 3cm    Internal Length: (CM) 12cm   Cut: (CM) 0cm   Vein Measurement: 0.57cm              Lidocaine Given: yes 1%  Celina Carmona RN  10/5/2024  5:23 PM

## 2024-10-05 NOTE — PROGRESS NOTES
[facility]  Family Medicine Attending    S: 44 y.o. male with a history of prostate cancer, anxiety, opiate abuse who presented with nausea and vomiting as well as left sided abdominal pain.  He was found to have hypoxia and severe symptomatic anemia with hgb 5.3.  He has been having nausea with vomiting, pelvic and back pain.  Has not been able to walk due to pain for a couple of weeks.  Goals of care-pain control so that he can walk again.  He wanted more information on side effects from a regimen of chemotherapy drugs from the oncologist.  Oncology was consulted.  Per their note, \"Considering high tumor burden, advised treatment with Taxotere, GnRH agonist, Nubeqa along with Xgeva\"  Side effects of these medications were discussed with the patient by oncology.     Patient decided to move forward with chemotherapy.    Palliative care following patient.  Pain is still too severe to walk.    He also states that when he tries to stand up the pain is mostly in a band around his upper abdomen.  He says he has no difficulty with his legs when standing. Patients notes some small amounts of blood in his mucous if he coughs.      9/30-patient reports that he is not feeling well but not willing to elaborate.  To start transfusion for hemoglobin of 6.5. No change in breathing. Denies any active bleeding today. Rib pain where mets are located.    10/1-s/p 2 units PRBCS yesterday.  This morning stating that oxygen was increased overnight to 11L HF NC, now at 10. But now denying dyspnea, some mucus with blood upon coughing. He denies that he is having depressive symptoms.    10/2-Patient reports he is feeling short of breath today. Coughing up some blood tinged mucus.     10/3-Patient reports ongoing shortness of breath but believes it is better. Some chills.  He is trying to do incentive spirometry.  Less blood in his sputum. Worked wit pt/ot and walked. Improved mentally.    10/4-Pt had shortness of breath

## 2024-10-05 NOTE — PROGRESS NOTES
Lab notified of Hgb 6.8 and Plt 15. Sent message to Dr. Katheryn Macias regarding the critical values.     Message read at 0600. Still awaiting response.    Robby Marquez RN

## 2024-10-05 NOTE — CONSULTS
Comprehensive Nutrition Assessment    Type and Reason for Visit:  Consult    Nutrition Recommendations/Plan:   Continue Current Diet, Continue Oral Nutrition Supplement     RD consult re: concern for refeeding syndrome   Current K+ 3.2 L  Mg/Phos WNL     Monitor electrolytes closely & replace prn   Full RD assessment completed 10/4/24         Current Nutrition Intake & Therapies:    Average Meal Intake: 26-50%  Average Supplements Intake: 26-50%  ADULT DIET; Regular; NO PORK  ADULT ORAL NUTRITION SUPPLEMENT; Breakfast, Dinner; Plant Based Oral Supplement  ADULT ORAL NUTRITION SUPPLEMENT; Lunch, Dinner; Fortified Gelatin Oral Supplement      Shira Winkler, MS, RD, LD  Contact: [phone]

## 2024-10-06 ENCOUNTER — APPOINTMENT (OUTPATIENT)
Dept: CT IMAGING | Age: 44
DRG: 500 | End: 2024-10-06
Payer: MEDICAID

## 2024-10-06 LAB
ALBUMIN SERPL-MCNC: 2.9 G/DL (ref 3.5–5.2)
ALP SERPL-CCNC: 167 U/L (ref 40–129)
ALT SERPL-CCNC: 16 U/L (ref 0–40)
ANION GAP SERPL CALCULATED.3IONS-SCNC: 10 MMOL/L (ref 7–16)
AST SERPL-CCNC: 32 U/L (ref 0–39)
BASOPHILS # BLD: 0 K/UL (ref 0–0.2)
BASOPHILS # BLD: 0 K/UL (ref 0–0.2)
BASOPHILS NFR BLD: 0 % (ref 0–2)
BASOPHILS NFR BLD: 0 % (ref 0–2)
BILIRUB SERPL-MCNC: 1.7 MG/DL (ref 0–1.2)
BUN SERPL-MCNC: 16 MG/DL (ref 6–20)
CALCIUM SERPL-MCNC: 8.7 MG/DL (ref 8.6–10.2)
CHLORIDE SERPL-SCNC: 98 MMOL/L (ref 98–107)
CO2 SERPL-SCNC: 30 MMOL/L (ref 22–29)
CREAT SERPL-MCNC: 0.7 MG/DL (ref 0.7–1.2)
EOSINOPHIL # BLD: 0.03 K/UL (ref 0.05–0.5)
EOSINOPHIL # BLD: 0.07 K/UL (ref 0.05–0.5)
EOSINOPHILS RELATIVE PERCENT: 1 % (ref 0–6)
EOSINOPHILS RELATIVE PERCENT: 2 % (ref 0–6)
ERYTHROCYTE [DISTWIDTH] IN BLOOD BY AUTOMATED COUNT: 16.7 % (ref 11.5–15)
ERYTHROCYTE [DISTWIDTH] IN BLOOD BY AUTOMATED COUNT: 17 % (ref 11.5–15)
ERYTHROCYTE [DISTWIDTH] IN BLOOD BY AUTOMATED COUNT: 17.1 % (ref 11.5–15)
GFR, ESTIMATED: >90 ML/MIN/1.73M2
GLUCOSE SERPL-MCNC: 95 MG/DL (ref 74–99)
HCT VFR BLD AUTO: 20.9 % (ref 37–54)
HCT VFR BLD AUTO: 21.5 % (ref 37–54)
HCT VFR BLD AUTO: 22.5 % (ref 37–54)
HGB BLD-MCNC: 7 G/DL (ref 12.5–16.5)
HGB BLD-MCNC: 7.1 G/DL (ref 12.5–16.5)
HGB BLD-MCNC: 7.3 G/DL (ref 12.5–16.5)
LYMPHOCYTES NFR BLD: 0.1 K/UL (ref 1.5–4)
LYMPHOCYTES NFR BLD: 0.15 K/UL (ref 1.5–4)
LYMPHOCYTES RELATIVE PERCENT: 3 % (ref 20–42)
LYMPHOCYTES RELATIVE PERCENT: 4 % (ref 20–42)
MAGNESIUM SERPL-MCNC: 2.1 MG/DL (ref 1.6–2.6)
MCH RBC QN AUTO: 29.1 PG (ref 26–35)
MCH RBC QN AUTO: 29.5 PG (ref 26–35)
MCH RBC QN AUTO: 30.2 PG (ref 26–35)
MCHC RBC AUTO-ENTMCNC: 32.4 G/DL (ref 32–34.5)
MCHC RBC AUTO-ENTMCNC: 33 G/DL (ref 32–34.5)
MCHC RBC AUTO-ENTMCNC: 33.5 G/DL (ref 32–34.5)
MCV RBC AUTO: 89.2 FL (ref 80–99.9)
MCV RBC AUTO: 89.6 FL (ref 80–99.9)
MCV RBC AUTO: 90.1 FL (ref 80–99.9)
METAMYELOCYTES ABSOLUTE COUNT: 0.03 K/UL (ref 0–0.12)
METAMYELOCYTES ABSOLUTE COUNT: 0.07 K/UL (ref 0–0.12)
METAMYELOCYTES: 1 % (ref 0–1)
METAMYELOCYTES: 2 % (ref 0–1)
MONOCYTES NFR BLD: 0.03 K/UL (ref 0.1–0.95)
MONOCYTES NFR BLD: 0.04 K/UL (ref 0.1–0.95)
MONOCYTES NFR BLD: 1 % (ref 2–12)
MONOCYTES NFR BLD: 1 % (ref 2–12)
MYELOCYTES ABSOLUTE COUNT: 0.14 K/UL
MYELOCYTES ABSOLUTE COUNT: 0.22 K/UL
MYELOCYTES: 4 %
MYELOCYTES: 5 %
NEUTROPHILS NFR BLD: 87 % (ref 43–80)
NEUTROPHILS NFR BLD: 91 % (ref 43–80)
NEUTS SEG NFR BLD: 3.56 K/UL (ref 1.8–7.3)
NEUTS SEG NFR BLD: 3.65 K/UL (ref 1.8–7.3)
NUCLEATED RED BLOOD CELLS: 1 PER 100 WBC
NUCLEATED RED BLOOD CELLS: 2 PER 100 WBC
PHOSPHATE SERPL-MCNC: 3.3 MG/DL (ref 2.5–4.5)
PLATELET CONFIRMATION: NORMAL
PLATELET, FLUORESCENCE: 16 K/UL (ref 130–450)
PLATELET, FLUORESCENCE: 19 K/UL (ref 130–450)
PLATELET, FLUORESCENCE: 38 K/UL (ref 130–450)
PMV BLD AUTO: 12.1 FL (ref 7–12)
PMV BLD AUTO: ABNORMAL FL (ref 7–12)
PMV BLD AUTO: ABNORMAL FL (ref 7–12)
POTASSIUM SERPL-SCNC: 3.4 MMOL/L (ref 3.5–5)
POTASSIUM SERPL-SCNC: 3.8 MMOL/L (ref 3.5–5)
PROT SERPL-MCNC: 5.2 G/DL (ref 6.4–8.3)
RBC # BLD AUTO: 2.32 M/UL (ref 3.8–5.8)
RBC # BLD AUTO: 2.41 M/UL (ref 3.8–5.8)
RBC # BLD AUTO: 2.51 M/UL (ref 3.8–5.8)
RBC # BLD: ABNORMAL 10*6/UL
SODIUM SERPL-SCNC: 138 MMOL/L (ref 132–146)
WBC OTHER # BLD: 3.8 K/UL (ref 4.5–11.5)
WBC OTHER # BLD: 4 K/UL (ref 4.5–11.5)
WBC OTHER # BLD: 4.1 K/UL (ref 4.5–11.5)

## 2024-10-06 PROCEDURE — 84100 ASSAY OF PHOSPHORUS: CPT

## 2024-10-06 PROCEDURE — 6360000002 HC RX W HCPCS

## 2024-10-06 PROCEDURE — 6370000000 HC RX 637 (ALT 250 FOR IP)

## 2024-10-06 PROCEDURE — 2700000000 HC OXYGEN THERAPY PER DAY

## 2024-10-06 PROCEDURE — 94640 AIRWAY INHALATION TREATMENT: CPT

## 2024-10-06 PROCEDURE — 80053 COMPREHEN METABOLIC PANEL: CPT

## 2024-10-06 PROCEDURE — 85027 COMPLETE CBC AUTOMATED: CPT

## 2024-10-06 PROCEDURE — 99232 SBSQ HOSP IP/OBS MODERATE 35: CPT | Performed by: FAMILY MEDICINE

## 2024-10-06 PROCEDURE — 94669 MECHANICAL CHEST WALL OSCILL: CPT

## 2024-10-06 PROCEDURE — 6370000000 HC RX 637 (ALT 250 FOR IP): Performed by: INTERNAL MEDICINE

## 2024-10-06 PROCEDURE — 6370000000 HC RX 637 (ALT 250 FOR IP): Performed by: NURSE PRACTITIONER

## 2024-10-06 PROCEDURE — 2580000003 HC RX 258

## 2024-10-06 PROCEDURE — 6360000004 HC RX CONTRAST MEDICATION: Performed by: RADIOLOGY

## 2024-10-06 PROCEDURE — P9073 PLATELETS PHERESIS PATH REDU: HCPCS

## 2024-10-06 PROCEDURE — 83735 ASSAY OF MAGNESIUM: CPT

## 2024-10-06 PROCEDURE — 2500000003 HC RX 250 WO HCPCS: Performed by: NURSE PRACTITIONER

## 2024-10-06 PROCEDURE — 71275 CT ANGIOGRAPHY CHEST: CPT

## 2024-10-06 PROCEDURE — 2140000000 HC CCU INTERMEDIATE R&B

## 2024-10-06 PROCEDURE — 84132 ASSAY OF SERUM POTASSIUM: CPT

## 2024-10-06 PROCEDURE — 85025 COMPLETE CBC W/AUTO DIFF WBC: CPT

## 2024-10-06 PROCEDURE — 36430 TRANSFUSION BLD/BLD COMPNT: CPT

## 2024-10-06 RX ORDER — SODIUM CHLORIDE 9 MG/ML
INJECTION, SOLUTION INTRAVENOUS PRN
Status: DISCONTINUED | OUTPATIENT
Start: 2024-10-06 | End: 2024-10-11 | Stop reason: HOSPADM

## 2024-10-06 RX ORDER — POTASSIUM CHLORIDE 1500 MG/1
20 TABLET, EXTENDED RELEASE ORAL ONCE
Status: COMPLETED | OUTPATIENT
Start: 2024-10-06 | End: 2024-10-06

## 2024-10-06 RX ORDER — FUROSEMIDE 10 MG/ML
20 INJECTION INTRAMUSCULAR; INTRAVENOUS SEE ADMIN INSTRUCTIONS
Status: COMPLETED | OUTPATIENT
Start: 2024-10-06 | End: 2024-10-06

## 2024-10-06 RX ORDER — IOPAMIDOL 755 MG/ML
75 INJECTION, SOLUTION INTRAVASCULAR
Status: COMPLETED | OUTPATIENT
Start: 2024-10-06 | End: 2024-10-06

## 2024-10-06 RX ORDER — POTASSIUM CHLORIDE 1500 MG/1
20 TABLET, EXTENDED RELEASE ORAL ONCE
Status: DISCONTINUED | OUTPATIENT
Start: 2024-10-06 | End: 2024-10-06

## 2024-10-06 RX ORDER — LORAZEPAM 0.5 MG/1
0.5 TABLET ORAL
Status: COMPLETED | OUTPATIENT
Start: 2024-10-06 | End: 2024-10-06

## 2024-10-06 RX ADMIN — HYDROMORPHONE HYDROCHLORIDE 1 MG: 1 INJECTION, SOLUTION INTRAMUSCULAR; INTRAVENOUS; SUBCUTANEOUS at 09:12

## 2024-10-06 RX ADMIN — HYDROMORPHONE HYDROCHLORIDE 1 MG: 1 INJECTION, SOLUTION INTRAMUSCULAR; INTRAVENOUS; SUBCUTANEOUS at 05:56

## 2024-10-06 RX ADMIN — SUCRALFATE 1 G: 1 TABLET ORAL at 17:20

## 2024-10-06 RX ADMIN — HYDROMORPHONE HYDROCHLORIDE 1 MG: 1 INJECTION, SOLUTION INTRAMUSCULAR; INTRAVENOUS; SUBCUTANEOUS at 18:15

## 2024-10-06 RX ADMIN — LORAZEPAM 0.5 MG: 0.5 TABLET ORAL at 09:23

## 2024-10-06 RX ADMIN — PANTOPRAZOLE SODIUM 40 MG: 40 TABLET, DELAYED RELEASE ORAL at 05:56

## 2024-10-06 RX ADMIN — METHOCARBAMOL TABLETS 750 MG: 750 TABLET, COATED ORAL at 07:47

## 2024-10-06 RX ADMIN — METHOCARBAMOL TABLETS 750 MG: 750 TABLET, COATED ORAL at 12:13

## 2024-10-06 RX ADMIN — HYDROMORPHONE HYDROCHLORIDE 1 MG: 1 INJECTION, SOLUTION INTRAMUSCULAR; INTRAVENOUS; SUBCUTANEOUS at 15:17

## 2024-10-06 RX ADMIN — IPRATROPIUM BROMIDE AND ALBUTEROL SULFATE 1 DOSE: 2.5; .5 SOLUTION RESPIRATORY (INHALATION) at 19:24

## 2024-10-06 RX ADMIN — BUDESONIDE 500 MCG: 0.5 INHALANT RESPIRATORY (INHALATION) at 19:24

## 2024-10-06 RX ADMIN — SENNOSIDES AND DOCUSATE SODIUM 2 TABLET: 50; 8.6 TABLET ORAL at 07:50

## 2024-10-06 RX ADMIN — IOPAMIDOL 75 ML: 755 INJECTION, SOLUTION INTRAVENOUS at 10:45

## 2024-10-06 RX ADMIN — POTASSIUM BICARBONATE 40 MEQ: 782 TABLET, EFFERVESCENT ORAL at 11:01

## 2024-10-06 RX ADMIN — SENNOSIDES AND DOCUSATE SODIUM 2 TABLET: 50; 8.6 TABLET ORAL at 20:21

## 2024-10-06 RX ADMIN — OXYCODONE HYDROCHLORIDE 15 MG: 15 TABLET ORAL at 23:22

## 2024-10-06 RX ADMIN — SUCRALFATE 1 G: 1 TABLET ORAL at 11:01

## 2024-10-06 RX ADMIN — TRANEXAMIC ACID 500 MG: 100 INJECTION, SOLUTION INTRAVENOUS at 08:35

## 2024-10-06 RX ADMIN — POLYETHYLENE GLYCOL 3350 17 G: 17 POWDER, FOR SOLUTION ORAL at 07:50

## 2024-10-06 RX ADMIN — OXYCODONE HYDROCHLORIDE 15 MG: 15 TABLET ORAL at 07:47

## 2024-10-06 RX ADMIN — IPRATROPIUM BROMIDE AND ALBUTEROL SULFATE 1 DOSE: 2.5; .5 SOLUTION RESPIRATORY (INHALATION) at 12:39

## 2024-10-06 RX ADMIN — OXYCODONE HYDROCHLORIDE 15 MG: 15 TABLET ORAL at 04:44

## 2024-10-06 RX ADMIN — SUCRALFATE 1 G: 1 TABLET ORAL at 23:22

## 2024-10-06 RX ADMIN — METHOCARBAMOL TABLETS 750 MG: 750 TABLET, COATED ORAL at 17:20

## 2024-10-06 RX ADMIN — BICALUTAMIDE 50 MG: 50 TABLET, FILM COATED ORAL at 07:50

## 2024-10-06 RX ADMIN — METHOCARBAMOL TABLETS 750 MG: 750 TABLET, COATED ORAL at 20:21

## 2024-10-06 RX ADMIN — HYDROMORPHONE HYDROCHLORIDE 1 MG: 1 INJECTION, SOLUTION INTRAMUSCULAR; INTRAVENOUS; SUBCUTANEOUS at 21:25

## 2024-10-06 RX ADMIN — SODIUM CHLORIDE, PRESERVATIVE FREE 10 ML: 5 INJECTION INTRAVENOUS at 07:50

## 2024-10-06 RX ADMIN — SODIUM CHLORIDE, PRESERVATIVE FREE 10 ML: 5 INJECTION INTRAVENOUS at 21:26

## 2024-10-06 RX ADMIN — OXYCODONE HYDROCHLORIDE 15 MG: 15 TABLET ORAL at 11:01

## 2024-10-06 RX ADMIN — TRANEXAMIC ACID 500 MG: 100 INJECTION, SOLUTION INTRAVENOUS at 12:40

## 2024-10-06 RX ADMIN — POTASSIUM CHLORIDE 20 MEQ: 1500 TABLET, EXTENDED RELEASE ORAL at 01:46

## 2024-10-06 RX ADMIN — OXYCODONE HYDROCHLORIDE 15 MG: 15 TABLET ORAL at 17:21

## 2024-10-06 RX ADMIN — BUDESONIDE 500 MCG: 0.5 INHALANT RESPIRATORY (INHALATION) at 08:35

## 2024-10-06 RX ADMIN — IPRATROPIUM BROMIDE AND ALBUTEROL SULFATE 1 DOSE: 2.5; .5 SOLUTION RESPIRATORY (INHALATION) at 08:35

## 2024-10-06 RX ADMIN — TRANEXAMIC ACID 500 MG: 100 INJECTION, SOLUTION INTRAVENOUS at 19:23

## 2024-10-06 RX ADMIN — HYDROMORPHONE HYDROCHLORIDE 1 MG: 1 INJECTION, SOLUTION INTRAMUSCULAR; INTRAVENOUS; SUBCUTANEOUS at 12:13

## 2024-10-06 RX ADMIN — HYDROMORPHONE HYDROCHLORIDE 1 MG: 1 INJECTION, SOLUTION INTRAMUSCULAR; INTRAVENOUS; SUBCUTANEOUS at 02:53

## 2024-10-06 RX ADMIN — FUROSEMIDE 20 MG: 10 INJECTION, SOLUTION INTRAMUSCULAR; INTRAVENOUS at 17:36

## 2024-10-06 RX ADMIN — IPRATROPIUM BROMIDE AND ALBUTEROL SULFATE 1 DOSE: 2.5; .5 SOLUTION RESPIRATORY (INHALATION) at 15:57

## 2024-10-06 RX ADMIN — OXYCODONE HYDROCHLORIDE 15 MG: 15 TABLET ORAL at 01:45

## 2024-10-06 RX ADMIN — OXYCODONE HYDROCHLORIDE 15 MG: 15 TABLET ORAL at 20:21

## 2024-10-06 RX ADMIN — OXYCODONE HYDROCHLORIDE 15 MG: 15 TABLET ORAL at 14:05

## 2024-10-06 RX ADMIN — Medication 2000 UNITS: at 07:49

## 2024-10-06 RX ADMIN — SUCRALFATE 1 G: 1 TABLET ORAL at 05:56

## 2024-10-06 ASSESSMENT — PAIN DESCRIPTION - DESCRIPTORS
DESCRIPTORS: ACHING;DISCOMFORT;SORE;THROBBING;TENDER
DESCRIPTORS: ACHING;DISCOMFORT;SORE
DESCRIPTORS: ACHING;DISCOMFORT;SORE;STABBING;TENDER
DESCRIPTORS: ACHING;DISCOMFORT;SHARP
DESCRIPTORS: ACHING;DISCOMFORT;SORE
DESCRIPTORS: ACHING;DISCOMFORT;SORE

## 2024-10-06 ASSESSMENT — PAIN SCALES - GENERAL
PAINLEVEL_OUTOF10: 8
PAINLEVEL_OUTOF10: 9
PAINLEVEL_OUTOF10: 7
PAINLEVEL_OUTOF10: 10
PAINLEVEL_OUTOF10: 9
PAINLEVEL_OUTOF10: 8
PAINLEVEL_OUTOF10: 9
PAINLEVEL_OUTOF10: 10
PAINLEVEL_OUTOF10: 10
PAINLEVEL_OUTOF10: 8
PAINLEVEL_OUTOF10: 10
PAINLEVEL_OUTOF10: 7
PAINLEVEL_OUTOF10: 10
PAINLEVEL_OUTOF10: 10
PAINLEVEL_OUTOF10: 8
PAINLEVEL_OUTOF10: 9
PAINLEVEL_OUTOF10: 8
PAINLEVEL_OUTOF10: 10
PAINLEVEL_OUTOF10: 7
PAINLEVEL_OUTOF10: 10
PAINLEVEL_OUTOF10: 7
PAINLEVEL_OUTOF10: 10
PAINLEVEL_OUTOF10: 9
PAINLEVEL_OUTOF10: 8
PAINLEVEL_OUTOF10: 8

## 2024-10-06 ASSESSMENT — PAIN DESCRIPTION - ORIENTATION
ORIENTATION: ANTERIOR;POSTERIOR
ORIENTATION: RIGHT;LEFT
ORIENTATION: ANTERIOR;POSTERIOR
ORIENTATION: RIGHT;LEFT;MID
ORIENTATION: RIGHT;LEFT
ORIENTATION: ANTERIOR;POSTERIOR
ORIENTATION: ANTERIOR;MID
ORIENTATION: ANTERIOR;POSTERIOR
ORIENTATION: RIGHT;LEFT
ORIENTATION: RIGHT;LEFT

## 2024-10-06 ASSESSMENT — PAIN DESCRIPTION - LOCATION
LOCATION: ABDOMEN;CHEST;RIB CAGE
LOCATION: ABDOMEN;RIB CAGE;BACK
LOCATION: ABDOMEN;BACK;PELVIS
LOCATION: ABDOMEN;BACK;RIB CAGE
LOCATION: ABDOMEN;PELVIS;BACK
LOCATION: ABDOMEN;RIB CAGE;BACK
LOCATION: ABDOMEN;PELVIS;BACK
LOCATION: ABDOMEN;BACK;PELVIS
LOCATION: ABDOMEN;RIB CAGE;BACK
LOCATION: ABDOMEN;BACK;PELVIS
LOCATION: ABDOMEN;BACK;RIB CAGE
LOCATION: ABDOMEN;PELVIS;BACK
LOCATION: ABDOMEN;PELVIS;BACK

## 2024-10-06 ASSESSMENT — PAIN - FUNCTIONAL ASSESSMENT
PAIN_FUNCTIONAL_ASSESSMENT: PREVENTS OR INTERFERES SOME ACTIVE ACTIVITIES AND ADLS
PAIN_FUNCTIONAL_ASSESSMENT: ACTIVITIES ARE NOT PREVENTED
PAIN_FUNCTIONAL_ASSESSMENT: PREVENTS OR INTERFERES SOME ACTIVE ACTIVITIES AND ADLS
PAIN_FUNCTIONAL_ASSESSMENT: ACTIVITIES ARE NOT PREVENTED
PAIN_FUNCTIONAL_ASSESSMENT: PREVENTS OR INTERFERES SOME ACTIVE ACTIVITIES AND ADLS
PAIN_FUNCTIONAL_ASSESSMENT: PREVENTS OR INTERFERES SOME ACTIVE ACTIVITIES AND ADLS

## 2024-10-06 NOTE — PROGRESS NOTES
RN stayed with patient for the first 15 minutes of platelet infusion. Patient complained of no transfusion reaction symptoms.     Jennifer Posey RN

## 2024-10-06 NOTE — PROGRESS NOTES
RN notified Family medicine MOLLY of patient critical labs this morning. Hbg 7.0 platelet 16.     Jennifer Posey RN

## 2024-10-06 NOTE — PROGRESS NOTES
[facility]  Family Medicine Attending    S: 44 y.o. male with a history of prostate cancer, anxiety, opiate abuse who presented with nausea and vomiting as well as left sided abdominal pain.  He was found to have hypoxia and severe symptomatic anemia with hgb 5.3.  He has been having nausea with vomiting, pelvic and back pain.  Has not been able to walk due to pain for a couple of weeks.  Goals of care-pain control so that he can walk again.  He wanted more information on side effects from a regimen of chemotherapy drugs from the oncologist.  Oncology was consulted.  Per their note, \"Considering high tumor burden, advised treatment with Taxotere, GnRH agonist, Nubeqa along with Xgeva\"  Side effects of these medications were discussed with the patient by oncology.     Patient decided to move forward with chemotherapy.    Palliative care following patient.  Pain is still too severe to walk.    He also states that when he tries to stand up the pain is mostly in a band around his upper abdomen.  He says he has no difficulty with his legs when standing. Patients notes some small amounts of blood in his mucous if he coughs.      9/30-patient reports that he is not feeling well but not willing to elaborate.  To start transfusion for hemoglobin of 6.5. No change in breathing. Denies any active bleeding today. Rib pain where mets are located.    10/1-s/p 2 units PRBCS yesterday.  This morning stating that oxygen was increased overnight to 11L HF NC, now at 10. But now denying dyspnea, some mucus with blood upon coughing. He denies that he is having depressive symptoms.    10/2-Patient reports he is feeling short of breath today. Coughing up some blood tinged mucus.     10/3-Patient reports ongoing shortness of breath but believes it is better. Some chills.  He is trying to do incentive spirometry.  Less blood in his sputum. Worked wit pt/ot and walked. Improved mentally.    10/4-Pt had shortness of breath  Urology 10/7/24.     Consultation to palliative for recs on pain control/bowel regimen; pain medication adjustments per palliative.     Patient also considering moving back to [place] to seek Tx from [facility].    PT/OT- patient was unable to walk due to pain. Encouraged working with PT.    Lft elevation-improving    Severe malnutrition-has nutritional supplements ordered, watch for refeeding syndrome    Dispo:  pending dyspnea and Hgb stabalization     Attending Physician Statement  I have reviewed the chart and seen the patient with the resident(s).  I personally reviewed images, EKG's and similar tests, if present.  I personally reviewed and performed key elements of the history and exam.  I have reviewed and confirmed the Family Medicine admitting team resident history and exam with changes as indicated above.  I agree with the assessment, plan, and orders as documented by the  admitting team resident Jayme.  Please refer to the resident progress note today (Dr. Rebollar) for additional information.      Konstantin Zhang MD

## 2024-10-06 NOTE — PROGRESS NOTES
RN attempted to call urology consult. Offices currently closed. RN will attempt to call when offices open.     Jennifer Posey RN

## 2024-10-06 NOTE — PLAN OF CARE
Problem: Discharge Planning  Goal: Discharge to home or other facility with appropriate resources  10/6/2024 0852 by Jennifer Posey RN  Outcome: Progressing     Problem: Pain  Goal: Verbalizes/displays adequate comfort level or baseline comfort level  10/6/2024 0852 by Jennifer Posey RN  Outcome: Progressing     Problem: Skin/Tissue Integrity  Goal: Absence of new skin breakdown  Description: 1.  Monitor for areas of redness and/or skin breakdown  2.  Assess vascular access sites hourly  3.  Every 4-6 hours minimum:  Change oxygen saturation probe site  4.  Every 4-6 hours:  If on nasal continuous positive airway pressure, respiratory therapy assess nares and determine need for appliance change or resting period.  10/6/2024 0852 by Jennifer Posey RN  Outcome: Progressing     Problem: Safety - Adult  Goal: Free from fall injury  10/6/2024 0852 by Jennifer Posey RN  Outcome: Progressing     Problem: ABCDS Injury Assessment  Goal: Absence of physical injury  10/6/2024 0852 by Jennifer Posey RN  Outcome: Progressing     Problem: Nutrition Deficit:  Goal: Optimize nutritional status  10/6/2024 0852 by Jennifer Posey RN  Outcome: Progressing

## 2024-10-06 NOTE — PROGRESS NOTES
lymphangitic spread of disease given mediastinal adenopathy and abnormal bony   metastatic process within the ribs and spine cannot be completely excluded   and clinical correlation is needed..   3. Abnormal bony metastatic disease within the spine. There is a bony   destructive process involving the left anterior 2nd rib. Associated soft   tissue thickening.   4. No acute intra-abdominal or intrapelvic process.  Stable adenopathy   identified throughout the retroperitoneum and left pelvic sidewall.  Stable   bony Mets metastatic disease throughout the spine and pelvis as well as the   hips bilaterally.         XR CHEST PORTABLE   Final Result   Interval worsening of the patchy airspace disease throughout the lung fields   bilaterally.         CTA PULMONARY W CONTRAST    (Results Pending)         Resident Assessment and Plan     Nausea and vomiting  Stage IV prostate cancer with mets s/p radiation therapy  Back pain and pelvic pain 2/2 mets  -Patient completed radiation 08/25/2024  -Patient finally agreeable to chemotherapy, Nubeqa LHRH agonist as an outpatient  -Heme-onc following: Patient on Casodex  -Palliative care following: On IV Dilaudid 0.5 to 1 mg every 3 hours as needed, Oxycodone 15 mg every 4 hours as needed, Robaxin  -Refused fentanyl patch  -D/C morphine due to drowsiness  -Thoracic X-rays ordered- unchanged finding from previous imaging.   -Lactic acid within normal limits  -[facility] 15/24, not able to tolerate PT at this time  -Monitor electrolytes daily      Bilateral Leg Edema   -Follow ProBNP   -IV fluids dc'd  -IV Lasix  -Monitor intake/output    Acute hypoxic respiratory failure  Hemoptysis  Mediastinal LAD  Pleural effusion   -Patient not on oxygen at home. Now on 10L of oxygen   -Pulmonology consulted  -Continue BT   -Possible bronchoscopy but on hold due to thrombocytopenia  -On inhaled TXA  -Completed solumedrol   -Chest Xray 10/04 - Mild improvement compared to 10/03  -ABG's and chest  Xray  -CTA pulm pending  -On IV Lasix  -Monitor intake/output     Anemia - stable   Thrombocytopenia - improved   -Hg 5.3 on arrival.  Transfused 6 units total since admission   -Transfused platelets 3x  -Monitor CBC daily   -Transfuse Hg <7  -Transfuse platelets <10k or active bleeding   -Trend H&H q12    Hypokalemia   Hypophos  - Likely worsened 2/2 IV Lasix   - Replete as needed  -Pending PTH, PTHrp, vitamin D, uric acid, ionized calcium, urine phos        DVT/GI ppx: PCD's/Protonix  Pain: Dilaudid, oxy  GI regimen: Senna S & GlycoLax scheduled  PT/OT: 15/24, not able to tolerat PT at this time  Dispo: Continue present management   Telemetry Day: 10/02 since last renewal      Electronically signed by Crystal Delacruz DO on 10/6/2024 at 6:29 AM  Attending physician: Dr. Zhang

## 2024-10-06 NOTE — PATIENT CARE CONFERENCE
[facility] Quality Flow/Interdisciplinary Rounds Progress Note        Quality Flow Rounds held on October 6, 2024    Disciplines Attending:  Bedside Nurse and Nursing Unit Leadership    Joseph Bond was admitted on 9/26/2024  4:56 AM    Anticipated Discharge Date:       Disposition:    Simon Score:  Simon Scale Score: 20    Readmission Risk              Risk of Unplanned Readmission:  40           Discussed patient goal for the day, patient clinical progression, and barriers to discharge.  The following Goal(s) of the Day/Commitment(s) have been identified:  Diagnostics - Report Results and Labs - Report Results      Josephine Hatfield RN  October 6, 2024

## 2024-10-07 LAB
ALBUMIN SERPL-MCNC: 3.1 G/DL (ref 3.5–5.2)
ALP SERPL-CCNC: 186 U/L (ref 40–129)
ALT SERPL-CCNC: 18 U/L (ref 0–40)
ANION GAP SERPL CALCULATED.3IONS-SCNC: 11 MMOL/L (ref 7–16)
ARM BAND NUMBER: NORMAL
AST SERPL-CCNC: 33 U/L (ref 0–39)
BASOPHILS # BLD: 0 K/UL (ref 0–0.2)
BASOPHILS NFR BLD: 0 % (ref 0–2)
BILIRUB SERPL-MCNC: 1.4 MG/DL (ref 0–1.2)
BLOOD BANK BLOOD PRODUCT EXPIRATION DATE: NORMAL
BLOOD BANK DISPENSE STATUS: NORMAL
BLOOD BANK ISBT PRODUCT BLOOD TYPE: 6200
BLOOD BANK PRODUCT CODE: NORMAL
BLOOD BANK UNIT TYPE AND RH: NORMAL
BPU ID: NORMAL
BUN SERPL-MCNC: 16 MG/DL (ref 6–20)
CALCIUM SERPL-MCNC: 8.8 MG/DL (ref 8.6–10.2)
CHLORIDE SERPL-SCNC: 99 MMOL/L (ref 98–107)
CO2 SERPL-SCNC: 29 MMOL/L (ref 22–29)
COMPONENT: NORMAL
CREAT SERPL-MCNC: 0.6 MG/DL (ref 0.7–1.2)
EOSINOPHIL # BLD: 0.1 K/UL (ref 0.05–0.5)
EOSINOPHILS RELATIVE PERCENT: 3 % (ref 0–6)
ERYTHROCYTE [DISTWIDTH] IN BLOOD BY AUTOMATED COUNT: 15.6 % (ref 11.5–15)
ERYTHROCYTE [DISTWIDTH] IN BLOOD BY AUTOMATED COUNT: 16.8 % (ref 11.5–15)
GFR, ESTIMATED: >90 ML/MIN/1.73M2
GLUCOSE SERPL-MCNC: 101 MG/DL (ref 74–99)
HCT VFR BLD AUTO: 21.1 % (ref 37–54)
HCT VFR BLD AUTO: 24.5 % (ref 37–54)
HGB BLD-MCNC: 6.8 G/DL (ref 12.5–16.5)
HGB BLD-MCNC: 8.2 G/DL (ref 12.5–16.5)
LYMPHOCYTES NFR BLD: 0.24 K/UL (ref 1.5–4)
LYMPHOCYTES RELATIVE PERCENT: 6 % (ref 20–42)
MCH RBC QN AUTO: 29.4 PG (ref 26–35)
MCH RBC QN AUTO: 29.6 PG (ref 26–35)
MCHC RBC AUTO-ENTMCNC: 32.2 G/DL (ref 32–34.5)
MCHC RBC AUTO-ENTMCNC: 33.5 G/DL (ref 32–34.5)
MCV RBC AUTO: 88.4 FL (ref 80–99.9)
MCV RBC AUTO: 91.3 FL (ref 80–99.9)
METAMYELOCYTES ABSOLUTE COUNT: 0.14 K/UL (ref 0–0.12)
METAMYELOCYTES: 4 % (ref 0–1)
MONOCYTES NFR BLD: 0.03 K/UL (ref 0.1–0.95)
MONOCYTES NFR BLD: 1 % (ref 2–12)
MYELOCYTES ABSOLUTE COUNT: 0.17 K/UL
MYELOCYTES: 4 %
NEUTROPHILS NFR BLD: 83 % (ref 43–80)
NEUTS SEG NFR BLD: 3.3 K/UL (ref 1.8–7.3)
NUCLEATED RED BLOOD CELLS: 1 PER 100 WBC
PHOSPHATE SERPL-MCNC: 3.7 MG/DL (ref 2.5–4.5)
PLATELET # BLD AUTO: 28 K/UL (ref 130–450)
PLATELET CONFIRMATION: NORMAL
PLATELET CONFIRMATION: NORMAL
PLATELET, FLUORESCENCE: 24 K/UL (ref 130–450)
PMV BLD AUTO: 11 FL (ref 7–12)
PMV BLD AUTO: 9.7 FL (ref 7–12)
POTASSIUM SERPL-SCNC: 3.7 MMOL/L (ref 3.5–5)
PROT SERPL-MCNC: 5.5 G/DL (ref 6.4–8.3)
RBC # BLD AUTO: 2.31 M/UL (ref 3.8–5.8)
RBC # BLD AUTO: 2.77 M/UL (ref 3.8–5.8)
RBC # BLD: ABNORMAL 10*6/UL
SODIUM SERPL-SCNC: 139 MMOL/L (ref 132–146)
TRANSFUSION STATUS: NORMAL
UNIT DIVISION: 0
UNIT ISSUE DATE/TIME: NORMAL
WBC OTHER # BLD: 4 K/UL (ref 4.5–11.5)
WBC OTHER # BLD: 5.3 K/UL (ref 4.5–11.5)

## 2024-10-07 PROCEDURE — 6360000002 HC RX W HCPCS

## 2024-10-07 PROCEDURE — 84166 PROTEIN E-PHORESIS/URINE/CSF: CPT

## 2024-10-07 PROCEDURE — 80053 COMPREHEN METABOLIC PANEL: CPT

## 2024-10-07 PROCEDURE — 99232 SBSQ HOSP IP/OBS MODERATE 35: CPT | Performed by: INTERNAL MEDICINE

## 2024-10-07 PROCEDURE — 2580000003 HC RX 258

## 2024-10-07 PROCEDURE — 6370000000 HC RX 637 (ALT 250 FOR IP)

## 2024-10-07 PROCEDURE — 84155 ASSAY OF PROTEIN SERUM: CPT

## 2024-10-07 PROCEDURE — P9016 RBC LEUKOCYTES REDUCED: HCPCS

## 2024-10-07 PROCEDURE — 84165 PROTEIN E-PHORESIS SERUM: CPT

## 2024-10-07 PROCEDURE — 84100 ASSAY OF PHOSPHORUS: CPT

## 2024-10-07 PROCEDURE — 2700000000 HC OXYGEN THERAPY PER DAY

## 2024-10-07 PROCEDURE — 6370000000 HC RX 637 (ALT 250 FOR IP): Performed by: INTERNAL MEDICINE

## 2024-10-07 PROCEDURE — 6370000000 HC RX 637 (ALT 250 FOR IP): Performed by: NURSE PRACTITIONER

## 2024-10-07 PROCEDURE — 86334 IMMUNOFIX E-PHORESIS SERUM: CPT

## 2024-10-07 PROCEDURE — 36430 TRANSFUSION BLD/BLD COMPNT: CPT

## 2024-10-07 PROCEDURE — 94669 MECHANICAL CHEST WALL OSCILL: CPT

## 2024-10-07 PROCEDURE — 99232 SBSQ HOSP IP/OBS MODERATE 35: CPT | Performed by: FAMILY MEDICINE

## 2024-10-07 PROCEDURE — 84156 ASSAY OF PROTEIN URINE: CPT

## 2024-10-07 PROCEDURE — 94640 AIRWAY INHALATION TREATMENT: CPT

## 2024-10-07 PROCEDURE — 2500000003 HC RX 250 WO HCPCS: Performed by: NURSE PRACTITIONER

## 2024-10-07 PROCEDURE — 85025 COMPLETE CBC W/AUTO DIFF WBC: CPT

## 2024-10-07 PROCEDURE — 83521 IG LIGHT CHAINS FREE EACH: CPT

## 2024-10-07 PROCEDURE — 2140000000 HC CCU INTERMEDIATE R&B

## 2024-10-07 PROCEDURE — 85027 COMPLETE CBC AUTOMATED: CPT

## 2024-10-07 RX ORDER — DEXAMETHASONE SODIUM PHOSPHATE 4 MG/ML
6 INJECTION, SOLUTION INTRA-ARTICULAR; INTRALESIONAL; INTRAMUSCULAR; INTRAVENOUS; SOFT TISSUE DAILY
Status: DISCONTINUED | OUTPATIENT
Start: 2024-10-07 | End: 2024-10-09

## 2024-10-07 RX ORDER — SODIUM CHLORIDE 9 MG/ML
INJECTION, SOLUTION INTRAVENOUS PRN
Status: DISCONTINUED | OUTPATIENT
Start: 2024-10-07 | End: 2024-10-11 | Stop reason: HOSPADM

## 2024-10-07 RX ADMIN — HYDROMORPHONE HYDROCHLORIDE 1 MG: 1 INJECTION, SOLUTION INTRAMUSCULAR; INTRAVENOUS; SUBCUTANEOUS at 22:58

## 2024-10-07 RX ADMIN — POLYETHYLENE GLYCOL 3350 17 G: 17 POWDER, FOR SOLUTION ORAL at 08:31

## 2024-10-07 RX ADMIN — SODIUM CHLORIDE, PRESERVATIVE FREE 10 ML: 5 INJECTION INTRAVENOUS at 20:01

## 2024-10-07 RX ADMIN — METHOCARBAMOL TABLETS 750 MG: 750 TABLET, COATED ORAL at 08:35

## 2024-10-07 RX ADMIN — PANTOPRAZOLE SODIUM 40 MG: 40 TABLET, DELAYED RELEASE ORAL at 05:27

## 2024-10-07 RX ADMIN — IPRATROPIUM BROMIDE AND ALBUTEROL SULFATE 1 DOSE: 2.5; .5 SOLUTION RESPIRATORY (INHALATION) at 10:45

## 2024-10-07 RX ADMIN — IPRATROPIUM BROMIDE AND ALBUTEROL SULFATE 1 DOSE: 2.5; .5 SOLUTION RESPIRATORY (INHALATION) at 15:54

## 2024-10-07 RX ADMIN — Medication 2000 UNITS: at 08:34

## 2024-10-07 RX ADMIN — OXYCODONE HYDROCHLORIDE 15 MG: 15 TABLET ORAL at 02:22

## 2024-10-07 RX ADMIN — BUDESONIDE 500 MCG: 0.5 INHALANT RESPIRATORY (INHALATION) at 10:45

## 2024-10-07 RX ADMIN — HYDROMORPHONE HYDROCHLORIDE 1 MG: 1 INJECTION, SOLUTION INTRAMUSCULAR; INTRAVENOUS; SUBCUTANEOUS at 00:30

## 2024-10-07 RX ADMIN — SENNOSIDES AND DOCUSATE SODIUM 2 TABLET: 50; 8.6 TABLET ORAL at 20:01

## 2024-10-07 RX ADMIN — METHOCARBAMOL TABLETS 750 MG: 750 TABLET, COATED ORAL at 12:31

## 2024-10-07 RX ADMIN — SENNOSIDES AND DOCUSATE SODIUM 2 TABLET: 50; 8.6 TABLET ORAL at 08:34

## 2024-10-07 RX ADMIN — SUCRALFATE 1 G: 1 TABLET ORAL at 12:31

## 2024-10-07 RX ADMIN — DEXAMETHASONE SODIUM PHOSPHATE 6 MG: 4 INJECTION INTRA-ARTICULAR; INTRALESIONAL; INTRAMUSCULAR; INTRAVENOUS; SOFT TISSUE at 19:59

## 2024-10-07 RX ADMIN — METHOCARBAMOL TABLETS 750 MG: 750 TABLET, COATED ORAL at 17:00

## 2024-10-07 RX ADMIN — HYDROMORPHONE HYDROCHLORIDE 1 MG: 1 INJECTION, SOLUTION INTRAMUSCULAR; INTRAVENOUS; SUBCUTANEOUS at 06:50

## 2024-10-07 RX ADMIN — SUCRALFATE 1 G: 1 TABLET ORAL at 17:02

## 2024-10-07 RX ADMIN — SUCRALFATE 1 G: 1 TABLET ORAL at 22:57

## 2024-10-07 RX ADMIN — BICALUTAMIDE 50 MG: 50 TABLET, FILM COATED ORAL at 08:33

## 2024-10-07 RX ADMIN — BUDESONIDE 500 MCG: 0.5 INHALANT RESPIRATORY (INHALATION) at 20:22

## 2024-10-07 RX ADMIN — METHOCARBAMOL TABLETS 750 MG: 750 TABLET, COATED ORAL at 20:01

## 2024-10-07 RX ADMIN — SODIUM CHLORIDE, PRESERVATIVE FREE 10 ML: 5 INJECTION INTRAVENOUS at 08:34

## 2024-10-07 RX ADMIN — OXYCODONE HYDROCHLORIDE 15 MG: 15 TABLET ORAL at 12:31

## 2024-10-07 RX ADMIN — OXYCODONE HYDROCHLORIDE 15 MG: 15 TABLET ORAL at 21:48

## 2024-10-07 RX ADMIN — HYDROMORPHONE HYDROCHLORIDE 1 MG: 1 INJECTION, SOLUTION INTRAMUSCULAR; INTRAVENOUS; SUBCUTANEOUS at 09:59

## 2024-10-07 RX ADMIN — OXYCODONE HYDROCHLORIDE 15 MG: 15 TABLET ORAL at 18:45

## 2024-10-07 RX ADMIN — HYDROMORPHONE HYDROCHLORIDE 1 MG: 1 INJECTION, SOLUTION INTRAMUSCULAR; INTRAVENOUS; SUBCUTANEOUS at 13:38

## 2024-10-07 RX ADMIN — SUCRALFATE 1 G: 1 TABLET ORAL at 05:26

## 2024-10-07 RX ADMIN — OXYCODONE HYDROCHLORIDE 15 MG: 15 TABLET ORAL at 15:43

## 2024-10-07 RX ADMIN — OXYCODONE HYDROCHLORIDE 15 MG: 15 TABLET ORAL at 05:26

## 2024-10-07 RX ADMIN — TRANEXAMIC ACID 500 MG: 100 INJECTION, SOLUTION INTRAVENOUS at 10:45

## 2024-10-07 RX ADMIN — IPRATROPIUM BROMIDE AND ALBUTEROL SULFATE 1 DOSE: 2.5; .5 SOLUTION RESPIRATORY (INHALATION) at 20:22

## 2024-10-07 RX ADMIN — HYDROMORPHONE HYDROCHLORIDE 1 MG: 1 INJECTION, SOLUTION INTRAMUSCULAR; INTRAVENOUS; SUBCUTANEOUS at 20:00

## 2024-10-07 RX ADMIN — OXYCODONE HYDROCHLORIDE 15 MG: 15 TABLET ORAL at 08:32

## 2024-10-07 RX ADMIN — HYDROMORPHONE HYDROCHLORIDE 1 MG: 1 INJECTION, SOLUTION INTRAMUSCULAR; INTRAVENOUS; SUBCUTANEOUS at 03:34

## 2024-10-07 RX ADMIN — HYDROMORPHONE HYDROCHLORIDE 1 MG: 1 INJECTION, SOLUTION INTRAMUSCULAR; INTRAVENOUS; SUBCUTANEOUS at 17:00

## 2024-10-07 ASSESSMENT — PAIN DESCRIPTION - DESCRIPTORS
DESCRIPTORS: ACHING;DISCOMFORT;SQUEEZING;TENDER
DESCRIPTORS: ACHING;DISCOMFORT;SORE
DESCRIPTORS: DISCOMFORT;HEAVINESS;PRESSURE
DESCRIPTORS: DISCOMFORT;HEAVINESS;PRESSURE
DESCRIPTORS: SQUEEZING;ACHING;DISCOMFORT
DESCRIPTORS: ACHING;DISCOMFORT;SORE
DESCRIPTORS: ACHING;DISCOMFORT;TENDER
DESCRIPTORS: ACHING;CRUSHING;DISCOMFORT
DESCRIPTORS: ACHING;DISCOMFORT;SORE
DESCRIPTORS: ACHING;DISCOMFORT;SORE
DESCRIPTORS: ACHING;CRUSHING;SQUEEZING
DESCRIPTORS: ACHING;DISCOMFORT;SORE
DESCRIPTORS: ACHING;DISCOMFORT;SHOOTING
DESCRIPTORS: ACHING;DISCOMFORT;SORE;TENDER

## 2024-10-07 ASSESSMENT — PAIN - FUNCTIONAL ASSESSMENT

## 2024-10-07 ASSESSMENT — PAIN SCALES - GENERAL
PAINLEVEL_OUTOF10: 10
PAINLEVEL_OUTOF10: 8
PAINLEVEL_OUTOF10: 10
PAINLEVEL_OUTOF10: 9
PAINLEVEL_OUTOF10: 8
PAINLEVEL_OUTOF10: 10
PAINLEVEL_OUTOF10: 10
PAINLEVEL_OUTOF10: 8
PAINLEVEL_OUTOF10: 10
PAINLEVEL_OUTOF10: 8
PAINLEVEL_OUTOF10: 10

## 2024-10-07 ASSESSMENT — PAIN SCALES - WONG BAKER
WONGBAKER_NUMERICALRESPONSE: NO HURT
WONGBAKER_NUMERICALRESPONSE: HURTS WHOLE LOT

## 2024-10-07 ASSESSMENT — PAIN DESCRIPTION - ORIENTATION
ORIENTATION: RIGHT;LEFT
ORIENTATION: ANTERIOR;POSTERIOR
ORIENTATION: RIGHT;LEFT;POSTERIOR;ANTERIOR
ORIENTATION: RIGHT;LEFT;ANTERIOR;POSTERIOR
ORIENTATION: ANTERIOR;POSTERIOR
ORIENTATION: RIGHT;LEFT;ANTERIOR;POSTERIOR
ORIENTATION: ANTERIOR;POSTERIOR
ORIENTATION: MID
ORIENTATION: RIGHT;LEFT;MID;ANTERIOR;POSTERIOR
ORIENTATION: ANTERIOR;POSTERIOR
ORIENTATION: MID
ORIENTATION: ANTERIOR;MID
ORIENTATION: MID;RIGHT;LEFT
ORIENTATION: RIGHT;LEFT;ANTERIOR;POSTERIOR
ORIENTATION: RIGHT;LEFT;ANTERIOR;POSTERIOR

## 2024-10-07 ASSESSMENT — PAIN DESCRIPTION - LOCATION
LOCATION: CHEST
LOCATION: ABDOMEN;BACK
LOCATION: ABDOMEN;BACK;PELVIS
LOCATION: ABDOMEN;BACK;PELVIS
LOCATION: CHEST
LOCATION: ABDOMEN
LOCATION: ABDOMEN;BACK;PELVIS
LOCATION: ABDOMEN
LOCATION: RIB CAGE
LOCATION: ABDOMEN;BACK
LOCATION: ABDOMEN;BACK;PELVIS
LOCATION: ABDOMEN
LOCATION: CHEST
LOCATION: ABDOMEN;BACK;PELVIS
LOCATION: ABDOMEN;BACK

## 2024-10-07 ASSESSMENT — PAIN DESCRIPTION - PAIN TYPE: TYPE: ACUTE PAIN

## 2024-10-07 NOTE — CONSULTS
[id number] [place] [address]                              CONSULTATION      PATIENT NAME: BRIAN ANTHONY              : 1980  MED REC NO: [id number]                        ROOM: [id number]  ACCOUNT NO: [id number]                       ADMIT DATE: 2024  PROVIDER: Davidson Smith MD      CONSULT DATE: 10/07/2024    HISTORY OF PRESENT ILLNESS:  This is a 44-year-old male who presented in July of this year to the hospital with back pain, was found to have metastatic prostatic carcinoma.  The patient was seen and diagnosed at that time; however, he has been reluctant to undergo any treatment.  He is readmitted at this time with severe back pain.  He has been in the hospital since .  He is essentially nonambulatory.  He has been losing weight.  He denies any voiding issues.  He is being treated by Dr. Recio and has apparently now agreed to treatment with chemotherapy.  Currently, he denies any voiding issues, has not had hematuria, although he does notice that his flow is not as strong as it has been in the past.    PHYSICAL EXAMINATION:  GENERAL:  He is alert and oriented.  ABDOMEN:  Soft.  :  Bladder is not distended.    IMPRESSION:  Metastatic and extensive prostate cancer.  At this point, I did not see any urologic treatment to benefit him.  We will continue to follow him to ensure that he is able to void comfortably, but hopefully if he does undergo some treatment, this may allow him to void even better than what he is doing at the present.  His serum creatinine remains excellent.          DAVIDSON SMITH MD      D:  10/07/2024 15:12:55     T:  10/07/2024 19:49:35     MÓNICA/TEJAS  Job #:  [id number]     Doc#:  [id number]

## 2024-10-07 NOTE — PATIENT CARE CONFERENCE
P Quality Flow/Interdisciplinary Rounds Progress Note        Quality Flow Rounds held on October 7, 2024    Disciplines Attending:  Bedside Nurse, , , and Nursing Unit Leadership    Joseph Bond was admitted on 9/26/2024  4:56 AM    Anticipated Discharge Date:       Disposition:    Simon Score:  Simon Scale Score: 19    Readmission Risk              Risk of Unplanned Readmission:  40           Discussed patient goal for the day, patient clinical progression, and barriers to discharge.  The following Goal(s) of the Day/Commitment(s) have been identified:  discharge plan      Kamala Yanez RN  October 7, 2024

## 2024-10-07 NOTE — PROGRESS NOTES
retroperitoneum and left pelvic sidewall.  Stable   bony Mets metastatic disease throughout the spine and pelvis as well as the   hips bilaterally.         CT ABDOMEN PELVIS W IV CONTRAST Additional Contrast? None   Final Result   1. No evidence of pulmonary embolism.   2. Abnormal patchy ground-glass opacity identified diffusely throughout the   lung fields. Findings show progression when compared to the prior study with   no definite pleural effusion or pneumothorax. Findings suggesting progression   of disease favoring an infectious/inflammatory process or however   lymphangitic spread of disease given mediastinal adenopathy and abnormal bony   metastatic process within the ribs and spine cannot be completely excluded   and clinical correlation is needed..   3. Abnormal bony metastatic disease within the spine. There is a bony   destructive process involving the left anterior 2nd rib. Associated soft   tissue thickening.   4. No acute intra-abdominal or intrapelvic process.  Stable adenopathy   identified throughout the retroperitoneum and left pelvic sidewall.  Stable   bony Mets metastatic disease throughout the spine and pelvis as well as the   hips bilaterally.         XR CHEST PORTABLE   Final Result   Interval worsening of the patchy airspace disease throughout the lung fields   bilaterally.               Resident Assessment and Plan     Nausea and vomiting  Stage IV prostate cancer with mets s/p radiation therapy  Back pain and pelvic pain 2/2 mets  -Patient completed radiation 08/25/2024  -Patient finally agreeable to chemotherapy, Nubeqa LHRH agonist as an outpatient  -Heme-onc following: Patient on Casodex  -Palliative care following: On IV Dilaudid 0.5 to 1 mg every 3 hours as needed, Oxycodone 15 mg every 4 hours as needed, Robaxin  -Refused fentanyl patch  -D/C morphine due to drowsiness  -Thoracic X-rays ordered- unchanged finding from previous imaging.   -Lactic acid within normal limits  -[facility]  15/24, not able to tolerate PT at this time  -Monitor electrolytes daily   -Follow labs - spep, upep, immunofixation and light chains      Bilateral Leg Edema   -Follow ProBNP   -IV fluids dc'd  -IV Lasix  -Monitor intake/output    Acute hypoxic respiratory failure  Hemoptysis  Mediastinal LAD  Pleural effusion   -Patient not on oxygen at home. Now on 10L of oxygen   -Pulmonology on board.   -Continue BT   -No bronchoscopy due to chronic thrombocytopenia  -Completed solumedrol and inhaled TXA  -Chest Xray 10/04 - Mild improvement compared to 10/03  -CTA pulm - No PE, Progressive diffuse bilateral pulmonary opacifications,  Heterogeneity and irregularities throughout the thoracic spine with sclerotic focus distal sternun.   -Monitor intake/output     Anemia   Thrombocytopenia   -Hg 5.3 on arrival.  Transfused 8 units total since admission   -Last transfusion : 10/07/2024  -Transfused platelets 3x  -Monitor CBC daily   -Transfuse Hg <7  -Transfuse platelets <10k or active bleeding   -Trend H&H q12    Hypokalemia   Hypophos  Vitamin D deficiency  -Likely worsened 2/2 IV Lasix   -Replete as needed  -Pending PTH, PTHrp, vitamin D, uric acid, ionized calcium, urine phos  -Continue daily vit D        DVT/GI ppx: PCD's/Protonix  Pain: Dilaudid, oxy  GI regimen: Senna S & GlycoLax scheduled  PT/OT: 15/24, not able to tolerat PT at this time  Dispo: Continue present management   Telemetry Day: 10/02 since last renewal      Electronically signed by Anastasia Helms MD on 10/7/2024 at 7:34 AM  Attending physician: Dr. Damon       Statement Selected

## 2024-10-07 NOTE — PROGRESS NOTES
Blood administered. Present at bedside while the first fifteen minutes of blood administering. VSS and no adverse reactions at this time.    Lesley Iverson RN

## 2024-10-07 NOTE — PROGRESS NOTES
[facility]  Department of Internal Medicine  Division of Pulmonary, Critical Care and Sleep Medicine  Progress Note    SUBJECTIVE:  Patient seen and examined.    He was seen on 15 L high flow nasal cannula.    He reports he had a terrible night with worsening pain and increased shortness of breath with increased oxygen requirements.      Chest x-ray was reviewed from this morning and shows improvement.    Family remains at the bedside.    Had genetic testing but doesn know results  No hemoptysis      OBJECTIVE:     PHYSICAL EXAM:   VITALS:   Vitals:    10/07/24 1514 10/07/24 1543 10/07/24 1554 10/07/24 1700   BP: 119/75      Pulse: (!) 102      Resp: 18 20  22   Temp: 98.6 °F (37 °C)      TempSrc: Oral      SpO2: 98%  98%    Weight:       Height:            Intake/Output Summary (Last 24 hours) at 10/7/2024 1843  Last data filed at 10/7/2024 1614  Gross per 24 hour   Intake 21 ml   Output 1450 ml   Net -1429 ml        CONSTITUTIONAL:   Ill-appearing, pale, weak, A&O x 3, NAD  SKIN:    Skin discoloration, Sunken eyes  HEENT:    EOMI, MMM, No thrush  NECK:    No bruits, No JVP appreciated  CV:     Sinus,  No murmur, No rubs, No gallops  PULMONARY:   Coarse, bilateral Rales,  No Wheezing, No Rhonchi     No noted egophony  ABDOMEN:    Soft, non-tender. BS normal. No R/R/G  EXT:   No deformities .  No clubbing.      1-2+ lower extremity edema, No venous stasis  PULSE:   Appears equal and palpable.  PSYCHIATRIC:  Seems appropriate, No acute psychosis  MS:    Gross weakness, spinal pain  NEUROLOGIC:   Hyperreflexia, non-focal     DATA: IMAGING & TESTING:     LABORATORY TESTS:        PRO-BNP:   Lab Results   Component Value Date    PROBNP 2,232 (H) 10/04/2024    PROBNP 1,170 (H) 10/01/2024      ABGs:   Lab Results   Component Value Date/Time    PH 7.452 10/04/2024 10:05 AM    PO2 66.2 10/04/2024 10:05 AM    PCO2 38.2 10/04/2024 10:05 AM     Hemoglobin A1C: No components found for:  given mediastinal adenopathy and abnormal bony metastatic process within the ribs and spine cannot be completely excluded and clinical correlation is needed.. 3. Abnormal bony metastatic disease within the spine. There is a bony destructive process involving the left anterior 2nd rib. Associated soft tissue thickening. 4. No acute intra-abdominal or intrapelvic process.  Stable adenopathy identified throughout the retroperitoneum and left pelvic sidewall.  Stable bony Mets metastatic disease throughout the spine and pelvis as well as the hips bilaterally.     XR CHEST PORTABLE  Result Date: 9/26/2024  Interval worsening of the patchy airspace disease throughout the lung fields bilaterally.           Assessment:     Acute hypoxic respiratory failure  Hemoptysis-improving  GGO seen on CT- progression of disease vs infectious  Mediastinal lymphadenopathy    Metastatic prostate cancer   Chronic pain  Thrombocytopenia   Nausea and vomiting         Plan:     Supplemental oxygen for sats 88 to 94%  Check for home oxygen requirements prior to discharge  Scheduled budesonide and DuoNebs  Pro-Polo 0.23> 0.30, RVP, pneumonia workup, resp culture, urine antigens negative to date  Incentive spirometry, flutter valve  Pain control per primary team  Transfuse per primary   Oncology f?  Not consulted?   DVT prophylaxis with SCDs  Add dexamethasone for cytogenic pulmonary edema to the mets  PT OT ? Spinal brace needed  Stop the inhaled TXA nebs   ABG's reviewed, lasix added, monitor I&O, +12L     Ed Cornejo, DO     6:43 PM

## 2024-10-07 NOTE — PROGRESS NOTES
[facility]  Family Medicine Attending    S: 44 y.o. male with a history of prostate cancer, anxiety, opiate abuse who presented with nausea and vomiting as well as left sided abdominal pain.  He was found to have hypoxia and severe symptomatic anemia with hgb 5.3.  He has been having nausea with vomiting, pelvic and back pain.  Has not been able to walk due to pain for a couple of weeks.  Goals of care-pain control so that he can walk again.  He wanted more information on side effects from a regimen of chemotherapy drugs from the oncologist.  Oncology was consulted.  Per their note, \"Considering high tumor burden, advised treatment with Taxotere, GnRH agonist, Nubeqa along with Xgeva\"  Side effects of these medications were discussed with the patient by oncology.     Patient decided to move forward with chemotherapy.    Palliative care following patient.  Pain is still too severe to walk.    He also states that when he tries to stand up the pain is mostly in a band around his upper abdomen.  He says he has no difficulty with his legs when standing. Patients notes some small amounts of blood in his mucous if he coughs.      9/30-patient reports that he is not feeling well but not willing to elaborate.  To start transfusion for hemoglobin of 6.5. No change in breathing. Denies any active bleeding today. Rib pain where mets are located.    10/1-s/p 2 units PRBCS yesterday.  This morning stating that oxygen was increased overnight to 11L HF NC, now at 10. But now denying dyspnea, some mucus with blood upon coughing. He denies that he is having depressive symptoms.    10/2-Patient reports he is feeling short of breath today. Coughing up some blood tinged mucus.     10/3-Patient reports ongoing shortness of breath but believes it is better. Some chills.  He is trying to do incentive spirometry.  Less blood in his sputum. Worked wit pt/ot and walked. Improved mentally.    10/4-Pt had shortness of breath  as well as LHRH agonist and Xgeva, but given pt's deconditioning and prolonged hospital stay, paged Onc to advise on starting some of the outpt therapy while inpt.     Consultation to palliative for recs on pain control/bowel regimen; pain medication adjustments per palliative.     Patient also considering moving back to [place] to seek Tx from [facility].    PT/OT- patient was unable to walk due to pain. Encouraged working with PT.    Lft elevation-improving.    Severe malnutrition-has nutritional supplements ordered, watch for refeeding syndrome    Dispo:  pending dyspnea and Hgb stabilization      Attending Physician Statement  I have reviewed the chart and seen the patient with the resident(s).  I personally reviewed images, EKG's and similar tests, if present.  I personally reviewed and performed key elements of the history and exam.  I have reviewed and confirmed the Family Medicine admitting team resident history and exam with changes as indicated above.  I agree with the assessment, plan, and orders as documented by the FM admitting team resident Nydia Helms and Pricilla.  Please refer to the resident progress note today for additional information.      Kylee Damon, DO

## 2024-10-07 NOTE — CARE COORDINATION
Transition of care update: Hgb 6.8, platelets 28 and other labs noted. Transfuse 1 unit prbcs. Pulmonary, palliative care and oncology following. Prostate ca with mets. Casodex po 50mg daily. Urology consulted on 10/06 re: metastatic prostate cancer. Met with pt in room. 10 l/hf nc. Plan remains to home when medically ready. Per prior sw, sister will be staying with him as full time caregiver. [place] home care and palliative care will resume. Will need MOLLY order at [place]. Pt has a hospital bed and wheelchair. [facility] is following for home o2 if needed. Spoke with Renetta with [facility] to confirm they are still following pt. Cm/sw will follow.

## 2024-10-07 NOTE — PROGRESS NOTES
FM resident notified of pt refusing labs until explained exactly what they are and that pt brother will be back soon to go over CT results    Lesley Iverson RN

## 2024-10-08 LAB
1,25(OH)2D3 SERPL-MCNC: 86.4 PG/ML (ref 19.9–79.3)
ABO/RH: NORMAL
ALBUMIN SERPL-MCNC: 3.3 G/DL (ref 3.5–5.2)
ALP SERPL-CCNC: 229 U/L (ref 40–129)
ALT SERPL-CCNC: 19 U/L (ref 0–40)
ANION GAP SERPL CALCULATED.3IONS-SCNC: 12 MMOL/L (ref 7–16)
ANTIBODY SCREEN: NEGATIVE
ARM BAND NUMBER: NORMAL
AST SERPL-CCNC: 31 U/L (ref 0–39)
BASOPHILS # BLD: 0 K/UL (ref 0–0.2)
BASOPHILS NFR BLD: 0 % (ref 0–2)
BILIRUB SERPL-MCNC: 1.6 MG/DL (ref 0–1.2)
BLOOD BANK BLOOD PRODUCT EXPIRATION DATE: NORMAL
BLOOD BANK BLOOD PRODUCT EXPIRATION DATE: NORMAL
BLOOD BANK DISPENSE STATUS: NORMAL
BLOOD BANK DISPENSE STATUS: NORMAL
BLOOD BANK ISBT PRODUCT BLOOD TYPE: 6200
BLOOD BANK ISBT PRODUCT BLOOD TYPE: 6200
BLOOD BANK PRODUCT CODE: NORMAL
BLOOD BANK PRODUCT CODE: NORMAL
BLOOD BANK SAMPLE EXPIRATION: NORMAL
BLOOD BANK UNIT TYPE AND RH: NORMAL
BLOOD BANK UNIT TYPE AND RH: NORMAL
BPU ID: NORMAL
BPU ID: NORMAL
BUN SERPL-MCNC: 13 MG/DL (ref 6–20)
CALCIUM SERPL-MCNC: 9.1 MG/DL (ref 8.6–10.2)
CHLORIDE SERPL-SCNC: 101 MMOL/L (ref 98–107)
CO2 SERPL-SCNC: 27 MMOL/L (ref 22–29)
COMPONENT: NORMAL
COMPONENT: NORMAL
CREAT SERPL-MCNC: 0.5 MG/DL (ref 0.7–1.2)
CROSSMATCH RESULT: NORMAL
CROSSMATCH RESULT: NORMAL
EOSINOPHIL # BLD: 0 K/UL (ref 0.05–0.5)
EOSINOPHILS RELATIVE PERCENT: 0 % (ref 0–6)
ERYTHROCYTE [DISTWIDTH] IN BLOOD BY AUTOMATED COUNT: 15.6 % (ref 11.5–15)
FREE KAPPA/LAMBDA RATIO: 0.74 (ref 0.22–1.74)
GFR, ESTIMATED: >90 ML/MIN/1.73M2
GLUCOSE SERPL-MCNC: 137 MG/DL (ref 74–99)
HCT VFR BLD AUTO: 25 % (ref 37–54)
HCT VFR BLD AUTO: 25 % (ref 37–54)
HGB BLD-MCNC: 8.2 G/DL (ref 12.5–16.5)
HGB BLD-MCNC: 8.3 G/DL (ref 12.5–16.5)
KAPPA LC FREE SER-MCNC: 22.2 MG/L
LAMBDA LC FREE SERPL-MCNC: 29.8 MG/L (ref 4.2–27.7)
LYMPHOCYTES NFR BLD: 0.17 K/UL (ref 1.5–4)
LYMPHOCYTES RELATIVE PERCENT: 4 % (ref 20–42)
MCH RBC QN AUTO: 29.4 PG (ref 26–35)
MCHC RBC AUTO-ENTMCNC: 32.8 G/DL (ref 32–34.5)
MCV RBC AUTO: 89.6 FL (ref 80–99.9)
METAMYELOCYTES ABSOLUTE COUNT: 0.13 K/UL (ref 0–0.12)
METAMYELOCYTES: 3 % (ref 0–1)
MONOCYTES NFR BLD: 0.04 K/UL (ref 0.1–0.95)
MONOCYTES NFR BLD: 1 % (ref 2–12)
MYELOCYTES ABSOLUTE COUNT: 0.25 K/UL
MYELOCYTES: 5 %
NEUTROPHILS NFR BLD: 87 % (ref 43–80)
NEUTS SEG NFR BLD: 4.17 K/UL (ref 1.8–7.3)
NUCLEATED RED BLOOD CELLS: 1 PER 100 WBC
PHOSPHATE SERPL-MCNC: 3.8 MG/DL (ref 2.5–4.5)
PLATELET CONFIRMATION: NORMAL
PLATELET, FLUORESCENCE: 21 K/UL (ref 130–450)
PMV BLD AUTO: ABNORMAL FL (ref 7–12)
POTASSIUM SERPL-SCNC: 4.2 MMOL/L (ref 3.5–5)
PROMYELOCYTES ABSOLUTE COUNT: 0.04 K/UL
PROMYELOCYTES: 1 %
PROT SERPL-MCNC: 5.8 G/DL (ref 6.4–8.3)
RBC # BLD AUTO: 2.79 M/UL (ref 3.8–5.8)
RBC # BLD: ABNORMAL 10*6/UL
SODIUM SERPL-SCNC: 140 MMOL/L (ref 132–146)
TRANSFUSION STATUS: NORMAL
TRANSFUSION STATUS: NORMAL
UNIT DIVISION: 0
UNIT DIVISION: 0
UNIT ISSUE DATE/TIME: NORMAL
UNIT ISSUE DATE/TIME: NORMAL
WBC OTHER # BLD: 4.8 K/UL (ref 4.5–11.5)

## 2024-10-08 PROCEDURE — 94640 AIRWAY INHALATION TREATMENT: CPT

## 2024-10-08 PROCEDURE — 6370000000 HC RX 637 (ALT 250 FOR IP)

## 2024-10-08 PROCEDURE — 6360000002 HC RX W HCPCS

## 2024-10-08 PROCEDURE — 6370000000 HC RX 637 (ALT 250 FOR IP): Performed by: NURSE PRACTITIONER

## 2024-10-08 PROCEDURE — 6370000000 HC RX 637 (ALT 250 FOR IP): Performed by: INTERNAL MEDICINE

## 2024-10-08 PROCEDURE — 85018 HEMOGLOBIN: CPT

## 2024-10-08 PROCEDURE — 99232 SBSQ HOSP IP/OBS MODERATE 35: CPT | Performed by: STUDENT IN AN ORGANIZED HEALTH CARE EDUCATION/TRAINING PROGRAM

## 2024-10-08 PROCEDURE — 80053 COMPREHEN METABOLIC PANEL: CPT

## 2024-10-08 PROCEDURE — 84100 ASSAY OF PHOSPHORUS: CPT

## 2024-10-08 PROCEDURE — 85014 HEMATOCRIT: CPT

## 2024-10-08 PROCEDURE — 2140000000 HC CCU INTERMEDIATE R&B

## 2024-10-08 PROCEDURE — 2580000003 HC RX 258

## 2024-10-08 PROCEDURE — 99231 SBSQ HOSP IP/OBS SF/LOW 25: CPT

## 2024-10-08 PROCEDURE — 2700000000 HC OXYGEN THERAPY PER DAY

## 2024-10-08 PROCEDURE — 85025 COMPLETE CBC W/AUTO DIFF WBC: CPT

## 2024-10-08 RX ADMIN — HYDROMORPHONE HYDROCHLORIDE 1 MG: 1 INJECTION, SOLUTION INTRAMUSCULAR; INTRAVENOUS; SUBCUTANEOUS at 23:41

## 2024-10-08 RX ADMIN — BUDESONIDE 500 MCG: 0.5 INHALANT RESPIRATORY (INHALATION) at 10:14

## 2024-10-08 RX ADMIN — HYDROMORPHONE HYDROCHLORIDE 1 MG: 1 INJECTION, SOLUTION INTRAMUSCULAR; INTRAVENOUS; SUBCUTANEOUS at 14:27

## 2024-10-08 RX ADMIN — METHOCARBAMOL TABLETS 750 MG: 750 TABLET, COATED ORAL at 16:22

## 2024-10-08 RX ADMIN — PANTOPRAZOLE SODIUM 40 MG: 40 TABLET, DELAYED RELEASE ORAL at 05:05

## 2024-10-08 RX ADMIN — BUDESONIDE 500 MCG: 0.5 INHALANT RESPIRATORY (INHALATION) at 20:23

## 2024-10-08 RX ADMIN — DEXAMETHASONE SODIUM PHOSPHATE 6 MG: 4 INJECTION INTRA-ARTICULAR; INTRALESIONAL; INTRAMUSCULAR; INTRAVENOUS; SOFT TISSUE at 08:03

## 2024-10-08 RX ADMIN — OXYCODONE HYDROCHLORIDE 15 MG: 15 TABLET ORAL at 00:46

## 2024-10-08 RX ADMIN — SENNOSIDES AND DOCUSATE SODIUM 2 TABLET: 50; 8.6 TABLET ORAL at 20:37

## 2024-10-08 RX ADMIN — IPRATROPIUM BROMIDE AND ALBUTEROL SULFATE 1 DOSE: 2.5; .5 SOLUTION RESPIRATORY (INHALATION) at 20:23

## 2024-10-08 RX ADMIN — OXYCODONE HYDROCHLORIDE 15 MG: 15 TABLET ORAL at 06:50

## 2024-10-08 RX ADMIN — OXYCODONE HYDROCHLORIDE 15 MG: 15 TABLET ORAL at 22:24

## 2024-10-08 RX ADMIN — OXYCODONE HYDROCHLORIDE 15 MG: 15 TABLET ORAL at 13:17

## 2024-10-08 RX ADMIN — SUCRALFATE 1 G: 1 TABLET ORAL at 17:37

## 2024-10-08 RX ADMIN — OXYCODONE HYDROCHLORIDE 15 MG: 15 TABLET ORAL at 19:24

## 2024-10-08 RX ADMIN — SUCRALFATE 1 G: 1 TABLET ORAL at 05:05

## 2024-10-08 RX ADMIN — BICALUTAMIDE 50 MG: 50 TABLET, FILM COATED ORAL at 08:04

## 2024-10-08 RX ADMIN — SODIUM CHLORIDE, PRESERVATIVE FREE 10 ML: 5 INJECTION INTRAVENOUS at 08:04

## 2024-10-08 RX ADMIN — SUCRALFATE 1 G: 1 TABLET ORAL at 13:17

## 2024-10-08 RX ADMIN — HYDROMORPHONE HYDROCHLORIDE 1 MG: 1 INJECTION, SOLUTION INTRAMUSCULAR; INTRAVENOUS; SUBCUTANEOUS at 02:01

## 2024-10-08 RX ADMIN — HYDROMORPHONE HYDROCHLORIDE 1 MG: 1 INJECTION, SOLUTION INTRAMUSCULAR; INTRAVENOUS; SUBCUTANEOUS at 11:19

## 2024-10-08 RX ADMIN — METHOCARBAMOL TABLETS 750 MG: 750 TABLET, COATED ORAL at 13:17

## 2024-10-08 RX ADMIN — OXYCODONE HYDROCHLORIDE 15 MG: 15 TABLET ORAL at 16:22

## 2024-10-08 RX ADMIN — SODIUM CHLORIDE, PRESERVATIVE FREE 10 ML: 5 INJECTION INTRAVENOUS at 20:37

## 2024-10-08 RX ADMIN — POLYETHYLENE GLYCOL 3350 17 G: 17 POWDER, FOR SOLUTION ORAL at 08:01

## 2024-10-08 RX ADMIN — HYDROMORPHONE HYDROCHLORIDE 1 MG: 1 INJECTION, SOLUTION INTRAMUSCULAR; INTRAVENOUS; SUBCUTANEOUS at 05:03

## 2024-10-08 RX ADMIN — Medication 2000 UNITS: at 08:02

## 2024-10-08 RX ADMIN — IPRATROPIUM BROMIDE AND ALBUTEROL SULFATE 1 DOSE: 2.5; .5 SOLUTION RESPIRATORY (INHALATION) at 17:21

## 2024-10-08 RX ADMIN — HYDROMORPHONE HYDROCHLORIDE 1 MG: 1 INJECTION, SOLUTION INTRAMUSCULAR; INTRAVENOUS; SUBCUTANEOUS at 17:37

## 2024-10-08 RX ADMIN — IPRATROPIUM BROMIDE AND ALBUTEROL SULFATE 1 DOSE: 2.5; .5 SOLUTION RESPIRATORY (INHALATION) at 10:14

## 2024-10-08 RX ADMIN — SUCRALFATE 1 G: 1 TABLET ORAL at 23:40

## 2024-10-08 RX ADMIN — METHOCARBAMOL TABLETS 750 MG: 750 TABLET, COATED ORAL at 08:03

## 2024-10-08 RX ADMIN — METHOCARBAMOL TABLETS 750 MG: 750 TABLET, COATED ORAL at 20:37

## 2024-10-08 RX ADMIN — OXYCODONE HYDROCHLORIDE 15 MG: 15 TABLET ORAL at 03:43

## 2024-10-08 RX ADMIN — SENNOSIDES AND DOCUSATE SODIUM 2 TABLET: 50; 8.6 TABLET ORAL at 08:02

## 2024-10-08 RX ADMIN — HYDROMORPHONE HYDROCHLORIDE 1 MG: 1 INJECTION, SOLUTION INTRAMUSCULAR; INTRAVENOUS; SUBCUTANEOUS at 08:04

## 2024-10-08 RX ADMIN — OXYCODONE HYDROCHLORIDE 15 MG: 15 TABLET ORAL at 10:01

## 2024-10-08 RX ADMIN — HYDROMORPHONE HYDROCHLORIDE 1 MG: 1 INJECTION, SOLUTION INTRAMUSCULAR; INTRAVENOUS; SUBCUTANEOUS at 20:36

## 2024-10-08 ASSESSMENT — PAIN DESCRIPTION - LOCATION
LOCATION: ABDOMEN
LOCATION: BACK;RIB CAGE
LOCATION: RIB CAGE;BACK
LOCATION: BACK;RIB CAGE
LOCATION: ABDOMEN
LOCATION: BACK;RIB CAGE
LOCATION: ABDOMEN
LOCATION: CHEST;RIB CAGE
LOCATION: RIB CAGE;BACK
LOCATION: RIB CAGE;BACK
LOCATION: ABDOMEN
LOCATION: ABDOMEN
LOCATION: BACK;RIB CAGE

## 2024-10-08 ASSESSMENT — PAIN SCALES - GENERAL
PAINLEVEL_OUTOF10: 10
PAINLEVEL_OUTOF10: 9
PAINLEVEL_OUTOF10: 10
PAINLEVEL_OUTOF10: 9
PAINLEVEL_OUTOF10: 10

## 2024-10-08 ASSESSMENT — PAIN - FUNCTIONAL ASSESSMENT

## 2024-10-08 ASSESSMENT — PAIN DESCRIPTION - ORIENTATION
ORIENTATION: RIGHT;LEFT;MID
ORIENTATION: MID;RIGHT;LOWER
ORIENTATION: RIGHT;LEFT;MID;INNER
ORIENTATION: RIGHT;LEFT;MID
ORIENTATION: RIGHT;LEFT;MID
ORIENTATION: MID;RIGHT;LEFT
ORIENTATION: RIGHT;LEFT;MID
ORIENTATION: RIGHT;LEFT;MID
ORIENTATION: RIGHT;LEFT;MID;INNER
ORIENTATION: MID;LEFT;RIGHT
ORIENTATION: RIGHT;LEFT;MID
ORIENTATION: RIGHT
ORIENTATION: RIGHT;LEFT;MID
ORIENTATION: RIGHT;LEFT;MID
ORIENTATION: MID
ORIENTATION: RIGHT;LEFT;MID

## 2024-10-08 ASSESSMENT — PAIN DESCRIPTION - DESCRIPTORS
DESCRIPTORS: ACHING;DISCOMFORT;SORE
DESCRIPTORS: ACHING;DISCOMFORT;SORE;TENDER
DESCRIPTORS: ACHING;BURNING;DISCOMFORT
DESCRIPTORS: ACHING;DISCOMFORT;DULL
DESCRIPTORS: ACHING;DISCOMFORT;SORE
DESCRIPTORS: ACHING;DISCOMFORT;DULL
DESCRIPTORS: ACHING;DISCOMFORT;DULL
DESCRIPTORS: ACHING;DISCOMFORT;SQUEEZING
DESCRIPTORS: ACHING;DISCOMFORT;NAGGING
DESCRIPTORS: ACHING;DISCOMFORT;SORE
DESCRIPTORS: ACHING;DISCOMFORT;DULL
DESCRIPTORS: ACHING;DISCOMFORT;DULL
DESCRIPTORS: ACHING;DISCOMFORT;NAGGING

## 2024-10-08 ASSESSMENT — PAIN SCALES - WONG BAKER
WONGBAKER_NUMERICALRESPONSE: NO HURT

## 2024-10-08 NOTE — PLAN OF CARE
Problem: Discharge Planning  Goal: Discharge to home or other facility with appropriate resources  10/8/2024 1416 by Kalyn Elias RN  Outcome: Progressing     Problem: Pain  Goal: Verbalizes/displays adequate comfort level or baseline comfort level  10/8/2024 1416 by Kalyn Elias RN  Outcome: Progressing     Problem: Skin/Tissue Integrity  Goal: Absence of new skin breakdown  Description: 1.  Monitor for areas of redness and/or skin breakdown  2.  Assess vascular access sites hourly  3.  Every 4-6 hours minimum:  Change oxygen saturation probe site  4.  Every 4-6 hours:  If on nasal continuous positive airway pressure, respiratory therapy assess nares and determine need for appliance change or resting period.  10/8/2024 1416 by Kalyn Elias RN  Outcome: Progressing     Problem: Safety - Adult  Goal: Free from fall injury  10/8/2024 1416 by Kalyn Elias RN  Outcome: Progressing     Problem: ABCDS Injury Assessment  Goal: Absence of physical injury  10/8/2024 1416 by Kalyn Elias RN  Outcome: Progressing     Problem: Nutrition Deficit:  Goal: Optimize nutritional status  10/8/2024 1416 by Kalyn Elias RN  Outcome: Progressing

## 2024-10-08 NOTE — PLAN OF CARE
Problem: Discharge Planning  Goal: Discharge to home or other facility with appropriate resources  10/8/2024 0042 by Merline Rae RN  Outcome: Progressing  10/7/2024 1609 by Lesley Iverson RN  Outcome: Progressing     Problem: Pain  Goal: Verbalizes/displays adequate comfort level or baseline comfort level  10/8/2024 0042 by Merline Rae RN  Outcome: Progressing  10/7/2024 1609 by Lesley Iverson RN  Outcome: Progressing     Problem: Skin/Tissue Integrity  Goal: Absence of new skin breakdown  Description: 1.  Monitor for areas of redness and/or skin breakdown  2.  Assess vascular access sites hourly  3.  Every 4-6 hours minimum:  Change oxygen saturation probe site  4.  Every 4-6 hours:  If on nasal continuous positive airway pressure, respiratory therapy assess nares and determine need for appliance change or resting period.  10/8/2024 0042 by Merline Rae RN  Outcome: Progressing  10/7/2024 1609 by Lesley Iverson RN  Outcome: Progressing     Problem: Safety - Adult  Goal: Free from fall injury  10/8/2024 0042 by Merline Rae RN  Outcome: Progressing  10/7/2024 1609 by Lesley Iverson RN  Outcome: Progressing     Problem: ABCDS Injury Assessment  Goal: Absence of physical injury  10/8/2024 0042 by Merline Rae RN  Outcome: Progressing  10/7/2024 1609 by Lesley Iverson RN  Outcome: Progressing     Problem: Nutrition Deficit:  Goal: Optimize nutritional status  Outcome: Progressing

## 2024-10-08 NOTE — PROGRESS NOTES
[facility] [place] - Family Medicine Inpatient   Resident Progress Note    S:  Hospital day: 12   Brief Synopsis: Joseph Bond is a 44 y.o. male with a PMH of Cancer (HCC), Diverticulosis, and History of opioid abuse (HCC). who presents to ED for nausea and vomiting.     Patient complains of nausea and vomiting that started 2 days ago.  Emesis contains phlegm, mucus and streaks of blood.  Patient could not quantify number of times but states that it has been continuous.  States that he had nausea medications but seemed to make his nausea was.  Also endorses chronic pain on his back, abdomen radha LUQ.  Patient was recently diagnosed for pancreatic cancer with mets.  Completed 10th cycle of radiation 1 month ago.  States that since radiation was completed he has not been able to walk due to pain in his pelvis, abdomen and back.  He has been wheelchair-bound for the past 1 month.  Has had poor appetite, blurry vision, dizziness.  Denies nosebleeds, bruising, melena, hematochezia, hematuria.  Patient does not use illicit drugs or drink alcohol.  Quit smoking 7 years ago.  He would like to remain full code.  Hemoglobin was 5.3 and platelets at 18 at the ER.  Patient was transfused 2 units of pRBC.  Post transfusion hemoglobin was 5.9 and patient was transfused another 2 units of PRBC.  Patient continued to experience pain in his back and upper abdomen and pelvis.  Oncology and palliative were consulted.  Pain medication with Dilaudid, oxy and fentanyl patch.  Palliative care managed pain, nausea and vomiting and constipation 2/2 opioids.  He decided to proceed with chemotherapy. Pulmonology was consulted for increased oxygen demand and hemoptysis. They recommended a bronchoscopy but held off due to thrombocytopenia. Patient was started on solumedrol 40mg and inhaled TXA.     On 10/4 patient with noted moderate respiratory distress on non-rebreather therefore Lasix IV increased and IVF dcd.     Overnight/interim:   No acute overnight  patchy ground-glass opacity identified diffusely throughout the   lung fields. Findings show progression when compared to the prior study with   no definite pleural effusion or pneumothorax. Findings suggesting progression   of disease favoring an infectious/inflammatory process or however   lymphangitic spread of disease given mediastinal adenopathy and abnormal bony   metastatic process within the ribs and spine cannot be completely excluded   and clinical correlation is needed..   3. Abnormal bony metastatic disease within the spine. There is a bony   destructive process involving the left anterior 2nd rib. Associated soft   tissue thickening.   4. No acute intra-abdominal or intrapelvic process.  Stable adenopathy   identified throughout the retroperitoneum and left pelvic sidewall.  Stable   bony Mets metastatic disease throughout the spine and pelvis as well as the   hips bilaterally.         XR CHEST PORTABLE   Final Result   Interval worsening of the patchy airspace disease throughout the lung fields   bilaterally.               Resident Assessment and Plan     Nausea and vomiting  Stage IV prostate cancer with mets s/p radiation therapy  Back pain and pelvic pain 2/2 mets  -Patient completed radiation 08/25/2024  -Patient finally agreeable to chemotherapy, Nubeqa LHRH agonist as an outpatient  -Heme-onc following: Patient on Casodex  -Palliative care following: On IV Dilaudid 0.5 to 1 mg every 3 hours as needed, Oxycodone 15 mg every 4 hours as needed, Robaxin  -Refused fentanyl patch  -D/C morphine due to drowsiness  -Thoracic X-rays ordered- unchanged finding from previous imaging.   -Lactic acid within normal limits  -[facility] 15/24, not able to tolerate PT at this time  -Monitor electrolytes daily   -Follow labs - spep, upep, immunofixation and light chains   -PTOT reordered today      Bilateral Leg Edema   -Follow ProBNP   -IV fluids dc'd  -IV Lasix PRN  -Monitor intake/output  -Compression socks

## 2024-10-08 NOTE — PROGRESS NOTES
[facility]  Family Medicine Attending    S: 44 y.o. male with a history of prostate cancer, anxiety, opiate abuse who presented with nausea and vomiting as well as left sided abdominal pain.  He was found to have hypoxia and severe symptomatic anemia with hgb 5.3.  He has been having nausea with vomiting, pelvic and back pain.  Has not been able to walk due to pain for a couple of weeks.  Goals of care-pain control so that he can walk again.  He wanted more information on side effects from a regimen of chemotherapy drugs from the oncologist.  Oncology was consulted.  Per their note, \"Considering high tumor burden, advised treatment with Taxotere, GnRH agonist, Nubeqa along with Xgeva\"  Side effects of these medications were discussed with the patient by oncology.     Patient decided to move forward with chemotherapy.    Palliative care following patient.  Pain is still too severe to walk.    He also states that when he tries to stand up the pain is mostly in a band around his upper abdomen.  He says he has no difficulty with his legs when standing. Patients notes some small amounts of blood in his mucous if he coughs.      9/30-patient reports that he is not feeling well but not willing to elaborate.  To start transfusion for hemoglobin of 6.5. No change in breathing. Denies any active bleeding today. Rib pain where mets are located.    10/1-s/p 2 units PRBCS yesterday.  This morning stating that oxygen was increased overnight to 11L HF NC, now at 10. But now denying dyspnea, some mucus with blood upon coughing. He denies that he is having depressive symptoms.    10/2-Patient reports he is feeling short of breath today. Coughing up some blood tinged mucus.     10/3-Patient reports ongoing shortness of breath but believes it is better. Some chills.  He is trying to do incentive spirometry.  Less blood in his sputum. Worked wit pt/ot and walked. Improved mentally.    10/4-Pt had shortness of breath

## 2024-10-08 NOTE — PROGRESS NOTES
[facility] Quality Flow/Interdisciplinary Rounds Progress Note        Quality Flow Rounds held on October 8, 2024    Disciplines Attending:  Bedside Nurse, , , and Nursing Unit Leadership    Joseph Bond was admitted on 9/26/2024  4:56 AM    Anticipated Discharge Date:       Disposition:    Simon Score:  Simon Scale Score: 20    Readmission Risk              Risk of Unplanned Readmission:  43           Discussed patient goal for the day, patient clinical progression, and barriers to discharge.  The following Goal(s) of the Day/Commitment(s) have been identified:  Diagnostics - Report Results and Labs - Report Results      Lesley Iverson RN  October 8, 2024

## 2024-10-08 NOTE — PROGRESS NOTES
Palliative Care Department  [phone]  Palliative Care Progress Note  Provider Jesus Falcon MD      PATIENT: Joseph Bond  : 1980  MRN: [id number]  ADMISSION DATE: 2024  4:56 AM  Referring Provider:  Anastasia Helms MD    Palliative Medicine was consulted on hospital day 12 for assistance with Goals of care, Overwhelmed Symptoms    HPI:     Clinical Summary:Joseph Bond is a 44 y.o. y/o male with a history of prostate cancer with mediastinal lymphadenopathy, extensive metastasis of the spine, rib cage, pelvis, humerus and femurs (had refused systemic therapy, pursuing homeopathic), completed palliative radiation treatment to spine, polysubstance abuse including opioids and tobacco, abstinent for 5 years, diverticulitis, with recent ER visit on 2024, due to shortness of breath, was going to be admitted for further medical management however left AMA, was supposed to follow-up outpatient with palliative medicine for symptom management, however missed appointment, did not return phone call to reschedule appointment, who presented to [facility] on 2024 with complaint of nausea, vomiting, left-sided rib pain.  At ED, found hypoxic 78% on room air, placed on 4 L nasal cannula.  Significant laboratory findings showed hemoglobin 5.3, platelets 18, alkaline phosphate 227, AST 76, lipase 8, WBC 4.2.  Received 2 packed red blood cell and sixpack platelets.  CT images showed disease progression when compared to prior images, favoring an infectious/inflammatory process or lymphangitic spread of disease given mediastinal adenopathy and abnormal bony metastatic process within the rib and spine.  Abnormal bony metastatic disease within the spine.  Bony destructive process involving the left anterior second rib.  Stable adenopathy throughout the retroperitoneal and pelvic sidewall,.  Stable bony metastatic disease throughout the spine, pelvis and hip bilaterally.  Palliative medicine consulted

## 2024-10-08 NOTE — CARE COORDINATION
10/8. Discharge plan is home with sister as caregiver. O2 6LHF NC. SpO2 100% [facility] and palliative care following. MOLLY orders are in. Will need pulse ox testing prior to dc. Stacy Calle RN

## 2024-10-08 NOTE — PROGRESS NOTES
consulted to assist further with goals of care, symptom management.    ASSESSMENT/PLAN:     Pertinent Hospital Diagnoses     Thrombocytopenia  Anemia  Acute respiratory failure  Metastatic prostate cancer    Symptom management     Pain due to neoplasm  -IV Dilaudid 0.5 to 1 mg every 3 hours as needed  -oxycodone 15 mg every 3 hours as needed  - refused  fentanyl patch  -Stopped taking morphine ER due to drowsiness  -Robaxin 750 mg 4 times a day    Nausea and vomiting  -Promethazine 12.5 mg p.o. every 6 hours as needed  -IV Zofran 4 mg every 6 hours as needed    Prophylaxis stool softener  -GlycoLax daily as needed  -Senokot S twice daily    Palliative Care Encounter / Counseling Regarding Goals of Care  Please see detailed goals of care discussion as below  At this time, Joseph Bond, Does have capacity for medical decision-making.  Capacity is time limited and situation/question specific  Outcome of goals of care meeting:  Following for symptom management    Code status Full Code  Advanced Directives: no POA or living will in [facility]  Surrogate/Legal NOK:  Margarita Bond (Sister ) [phone]   Gaurav Womack (cousin) [phone]    Spiritual assessment: no spiritual distress identified  Bereavement and grief: to be determined  Referrals to: none today    Thank you for the opportunity to participate in the care of Joseph Bond.     SUBJECTIVE:     Details of Conversation:    Chart was reviewed.  Patient seen at the bedside, his mother was present in the room.  Patient stated his pain is not controlled with current regimen, I have sent oxycodone was made every 3 hours instead of every 4, causing IV Dilaudid to overlap and him not being able to be given IV Dilaudid, he continues to have p.o. and IV round-the-clock, no interested to try long-acting due to drowsiness.  Patient was requesting to switch one of the opioids to every 4 hours, so they do not overlap, I recommended switching IV Dilaudid to every 4 hours  and keep oxy every 3 hours, he did not appreciate that recommendation.  No new changes were made to his pain regimen.  Denies new symptoms, or complaints.  Support provided.  Will continue to follow.      Prognosis: Poor    OBJECTIVE:     /70   Pulse 84   Temp 97.6 °F (36.4 °C) (Temporal)   Resp 16   Ht 1.702 m (5' 7\")   Wt 78.7 kg (173 lb 8 oz)   SpO2 100%   BMI 27.17 kg/m²     Physical Examination:  Gen: Ill-appearing, weak,  Lungs: respirations easy nasal cannula  Heart: regular rate   Abdomen: soft, non-tender  Extremities: no clubbing, cyanosis or edema, moving all extremities    Skin: warm, dry without rashes, lesions, bruising  Neuro: awake, alert, oriented x 3, follows commands, no gross neurologic deficit    Objective data reviewed: labs, images, records, medication use, vitals, and chart    Time/Communication  Greater than 50% of time spent, total 25 minutes in counseling and coordination of care at the bedside regarding goals of care and symptom management.    Thank you for allowing Palliative Medicine to participate in the care of Joseph Bond.    Note: This report was completed using [employer] voiced recognition software.  Every effort has been made to ensure accuracy; however, inadvertent computerized transcription errors may be present.

## 2024-10-08 NOTE — PROGRESS NOTES
Spiritual Health History and Assessment/Progress Note  [facility]    (P) Palliative Care,  ,  ,      Name: Joseph Bond MRN: [id number]    Age: 44 y.o.     Sex: male   Language: English   Anglican: Spiritualist   Anemia, unspecified type     Date: 10/8/2024                           Spiritual Assessment began in [facility]        Referral/Consult From: (P) Rounding   Encounter Overview/Reason: (P) Palliative Care  Service Provided For: (P) Patient and family together    Gail, Belief, Meaning:   Patient identifies as spiritual  Family/Friends No family/friends present      Importance and Influence:  Patient has spiritual/personal beliefs that influence decisions regarding their health  Family/Friends No family/friends present    Community:  Patient feels well-supported. Support system includes: Extended family  Family/Friends No family/friends present    Assessment and Plan of Care:     Patient Interventions include: Facilitated expression of thoughts and feelings and Explored spiritual coping/struggle/distress  Family/Friends Interventions include: No family/friends present    Patient Plan of Care: Spiritual Care available upon further referral and No future visits per patient/family request  Family/Friends Plan of Care: No family/friends present    Electronically signed by NORMAN HUTCHINS on 10/8/2024 at 3:25 PM

## 2024-10-09 LAB
ALBUMIN SERPL-MCNC: 3.1 G/DL (ref 3.5–5.2)
ALP SERPL-CCNC: 204 U/L (ref 40–129)
ALT SERPL-CCNC: 26 U/L (ref 0–40)
ANION GAP SERPL CALCULATED.3IONS-SCNC: 10 MMOL/L (ref 7–16)
AST SERPL-CCNC: 34 U/L (ref 0–39)
BASOPHILS # BLD: 0 K/UL (ref 0–0.2)
BASOPHILS NFR BLD: 0 % (ref 0–2)
BILIRUB SERPL-MCNC: 1 MG/DL (ref 0–1.2)
BUN SERPL-MCNC: 15 MG/DL (ref 6–20)
CA-I BLD-SCNC: 1.17 MMOL/L (ref 1.15–1.33)
CALCIUM SERPL-MCNC: 8.3 MG/DL (ref 8.6–10.2)
CHLORIDE SERPL-SCNC: 104 MMOL/L (ref 98–107)
CO2 SERPL-SCNC: 26 MMOL/L (ref 22–29)
CREAT SERPL-MCNC: 0.6 MG/DL (ref 0.7–1.2)
EOSINOPHIL # BLD: 0 K/UL (ref 0.05–0.5)
EOSINOPHILS RELATIVE PERCENT: 0 % (ref 0–6)
ERYTHROCYTE [DISTWIDTH] IN BLOOD BY AUTOMATED COUNT: 15.7 % (ref 11.5–15)
FERRITIN SERPL-MCNC: 2536 NG/ML
GFR, ESTIMATED: >90 ML/MIN/1.73M2
GLUCOSE SERPL-MCNC: 92 MG/DL (ref 74–99)
HCT VFR BLD AUTO: 22.8 % (ref 37–54)
HGB BLD-MCNC: 7.4 G/DL (ref 12.5–16.5)
IRON SATN MFR SERPL: 57 % (ref 20–55)
IRON SERPL-MCNC: 108 UG/DL (ref 59–158)
LYMPHOCYTES NFR BLD: 0.12 K/UL (ref 1.5–4)
LYMPHOCYTES RELATIVE PERCENT: 3 % (ref 20–42)
MAGNESIUM SERPL-MCNC: 2.4 MG/DL (ref 1.6–2.6)
MCH RBC QN AUTO: 29.2 PG (ref 26–35)
MCHC RBC AUTO-ENTMCNC: 32.5 G/DL (ref 32–34.5)
MCV RBC AUTO: 90.1 FL (ref 80–99.9)
METAMYELOCYTES ABSOLUTE COUNT: 0.08 K/UL (ref 0–0.12)
METAMYELOCYTES: 2 % (ref 0–1)
MONOCYTES NFR BLD: 0.28 K/UL (ref 0.1–0.95)
MONOCYTES NFR BLD: 6 % (ref 2–12)
MYELOCYTES ABSOLUTE COUNT: 0.12 K/UL
MYELOCYTES: 3 %
NEUTROPHILS NFR BLD: 87 % (ref 43–80)
NEUTS SEG NFR BLD: 4 K/UL (ref 1.8–7.3)
P E INTERPRETATION, U: NORMAL
PATHOLOGIST: NORMAL
PHOSPHATE SERPL-MCNC: 3 MG/DL (ref 2.5–4.5)
PLATELET CONFIRMATION: NORMAL
PLATELET, FLUORESCENCE: 17 K/UL (ref 130–450)
PMV BLD AUTO: ABNORMAL FL (ref 7–12)
POTASSIUM SERPL-SCNC: 3.5 MMOL/L (ref 3.5–5)
PROT SERPL-MCNC: 5.3 G/DL (ref 6.4–8.3)
RBC # BLD AUTO: 2.53 M/UL (ref 3.8–5.8)
RBC # BLD: ABNORMAL 10*6/UL
SODIUM SERPL-SCNC: 140 MMOL/L (ref 132–146)
SPECIMEN TYPE: NORMAL
TIBC SERPL-MCNC: 191 UG/DL (ref 250–450)
WBC OTHER # BLD: 4.6 K/UL (ref 4.5–11.5)

## 2024-10-09 PROCEDURE — 2580000003 HC RX 258

## 2024-10-09 PROCEDURE — 6370000000 HC RX 637 (ALT 250 FOR IP)

## 2024-10-09 PROCEDURE — 83550 IRON BINDING TEST: CPT

## 2024-10-09 PROCEDURE — 2700000000 HC OXYGEN THERAPY PER DAY

## 2024-10-09 PROCEDURE — 97530 THERAPEUTIC ACTIVITIES: CPT

## 2024-10-09 PROCEDURE — 82728 ASSAY OF FERRITIN: CPT

## 2024-10-09 PROCEDURE — 99232 SBSQ HOSP IP/OBS MODERATE 35: CPT | Performed by: INTERNAL MEDICINE

## 2024-10-09 PROCEDURE — 6360000002 HC RX W HCPCS

## 2024-10-09 PROCEDURE — 99232 SBSQ HOSP IP/OBS MODERATE 35: CPT | Performed by: STUDENT IN AN ORGANIZED HEALTH CARE EDUCATION/TRAINING PROGRAM

## 2024-10-09 PROCEDURE — 6370000000 HC RX 637 (ALT 250 FOR IP): Performed by: INTERNAL MEDICINE

## 2024-10-09 PROCEDURE — 84100 ASSAY OF PHOSPHORUS: CPT

## 2024-10-09 PROCEDURE — 2140000000 HC CCU INTERMEDIATE R&B

## 2024-10-09 PROCEDURE — 94640 AIRWAY INHALATION TREATMENT: CPT

## 2024-10-09 PROCEDURE — 6370000000 HC RX 637 (ALT 250 FOR IP): Performed by: NURSE PRACTITIONER

## 2024-10-09 PROCEDURE — 80053 COMPREHEN METABOLIC PANEL: CPT

## 2024-10-09 PROCEDURE — 83540 ASSAY OF IRON: CPT

## 2024-10-09 PROCEDURE — 83735 ASSAY OF MAGNESIUM: CPT

## 2024-10-09 PROCEDURE — 85025 COMPLETE CBC W/AUTO DIFF WBC: CPT

## 2024-10-09 PROCEDURE — 82330 ASSAY OF CALCIUM: CPT

## 2024-10-09 PROCEDURE — 99232 SBSQ HOSP IP/OBS MODERATE 35: CPT | Performed by: FAMILY MEDICINE

## 2024-10-09 PROCEDURE — 97535 SELF CARE MNGMENT TRAINING: CPT

## 2024-10-09 RX ORDER — DEXAMETHASONE SODIUM PHOSPHATE 4 MG/ML
4 INJECTION, SOLUTION INTRA-ARTICULAR; INTRALESIONAL; INTRAMUSCULAR; INTRAVENOUS; SOFT TISSUE DAILY
Status: DISCONTINUED | OUTPATIENT
Start: 2024-10-10 | End: 2024-10-10

## 2024-10-09 RX ADMIN — METHOCARBAMOL TABLETS 750 MG: 750 TABLET, COATED ORAL at 13:45

## 2024-10-09 RX ADMIN — OXYCODONE HYDROCHLORIDE 15 MG: 15 TABLET ORAL at 16:51

## 2024-10-09 RX ADMIN — OXYCODONE HYDROCHLORIDE 15 MG: 15 TABLET ORAL at 22:43

## 2024-10-09 RX ADMIN — OXYCODONE HYDROCHLORIDE 15 MG: 15 TABLET ORAL at 13:45

## 2024-10-09 RX ADMIN — SUCRALFATE 1 G: 1 TABLET ORAL at 05:45

## 2024-10-09 RX ADMIN — OXYCODONE HYDROCHLORIDE 15 MG: 15 TABLET ORAL at 10:43

## 2024-10-09 RX ADMIN — HYDROMORPHONE HYDROCHLORIDE 1 MG: 1 INJECTION, SOLUTION INTRAMUSCULAR; INTRAVENOUS; SUBCUTANEOUS at 23:51

## 2024-10-09 RX ADMIN — BUDESONIDE 500 MCG: 0.5 INHALANT RESPIRATORY (INHALATION) at 09:13

## 2024-10-09 RX ADMIN — OXYCODONE HYDROCHLORIDE 15 MG: 15 TABLET ORAL at 07:40

## 2024-10-09 RX ADMIN — IPRATROPIUM BROMIDE AND ALBUTEROL SULFATE 1 DOSE: 2.5; .5 SOLUTION RESPIRATORY (INHALATION) at 12:27

## 2024-10-09 RX ADMIN — BUDESONIDE 500 MCG: 0.5 INHALANT RESPIRATORY (INHALATION) at 21:21

## 2024-10-09 RX ADMIN — IPRATROPIUM BROMIDE AND ALBUTEROL SULFATE 1 DOSE: 2.5; .5 SOLUTION RESPIRATORY (INHALATION) at 09:13

## 2024-10-09 RX ADMIN — SUCRALFATE 1 G: 1 TABLET ORAL at 16:51

## 2024-10-09 RX ADMIN — Medication 2000 UNITS: at 08:46

## 2024-10-09 RX ADMIN — SODIUM CHLORIDE, PRESERVATIVE FREE 10 ML: 5 INJECTION INTRAVENOUS at 21:56

## 2024-10-09 RX ADMIN — POLYETHYLENE GLYCOL 3350 17 G: 17 POWDER, FOR SOLUTION ORAL at 08:46

## 2024-10-09 RX ADMIN — OXYCODONE HYDROCHLORIDE 15 MG: 15 TABLET ORAL at 04:37

## 2024-10-09 RX ADMIN — HYDROMORPHONE HYDROCHLORIDE 1 MG: 1 INJECTION, SOLUTION INTRAMUSCULAR; INTRAVENOUS; SUBCUTANEOUS at 02:44

## 2024-10-09 RX ADMIN — OXYCODONE HYDROCHLORIDE 15 MG: 15 TABLET ORAL at 01:30

## 2024-10-09 RX ADMIN — IPRATROPIUM BROMIDE AND ALBUTEROL SULFATE 1 DOSE: 2.5; .5 SOLUTION RESPIRATORY (INHALATION) at 21:21

## 2024-10-09 RX ADMIN — OXYCODONE HYDROCHLORIDE 15 MG: 15 TABLET ORAL at 19:45

## 2024-10-09 RX ADMIN — SODIUM CHLORIDE, PRESERVATIVE FREE 10 ML: 5 INJECTION INTRAVENOUS at 05:46

## 2024-10-09 RX ADMIN — PANTOPRAZOLE SODIUM 40 MG: 40 TABLET, DELAYED RELEASE ORAL at 05:45

## 2024-10-09 RX ADMIN — HYDROMORPHONE HYDROCHLORIDE 1 MG: 1 INJECTION, SOLUTION INTRAMUSCULAR; INTRAVENOUS; SUBCUTANEOUS at 20:53

## 2024-10-09 RX ADMIN — METHOCARBAMOL TABLETS 750 MG: 750 TABLET, COATED ORAL at 08:46

## 2024-10-09 RX ADMIN — HYDROMORPHONE HYDROCHLORIDE 1 MG: 1 INJECTION, SOLUTION INTRAMUSCULAR; INTRAVENOUS; SUBCUTANEOUS at 14:45

## 2024-10-09 RX ADMIN — METHOCARBAMOL TABLETS 750 MG: 750 TABLET, COATED ORAL at 16:51

## 2024-10-09 RX ADMIN — HYDROMORPHONE HYDROCHLORIDE 1 MG: 1 INJECTION, SOLUTION INTRAMUSCULAR; INTRAVENOUS; SUBCUTANEOUS at 08:47

## 2024-10-09 RX ADMIN — BICALUTAMIDE 50 MG: 50 TABLET, FILM COATED ORAL at 08:46

## 2024-10-09 RX ADMIN — SENNOSIDES AND DOCUSATE SODIUM 2 TABLET: 50; 8.6 TABLET ORAL at 08:46

## 2024-10-09 RX ADMIN — SUCRALFATE 1 G: 1 TABLET ORAL at 11:46

## 2024-10-09 RX ADMIN — DEXAMETHASONE SODIUM PHOSPHATE 6 MG: 4 INJECTION INTRA-ARTICULAR; INTRALESIONAL; INTRAMUSCULAR; INTRAVENOUS; SOFT TISSUE at 08:46

## 2024-10-09 RX ADMIN — HYDROMORPHONE HYDROCHLORIDE 1 MG: 1 INJECTION, SOLUTION INTRAMUSCULAR; INTRAVENOUS; SUBCUTANEOUS at 17:54

## 2024-10-09 RX ADMIN — HYDROMORPHONE HYDROCHLORIDE 1 MG: 1 INJECTION, SOLUTION INTRAMUSCULAR; INTRAVENOUS; SUBCUTANEOUS at 11:47

## 2024-10-09 RX ADMIN — SODIUM CHLORIDE, PRESERVATIVE FREE 10 ML: 5 INJECTION INTRAVENOUS at 08:47

## 2024-10-09 RX ADMIN — HYDROMORPHONE HYDROCHLORIDE 1 MG: 1 INJECTION, SOLUTION INTRAMUSCULAR; INTRAVENOUS; SUBCUTANEOUS at 05:45

## 2024-10-09 RX ADMIN — SENNOSIDES AND DOCUSATE SODIUM 2 TABLET: 50; 8.6 TABLET ORAL at 21:52

## 2024-10-09 RX ADMIN — SUCRALFATE 1 G: 1 TABLET ORAL at 23:51

## 2024-10-09 RX ADMIN — IPRATROPIUM BROMIDE AND ALBUTEROL SULFATE 1 DOSE: 2.5; .5 SOLUTION RESPIRATORY (INHALATION) at 16:33

## 2024-10-09 RX ADMIN — METHOCARBAMOL TABLETS 750 MG: 750 TABLET, COATED ORAL at 21:53

## 2024-10-09 ASSESSMENT — PAIN SCALES - GENERAL
PAINLEVEL_OUTOF10: 10
PAINLEVEL_OUTOF10: 9
PAINLEVEL_OUTOF10: 9
PAINLEVEL_OUTOF10: 10
PAINLEVEL_OUTOF10: 9
PAINLEVEL_OUTOF10: 10
PAINLEVEL_OUTOF10: 9
PAINLEVEL_OUTOF10: 10
PAINLEVEL_OUTOF10: 9
PAINLEVEL_OUTOF10: 10

## 2024-10-09 ASSESSMENT — PAIN - FUNCTIONAL ASSESSMENT
PAIN_FUNCTIONAL_ASSESSMENT: ACTIVITIES ARE NOT PREVENTED
PAIN_FUNCTIONAL_ASSESSMENT: PREVENTS OR INTERFERES SOME ACTIVE ACTIVITIES AND ADLS
PAIN_FUNCTIONAL_ASSESSMENT: ACTIVITIES ARE NOT PREVENTED
PAIN_FUNCTIONAL_ASSESSMENT: PREVENTS OR INTERFERES SOME ACTIVE ACTIVITIES AND ADLS
PAIN_FUNCTIONAL_ASSESSMENT: PREVENTS OR INTERFERES SOME ACTIVE ACTIVITIES AND ADLS
PAIN_FUNCTIONAL_ASSESSMENT: ACTIVITIES ARE NOT PREVENTED

## 2024-10-09 ASSESSMENT — PAIN DESCRIPTION - DESCRIPTORS
DESCRIPTORS: ACHING;DISCOMFORT;SORE;TENDER
DESCRIPTORS: ACHING;DISCOMFORT;SORE;TENDER
DESCRIPTORS: ACHING;DISCOMFORT;NAGGING
DESCRIPTORS: ACHING;POUNDING;SORE
DESCRIPTORS: ACHING;CRUSHING;SORE
DESCRIPTORS: ACHING;DISCOMFORT;SQUEEZING;TENDER
DESCRIPTORS: ACHING;DISCOMFORT
DESCRIPTORS: ACHING;DISCOMFORT;SORE
DESCRIPTORS: ACHING;DISCOMFORT;NAGGING
DESCRIPTORS: ACHING;DISCOMFORT;SORE;SPASM
DESCRIPTORS: ACHING;DISCOMFORT;NAGGING
DESCRIPTORS: ACHING;DISCOMFORT;NAGGING
DESCRIPTORS: ACHING;DISCOMFORT;SORE;TENDER
DESCRIPTORS: ACHING;DISCOMFORT;STABBING
DESCRIPTORS: ACHING;SORE;STABBING
DESCRIPTORS: ACHING;DISCOMFORT;NAGGING

## 2024-10-09 ASSESSMENT — PAIN DESCRIPTION - ORIENTATION
ORIENTATION: RIGHT;LEFT;MID;INNER
ORIENTATION: RIGHT;LEFT;MID;INNER
ORIENTATION: LOWER
ORIENTATION: RIGHT
ORIENTATION: RIGHT
ORIENTATION: RIGHT;LEFT;MID
ORIENTATION: RIGHT;LEFT;MID
ORIENTATION: LOWER
ORIENTATION: MID;LEFT;RIGHT
ORIENTATION: RIGHT
ORIENTATION: RIGHT;LEFT;MID
ORIENTATION: RIGHT;LEFT
ORIENTATION: RIGHT;LEFT;MID;INNER
ORIENTATION: RIGHT;LEFT;MID

## 2024-10-09 ASSESSMENT — PAIN DESCRIPTION - LOCATION
LOCATION: ABDOMEN
LOCATION: BACK;RIB CAGE
LOCATION: RIB CAGE
LOCATION: ABDOMEN;BACK;GENERALIZED
LOCATION: RIB CAGE
LOCATION: BACK
LOCATION: ABDOMEN
LOCATION: BACK;RIB CAGE
LOCATION: RIB CAGE
LOCATION: ABDOMEN
LOCATION: BACK
LOCATION: BACK;RIB CAGE

## 2024-10-09 ASSESSMENT — PAIN SCALES - WONG BAKER
WONGBAKER_NUMERICALRESPONSE: NO HURT

## 2024-10-09 ASSESSMENT — PAIN DESCRIPTION - ONSET: ONSET: ON-GOING

## 2024-10-09 ASSESSMENT — PAIN DESCRIPTION - FREQUENCY: FREQUENCY: CONTINUOUS

## 2024-10-09 NOTE — PLAN OF CARE
Problem: Discharge Planning  Goal: Discharge to home or other facility with appropriate resources  10/8/2024 2148 by Robby Marquez RN  Outcome: Progressing  10/8/2024 1416 by Kalyn Elias RN  Outcome: Progressing     Problem: Pain  Goal: Verbalizes/displays adequate comfort level or baseline comfort level  10/8/2024 2148 by Robby Marquez RN  Outcome: Progressing  10/8/2024 1416 by Kalyn Elias RN  Outcome: Progressing     Problem: Skin/Tissue Integrity  Goal: Absence of new skin breakdown  Description: 1.  Monitor for areas of redness and/or skin breakdown  2.  Assess vascular access sites hourly  3.  Every 4-6 hours minimum:  Change oxygen saturation probe site  4.  Every 4-6 hours:  If on nasal continuous positive airway pressure, respiratory therapy assess nares and determine need for appliance change or resting period.  10/8/2024 2148 by Robby Marquez RN  Outcome: Progressing  10/8/2024 1416 by Kalyn Elias RN  Outcome: Progressing     Problem: Safety - Adult  Goal: Free from fall injury  10/8/2024 2148 by Robby Marquez RN  Outcome: Progressing  10/8/2024 1416 by Kalyn Elias RN  Outcome: Progressing     Problem: ABCDS Injury Assessment  Goal: Absence of physical injury  10/8/2024 2148 by Robby Marquez RN  Outcome: Progressing  10/8/2024 1416 by Kalyn Elias RN  Outcome: Progressing     Problem: Nutrition Deficit:  Goal: Optimize nutritional status  10/8/2024 2148 by Robby Marquez RN  Outcome: Progressing  10/8/2024 1416 by Kalyn Elias RN  Outcome: Progressing

## 2024-10-09 NOTE — PROGRESS NOTES
distance to bathroom    maxA  Pt declining need to this session, however pt stating of requiring assistance to complete of hygiene and clothing management    **Recommending of BSC at discharge Mod I      Bed Mobility  Supine to sit: SBA   Sit to supine:  NT      VCs for safety, techs for bracing Ribs    SBA  Supine<>EOB Supine to sit: IND  Sit to supine: IND      Functional Transfers Min A     EOB 2x, Chair  Pt ed for safety/hand placement    SBA  Sit to Stand  Stand to Sit    Cueing for hand placement, however poor follow through and pt placing of hands on walker with all transfers  Mod I      Functional Mobility Min A w/ WW     Only able to tolerate short distance b/t surfaces only  Pt ed for safety/improved safety awareness, walker safety    SBA  Pt ambulated short household distance in room EOB<>Doorway x2 with seated rest break Mod I      Balance Sitting:     Static:  SUP EOB, Remote SUP chair    Dynamic:  SBA w/ functional ax Unsupported at EOB      Standing:     Static:  Min A w/ WW    Dynamic:  Min A w/ functional ax/mobility w/ WW     Sitting EOB:  Supervision    Standing:  SBA/CGA Sitting:     Static:  IND    Dynamic:  IND w/ functional ax     Standing:     Static:  Mod I w/ AD PRN    Dynamic:  Mod I w/ functional ax/mobility w/ AD PRN   Activity Tolerance Fair     Sitting Tolerance w/ light ax ~ 15 mins EOB, in chair at end of session  Standing Tolerance w/ light ax ~ 2 mins    Fair-  Max encouragement to participate, poor activity tolerance, easily fatigued    Monitoring of O2 on 2L, dropping to 87-90% with movement, however able to recover above 90 Good(-)   Visual/  Perceptual     Hearing: WNL   Glasses: No     WFL   Hearing Aids:  No                       Hand Dominance: Right    AROM Strength Additional Info:    RUE  WFL WNL - observed, NT d/t pain  WNL ;    WNL FMC/dexterity noted during ADL tasks      LUE WFL WNL - observed, NT d/t pain  WNL ;     WNL FMC/dexterity noted during ADL tasks

## 2024-10-09 NOTE — PROGRESS NOTES
XR CHEST PORTABLE   Final Result   Mild improvement in diffuse bilateral lung infiltrates.         XR CHEST PORTABLE   Final Result   Stable generalized airspace opacities throughout both lungs suggestive of   bilateral pneumonia or interstitial pulmonary edema with probable right   pleural effusion.         XR CHEST (2 VW)   Final Result   Interval worsening of bilateral diffuse patchy airspace opacities now with   bilateral pleural effusions.  No pneumothorax.         XR CHEST PORTABLE   Final Result   Interval progression of bilateral diffuse patchy pulmonary alveolar   infiltrates.         XR THORACIC SPINE (3 VIEWS)   Final Result   1. No acute abnormality of the thoracic spine.   2. Unchanged biconcave deformities within the midthoracic spine.   3. Heterogeneous marrow density, compatible with known osseous metastatic   disease.   4. Heterogeneous lung parenchyma compatible with ground-glass opacities   identified on the recent CT.   No acute abnormality of the thoracic spine         CTA CHEST W CONTRAST   Final Result   1. No evidence of pulmonary embolism.   2. Abnormal patchy ground-glass opacity identified diffusely throughout the   lung fields. Findings show progression when compared to the prior study with   no definite pleural effusion or pneumothorax. Findings suggesting progression   of disease favoring an infectious/inflammatory process or however   lymphangitic spread of disease given mediastinal adenopathy and abnormal bony   metastatic process within the ribs and spine cannot be completely excluded   and clinical correlation is needed..   3. Abnormal bony metastatic disease within the spine. There is a bony   destructive process involving the left anterior 2nd rib. Associated soft   tissue thickening.   4. No acute intra-abdominal or intrapelvic process.  Stable adenopathy   identified throughout the retroperitoneum and left pelvic sidewall.  Stable   bony Mets metastatic disease throughout the

## 2024-10-09 NOTE — PROGRESS NOTES
Hemoglobin A1C: No components found for: \"HGBA1C\"    IMAGING:  Imaging tests were completed and reviewed and discussed radiology and care team involved and reveals   XR THORACIC SPINE (3 VIEWS)  Result Date: 9/29/2024  1. No acute abnormality of the thoracic spine. 2. Unchanged biconcave deformities within the midthoracic spine. 3. Heterogeneous marrow density, compatible with known osseous metastatic disease. 4. Heterogeneous lung parenchyma compatible with ground-glass opacities identified on the recent CT. No acute abnormality of the thoracic spine     CTA CHEST W CONTRAST  Result Date: 9/26/2024  1. No evidence of pulmonary embolism. 2. Abnormal patchy ground-glass opacity identified diffusely throughout the lung fields. Findings show progression when compared to the prior study with no definite pleural effusion or pneumothorax. Findings suggesting progression of disease favoring an infectious/inflammatory process or however lymphangitic spread of disease given mediastinal adenopathy and abnormal bony metastatic process within the ribs and spine cannot be completely excluded and clinical correlation is needed.. 3. Abnormal bony metastatic disease within the spine. There is a bony destructive process involving the left anterior 2nd rib. Associated soft tissue thickening. 4. No acute intra-abdominal or intrapelvic process.  Stable adenopathy identified throughout the retroperitoneum and left pelvic sidewall.  Stable bony Mets metastatic disease throughout the spine and pelvis as well as the hips bilaterally.     CT ABDOMEN PELVIS W IV CONTRAST Additional Contrast? None  Result Date: 9/26/2024  1. No evidence of pulmonary embolism. 2. Abnormal patchy ground-glass opacity identified diffusely throughout the lung fields. Findings show progression when compared to the prior study with no definite pleural effusion or pneumothorax. Findings suggesting progression of disease favoring an infectious/inflammatory

## 2024-10-09 NOTE — PROGRESS NOTES
[facility] Quality Flow/Interdisciplinary Rounds Progress Note        Quality Flow Rounds held on October 9, 2024    Disciplines Attending:  Bedside Nurse, , , and Nursing Unit Leadership    Joseph Bond was admitted on 9/26/2024  4:56 AM    Anticipated Discharge Date:       Disposition:    Simon Score:  Simon Scale Score: 19    Readmission Risk              Risk of Unplanned Readmission:  43           Discussed patient goal for the day, patient clinical progression, and barriers to discharge.  The following Goal(s) of the Day/Commitment(s) have been identified:  Diagnostics - Report Results and Labs - Report Results      Lesley Iverson RN  October 9, 2024

## 2024-10-09 NOTE — PROGRESS NOTES
Physical Therapy  Treatment Note     Name: Joseph Bond  : 1980  MRN: [id number]        Date of Service: 2024     Evaluating PT:  Yeison Nguyen PT, DPT     Room #:  6523/6523-A  Diagnosis:  Hypoxemia [R09.02]  Thrombocytopenia (HCC) [D69.6]  History of hematemesis [Z87.19]  Anemia, unspecified type [D64.9]  PMHx/PSHx:  cancer, diverticulitis, opioid abuse  Procedure/Surgery:  N/A  Precautions:  fall risk  Equipment Owned: wc, walker  Equipment Needs:  TBD     SUBJECTIVE:     Pt lives alone in a 1st floor apartment with 4 steps to enter and no handrail.  Bed/bath is on main floor.  Pt ambulated with wc/walker PTA.     OBJECTIVE:    Initial Evaluation  Date: 24 Treatment  Date: 10/9/24 Short Term/ Long Term   Goals   AM-PAC 6 Clicks 15/24 16/24      Was pt agreeable to Eval/treatment? yes yes      Does pt have pain? L upper abdominal pain 10/10 Upper abdomen pain 10/10      Bed Mobility  Rolling: NT  Supine to sit: SBA  Sit to supine: NT  Scooting: SBA Rolling: SBA  Supine to sit: SBA  Sit to supine: NT  Scooting: SBA  Rolling: Ind  Supine to sit: Ind  Sit to supine: Ind  Scooting: Ind   Transfers Sit to stand: Yoan  Stand to sit: Yoan  Stand pivot: Yoan with WW  Sit to stand: SBA  Stand to sit: SBA  Stand pivot: SBA with WW Sit to stand: Ind  Stand to sit: Ind  Stand pivot: Ind with WW   Ambulation    NT  25ft x4 with WW SBA 50ft with WW Ind   Stair negotiation: ascended and descended NT  NT Make goal as appropriate      Strength/ROM:   BLE gross strength 4-/5  BLE ROM WFL     Balance:   Static Sitting: SBA  Dynamic Sitting: SBA  Static Standing: NT  Dynamic Standing: NT     Pt is A & O x 4  Sensation:  Pt denies numbness and tingling to extremities  Edema:  None noted    Vitals:  SpO2 and HR were stable during session    Therapeutic Exercises:    Bed mobility: supine>sit, cued for positioning at EOB  Transfers: STS x3, cued for hand placement and postural correction  Ambulation: 25ft x2, cued for

## 2024-10-09 NOTE — PLAN OF CARE
Was messaged regarding bloody bowel movement. Went to evaluate patient- he states he has a history of hemorrhoids and has had bleeding from them before. Denies worsening abdominal pain, nausea, or vomiting. Endorses constipation. Abdominal exam benign. Recheck H&H was stable. Continue to monitor for now

## 2024-10-09 NOTE — PROGRESS NOTES
Palliative Care Department  [phone]  Palliative Care Progress Note  Provider Jesus Falcon MD      PATIENT: Joseph Bond  : 1980  MRN: [id number]  ADMISSION DATE: 2024  4:56 AM  Referring Provider:  Anastasia Helms MD    Palliative Medicine was consulted on hospital day 13 for assistance with Goals of care, Overwhelmed Symptoms    HPI:     Clinical Summary:Joseph Bond is a 44 y.o. y/o male with a history of prostate cancer with mediastinal lymphadenopathy, extensive metastasis of the spine, rib cage, pelvis, humerus and femurs (had refused systemic therapy, pursuing homeopathic), completed palliative radiation treatment to spine, polysubstance abuse including opioids and tobacco, abstinent for 5 years, diverticulitis, with recent ER visit on 2024, due to shortness of breath, was going to be admitted for further medical management however left AMA, was supposed to follow-up outpatient with palliative medicine for symptom management, however missed appointment, did not return phone call to reschedule appointment, who presented to [facility] on 2024 with complaint of nausea, vomiting, left-sided rib pain.  At ED, found hypoxic 78% on room air, placed on 4 L nasal cannula.  Significant laboratory findings showed hemoglobin 5.3, platelets 18, alkaline phosphate 227, AST 76, lipase 8, WBC 4.2.  Received 2 packed red blood cell and sixpack platelets.  CT images showed disease progression when compared to prior images, favoring an infectious/inflammatory process or lymphangitic spread of disease given mediastinal adenopathy and abnormal bony metastatic process within the rib and spine.  Abnormal bony metastatic disease within the spine.  Bony destructive process involving the left anterior second rib.  Stable adenopathy throughout the retroperitoneal and pelvic sidewall,.  Stable bony metastatic disease throughout the spine, pelvis and hip bilaterally.  Palliative medicine consulted  to assist further with goals of care, symptom management.    ASSESSMENT/PLAN:     Pertinent Hospital Diagnoses     Thrombocytopenia  Anemia  Acute respiratory failure  Metastatic prostate cancer    Symptom management     Pain due to neoplasm    -Patient received 8 as needed doses of Dilaudid 1 mg and 8 as needed doses of oxycodone 15 mg in the past 24 hours  -IV Dilaudid 1 mg every 3 hours as needed  -oxycodone 15 mg every 3 hours as needed  - refused  fentanyl patch  -Stopped taking morphine ER due to drowsiness  -Robaxin 750 mg 4 times a day   -Patient still think about methadone treatment  -We will switch the patient from IV to PO dilaudid    Nausea and vomiting  -Promethazine 12.5 mg p.o. every 6 hours as needed  -IV Zofran 4 mg every 6 hours as needed    Prophylaxis stool softener  -GlycoLax daily as needed  -Senokot S twice daily    Palliative Care Encounter / Counseling Regarding Goals of Care  Please see detailed goals of care discussion as below  At this time, Joseph Bond, Does have capacity for medical decision-making.  Capacity is time limited and situation/question specific  Outcome of goals of care meeting:  Following for symptom management  Code status Full Code  Advanced Directives: no POA or living will in epic  Surrogate/Legal NOK:  Margarita Bond (Sister ) [phone]   Gauravernst Womack (cousin) [phone]    Spiritual assessment: no spiritual distress identified  Bereavement and grief: to be determined  Referrals to: none today    Thank you for the opportunity to participate in the care of Joseph Bond.     SUBJECTIVE:   Chart was reviewed.  Patient seen at the bedside, his father was present in the room.  Patient stated his pain is well controlled with current regimen. The patient received 8 as needed doses of Dilaudid 1 mg IV and 8 as needed doses of oxycodone 15 mg PO in the past 24 hours.The patient still thinking about methadone. We spoke to primary team. The patient will be

## 2024-10-10 LAB
ALBUMIN SERPL-MCNC: 2.4 G/DL (ref 3.5–4.7)
ALBUMIN SERPL-MCNC: 3.1 G/DL (ref 3.5–5.2)
ALP SERPL-CCNC: 222 U/L (ref 40–129)
ALPHA1 GLOB SERPL ELPH-MCNC: 0.7 G/DL (ref 0.2–0.4)
ALPHA2 GLOB SERPL ELPH-MCNC: 0.5 G/DL (ref 0.5–1)
ALT SERPL-CCNC: 27 U/L (ref 0–40)
ANION GAP SERPL CALCULATED.3IONS-SCNC: 11 MMOL/L (ref 7–16)
AST SERPL-CCNC: 25 U/L (ref 0–39)
B-GLOBULIN SERPL ELPH-MCNC: 1.1 G/DL (ref 0.8–1.3)
BASOPHILS # BLD: 0.01 K/UL (ref 0–0.2)
BASOPHILS NFR BLD: 0 % (ref 0–2)
BILIRUB SERPL-MCNC: 0.8 MG/DL (ref 0–1.2)
BUN SERPL-MCNC: 14 MG/DL (ref 6–20)
CALCIUM SERPL-MCNC: 8.5 MG/DL (ref 8.6–10.2)
CHLORIDE SERPL-SCNC: 105 MMOL/L (ref 98–107)
CO2 SERPL-SCNC: 25 MMOL/L (ref 22–29)
CREAT SERPL-MCNC: 0.6 MG/DL (ref 0.7–1.2)
EOSINOPHIL # BLD: 0.02 K/UL (ref 0.05–0.5)
EOSINOPHILS RELATIVE PERCENT: 1 % (ref 0–6)
ERYTHROCYTE [DISTWIDTH] IN BLOOD BY AUTOMATED COUNT: 15.9 % (ref 11.5–15)
GAMMA GLOB SERPL ELPH-MCNC: 0.6 G/DL (ref 0.7–1.6)
GFR, ESTIMATED: >90 ML/MIN/1.73M2
GGT SERPL-CCNC: 53 U/L (ref 10–71)
GLUCOSE SERPL-MCNC: 96 MG/DL (ref 74–99)
HCT VFR BLD AUTO: 21.8 % (ref 37–54)
HGB BLD-MCNC: 7.1 G/DL (ref 12.5–16.5)
IMM GRANULOCYTES # BLD AUTO: 0.54 K/UL (ref 0–0.58)
IMM GRANULOCYTES NFR BLD: 12 % (ref 0–5)
INTERPRETATION SERPL IFE-IMP: NORMAL
LYMPHOCYTES NFR BLD: 0.29 K/UL (ref 1.5–4)
LYMPHOCYTES RELATIVE PERCENT: 7 % (ref 20–42)
MAGNESIUM SERPL-MCNC: 2.3 MG/DL (ref 1.6–2.6)
MCH RBC QN AUTO: 29.7 PG (ref 26–35)
MCHC RBC AUTO-ENTMCNC: 32.6 G/DL (ref 32–34.5)
MCV RBC AUTO: 91.2 FL (ref 80–99.9)
MONOCYTES NFR BLD: 0.33 K/UL (ref 0.1–0.95)
MONOCYTES NFR BLD: 8 % (ref 2–12)
NEUTROPHILS NFR BLD: 73 % (ref 43–80)
NEUTS SEG NFR BLD: 3.23 K/UL (ref 1.8–7.3)
PATH REV: NORMAL
PATHOLOGIST: ABNORMAL
PHOSPHATE SERPL-MCNC: 3.3 MG/DL (ref 2.5–4.5)
PLATELET # BLD AUTO: 20 K/UL (ref 130–450)
PLATELET CONFIRMATION: NORMAL
PMV BLD AUTO: ABNORMAL FL (ref 7–12)
POTASSIUM SERPL-SCNC: 3.5 MMOL/L (ref 3.5–5)
PROT PATTERN SERPL ELPH-IMP: ABNORMAL
PROT SERPL-MCNC: 5.1 G/DL (ref 6.4–8.3)
PROT SERPL-MCNC: 5.2 G/DL (ref 6.4–8.3)
RBC # BLD AUTO: 2.39 M/UL (ref 3.8–5.8)
SODIUM SERPL-SCNC: 141 MMOL/L (ref 132–146)
WBC OTHER # BLD: 4.4 K/UL (ref 4.5–11.5)

## 2024-10-10 PROCEDURE — 80053 COMPREHEN METABOLIC PANEL: CPT

## 2024-10-10 PROCEDURE — 2580000003 HC RX 258

## 2024-10-10 PROCEDURE — 1200000000 HC SEMI PRIVATE

## 2024-10-10 PROCEDURE — 2500000003 HC RX 250 WO HCPCS: Performed by: STUDENT IN AN ORGANIZED HEALTH CARE EDUCATION/TRAINING PROGRAM

## 2024-10-10 PROCEDURE — 6370000000 HC RX 637 (ALT 250 FOR IP): Performed by: NURSE PRACTITIONER

## 2024-10-10 PROCEDURE — 6360000002 HC RX W HCPCS

## 2024-10-10 PROCEDURE — 94640 AIRWAY INHALATION TREATMENT: CPT

## 2024-10-10 PROCEDURE — 6370000000 HC RX 637 (ALT 250 FOR IP)

## 2024-10-10 PROCEDURE — 99232 SBSQ HOSP IP/OBS MODERATE 35: CPT | Performed by: STUDENT IN AN ORGANIZED HEALTH CARE EDUCATION/TRAINING PROGRAM

## 2024-10-10 PROCEDURE — 6370000000 HC RX 637 (ALT 250 FOR IP): Performed by: INTERNAL MEDICINE

## 2024-10-10 PROCEDURE — 94669 MECHANICAL CHEST WALL OSCILL: CPT

## 2024-10-10 PROCEDURE — 6360000002 HC RX W HCPCS: Performed by: INTERNAL MEDICINE

## 2024-10-10 PROCEDURE — 2700000000 HC OXYGEN THERAPY PER DAY

## 2024-10-10 PROCEDURE — 99232 SBSQ HOSP IP/OBS MODERATE 35: CPT | Performed by: FAMILY MEDICINE

## 2024-10-10 PROCEDURE — 6370000000 HC RX 637 (ALT 250 FOR IP): Performed by: STUDENT IN AN ORGANIZED HEALTH CARE EDUCATION/TRAINING PROGRAM

## 2024-10-10 PROCEDURE — 83735 ASSAY OF MAGNESIUM: CPT

## 2024-10-10 PROCEDURE — 82977 ASSAY OF GGT: CPT

## 2024-10-10 PROCEDURE — 85025 COMPLETE CBC W/AUTO DIFF WBC: CPT

## 2024-10-10 PROCEDURE — 99232 SBSQ HOSP IP/OBS MODERATE 35: CPT | Performed by: INTERNAL MEDICINE

## 2024-10-10 PROCEDURE — 84100 ASSAY OF PHOSPHORUS: CPT

## 2024-10-10 RX ORDER — FUROSEMIDE 20 MG
20 TABLET ORAL DAILY
Status: DISCONTINUED | OUTPATIENT
Start: 2024-10-10 | End: 2024-10-11

## 2024-10-10 RX ORDER — METHYLPREDNISOLONE 4 MG/1
4 TABLET ORAL
Status: DISCONTINUED | OUTPATIENT
Start: 2024-10-11 | End: 2024-10-11 | Stop reason: HOSPADM

## 2024-10-10 RX ORDER — HYDROMORPHONE HYDROCHLORIDE 2 MG/1
4 TABLET ORAL
Status: DISCONTINUED | OUTPATIENT
Start: 2024-10-10 | End: 2024-10-10

## 2024-10-10 RX ORDER — METHYLPREDNISOLONE 4 MG/1
8 TABLET ORAL NIGHTLY
Status: DISCONTINUED | OUTPATIENT
Start: 2024-10-11 | End: 2024-10-11 | Stop reason: HOSPADM

## 2024-10-10 RX ORDER — OXYCODONE HYDROCHLORIDE 10 MG/1
20 TABLET ORAL
Status: DISCONTINUED | OUTPATIENT
Start: 2024-10-10 | End: 2024-10-10

## 2024-10-10 RX ORDER — FUROSEMIDE 10 MG/ML
20 INJECTION INTRAMUSCULAR; INTRAVENOUS ONCE
Status: CANCELLED | OUTPATIENT
Start: 2024-10-10 | End: 2024-10-10

## 2024-10-10 RX ORDER — OXYCODONE HYDROCHLORIDE 10 MG/1
20 TABLET ORAL
Status: DISCONTINUED | OUTPATIENT
Start: 2024-10-10 | End: 2024-10-11 | Stop reason: HOSPADM

## 2024-10-10 RX ORDER — HYDROMORPHONE HYDROCHLORIDE 2 MG/1
4 TABLET ORAL
Status: DISCONTINUED | OUTPATIENT
Start: 2024-10-10 | End: 2024-10-11 | Stop reason: HOSPADM

## 2024-10-10 RX ORDER — METHYLPREDNISOLONE 4 MG/1
4 TABLET ORAL NIGHTLY
Status: DISCONTINUED | OUTPATIENT
Start: 2024-10-12 | End: 2024-10-11 | Stop reason: HOSPADM

## 2024-10-10 RX ORDER — METHYLPREDNISOLONE 4 MG/1
24 TABLET ORAL ONCE
Status: COMPLETED | OUTPATIENT
Start: 2024-10-10 | End: 2024-10-10

## 2024-10-10 RX ADMIN — SENNOSIDES AND DOCUSATE SODIUM 2 TABLET: 50; 8.6 TABLET ORAL at 08:04

## 2024-10-10 RX ADMIN — PANTOPRAZOLE SODIUM 40 MG: 40 TABLET, DELAYED RELEASE ORAL at 05:07

## 2024-10-10 RX ADMIN — METHOCARBAMOL TABLETS 750 MG: 750 TABLET, COATED ORAL at 08:04

## 2024-10-10 RX ADMIN — OXYCODONE HYDROCHLORIDE 20 MG: 10 TABLET ORAL at 21:40

## 2024-10-10 RX ADMIN — HYDROMORPHONE HYDROCHLORIDE 4 MG: 2 TABLET ORAL at 19:48

## 2024-10-10 RX ADMIN — IPRATROPIUM BROMIDE AND ALBUTEROL SULFATE 1 DOSE: 2.5; .5 SOLUTION RESPIRATORY (INHALATION) at 13:07

## 2024-10-10 RX ADMIN — METHOCARBAMOL TABLETS 750 MG: 750 TABLET, COATED ORAL at 17:53

## 2024-10-10 RX ADMIN — DEXAMETHASONE SODIUM PHOSPHATE 4 MG: 4 INJECTION INTRA-ARTICULAR; INTRALESIONAL; INTRAMUSCULAR; INTRAVENOUS; SOFT TISSUE at 08:04

## 2024-10-10 RX ADMIN — OXYCODONE HYDROCHLORIDE 15 MG: 15 TABLET ORAL at 05:07

## 2024-10-10 RX ADMIN — METHOCARBAMOL TABLETS 750 MG: 750 TABLET, COATED ORAL at 13:45

## 2024-10-10 RX ADMIN — METHOCARBAMOL TABLETS 750 MG: 750 TABLET, COATED ORAL at 21:39

## 2024-10-10 RX ADMIN — METHYLPREDNISOLONE 24 MG: 4 TABLET ORAL at 16:22

## 2024-10-10 RX ADMIN — HYDROMORPHONE HYDROCHLORIDE 1 MG: 1 INJECTION, SOLUTION INTRAMUSCULAR; INTRAVENOUS; SUBCUTANEOUS at 12:30

## 2024-10-10 RX ADMIN — FUROSEMIDE 20 MG: 20 TABLET ORAL at 11:27

## 2024-10-10 RX ADMIN — HYDROMORPHONE HYDROCHLORIDE 1 MG: 1 INJECTION, SOLUTION INTRAMUSCULAR; INTRAVENOUS; SUBCUTANEOUS at 09:06

## 2024-10-10 RX ADMIN — OXYCODONE HYDROCHLORIDE 15 MG: 15 TABLET ORAL at 11:27

## 2024-10-10 RX ADMIN — SUCRALFATE 1 G: 1 TABLET ORAL at 05:07

## 2024-10-10 RX ADMIN — POLYETHYLENE GLYCOL 3350 17 G: 17 POWDER, FOR SOLUTION ORAL at 08:06

## 2024-10-10 RX ADMIN — SODIUM CHLORIDE, PRESERVATIVE FREE 10 ML: 5 INJECTION INTRAVENOUS at 08:15

## 2024-10-10 RX ADMIN — BICALUTAMIDE 50 MG: 50 TABLET, FILM COATED ORAL at 08:06

## 2024-10-10 RX ADMIN — OXYCODONE HYDROCHLORIDE 20 MG: 10 TABLET ORAL at 17:54

## 2024-10-10 RX ADMIN — Medication 2000 UNITS: at 08:06

## 2024-10-10 RX ADMIN — SUCRALFATE 1 G: 1 TABLET ORAL at 12:29

## 2024-10-10 RX ADMIN — HYDROMORPHONE HYDROCHLORIDE 1 MG: 1 INJECTION, SOLUTION INTRAMUSCULAR; INTRAVENOUS; SUBCUTANEOUS at 02:56

## 2024-10-10 RX ADMIN — SODIUM CHLORIDE, PRESERVATIVE FREE 10 ML: 5 INJECTION INTRAVENOUS at 21:41

## 2024-10-10 RX ADMIN — BUDESONIDE 500 MCG: 0.5 INHALANT RESPIRATORY (INHALATION) at 09:05

## 2024-10-10 RX ADMIN — BUDESONIDE 500 MCG: 0.5 INHALANT RESPIRATORY (INHALATION) at 21:24

## 2024-10-10 RX ADMIN — HYDROMORPHONE HYDROCHLORIDE 1 MG: 1 INJECTION, SOLUTION INTRAMUSCULAR; INTRAVENOUS; SUBCUTANEOUS at 06:06

## 2024-10-10 RX ADMIN — IPRATROPIUM BROMIDE AND ALBUTEROL SULFATE 1 DOSE: 2.5; .5 SOLUTION RESPIRATORY (INHALATION) at 09:05

## 2024-10-10 RX ADMIN — HYDROMORPHONE HYDROCHLORIDE 1 MG: 1 INJECTION, SOLUTION INTRAMUSCULAR; INTRAVENOUS; SUBCUTANEOUS at 15:41

## 2024-10-10 RX ADMIN — HYDROMORPHONE HYDROCHLORIDE 4 MG: 2 TABLET ORAL at 22:47

## 2024-10-10 RX ADMIN — SENNOSIDES AND DOCUSATE SODIUM 2 TABLET: 50; 8.6 TABLET ORAL at 21:40

## 2024-10-10 RX ADMIN — OXYCODONE HYDROCHLORIDE 15 MG: 15 TABLET ORAL at 01:47

## 2024-10-10 RX ADMIN — OXYCODONE HYDROCHLORIDE 15 MG: 15 TABLET ORAL at 08:07

## 2024-10-10 RX ADMIN — SODIUM CHLORIDE, PRESERVATIVE FREE 10 ML: 5 INJECTION INTRAVENOUS at 08:05

## 2024-10-10 ASSESSMENT — PAIN DESCRIPTION - FREQUENCY
FREQUENCY: CONTINUOUS

## 2024-10-10 ASSESSMENT — PAIN DESCRIPTION - ORIENTATION
ORIENTATION: LEFT;RIGHT;MID
ORIENTATION: RIGHT;LEFT;UPPER;LOWER
ORIENTATION: RIGHT;LEFT;MID
ORIENTATION: UPPER;LOWER;MID
ORIENTATION: LOWER;UPPER
ORIENTATION: RIGHT;LEFT;LOWER;UPPER
ORIENTATION: RIGHT;LEFT;MID
ORIENTATION: RIGHT;LEFT;MID
ORIENTATION: UPPER;LOWER;MID
ORIENTATION: UPPER;MID
ORIENTATION: RIGHT;LEFT;MID
ORIENTATION: RIGHT;LEFT;MID;POSTERIOR;LOWER

## 2024-10-10 ASSESSMENT — PAIN - FUNCTIONAL ASSESSMENT
PAIN_FUNCTIONAL_ASSESSMENT: PREVENTS OR INTERFERES SOME ACTIVE ACTIVITIES AND ADLS
PAIN_FUNCTIONAL_ASSESSMENT: PREVENTS OR INTERFERES SOME ACTIVE ACTIVITIES AND ADLS
PAIN_FUNCTIONAL_ASSESSMENT: ACTIVITIES ARE NOT PREVENTED
PAIN_FUNCTIONAL_ASSESSMENT: PREVENTS OR INTERFERES WITH MANY ACTIVE NOT PASSIVE ACTIVITIES
PAIN_FUNCTIONAL_ASSESSMENT: PREVENTS OR INTERFERES SOME ACTIVE ACTIVITIES AND ADLS
PAIN_FUNCTIONAL_ASSESSMENT: ACTIVITIES ARE NOT PREVENTED
PAIN_FUNCTIONAL_ASSESSMENT: PREVENTS OR INTERFERES WITH MANY ACTIVE NOT PASSIVE ACTIVITIES
PAIN_FUNCTIONAL_ASSESSMENT: PREVENTS OR INTERFERES SOME ACTIVE ACTIVITIES AND ADLS
PAIN_FUNCTIONAL_ASSESSMENT: ACTIVITIES ARE NOT PREVENTED
PAIN_FUNCTIONAL_ASSESSMENT: PREVENTS OR INTERFERES SOME ACTIVE ACTIVITIES AND ADLS
PAIN_FUNCTIONAL_ASSESSMENT: PREVENTS OR INTERFERES SOME ACTIVE ACTIVITIES AND ADLS
PAIN_FUNCTIONAL_ASSESSMENT: ACTIVITIES ARE NOT PREVENTED

## 2024-10-10 ASSESSMENT — PAIN DESCRIPTION - PAIN TYPE
TYPE: CHRONIC PAIN

## 2024-10-10 ASSESSMENT — PAIN DESCRIPTION - DESCRIPTORS
DESCRIPTORS: ACHING;DISCOMFORT;SQUEEZING
DESCRIPTORS: STABBING;THROBBING;SHARP
DESCRIPTORS: ACHING;DISCOMFORT;DULL;THROBBING
DESCRIPTORS: ACHING;DISCOMFORT;DULL;THROBBING
DESCRIPTORS: STABBING;THROBBING;SHARP
DESCRIPTORS: ACHING;DISCOMFORT;SQUEEZING;TENDER
DESCRIPTORS: STABBING;THROBBING;SHARP
DESCRIPTORS: ACHING;DISCOMFORT;TENDER;SORE
DESCRIPTORS: ACHING;DISCOMFORT;SQUEEZING
DESCRIPTORS: CRAMPING;SHARP;STABBING
DESCRIPTORS: ACHING;DISCOMFORT;SORE;TENDER
DESCRIPTORS: ACHING;DISCOMFORT;DULL;THROBBING

## 2024-10-10 ASSESSMENT — PAIN SCALES - GENERAL
PAINLEVEL_OUTOF10: 10
PAINLEVEL_OUTOF10: 9
PAINLEVEL_OUTOF10: 10

## 2024-10-10 ASSESSMENT — PAIN SCALES - WONG BAKER
WONGBAKER_NUMERICALRESPONSE: NO HURT

## 2024-10-10 ASSESSMENT — PAIN DESCRIPTION - LOCATION
LOCATION: ABDOMEN
LOCATION: ABDOMEN;BACK
LOCATION: BACK;ABDOMEN
LOCATION: ABDOMEN
LOCATION: BACK;RIB CAGE
LOCATION: ABDOMEN
LOCATION: RIB CAGE;BACK
LOCATION: ABDOMEN

## 2024-10-10 ASSESSMENT — PAIN DESCRIPTION - ONSET
ONSET: ON-GOING

## 2024-10-10 ASSESSMENT — ENCOUNTER SYMPTOMS: BACK PAIN: 1

## 2024-10-10 NOTE — PLAN OF CARE
Problem: Discharge Planning  Goal: Discharge to home or other facility with appropriate resources  10/10/2024 0733 by Cathy Deras RN  Outcome: Progressing     Problem: Pain  Goal: Verbalizes/displays adequate comfort level or baseline comfort level  10/10/2024 0733 by Cathy Deras RN  Outcome: Progressing     Problem: Skin/Tissue Integrity  Goal: Absence of new skin breakdown  Description: 1.  Monitor for areas of redness and/or skin breakdown  2.  Assess vascular access sites hourly  3.  Every 4-6 hours minimum:  Change oxygen saturation probe site  4.  Every 4-6 hours:  If on nasal continuous positive airway pressure, respiratory therapy assess nares and determine need for appliance change or resting period.  10/10/2024 0733 by Cathy Deras RN  Outcome: Progressing     Problem: Safety - Adult  Goal: Free from fall injury  10/10/2024 0733 by Cathy Deras RN  Outcome: Progressing     Problem: ABCDS Injury Assessment  Goal: Absence of physical injury  10/10/2024 0733 by Cathy Deras RN  Outcome: Progressing     Problem: Nutrition Deficit:  Goal: Optimize nutritional status  10/10/2024 0733 by Cathy Deras RN  Outcome: Progressing

## 2024-10-10 NOTE — CARE COORDINATION
10/10. SpO2 90% on O2 1L. IV decadron 4mg qd. Discharge plan is home with sister and [facility]. Stacy Calle RN    Met with the pt's brother. He lives out of town and will need to return home soon. He is requesting additional assistance at home. Informed pt and brother that aide services can arranged on discharge. Also explained that the pt can call customer service on his insurance card to see if he can receive help from his insurance company. Stacy Calle RN

## 2024-10-10 NOTE — PROGRESS NOTES
[facility]  Family Medicine Attending    S: 44 y.o. male with a history of prostate cancer, anxiety, opiate abuse who presented with nausea and vomiting as well as left sided abdominal pain.  He was found to have hypoxia and severe symptomatic anemia with hgb 5.3.  He has been having nausea with vomiting, pelvic and back pain.  Has not been able to walk due to pain for a couple of weeks.  Goals of care-pain control so that he can walk again.  He wanted more information on side effects from a regimen of chemotherapy drugs from the oncologist.  Oncology was consulted.  Per their note, \"Considering high tumor burden, advised treatment with Taxotere, GnRH agonist, Nubeqa along with Xgeva\"  Side effects of these medications were discussed with the patient by oncology.     Patient decided to move forward with chemotherapy.    Palliative care following patient.  Pain is still too severe to walk.    He also states that when he tries to stand up the pain is mostly in a band around his upper abdomen.  He says he has no difficulty with his legs when standing. Patients notes some small amounts of blood in his mucous if he coughs.      9/30-patient reports that he is not feeling well but not willing to elaborate.  To start transfusion for hemoglobin of 6.5. No change in breathing. Denies any active bleeding today. Rib pain where mets are located.    10/1-s/p 2 units PRBCS yesterday.  This morning stating that oxygen was increased overnight to 11L HF NC, now at 10. But now denying dyspnea, some mucus with blood upon coughing. He denies that he is having depressive symptoms.    10/2-Patient reports he is feeling short of breath today. Coughing up some blood tinged mucus.     10/3-Patient reports ongoing shortness of breath but believes it is better. Some chills.  He is trying to do incentive spirometry.  Less blood in his sputum. Worked wit pt/ot and walked. Improved mentally.    10/4-Pt had shortness of breath  Pricilla.  Please refer to the resident progress note today for additional information.      Kylee Damon, DO

## 2024-10-10 NOTE — PROGRESS NOTES
Comprehensive Nutrition Assessment    Type and Reason for Visit:  Reassess    Nutrition Recommendations/Plan:   Recommend to continue MyoKardia organic nutritional supplement BID and Gelatein high protein supplement BID to help meet increased nutritional needs and d/t decreased po intake of meals.     Monitor for re-feeding syndrome. Monitor electrolytes closely & replace prn.           Malnutrition Assessment:  Malnutrition Status:  Severe malnutrition (09/29/24 1218)    Context:  Chronic Illness     Findings of the 6 clinical characteristics of malnutrition:  Energy Intake:  75% or less estimated energy requirements for 1 month or longer  Weight Loss:  Greater than 5% over 1 month (~10% x ~2 months)     Body Fat Loss:  Unable to assess (2/2 SAURAV for radiology at this time)     Muscle Mass Loss:  Unable to assess    Fluid Accumulation:  Unable to assess (2/2 multifactorial at this time)     Strength:  Not Performed    Nutrition Assessment:    Patients po intake remains sporadic and decreased, averaging 25-50% of meals served ; adm w/ abd pain and N/V ; noted hemoptysis ; hx of stage IV prostate CA w/ widespread bone mets s/p XRT (7/23) ; noted mediastinal lymphadenopathy ; noted acute hypoxic respiratory failure with tachycardia ; hx of diverticulitis/opioid abuse/lymphedenopathy ; hx of malnutrition ; pt does meet criteria for severe malnutrition ; noted thrombocytopenia/anemia/SOB/leg swelling ; will provide updated recommendations    Nutrition Related Findings:    +I&Os (+5.4 L), 3+ edema, A&O x 4, active BS, tenderness to abd, jaundice, redness to coccyx ; Wound Type: None       Current Nutrition Intake & Therapies:    Average Meal Intake: 26-50% (limited meals recorded in flowsheets)  Average Supplements Intake: 26-50%  ADULT DIET; Regular; NO PORK  ADULT ORAL NUTRITION SUPPLEMENT; Breakfast, Dinner; Plant Based Oral Supplement  ADULT ORAL NUTRITION SUPPLEMENT; Lunch, Dinner; Fortified Gelatin Oral

## 2024-10-10 NOTE — CARE COORDINATION
10/10/24 Update Cm Note: Patient is now on general medical floor. CM spoke with FP resident. Plan for patient is to ambulate and check pulse oximeter. Continue to have Palliative following. The pain meds are to be transitioned to oral. Reached out to Palliative to ask if they will be handling the conversion. He remains on iv decadron qd. He is on 1lnc and sat is 95%. Will see what the oximeter is room air with activity. The transition to hospice has not occurred as patient wishes to be a full code at this time. Will follow for any further needs. Electronically signed by Kamilla Perez RN CM on 10/10/2024 at 1:40 PM

## 2024-10-10 NOTE — PROGRESS NOTES
Findings show progression when compared to the prior study with no definite pleural effusion or pneumothorax. Findings suggesting progression of disease favoring an infectious/inflammatory process or however lymphangitic spread of disease given mediastinal adenopathy and abnormal bony metastatic process within the ribs and spine cannot be completely excluded and clinical correlation is needed.. 3. Abnormal bony metastatic disease within the spine. There is a bony destructive process involving the left anterior 2nd rib. Associated soft tissue thickening. 4. No acute intra-abdominal or intrapelvic process.  Stable adenopathy identified throughout the retroperitoneum and left pelvic sidewall.  Stable bony Mets metastatic disease throughout the spine and pelvis as well as the hips bilaterally.     XR CHEST PORTABLE  Result Date: 9/26/2024  Interval worsening of the patchy airspace disease throughout the lung fields bilaterally.           Assessment:     Acute hypoxic respiratory failure  Hemoptysis-improving  GGO seen on CT- progression of disease vs infectious  Mediastinal lymphadenopathy    Metastatic prostate cancer   Chronic pain  Thrombocytopenia   Nausea and vomiting         Plan:     Supplemental oxygen for sats 88 to 94%  Scheduled budesonide a  Stop duonebs  Taper steroids to 4 mg   Pro-Polo 0.23> 0.30, RVP, pneumonia workup, resp culture, urine antigens negative to date  Incentive spirometry, flutter valve  Pain control is good  Transfuse per protocol levels  Oncology   DVT prophylaxis with SCDs  Add dexamethasone for cytogenic pulmonary edema to the mets  PT OT ? Spinal brace needed  Stop the inhaled TXA nebs   ABG's reviewed, lasix added, monitor I&O, +12L now only 5 continue diuresis    Ed Cornejo, DO     3:13 PM

## 2024-10-10 NOTE — PROGRESS NOTES
[facility] - Family Medicine Inpatient   Resident Progress Note    S:  Hospital day: 14   Brief Synopsis: Joseph Bond is a 44 y.o. male with a PMH of Cancer (HCC), Diverticulosis, and History of opioid abuse (HCC). who presents to ED for nausea and vomiting.     Patient complains of nausea and vomiting that started 2 days ago.  Emesis contains phlegm, mucus and streaks of blood.  Patient could not quantify number of times but states that it has been continuous.  States that he had nausea medications but seemed to make his nausea was.  Also endorses chronic pain on his back, abdomen radha LUQ.  Patient was recently diagnosed for pancreatic cancer with mets.  Completed 10th cycle of radiation 1 month ago.  States that since radiation was completed he has not been able to walk due to pain in his pelvis, abdomen and back.  He has been wheelchair-bound for the past 1 month.  Has had poor appetite, blurry vision, dizziness.  Denies nosebleeds, bruising, melena, hematochezia, hematuria.  Patient does not use illicit drugs or drink alcohol.  Quit smoking 7 years ago.  He would like to remain full code.  Hemoglobin was 5.3 and platelets at 18 at the ER.  Patient was transfused 2 units of pRBC.  Post transfusion hemoglobin was 5.9 and patient was transfused another 2 units of PRBC.  Patient continued to experience pain in his back and upper abdomen and pelvis.  Oncology and palliative were consulted.  Pain medication with Dilaudid, oxy and fentanyl patch.  Palliative care managed pain, nausea and vomiting and constipation 2/2 opioids.  He decided to proceed with chemotherapy. Pulmonology was consulted for increased oxygen demand and hemoptysis. They recommended a bronchoscopy but held off due to thrombocytopenia. Patient was started on solumedrol 40mg and inhaled TXA.     On 10/4 patient with noted moderate respiratory distress on non-rebreather therefore Lasix IV was ordered and IVF dcd. Patient made significant improvement

## 2024-10-10 NOTE — PROGRESS NOTES
4 Eyes Skin Assessment     NAME:  Joseph Bond  YOB: 1980  MEDICAL RECORD NUMBER:  [id number]    The patient is being assessed for  Transfer to New Unit    I agree that at least one RN has performed a thorough Head to Toe Skin Assessment on the patient. ALL assessment sites listed below have been assessed.      Areas assessed by both nurses:    Head, Face, Ears, Shoulders, Back, Chest, Arms, Elbows, Hands, Sacrum. Buttock, Coccyx, Ischium, and Legs. Feet and Heels        Does the Patient have a Wound? No noted wound(s)       Simon Prevention initiated by RN: Yes  Wound Care Orders initiated by RN: Yes    Pressure Injury (Stage 3,4, Unstageable, DTI, NWPT, and Complex wounds) if present, place Wound referral order by RN under : Yes    New Ostomies, if present place, Ostomy referral order under : No     DTI: Low back/Sacrum area  Dry/Flaky/Edematous: Feet    Nurse 1 eSignature: Electronically signed by Kaelyn Alvarez RN on 10/10/24 at 2:00 PM EDT    **SHARE this note so that the co-signing nurse can place an eSignature**    Nurse 2 eSignature: Electronically signed by Cynthia Navarro RN on 10/10/24 at 2:37 PM EDT

## 2024-10-10 NOTE — PATIENT CARE CONFERENCE
[facility] Quality Flow/Interdisciplinary Rounds Progress Note        Quality Flow Rounds held on October 10, 2024    Disciplines Attending:  Bedside Nurse, , , and Nursing Unit Leadership    Joseph Bond was admitted on 9/26/2024  4:56 AM    Anticipated Discharge Date:       Disposition:    Simon Score:  Simon Scale Score: 20    Readmission Risk              Risk of Unplanned Readmission:  48           Discussed patient goal for the day, patient clinical progression, and barriers to discharge.  The following Goal(s) of the Day/Commitment(s) have been identified:  downgrade/discharge plan      Kamala Yanez RN  October 10, 2024

## 2024-10-10 NOTE — PLAN OF CARE
Problem: Discharge Planning  Goal: Discharge to home or other facility with appropriate resources  10/10/2024 1939 by Kaelyn Alvarez RN  Outcome: Progressing  10/10/2024 0733 by Cathy Deras RN  Outcome: Progressing     Problem: Pain  Goal: Verbalizes/displays adequate comfort level or baseline comfort level  10/10/2024 1939 by Kaelyn Alvarez RN  Outcome: Progressing  10/10/2024 0733 by Cathy Deras RN  Outcome: Progressing     Problem: Skin/Tissue Integrity  Goal: Absence of new skin breakdown  Description: 1.  Monitor for areas of redness and/or skin breakdown  2.  Assess vascular access sites hourly  3.  Every 4-6 hours minimum:  Change oxygen saturation probe site  4.  Every 4-6 hours:  If on nasal continuous positive airway pressure, respiratory therapy assess nares and determine need for appliance change or resting period.  10/10/2024 1939 by Kaelyn Alvarez RN  Outcome: Progressing  10/10/2024 0733 by Cathy Deras RN  Outcome: Progressing     Problem: Safety - Adult  Goal: Free from fall injury  10/10/2024 1939 by Kaelyn Alvarez RN  Outcome: Progressing  10/10/2024 0733 by Cathy Deras RN  Outcome: Progressing     Problem: ABCDS Injury Assessment  Goal: Absence of physical injury  10/10/2024 1939 by Kaelyn Alvarez RN  Outcome: Progressing  10/10/2024 0733 by Cathy Deras RN  Outcome: Progressing     Problem: Nutrition Deficit:  Goal: Optimize nutritional status  10/10/2024 1939 by Kaelyn Alvarez RN  Outcome: Progressing  Flowsheets (Taken 10/10/2024 1043 by John Eugene, RD, LD)  Nutrient intake appropriate for improving, restoring, or maintaining nutritional needs: Recommend appropriate diets, oral nutritional supplements, and vitamin/mineral supplements  10/10/2024 0733 by Cathy Deras RN  Outcome: Progressing

## 2024-10-11 VITALS
WEIGHT: 173.5 LBS | BODY MASS INDEX: 27.23 KG/M2 | HEART RATE: 96 BPM | SYSTOLIC BLOOD PRESSURE: 96 MMHG | RESPIRATION RATE: 18 BRPM | DIASTOLIC BLOOD PRESSURE: 71 MMHG | HEIGHT: 67 IN | TEMPERATURE: 98 F | OXYGEN SATURATION: 97 %

## 2024-10-11 PROBLEM — G89.3 CANCER-RELATED PAIN: Status: ACTIVE | Noted: 2024-10-11

## 2024-10-11 PROBLEM — Z51.5 ENCOUNTER FOR PALLIATIVE CARE: Status: ACTIVE | Noted: 2024-10-11

## 2024-10-11 LAB
ALBUMIN SERPL-MCNC: 3.3 G/DL (ref 3.5–5.2)
ALP SERPL-CCNC: 261 U/L (ref 40–129)
ALT SERPL-CCNC: 32 U/L (ref 0–40)
ANION GAP SERPL CALCULATED.3IONS-SCNC: 11 MMOL/L (ref 7–16)
AST SERPL-CCNC: 29 U/L (ref 0–39)
BASOPHILS # BLD: 0 K/UL (ref 0–0.2)
BASOPHILS NFR BLD: 0 % (ref 0–2)
BILIRUB SERPL-MCNC: 0.7 MG/DL (ref 0–1.2)
BUN SERPL-MCNC: 12 MG/DL (ref 6–20)
CALCIUM SERPL-MCNC: 8.6 MG/DL (ref 8.6–10.2)
CHLORIDE SERPL-SCNC: 106 MMOL/L (ref 98–107)
CO2 SERPL-SCNC: 24 MMOL/L (ref 22–29)
CREAT SERPL-MCNC: 0.6 MG/DL (ref 0.7–1.2)
EOSINOPHIL # BLD: 0 K/UL (ref 0.05–0.5)
EOSINOPHILS RELATIVE PERCENT: 0 % (ref 0–6)
ERYTHROCYTE [DISTWIDTH] IN BLOOD BY AUTOMATED COUNT: 15.6 % (ref 11.5–15)
GFR, ESTIMATED: >90 ML/MIN/1.73M2
GLUCOSE SERPL-MCNC: 97 MG/DL (ref 74–99)
HCT VFR BLD AUTO: 21.8 % (ref 37–54)
HGB BLD-MCNC: 7.1 G/DL (ref 12.5–16.5)
LYMPHOCYTES NFR BLD: 0.14 K/UL (ref 1.5–4)
LYMPHOCYTES RELATIVE PERCENT: 3 % (ref 20–42)
MAGNESIUM SERPL-MCNC: 2.2 MG/DL (ref 1.6–2.6)
MCH RBC QN AUTO: 29.7 PG (ref 26–35)
MCHC RBC AUTO-ENTMCNC: 32.6 G/DL (ref 32–34.5)
MCV RBC AUTO: 91.2 FL (ref 80–99.9)
METAMYELOCYTES ABSOLUTE COUNT: 0.05 K/UL (ref 0–0.12)
METAMYELOCYTES: 1 % (ref 0–1)
MONOCYTES NFR BLD: 0.09 K/UL (ref 0.1–0.95)
MONOCYTES NFR BLD: 2 % (ref 2–12)
MYELOCYTES ABSOLUTE COUNT: 0.05 K/UL
MYELOCYTES: 1 %
NEUTROPHILS NFR BLD: 94 % (ref 43–80)
NEUTS SEG NFR BLD: 4.88 K/UL (ref 1.8–7.3)
NUCLEATED RED BLOOD CELLS: 2 PER 100 WBC
PHOSPHATE SERPL-MCNC: 3.4 MG/DL (ref 2.5–4.5)
PLATELET CONFIRMATION: NORMAL
PLATELET, FLUORESCENCE: 15 K/UL (ref 130–450)
PMV BLD AUTO: ABNORMAL FL (ref 7–12)
POTASSIUM SERPL-SCNC: 3.4 MMOL/L (ref 3.5–5)
PROT SERPL-MCNC: 5.3 G/DL (ref 6.4–8.3)
RBC # BLD AUTO: 2.39 M/UL (ref 3.8–5.8)
RBC # BLD: ABNORMAL 10*6/UL
SODIUM SERPL-SCNC: 141 MMOL/L (ref 132–146)
WBC OTHER # BLD: 5.2 K/UL (ref 4.5–11.5)

## 2024-10-11 PROCEDURE — 2700000000 HC OXYGEN THERAPY PER DAY

## 2024-10-11 PROCEDURE — 99232 SBSQ HOSP IP/OBS MODERATE 35: CPT | Performed by: INTERNAL MEDICINE

## 2024-10-11 PROCEDURE — L0464 TLSO 4MOD SACRO-SCAP PRE: HCPCS

## 2024-10-11 PROCEDURE — 6370000000 HC RX 637 (ALT 250 FOR IP)

## 2024-10-11 PROCEDURE — 99232 SBSQ HOSP IP/OBS MODERATE 35: CPT | Performed by: STUDENT IN AN ORGANIZED HEALTH CARE EDUCATION/TRAINING PROGRAM

## 2024-10-11 PROCEDURE — 6370000000 HC RX 637 (ALT 250 FOR IP): Performed by: STUDENT IN AN ORGANIZED HEALTH CARE EDUCATION/TRAINING PROGRAM

## 2024-10-11 PROCEDURE — 83735 ASSAY OF MAGNESIUM: CPT

## 2024-10-11 PROCEDURE — 6360000002 HC RX W HCPCS: Performed by: INTERNAL MEDICINE

## 2024-10-11 PROCEDURE — 6370000000 HC RX 637 (ALT 250 FOR IP): Performed by: INTERNAL MEDICINE

## 2024-10-11 PROCEDURE — 84100 ASSAY OF PHOSPHORUS: CPT

## 2024-10-11 PROCEDURE — 85025 COMPLETE CBC W/AUTO DIFF WBC: CPT

## 2024-10-11 PROCEDURE — 80053 COMPREHEN METABOLIC PANEL: CPT

## 2024-10-11 PROCEDURE — 99238 HOSP IP/OBS DSCHRG MGMT 30/<: CPT | Performed by: FAMILY MEDICINE

## 2024-10-11 PROCEDURE — 2580000003 HC RX 258

## 2024-10-11 PROCEDURE — 94640 AIRWAY INHALATION TREATMENT: CPT

## 2024-10-11 PROCEDURE — 97535 SELF CARE MNGMENT TRAINING: CPT

## 2024-10-11 PROCEDURE — 97530 THERAPEUTIC ACTIVITIES: CPT

## 2024-10-11 PROCEDURE — 6370000000 HC RX 637 (ALT 250 FOR IP): Performed by: NURSE PRACTITIONER

## 2024-10-11 PROCEDURE — 6360000002 HC RX W HCPCS

## 2024-10-11 RX ORDER — HYDROMORPHONE HYDROCHLORIDE 4 MG/1
4 TABLET ORAL
Qty: 60 TABLET | Refills: 0 | Status: SHIPPED | OUTPATIENT
Start: 2024-10-11 | End: 2024-10-16

## 2024-10-11 RX ORDER — METHYLPREDNISOLONE 8 MG/1
8 TABLET ORAL NIGHTLY
Qty: 1 TABLET | Refills: 0 | Status: SHIPPED | OUTPATIENT
Start: 2024-10-11 | End: 2024-10-12

## 2024-10-11 RX ORDER — METHYLPREDNISOLONE 4 MG/1
4 TABLET ORAL
Qty: 2 TABLET | Refills: 0 | Status: SHIPPED | OUTPATIENT
Start: 2024-10-11 | End: 2024-10-13

## 2024-10-11 RX ORDER — LIDOCAINE 4 G/G
10 PATCH TOPICAL DAILY
Qty: 10 PATCH | Refills: 0 | Status: SHIPPED | OUTPATIENT
Start: 2024-10-11

## 2024-10-11 RX ORDER — OXYCODONE HYDROCHLORIDE 20 MG/1
20 TABLET ORAL
Qty: 60 TABLET | Refills: 0 | Status: SHIPPED | OUTPATIENT
Start: 2024-10-11 | End: 2024-10-16

## 2024-10-11 RX ORDER — METHYLPREDNISOLONE 4 MG/1
4 TABLET ORAL
Qty: 4 TABLET | Refills: 0 | Status: SHIPPED | OUTPATIENT
Start: 2024-10-12 | End: 2024-10-16

## 2024-10-11 RX ORDER — METHYLPREDNISOLONE 4 MG/1
4 TABLET ORAL NIGHTLY
Qty: 3 TABLET | Refills: 0 | Status: SHIPPED | OUTPATIENT
Start: 2024-10-12 | End: 2024-10-15

## 2024-10-11 RX ORDER — OXYCODONE HYDROCHLORIDE 20 MG/1
20 TABLET ORAL
Qty: 120 TABLET | Refills: 0 | Status: SHIPPED | OUTPATIENT
Start: 2024-10-11 | End: 2024-10-11

## 2024-10-11 RX ORDER — BICALUTAMIDE 50 MG/1
50 TABLET, FILM COATED ORAL DAILY
Qty: 90 TABLET | Refills: 3 | Status: SHIPPED | OUTPATIENT
Start: 2024-10-12

## 2024-10-11 RX ORDER — HYDROMORPHONE HYDROCHLORIDE 4 MG/1
4 TABLET ORAL
Qty: 120 TABLET | Refills: 0 | Status: SHIPPED | OUTPATIENT
Start: 2024-10-11 | End: 2024-10-11

## 2024-10-11 RX ORDER — METHYLPREDNISOLONE 4 MG/1
4 TABLET ORAL
Qty: 2 TABLET | Refills: 0 | Status: SHIPPED | OUTPATIENT
Start: 2024-10-12 | End: 2024-10-14

## 2024-10-11 RX ORDER — SENNA AND DOCUSATE SODIUM 50; 8.6 MG/1; MG/1
2 TABLET, FILM COATED ORAL 2 TIMES DAILY PRN
Qty: 60 TABLET | Refills: 0 | Status: SHIPPED | OUTPATIENT
Start: 2024-10-11

## 2024-10-11 RX ADMIN — HYDROMORPHONE HYDROCHLORIDE 4 MG: 2 TABLET ORAL at 05:15

## 2024-10-11 RX ADMIN — POLYETHYLENE GLYCOL 3350 17 G: 17 POWDER, FOR SOLUTION ORAL at 10:02

## 2024-10-11 RX ADMIN — SENNOSIDES AND DOCUSATE SODIUM 2 TABLET: 50; 8.6 TABLET ORAL at 10:02

## 2024-10-11 RX ADMIN — FUROSEMIDE 20 MG: 20 TABLET ORAL at 10:02

## 2024-10-11 RX ADMIN — SUCRALFATE 1 G: 1 TABLET ORAL at 06:13

## 2024-10-11 RX ADMIN — OXYCODONE HYDROCHLORIDE 20 MG: 10 TABLET ORAL at 11:38

## 2024-10-11 RX ADMIN — BICALUTAMIDE 50 MG: 50 TABLET, FILM COATED ORAL at 11:42

## 2024-10-11 RX ADMIN — METHOCARBAMOL TABLETS 750 MG: 750 TABLET, COATED ORAL at 10:02

## 2024-10-11 RX ADMIN — HYDROMORPHONE HYDROCHLORIDE 4 MG: 2 TABLET ORAL at 18:15

## 2024-10-11 RX ADMIN — HYDROMORPHONE HYDROCHLORIDE 4 MG: 2 TABLET ORAL at 09:35

## 2024-10-11 RX ADMIN — OXYCODONE HYDROCHLORIDE 20 MG: 10 TABLET ORAL at 06:31

## 2024-10-11 RX ADMIN — SUCRALFATE 1 G: 1 TABLET ORAL at 13:56

## 2024-10-11 RX ADMIN — METHOCARBAMOL TABLETS 750 MG: 750 TABLET, COATED ORAL at 15:36

## 2024-10-11 RX ADMIN — BUDESONIDE 500 MCG: 0.5 INHALANT RESPIRATORY (INHALATION) at 06:46

## 2024-10-11 RX ADMIN — METHYLPREDNISOLONE 4 MG: 4 TABLET ORAL at 06:12

## 2024-10-11 RX ADMIN — HYDROMORPHONE HYDROCHLORIDE 4 MG: 2 TABLET ORAL at 13:56

## 2024-10-11 RX ADMIN — SUCRALFATE 1 G: 1 TABLET ORAL at 00:07

## 2024-10-11 RX ADMIN — OXYCODONE HYDROCHLORIDE 20 MG: 10 TABLET ORAL at 00:09

## 2024-10-11 RX ADMIN — POTASSIUM BICARBONATE 50 MEQ: 978 TABLET, EFFERVESCENT ORAL at 11:22

## 2024-10-11 RX ADMIN — HYDROMORPHONE HYDROCHLORIDE 4 MG: 2 TABLET ORAL at 01:20

## 2024-10-11 RX ADMIN — SODIUM CHLORIDE, PRESERVATIVE FREE 10 ML: 5 INJECTION INTRAVENOUS at 10:10

## 2024-10-11 RX ADMIN — METHYLPREDNISOLONE 4 MG: 4 TABLET ORAL at 11:42

## 2024-10-11 RX ADMIN — Medication 2000 UNITS: at 10:02

## 2024-10-11 RX ADMIN — OXYCODONE HYDROCHLORIDE 20 MG: 10 TABLET ORAL at 15:54

## 2024-10-11 RX ADMIN — OXYCODONE HYDROCHLORIDE 20 MG: 10 TABLET ORAL at 02:32

## 2024-10-11 RX ADMIN — PANTOPRAZOLE SODIUM 40 MG: 40 TABLET, DELAYED RELEASE ORAL at 06:13

## 2024-10-11 ASSESSMENT — PAIN DESCRIPTION - FREQUENCY
FREQUENCY: CONTINUOUS

## 2024-10-11 ASSESSMENT — PAIN DESCRIPTION - PAIN TYPE
TYPE: CHRONIC PAIN

## 2024-10-11 ASSESSMENT — PAIN SCALES - GENERAL
PAINLEVEL_OUTOF10: 10
PAINLEVEL_OUTOF10: 8
PAINLEVEL_OUTOF10: 10

## 2024-10-11 ASSESSMENT — PAIN DESCRIPTION - ORIENTATION
ORIENTATION: RIGHT;LEFT;POSTERIOR;UPPER;LOWER
ORIENTATION: MID;UPPER
ORIENTATION: MID;UPPER
ORIENTATION: RIGHT;LEFT;ANTERIOR;LOWER;MID
ORIENTATION: RIGHT;LEFT;ANTERIOR;POSTERIOR;MID;LOWER
ORIENTATION: RIGHT;LEFT;LOWER;MID;UPPER
ORIENTATION: UPPER;MID

## 2024-10-11 ASSESSMENT — PAIN DESCRIPTION - LOCATION
LOCATION: BACK;ABDOMEN
LOCATION: ABDOMEN;RIB CAGE;BACK
LOCATION: ABDOMEN;RIB CAGE;BACK
LOCATION: BACK;ABDOMEN
LOCATION: BACK;ABDOMEN

## 2024-10-11 ASSESSMENT — PAIN DESCRIPTION - DESCRIPTORS
DESCRIPTORS: STABBING;THROBBING;SHARP
DESCRIPTORS: ACHING;DISCOMFORT;DULL;THROBBING
DESCRIPTORS: ACHING;DISCOMFORT;DULL;THROBBING
DESCRIPTORS: ACHING;DULL;DISCOMFORT;THROBBING
DESCRIPTORS: ACHING;DISCOMFORT;DULL;THROBBING
DESCRIPTORS: STABBING;THROBBING;SHARP
DESCRIPTORS: ACHING;DULL;DISCOMFORT;THROBBING

## 2024-10-11 ASSESSMENT — PAIN - FUNCTIONAL ASSESSMENT
PAIN_FUNCTIONAL_ASSESSMENT: PREVENTS OR INTERFERES SOME ACTIVE ACTIVITIES AND ADLS
PAIN_FUNCTIONAL_ASSESSMENT: PREVENTS OR INTERFERES WITH MANY ACTIVE NOT PASSIVE ACTIVITIES

## 2024-10-11 ASSESSMENT — PAIN DESCRIPTION - ONSET
ONSET: ON-GOING

## 2024-10-11 ASSESSMENT — ENCOUNTER SYMPTOMS: BACK PAIN: 1

## 2024-10-11 NOTE — PROGRESS NOTES
PO dilaudid. Support provided.  Will continue to follow.      Prognosis: Poor    OBJECTIVE:   Review of Systems   Musculoskeletal:  Positive for back pain.      /66   Pulse 83   Temp 97.8 °F (36.6 °C) (Temporal)   Resp 17   Ht 1.702 m (5' 7\")   Wt 78.7 kg (173 lb 8 oz)   SpO2 96%   BMI 27.17 kg/m²     Review of Systems - Negative except as above     Physical Examination:  Gen: Ill-appearing, weak,  Lungs: respirations easy nasal cannula  Heart: regular rate   Abdomen: soft, non-tender  Extremities: no clubbing, cyanosis or edema, moving all extremities    Skin: warm, dry without rashes, lesions, bruising  Neuro: awake, alert, oriented x 4, follows commands, no gross neurologic deficit    Objective data reviewed: labs, images, records, medication use, vitals, and chart    Time/Communication  Greater than 50% of time spent, total 25 minutes in counseling and coordination of care at the bedside regarding goals of care and symptom management.    Thank you for allowing Palliative Medicine to participate in the care of Joseph Bond.    Note: This report was completed using computer[employer]ET Information Systems voiced recognition software.  Every effort has been made to ensure accuracy; however, inadvertent computerized transcription errors may be present.

## 2024-10-11 NOTE — DISCHARGE SUMMARY
distended.  The prostate is heterogeneous and mildly enlarged..  Correlation to PSA levels is recommended on a nonemergent basis. Retroperitoneum/peritoneum: There is adenopathy identified along the left pelvic sidewall short axis dimension measuring 1.5 cm stable and unchanged. Adenopathy identified along the left para aortic region and left iliac region suggesting metastatic disease. Bones/soft tissues: There is sclerotic and lytic disease seen diffusely throughout the bony pelvis and spine.  No significant progression when compared to the prior study.     1. No evidence of pulmonary embolism. 2. Abnormal patchy ground-glass opacity identified diffusely throughout the lung fields. Findings show progression when compared to the prior study with no definite pleural effusion or pneumothorax. Findings suggesting progression of disease favoring an infectious/inflammatory process or however lymphangitic spread of disease given mediastinal adenopathy and abnormal bony metastatic process within the ribs and spine cannot be completely excluded and clinical correlation is needed.. 3. Abnormal bony metastatic disease within the spine. There is a bony destructive process involving the left anterior 2nd rib. Associated soft tissue thickening. 4. No acute intra-abdominal or intrapelvic process.  Stable adenopathy identified throughout the retroperitoneum and left pelvic sidewall.  Stable bony Mets metastatic disease throughout the spine and pelvis as well as the hips bilaterally.     CT ABDOMEN PELVIS W IV CONTRAST Additional Contrast? None    Result Date: 9/26/2024  EXAMINATION: CTA OF THE CHEST; CT OF THE ABDOMEN AND PELVIS WITH CONTRAST 9/26/2024 7:15 am TECHNIQUE: CTA of the chest was performed after the administration of intravenous contrast.  Multiplanar reformatted images are provided for review.  MIP images are provided for review. Automated exposure control, iterative reconstruction, and/or weight based adjustment of    10/30/2024  2:45 PM Holly Doyle PA [facility]       More than 30 minutes was spent in preparation of this patient's discharge including, but not limited to, examination, preparation of documents, prescription preparation, counseling and coordination.    Signed:  Anastasia Helms MD  10/11/2024, 4:31 PM

## 2024-10-11 NOTE — PROGRESS NOTES
O: VS- Blood pressure 103/73, pulse 91, temperature (!) 96.5 °F (35.8 °C), temperature source Temporal, resp. rate 16, height 1.702 m (5' 7\"), weight 78.7 kg (173 lb 8 oz), SpO2 94%.  Exam is as noted by resident with the following changes, additions or corrections:  Gen:  AAO x3, 1L NC  CVS: RRR, normal rate  Lungs: Coarse bs b/l, unlabored, equal  Abd softly distended   Ext no CC; trace - 1+ edema bilaterally, compression stockings in place; good pulses.      Impressions:  [phone]  Severe Symptomatic Anemia, related to metastatic prostate cancer in the bone including the spine and lungs  Metastatic prostate cancer  Pain 2/2 metatstatic cancer-s/p palliative radiation treatments.  Nausea, elevated LFTs and bili (liver vs   Acute hypoxic respiratory failure with Tachycardia    Plan:     RBC transfusion if hgb <7, and platelets if <10,000 or active bleeding. PLT at 15K today; not currently with bleeding, but follow for blood with BMs or from other sources.     Pt now s/p at least 7 U PRBCs. Will follow H/H. Stable today.      Acute hypoxic respiratory failure-no PE on CTA chest on admission, there is progression of metastatic disease with lymphangitic spread and mediastinal adenopathy. Appreciate pulm management.  Nebs. S/p transexamic acid neb treatments and solumedrol. Lasix now held.   Symptoms improving, and oxygen demands decreasing.      Holding anticoagulants.   SCDs in place.      Monitor vitals-tachycardia may be related to patient's anemia, now improved.      Metastatic prostate cancer-appreciate heme/onc; had been consulted on 9/26.  Started chemo with casodex 9/29/24. Planning outpt taxotere with nubeqa/prednisone as well as LHRH agonist and Xgeva, but given pt's deconditioning and prolonged hospital stay, paged Onc to advise on starting some of the outpt therapy while inpt.     Consultation to palliative for recs on pain control/bowel regimen; pain medication adjustments per palliative.      Patient also considering moving back to [place] to seek Tx from [facility].    PT/OT- patient was unable to walk due to pain. Encouraged working with PT.    Lft elevation-improving.    Severe malnutrition-has nutritional supplements ordered, watch for refeeding syndrome    Dispo: Hgb has been stable.  Pulse ox stable on room air.    PT and brace for spine.    Palliative management of pain appreciated.   Outpatient follow up with Heme/Onc, PCP, palliative.    Plan to recheck labs in the next several days.        Attending Physician Statement  I have reviewed the chart and seen the patient with the resident(s).  I personally reviewed images, EKG's and similar tests, if present.  I personally reviewed and performed key elements of the history and exam.  I have reviewed and confirmed the Family Medicine admitting team resident history and exam with changes as indicated above.  I agree with the assessment, plan, and orders as documented by the  admitting team resident Nydia Helms and Pricilla.  Please refer to the resident progress note today for additional information.      Kylee Damon, DO

## 2024-10-11 NOTE — PLAN OF CARE
Problem: Discharge Planning  Goal: Discharge to home or other facility with appropriate resources  10/11/2024 0141 by Marc Alvarez RN  Outcome: Progressing  10/10/2024 1939 by Kaelyn Alvarez RN  Outcome: Progressing     Problem: Pain  Goal: Verbalizes/displays adequate comfort level or baseline comfort level  10/11/2024 0141 by Marc Alvarez RN  Outcome: Progressing  10/10/2024 1939 by Kaelyn Alvarez RN  Outcome: Progressing     Problem: Skin/Tissue Integrity  Goal: Absence of new skin breakdown  Description: 1.  Monitor for areas of redness and/or skin breakdown  2.  Assess vascular access sites hourly  3.  Every 4-6 hours minimum:  Change oxygen saturation probe site  4.  Every 4-6 hours:  If on nasal continuous positive airway pressure, respiratory therapy assess nares and determine need for appliance change or resting period.  10/11/2024 0141 by Marc Alvarez RN  Outcome: Progressing  10/10/2024 1939 by Kaelyn Alvarez RN  Outcome: Progressing     Problem: Safety - Adult  Goal: Free from fall injury  10/11/2024 0141 by Marc Alvarez RN  Outcome: Progressing  10/10/2024 1939 by Kaelyn Alvarez RN  Outcome: Progressing     Problem: ABCDS Injury Assessment  Goal: Absence of physical injury  10/11/2024 0141 by Marc Alvarez RN  Outcome: Progressing  10/10/2024 1939 by Kaelyn Alvarez RN  Outcome: Progressing     Problem: Nutrition Deficit:  Goal: Optimize nutritional status  10/11/2024 0141 by Marc Alvarez RN  Outcome: Progressing  10/10/2024 1939 by Kaelyn Alvarez RN  Outcome: Progressing  Flowsheets (Taken 10/10/2024 1043 by John Eugene, RD, LD)  Nutrient intake appropriate for improving, restoring, or maintaining nutritional needs: Recommend appropriate diets, oral nutritional supplements, and vitamin/mineral supplements

## 2024-10-11 NOTE — CARE COORDINATION
10/11/24 Update CM Note: patient remains on general medical floor. He is now off the oxygen and is up in the chair. On 1 liter at rest pulse oxim 99%. He is switched to oral pain meds without issues. Perfect serve sent for possible discharge. The plan remains home with his sister and [facility]. Electronically signed by Kamilla Perez RN CM on 10/11/2024 at 9:30 AM

## 2024-10-11 NOTE — PROGRESS NOTES
Pharmacy called me back regarding his OP medications.  They do not have enough of his Dilaudid PO 4mg or Oxy IR 20 mg to fill the script.  She asked me to message the resident to see if they can send those 2 prescriptions to his pharmacy?

## 2024-10-11 NOTE — PROGRESS NOTES
CLINICAL PHARMACY NOTE: MEDS TO BEDS    Total # of Prescriptions Filled: 5   The following medications were delivered to the patient:  Senexon-S 8.6-50 mg  Methylprednisolone 4 mg  Lidocaine pain relief 4% patch  Saline nasal spray  Bicalutamide 50  mg    Additional Documentation:   Delivered to pt

## 2024-10-11 NOTE — PROGRESS NOTES
Occupational Therapy  OT BEDSIDE TREATMENT NOTE   [facility]  [address], [place]      Date:10/11/2024  Patient Name: Joseph Bond  MRN: [id number]  : 1980  Room: [address] [place] [address]     Evaluating OT: APARNA Weinstein, OTR/L  # [id number]     Referring Provider:  Anastasia Helms MD   Specific Provider Orders:  \"OT Eval and Treat\"  24     Diagnosis: Hypoxemia [R09.02]  Thrombocytopenia (HCC) [D69.6]  History of hematemesis [Z87.19]  Anemia, unspecified type [D64.9]     Pt was admitted w/ Pain Back, Left Rib, Abdomen, hypoxia, severe symptomatic anemia Hgb 5.3, Nausea/Vomiting     Pertinent Medical History:  Pt has a past medical history of Cancer (HCC), Diverticulosis, and History of opioid abuse (HCC).,  has a past surgical history that includes bronchoscopy (N/A, 2024) and bronchoscopy (2024).     Surgeries this admission: None      Precautions:  Fall Risk  3L O2     Assessment of current deficits   [x] Functional mobility             [x]ADLs           [x] Strength                  []Cognition   [x] Functional transfers           [x] IADLs         [x] Safety Awareness   [x]Endurance   [] Fine Coordination              [x] Balance     [] Vision/perception    []Sensation     []Gross Motor Coordination  [] ROM           [] Delirium                  [] Motor Control         OT PLAN OF CARE   OT POC based on physician orders, patient diagnosis and results of clinical assessment     Frequency/Duration 1-3 days/wk for 2 weeks PRN   Specific OT Treatment to include:   * Instruction/training on adapted ADL techniques and AE recommendations to increase functional independence within precautions       * Training on energy conservation strategies, correct breathing pattern and techniques to improve independence/tolerance for self-care routine  * Functional transfer/mobility training/DME recommendations for increased independence,  safety, and fall prevention  * Patient/Family education to increase follow through with safety techniques and functional independence  * Recommendation of environmental modifications for increased safety with functional transfers/mobility and ADLs  * Cognitive retraining/development of therapeutic activities to improve problem solving, judgement, memory, and attention for increased safety/participation in ADL/IADL tasks  * Therapeutic exercise to improve motor endurance, ROM, and functional strength for ADLs/functional transfers  * Therapeutic activities to facilitate/challenge dynamic balance, stand tolerance for increased safety and independence with ADLs  * Therapeutic activities to facilitate gross/fine motor skills for increased independence with ADLs  * Neuro-muscular re-education: facilitation of righting/equilibrium reactions  * Positioning to improve skin integrity, interaction with environment and functional independence  * Delirium prevention/treatment  * Manual techniques for edema management  Other:     Recommended Adaptive Equipment: TBD as pt progresses Shower chair/Tub Bench, Adaptive equipment, BSC        Home Living:  Pt lives alone in a single-level apartment, 4 Steps down to enter.    Bathroom setup:  Tub-Shower, Standard-height Commode   Equipment owned:  W/C, WW     Available Family Assist:  Family members are supportive - can assist PRN     Report of PLOF somewhat Inconsistent.  Initially reported being IND w/ all ADLs, IADLs, Transfers and Mobility w/o the use of any DME/AD.    During session, pt reported most recent ax level:     Prior Level of Function:  Pt reported most recently requiring assist w/ ADLs, IADLs, SUP for Transfers and Mobility using WW for household ambulation. W/C for outings.  Driving:  No - Unable to transfer into Sister's \"High Truck\"  Occupation:  None reported     Pain Level: Pt complained of 10/10 ribs/abdomen/chest pain this session  Additional Complaints:  pt

## 2024-10-11 NOTE — CARE COORDINATION
10/11/24 Update CM Note; FP resident notified of oxygen readings with ambulation and at rest. He does not qualify for home oxygen based on his readings.   Resting 94% in the chair  Ambulation 89-94% with walking  Electronically signed by Kamilla Perez RN CM on 10/11/2024 at 11:11 AM

## 2024-10-11 NOTE — PROGRESS NOTES
Palliative Care Department  [phone]  Palliative Care Progress Note  Provider Cedric Segovia MD      PATIENT: Joseph Bond  : 1980  MRN: [id number]  ADMISSION DATE: 2024  4:56 AM  Referring Provider:  Anastasia Helms MD    Palliative Medicine was consulted on hospital day 15 for assistance with Goals of care, Overwhelmed Symptoms    HPI:   Joseph Bond is a 44 y.o. y/o male with a history of prostate cancer with mediastinal lymphadenopathy, extensive metastasis of the spine, rib cage, pelvis, humerus and femurs (had refused systemic therapy, pursuing homeopathic), completed palliative radiation treatment to spine, polysubstance abuse including opioids and tobacco, abstinent for 5 years, diverticulitis, with recent ER visit on 2024, due to shortness of breath, was going to be admitted for further medical management however left AMA, was supposed to follow-up outpatient with palliative medicine for symptom management, however missed appointment, did not return phone call to reschedule appointment, who presented to [facility] on 2024 with complaint of nausea, vomiting, left-sided rib pain.  At ED, found hypoxic 78% on room air, placed on 4 L nasal cannula.  Significant laboratory findings showed hemoglobin 5.3, platelets 18, alkaline phosphate 227, AST 76, lipase 8, WBC 4.2.  Received 2 packed red blood cell and sixpack platelets.  CT images showed disease progression when compared to prior images, favoring an infectious/inflammatory process or lymphangitic spread of disease given mediastinal adenopathy and abnormal bony metastatic process within the rib and spine.  Abnormal bony metastatic disease within the spine.  Bony destructive process involving the left anterior second rib.  Stable adenopathy throughout the retroperitoneal and pelvic sidewall,.  Stable bony metastatic disease throughout the spine, pelvis and hip bilaterally.  Palliative medicine consulted to assist  further with goals of care, symptom management.    ASSESSMENT/PLAN:     Pertinent Hospital Diagnoses     Thrombocytopenia  Anemia  Acute respiratory failure  Metastatic prostate cancer with cancer related pain    Symptom management     Pain due to neoplasm    -Patient was transition from IV hydromorphone to oral hydromorphone and reports that the combination of oral oxycodone and hydromorphone is about the same as the oxycodone and IV hydromorphone  -Continue 4 mg PO every 2 hours as needed  -Continue oxycodone 20 mg every 2 hours as needed  -Refused fentanyl patch  -Stopped taking morphine ER due to drowsiness  -Robaxin 750 mg 4 times a day   -Patient is still considering methadone treatment; encouraged him to discuss this with palliative care as an outpatient  -Patient likely discharging home today per discussion with family medicine; prescription for hydromorphone and oxycodone sent to hospital pharmacy.  Patient will need to follow-up with either [facility] palliative care or palliative care clinic depending on patient's choice.    Nausea and vomiting  -Promethazine 12.5 mg p.o. every 6 hours as needed  -IV Zofran 4 mg every 6 hours as needed    Prophylaxis stool softener  -GlycoLax daily as needed  -Senokot S twice daily    Palliative Care Encounter / Counseling Regarding Goals of Care  Please see detailed goals of care discussion as below  At this time, Joseph Bond, Does have capacity for medical decision-making.  Capacity is time limited and situation/question specific  Outcome of goals of care meeting:  Following for symptom management  Code status Full Code  Advanced Directives: no POA or living will in [facility]  Surrogate/Legal NOK:  BondNorbertoMargarita (Sister ) [phone]   Gaurav Womack (cousin) [phone]    Spiritual assessment: no spiritual distress identified  Bereavement and grief: to be determined  Referrals to: none today    Thank you for the opportunity to participate in the care of Joseph

## 2024-10-11 NOTE — PROGRESS NOTES
[facility] was faxed and called regarding TLSO brace.  Saira from [facility] needed clarification regarding the brace itself, is it a regular TLSO (turtle shell) or the soft TLSO.    I PS Family Medicine Resident, who agreed to the soft TLSO.

## 2024-10-11 NOTE — PROGRESS NOTES
[facility] - Family Medicine Inpatient   Resident Progress Note    S:  Hospital day: 15   Brief Synopsis: Joseph Bond is a 44 y.o. male with a PMH of Cancer (HCC), Diverticulosis, and History of opioid abuse (HCC). who presents to ED for nausea and vomiting.     Patient complains of nausea and vomiting that started 2 days ago.  Emesis contains phlegm, mucus and streaks of blood.  Patient could not quantify number of times but states that it has been continuous.  States that he had nausea medications but seemed to make his nausea was.  Also endorses chronic pain on his back, abdomen radha LUQ.  Patient was recently diagnosed for pancreatic cancer with mets.  Completed 10th cycle of radiation 1 month ago.  States that since radiation was completed he has not been able to walk due to pain in his pelvis, abdomen and back.  He has been wheelchair-bound for the past 1 month.  Has had poor appetite, blurry vision, dizziness.  Denies nosebleeds, bruising, melena, hematochezia, hematuria.  Patient does not use illicit drugs or drink alcohol.  Quit smoking 7 years ago.  He would like to remain full code.  Hemoglobin was 5.3 and platelets at 18 at the ER.  Patient was transfused 2 units of pRBC.  Post transfusion hemoglobin was 5.9 and patient was transfused another 2 units of PRBC.  Patient continued to experience pain in his back and upper abdomen and pelvis.  Oncology and palliative were consulted.  Pain medication with Dilaudid, oxy and fentanyl patch.  Palliative care managed pain, nausea and vomiting and constipation 2/2 opioids.  He decided to proceed with chemotherapy. Pulmonology was consulted for increased oxygen demand and hemoptysis. They recommended a bronchoscopy but held off due to thrombocytopenia. Patient was started on solumedrol 40mg and inhaled TXA.     On 10/4 patient with noted moderate respiratory distress on non-rebreather therefore Lasix IV was ordered and IVF dcd. Patient made significant improvement

## 2024-10-11 NOTE — PROGRESS NOTES
[facility]  Department of Internal Medicine  Division of Pulmonary, Critical Care and Sleep Medicine  Progress Note    SUBJECTIVE:  No new symptoms. Has been on room air, with sats stable. Has been ambulating.     OBJECTIVE:     PHYSICAL EXAM:   VITALS:   Vitals:    10/11/24 1138 10/11/24 1230 10/11/24 1325 10/11/24 1535   BP: 107/71  103/73 96/71   Pulse:   91 96   Resp:    18   Temp:  97.9 °F (36.6 °C)  98 °F (36.7 °C)   TempSrc:  Oral  Temporal   SpO2:    97%   Weight:       Height:            Intake/Output Summary (Last 24 hours) at 10/11/2024 1612  Last data filed at 10/11/2024 1252  Gross per 24 hour   Intake 710 ml   Output 950 ml   Net -240 ml        CONSTITUTIONAL:   Ill-appearing, pale, weak, A&O x 3, NAD  SKIN:    Skin discoloration, Sunken eyes  HEENT:    EOMI, MMM, No thrush  NECK:    No bruits, No JVP appreciated  CV:     Sinus,  No murmur, No rubs, No gallops  PULMONARY:   Coarse, bilateral Rales,  No Wheezing, No Rhonchi     No noted egophony  ABDOMEN:    Soft, non-tender. BS normal. No R/R/G  EXT:   No deformities .  No clubbing.      1-2+ lower extremity edema, No venous stasis  PULSE:   Appears equal and palpable.  PSYCHIATRIC:  Seems appropriate, No acute psychosis  MS:    Gross weakness, spinal pain  NEUROLOGIC:   Hyperreflexia, non-focal     DATA: IMAGING & TESTING:     LABORATORY TESTS:        PRO-BNP:   Lab Results   Component Value Date    PROBNP 2,232 (H) 10/04/2024    PROBNP 1,170 (H) 10/01/2024      ABGs:   Lab Results   Component Value Date/Time    PH 7.452 10/04/2024 10:05 AM    PO2 66.2 10/04/2024 10:05 AM    PCO2 38.2 10/04/2024 10:05 AM     Hemoglobin A1C: No components found for: \"HGBA1C\"    IMAGING:  Imaging tests were completed and reviewed and discussed radiology and care team involved and reveals   XR THORACIC SPINE (3 VIEWS)  Result Date: 9/29/2024  1. No acute abnormality of the thoracic spine. 2. Unchanged biconcave deformities within  the midthoracic spine. 3. Heterogeneous marrow density, compatible with known osseous metastatic disease. 4. Heterogeneous lung parenchyma compatible with ground-glass opacities identified on the recent CT. No acute abnormality of the thoracic spine     CTA CHEST W CONTRAST  Result Date: 9/26/2024  1. No evidence of pulmonary embolism. 2. Abnormal patchy ground-glass opacity identified diffusely throughout the lung fields. Findings show progression when compared to the prior study with no definite pleural effusion or pneumothorax. Findings suggesting progression of disease favoring an infectious/inflammatory process or however lymphangitic spread of disease given mediastinal adenopathy and abnormal bony metastatic process within the ribs and spine cannot be completely excluded and clinical correlation is needed.. 3. Abnormal bony metastatic disease within the spine. There is a bony destructive process involving the left anterior 2nd rib. Associated soft tissue thickening. 4. No acute intra-abdominal or intrapelvic process.  Stable adenopathy identified throughout the retroperitoneum and left pelvic sidewall.  Stable bony Mets metastatic disease throughout the spine and pelvis as well as the hips bilaterally.     CT ABDOMEN PELVIS W IV CONTRAST Additional Contrast? None  Result Date: 9/26/2024  1. No evidence of pulmonary embolism. 2. Abnormal patchy ground-glass opacity identified diffusely throughout the lung fields. Findings show progression when compared to the prior study with no definite pleural effusion or pneumothorax. Findings suggesting progression of disease favoring an infectious/inflammatory process or however lymphangitic spread of disease given mediastinal adenopathy and abnormal bony metastatic process within the ribs and spine cannot be completely excluded and clinical correlation is needed.. 3. Abnormal bony metastatic disease within the spine. There is a bony destructive process involving the left

## 2024-10-11 NOTE — DISCHARGE INSTRUCTIONS
DISCHARGE INSTRUCTIONS - FAMILY MEDICINE    Follow up at the [facility] ([facility]) on 10/18/2024 at 3.40 pm. If there are any issues and cannot present to your appointment, please contact us at [phone] and we will schedule a new appointment for you.     Future Appointments   Date Time Provider Department Center   10/18/2024  3:40 PM Anastasia Helms MD F[facility] [place] [facility]   10/30/2024  2:45 PM Holly Doyle PA [facility]       Instructions:   - Please discuss your pain medication regimen with [place] palliative care, and discuss that we were talking about possibly initiating low dose methadone for longer acting pain control.  - Will need to follow with PCP for follow up labs.  - You have been placed on steroids with a slow taper. Please follow the instructions closely. Last day of final dose is 10/16/2024    [facility]:  [address] [place] [address]         Phone: [phone]    Once discharged from [facility], you can :    Return to work : Yes, you may return to work  Activity : As tolerated  Stairs : As tolerated  Exercise : As tolerated  Lifting : As tolerated   Sexual activity : Yes  Driving : With seat belt on. NO driving on narcotic pain medication if prescribed   Medications : Always take your medications as prescribed  Wound Care: {wound care:86477}  Diet : You are asked to make an attempt to improve diet and exercise patterns to aid in medical management of your medical condition/problem.    Call [facility] with any further questions. Return to Emergency Department with any worsening of your condition and/or fever greater than 101 degrees, new weakness, shortness of breath or chest pain.  ______________________________________________________________________    =====================================   [facility]   DISCHARGE INSTRUCTIONS   =====================================   Take your medications as directed in  summary     Follow up with all your future appointments as advised   Call [phone] to confirm your appointment with [facility] ([facility]) as soon as possible and/or if there are any appointment changes or other issues.     It is important that you follow up with us for better monitoring of the current cause of your hospitalization. Follow up as well with the specialists that saw you during your stay. If you have any questions call us [phone].    Please discuss your pain medication regimen with [place] palliative care, and discuss that we were talking about possibly initiating low dose methadone for longer acting pain control.

## 2024-10-12 NOTE — PLAN OF CARE
Problem: Nutrition Deficit:  Goal: Optimize nutritional status  Outcome: Adequate for Discharge     Problem: Discharge Planning  Goal: Discharge to home or other facility with appropriate resources  Outcome: Completed     Problem: Pain  Goal: Verbalizes/displays adequate comfort level or baseline comfort level  Outcome: Completed     Problem: Skin/Tissue Integrity  Goal: Absence of new skin breakdown  Description: 1.  Monitor for areas of redness and/or skin breakdown  2.  Assess vascular access sites hourly  3.  Every 4-6 hours minimum:  Change oxygen saturation probe site  4.  Every 4-6 hours:  If on nasal continuous positive airway pressure, respiratory therapy assess nares and determine need for appliance change or resting period.  Outcome: Completed     Problem: Safety - Adult  Goal: Free from fall injury  Outcome: Completed     Problem: ABCDS Injury Assessment  Goal: Absence of physical injury  Outcome: Completed

## 2024-10-18 ENCOUNTER — OFFICE VISIT (OUTPATIENT)
Dept: FAMILY MEDICINE CLINIC | Age: 44
End: 2024-10-18
Payer: MEDICAID

## 2024-10-18 VITALS
HEIGHT: 67 IN | SYSTOLIC BLOOD PRESSURE: 103 MMHG | TEMPERATURE: 97.8 F | WEIGHT: 165 LBS | RESPIRATION RATE: 16 BRPM | OXYGEN SATURATION: 97 % | DIASTOLIC BLOOD PRESSURE: 71 MMHG | BODY MASS INDEX: 25.9 KG/M2 | HEART RATE: 113 BPM

## 2024-10-18 DIAGNOSIS — M54.50 CHRONIC BILATERAL LOW BACK PAIN WITHOUT SCIATICA: ICD-10-CM

## 2024-10-18 DIAGNOSIS — Z09 HOSPITAL DISCHARGE FOLLOW-UP: ICD-10-CM

## 2024-10-18 DIAGNOSIS — C79.51 PROSTATE CANCER METASTATIC TO BONE (HCC): ICD-10-CM

## 2024-10-18 DIAGNOSIS — G89.29 CHRONIC BILATERAL LOW BACK PAIN WITHOUT SCIATICA: ICD-10-CM

## 2024-10-18 DIAGNOSIS — D61.9 ANEMIA DUE TO BONE MARROW FAILURE, UNSPECIFIED BONE MARROW FAILURE TYPE (HCC): Primary | ICD-10-CM

## 2024-10-18 DIAGNOSIS — C61 PROSTATE CANCER METASTATIC TO BONE (HCC): ICD-10-CM

## 2024-10-18 LAB
ALBUMIN: 4 G/DL (ref 3.5–5.2)
ALP BLD-CCNC: 881 U/L (ref 40–129)
ALT SERPL-CCNC: 24 U/L (ref 0–40)
ANION GAP SERPL CALCULATED.3IONS-SCNC: 15 MMOL/L (ref 7–16)
AST SERPL-CCNC: 18 U/L (ref 0–39)
BASOPHILS ABSOLUTE: 0 K/UL (ref 0–0.2)
BASOPHILS RELATIVE PERCENT: 0 % (ref 0–2)
BILIRUB SERPL-MCNC: 0.3 MG/DL (ref 0–1.2)
BUN BLDV-MCNC: 10 MG/DL (ref 6–20)
CALCIUM SERPL-MCNC: 9.3 MG/DL (ref 8.6–10.2)
CHLORIDE BLD-SCNC: 102 MMOL/L (ref 98–107)
CO2: 23 MMOL/L (ref 22–29)
CREAT SERPL-MCNC: 0.7 MG/DL (ref 0.7–1.2)
EOSINOPHILS ABSOLUTE: 0.04 K/UL (ref 0.05–0.5)
EOSINOPHILS RELATIVE PERCENT: 1 % (ref 0–6)
GFR, ESTIMATED: >90 ML/MIN/1.73M2
GLUCOSE BLD-MCNC: 90 MG/DL (ref 74–99)
HCT VFR BLD CALC: 30 % (ref 37–54)
HEMOGLOBIN: 9.2 G/DL (ref 12.5–16.5)
LYMPHOCYTES ABSOLUTE: 0.42 K/UL (ref 1.5–4)
LYMPHOCYTES RELATIVE PERCENT: 9 % (ref 20–42)
MCH RBC QN AUTO: 29.3 PG (ref 26–35)
MCHC RBC AUTO-ENTMCNC: 30.7 G/DL (ref 32–34.5)
MCV RBC AUTO: 95.5 FL (ref 80–99.9)
METAMYELOCYTES ABSOLUTE COUNT: 0.08 K/UL (ref 0–0.12)
METAMYELOCYTES: 2 % (ref 0–1)
MONOCYTES ABSOLUTE: 0.33 K/UL (ref 0.1–0.95)
MONOCYTES RELATIVE PERCENT: 7 % (ref 2–12)
NEUTROPHILS ABSOLUTE: 3.92 K/UL (ref 1.8–7.3)
NEUTROPHILS RELATIVE PERCENT: 82 % (ref 43–80)
NUCLEATED RED BLOOD CELLS: 4 PER 100 WBC
PDW BLD-RTO: 17.2 % (ref 11.5–15)
PLATELET # BLD: 178 K/UL (ref 130–450)
PMV BLD AUTO: 9.2 FL (ref 7–12)
POTASSIUM SERPL-SCNC: 4.4 MMOL/L (ref 3.5–5)
PROSTATE SPECIFIC ANTIGEN: 19.4 NG/ML (ref 0–4)
RBC # BLD: 3.14 M/UL (ref 3.8–5.8)
RBC # BLD: ABNORMAL 10*6/UL
SODIUM BLD-SCNC: 140 MMOL/L (ref 132–146)
TOTAL PROTEIN: 6.9 G/DL (ref 6.4–8.3)
WBC # BLD: 4.8 K/UL (ref 4.5–11.5)

## 2024-10-18 PROCEDURE — 36415 COLL VENOUS BLD VENIPUNCTURE: CPT | Performed by: FAMILY MEDICINE

## 2024-10-18 RX ORDER — CHOLECALCIFEROL (VITAMIN D3) 50 MCG
2000 TABLET ORAL DAILY
Qty: 60 TABLET | Refills: 3 | Status: SHIPPED | OUTPATIENT
Start: 2024-10-18

## 2024-10-18 RX ORDER — METHOCARBAMOL 750 MG/1
750 TABLET, FILM COATED ORAL 4 TIMES DAILY
Qty: 120 TABLET | Refills: 5 | Status: SHIPPED | OUTPATIENT
Start: 2024-10-18

## 2024-10-18 NOTE — PROGRESS NOTES
S: 44 y.o. male here for hospital f/u anemia and thrombocytopenia 2/2 metastatic prostate CA. High dose opiates. Needs to get palliative f/u appt. Onc appt 10/23. Finished steroid taper, breathing is better.       O: VS: /71   Pulse (!) 113 Comment: radial regular  Temp 97.8 °F (36.6 °C) (Temporal)   Resp 16   Ht 1.702 m (5' 7\")   Wt 74.8 kg (165 lb)   SpO2 97%   BMI 25.84 kg/m²    General: NAD, alert and interacting appropriately.    CV:  RRR, no gallops, rubs, or murmurs    Resp: CTAB   Abd:  Soft, nontender   Ext:  No edema    Impression: hospital f/u anemia and thrombocytopenia 2/2 metastatic prostate CA  Plan:   Cmp, cbc  Cont robaxin  Close f/u    Attending Physician Statement  I have discussed the case, including pertinent history and exam findings with the resident.  I agree with the documented assessment and plan.      
has not gotten refills for pain medications as yet. Pain has been well controlled with current pain regimen and has extended periods without scheduled pain meds. States that the brace that was given to him felt constricting. Pain continues to be worse around torso. Currently on dilaudid 4mg and oxy 2mg q2hrly. Also on robaxin 750 mg 4x a day.     Mobility: Patient has been getting PT at home which he states have been going well. He also has been able to ambulate more with walker.     Prostate cancer w mets:  Has an appointment with Dr Matson on 23rd October. Patient wants his PSA levels checked today. He also wanted to know if a decreased PSA level meant resolution of cancer. I informed patient that may not be the case but his oncologist would have a better.    Hemoptysis: Improved since discharge. Has not had repeat episodes since discharge. Completed steroid taper as advised. States that his breathing has also improved. No hematuria, melena or hematochezia.      Patient Active Problem List   Diagnosis    Retroperitoneal lymphadenopathy    Chronic midline low back pain with left-sided sciatica    Elevated PSA    Urinary hesitancy    Pelvic pain    Osteopenia of lumbar spine    Acute midline low back pain with sciatica    Prostate cancer metastatic to bone (HCC)    Hemorrhoids    Anxiety    Cancer, metastatic to bone (HCC)    Nausea and vomiting    Back pain    Primary prostate cancer with metastasis from prostate to other site (HCC)    Severe protein-calorie malnutrition (HCC)    Pneumonia, unspecified organism    Shortness of breath    Anemia, unspecified type    Thrombocytopenia (HCC)    Hemoptysis    Cancer-related pain    Encounter for palliative care       Medications listed as ordered at the time of discharge from hospital     Medication List            Accurate as of October 18, 2024 11:59 PM. If you have any questions, ask your nurse or doctor.                START taking these medications      vitamin D 50

## 2024-10-24 NOTE — RESULT ENCOUNTER NOTE
Discussed results extensively with patient.  Elevated alk phos consistent with mets to bone.  Improved hemoglobin and platelets.  Patient advised to follow-up with oncologist regarding downtrending PSA levels.    Patient had concerns about his opioid medications with [facility].  States that he is not getting enough medications for his pain.  Has home visit scheduled with them tomorrow.  Patient advised to forward all his questions at the time of visit.    Patient states that he is doing well and able to move more with minimal difficulty.

## 2024-10-30 ENCOUNTER — HOSPITAL ENCOUNTER (OUTPATIENT)
Age: 44
Discharge: HOME | End: 2024-11-01
Payer: MEDICAID

## 2024-10-30 ENCOUNTER — HOSPITAL ENCOUNTER (OUTPATIENT)
Dept: GENERAL RADIOLOGY | Age: 44
Discharge: HOME | End: 2024-11-01
Payer: MEDICAID

## 2024-10-30 DIAGNOSIS — M54.42 CHRONIC MIDLINE LOW BACK PAIN WITH LEFT-SIDED SCIATICA: ICD-10-CM

## 2024-10-30 DIAGNOSIS — S32.010A CLOSED COMPRESSION FRACTURE OF BODY OF L1 VERTEBRA (HCC): ICD-10-CM

## 2024-10-30 DIAGNOSIS — G89.29 CHRONIC MIDLINE LOW BACK PAIN WITH LEFT-SIDED SCIATICA: ICD-10-CM

## 2024-10-30 PROCEDURE — 72100 X-RAY EXAM L-S SPINE 2/3 VWS: CPT

## 2024-11-03 ENCOUNTER — APPOINTMENT (OUTPATIENT)
Dept: GENERAL RADIOLOGY | Age: 44
End: 2024-11-03
Payer: MEDICAID

## 2024-11-03 ENCOUNTER — HOSPITAL ENCOUNTER (INPATIENT)
Age: 44
LOS: 4 days | Discharge: HOME | End: 2024-11-07
Attending: STUDENT IN AN ORGANIZED HEALTH CARE EDUCATION/TRAINING PROGRAM | Admitting: FAMILY MEDICINE
Payer: MEDICAID

## 2024-11-03 DIAGNOSIS — Z51.5 ENCOUNTER FOR PALLIATIVE CARE: ICD-10-CM

## 2024-11-03 DIAGNOSIS — R52 UNCONTROLLED PAIN: ICD-10-CM

## 2024-11-03 DIAGNOSIS — M25.551 PAIN OF RIGHT HIP: Primary | ICD-10-CM

## 2024-11-03 DIAGNOSIS — C61 PROSTATE CANCER METASTATIC TO BONE (HCC): ICD-10-CM

## 2024-11-03 DIAGNOSIS — C79.51 PROSTATE CANCER METASTATIC TO BONE (HCC): ICD-10-CM

## 2024-11-03 DIAGNOSIS — G89.3 CANCER-RELATED PAIN: ICD-10-CM

## 2024-11-03 DIAGNOSIS — C79.9 METASTATIC MALIGNANT NEOPLASM, UNSPECIFIED SITE (HCC): ICD-10-CM

## 2024-11-03 LAB
ALBUMIN SERPL-MCNC: 3.8 G/DL (ref 3.5–5.2)
ALP SERPL-CCNC: 653 U/L (ref 40–129)
ALT SERPL-CCNC: 12 U/L (ref 0–40)
ANION GAP SERPL CALCULATED.3IONS-SCNC: 11 MMOL/L (ref 7–16)
AST SERPL-CCNC: 45 U/L (ref 0–39)
BACTERIA URNS QL MICRO: ABNORMAL
BASOPHILS # BLD: 0.06 K/UL (ref 0–0.2)
BASOPHILS NFR BLD: 1 % (ref 0–2)
BILIRUB SERPL-MCNC: 0.3 MG/DL (ref 0–1.2)
BILIRUB UR QL STRIP: NEGATIVE
BUN SERPL-MCNC: 10 MG/DL (ref 6–20)
CALCIUM SERPL-MCNC: 9.1 MG/DL (ref 8.6–10.2)
CHLORIDE SERPL-SCNC: 102 MMOL/L (ref 98–107)
CLARITY UR: CLEAR
CO2 SERPL-SCNC: 26 MMOL/L (ref 22–29)
COLOR UR: YELLOW
CREAT SERPL-MCNC: 0.7 MG/DL (ref 0.7–1.2)
EOSINOPHIL # BLD: 0 K/UL (ref 0.05–0.5)
EOSINOPHILS RELATIVE PERCENT: 0 % (ref 0–6)
ERYTHROCYTE [DISTWIDTH] IN BLOOD BY AUTOMATED COUNT: 18.1 % (ref 11.5–15)
GFR, ESTIMATED: >90 ML/MIN/1.73M2
GLUCOSE SERPL-MCNC: 100 MG/DL (ref 74–99)
GLUCOSE UR STRIP-MCNC: NEGATIVE MG/DL
HCT VFR BLD AUTO: 25.7 % (ref 37–54)
HGB BLD-MCNC: 8.2 G/DL (ref 12.5–16.5)
HGB UR QL STRIP.AUTO: NEGATIVE
KETONES UR STRIP-MCNC: NEGATIVE MG/DL
LEUKOCYTE ESTERASE UR QL STRIP: NEGATIVE
LYMPHOCYTES NFR BLD: 0.43 K/UL (ref 1.5–4)
LYMPHOCYTES RELATIVE PERCENT: 6 % (ref 20–42)
MCH RBC QN AUTO: 29.8 PG (ref 26–35)
MCHC RBC AUTO-ENTMCNC: 31.9 G/DL (ref 32–34.5)
MCV RBC AUTO: 93.5 FL (ref 80–99.9)
MONOCYTES NFR BLD: 0.37 K/UL (ref 0.1–0.95)
MONOCYTES NFR BLD: 5 % (ref 2–12)
MYELOCYTES ABSOLUTE COUNT: 0.06 K/UL
MYELOCYTES: 1 %
NEUTROPHILS NFR BLD: 87 % (ref 43–80)
NEUTS SEG NFR BLD: 6.17 K/UL (ref 1.8–7.3)
NITRITE UR QL STRIP: NEGATIVE
PH UR STRIP: 6.5 [PH] (ref 5–9)
PLATELET # BLD AUTO: 130 K/UL (ref 130–450)
PMV BLD AUTO: 9.3 FL (ref 7–12)
POTASSIUM SERPL-SCNC: 3.7 MMOL/L (ref 3.5–5)
PROT SERPL-MCNC: 6.5 G/DL (ref 6.4–8.3)
PROT UR STRIP-MCNC: NEGATIVE MG/DL
RBC # BLD AUTO: 2.75 M/UL (ref 3.8–5.8)
RBC # BLD: ABNORMAL 10*6/UL
RBC #/AREA URNS HPF: ABNORMAL /HPF
SODIUM SERPL-SCNC: 139 MMOL/L (ref 132–146)
SP GR UR STRIP: 1.02 (ref 1–1.03)
UROBILINOGEN UR STRIP-ACNC: 0.2 EU/DL (ref 0–1)
WBC #/AREA URNS HPF: ABNORMAL /HPF
WBC OTHER # BLD: 7.1 K/UL (ref 4.5–11.5)

## 2024-11-03 PROCEDURE — 6360000002 HC RX W HCPCS: Performed by: STUDENT IN AN ORGANIZED HEALTH CARE EDUCATION/TRAINING PROGRAM

## 2024-11-03 PROCEDURE — 87086 URINE CULTURE/COLONY COUNT: CPT

## 2024-11-03 PROCEDURE — 99285 EMERGENCY DEPT VISIT HI MDM: CPT

## 2024-11-03 PROCEDURE — 96376 TX/PRO/DX INJ SAME DRUG ADON: CPT

## 2024-11-03 PROCEDURE — 85025 COMPLETE CBC W/AUTO DIFF WBC: CPT

## 2024-11-03 PROCEDURE — 96372 THER/PROPH/DIAG INJ SC/IM: CPT

## 2024-11-03 PROCEDURE — 96365 THER/PROPH/DIAG IV INF INIT: CPT

## 2024-11-03 PROCEDURE — 81001 URINALYSIS AUTO W/SCOPE: CPT

## 2024-11-03 PROCEDURE — 2500000003 HC RX 250 WO HCPCS: Performed by: STUDENT IN AN ORGANIZED HEALTH CARE EDUCATION/TRAINING PROGRAM

## 2024-11-03 PROCEDURE — 2060000000 HC ICU INTERMEDIATE R&B

## 2024-11-03 PROCEDURE — 80053 COMPREHEN METABOLIC PANEL: CPT

## 2024-11-03 PROCEDURE — 73502 X-RAY EXAM HIP UNI 2-3 VIEWS: CPT

## 2024-11-03 PROCEDURE — 2580000003 HC RX 258: Performed by: STUDENT IN AN ORGANIZED HEALTH CARE EDUCATION/TRAINING PROGRAM

## 2024-11-03 PROCEDURE — 93005 ELECTROCARDIOGRAM TRACING: CPT | Performed by: STUDENT IN AN ORGANIZED HEALTH CARE EDUCATION/TRAINING PROGRAM

## 2024-11-03 PROCEDURE — 96375 TX/PRO/DX INJ NEW DRUG ADDON: CPT

## 2024-11-03 RX ORDER — ONDANSETRON 4 MG/1
4 TABLET, ORALLY DISINTEGRATING ORAL EVERY 8 HOURS PRN
Status: CANCELLED | OUTPATIENT
Start: 2024-11-03

## 2024-11-03 RX ORDER — SODIUM CHLORIDE 9 MG/ML
INJECTION, SOLUTION INTRAVENOUS PRN
Status: CANCELLED | OUTPATIENT
Start: 2024-11-03

## 2024-11-03 RX ORDER — SENNA AND DOCUSATE SODIUM 50; 8.6 MG/1; MG/1
2 TABLET, FILM COATED ORAL 2 TIMES DAILY PRN
Status: DISCONTINUED | OUTPATIENT
Start: 2024-11-03 | End: 2024-11-07 | Stop reason: HOSPADM

## 2024-11-03 RX ORDER — ENOXAPARIN SODIUM 100 MG/ML
40 INJECTION SUBCUTANEOUS DAILY
Status: CANCELLED | OUTPATIENT
Start: 2024-11-04

## 2024-11-03 RX ORDER — FENTANYL CITRATE 50 UG/ML
75 INJECTION, SOLUTION INTRAMUSCULAR; INTRAVENOUS ONCE
Status: COMPLETED | OUTPATIENT
Start: 2024-11-03 | End: 2024-11-03

## 2024-11-03 RX ORDER — HYDROMORPHONE HYDROCHLORIDE 1 MG/ML
1 INJECTION, SOLUTION INTRAMUSCULAR; INTRAVENOUS; SUBCUTANEOUS ONCE
Status: DISCONTINUED | OUTPATIENT
Start: 2024-11-03 | End: 2024-11-03

## 2024-11-03 RX ORDER — METHOCARBAMOL 500 MG/1
750 TABLET, FILM COATED ORAL 4 TIMES DAILY
Status: DISCONTINUED | OUTPATIENT
Start: 2024-11-04 | End: 2024-11-04

## 2024-11-03 RX ORDER — ACETAMINOPHEN 650 MG/1
650 SUPPOSITORY RECTAL EVERY 6 HOURS PRN
Status: CANCELLED | OUTPATIENT
Start: 2024-11-03

## 2024-11-03 RX ORDER — POLYETHYLENE GLYCOL 3350 17 G/17G
17 POWDER, FOR SOLUTION ORAL DAILY PRN
Status: CANCELLED | OUTPATIENT
Start: 2024-11-03

## 2024-11-03 RX ORDER — HYDROMORPHONE HYDROCHLORIDE 2 MG/1
4 TABLET ORAL
Status: ON HOLD | COMMUNITY
End: 2024-11-07

## 2024-11-03 RX ORDER — SODIUM CHLORIDE 9 MG/ML
INJECTION, SOLUTION INTRAVENOUS PRN
Status: DISCONTINUED | OUTPATIENT
Start: 2024-11-03 | End: 2024-11-06

## 2024-11-03 RX ORDER — OXYCODONE HCL 20 MG/1
20 TABLET, FILM COATED, EXTENDED RELEASE ORAL
Status: ON HOLD | COMMUNITY
End: 2024-11-07 | Stop reason: HOSPADM

## 2024-11-03 RX ORDER — HYDROMORPHONE HYDROCHLORIDE 1 MG/ML
1 INJECTION, SOLUTION INTRAMUSCULAR; INTRAVENOUS; SUBCUTANEOUS
Status: CANCELLED | OUTPATIENT
Start: 2024-11-03

## 2024-11-03 RX ORDER — HYDROMORPHONE HYDROCHLORIDE 1 MG/ML
2 INJECTION, SOLUTION INTRAMUSCULAR; INTRAVENOUS; SUBCUTANEOUS ONCE
Status: COMPLETED | OUTPATIENT
Start: 2024-11-03 | End: 2024-11-03

## 2024-11-03 RX ORDER — POTASSIUM CHLORIDE 1500 MG/1
40 TABLET, EXTENDED RELEASE ORAL PRN
Status: CANCELLED | OUTPATIENT
Start: 2024-11-03

## 2024-11-03 RX ORDER — ACETAMINOPHEN 650 MG/1
650 SUPPOSITORY RECTAL EVERY 6 HOURS PRN
Status: DISCONTINUED | OUTPATIENT
Start: 2024-11-03 | End: 2024-11-07 | Stop reason: HOSPADM

## 2024-11-03 RX ORDER — ONDANSETRON 2 MG/ML
4 INJECTION INTRAMUSCULAR; INTRAVENOUS ONCE
Status: COMPLETED | OUTPATIENT
Start: 2024-11-03 | End: 2024-11-03

## 2024-11-03 RX ORDER — POLYETHYLENE GLYCOL 3350 17 G/17G
17 POWDER, FOR SOLUTION ORAL DAILY PRN
Status: DISCONTINUED | OUTPATIENT
Start: 2024-11-03 | End: 2024-11-04

## 2024-11-03 RX ORDER — OXYCODONE HYDROCHLORIDE 10 MG/1
20 TABLET ORAL ONCE
Status: CANCELLED | OUTPATIENT
Start: 2024-11-04

## 2024-11-03 RX ORDER — DOCUSATE SODIUM 100 MG/1
100 CAPSULE, LIQUID FILLED ORAL DAILY
Status: DISCONTINUED | OUTPATIENT
Start: 2024-11-04 | End: 2024-11-07 | Stop reason: HOSPADM

## 2024-11-03 RX ORDER — ACETAMINOPHEN 325 MG/1
650 TABLET ORAL EVERY 6 HOURS PRN
Status: CANCELLED | OUTPATIENT
Start: 2024-11-03

## 2024-11-03 RX ORDER — ENOXAPARIN SODIUM 100 MG/ML
40 INJECTION SUBCUTANEOUS DAILY
Status: DISCONTINUED | OUTPATIENT
Start: 2024-11-04 | End: 2024-11-04

## 2024-11-03 RX ORDER — SODIUM CHLORIDE 0.9 % (FLUSH) 0.9 %
5-40 SYRINGE (ML) INJECTION PRN
Status: DISCONTINUED | OUTPATIENT
Start: 2024-11-03 | End: 2024-11-07 | Stop reason: HOSPADM

## 2024-11-03 RX ORDER — BICALUTAMIDE 50 MG/1
50 TABLET, FILM COATED ORAL DAILY
Status: DISCONTINUED | OUTPATIENT
Start: 2024-11-04 | End: 2024-11-07 | Stop reason: HOSPADM

## 2024-11-03 RX ORDER — CHOLECALCIFEROL (VITAMIN D3) 50 MCG
2000 TABLET ORAL DAILY
Status: DISCONTINUED | OUTPATIENT
Start: 2024-11-04 | End: 2024-11-07 | Stop reason: HOSPADM

## 2024-11-03 RX ORDER — ONDANSETRON 2 MG/ML
4 INJECTION INTRAMUSCULAR; INTRAVENOUS EVERY 6 HOURS PRN
Status: DISCONTINUED | OUTPATIENT
Start: 2024-11-03 | End: 2024-11-07 | Stop reason: HOSPADM

## 2024-11-03 RX ORDER — DOCUSATE SODIUM 100 MG/1
100 CAPSULE, LIQUID FILLED ORAL DAILY
Status: DISCONTINUED | OUTPATIENT
Start: 2024-11-04 | End: 2024-11-04

## 2024-11-03 RX ORDER — ONDANSETRON 2 MG/ML
4 INJECTION INTRAMUSCULAR; INTRAVENOUS EVERY 6 HOURS PRN
Status: CANCELLED | OUTPATIENT
Start: 2024-11-03

## 2024-11-03 RX ORDER — ACETAMINOPHEN 325 MG/1
650 TABLET ORAL EVERY 6 HOURS PRN
Status: DISCONTINUED | OUTPATIENT
Start: 2024-11-03 | End: 2024-11-07 | Stop reason: HOSPADM

## 2024-11-03 RX ORDER — HYDROMORPHONE HYDROCHLORIDE 1 MG/ML
1 INJECTION, SOLUTION INTRAMUSCULAR; INTRAVENOUS; SUBCUTANEOUS ONCE
Status: COMPLETED | OUTPATIENT
Start: 2024-11-03 | End: 2024-11-03

## 2024-11-03 RX ORDER — POTASSIUM CHLORIDE 7.45 MG/ML
10 INJECTION INTRAVENOUS PRN
Status: CANCELLED | OUTPATIENT
Start: 2024-11-03

## 2024-11-03 RX ORDER — ORPHENADRINE CITRATE 30 MG/ML
60 INJECTION INTRAMUSCULAR; INTRAVENOUS ONCE
Status: COMPLETED | OUTPATIENT
Start: 2024-11-03 | End: 2024-11-03

## 2024-11-03 RX ORDER — ONDANSETRON 4 MG/1
4 TABLET, ORALLY DISINTEGRATING ORAL EVERY 8 HOURS PRN
Status: DISCONTINUED | OUTPATIENT
Start: 2024-11-03 | End: 2024-11-07 | Stop reason: HOSPADM

## 2024-11-03 RX ORDER — SODIUM CHLORIDE 0.9 % (FLUSH) 0.9 %
5-40 SYRINGE (ML) INJECTION EVERY 12 HOURS SCHEDULED
Status: DISCONTINUED | OUTPATIENT
Start: 2024-11-04 | End: 2024-11-07 | Stop reason: HOSPADM

## 2024-11-03 RX ORDER — POLYETHYLENE GLYCOL 3350 17 G/17G
17 POWDER, FOR SOLUTION ORAL DAILY
Status: DISCONTINUED | OUTPATIENT
Start: 2024-11-04 | End: 2024-11-04

## 2024-11-03 RX ORDER — MAGNESIUM SULFATE IN WATER 40 MG/ML
2000 INJECTION, SOLUTION INTRAVENOUS PRN
Status: CANCELLED | OUTPATIENT
Start: 2024-11-03

## 2024-11-03 RX ADMIN — Medication 30 MG: at 18:08

## 2024-11-03 RX ADMIN — FENTANYL CITRATE 75 MCG: 50 INJECTION INTRAMUSCULAR; INTRAVENOUS at 19:21

## 2024-11-03 RX ADMIN — HYDROMORPHONE HYDROCHLORIDE 1 MG: 1 INJECTION, SOLUTION INTRAMUSCULAR; INTRAVENOUS; SUBCUTANEOUS at 16:12

## 2024-11-03 RX ADMIN — METHOCARBAMOL 1000 MG: 100 INJECTION INTRAMUSCULAR; INTRAVENOUS at 21:46

## 2024-11-03 RX ADMIN — HYDROMORPHONE HYDROCHLORIDE 1 MG: 1 INJECTION, SOLUTION INTRAMUSCULAR; INTRAVENOUS; SUBCUTANEOUS at 23:47

## 2024-11-03 RX ADMIN — HYDROMORPHONE HYDROCHLORIDE 2 MG: 1 INJECTION, SOLUTION INTRAMUSCULAR; INTRAVENOUS; SUBCUTANEOUS at 20:25

## 2024-11-03 RX ADMIN — ONDANSETRON 4 MG: 2 INJECTION INTRAMUSCULAR; INTRAVENOUS at 19:21

## 2024-11-03 RX ADMIN — ORPHENADRINE CITRATE 60 MG: 30 INJECTION, SOLUTION INTRAMUSCULAR; INTRAVENOUS at 16:13

## 2024-11-03 ASSESSMENT — PAIN SCALES - GENERAL
PAINLEVEL_OUTOF10: 10

## 2024-11-03 ASSESSMENT — PAIN DESCRIPTION - ORIENTATION
ORIENTATION: RIGHT
ORIENTATION: RIGHT

## 2024-11-03 ASSESSMENT — PAIN DESCRIPTION - LOCATION
LOCATION: HIP
LOCATION: HIP

## 2024-11-03 ASSESSMENT — PAIN DESCRIPTION - FREQUENCY: FREQUENCY: CONTINUOUS

## 2024-11-03 ASSESSMENT — PAIN DESCRIPTION - PAIN TYPE: TYPE: ACUTE PAIN

## 2024-11-03 ASSESSMENT — PAIN DESCRIPTION - ONSET: ONSET: ON-GOING

## 2024-11-03 ASSESSMENT — PAIN - FUNCTIONAL ASSESSMENT: PAIN_FUNCTIONAL_ASSESSMENT: 0-10

## 2024-11-03 ASSESSMENT — PAIN DESCRIPTION - DESCRIPTORS: DESCRIPTORS: CRUSHING;DISCOMFORT;THROBBING

## 2024-11-03 NOTE — ED PROVIDER NOTES
Joseph Bond is a 44 year old-year-old male present emergency department with concern for right hip pain.  Patient does have a history of metastatic prostate cancer.  Patient states that he does have mets to his lumbar spine.  He has had 2 days of increasing right hip pain.  Patient states he cannot lift his leg due to pain.  He denies any increasing weakness.  He denies saddle anesthesia paresthesia, he denies urinary incontinence or retention.  He has not had fever, chills, nausea, vomiting.  Patient is taking oxycodone and p.o. Dilaudid for pain without improvement of symptoms.    The history is provided by the patient, medical records and the EMS personnel.        Review of Systems   Constitutional:  Negative for chills, diaphoresis, fatigue and fever.   Eyes:  Negative for photophobia and visual disturbance.   Respiratory:  Negative for cough, chest tightness and shortness of breath.    Cardiovascular:  Negative for chest pain, palpitations and leg swelling.   Gastrointestinal:  Negative for abdominal distention, abdominal pain, diarrhea, nausea and vomiting.   Genitourinary:  Negative for dysuria.   Musculoskeletal:  Negative for back pain, neck pain and neck stiffness.   Skin:  Negative for pallor and rash.   Neurological:  Negative for headaches.   Psychiatric/Behavioral:  Negative for confusion.         Physical Exam  Vitals and nursing note reviewed.   Constitutional:       General: He is not in acute distress.     Appearance: Normal appearance. He is not ill-appearing.   HENT:      Head: Normocephalic and atraumatic.   Eyes:      General: No scleral icterus.     Conjunctiva/sclera: Conjunctivae normal.      Pupils: Pupils are equal, round, and reactive to light.   Cardiovascular:      Rate and Rhythm: Regular rhythm. Tachycardia present.      Comments: DP PT pulses   Pulmonary:      Effort: Pulmonary effort is normal.      Breath sounds: Normal breath sounds.   Abdominal:      General: Bowel sounds    Co mobidities include metastatic prostate ca, anemia,   Differential diagnosis includes was not limited to, metastatic cancer, uncontrolled pain,  Social determinants of health include stress         ED Course as of 11/06/24 1020   Mon Nov 04, 2024   0144 EKG:  This EKG is signed and interpreted by me.    Rate: 109  Rhythm: Sinus  Interpretation: non-specific EKG, no st elevation, normal axis  Comparison: changes compared to previous EKG   [SS]      ED Course User Index  [SS] Brianna Porter MD       SEP-1 CORE MEASURE DATA      Sepsis Criteria   Severe Sepsis Criteria   Septic Shock Criteria     Must be confirmed or suspected to move forward with diagnosis of sepsis.    Must meet 2:    [] Temperature > 100.9 F (38.3 C)        or < 96.8 F (36 C)  [] HR > 90  [] RR > 20  [] WBC > 12 or < 4 or 10% bands    AND:    [] Infection Confirmed or        Suspected.    OR:    [x] Exclude from SEP-1 because:    [x] No infection present or suspected  [] Does not have 2+ SIRS criteria but may have an incidental infection that requires treatment  [] May have sepsis, but does not meet criteria for severe sepsis or septic shock  [] Alternative explanation for abnormal labs and/or vitals (see MDM)  [] Viral etiology found or highly suspected (including COVID-19) without concomitant bacterial infection   Must meet 1:    [] Lactate > 2       or   [] Signs of Organ Dysfunction:    - SBP < 90 or MAP < 65  - Altered mental status  - Creatinine > 2 or increased from      baseline  - Urine Output < 0.5 ml/kg/hr  - Bilirubin > 2  - INR > 1.5 (not anticoagulated)  - Platelets < 100,000  - Acute Respiratory Failure as     evidenced by new need for NIPPV     or mechanical ventilation        [] No criteria met for Severe Sepsis.   Must meet 1:    [] Lactate > 4        or   [] SBP < 90 or MAP < 65 for at        least two readings in the first        hour after fluid bolus        administration      [] Vasopressors initiated (if hypotension

## 2024-11-04 ENCOUNTER — APPOINTMENT (OUTPATIENT)
Dept: GENERAL RADIOLOGY | Age: 44
End: 2024-11-04
Payer: MEDICAID

## 2024-11-04 PROBLEM — R52 INTRACTABLE PAIN: Status: ACTIVE | Noted: 2024-11-04

## 2024-11-04 LAB
25(OH)D3 SERPL-MCNC: 18.3 NG/ML (ref 30–100)
ALBUMIN SERPL-MCNC: 3.7 G/DL (ref 3.5–5.2)
ALP SERPL-CCNC: 617 U/L (ref 40–129)
ALT SERPL-CCNC: 12 U/L (ref 0–40)
ANION GAP SERPL CALCULATED.3IONS-SCNC: 14 MMOL/L (ref 7–16)
AST SERPL-CCNC: 54 U/L (ref 0–39)
BASOPHILS # BLD: 0.07 K/UL (ref 0–0.2)
BASOPHILS NFR BLD: 1 % (ref 0–2)
BILIRUB SERPL-MCNC: 0.6 MG/DL (ref 0–1.2)
BUN SERPL-MCNC: 7 MG/DL (ref 6–20)
CALCIUM SERPL-MCNC: 9.4 MG/DL (ref 8.6–10.2)
CHLORIDE SERPL-SCNC: 100 MMOL/L (ref 98–107)
CO2 SERPL-SCNC: 24 MMOL/L (ref 22–29)
CREAT SERPL-MCNC: 0.6 MG/DL (ref 0.7–1.2)
CRP SERPL HS-MCNC: 93 MG/L (ref 0–5)
EKG ATRIAL RATE: 109 BPM
EKG P AXIS: 24 DEGREES
EKG P-R INTERVAL: 138 MS
EKG Q-T INTERVAL: 346 MS
EKG QRS DURATION: 88 MS
EKG QTC CALCULATION (BAZETT): 465 MS
EKG R AXIS: 0 DEGREES
EKG T AXIS: 6 DEGREES
EKG VENTRICULAR RATE: 109 BPM
EOSINOPHIL # BLD: 0 K/UL (ref 0.05–0.5)
EOSINOPHILS RELATIVE PERCENT: 0 % (ref 0–6)
ERYTHROCYTE [DISTWIDTH] IN BLOOD BY AUTOMATED COUNT: 18.4 % (ref 11.5–15)
ERYTHROCYTE [SEDIMENTATION RATE] IN BLOOD BY WESTERGREN METHOD: 70 MM/HR (ref 0–15)
GFR, ESTIMATED: >90 ML/MIN/1.73M2
GLUCOSE SERPL-MCNC: 104 MG/DL (ref 74–99)
HCT VFR BLD AUTO: 27.3 % (ref 37–54)
HGB BLD-MCNC: 8.7 G/DL (ref 12.5–16.5)
LYMPHOCYTES NFR BLD: 0.66 K/UL (ref 1.5–4)
LYMPHOCYTES RELATIVE PERCENT: 8 % (ref 20–42)
MCH RBC QN AUTO: 30.3 PG (ref 26–35)
MCHC RBC AUTO-ENTMCNC: 31.9 G/DL (ref 32–34.5)
MCV RBC AUTO: 95.1 FL (ref 80–99.9)
METAMYELOCYTES ABSOLUTE COUNT: 0.07 K/UL (ref 0–0.12)
METAMYELOCYTES: 1 % (ref 0–1)
MICROORGANISM SPEC CULT: NO GROWTH
MONOCYTES NFR BLD: 1.1 K/UL (ref 0.1–0.95)
MONOCYTES NFR BLD: 13 % (ref 2–12)
MYELOCYTES ABSOLUTE COUNT: 0.07 K/UL
MYELOCYTES: 1 %
NEUTROPHILS NFR BLD: 77 % (ref 43–80)
NEUTS SEG NFR BLD: 6.43 K/UL (ref 1.8–7.3)
PLATELET # BLD AUTO: 137 K/UL (ref 130–450)
PMV BLD AUTO: 10.2 FL (ref 7–12)
POTASSIUM SERPL-SCNC: 4.3 MMOL/L (ref 3.5–5)
PROT SERPL-MCNC: 6.7 G/DL (ref 6.4–8.3)
RBC # BLD AUTO: 2.87 M/UL (ref 3.8–5.8)
RBC # BLD: ABNORMAL 10*6/UL
SERVICE CMNT-IMP: NORMAL
SODIUM SERPL-SCNC: 138 MMOL/L (ref 132–146)
SPECIMEN DESCRIPTION: NORMAL
WBC OTHER # BLD: 8.4 K/UL (ref 4.5–11.5)

## 2024-11-04 PROCEDURE — 6370000000 HC RX 637 (ALT 250 FOR IP)

## 2024-11-04 PROCEDURE — 80053 COMPREHEN METABOLIC PANEL: CPT

## 2024-11-04 PROCEDURE — 2060000000 HC ICU INTERMEDIATE R&B

## 2024-11-04 PROCEDURE — 2580000003 HC RX 258

## 2024-11-04 PROCEDURE — 99222 1ST HOSP IP/OBS MODERATE 55: CPT | Performed by: ORTHOPAEDIC SURGERY

## 2024-11-04 PROCEDURE — 2500000003 HC RX 250 WO HCPCS

## 2024-11-04 PROCEDURE — 6360000002 HC RX W HCPCS: Performed by: NURSE PRACTITIONER

## 2024-11-04 PROCEDURE — 85025 COMPLETE CBC W/AUTO DIFF WBC: CPT

## 2024-11-04 PROCEDURE — 73552 X-RAY EXAM OF FEMUR 2/>: CPT

## 2024-11-04 PROCEDURE — 99222 1ST HOSP IP/OBS MODERATE 55: CPT | Performed by: NURSE PRACTITIONER

## 2024-11-04 PROCEDURE — 85652 RBC SED RATE AUTOMATED: CPT

## 2024-11-04 PROCEDURE — 86140 C-REACTIVE PROTEIN: CPT

## 2024-11-04 PROCEDURE — 82306 VITAMIN D 25 HYDROXY: CPT

## 2024-11-04 PROCEDURE — 99222 1ST HOSP IP/OBS MODERATE 55: CPT | Performed by: FAMILY MEDICINE

## 2024-11-04 PROCEDURE — 93010 ELECTROCARDIOGRAM REPORT: CPT | Performed by: INTERNAL MEDICINE

## 2024-11-04 PROCEDURE — 2500000003 HC RX 250 WO HCPCS: Performed by: NURSE PRACTITIONER

## 2024-11-04 PROCEDURE — 6360000002 HC RX W HCPCS

## 2024-11-04 RX ORDER — SODIUM CHLORIDE 0.9 % (FLUSH) 0.9 %
5-40 SYRINGE (ML) INJECTION PRN
Status: DISCONTINUED | OUTPATIENT
Start: 2024-11-03 | End: 2024-11-07 | Stop reason: HOSPADM

## 2024-11-04 RX ORDER — OXYCODONE HYDROCHLORIDE 10 MG/1
20 TABLET ORAL
Status: DISCONTINUED | OUTPATIENT
Start: 2024-11-04 | End: 2024-11-05

## 2024-11-04 RX ORDER — GLUCAGON 1 MG/ML
1 KIT INJECTION PRN
Status: DISCONTINUED | OUTPATIENT
Start: 2024-11-04 | End: 2024-11-07 | Stop reason: HOSPADM

## 2024-11-04 RX ORDER — HYDROMORPHONE HYDROCHLORIDE 1 MG/ML
2 INJECTION, SOLUTION INTRAMUSCULAR; INTRAVENOUS; SUBCUTANEOUS
Status: DISCONTINUED | OUTPATIENT
Start: 2024-11-04 | End: 2024-11-05

## 2024-11-04 RX ORDER — DEXTROSE MONOHYDRATE 100 MG/ML
INJECTION, SOLUTION INTRAVENOUS CONTINUOUS PRN
Status: DISCONTINUED | OUTPATIENT
Start: 2024-11-04 | End: 2024-11-07 | Stop reason: HOSPADM

## 2024-11-04 RX ORDER — OXYCODONE HYDROCHLORIDE 15 MG/1
15 TABLET ORAL EVERY 4 HOURS PRN
Status: DISCONTINUED | OUTPATIENT
Start: 2024-11-04 | End: 2024-11-04

## 2024-11-04 RX ORDER — OXYCODONE HYDROCHLORIDE 10 MG/1
10 TABLET ORAL
Status: DISCONTINUED | OUTPATIENT
Start: 2024-11-04 | End: 2024-11-04

## 2024-11-04 RX ORDER — SODIUM CHLORIDE 0.9 % (FLUSH) 0.9 %
5-40 SYRINGE (ML) INJECTION EVERY 12 HOURS SCHEDULED
Status: DISCONTINUED | OUTPATIENT
Start: 2024-11-04 | End: 2024-11-05 | Stop reason: SDUPTHER

## 2024-11-04 RX ORDER — METHOCARBAMOL 750 MG/1
750 TABLET, FILM COATED ORAL 4 TIMES DAILY
Status: DISCONTINUED | OUTPATIENT
Start: 2024-11-04 | End: 2024-11-07 | Stop reason: HOSPADM

## 2024-11-04 RX ORDER — HYDROMORPHONE HYDROCHLORIDE 1 MG/ML
1 INJECTION, SOLUTION INTRAMUSCULAR; INTRAVENOUS; SUBCUTANEOUS
Status: DISCONTINUED | OUTPATIENT
Start: 2024-11-04 | End: 2024-11-04

## 2024-11-04 RX ORDER — ENOXAPARIN SODIUM 100 MG/ML
40 INJECTION SUBCUTANEOUS DAILY
Status: DISCONTINUED | OUTPATIENT
Start: 2024-11-04 | End: 2024-11-07 | Stop reason: HOSPADM

## 2024-11-04 RX ORDER — POLYETHYLENE GLYCOL 3350 17 G/17G
17 POWDER, FOR SOLUTION ORAL DAILY
Status: DISCONTINUED | OUTPATIENT
Start: 2024-11-04 | End: 2024-11-07 | Stop reason: HOSPADM

## 2024-11-04 RX ORDER — HYDROMORPHONE HYDROCHLORIDE 2 MG/1
4 TABLET ORAL
Status: DISCONTINUED | OUTPATIENT
Start: 2024-11-04 | End: 2024-11-04

## 2024-11-04 RX ORDER — DEXAMETHASONE 4 MG/1
8 TABLET ORAL DAILY
Status: DISCONTINUED | OUTPATIENT
Start: 2024-11-04 | End: 2024-11-07 | Stop reason: HOSPADM

## 2024-11-04 RX ORDER — POLYETHYLENE GLYCOL 3350 17 G/17G
17 POWDER, FOR SOLUTION ORAL DAILY PRN
Status: DISCONTINUED | OUTPATIENT
Start: 2024-11-04 | End: 2024-11-04

## 2024-11-04 RX ADMIN — METHOCARBAMOL TABLETS 750 MG: 750 TABLET, COATED ORAL at 08:06

## 2024-11-04 RX ADMIN — OXYCODONE HYDROCHLORIDE 20 MG: 10 TABLET ORAL at 15:20

## 2024-11-04 RX ADMIN — SODIUM CHLORIDE, PRESERVATIVE FREE 10 ML: 5 INJECTION INTRAVENOUS at 08:09

## 2024-11-04 RX ADMIN — OXYCODONE HYDROCHLORIDE 20 MG: 10 TABLET ORAL at 21:42

## 2024-11-04 RX ADMIN — DOCUSATE SODIUM 100 MG: 100 CAPSULE, LIQUID FILLED ORAL at 08:06

## 2024-11-04 RX ADMIN — HYDROMORPHONE HYDROCHLORIDE 2 MG: 1 INJECTION, SOLUTION INTRAMUSCULAR; INTRAVENOUS; SUBCUTANEOUS at 12:18

## 2024-11-04 RX ADMIN — OXYCODONE HYDROCHLORIDE 20 MG: 10 TABLET ORAL at 17:28

## 2024-11-04 RX ADMIN — ENOXAPARIN SODIUM 40 MG: 100 INJECTION SUBCUTANEOUS at 08:09

## 2024-11-04 RX ADMIN — DEXAMETHASONE 8 MG: 4 TABLET ORAL at 10:24

## 2024-11-04 RX ADMIN — METHOCARBAMOL TABLETS 750 MG: 750 TABLET, COATED ORAL at 20:38

## 2024-11-04 RX ADMIN — HYDROMORPHONE HYDROCHLORIDE 2 MG: 1 INJECTION, SOLUTION INTRAMUSCULAR; INTRAVENOUS; SUBCUTANEOUS at 18:27

## 2024-11-04 RX ADMIN — OXYCODONE HYDROCHLORIDE 15 MG: 15 TABLET ORAL at 06:10

## 2024-11-04 RX ADMIN — HYDROMORPHONE HYDROCHLORIDE 2 MG: 1 INJECTION, SOLUTION INTRAMUSCULAR; INTRAVENOUS; SUBCUTANEOUS at 20:37

## 2024-11-04 RX ADMIN — HYDROMORPHONE HYDROCHLORIDE 2 MG: 1 INJECTION, SOLUTION INTRAMUSCULAR; INTRAVENOUS; SUBCUTANEOUS at 16:25

## 2024-11-04 RX ADMIN — OXYCODONE HYDROCHLORIDE 20 MG: 10 TABLET ORAL at 19:39

## 2024-11-04 RX ADMIN — BICALUTAMIDE 50 MG: 50 TABLET, FILM COATED ORAL at 08:06

## 2024-11-04 RX ADMIN — HYDROMORPHONE HYDROCHLORIDE 2 MG: 1 INJECTION, SOLUTION INTRAMUSCULAR; INTRAVENOUS; SUBCUTANEOUS at 10:23

## 2024-11-04 RX ADMIN — METHOCARBAMOL TABLETS 750 MG: 750 TABLET, COATED ORAL at 02:00

## 2024-11-04 RX ADMIN — METHOCARBAMOL TABLETS 750 MG: 750 TABLET, COATED ORAL at 12:18

## 2024-11-04 RX ADMIN — HYDROMORPHONE HYDROCHLORIDE 1 MG: 1 INJECTION, SOLUTION INTRAMUSCULAR; INTRAVENOUS; SUBCUTANEOUS at 05:09

## 2024-11-04 RX ADMIN — ACETAMINOPHEN 650 MG: 325 TABLET ORAL at 09:03

## 2024-11-04 RX ADMIN — METHOCARBAMOL TABLETS 750 MG: 750 TABLET, COATED ORAL at 17:28

## 2024-11-04 RX ADMIN — ACETAMINOPHEN 650 MG: 325 TABLET ORAL at 02:01

## 2024-11-04 RX ADMIN — HYDROMORPHONE HYDROCHLORIDE 2 MG: 1 INJECTION, SOLUTION INTRAMUSCULAR; INTRAVENOUS; SUBCUTANEOUS at 14:24

## 2024-11-04 RX ADMIN — OXYCODONE HYDROCHLORIDE 15 MG: 15 TABLET ORAL at 02:01

## 2024-11-04 RX ADMIN — ONDANSETRON 4 MG: 2 INJECTION INTRAMUSCULAR; INTRAVENOUS at 02:01

## 2024-11-04 RX ADMIN — OXYCODONE HYDROCHLORIDE 20 MG: 10 TABLET ORAL at 11:30

## 2024-11-04 RX ADMIN — ONDANSETRON 4 MG: 2 INJECTION INTRAMUSCULAR; INTRAVENOUS at 09:21

## 2024-11-04 RX ADMIN — HYDROMORPHONE HYDROCHLORIDE 4 MG: 2 TABLET ORAL at 08:07

## 2024-11-04 RX ADMIN — HYDROMORPHONE HYDROCHLORIDE 1 MG: 1 INJECTION, SOLUTION INTRAMUSCULAR; INTRAVENOUS; SUBCUTANEOUS at 02:00

## 2024-11-04 RX ADMIN — Medication 2000 UNITS: at 08:06

## 2024-11-04 RX ADMIN — SODIUM CHLORIDE, PRESERVATIVE FREE 10 ML: 5 INJECTION INTRAVENOUS at 20:39

## 2024-11-04 RX ADMIN — OXYCODONE HYDROCHLORIDE 20 MG: 10 TABLET ORAL at 13:25

## 2024-11-04 RX ADMIN — OXYCODONE HYDROCHLORIDE 20 MG: 10 TABLET ORAL at 09:03

## 2024-11-04 ASSESSMENT — PAIN SCALES - GENERAL
PAINLEVEL_OUTOF10: 10
PAINLEVEL_OUTOF10: 10
PAINLEVEL_OUTOF10: 9
PAINLEVEL_OUTOF10: 10
PAINLEVEL_OUTOF10: 5
PAINLEVEL_OUTOF10: 10
PAINLEVEL_OUTOF10: 5
PAINLEVEL_OUTOF10: 10

## 2024-11-04 ASSESSMENT — PAIN DESCRIPTION - LOCATION
LOCATION: HIP

## 2024-11-04 ASSESSMENT — PAIN DESCRIPTION - ORIENTATION
ORIENTATION: RIGHT
ORIENTATION: LEFT
ORIENTATION: RIGHT
ORIENTATION: RIGHT;ANTERIOR

## 2024-11-04 ASSESSMENT — PAIN DESCRIPTION - PAIN TYPE
TYPE: CHRONIC PAIN

## 2024-11-04 ASSESSMENT — PAIN - FUNCTIONAL ASSESSMENT
PAIN_FUNCTIONAL_ASSESSMENT: 0-10
PAIN_FUNCTIONAL_ASSESSMENT: INTOLERABLE, UNABLE TO DO ANY ACTIVE OR PASSIVE ACTIVITIES
PAIN_FUNCTIONAL_ASSESSMENT: PREVENTS OR INTERFERES WITH MANY ACTIVE NOT PASSIVE ACTIVITIES
PAIN_FUNCTIONAL_ASSESSMENT: ACTIVITIES ARE NOT PREVENTED
PAIN_FUNCTIONAL_ASSESSMENT: 0-10

## 2024-11-04 ASSESSMENT — PAIN DESCRIPTION - DESCRIPTORS
DESCRIPTORS: CRUSHING
DESCRIPTORS: JABBING;THROBBING
DESCRIPTORS: ACHING;CRUSHING;DISCOMFORT
DESCRIPTORS: ACHING;SORE;SHARP
DESCRIPTORS: DISCOMFORT;CRUSHING;THROBBING
DESCRIPTORS: CRUSHING;DISCOMFORT;THROBBING
DESCRIPTORS: ACHING;DULL;SORE
DESCRIPTORS: JABBING
DESCRIPTORS: ACHING;SORE;SHARP
DESCRIPTORS: ACHING;DISCOMFORT
DESCRIPTORS: ACHING
DESCRIPTORS: ACHING;PRESSURE;SHARP
DESCRIPTORS: DISCOMFORT;CRUSHING;THROBBING
DESCRIPTORS: ACHING;CRUSHING;DISCOMFORT

## 2024-11-04 ASSESSMENT — PAIN DESCRIPTION - ONSET
ONSET: ON-GOING

## 2024-11-04 ASSESSMENT — PAIN DESCRIPTION - FREQUENCY
FREQUENCY: CONTINUOUS

## 2024-11-04 ASSESSMENT — PAIN SCALES - WONG BAKER: WONGBAKER_NUMERICALRESPONSE: HURTS A LITTLE BIT

## 2024-11-04 NOTE — PROGRESS NOTES
[facility] - Family Medicine Inpatient   Resident Progress Note    S:  Hospital day: 1   Brief Synopsis: Jsoeph Bond is a 44 y.o. male with a PMH of Diverticulosis, history of opioid abuse (HCC) stage IV prostate cancer with mets who presents to ED for worsening right sided hip pain and an inability to ambulate due to pain and weakness. Pain is throbbing and constant, refractory to outpatient pain regimen with Dilaudid 2 mg Q2 PRN and Oxy 20 mg Q3-4H PRN. Denies loss of bowel or bladder function, saddle anesthesia, or paresthesias in his bilateral lower extremities. Patient was recently admitted and was started on and inpatient pain regimen to include IV Dilaudid 1 mg Q3 PRN, Oxy 15 mg Q4 PRN, in addition to Robaxin 750 mg QID by palliative care. He was started on Casodex by oncology. In the ED, patient had an elevated AST (45), elevated alk phos (653), low hemoglobin (8.2) but at baseline and normal right hip Xray. Patient got a total of  fentanyl 75 mcg x 1, Dilaudid total 4 mg IV, ketamine 3 mg IV x 1, Norflex 60 mg x 1, Robaxin 1 g IV in the ED for pain.    Overnight/interim:   No acute events overnight  Patient continues to experience 10/10 right hip pain and is unable to move it due to excruciating pain   Pain regiment was switched to his outpatient regiment - Dilaudid 4 mg every 2 hours and Oxycodone 20 mg every 2 hours.  Does not have any other acute complaints at this time      Cont meds:    dextrose      sodium chloride       Scheduled meds:    sodium chloride flush  5-40 mL IntraVENous 2 times per day    enoxaparin  40 mg SubCUTAneous Daily    methocarbamol  750 mg Oral 4x Daily    polyethylene glycol  17 g Oral Daily    bicalutamide  50 mg Oral Daily    docusate sodium  100 mg Oral Daily    vitamin D  2,000 Units Oral Daily    sodium chloride flush  5-40 mL IntraVENous 2 times per day     PRN meds: sodium chloride flush, HYDROmorphone, oxyCODONE, glucose, dextrose bolus **OR** dextrose bolus, glucagon  (rDNA), dextrose, sennosides-docusate sodium, sodium chloride flush, sodium chloride, ondansetron **OR** ondansetron, acetaminophen **OR** acetaminophen     I reviewed the patient's past medical and surgical history, Medications and Allergies.    O:  BP (!) 122/97   Pulse (!) 103   Temp 98.1 °F (36.7 °C) (Oral)   Resp 24   Ht 1.702 m (5' 7\")   Wt 73.9 kg (163 lb)   SpO2 92%   BMI 25.53 kg/m²   24 hour I&O: No intake/output data recorded.  No intake/output data recorded.       Physical Exam  Constitutional:       General: He is in acute distress.      Appearance: Normal appearance. He is normal weight.   HENT:      Head: Normocephalic and atraumatic.      Nose: Congestion present.   Eyes:      Extraocular Movements: Extraocular movements intact.      Pupils: Pupils are equal, round, and reactive to light.   Cardiovascular:      Rate and Rhythm: Regular rhythm. Tachycardia present.      Pulses: Normal pulses.      Heart sounds: Normal heart sounds.   Pulmonary:      Effort: Pulmonary effort is normal.      Breath sounds: Normal breath sounds.   Abdominal:      General: Bowel sounds are normal. There is no distension.      Tenderness: There is no abdominal tenderness.   Musculoskeletal:         General: Tenderness (right hip) present. No swelling.      Cervical back: Normal range of motion.      Right hip: Decreased range of motion (due to pain).      Left hip: Normal.      Comments: Severe pain in right LE   Skin:     General: Skin is warm.   Neurological:      General: No focal deficit present.      Mental Status: He is alert and oriented to person, place, and time.         Labs:  Na/K/Cl/CO2:  139/3.7/102/26 (11/03 1624)  BUN/Cr/glu/ALT/AST/amyl/lip:  10/0.7/--/12/45/--/-- (11/03 1624)  WBC/Hgb/Hct/Plts:  7.1/8.2/25.7/130 (11/03 1624)  estimated creatinine clearance is 125 mL/min (based on SCr of 0.7 mg/dL).  Other pertinent labs as noted below    Radiology:  XR HIP 2-3 VW W PELVIS RIGHT   Final Result   No

## 2024-11-04 NOTE — ED NOTES
This RN spoke with pt per section ANDREY Lr's request. Explained that RN has been contacting admitting physician regarding pain medications from home, but at this time no further orders due to concern for depressing resp drive. This RN went over pain meds received in ER and next scheduled meds. Informed pt would look further into situation to see if any further treatment could be provided for relief of pain

## 2024-11-04 NOTE — H&P
[facility] - Family Medicine Resident Inpatient  History and Physical      CC: Hip Pain (R hip pain 2 days, stage 4 prostate CA mets to bone, can't lift leg anymore, also has tumor on spine)  .    HPI: History obtained from patient. Patient is oriented to time, place, person x3.  Joseph Bond is a 44 y.o. male with a PMH of Diverticulosis, history of opioid abuse (HCC) stage IV prostate cancer with mets who presents to ED for worsening right sided hip pain and an inability to ambulate due to pain and weakness.  Patient states that pain acutely started yesterday in his right hip and wraps around his back.  He has been unable to lift his leg ever since.  Pain is throbbing and constant, refractory to outpatient pain regimen with Dilaudid 2 mg Q2 PRN and Oxy 20 mg Q3-4H PRN. Denies the presence of pain elsewhere.  Denies loss of bowel or bladder function, saddle anesthesia, or paresthesias in his bilateral lower extremities.  Of note, patient was recently admitted from 9/26 to 10/11 for inability to walk, nausea and emesis with streaks of blood.  He was noted to have thrombocytopenia as well as low hemoglobin count and was transfused with both pRBC and platelets.  He was finally agreeable to chemotherapy and was started on Casodex per oncology.  Palliative was consulted and patient was started on a pain regimen to include IV Dilaudid 1 mg Q3 PRN, Oxy 15 mg Q4 PRN, in addition to Robaxin 750 mg QID.  He declined receiving the fentanyl patch due to worsening pain.  Since discharge, he has been compliant with his Casodex with no missed doses.  Lumbar spine x-ray was ordered by PETEY on 10/30 and was remarkable for a slight increase in the anterior wedging of the L2 vertebral body and lumbarization of S1.    Otherwise, he denies having any fevers, chills, N/V/D/C, abdominal pain, additional back pain, SOB, chest pain, palpitations, dysuria, melena, hematochezia, hematuria, or any other acute symptoms.  Patient denies use of

## 2024-11-04 NOTE — ED NOTES
Patient / family persistantly ane repeatedly requesting home pain medication regimen (which includes 2mg of dilaudid q2h, 20mg of oxycodone q3h, and 750mg of robaxin q6h) be started. In the past 10 hours, patient has received 75mcg of fentanyl IV, 4mg of dilaudid IV, 30mg of ketamine IV, 1000mg of robaxin IV, and 60mg of norflex IM. Patient states that he has not had any pain relief from any of these medications. Attending / resident made aware - concern for respiratory depression prevents the meeting patient's request.

## 2024-11-04 NOTE — PROGRESS NOTES
RN asked pt if we could try Cts again since pain seems slightly more manageable and pt states it is impossible for him to lay flat even with the doses of pain medicine and muscle relaxers given. RN will reassess after next pain med dose.

## 2024-11-04 NOTE — PROGRESS NOTES
Pt moaning and writhing in pain despite both PRN meds being given recently. Night shift Rns messaged FM who deferred pain management to palliative. Palliative messaged and states they will see him this morning. Night shift RN messaged FM attending to get something for pain ordered quicker due to pt unrelenting pain.

## 2024-11-04 NOTE — PLAN OF CARE
Problem: Skin/Tissue Integrity  Goal: Absence of new skin breakdown  Description: 1.  Monitor for areas of redness and/or skin breakdown  2.  Assess vascular access sites hourly  3.  Every 4-6 hours minimum:  Change oxygen saturation probe site  4.  Every 4-6 hours:  If on nasal continuous positive airway pressure, respiratory therapy assess nares and determine need for appliance change or resting period.  11/4/2024 1659 by Jenise Kearns RN  Outcome: Progressing     Problem: Safety - Adult  Goal: Free from fall injury  11/4/2024 1659 by Jenise Kearns RN  Outcome: Progressing     Problem: Discharge Planning  Goal: Discharge to home or other facility with appropriate resources  11/4/2024 1659 by Jenise Kearns RN  Outcome: Progressing     Problem: Skin/Tissue Integrity - Adult  Goal: Skin integrity remains intact  Outcome: Progressing     Problem: Musculoskeletal - Adult  Goal: Return mobility to safest level of function  Outcome: Progressing     Problem: Infection - Adult  Goal: Absence of infection at discharge  Outcome: Progressing

## 2024-11-04 NOTE — PROGRESS NOTES
Transport at bedside, pt is in too much pain to go to xray. He is requesting xray to be portable, xray notified and state they will come to the bedside.

## 2024-11-04 NOTE — PROGRESS NOTES
4 Eyes Skin Assessment     NAME:  Joseph Bond  YOB: 1980  MEDICAL RECORD NUMBER:  [id number]    The patient is being assessed for  Admission    I agree that at least one RN has performed a thorough Head to Toe Skin Assessment on the patient. ALL assessment sites listed below have been assessed.      Areas assessed by both nurses:    Head, Face, Ears and Arms, Elbows, Hands limited assessment patient refusing to turn or lift legs due to pain        Does the Patient have a Wound? No noted wound(s)       Simon Prevention initiated by RN: Yes  Wound Care Orders initiated by RN: No    Pressure Injury (Stage 3,4, Unstageable, DTI, NWPT, and Complex wounds) if present, place Wound referral order by RN under : No    New Ostomies, if present place, Ostomy referral order under : No     Nurse 1 eSignature: Electronically signed by Jenise Kearns RN on 11/4/24 at 5:02 PM EST    **SHARE this note so that the co-signing nurse can place an eSignature**    Nurse 2 eSignature: {Esignature:[id number]}

## 2024-11-05 ENCOUNTER — TELEPHONE (OUTPATIENT)
Dept: NEUROSURGERY | Age: 44
End: 2024-11-05

## 2024-11-05 ENCOUNTER — APPOINTMENT (OUTPATIENT)
Dept: CT IMAGING | Age: 44
End: 2024-11-05
Payer: MEDICAID

## 2024-11-05 LAB
ALBUMIN SERPL-MCNC: 3.8 G/DL (ref 3.5–5.2)
ALP SERPL-CCNC: 563 U/L (ref 40–129)
ALT SERPL-CCNC: 12 U/L (ref 0–40)
ANION GAP SERPL CALCULATED.3IONS-SCNC: 10 MMOL/L (ref 7–16)
AST SERPL-CCNC: 40 U/L (ref 0–39)
BASOPHILS # BLD: 0 K/UL (ref 0–0.2)
BASOPHILS NFR BLD: 0 % (ref 0–2)
BILIRUB SERPL-MCNC: 0.4 MG/DL (ref 0–1.2)
BUN SERPL-MCNC: 13 MG/DL (ref 6–20)
CALCIUM SERPL-MCNC: 9.5 MG/DL (ref 8.6–10.2)
CHLORIDE SERPL-SCNC: 98 MMOL/L (ref 98–107)
CO2 SERPL-SCNC: 27 MMOL/L (ref 22–29)
CREAT SERPL-MCNC: 0.7 MG/DL (ref 0.7–1.2)
EOSINOPHIL # BLD: 0 K/UL (ref 0.05–0.5)
EOSINOPHILS RELATIVE PERCENT: 0 % (ref 0–6)
ERYTHROCYTE [DISTWIDTH] IN BLOOD BY AUTOMATED COUNT: 17.8 % (ref 11.5–15)
GFR, ESTIMATED: >90 ML/MIN/1.73M2
GLUCOSE SERPL-MCNC: 133 MG/DL (ref 74–99)
HCT VFR BLD AUTO: 26.4 % (ref 37–54)
HGB BLD-MCNC: 8.6 G/DL (ref 12.5–16.5)
LYMPHOCYTES NFR BLD: 0 K/UL (ref 1.5–4)
LYMPHOCYTES RELATIVE PERCENT: 0 % (ref 20–42)
MCH RBC QN AUTO: 30.2 PG (ref 26–35)
MCHC RBC AUTO-ENTMCNC: 32.6 G/DL (ref 32–34.5)
MCV RBC AUTO: 92.6 FL (ref 80–99.9)
METAMYELOCYTES ABSOLUTE COUNT: 0.07 K/UL (ref 0–0.12)
METAMYELOCYTES: 1 % (ref 0–1)
MONOCYTES NFR BLD: 0.29 K/UL (ref 0.1–0.95)
MONOCYTES NFR BLD: 4 % (ref 2–12)
MYELOCYTES ABSOLUTE COUNT: 0.07 K/UL
MYELOCYTES: 1 %
NEUTROPHILS NFR BLD: 95 % (ref 43–80)
NEUTS SEG NFR BLD: 7.77 K/UL (ref 1.8–7.3)
PLATELET # BLD AUTO: 121 K/UL (ref 130–450)
PMV BLD AUTO: 8.9 FL (ref 7–12)
POTASSIUM SERPL-SCNC: 4.2 MMOL/L (ref 3.5–5)
PROT SERPL-MCNC: 7 G/DL (ref 6.4–8.3)
RBC # BLD AUTO: 2.85 M/UL (ref 3.8–5.8)
RBC # BLD: ABNORMAL 10*6/UL
SODIUM SERPL-SCNC: 135 MMOL/L (ref 132–146)
WBC OTHER # BLD: 8.2 K/UL (ref 4.5–11.5)

## 2024-11-05 PROCEDURE — 36415 COLL VENOUS BLD VENIPUNCTURE: CPT

## 2024-11-05 PROCEDURE — 2580000003 HC RX 258

## 2024-11-05 PROCEDURE — 6370000000 HC RX 637 (ALT 250 FOR IP)

## 2024-11-05 PROCEDURE — 2500000003 HC RX 250 WO HCPCS: Performed by: NURSE PRACTITIONER

## 2024-11-05 PROCEDURE — 85025 COMPLETE CBC W/AUTO DIFF WBC: CPT

## 2024-11-05 PROCEDURE — 99232 SBSQ HOSP IP/OBS MODERATE 35: CPT | Performed by: FAMILY MEDICINE

## 2024-11-05 PROCEDURE — 6360000002 HC RX W HCPCS: Performed by: PHYSICIAN ASSISTANT

## 2024-11-05 PROCEDURE — 1200000000 HC SEMI PRIVATE

## 2024-11-05 PROCEDURE — 72131 CT LUMBAR SPINE W/O DYE: CPT

## 2024-11-05 PROCEDURE — 6360000002 HC RX W HCPCS

## 2024-11-05 PROCEDURE — 80053 COMPREHEN METABOLIC PANEL: CPT

## 2024-11-05 PROCEDURE — 72192 CT PELVIS W/O DYE: CPT

## 2024-11-05 PROCEDURE — 6360000002 HC RX W HCPCS: Performed by: NURSE PRACTITIONER

## 2024-11-05 RX ORDER — NALOXONE HYDROCHLORIDE 0.4 MG/ML
INJECTION, SOLUTION INTRAMUSCULAR; INTRAVENOUS; SUBCUTANEOUS PRN
Status: DISCONTINUED | OUTPATIENT
Start: 2024-11-05 | End: 2024-11-07 | Stop reason: HOSPADM

## 2024-11-05 RX ORDER — OXYCODONE HYDROCHLORIDE 10 MG/1
20 TABLET ORAL
Status: DISCONTINUED | OUTPATIENT
Start: 2024-11-05 | End: 2024-11-07 | Stop reason: HOSPADM

## 2024-11-05 RX ORDER — POLYETHYLENE GLYCOL 3350 17 G/17G
17 POWDER, FOR SOLUTION ORAL DAILY PRN
Status: DISCONTINUED | OUTPATIENT
Start: 2024-11-05 | End: 2024-11-06

## 2024-11-05 RX ORDER — LORAZEPAM 2 MG/ML
1 INJECTION INTRAMUSCULAR ONCE
Status: COMPLETED | OUTPATIENT
Start: 2024-11-05 | End: 2024-11-05

## 2024-11-05 RX ADMIN — DOCUSATE SODIUM 100 MG: 100 CAPSULE, LIQUID FILLED ORAL at 07:52

## 2024-11-05 RX ADMIN — OXYCODONE HYDROCHLORIDE 20 MG: 10 TABLET ORAL at 20:28

## 2024-11-05 RX ADMIN — HYDROMORPHONE HYDROCHLORIDE 2 MG: 1 INJECTION, SOLUTION INTRAMUSCULAR; INTRAVENOUS; SUBCUTANEOUS at 08:54

## 2024-11-05 RX ADMIN — OXYCODONE HYDROCHLORIDE 20 MG: 10 TABLET ORAL at 13:10

## 2024-11-05 RX ADMIN — SODIUM CHLORIDE, PRESERVATIVE FREE 10 ML: 5 INJECTION INTRAVENOUS at 11:12

## 2024-11-05 RX ADMIN — HYDROMORPHONE HYDROCHLORIDE 2 MG: 1 INJECTION, SOLUTION INTRAMUSCULAR; INTRAVENOUS; SUBCUTANEOUS at 01:28

## 2024-11-05 RX ADMIN — LORAZEPAM 1 MG: 2 INJECTION INTRAMUSCULAR; INTRAVENOUS at 08:51

## 2024-11-05 RX ADMIN — POLYETHYLENE GLYCOL 3350 17 G: 17 POWDER, FOR SOLUTION ORAL at 07:53

## 2024-11-05 RX ADMIN — HYDROMORPHONE HYDROCHLORIDE 2 MG: 1 INJECTION, SOLUTION INTRAMUSCULAR; INTRAVENOUS; SUBCUTANEOUS at 04:47

## 2024-11-05 RX ADMIN — BICALUTAMIDE 50 MG: 50 TABLET, FILM COATED ORAL at 07:51

## 2024-11-05 RX ADMIN — OXYCODONE HYDROCHLORIDE 20 MG: 10 TABLET ORAL at 11:11

## 2024-11-05 RX ADMIN — OXYCODONE HYDROCHLORIDE 20 MG: 10 TABLET ORAL at 22:21

## 2024-11-05 RX ADMIN — HYDROMORPHONE HYDROCHLORIDE: 10 INJECTION, SOLUTION INTRAMUSCULAR; INTRAVENOUS; SUBCUTANEOUS at 16:13

## 2024-11-05 RX ADMIN — METHOCARBAMOL TABLETS 750 MG: 750 TABLET, COATED ORAL at 07:52

## 2024-11-05 RX ADMIN — OXYCODONE HYDROCHLORIDE 20 MG: 10 TABLET ORAL at 15:28

## 2024-11-05 RX ADMIN — OXYCODONE HYDROCHLORIDE 20 MG: 10 TABLET ORAL at 07:51

## 2024-11-05 RX ADMIN — DEXAMETHASONE 8 MG: 4 TABLET ORAL at 07:52

## 2024-11-05 RX ADMIN — SODIUM CHLORIDE, PRESERVATIVE FREE 10 ML: 5 INJECTION INTRAVENOUS at 07:53

## 2024-11-05 RX ADMIN — METHOCARBAMOL TABLETS 750 MG: 750 TABLET, COATED ORAL at 13:10

## 2024-11-05 RX ADMIN — METHOCARBAMOL TABLETS 750 MG: 750 TABLET, COATED ORAL at 20:28

## 2024-11-05 RX ADMIN — OXYCODONE HYDROCHLORIDE 20 MG: 10 TABLET ORAL at 05:39

## 2024-11-05 RX ADMIN — HYDROMORPHONE HYDROCHLORIDE 2 MG: 1 INJECTION, SOLUTION INTRAMUSCULAR; INTRAVENOUS; SUBCUTANEOUS at 14:24

## 2024-11-05 RX ADMIN — METHOCARBAMOL TABLETS 750 MG: 750 TABLET, COATED ORAL at 17:59

## 2024-11-05 RX ADMIN — HYDROMORPHONE HYDROCHLORIDE 2 MG: 1 INJECTION, SOLUTION INTRAMUSCULAR; INTRAVENOUS; SUBCUTANEOUS at 06:49

## 2024-11-05 RX ADMIN — Medication 2000 UNITS: at 07:51

## 2024-11-05 RX ADMIN — HYDROMORPHONE HYDROCHLORIDE 2 MG: 1 INJECTION, SOLUTION INTRAMUSCULAR; INTRAVENOUS; SUBCUTANEOUS at 12:12

## 2024-11-05 ASSESSMENT — PAIN SCALES - GENERAL
PAINLEVEL_OUTOF10: 10
PAINLEVEL_OUTOF10: 10
PAINLEVEL_OUTOF10: 9
PAINLEVEL_OUTOF10: 9
PAINLEVEL_OUTOF10: 6
PAINLEVEL_OUTOF10: 10
PAINLEVEL_OUTOF10: 10
PAINLEVEL_OUTOF10: 6
PAINLEVEL_OUTOF10: 10
PAINLEVEL_OUTOF10: 3
PAINLEVEL_OUTOF10: 10

## 2024-11-05 ASSESSMENT — PAIN DESCRIPTION - FREQUENCY
FREQUENCY: CONTINUOUS

## 2024-11-05 ASSESSMENT — PAIN DESCRIPTION - DESCRIPTORS
DESCRIPTORS: ACHING;SORE;THROBBING
DESCRIPTORS: ACHING;BURNING
DESCRIPTORS: ACHING;SORE;THROBBING
DESCRIPTORS: ACHING;SORE;THROBBING
DESCRIPTORS: ACHING;SORE;DISCOMFORT
DESCRIPTORS: ACHING
DESCRIPTORS: JABBING
DESCRIPTORS: SHARP;THROBBING;SORE
DESCRIPTORS: ACHING;SORE;THROBBING
DESCRIPTORS: ACHING;THROBBING;SORE
DESCRIPTORS: ACHING;SORE;THROBBING
DESCRIPTORS: ACHING;THROBBING;SORE
DESCRIPTORS: SHARP;THROBBING;SORE
DESCRIPTORS: ACHING;JABBING

## 2024-11-05 ASSESSMENT — PAIN DESCRIPTION - LOCATION
LOCATION: PELVIS;LEG
LOCATION: PELVIS
LOCATION: PELVIS;LEG
LOCATION: HIP
LOCATION: PELVIS
LOCATION: HIP
LOCATION: HIP
LOCATION: PELVIS
LOCATION: HIP
LOCATION: PELVIS

## 2024-11-05 ASSESSMENT — PAIN - FUNCTIONAL ASSESSMENT
PAIN_FUNCTIONAL_ASSESSMENT: PREVENTS OR INTERFERES SOME ACTIVE ACTIVITIES AND ADLS
PAIN_FUNCTIONAL_ASSESSMENT: PREVENTS OR INTERFERES SOME ACTIVE ACTIVITIES AND ADLS
PAIN_FUNCTIONAL_ASSESSMENT: PREVENTS OR INTERFERES WITH MANY ACTIVE NOT PASSIVE ACTIVITIES
PAIN_FUNCTIONAL_ASSESSMENT: PREVENTS OR INTERFERES SOME ACTIVE ACTIVITIES AND ADLS
PAIN_FUNCTIONAL_ASSESSMENT: PREVENTS OR INTERFERES WITH MANY ACTIVE NOT PASSIVE ACTIVITIES
PAIN_FUNCTIONAL_ASSESSMENT: PREVENTS OR INTERFERES SOME ACTIVE ACTIVITIES AND ADLS

## 2024-11-05 ASSESSMENT — PAIN DESCRIPTION - ORIENTATION
ORIENTATION: RIGHT

## 2024-11-05 ASSESSMENT — PAIN DESCRIPTION - ONSET
ONSET: ON-GOING

## 2024-11-05 ASSESSMENT — PAIN DESCRIPTION - PAIN TYPE
TYPE: CHRONIC PAIN

## 2024-11-05 NOTE — PLAN OF CARE
Problem: Skin/Tissue Integrity  Goal: Absence of new skin breakdown  Description: 1.  Monitor for areas of redness and/or skin breakdown  2.  Assess vascular access sites hourly  3.  Every 4-6 hours minimum:  Change oxygen saturation probe site  4.  Every 4-6 hours:  If on nasal continuous positive airway pressure, respiratory therapy assess nares and determine need for appliance change or resting period.  11/5/2024 0149 by Simin Cedeño RN  Outcome: Progressing  11/4/2024 1659 by Jenise Kearns RN  Outcome: Progressing  11/4/2024 1658 by Jenise Kearns RN  Outcome: Progressing     Problem: Safety - Adult  Goal: Free from fall injury  11/5/2024 0149 by Simin Cedeño RN  Outcome: Progressing  11/4/2024 1659 by Jenise Kearns RN  Outcome: Progressing  11/4/2024 1658 by Jenise Kearns RN  Outcome: Progressing     Problem: Discharge Planning  Goal: Discharge to home or other facility with appropriate resources  11/5/2024 0149 by Simin Cedeño RN  Outcome: Progressing  Flowsheets (Taken 11/4/2024 2000)  Discharge to home or other facility with appropriate resources:   Identify barriers to discharge with patient and caregiver   Arrange for needed discharge resources and transportation as appropriate   Identify discharge learning needs (meds, wound care, etc)  11/4/2024 1659 by Jenise Kearns RN  Outcome: Progressing  11/4/2024 1658 by Jenise Kearns RN  Outcome: Progressing     Problem: Pain  Goal: Verbalizes/displays adequate comfort level or baseline comfort level  11/5/2024 0149 by Simin Cedeño RN  Outcome: Progressing  11/4/2024 1659 by Jenise Kearns RN  Outcome: Progressing  11/4/2024 1658 by Jenise Kearns RN  Outcome: Progressing     Problem: Skin/Tissue Integrity - Adult  Goal: Skin integrity remains intact  11/5/2024 0149 by Simin Cedeño RN  Outcome: Progressing  11/4/2024 1659 by Jenise Kearns RN  Outcome: Progressing     Problem: Musculoskeletal - Adult  Goal: Return mobility to

## 2024-11-05 NOTE — PROGRESS NOTES
Spiritual Health History and Assessment/Progress Note  [facility]    Initial Encounter, Spiritual/Emotional Needs, Palliative Care,  ,  ,      Name: Joseph Bond MRN: [id number]    Age: 44 y.o.     Sex: male   Language: English   Congregation: Spiritualist   Metastatic cancer to bone (HCC)     Date: 11/5/2024                           Spiritual Assessment began in [facility]        Referral/Consult From: Rounding   Encounter Overview/Reason: Initial Encounter, Spiritual/Emotional Needs, Palliative Care  Service Provided For: Patient    Gail, Belief, Meaning:   Patient identifies as spiritual, is connected with a gail tradition or spiritual practice, has beliefs or practices that help with coping during difficult times, and Other: Mr. Bond has unique and personal spiritual beliefs that are similar to Jain beliefs.  Family/Friends No family/friends present      Importance and Influence:  Patient has spiritual/personal beliefs that influence decisions regarding their health and Other: We talked at length about how his spiritual beliefs have helped him these past few months after finding out about his cancer diagnosis.   Family/Friends No family/friends present    Community:  Patient feels well-supported. Support system includes: Parent/s, Extended family, and Other: Indra talked with me about his mother and sister whom he says God placed back into his life.  He expressed gratitude towards the Lord for bring them back into his life. and expresses feelings of isolation: disconnected from family/friends and Other: He also spoke with me about his girlfriend who has dementia.  He stated that he cared for her for many years until he got sick at which point she went into a nursing home.  He expressed disconnect from her family.  Family/Friends No family/friends present    Assessment and Plan of Care:     Patient Interventions include: Facilitated expression of thoughts and feelings, Explored  spiritual coping/struggle/distress, Engaged in theological reflection, Affirmed coping skills/support systems, and Other: We reflected on his life and possible death.  He spoke with me about his conflict relating to having Chemo Therapy.  He also talked about how he is at peace in his stephanie no matter what happens to his life.    Family/Friends Interventions include: No family/friends present    Patient Plan of Care: Spiritual Care available upon further referral  Family/Friends Plan of Care: No family/friends present    Electronically signed by NORMAN Don on 11/5/2024 at 10:47 AM

## 2024-11-05 NOTE — PROGRESS NOTES
Leo Bradley NP notified of patient being upset about PCA pump taking place of PRN pain medications. Attempted to educate patient on PCA pump and goals of pain control. Patient unable to understand and asking to speak to someone in charge. Asked for Dr. Washington to please call the unit. Nursing supervisor notified.

## 2024-11-05 NOTE — PROGRESS NOTES
Occupational Therapy    Date:2024  Patient Name: Joseph Bond  MRN: [id number]  : 1980  Room: [address] [place], [place] [address]     OT orders received and chart reviewed. OT eval on hold at this time pending imaging results of CT pelvis.  OT will follow and re-attempt eval as appropriate, pending further ortho POC, at a later time/date.    Michel Gutierrez OTR/L; [id number]

## 2024-11-05 NOTE — PROGRESS NOTES
CT called inquiring about patient's willingness to go down for scan.  Patient stated that he is unable to lay flat and unless \"they do more for this pain, I am not going to be able to.\"  CT tech made aware.

## 2024-11-05 NOTE — PROGRESS NOTES
PCA pump initiated. Patient educated provided to patient regarding CO2 monitoring, PCA bolus dose, and the importance of him being the only one to push his button.

## 2024-11-05 NOTE — CARE COORDINATION
11/5/24 CM Note.  Pt admitted 11/3/24 Metastatic cancer to bone. Awaiting CT results. Hem Onc following.  Palliative following for symptom mgt.  Pt was active with both [facility] and [facility].  Upon DC Palliative services to continue with [facility].  Referral to [facility] for [facility] for SN. Planning pain pump.   Silvana TAVERA RN-BC  [phone]    Case Management Assessment  Initial Evaluation    Date/Time of Evaluation: 11/5/2024 1:32 PM  Assessment Completed by: Silvana Fisher RN    If patient is discharged prior to next notation, then this note serves as note for discharge by case management.    Patient Name: Joseph Bond                   YOB: 1980  Diagnosis: Metastatic cancer to bone (HCC) [C79.51]                   Date / Time: 11/3/2024  3:10 PM    Patient Admission Status: Inpatient   Readmission Risk (Low < 19, Mod (19-27), High > 27): Readmission Risk Score: 28.8    Current PCP: Ty Shaikh MD  PCP verified by ? Yes    Chart Reviewed: Yes      History Provided by: Patient  Patient Orientation: Alert and Oriented    Patient Cognition: Alert    Hospitalization in the last 30 days (Readmission):  Yes    If yes, Readmission Assessment in  Navigator will be completed.    Advance Directives:      Code Status: Full Code   Patient's Primary Decision Maker is: Legal Next of Kin    Primary Decision Maker: Margarita Bond - Brother/Sister - [phone]    Secondary Decision Maker: Jeri Bond - Brother/Sister - [phone]    Supplemental (Other) Decision Maker: Ryan Bond - Brother/Sister - [phone]    Discharge Planning:    Patient lives with: Alone Type of Home: Apartment  Primary Care Giver: Self  Patient Support Systems include: Family Members   Current Financial resources:    Current community resources:    Current services prior to admission: None            Current DME:              Type of Home Care services:  None    ADLS  Prior functional level: Assistance with  the following:, Bathing, Cooking, Housework, Shopping  Current functional level: Assistance with the following:, Bathing, Dressing, Cooking, Housework, Shopping    PT AM-PAC:   /24  OT AM-PAC:   /24    Family can provide assistance at DC: Yes  Would you like Case Management to discuss the discharge plan with any other family members/significant others, and if so, who? No  Plans to Return to Present Housing: Yes    Patient expects to discharge to: Apartment  Plan for transportation at discharge: family     Financial    Payor: [employer] MEDICAID / Plan: [facility] MEDICA / Product Type: *No Product type* /     Does insurance require precert for SNF: Yes    Potential assistance Purchasing Medications: No  Meds-to-Beds request:        [facility] [address] [place] [address] [address]  - P [phone] - F [phone]  [address] [facility] [address]  Phone: [phone] Fax: [phone]    [facility] [address] [place] [address] [address] [facility] -  [phone] -  [phone]  [address] [place] [address]  Phone: [phone] Fax: [phone]          The Plan for Transition of Care is related to the following treatment goals of Metastatic cancer to bone (HCC) [C79.51]    IF APPLICABLE: The Patient wasprovided with a choice of provider and agrees with the discharge plan. Freedom of choice list with basic dialogue that supports the patient's individualized plan of care/goals and shares the quality data associated with the providers was provided to    Patient      The Patient and/or Patient Representative Agree with the Discharge Plan? yes     Silvana Fisher RN

## 2024-11-05 NOTE — CONSULTS
Palliative Care Department  [phone]  Palliative Care Initial Consult  Provider Kylee Tellez, APRN - CNP    Joseph Bond  [id number]  Hospital Day: 2  Date of Initial Consult: 11/4/2024  Referring Provider: Apryl Pleitez MD  Palliative Medicine was consulted for assistance with: Overwhelming symptoms    HPI:   Joseph Bond is a 44 y.o. with a medical history of stage IV prostate cancer with metastasis to the bone who was admitted on 11/3/2024 from home with a CHIEF COMPLAINT of hip pain. Patient states that pain acutely started yesterday in his right hip and wraps around his back.  He has been unable to lift his leg ever since.  Pain is throbbing and constant, refractory to outpatient pain regimen with Dilaudid 2 mg Q2 PRN and Oxy 20 mg Q3-4H PRN. Patient was recently admitted from 9/26 to 10/11 for inability to walk, nausea and emesis with streaks of blood.  He was noted to have thrombocytopenia as well as low hemoglobin count and was transfused with both pRBC and platelets.  He was finally agreeable to chemotherapy and was started on Casodex per oncology.  Lumbar spine x-ray was ordered by [facility] on 10/30 and was remarkable for a slight increase in the anterior wedging of the L2 vertebral body and lumbarization of S1.   ASSESSMENT/PLAN:     Pertinent Hospital Diagnoses     Stage IV prostate cancer with metastasis to the bone    Symptom management    Pain due to neoplasm  -Continue oxycodone 20 mg every 2 hours as needed  -Start IV Dilaudid 2 mg every 2 hours as needed  -Start Decadron 8 mg daily x 5 days  -Robaxin 750 mg 4 times daily  -DC oral Dilaudid 4 mg every 2 hours as needed    Constipation prophylaxis  -Colace 100 mg daily  -GlycoLax daily  -Senna S as needed    Palliative Care Encounter / Counseling Regarding Goals of Care  Please see detailed goals of care discussion as below  At this time, Joseph Bond, Does have capacity for medical decision-making.  Capacity is time limited and 
Department of Orthopedic Surgery  Consult Note    Reason for Consult: Right hip    HISTORY OF PRESENT ILLNESS:       Patient is a 44 y.o. male who presents with right hip pain, this has been going on for several days now worsening, patient states he is unable to bear weight due to the severe right hip pain.  He was recently hospitalized for about 15 days, he does have metastatic prostate cancer and has a mass in his lumbar spine according to patient.  Patient is having severe right hip pain that does radiate to his back but it also is in his groin..  Denies numbness/tingling/paresthesias.  Denies any other orthopedic complaints at this time.      Past Medical History:        Diagnosis Date    Cancer (HCC)     Diverticulosis     between 2016 to 2017 diverticulitis an diverticulosis    History of opioid abuse (HCC)      Past Surgical History:        Procedure Laterality Date    BRONCHOSCOPY N/A 7/11/2024    BRONCHOSCOPY ENDOBRONCHIAL ULTRASOUND FINE NEEDLE ASPIRATION performed by Ed Cornejo DO at [facility] ENDOSCOPY    BRONCHOSCOPY  7/11/2024    BRONCHOSCOPY DIAGNOSTIC OR CELL WASH ONLY performed by Ed Cornejo DO at [facility] ENDOSCOPY     Current Medications:   Current Facility-Administered Medications: sodium chloride flush 0.9 % injection 5-40 mL, 5-40 mL, IntraVENous, 2 times per day  sodium chloride flush 0.9 % injection 5-40 mL, 5-40 mL, IntraVENous, PRN  enoxaparin (LOVENOX) injection 40 mg, 40 mg, SubCUTAneous, Daily  HYDROmorphone HCl PF (DILAUDID) injection 1 mg, 1 mg, IntraVENous, Q3H PRN  oxyCODONE (OXY-IR) immediate release tablet 15 mg, 15 mg, Oral, Q4H PRN  glucose chewable tablet 16 g, 4 tablet, Oral, PRN  dextrose bolus 10% 125 mL, 125 mL, IntraVENous, PRN **OR** dextrose bolus 10% 250 mL, 250 mL, IntraVENous, PRN  glucagon injection 1 mg, 1 mg, SubCUTAneous, PRN  dextrose 10 % infusion, , IntraVENous, Continuous PRN  methocarbamol (ROBAXIN) tablet 750 mg, 750 mg, Oral, 4x 
osseous metastatic disease again demonstrated without clear evidence of acute bony abnormality.   - CT lumbar spine pending.   - Palliative consulted for symptom management. CMP unremarkable.  - LFT's with Alk Phos 563, AST 40. CBC with Hgb 8.6, platelets 121. WBC WNL.     Consultation for patient with known metastatic prostate cancer.     Attending addendum:  As above, palliative care consulted for pain management  To be initiated on Taxotere as soon as he is discharged along with Nubeqa and Gt.        FANG Aguirre - CNP  Electronically signed 11/5/2024 at 10:39 AM  Patient seen and examined, note edited  Jose Marin MD

## 2024-11-05 NOTE — PROGRESS NOTES
Perfect serve message sent to Family Medicine residents requesting patient downgrade off telemetry.     Kylee Montoya RN, BSN

## 2024-11-05 NOTE — TELEPHONE ENCOUNTER
Patient completed x-rays on 10/30 and would like results. He is scheduled for a follow up with repeat x-rays on 1/3/25

## 2024-11-05 NOTE — PROGRESS NOTES
Palliative Care Department  [phone]  Palliative Care Initial Consult  Provider Dallas Washington MD    Jsoeph Bond  [id number]  Hospital Day: 3  Date of Initial Consult: 11/4/2024  Referring Provider: Apryl Pleitez MD  Palliative Medicine was consulted for assistance with: Overwhelming symptoms    HPI:   Joseph Bond is a 44 y.o. with a medical history of stage IV prostate cancer with metastasis to the bone who was admitted on 11/3/2024 from home with a CHIEF COMPLAINT of hip pain. Patient states that pain acutely started yesterday in his right hip and wraps around his back.  He has been unable to lift his leg ever since.  Pain is throbbing and constant, refractory to outpatient pain regimen with Dilaudid 2 mg Q2 PRN and Oxy 20 mg Q3-4H PRN. Patient was recently admitted from 9/26 to 10/11 for inability to walk, nausea and emesis with streaks of blood.  He was noted to have thrombocytopenia as well as low hemoglobin count and was transfused with both pRBC and platelets.  He was finally agreeable to chemotherapy and was started on Casodex per oncology.  Lumbar spine x-ray was ordered by NSLIVIA on 10/30 and was remarkable for a slight increase in the anterior wedging of the L2 vertebral body and lumbarization of S1.   ASSESSMENT/PLAN:     Pertinent Hospital Diagnoses     Stage IV prostate cancer with metastasis to the bone    Symptom management    Pain due to neoplasm  -Continue oxycodone 20 mg every 2 hours as needed  -Stop IV Dilaudid 2 mg every 2 hours as needed  -Continue Decadron 8 mg daily x 5 days  -Robaxin 750 mg 4 times daily  -Start Dilaudid PCA with continuous infusion rate of 0.6 mg/h, with Dilaudid PCA allow for patient controlled pushes of 0.75 mg Dilaudid every 15 minutes  -Will attempt for CADD pump on discharge, coordinating with social work and home health care    Constipation prophylaxis  -Colace 100 mg daily  -GlycoLax daily  -Senna S as needed    Palliative Care Encounter / Counseling  following..    OBJECTIVE:   Prognosis: Guarded    Physical Exam:  /68   Pulse 96   Temp 97.5 °F (36.4 °C) (Temporal)   Resp 20   Ht 1.702 m (5' 7\")   Wt 73.9 kg (163 lb)   SpO2 95%   BMI 25.53 kg/m²   Constitutional: Ill-appearing, awake, alert  Eyes: no scleral icterus, normal lids, no discharge  ENMT:  Normocephalic, atraumatic, mucosa moist  Neck:  trachea midline, no JVD  Lungs: Respirations easy and unlabored  Heart::  RRR, distant heart tones, no murmur, rub, or gallop noted during exam  Abd:  Soft, non tender, non distended, bowel sounds present  Ext:  Moving all extremities, no edema, pulses present  Skin:  Warm and dry, no rashes on visible skin  Psych: non-anxious affect  Neuro:  PERRL, Alert, grossly nonfocal; following commands    Objective data reviewed: labs, images, records, medication use, vitals, and chart    Discussed patient and the plan of care with the other IDT members: Palliative Medicine IDT Team    Time/Communication  Greater than 50% of time spent, total 55 minutes in counseling and coordination of care at the bedside regarding goals of care and symptom management.    Thank you for allowing Palliative Medicine to participate in the care of Joseph Bond.

## 2024-11-05 NOTE — PROGRESS NOTES
[facility] - Family Medicine Inpatient   Resident Progress Note    S:  Hospital day: 2   Brief Synopsis: Joseph Bond is a 44 y.o. male with a PMH of Diverticulosis, history of opioid abuse (HCC) stage IV prostate cancer with mets who presents to ED for worsening right sided hip pain and an inability to ambulate due to pain and weakness. Pain is throbbing and constant, refractory to outpatient pain regimen with Dilaudid 2 mg Q2 PRN and Oxy 20 mg Q3-4H PRN. Denies loss of bowel or bladder function, saddle anesthesia, or paresthesias in his bilateral lower extremities. Patient was recently admitted and was started on and inpatient pain regimen to include IV Dilaudid 1 mg Q3 PRN, Oxy 15 mg Q4 PRN, in addition to Robaxin 750 mg QID by palliative care. He was started on Casodex by oncology. In the ED, patient had an elevated AST (45), elevated alk phos (653), low hemoglobin (8.2) but at baseline and normal right hip Xray. Patient got a total of  fentanyl 75 mcg x 1, Dilaudid total 4 mg IV, ketamine 3 mg IV x 1, Norflex 60 mg x 1, Robaxin 1 g IV in the ED for pain. CT of hip and lumbar spine pending. Pain regiment adjusted to changed to IV Dilaudid 2 mg q2h, PO Oxycodone 20 mg q2h, Robaxin and Tylenol by palliative care. Also started on Decadron 8 mg for five days.    Overnight/interim:   No acute events overnight  Pain is much improved, patient resting comfortably in bed and eating  Still unable to move without pain   Pain regiment adjusted by palliative care  Does not have any other acute complaints at this time      Cont meds:    dextrose      sodium chloride       Scheduled meds:    sodium chloride flush  5-40 mL IntraVENous 2 times per day    enoxaparin  40 mg SubCUTAneous Daily    methocarbamol  750 mg Oral 4x Daily    polyethylene glycol  17 g Oral Daily    dexAMETHasone  8 mg Oral Daily    bicalutamide  50 mg Oral Daily    docusate sodium  100 mg Oral Daily    vitamin D  2,000 Units Oral Daily    sodium chloride

## 2024-11-05 NOTE — PROGRESS NOTES
Received call from clinical manager stating she spoke to Leo Bradley NP on the phone and to give PRN pain medications on top of PCA pump as ordered. Palliative to see patient again tomorrow.

## 2024-11-05 NOTE — PROGRESS NOTES
Physical Therapy    PT order received and medical chart reviewed on 11/5/24. PT evaluation on hold this AM 2/2 pending imaging of CT of pelvis and ortho POC. Will re-attempt as able. Thank you.    Amanda Beckman, PT, DPT  [id number]

## 2024-11-05 NOTE — TELEPHONE ENCOUNTER
Patient called, XR results given. He is currently admitted. This can serve as his 3 month appointment and we can cancel the one in january

## 2024-11-06 LAB
ALBUMIN SERPL-MCNC: 3.7 G/DL (ref 3.5–5.2)
ALP SERPL-CCNC: 441 U/L (ref 40–129)
ALT SERPL-CCNC: 9 U/L (ref 0–40)
ANION GAP SERPL CALCULATED.3IONS-SCNC: 11 MMOL/L (ref 7–16)
AST SERPL-CCNC: 22 U/L (ref 0–39)
BASOPHILS # BLD: 0 K/UL (ref 0–0.2)
BASOPHILS NFR BLD: 0 % (ref 0–2)
BILIRUB SERPL-MCNC: 0.3 MG/DL (ref 0–1.2)
BUN SERPL-MCNC: 14 MG/DL (ref 6–20)
CALCIUM SERPL-MCNC: 9.4 MG/DL (ref 8.6–10.2)
CHLORIDE SERPL-SCNC: 103 MMOL/L (ref 98–107)
CO2 SERPL-SCNC: 27 MMOL/L (ref 22–29)
CREAT SERPL-MCNC: 0.6 MG/DL (ref 0.7–1.2)
EOSINOPHIL # BLD: 0 K/UL (ref 0.05–0.5)
EOSINOPHILS RELATIVE PERCENT: 0 % (ref 0–6)
ERYTHROCYTE [DISTWIDTH] IN BLOOD BY AUTOMATED COUNT: 18 % (ref 11.5–15)
GFR, ESTIMATED: >90 ML/MIN/1.73M2
GLUCOSE SERPL-MCNC: 114 MG/DL (ref 74–99)
HCT VFR BLD AUTO: 25.3 % (ref 37–54)
HGB BLD-MCNC: 8.2 G/DL (ref 12.5–16.5)
LYMPHOCYTES NFR BLD: 0.31 K/UL (ref 1.5–4)
LYMPHOCYTES RELATIVE PERCENT: 4 % (ref 20–42)
MCH RBC QN AUTO: 30 PG (ref 26–35)
MCHC RBC AUTO-ENTMCNC: 32.4 G/DL (ref 32–34.5)
MCV RBC AUTO: 92.7 FL (ref 80–99.9)
METAMYELOCYTES ABSOLUTE COUNT: 0.08 K/UL (ref 0–0.12)
METAMYELOCYTES: 1 % (ref 0–1)
MONOCYTES NFR BLD: 0.7 K/UL (ref 0.1–0.95)
MONOCYTES NFR BLD: 8 % (ref 2–12)
MYELOCYTES ABSOLUTE COUNT: 0.15 K/UL
MYELOCYTES: 2 %
NEUTROPHILS NFR BLD: 84 % (ref 43–80)
NEUTS SEG NFR BLD: 7.51 K/UL (ref 1.8–7.3)
PLATELET # BLD AUTO: 133 K/UL (ref 130–450)
PMV BLD AUTO: 9.4 FL (ref 7–12)
POTASSIUM SERPL-SCNC: 4.1 MMOL/L (ref 3.5–5)
PROMYELOCYTES ABSOLUTE COUNT: 0.15 K/UL
PROMYELOCYTES: 2 %
PROT SERPL-MCNC: 6.4 G/DL (ref 6.4–8.3)
RBC # BLD AUTO: 2.73 M/UL (ref 3.8–5.8)
RBC # BLD: ABNORMAL 10*6/UL
SODIUM SERPL-SCNC: 141 MMOL/L (ref 132–146)
WBC OTHER # BLD: 8.9 K/UL (ref 4.5–11.5)

## 2024-11-06 PROCEDURE — 6370000000 HC RX 637 (ALT 250 FOR IP)

## 2024-11-06 PROCEDURE — 80053 COMPREHEN METABOLIC PANEL: CPT

## 2024-11-06 PROCEDURE — 1200000000 HC SEMI PRIVATE

## 2024-11-06 PROCEDURE — 6370000000 HC RX 637 (ALT 250 FOR IP): Performed by: STUDENT IN AN ORGANIZED HEALTH CARE EDUCATION/TRAINING PROGRAM

## 2024-11-06 PROCEDURE — 6360000002 HC RX W HCPCS: Performed by: NURSE PRACTITIONER

## 2024-11-06 PROCEDURE — 6360000002 HC RX W HCPCS

## 2024-11-06 PROCEDURE — 2580000003 HC RX 258

## 2024-11-06 PROCEDURE — 36415 COLL VENOUS BLD VENIPUNCTURE: CPT

## 2024-11-06 PROCEDURE — 85025 COMPLETE CBC W/AUTO DIFF WBC: CPT

## 2024-11-06 PROCEDURE — 99232 SBSQ HOSP IP/OBS MODERATE 35: CPT | Performed by: FAMILY MEDICINE

## 2024-11-06 RX ORDER — SODIUM CHLORIDE 0.9 % (FLUSH) 0.9 %
5-40 SYRINGE (ML) INJECTION EVERY 12 HOURS SCHEDULED
Status: DISCONTINUED | OUTPATIENT
Start: 2024-11-06 | End: 2024-11-07 | Stop reason: HOSPADM

## 2024-11-06 RX ORDER — SODIUM CHLORIDE 0.9 % (FLUSH) 0.9 %
5-40 SYRINGE (ML) INJECTION PRN
Status: DISCONTINUED | OUTPATIENT
Start: 2024-11-06 | End: 2024-11-07 | Stop reason: HOSPADM

## 2024-11-06 RX ORDER — SODIUM CHLORIDE 9 MG/ML
INJECTION, SOLUTION INTRAVENOUS PRN
Status: DISCONTINUED | OUTPATIENT
Start: 2024-11-06 | End: 2024-11-06

## 2024-11-06 RX ORDER — LIDOCAINE HYDROCHLORIDE 10 MG/ML
50 INJECTION, SOLUTION EPIDURAL; INFILTRATION; INTRACAUDAL; PERINEURAL ONCE
Status: DISCONTINUED | OUTPATIENT
Start: 2024-11-06 | End: 2024-11-07 | Stop reason: HOSPADM

## 2024-11-06 RX ORDER — HYDROMORPHONE HYDROCHLORIDE 2 MG/1
6 TABLET ORAL
Status: DISCONTINUED | OUTPATIENT
Start: 2024-11-06 | End: 2024-11-07 | Stop reason: HOSPADM

## 2024-11-06 RX ADMIN — DEXAMETHASONE 8 MG: 4 TABLET ORAL at 08:46

## 2024-11-06 RX ADMIN — DOCUSATE SODIUM 100 MG: 100 CAPSULE, LIQUID FILLED ORAL at 08:45

## 2024-11-06 RX ADMIN — HYDROMORPHONE HYDROCHLORIDE: 10 INJECTION, SOLUTION INTRAMUSCULAR; INTRAVENOUS; SUBCUTANEOUS at 09:57

## 2024-11-06 RX ADMIN — OXYCODONE HYDROCHLORIDE 20 MG: 10 TABLET ORAL at 08:44

## 2024-11-06 RX ADMIN — SODIUM CHLORIDE, PRESERVATIVE FREE 10 ML: 5 INJECTION INTRAVENOUS at 08:47

## 2024-11-06 RX ADMIN — OXYCODONE HYDROCHLORIDE 20 MG: 10 TABLET ORAL at 13:56

## 2024-11-06 RX ADMIN — OXYCODONE HYDROCHLORIDE 20 MG: 10 TABLET ORAL at 21:11

## 2024-11-06 RX ADMIN — METHOCARBAMOL TABLETS 750 MG: 750 TABLET, COATED ORAL at 21:11

## 2024-11-06 RX ADMIN — OXYCODONE HYDROCHLORIDE 20 MG: 10 TABLET ORAL at 00:23

## 2024-11-06 RX ADMIN — OXYCODONE HYDROCHLORIDE 20 MG: 10 TABLET ORAL at 18:47

## 2024-11-06 RX ADMIN — HYDROMORPHONE HYDROCHLORIDE 6 MG: 2 TABLET ORAL at 20:12

## 2024-11-06 RX ADMIN — OXYCODONE HYDROCHLORIDE 20 MG: 10 TABLET ORAL at 23:15

## 2024-11-06 RX ADMIN — Medication 2000 UNITS: at 08:46

## 2024-11-06 RX ADMIN — OXYCODONE HYDROCHLORIDE 20 MG: 10 TABLET ORAL at 06:27

## 2024-11-06 RX ADMIN — BICALUTAMIDE 50 MG: 50 TABLET, FILM COATED ORAL at 08:46

## 2024-11-06 RX ADMIN — METHOCARBAMOL TABLETS 750 MG: 750 TABLET, COATED ORAL at 13:57

## 2024-11-06 RX ADMIN — METHOCARBAMOL TABLETS 750 MG: 750 TABLET, COATED ORAL at 08:45

## 2024-11-06 RX ADMIN — OXYCODONE HYDROCHLORIDE 20 MG: 10 TABLET ORAL at 04:39

## 2024-11-06 RX ADMIN — METHOCARBAMOL TABLETS 750 MG: 750 TABLET, COATED ORAL at 18:47

## 2024-11-06 RX ADMIN — HYDROMORPHONE HYDROCHLORIDE 6 MG: 2 TABLET ORAL at 22:10

## 2024-11-06 RX ADMIN — SENNOSIDES AND DOCUSATE SODIUM 2 TABLET: 50; 8.6 TABLET ORAL at 13:57

## 2024-11-06 ASSESSMENT — PAIN DESCRIPTION - ONSET
ONSET: ON-GOING
ONSET: AWAKENED FROM SLEEP
ONSET: ON-GOING

## 2024-11-06 ASSESSMENT — PAIN SCALES - GENERAL
PAINLEVEL_OUTOF10: 10
PAINLEVEL_OUTOF10: 8
PAINLEVEL_OUTOF10: 10
PAINLEVEL_OUTOF10: 8
PAINLEVEL_OUTOF10: 6
PAINLEVEL_OUTOF10: 6
PAINLEVEL_OUTOF10: 10
PAINLEVEL_OUTOF10: 7
PAINLEVEL_OUTOF10: 10
PAINLEVEL_OUTOF10: 7
PAINLEVEL_OUTOF10: 10
PAINLEVEL_OUTOF10: 8
PAINLEVEL_OUTOF10: 9
PAINLEVEL_OUTOF10: 10
PAINLEVEL_OUTOF10: 7
PAINLEVEL_OUTOF10: 9
PAINLEVEL_OUTOF10: 6
PAINLEVEL_OUTOF10: 6
PAINLEVEL_OUTOF10: 8
PAINLEVEL_OUTOF10: 8

## 2024-11-06 ASSESSMENT — PAIN DESCRIPTION - ORIENTATION
ORIENTATION: RIGHT
ORIENTATION: RIGHT;LEFT
ORIENTATION: RIGHT

## 2024-11-06 ASSESSMENT — PAIN DESCRIPTION - LOCATION
LOCATION: HIP;PELVIS
LOCATION: PELVIS
LOCATION: PELVIS;BACK;LEG
LOCATION: PELVIS;LEG
LOCATION: PELVIS
LOCATION: PELVIS
LOCATION: HIP;BACK;PELVIS
LOCATION: HIP;BACK;PELVIS
LOCATION: HIP;PELVIS;BACK
LOCATION: PELVIS
LOCATION: HIP;BACK;PELVIS
LOCATION: PELVIS;LEG
LOCATION: HIP;BACK;PELVIS
LOCATION: LEG;PELVIS

## 2024-11-06 ASSESSMENT — PAIN DESCRIPTION - PAIN TYPE
TYPE: CHRONIC PAIN

## 2024-11-06 ASSESSMENT — PAIN - FUNCTIONAL ASSESSMENT
PAIN_FUNCTIONAL_ASSESSMENT: ACTIVITIES ARE NOT PREVENTED
PAIN_FUNCTIONAL_ASSESSMENT: PREVENTS OR INTERFERES SOME ACTIVE ACTIVITIES AND ADLS
PAIN_FUNCTIONAL_ASSESSMENT: ACTIVITIES ARE NOT PREVENTED
PAIN_FUNCTIONAL_ASSESSMENT: PREVENTS OR INTERFERES SOME ACTIVE ACTIVITIES AND ADLS
PAIN_FUNCTIONAL_ASSESSMENT: ACTIVITIES ARE NOT PREVENTED
PAIN_FUNCTIONAL_ASSESSMENT: PREVENTS OR INTERFERES SOME ACTIVE ACTIVITIES AND ADLS
PAIN_FUNCTIONAL_ASSESSMENT: ACTIVITIES ARE NOT PREVENTED
PAIN_FUNCTIONAL_ASSESSMENT: PREVENTS OR INTERFERES SOME ACTIVE ACTIVITIES AND ADLS
PAIN_FUNCTIONAL_ASSESSMENT: PREVENTS OR INTERFERES SOME ACTIVE ACTIVITIES AND ADLS
PAIN_FUNCTIONAL_ASSESSMENT: ACTIVITIES ARE NOT PREVENTED
PAIN_FUNCTIONAL_ASSESSMENT: ACTIVITIES ARE NOT PREVENTED

## 2024-11-06 ASSESSMENT — PAIN DESCRIPTION - DESCRIPTORS
DESCRIPTORS: ACHING;DISCOMFORT;SORE;SHOOTING
DESCRIPTORS: ACHING;DISCOMFORT;SHOOTING;SORE
DESCRIPTORS: ACHING;DISCOMFORT;SORE
DESCRIPTORS: ACHING;DISCOMFORT;SHOOTING;SORE
DESCRIPTORS: SHARP;THROBBING;SORE
DESCRIPTORS: SHARP;THROBBING;SORE
DESCRIPTORS: ACHING;DISCOMFORT;SHOOTING;SHARP
DESCRIPTORS: ACHING;DISCOMFORT;SORE;SHOOTING
DESCRIPTORS: ACHING;DISCOMFORT;SORE;SHOOTING;SHARP
DESCRIPTORS: SHARP;SORE;THROBBING
DESCRIPTORS: ACHING;DISCOMFORT;NAGGING
DESCRIPTORS: ACHING;DISCOMFORT;SORE
DESCRIPTORS: ACHING;DISCOMFORT;NAGGING
DESCRIPTORS: ACHING;PENETRATING;NAGGING

## 2024-11-06 ASSESSMENT — PAIN DESCRIPTION - FREQUENCY
FREQUENCY: CONTINUOUS

## 2024-11-06 NOTE — CARE COORDINATION
reached out to patients PCP office Dr Ty Shaikh at [phone] to schedule follow up D/C appointment.  spoke to Abbey who is agreeable to changing normal office visit on 11/15 at 3:20 PM to a \[facility] [place] [facility]\"" f/u.     Information added to D/C summary.

## 2024-11-06 NOTE — PROGRESS NOTES
[facility]  Hematology/Oncology  Consult      Patient Name: Joseph Bond  YOB: 1980  PCP: Ty Shaikh MD   Referring Provider:      Reason for Consultation:   Chief Complaint   Patient presents with    Hip Pain     R hip pain 2 days, stage 4 prostate CA mets to bone, can't lift leg anymore, also has tumor on spine        History of Present Illness: This is a 44-year-old male patient following with Dr. Matson for  stage 4 prostate ca with retroperitoneal mediastinal lymphadenopathy diffuse bone metastasis. Genetic testing negative. . Considering high tumor burden, he was advised treatment with Taxotere, GnRH agonist, Nubeqa along with Xgeva in 8/2024. Patient initially refused any systemic therapy however, he is s/p multiple admission over past 2 months and he agreed to treatment on his most recent admission. Since then he has been on casodex. MRI lumbar spine showed T12 L4 metastasis with epidural extension. s/p Palliative RT. He is also on Nubeqa LHRH agonist, Taxotere was to be added for tomorrow's treatment 11/6 now that his thrombocytopenia has resolved, and Eliguard to start 11/11.  Follows with palliative care for pain management. Patient presented to the ED for evaluation of acute pain started yesterday in his right hip and wraps around his back. R hip xray showed no acute abnormality of the hip. R femur xray showed no signs of fracture or dislocation. CT A/P showed diffuse osseous metastatic disease again demonstrated without clear evidence of acute bony abnormality. CT lumbar spine pending. Palliative consulted for symptom management. CMP unremarkable. LFT's with Alk Phos 563, AST 40. CBC with Hgb 8.6, platelets 121. WBC WNL. Consultation for patient with known metastatic prostate cancer.         Diagnostic Data:     Past Medical History:   Diagnosis Date    Cancer (HCC)     Diverticulosis     between 2016 to 2017 diverticulitis an diverticulosis    History of

## 2024-11-06 NOTE — PLAN OF CARE
Problem: Skin/Tissue Integrity  Goal: Absence of new skin breakdown  Description: 1.  Monitor for areas of redness and/or skin breakdown  2.  Assess vascular access sites hourly  3.  Every 4-6 hours minimum:  Change oxygen saturation probe site  4.  Every 4-6 hours:  If on nasal continuous positive airway pressure, respiratory therapy assess nares and determine need for appliance change or resting period.  Outcome: Progressing     Problem: Safety - Adult  Goal: Free from fall injury  Outcome: Progressing     Problem: Discharge Planning  Goal: Discharge to home or other facility with appropriate resources  Outcome: Progressing     Problem: Pain  Goal: Verbalizes/displays adequate comfort level or baseline comfort level  Outcome: Progressing     Problem: Skin/Tissue Integrity - Adult  Goal: Skin integrity remains intact  Outcome: Progressing     Problem: Musculoskeletal - Adult  Goal: Return mobility to safest level of function  Outcome: Not Progressing  Goal: Return ADL status to a safe level of function  Outcome: Not Progressing     Problem: Neurosensory - Adult  Goal: Achieves maximal functionality and self care  Outcome: Progressing     Problem: ABCDS Injury Assessment  Goal: Absence of physical injury  Outcome: Progressing     Problem: Chronic Conditions and Co-morbidities  Goal: Patient's chronic conditions and co-morbidity symptoms are monitored and maintained or improved  Outcome: Progressing     Problem: Depression  Goal: Will be euthymic at discharge  Description: INTERVENTIONS:  1. Administer medication as ordered  2. Provide emotional support via 1:1 interaction with staff  3. Encourage involvement in milieu/groups/activities  4. Monitor for social isolation  Outcome: Progressing     Problem: Sleep Disturbance  Goal: Will exhibit normal sleeping pattern  Description: INTERVENTIONS:  1. Administer medication as ordered  2. Decrease environmental stimuli, including noise, as appropriate  3. Discourage

## 2024-11-06 NOTE — PLAN OF CARE
Problem: Skin/Tissue Integrity  Goal: Absence of new skin breakdown  Description: 1.  Monitor for areas of redness and/or skin breakdown  2.  Assess vascular access sites hourly  3.  Every 4-6 hours minimum:  Change oxygen saturation probe site  4.  Every 4-6 hours:  If on nasal continuous positive airway pressure, respiratory therapy assess nares and determine need for appliance change or resting period.  Outcome: Progressing     Problem: Safety - Adult  Goal: Free from fall injury  Outcome: Progressing     Problem: Discharge Planning  Goal: Discharge to home or other facility with appropriate resources  Outcome: Progressing     Problem: Pain  Goal: Verbalizes/displays adequate comfort level or baseline comfort level  Outcome: Progressing     Problem: Skin/Tissue Integrity - Adult  Goal: Skin integrity remains intact  Outcome: Progressing     Problem: Musculoskeletal - Adult  Goal: Return mobility to safest level of function  Outcome: Progressing  Goal: Return ADL status to a safe level of function  Outcome: Progressing     Problem: Infection - Adult  Goal: Absence of infection at discharge  Outcome: Progressing     Problem: ABCDS Injury Assessment  Goal: Absence of physical injury  Outcome: Progressing     Problem: Chronic Conditions and Co-morbidities  Goal: Patient's chronic conditions and co-morbidity symptoms are monitored and maintained or improved  Outcome: Progressing     Problem: Neurosensory - Adult  Goal: Achieves maximal functionality and self care  Outcome: Progressing     Problem: Depression  Goal: Will be euthymic at discharge  Description: INTERVENTIONS:  1. Administer medication as ordered  2. Provide emotional support via 1:1 interaction with staff  3. Encourage involvement in milieu/groups/activities  4. Monitor for social isolation  Outcome: Progressing     Problem: Sleep Disturbance  Goal: Will exhibit normal sleeping pattern  Description: INTERVENTIONS:  1. Administer medication as  ordered  2. Decrease environmental stimuli, including noise, as appropriate  3. Discourage social isolation and naps during the day  Outcome: Progressing     Problem: Self Care Deficit  Goal: Return ADL status to a safe level of function  Description: INTERVENTIONS:  1. Administer medication as ordered  2. Assess ADL deficits and provide assistive devices as needed  3. Obtain PT/OT consults as needed  4. Assist and instruct patient to increase activity and self care as tolerated  Outcome: Progressing     Problem: Spiritual Care  Goal: Pt/Family able to move forward in process of forgiving self, others, and/or higher power  Description: INTERVENTIONS:  1. Assist patient/family to overcome blocks to healing by use of spiritual practices (prayer, meditation, guided imagery, reiki, breath work, etc).  2. De-myth guilt and help patient/family identify possible irrational spiritual/cultural beliefs and values.  3. Explore possibilities of making amends & reconciliation with self, others, and/or a greater power.  4. Guide patient/family in identifying painful feelings of guilt.  5. Help patient/famiy explore and identify spiritual beliefs, cultural understandings or values that may help or hinder letting go of issue.  6. Help patient/family explore feelings of anger, bitterness, resentment.  7. Help patient/family identify and examine the situation in which these feelings are experienced.  8. Help patient/family identify destructive displacement of feelings onto other individuals.  9. Invite use of sacraments/rituals/ceremonies as appropriate (e.g. - confession, anointing, smudging).  10. Refer patient/family to formal counseling and/or to [facility] for further support work.  Outcome: Progressing

## 2024-11-06 NOTE — PROGRESS NOTES
Orthopaedic Attending    Updated CT scan demonstrates bone metastasis throughout the bilateral pelvis and hips which is relatively unchanged compared to previous imaging.  Questionable pathologic fracture through the right ilium nondisplaced.  Recommend protected weightbearing right lower extremity with assistive device.  Continue medical management.  Patient can follow-up in office in 1-2 weeks for repeat evaluation.        Electronically signed by   Jony Ortega DO  11/6/2024

## 2024-11-06 NOTE — PROGRESS NOTES
[facility] - Family Medicine Inpatient   Resident Progress Note    S:  Hospital day: 3   Brief Synopsis: Joseph Bond is a 44 y.o. male with a PMH of Diverticulosis, history of opioid abuse (HCC) stage IV prostate cancer with mets who presents to ED for worsening right sided hip pain and an inability to ambulate due to pain and weakness. Pain is throbbing and constant, refractory to outpatient pain regimen with Dilaudid 2 mg Q2 PRN and Oxy 20 mg Q3-4H PRN. Denies loss of bowel or bladder function, saddle anesthesia, or paresthesias in his bilateral lower extremities. Patient was recently admitted and was started on and inpatient pain regimen to include IV Dilaudid 1 mg Q3 PRN, Oxy 15 mg Q4 PRN, in addition to Robaxin 750 mg QID by palliative care. He was started on Casodex by oncology. In the ED, patient had an elevated AST (45), elevated alk phos (653), low hemoglobin (8.2) but at baseline and normal right hip Xray. Patient got a total of  fentanyl 75 mcg x 1, Dilaudid total 4 mg IV, ketamine 3 mg IV x 1, Norflex 60 mg x 1, Robaxin 1 g IV in the ED for pain. CT of hip and lumbar spine showed metastatic disease in the lumbar spine, no changes since previous. Patient started on Decadron 8 mg for five days. He was started on a PCA pump with a continuous infusion of Dilaudid 0.6 mg/h and 0.75 mg every 15 min as needed. He continues with Oxycodone 20 mg q2h as needed and Robaxin 750 four times daily. Patient will be getting a midline    Overnight/interim:   No acute events overnight  Patient started on PCA pump yesterday. Was able to sleep without additional Dilaudid. Patient was still uncomfortable yesterday and he was worried about the pain coming back so Oxycodone prn was prescribed for overnight   Pain regiment adjusted by palliative care  Does not have any other acute complaints at this time      Cont meds:    HYDROmorphone      dextrose      sodium chloride       Scheduled meds:    enoxaparin  40 mg SubCUTAneous    Neurological:      General: No focal deficit present.      Mental Status: He is alert and oriented to person, place, and time.           Labs:  Na/K/Cl/CO2:  141/4.1/103/27 (11/06 0602)  BUN/Cr/glu/ALT/AST/amyl/lip:  14/0.6/--/9/22/--/-- (11/06 0602)  WBC/Hgb/Hct/Plts:  8.9/8.2/25.3/133 (11/06 0602)  estimated creatinine clearance is 145 mL/min (A) (based on SCr of 0.6 mg/dL (L)).  Other pertinent labs as noted below    Radiology:  CT LUMBAR SPINE WO CONTRAST   Final Result   1. Heterogeneous marrow density with areas of lytic change and sclerosis.   Findings are concerning for metastatic disease.   2. Multiple endplate irregularities and partial endplate compression   fractures similar to the prior study of 09/26/2024.   3. No high-grade canal stenosis.   4. Mild unchanged left periaortic lymphadenopathy and left iliac lymph node   chain adenopathy.         CT PELVIS WO CONTRAST Additional Contrast? None   Final Result   Diffuse osseous metastatic disease again demonstrated without clear evidence   of acute bony abnormality.         XR FEMUR RIGHT (MIN 2 VIEWS)   Final Result   No acute fracture or dislocation.  No signs of fracture or dislocation.         XR HIP 2-3 VW W PELVIS RIGHT   Final Result   No acute abnormality of the hip.               Resident Assessment and Plan       1.Stage IV prostate cancer with mets s/p radiation therapy  Right hip pain likely secondary to mets  - Right Hip X-ray - WNL  - Right Hip CT scan and lumbar spine CT    - Metastatic disease in the lumbar spine and pelvis  - Multiple partial endplate compression fractures, unchanged from 09/24  - Unchanged left periaortic lymphadenopathy and left iliac lymph node chain adenopathy.   - No acute bony abnormality  - Continue home Casodex 50 mg daily  - CRP - 93, ESR 70  - Neurovascular checks in place.  Continue monitoring respiratory status.  - Heme-onc consulted - symptom management per palliative; will start Taxotere, Nubeqa and Eligard

## 2024-11-06 NOTE — CARE COORDINATION
11/6/2024social work transition of care planning  Pt plan is to return home,pt will call for a ride at OR. Jerry made referral to [facility] pharmacy  [id number] option #3 for pain pump med. Pharmacist to  rx today. Jerry faxed clinicals and pharmacy agreement to [phone].  Electronically signed by BERNADETTE Terrell on 11/6/2024 at 9:44 AM    Addendum: jerry notified that pt will go home on oral pain meds.  Electronically signed by BERNADETTE Terrell on 11/6/2024 at 11:41 AM

## 2024-11-06 NOTE — DISCHARGE INSTRUCTIONS
=====================================  [facility]  DISCHARGE INSTRUCTIONS  =====================================    Take your medications as directed in this summary    Future scheduled appointments are listed below or recommended appointments are mentioned above.    Future Appointments   Date Time Provider Department Center   11/15/2024  3:20 PM Ty Shaikh MD [facility]   11/25/2024  8:00 AM SCHEDULE, [facility] PALLIATIVE CARE PROVIDER [facility]     Once discharged from [facility], you can :     Return to work : Yes, you may return to work  Activity : As tolerated  Stairs : As tolerated  Exercise : As tolerated  Lifting : As tolerated   Sexual activity : Yes  Driving : With seat belt on. NO driving on narcotic pain medication if prescribed   Medications : Always take your medications as prescribed  Wound Care: none needed  Diet : You are asked to make an attempt to improve diet and exercise patterns to aid in medical management of your medical condition/problem.     Call [facility] with any further questions. Return to Emergency Department with any worsening of your condition and/or fever greater than 101 degrees, new weakness, shortness of breath or chest pain.      [facility] Department of Orthopedic Surgery  [facility] [address]    Dr. Jony Ortega, DO         MD Dr. Jony Cotton MD Frank Ansevin, PA-C Sara Zatchok, PA-C Tyler Tsangaris PA-C      Orthopaedics Discharge Instructions   Weight bearing Status - Weight bearing as tolerated - bilaterally lower Extremity  Ice to operative/injured site for 15-30 minutes of each hour for next 5 days    Recommend that you continue to ice the area 2-3 times per day after this   Elevate operative/injured limb on 2 pillows at home  Goal is to have limb above the heart if able    Follow up in office in approximately 2 weeks. Your first follow up  appointment is often with one of our physician assistants.     Call the office at [phone] if having any concerns.     Watch for these significant complications.  Call physician if they or any other problems occur:  Fever over 101°, redness, swelling or warmth at the operative site  Unrelieved nausea    Foul smelling or cloudy drainage at the operative site   Unrelieved pain    Blood soaked dressing. (Some oozing may be normal)     Numb, pale, blue, cold or tingling extremity          It is the Department of Orthopaedic Trauma's standard of practice that providers will de-escalate (wean) all patients from narcotic (opioid) medications during the post-operative period.   We provide multimodal pain control, but opioid medications are tapered in all of our patients.  If patient requires referral to pain management for prolonged taper from opioid pain medication, we will facilitate this process.        Weight bearing is an important component to your healing fracture or injury. If you put weight on your extremity too soon, the fixation (plates/screws) could loosen and cause your fracture to shift or move.   It is crucial you listen to your surgeon regarding weight bearing recommendations.    After surgery your weight bearing status is determined by your surgeon. For a significant portion of lower extremity and upper extremity fractures, this will be a non-weightbearing or touch-down weight bearing status. Usually this is continued for 6-10 weeks before you are allowed to start weightbearing.    Weightbearing as tolerated (WBAT)  This means that you can put as much weight as you can tolerated through your arm or leg. The key is \"AS TOLERATED\". You should not push through the pain and use your pain as a guide. Often times you will have a brace to provide increased stability.    Partial Weightbearing (PWB)  This status allows you to put some weight through your leg. Usually it is a percentage of full body weight. It is

## 2024-11-06 NOTE — PROGRESS NOTES
4 Eyes Skin Assessment     NAME:  Joseph Bond  YOB: 1980  MEDICAL RECORD NUMBER:  [id number]    The patient is being assessed for  Transfer to New Unit    I agree that at least one RN has performed a thorough Head to Toe Skin Assessment on the patient. ALL assessment sites listed below have been assessed.      Areas assessed by both nurses:    Head, Face, Ears, Shoulders, Back, Chest, Arms, Elbows, Hands, Sacrum. Buttock, Coccyx, Ischium, Legs. Feet and Heels, and Under Medical Devices         Does the Patient have a Wound? No noted wound(s)       Simon Prevention initiated by RN: Yes  Wound Care Orders initiated by RN: No    Pressure Injury (Stage 3,4, Unstageable, DTI, NWPT, and Complex wounds) if present, place Wound referral order by RN under : No    New Ostomies, if present place, Ostomy referral order under : No     Nurse 1 eSignature: Electronically signed by Romeo Antoine RN on 11/5/24 at 11:45 PM EST    **SHARE this note so that the co-signing nurse can place an eSignature**    Nurse 2 eSignature: Electronically signed by Tran Ghosh RN on 11/6/24 at 2:02 AM EST

## 2024-11-07 VITALS
TEMPERATURE: 98 F | BODY MASS INDEX: 25.9 KG/M2 | RESPIRATION RATE: 17 BRPM | OXYGEN SATURATION: 97 % | WEIGHT: 165 LBS | DIASTOLIC BLOOD PRESSURE: 75 MMHG | HEART RATE: 77 BPM | HEIGHT: 67 IN | SYSTOLIC BLOOD PRESSURE: 135 MMHG

## 2024-11-07 PROBLEM — D69.6 THROMBOCYTOPENIA (HCC): Status: RESOLVED | Noted: 2024-09-30 | Resolved: 2024-11-07

## 2024-11-07 LAB
ALBUMIN SERPL-MCNC: 3.4 G/DL (ref 3.5–5.2)
ALP SERPL-CCNC: 369 U/L (ref 40–129)
ALT SERPL-CCNC: 12 U/L (ref 0–40)
ANION GAP SERPL CALCULATED.3IONS-SCNC: 11 MMOL/L (ref 7–16)
AST SERPL-CCNC: 19 U/L (ref 0–39)
BASOPHILS # BLD: 0 K/UL (ref 0–0.2)
BASOPHILS NFR BLD: 0 % (ref 0–2)
BILIRUB SERPL-MCNC: 0.2 MG/DL (ref 0–1.2)
BUN SERPL-MCNC: 16 MG/DL (ref 6–20)
CALCIUM SERPL-MCNC: 9.4 MG/DL (ref 8.6–10.2)
CHLORIDE SERPL-SCNC: 105 MMOL/L (ref 98–107)
CO2 SERPL-SCNC: 25 MMOL/L (ref 22–29)
CREAT SERPL-MCNC: 0.6 MG/DL (ref 0.7–1.2)
EOSINOPHIL # BLD: 0 K/UL (ref 0.05–0.5)
EOSINOPHILS RELATIVE PERCENT: 0 % (ref 0–6)
ERYTHROCYTE [DISTWIDTH] IN BLOOD BY AUTOMATED COUNT: 17.9 % (ref 11.5–15)
GFR, ESTIMATED: >90 ML/MIN/1.73M2
GLUCOSE SERPL-MCNC: 93 MG/DL (ref 74–99)
HCT VFR BLD AUTO: 24.9 % (ref 37–54)
HGB BLD-MCNC: 7.9 G/DL (ref 12.5–16.5)
LYMPHOCYTES NFR BLD: 0.27 K/UL (ref 1.5–4)
LYMPHOCYTES RELATIVE PERCENT: 4 % (ref 20–42)
MCH RBC QN AUTO: 29.9 PG (ref 26–35)
MCHC RBC AUTO-ENTMCNC: 31.7 G/DL (ref 32–34.5)
MCV RBC AUTO: 94.3 FL (ref 80–99.9)
MONOCYTES NFR BLD: 0.47 K/UL (ref 0.1–0.95)
MONOCYTES NFR BLD: 6 % (ref 2–12)
NEUTROPHILS NFR BLD: 90 % (ref 43–80)
NEUTS SEG NFR BLD: 6.96 K/UL (ref 1.8–7.3)
NUCLEATED RED BLOOD CELLS: 4 PER 100 WBC
PLATELET # BLD AUTO: 117 K/UL (ref 130–450)
PMV BLD AUTO: 8.9 FL (ref 7–12)
POTASSIUM SERPL-SCNC: 3.8 MMOL/L (ref 3.5–5)
PROT SERPL-MCNC: 6.1 G/DL (ref 6.4–8.3)
RBC # BLD AUTO: 2.64 M/UL (ref 3.8–5.8)
RBC # BLD: ABNORMAL 10*6/UL
SODIUM SERPL-SCNC: 141 MMOL/L (ref 132–146)
WBC OTHER # BLD: 7.7 K/UL (ref 4.5–11.5)

## 2024-11-07 PROCEDURE — 97530 THERAPEUTIC ACTIVITIES: CPT

## 2024-11-07 PROCEDURE — 85025 COMPLETE CBC W/AUTO DIFF WBC: CPT

## 2024-11-07 PROCEDURE — 36415 COLL VENOUS BLD VENIPUNCTURE: CPT

## 2024-11-07 PROCEDURE — 97161 PT EVAL LOW COMPLEX 20 MIN: CPT

## 2024-11-07 PROCEDURE — 6370000000 HC RX 637 (ALT 250 FOR IP)

## 2024-11-07 PROCEDURE — 6370000000 HC RX 637 (ALT 250 FOR IP): Performed by: STUDENT IN AN ORGANIZED HEALTH CARE EDUCATION/TRAINING PROGRAM

## 2024-11-07 PROCEDURE — 6360000002 HC RX W HCPCS: Performed by: NURSE PRACTITIONER

## 2024-11-07 PROCEDURE — 97165 OT EVAL LOW COMPLEX 30 MIN: CPT

## 2024-11-07 PROCEDURE — 99239 HOSP IP/OBS DSCHRG MGMT >30: CPT | Performed by: FAMILY MEDICINE

## 2024-11-07 PROCEDURE — 80053 COMPREHEN METABOLIC PANEL: CPT

## 2024-11-07 RX ORDER — HYDROMORPHONE HYDROCHLORIDE 2 MG/1
4 TABLET ORAL
Qty: 30 TABLET | Refills: 0 | Status: SHIPPED | OUTPATIENT
Start: 2024-11-07 | End: 2024-11-13 | Stop reason: SDUPTHER

## 2024-11-07 RX ORDER — OXYCODONE HYDROCHLORIDE 20 MG/1
20 TABLET ORAL
Qty: 60 TABLET | Refills: 0 | Status: SHIPPED | OUTPATIENT
Start: 2024-11-07 | End: 2024-11-14

## 2024-11-07 RX ORDER — DEXAMETHASONE 4 MG/1
8 TABLET ORAL DAILY
Qty: 2 TABLET | Refills: 0 | Status: SHIPPED | OUTPATIENT
Start: 2024-11-08 | End: 2024-11-09

## 2024-11-07 RX ORDER — HYDROMORPHONE HYDROCHLORIDE 4 MG/1
4 TABLET ORAL
Qty: 30 TABLET | Refills: 0 | Status: SHIPPED | OUTPATIENT
Start: 2024-11-07 | End: 2024-11-13 | Stop reason: SDUPTHER

## 2024-11-07 RX ADMIN — HYDROMORPHONE HYDROCHLORIDE 2 MG: 2 TABLET ORAL at 14:17

## 2024-11-07 RX ADMIN — OXYCODONE HYDROCHLORIDE 20 MG: 10 TABLET ORAL at 09:53

## 2024-11-07 RX ADMIN — HYDROMORPHONE HYDROCHLORIDE 6 MG: 2 TABLET ORAL at 02:46

## 2024-11-07 RX ADMIN — HYDROMORPHONE HYDROCHLORIDE 4 MG: 2 TABLET ORAL at 14:06

## 2024-11-07 RX ADMIN — HYDROMORPHONE HYDROCHLORIDE 6 MG: 2 TABLET ORAL at 05:57

## 2024-11-07 RX ADMIN — OXYCODONE HYDROCHLORIDE 20 MG: 10 TABLET ORAL at 07:01

## 2024-11-07 RX ADMIN — OXYCODONE HYDROCHLORIDE 20 MG: 10 TABLET ORAL at 12:43

## 2024-11-07 RX ADMIN — OXYCODONE HYDROCHLORIDE 20 MG: 10 TABLET ORAL at 15:31

## 2024-11-07 RX ADMIN — METHOCARBAMOL TABLETS 750 MG: 750 TABLET, COATED ORAL at 17:29

## 2024-11-07 RX ADMIN — METHOCARBAMOL TABLETS 750 MG: 750 TABLET, COATED ORAL at 08:29

## 2024-11-07 RX ADMIN — Medication 2000 UNITS: at 08:29

## 2024-11-07 RX ADMIN — HYDROMORPHONE HYDROCHLORIDE 6 MG: 2 TABLET ORAL at 17:29

## 2024-11-07 RX ADMIN — BICALUTAMIDE 50 MG: 50 TABLET, FILM COATED ORAL at 08:30

## 2024-11-07 RX ADMIN — METHOCARBAMOL TABLETS 750 MG: 750 TABLET, COATED ORAL at 12:43

## 2024-11-07 RX ADMIN — HYDROMORPHONE HYDROCHLORIDE 6 MG: 2 TABLET ORAL at 00:18

## 2024-11-07 RX ADMIN — HYDROMORPHONE HYDROCHLORIDE 6 MG: 2 TABLET ORAL at 11:11

## 2024-11-07 RX ADMIN — DOCUSATE SODIUM 100 MG: 100 CAPSULE, LIQUID FILLED ORAL at 08:29

## 2024-11-07 RX ADMIN — OXYCODONE HYDROCHLORIDE 20 MG: 10 TABLET ORAL at 01:36

## 2024-11-07 RX ADMIN — HYDROMORPHONE HYDROCHLORIDE 6 MG: 2 TABLET ORAL at 08:28

## 2024-11-07 RX ADMIN — DEXAMETHASONE 8 MG: 4 TABLET ORAL at 08:30

## 2024-11-07 RX ADMIN — OXYCODONE HYDROCHLORIDE 20 MG: 10 TABLET ORAL at 03:59

## 2024-11-07 ASSESSMENT — PAIN DESCRIPTION - LOCATION
LOCATION: LEG;PELVIS
LOCATION: LEG
LOCATION: LEG;PELVIS
LOCATION: PELVIS
LOCATION: LEG;PELVIS
LOCATION: PELVIS
LOCATION: PELVIS;LEG
LOCATION: PELVIS
LOCATION: LEG;PELVIS
LOCATION: PELVIS
LOCATION: LEG;PELVIS
LOCATION: PELVIS

## 2024-11-07 ASSESSMENT — PAIN SCALES - GENERAL
PAINLEVEL_OUTOF10: 10
PAINLEVEL_OUTOF10: 6
PAINLEVEL_OUTOF10: 10
PAINLEVEL_OUTOF10: 6
PAINLEVEL_OUTOF10: 5
PAINLEVEL_OUTOF10: 10
PAINLEVEL_OUTOF10: 0
PAINLEVEL_OUTOF10: 7
PAINLEVEL_OUTOF10: 10
PAINLEVEL_OUTOF10: 4
PAINLEVEL_OUTOF10: 10
PAINLEVEL_OUTOF10: 10
PAINLEVEL_OUTOF10: 9

## 2024-11-07 ASSESSMENT — PAIN - FUNCTIONAL ASSESSMENT
PAIN_FUNCTIONAL_ASSESSMENT: PREVENTS OR INTERFERES SOME ACTIVE ACTIVITIES AND ADLS

## 2024-11-07 ASSESSMENT — PAIN DESCRIPTION - ORIENTATION
ORIENTATION: RIGHT

## 2024-11-07 ASSESSMENT — PAIN DESCRIPTION - DESCRIPTORS
DESCRIPTORS: CRUSHING;DISCOMFORT;HEAVINESS
DESCRIPTORS: ACHING;DISCOMFORT;NAGGING
DESCRIPTORS: STABBING;SORE;THROBBING
DESCRIPTORS: THROBBING;STABBING;SORE
DESCRIPTORS: STABBING;PRESSURE;THROBBING
DESCRIPTORS: ACHING;TENDER;NAGGING
DESCRIPTORS: THROBBING;SORE;PRESSURE
DESCRIPTORS: ACHING;PENETRATING;SORE
DESCRIPTORS: ACHING;TENDER;SHARP
DESCRIPTORS: THROBBING;STABBING;SORE
DESCRIPTORS: ACHING;THROBBING
DESCRIPTORS: STABBING;SORE;PRESSURE
DESCRIPTORS: ACHING;PRESSURE;NAGGING

## 2024-11-07 ASSESSMENT — PAIN DESCRIPTION - PAIN TYPE
TYPE: CHRONIC PAIN

## 2024-11-07 ASSESSMENT — PAIN SCALES - WONG BAKER
WONGBAKER_NUMERICALRESPONSE: HURTS A LITTLE BIT

## 2024-11-07 ASSESSMENT — PAIN DESCRIPTION - FREQUENCY
FREQUENCY: CONTINUOUS

## 2024-11-07 ASSESSMENT — PAIN DESCRIPTION - ONSET
ONSET: ON-GOING

## 2024-11-07 NOTE — PROGRESS NOTES
OCCUPATIONAL THERAPY INITIAL EVALUATION    [facility] [address] [place], [place]      Date:2024                                                Patient Name: Joseph Bond  MRN: [id number]  : 1980  Room: [facility]54-A     Evaluating OT:Ernestina Cota, OTR/L   License #  [id number]       Referring Provider:     Apryl Pleitez MD       Specific Provider Orders/Date: OT evaluation & treatment        Diagnosis: Metastatic cancer to bone (HCC) [C79.51]      Pertinent Medical History:  has a past medical history of Cancer (HCC), Diverticulosis, and History of opioid abuse (HCC).    Surgery: none this admit    Past Surgical History:  has a past surgical history that includes bronchoscopy (N/A, 2024) and bronchoscopy (2024).       Precautions:  Fall Risk, , PWB R LE (revised per Dr. Ortega at 9:21 this day)-pt. updated. & understands.      Assessment of current deficits    [x] Functional mobility            [x]ADLs           [x] Strength                  [x]Cognition    [x] Functional transfers          [x] IADLs         [x] Safety Awareness   [x]Endurance    [x] Fine Coordination                         [x] Balance      [] Vision/perception   [x]Sensation      []Gross Motor Coordination             [] ROM           [] Delirium                   [] Motor Control      OT PLAN OF CARE   OT POC based on physician orders, patient diagnosis and results of clinical assessment     Frequency/Duration: 2-4 days/wk for 2 weeks PRN   Specific OT Treatment Interventions to include:   Instruction/training on adapted ADL techniques and AE recommendations to increase functional independence within precautions  Training on energy conservation strategies, correct breathing pattern and techniques to improve independence/tolerance for self-care routine  Functional transfer/mobility training/DME recommendations for increased independence, safety, and fall  Pt sent from the Oncology clinic due to elevated creatinine. Pt endorses nausea and diminished urine output. Pt is currently undergoing chemotherapy for lung cancer

## 2024-11-07 NOTE — PLAN OF CARE
Problem: Skin/Tissue Integrity  Goal: Absence of new skin breakdown  Description: 1.  Monitor for areas of redness and/or skin breakdown  2.  Assess vascular access sites hourly  3.  Every 4-6 hours minimum:  Change oxygen saturation probe site  4.  Every 4-6 hours:  If on nasal continuous positive airway pressure, respiratory therapy assess nares and determine need for appliance change or resting period.  11/6/2024 2220 by Jasmin Thorne RN  Outcome: Progressing  11/6/2024 1525 by Maryjo Kaba RN  Outcome: Progressing     Problem: Safety - Adult  Goal: Free from fall injury  11/6/2024 2220 by Jasmin Thorne RN  Outcome: Progressing  11/6/2024 1525 by Maryjo Kaba RN  Outcome: Progressing     Problem: Discharge Planning  Goal: Discharge to home or other facility with appropriate resources  11/6/2024 2220 by Jasmin Thorne RN  Outcome: Progressing  11/6/2024 1525 by Maryjo Kaba RN  Outcome: Progressing     Problem: Pain  Goal: Verbalizes/displays adequate comfort level or baseline comfort level  11/6/2024 2220 by Jasmin Thorne RN  Outcome: Not Progressing  11/6/2024 1525 by Maryjo Kaba RN  Outcome: Progressing     Problem: Skin/Tissue Integrity - Adult  Goal: Skin integrity remains intact  11/6/2024 2220 by Jasmin Thorne RN  Outcome: Progressing  11/6/2024 1525 by Maryjo Kaba RN  Outcome: Progressing     Problem: Musculoskeletal - Adult  Goal: Return mobility to safest level of function  11/6/2024 2220 by Jasmin Thorne RN  Outcome: Progressing  11/6/2024 1525 by Maryjo Kaba RN  Outcome: Progressing  Goal: Return ADL status to a safe level of function  11/6/2024 2220 by Jasmin Thorne RN  Outcome: Progressing  11/6/2024 1525 by Maryjo Kaba RN  Outcome: Progressing     Problem: Infection - Adult  Goal: Absence of infection at discharge  11/6/2024 2220 by Jasmin Thorne RN  Outcome: Progressing  11/6/2024 1525 by Maryjo Kaba RN  Outcome: Progressing     Problem: Neurosensory - Adult  Goal: Achieves maximal

## 2024-11-07 NOTE — PROGRESS NOTES
Physical Therapy  Physical Therapy Initial Assessment     Name: Joseph Bond  : 1980  MRN: [id number]      Date of Service: 2024    Evaluating PT:  India Yoder PT, DPT [id number]    Room #:  5423/5423-A  Diagnosis:  Metastatic cancer to bone (HCC) [C79.51]  PMHx/PSHx:    Past Medical History:   Diagnosis Date    Cancer (HCC)     Diverticulosis     between  to 2017 diverticulitis an diverticulosis    History of opioid abuse (HCC)       Procedure/Surgery:  none this admission  Precautions:  Falls,, CA mets to bone, revised WB to PWB RLE with assistive device (revised in Dr. Ortega's note from  at 9:21am )  Equipment Needs:  none, pt has WW and WC    SUBJECTIVE:    Pt lives alone in a 1st floor apartment with 4 BLANCHE and no rail.  Pt ambulated with WW PTA.    OBJECTIVE:   Initial Evaluation  Date: 24 Treatment Short Term/ Long Term   Goals   AM-PAC 6 Clicks      Was pt agreeable to Eval/treatment? yes     Does pt have pain? R hip/groin pain 10/10     Bed Mobility  Rolling: NT  Supine to sit: NT  Sit to supine: NT  Scooting: NT  Rolling: Independent   Supine to sit: Independent   Sit to supine: Independent   Scooting: Independent    Transfers Sit to stand: Supervision   Stand to sit: Supervision   Stand pivot: Supervision with WW  Sit to stand: Independent   Stand to sit: Independent   Stand pivot: Mod I with WW   Ambulation    20 feet x2 with WW Supervision   >150 feet with WW Mod I    Stair negotiation: ascended and descended  NT  4 steps with no rail Independent    ROM BUE:  Defer to OT note  BLE:  WFL     Strength BUE:  Defer to OT note  BLE:  grossly 3/5  WNL   Balance Sitting EOB:  NT  Dynamic Standing:  Supervision with WW  Sitting EOB:  Independent   Dynamic Standing:  Mod I with WW     Pt is A & O x 4  Sensation:  denies abnormalities   Edema:  none noted      Patient education  Pt educated on role of PT, safety during mobility    Patient response to education:   Pt

## 2024-11-07 NOTE — PROGRESS NOTES
Comprehensive Nutrition Assessment    Type and Reason for Visit:  Initial, Positive nutrition screen    Nutrition Recommendations/Plan:   Continue current diet regimen  Will monitor     Malnutrition Assessment:  Malnutrition Status:  At risk for malnutrition (11/07/24 1354)    Context:  Chronic Illness     Findings of the 6 clinical characteristics of malnutrition:  Energy Intake:  75% or less estimated energy requirements for 1 month or longer (pt reports poor oral intake PTA)  Weight Loss:  Unable to assess     Body Fat Loss:  Unable to assess     Muscle Mass Loss:  Unable to assess    Fluid Accumulation:  No fluid accumulation     Strength:  Not Performed    Nutrition Assessment:    pt adm d/t R-hip pain; pt w/ stage IV prostate CA w/ mets to bone; PMhx of diverticulosis/diverticulitis, opoid abuse; continue Ensure BID to promote oral intake this admission; will monitor.    Nutrition Related Findings:    A&Ox4; active BS; no edema; I/O WNL Wound Type: None       Current Nutrition Intake & Therapies:    Average Meal Intake: %  Average Supplements Intake: Unable to assess (d/t lack of wt hx per EMR)  ADULT ORAL NUTRITION SUPPLEMENT; Breakfast, Dinner; Standard High Calorie/High Protein Oral Supplement  ADULT DIET; Regular; No Pork    Anthropometric Measures:  Height: 170.2 cm (5' 7.01\")  Ideal Body Weight (IBW): 148 lbs (67 kg)       Current Body Weight: 74.8 kg (164 lb 14.5 oz) (11/7-no method; UTO measured CBW at this time, recommend measured CBW obtained by nursing as able), 111.4 % IBW.    Current BMI (kg/m2): 25.8  Usual Body Weight: 81.6 kg (179 lb 14.3 oz) (8/14/24-SS)     % Weight Change (Calculated): -8.3  Weight Adjustment For: No Adjustment                 BMI Categories: Overweight (BMI 25.0-29.9)    Estimated Daily Nutrient Needs:  Energy Requirements Based On: Formula  Weight Used for Energy Requirements: Current  Energy (kcal/day): 3605-5498  Weight Used for Protein Requirements:

## 2024-11-07 NOTE — PROGRESS NOTES
CLINICAL PHARMACY NOTE: MEDS TO BEDS    Total # of Prescriptions Filled: 3   The following medications were delivered to the patient:  Oxycodone 20 mg  Hydromorphone 4 mg  Hydromorphone 2 mg    Additional Documentation:   Delivered to Cha BALDERAS

## 2024-11-07 NOTE — PROGRESS NOTES
Palliative Care Department  [phone]  Palliative Care Initial Consult  Provider Dallas Washington MD    Joseph Bond  [id number]  Hospital Day: 4  Date of Initial Consult: 11/4/2024  Referring Provider: Apryl Pleitez MD  Palliative Medicine was consulted for assistance with: Overwhelming symptoms    HPI:   Joseph Bond is a 44 y.o. with a medical history of stage IV prostate cancer with metastasis to the bone who was admitted on 11/3/2024 from home with a CHIEF COMPLAINT of hip pain. Patient states that pain acutely started yesterday in his right hip and wraps around his back.  He has been unable to lift his leg ever since.  Pain is throbbing and constant, refractory to outpatient pain regimen with Dilaudid 2 mg Q2 PRN and Oxy 20 mg Q3-4H PRN. Patient was recently admitted from 9/26 to 10/11 for inability to walk, nausea and emesis with streaks of blood.  He was noted to have thrombocytopenia as well as low hemoglobin count and was transfused with both pRBC and platelets.  He was finally agreeable to chemotherapy and was started on Casodex per oncology.  Lumbar spine x-ray was ordered by [facility] on 10/30 and was remarkable for a slight increase in the anterior wedging of the L2 vertebral body and lumbarization of S1.   ASSESSMENT/PLAN:     Pertinent Hospital Diagnoses     Stage IV prostate cancer with metastasis to the bone    Symptom management    Pain due to neoplasm  -Continue oxycodone 20 mg every 2 hours as needed  -Start oral Dilaudid 6 mg every 2 hours as needed alternating with the oxycodone  -Continue Decadron 8 mg daily x 5 days  -Robaxin 750 mg 4 times daily  -Stop Dilaudid PCA with continuous infusion rate of 0.6 mg/h, with Dilaudid PCA allow for patient controlled pushes of 0.75 mg Dilaudid every 15 minutes  -He had 7 the demand doses with 14 delivered, total Dilaudid uses in the past 24 hours was 22.63 mg  -Unable to obtain home infusion CADD pump for patient as home health is unable to change  CA at bedside.

## 2024-11-07 NOTE — CARE COORDINATION
11/7/2024Social work transition of care planning  Pt plan is home,once medically stable. Pt will call for a ride at ID.  Electronically signed by BERNADETTE Terrell on 11/7/2024 at 8:27 AM

## 2024-11-07 NOTE — PROGRESS NOTES
[facility] In[facility]   Resident Progress Note    S:  Hospital day: 4   Brief Synopsis: Joseph Bond is a 44 y.o. male with a PMH of Diverticulosis, history of opioid abuse (HCC) stage IV prostate cancer with mets who presents to ED for worsening right sided hip pain and an inability to ambulate due to pain and weakness. Pain is throbbing and constant, refractory to outpatient pain regimen with Dilaudid 2 mg Q2 PRN and Oxy 20 mg Q3-4H PRN. Denies loss of bowel or bladder function, saddle anesthesia, or paresthesias in his bilateral lower extremities. Patient was recently admitted and was started on and inpatient pain regimen to include IV Dilaudid 1 mg Q3 PRN, Oxy 15 mg Q4 PRN, in addition to Robaxin 750 mg QID by palliative care. He was started on Casodex by oncology. In the ED, patient had an elevated AST (45), elevated alk phos (653), low hemoglobin (8.2) but at baseline and normal right hip Xray. Patient got a total of  fentanyl 75 mcg x 1, Dilaudid total 4 mg IV, ketamine 3 mg IV x 1, Norflex 60 mg x 1, Robaxin 1 g IV in the ED for pain. CT of hip and lumbar spine showed metastatic disease in the lumbar spine, no changes since previous. Patient started on Decadron 8 mg for five days. Palliative medicine consulted for pain management    Overnight/interim:   No acute events overnight  Patient describes the pain as 9/10  Pain regiment adjusted by palliative care  Does not have any other acute complaints at this time      Cont meds:    dextrose       Scheduled meds:    sodium chloride flush  5-40 mL IntraVENous 2 times per day    lidocaine 1 % injection  50 mg IntraDERmal Once    enoxaparin  40 mg SubCUTAneous Daily    methocarbamol  750 mg Oral 4x Daily    polyethylene glycol  17 g Oral Daily    dexAMETHasone  8 mg Oral Daily    bicalutamide  50 mg Oral Daily    docusate sodium  100 mg Oral Daily    vitamin D  2,000 Units Oral Daily    sodium chloride flush  5-40 mL IntraVENous 2 times per day  change cartridge when needed  - PT/OT consulted     2.Anemia  - Hgb is at baseline  - Continue to monitor  - Transfuse if Hgb < 7     3.Vit D deficiency  - Vit D 18.3  - Continue home Vit D supplements       PT/OT Evaluation: Consulted  Steroid: Decadron 8 mg (D4/5)  Pain medication:  Dilaudid 6 mg every 2 hours alternative with Oxycodone 20 mg every 2 hours  DVT Prophylaxis: Lovenox  GI Prophylaxis: Protonix   Diet: Adult, Regular  Code Status: Full code  Dispo: Admitted       Electronically signed by Sharlene Ramirez MD on 11/7/2024 at 6:46 AM  Attending physician: Dr. Avila

## 2024-11-07 NOTE — PROGRESS NOTES
Palliative Care Department  [phone]  Palliative Care Initial Consult  Provider Dallas Washington MD    Joseph Bond  [id number]  Hospital Day: 5  Date of Initial Consult: 11/4/2024  Referring Provider: Apryl Pleitez MD  Palliative Medicine was consulted for assistance with: Overwhelming symptoms    HPI:   Joseph Bond is a 44 y.o. with a medical history of stage IV prostate cancer with metastasis to the bone who was admitted on 11/3/2024 from home with a CHIEF COMPLAINT of hip pain. Patient states that pain acutely started yesterday in his right hip and wraps around his back.  He has been unable to lift his leg ever since.  Pain is throbbing and constant, refractory to outpatient pain regimen with Dilaudid 2 mg Q2 PRN and Oxy 20 mg Q3-4H PRN. Patient was recently admitted from 9/26 to 10/11 for inability to walk, nausea and emesis with streaks of blood.  He was noted to have thrombocytopenia as well as low hemoglobin count and was transfused with both pRBC and platelets.  He was finally agreeable to chemotherapy and was started on Casodex per oncology.  Lumbar spine x-ray was ordered by [facility] on 10/30 and was remarkable for a slight increase in the anterior wedging of the L2 vertebral body and lumbarization of S1.   ASSESSMENT/PLAN:     Pertinent Hospital Diagnoses     Stage IV prostate cancer with metastasis to the bone    Symptom management    Pain due to neoplasm  -Continue oxycodone 20 mg every 2 hours as needed  -Continue oral Dilaudid 6 mg every 2 hours as needed alternating with the oxycodone  -Continue Decadron 8 mg daily x 5 days  -Robaxin 750 mg 4 times daily  -Patient discharging home today  -Will provide prescriptions for Dilaudid 6 mg and oxycodone 20 mg he can take these alternating every 2 hours  -We will follow-up with patient in palliative care clinic.    Constipation prophylaxis  -Colace 100 mg daily  -GlycoLax daily  -Senna S as needed    Palliative Care Encounter / Counseling Regarding  Goals of Care  Please see detailed goals of care discussion as below  At this time, Joseph Bond, Does have capacity for medical decision-making.  Capacity is time limited and situation/question specific  Outcome of goals of care meeting:   Continue all aggressive medical management  Continue full code  Symptom relief  Code status Full Code  Advanced Directives: no POA or living will in epic  Surrogate/Legal NOK:  Margarita Bond (sibling/POA) [phone]  Jeri Bond (sibling/ 1st alt POA) [phone]  Ryan Bond (sibling/2nd alt POA) [phone]    Spiritual assessment: no spiritual distress identified  Bereavement and grief: to be determined  Referrals to: none today  SUBJECTIVE:     Current medical issues leading to Palliative Medicine involvement include   Active Hospital Problems    Diagnosis Date Noted    Intractable pain [R52] 11/04/2024    Metastatic cancer to bone (HCC) [C79.51] 11/03/2024    Thrombocytopenia (HCC) [D69.6] 09/30/2024    Anemia, unspecified type [D64.9] 09/26/2024    Prostate cancer metastatic to bone (HCC) [C61, C79.51] 07/23/2024       Details of Conversation: Saw patient at bedside today.  He was still complaining of pain.  Rates that this pain 10 out of 10 despite having pain medication nearly every hour.  He states that his been able to get up and move around some.  Overall he feels okay.  Patient states that he is ready for discharge.  Brought up the discussion regarding methadone again.  At this time patient declines the use of methadone as he would like to do more research on it.  Discussed overall prognosis.  Patient still appears to be in some denial over his current outlook and prognosis.  At this time we will attempt to send patient home on Dilaudid 6 mg and oxycodone 20 mg in alternating doses.  We recognize that in the past this pain regimen has been ineffective and has led to rehospitalization for patient, will attempt this regimen 1 more time.  If patient

## 2024-11-08 ENCOUNTER — TELEPHONE (OUTPATIENT)
Dept: ORTHOPEDIC SURGERY | Age: 44
End: 2024-11-08

## 2024-11-08 ENCOUNTER — CARE COORDINATION (OUTPATIENT)
Dept: CARE COORDINATION | Age: 44
End: 2024-11-08

## 2024-11-08 DIAGNOSIS — C79.51 METASTATIC CANCER TO BONE (HCC): Primary | ICD-10-CM

## 2024-11-08 NOTE — CARE COORDINATION
Care Transitions Note    Initial Call - Call within 2 business days of discharge: Yes    Patient Current Location:  Home: [address] [place] [address] Rd.  [place] [id number]    Care Transition Nurse contacted the patient by telephone to perform post hospital discharge assessment, verified name and  as identifiers. Provided introduction to self, and explanation of the Care Transition Nurse role.     Patient: Joseph Bond    Patient : 1980   MRN: [id number]    Reason for Admission: metastatic cancer to bone\  Discharge Date: 24  RURS: Readmission Risk Score: 31.4      Last Discharge Facility       Date Complaint Diagnosis Description Type Department Provider    11/3/24 Hip Pain Pain of right hip ... ED to Hosp-Admission (Discharged) (ADMITTED) [facility] Marilee vAila MD; [place]...            Was this an external facility discharge? No    Additional needs identified to be addressed with provider   No needs identified             Method of communication with provider: none.    Patients top risk factors for readmission: financial, functional physical ability, medical condition-immunocompromised, PNA, anxiety, multiple health system providers, polypharmacy, and utilization of services    Interventions to address risk factors:   Review of patient management of conditions/medications.    Care Summary Note:   -Patient is a readmission.  Patient admitted to [facility] on 11/3/24 with symptoms of right hip pain that wraps around back causing patient to not be able to lift his leg anymore.  Unable to ambulate d/t pain/weakness. Patient has metastatic cancer to bone.  See hospital discharge summary for further details.   -Patient reports he was able to sleep last night, woke up in pain, has taken his pain medications which helps decrease the pain somewhat.  Patient active with palliative care and medication adjustments were made while hospitalized.  Patient voicing concern as soon his insurance will not pay for the

## 2024-11-08 NOTE — TELEPHONE ENCOUNTER
Patient called office requesting appointment for ED follow up.    ED visit date:11/3/2024    [place] [facility] [facility]    Diagnosis/Injury: hip pain  Ct done while in the hospital    Provider on call: Dr. Jony Ortega DO    Routed to providers for recommendations.    Future Appointments   Date Time Provider Department Center   11/15/2024  3:20 PM Ty Shaikh MD [facility] [place]   11/25/2024  8:00 AM SCHEDULE, [facility] PALLIATIVE CARE [facility]

## 2024-11-08 NOTE — TELEPHONE ENCOUNTER
Future Appointments   Date Time Provider Department Center   11/15/2024  3:20 PM Ty Shaikh MD [facility] [place] [facility] ECC DEP   11/21/2024  8:30 AM Jony Ortega DO [facility]   11/25/2024  8:00 AM SCHEDULE, [facility] PALLIATIVE CARE PROVIDER [facility] PALLIAT [facility]

## 2024-11-09 NOTE — DISCHARGE SUMMARY
Discharge Summary    Joseph Bond  :  1980  MRN:  [id number]    Admit date:  11/3/2024  Discharge date:  2024    Admitting Physician:  Marilee Avila MD    Discharge Diagnoses:    Patient Active Problem List   Diagnosis    Retroperitoneal lymphadenopathy    Chronic midline low back pain with left-sided sciatica    Elevated PSA    Urinary hesitancy    Pelvic pain    Osteopenia of lumbar spine    Acute midline low back pain with sciatica    Prostate cancer metastatic to bone (HCC)    Hemorrhoids    Anxiety    Cancer, metastatic to bone (HCC)    Nausea and vomiting    Back pain    Primary prostate cancer with metastasis from prostate to other site (HCC)    Severe protein-calorie malnutrition (HCC)    Pneumonia, unspecified organism    Shortness of breath    Anemia, unspecified type    Hemoptysis    Cancer-related pain    Encounter for palliative care    Metastatic cancer to bone (HCC)    Intractable pain       Admission Condition:  stable    Discharged Condition:  stable    Hospital Course:     Joseph Bond is a 44 y.o. male with a PMH of Diverticulosis, history of opioid abuse (HCC) stage IV prostate cancer with mets who presents to ED for worsening right sided hip pain and an inability to ambulate due to severe pain and weakness. Pain is refractory to outpatient pain regimen with Dilaudid 2 mg Q2 PRN and Oxy 20 mg Q3-4H PRN. Denies loss of bowel or bladder function, saddle anesthesia, or paresthesias in his bilateral lower extremities. Patient was recently admitted and was started on and inpatient pain regimen to include IV Dilaudid 1 mg Q3 PRN, Oxy 15 mg Q4 PRN, in addition to Robaxin 750 mg QID by palliative care. He was started on Casodex by oncology. Hip and lumbar spine CT showed metastatic disease in the lumbar spine and pelvis, lymphadenopathy with no acute bony anomalities. Palliative medicine consulted for pain management. Patient was started on Decadron 8 mg for five days. Palliative    Neurological:      General: No focal deficit present.      Mental Status: He is alert and oriented to person, place, and time.     Disposition:   home    Future Appointments   Date Time Provider Department Center   11/15/2024  3:20 PM Ty Shaikh MD [facility]   11/21/2024  8:30 AM Jony Ortega,  [facility]   11/25/2024  8:00 AM SCHEDULE, [facility] PALLIATIVE CARE PROVIDER [facility]       More than 30 minutes was spent in preparation of this patient's discharge including, but not limited to, examination, preparation of documents, prescription preparation, counseling and coordination.    Signed:  Sharlene Ramirez MD  11/9/2024, 5:31 PM    16-Jan-2018

## 2024-11-12 ENCOUNTER — PREP FOR PROCEDURE (OUTPATIENT)
Dept: SURGERY | Age: 44
End: 2024-11-12

## 2024-11-12 ENCOUNTER — TELEPHONE (OUTPATIENT)
Dept: SURGERY | Age: 44
End: 2024-11-12

## 2024-11-12 DIAGNOSIS — C61 PROSTATE CANCER (HCC): ICD-10-CM

## 2024-11-12 RX ORDER — ACETAMINOPHEN 160 MG
2000 TABLET,DISINTEGRATING ORAL DAILY
COMMUNITY
Start: 2024-10-18

## 2024-11-12 RX ORDER — DAROLUTAMIDE 300 MG/1
TABLET, FILM COATED ORAL
COMMUNITY
Start: 2024-11-11

## 2024-11-12 NOTE — TELEPHONE ENCOUNTER
Prior Authorization Form:      DEMOGRAPHICS:                     Patient Name:  Joseph Bond  Patient :  1980            Insurance:  Payor: [employer] [place] MEDICAID / Plan: [employer] [place]ICA / Product Type: *No Product type* /   Insurance ID Number:    Payer/Plan Subscr  Sex Relation Sub. Ins. ID Effective Group Num   1. [employer]* JOSEPH BOND 1980 Male Self [id number] 20 [id number]                                   [address] [address]         DIAGNOSIS & PROCEDURE:                       Procedure/Operation: Mediport insertion           CPT Code: 19719    Diagnosis:  Prostate cancer metastatic to bone    ICD10 Code: C61/C79.51    Location:  [facility]    Surgeon:  Dr Washington    SCHEDULING INFORMATION:                          Date: 24    Time: 7:00 am              Anesthesia:  LMAC                                                       Status:  Outpatient        Special Comments:         Electronically signed by Mayuri Ring MA on 2024 at 5:55 AM

## 2024-11-12 NOTE — TELEPHONE ENCOUNTER
Joseph Bond is scheduled for mediport insertion with Dr Washington on 11-25-24 at [facility]. Patient needs to be NPO after midnight the night before procedure. All surgery instructions were explained to the patient and a surgery letter was also mailed out. MA informed patient that PAT will also be calling to review pre-op instructions and medications. Patient verbalized understanding.    Electronically signed by Mayuri Ring MA on 11/12/2024 at 5:54 AM

## 2024-11-13 DIAGNOSIS — G89.3 CANCER-RELATED PAIN: ICD-10-CM

## 2024-11-13 DIAGNOSIS — C79.51 PROSTATE CANCER METASTATIC TO BONE (HCC): ICD-10-CM

## 2024-11-13 DIAGNOSIS — C61 PROSTATE CANCER METASTATIC TO BONE (HCC): ICD-10-CM

## 2024-11-13 DIAGNOSIS — Z51.5 ENCOUNTER FOR PALLIATIVE CARE: ICD-10-CM

## 2024-11-13 RX ORDER — HYDROMORPHONE HYDROCHLORIDE 2 MG/1
4 TABLET ORAL
Qty: 30 TABLET | Refills: 0 | Status: SHIPPED
Start: 2024-11-13 | End: 2024-11-14 | Stop reason: SDUPTHER

## 2024-11-13 RX ORDER — HYDROMORPHONE HYDROCHLORIDE 4 MG/1
4 TABLET ORAL
Qty: 30 TABLET | Refills: 0 | Status: SHIPPED
Start: 2024-11-13 | End: 2024-11-14 | Stop reason: SDUPTHER

## 2024-11-13 NOTE — TELEPHONE ENCOUNTER
Call from Indra requesting refill for Dilaudid 2 mg and 4 mg tablets. Pharmacy is [facility] on [address]. First clinic appointment 11/25/24.

## 2024-11-14 ENCOUNTER — TELEPHONE (OUTPATIENT)
Dept: PALLATIVE CARE | Age: 44
End: 2024-11-14

## 2024-11-14 DIAGNOSIS — G89.3 CANCER-RELATED PAIN: ICD-10-CM

## 2024-11-14 DIAGNOSIS — C79.51 PROSTATE CANCER METASTATIC TO BONE (HCC): ICD-10-CM

## 2024-11-14 DIAGNOSIS — C61 PROSTATE CANCER METASTATIC TO BONE (HCC): ICD-10-CM

## 2024-11-14 DIAGNOSIS — Z51.5 ENCOUNTER FOR PALLIATIVE CARE: ICD-10-CM

## 2024-11-14 RX ORDER — HYDROMORPHONE HYDROCHLORIDE 2 MG/1
2 TABLET ORAL
Qty: 30 TABLET | Refills: 0 | Status: SHIPPED
Start: 2024-11-14 | End: 2024-11-15 | Stop reason: SDUPTHER

## 2024-11-14 RX ORDER — HYDROMORPHONE HYDROCHLORIDE 2 MG/1
2 TABLET ORAL
Qty: 30 TABLET | Refills: 0 | OUTPATIENT
Start: 2024-11-14 | End: 2024-11-21

## 2024-11-14 RX ORDER — HYDROMORPHONE HYDROCHLORIDE 4 MG/1
4 TABLET ORAL
Qty: 30 TABLET | Refills: 0 | OUTPATIENT
Start: 2024-11-14 | End: 2024-11-21

## 2024-11-14 RX ORDER — HYDROMORPHONE HYDROCHLORIDE 4 MG/1
4 TABLET ORAL
Qty: 30 TABLET | Refills: 0 | Status: SHIPPED
Start: 2024-11-14 | End: 2024-11-15 | Stop reason: SDUPTHER

## 2024-11-14 NOTE — TELEPHONE ENCOUNTER
Call from Pharmacist at [facility] on [address]. Pharmacist needs corrected prescriptions for Hydromorphone 2 mg and 4 mg that patient can take every 2 hours to equal 6 mg. Pharmacist states they will no longer fill Oxy IR prescriptions, questioning patient being on two short acting opiates. First clinic appointment is 11/25/24.

## 2024-11-14 NOTE — TELEPHONE ENCOUNTER
Indra called back stating why was he having such a hard time getting his pain medication. Indra was aggressive in his message stating he had cancer , he is the one in pain and why does he have to go through this every other week. Called back and shared the pharmacies concern for prescriptions. Explained to Indra that the pharmacy needed clarification of hydromorphone prescription but it would be available later today. Discussed option to change to methadone , but he refuses and states we are trying to force him. Tried to share that patients have good results with methadone but Indra just kept saying he needed his Oxy and dilaudid , that is what he was prescribed and it was working. We could not force him to take methadone. Tried to explain that the pharmacies may not be able to continue this regimen. Encouraged Indra to keep his appointment 11/25/24.

## 2024-11-14 NOTE — TELEPHONE ENCOUNTER
Call from [facility] pharmacy stating they are not comfortable filling prescriptions for patient as he has told hem that he will continue taking Hydromorphone and Oxy IR. Discussed with Dr. Washington and Dr. Segovia and called back to Indra to offer to send prescription to a different pharmacy. Indra states his Palliative care from [facility] was taking care of him and he hung up on this nurse. Called and spoke with Mary at [facility]. Mary states YES they have been following patient and their NP was meeting with Indra today and is prescribing for Indra.  [facility] will sign off.

## 2024-11-15 ENCOUNTER — OFFICE VISIT (OUTPATIENT)
Dept: FAMILY MEDICINE CLINIC | Age: 44
End: 2024-11-15

## 2024-11-15 VITALS
SYSTOLIC BLOOD PRESSURE: 134 MMHG | HEIGHT: 67 IN | RESPIRATION RATE: 18 BRPM | TEMPERATURE: 97.3 F | BODY MASS INDEX: 25.58 KG/M2 | OXYGEN SATURATION: 98 % | WEIGHT: 163 LBS | HEART RATE: 125 BPM | DIASTOLIC BLOOD PRESSURE: 59 MMHG

## 2024-11-15 DIAGNOSIS — C79.51 PROSTATE CANCER METASTATIC TO BONE (HCC): ICD-10-CM

## 2024-11-15 DIAGNOSIS — G89.3 CANCER-RELATED PAIN: ICD-10-CM

## 2024-11-15 DIAGNOSIS — C61 PRIMARY PROSTATE CANCER WITH METASTASIS FROM PROSTATE TO OTHER SITE (HCC): Primary | ICD-10-CM

## 2024-11-15 DIAGNOSIS — Z09 HOSPITAL DISCHARGE FOLLOW-UP: ICD-10-CM

## 2024-11-15 DIAGNOSIS — Z51.5 ENCOUNTER FOR PALLIATIVE CARE: ICD-10-CM

## 2024-11-15 DIAGNOSIS — C61 PROSTATE CANCER METASTATIC TO BONE (HCC): ICD-10-CM

## 2024-11-15 DIAGNOSIS — R52 PAIN: Primary | ICD-10-CM

## 2024-11-15 RX ORDER — HYDROMORPHONE HYDROCHLORIDE 4 MG/1
4 TABLET ORAL
Qty: 30 TABLET | Refills: 0 | Status: SHIPPED | OUTPATIENT
Start: 2024-11-15 | End: 2024-11-20

## 2024-11-15 RX ORDER — SENNA AND DOCUSATE SODIUM 50; 8.6 MG/1; MG/1
2 TABLET, FILM COATED ORAL 2 TIMES DAILY PRN
Qty: 60 TABLET | Refills: 0 | Status: SHIPPED | OUTPATIENT
Start: 2024-11-15

## 2024-11-15 RX ORDER — OXYCODONE HYDROCHLORIDE 15 MG/1
15 TABLET ORAL
Qty: 28 TABLET | Refills: 0 | Status: SHIPPED | OUTPATIENT
Start: 2024-11-15 | End: 2024-11-20

## 2024-11-15 RX ORDER — HYDROMORPHONE HYDROCHLORIDE 2 MG/1
2 TABLET ORAL
Qty: 30 TABLET | Refills: 0 | Status: SHIPPED | OUTPATIENT
Start: 2024-11-15 | End: 2024-11-20

## 2024-11-15 ASSESSMENT — PATIENT HEALTH QUESTIONNAIRE - PHQ9: DEPRESSION UNABLE TO ASSESS: FUNCTIONAL CAPACITY MOTIVATION LIMITS ACCURACY

## 2024-11-15 NOTE — PROGRESS NOTES
Patient is a 44-year-old male presenting today for hospital follow-up.  Patient has a pertinent past medical history of aggressive metastatic prostate cancer with mets to the spine and lungs.  Patient recently admitted for the pain of this aggressive cancer.  Palliative medicine is following and on discharge they recommended Dilaudid 6 mg every 2 hours as needed and oxycodone 20 mg every 2 hours as needed as well in an alternating fashion.  Patient notes that they only gave him a less than 7-day course and will need something to hold over until he sees them.  Patient notes he has upcoming appointment with palliative medicine.  Also, hematology/oncology is following and started the patient on nubeqa today.  Patient would like to remain full code and have everything done to treat his aggressive cancer.  Patient does not want hospice at this time, but would like cancer treatment and pain control measures    Blood pressure (!) 134/59, pulse (!) 125, temperature 97.3 °F (36.3 °C), temperature source Temporal, resp. rate 18, height 1.702 m (5' 7\"), weight 73.9 kg (163 lb), SpO2 98%.     HEENT WNL     Heart regular    Lungs clear    abd non-tender      No edema    Pulses intact   Patient appears very fatigued and using a walker to ambulate    Assessment and plan  Terminal prostate cancer mets to the spine and lung; advised to continue following with oncology and palliative medicine.  He has follow-up appointments for both, but in the meantime I will prescribe the palliative medicine recommendation of oxycodone 20 mg every 2 hours as needed and Dilaudid 6 mg every 2 hours as needed in an alternating fashion.  Patient informed of the risk of overdose and advised to use naloxone at home if he exhibits symptoms of such plus to call 911.  Patient and mother note that they have 2 bottles of naloxone at home.  Patient notes that he will contact palliative medicine to further discuss pain control measures.  Patient notes that he 
Post-Discharge Transitional Care Management Services  Nurse/MA Progress Note     Joseph Bond, 1980  Date of Visit:  11/15/2024     Please evaluate the following checklist (all answers must be yes)     The care coordinator documented communication with the patient/caretaker within 2 business days of the discharge date and filed an encounter? Yes If NO - convert to an office visit; patient does not qualify for RAFAELA visit     If YES - go to next question   The discharge information is available for the physician to review? Yes If NO - convert to an office visit; patient does not qualify for RAFAELA visit     If YES - go to next question   Is this visit within 7 or 14 days of discharge?  Yes - Less than 14 - Informed provider this qualifies only for 41482 moderate complexity.  If NO - convert to an office visit; patient does not qualify for RAFAELA visit     If YES - go to next question   Is the visit the first TCM in the 30d prior to this admission. Yes - this is the first TCM within the time period including 30d prior to this currently discussed admission. If NO - convert to an office visit; patient does not qualify for RAFAELA visit      If YES - go on to room the patient as a TCM visit.      The Doctor should use the system smart phrase TCMPN as their note template.  This visit requires attending presence if completed by a resident.    Billing codes should be as above    - 14033 - moderate complexity (visit occurring between 1-14d after discharge)   - 83648 - high complexity (high complexity visit occurring between 1-7d after discharge)   
Palliative cared tried a PCA pump but patient would not be eligible for a home pump due to not being able to change the cartridge. He was started on Dilaudid 6 mg and Oxycodone 20 mg every 2 hours alternating with a follow-up with pain clinic. Orthopedic surgery were consulted and recommended medical management with a repeat evaluation in 1-2 weeks outpatient. [facility] with [facility] to initiate Taxotere, Nubeqa and Eligard after his discharge. The patient's course was otherwise uneventful. Patient progressed well, pain was controlled on multimodal opioid PO medications. Orthopedic surgery evaluated and treated the patient and recommended protected weightbearing of the right lower extremity with assistive device. Patient was tolerating a regular diet with no nausea or vomiting, and was in a suitable condition for discharge home    Inpatient course: Discharge summary reviewed- see chart.    Interval history/Current status:   Today, the patient reports he is in significant pain throughout his body.  Patient notes that he is close to running out of his pain medications given on hospital discharge and does not have an appointment with palliative medicine until later.  Current medication regimen for the pain includes Dilaudid 6 mg and Oxycodone 20 mg every 2 hours alternating.  Aside from that, the patient notes that he feels fatigued overall, low appetite, and has no desire to do much.  Side effects from the opioids include constipation to which he uses senna as needed.  Patient is currently residing at home with his mother who is assisting with his everyday needs.    Hematology/oncology; patient has not followed up with him outpatient yet, but notes he has an appointment coming up.  Patient's chemotherapy medication regimen will be Nubeqa.  Further recommendations pending.  Patient is planned to undergo a Mediport insertion by vascular surgery on 25 November as well.    Palliative medicine; pain control with

## 2024-11-15 NOTE — PATIENT INSTRUCTIONS
Please contact Palliative Medicine for follow up and pain medicine management.     I filled 5 days worth of their recommended course of Dilaudid 6 mg and Oxycodone 15 mg every 2 hours as needed in an alternating manner.     It is imperative you contact Palliative Medicine to discuss pain control.     Also, monitor for side effects of overdose. Use the narcan you have at home and call 911 ASAP if any symptoms of such ensue.

## 2024-11-19 ENCOUNTER — CARE COORDINATION (OUTPATIENT)
Dept: CARE COORDINATION | Age: 44
End: 2024-11-19

## 2024-11-19 NOTE — CARE COORDINATION
-CTN phoned [facility] and spoke to Mazin who transferred CTN to palliative care service.  CTN received VM, message left requesting return call.

## 2024-11-19 NOTE — CARE COORDINATION
-CTN phoned patient to provide update.  Patient is agreeable to CTN routing to PCP need for external referral for palliative care.  Patient has no preference of palliative care.
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
## 2024-11-19 NOTE — CARE COORDINATION
Care Transitions Note    Follow Up Call     Patient Current Location:  Home: [address] [place] [address] [place] [address]    Care Transition Nurse contacted the patient by telephone.     Additional needs identified to be addressed with provider   No needs identified                 Method of communication with provider: none.    Care Summary Note:   -CTN reviewed with patient EMR notes regarding palliative care ([facility] vs [facility].)  Patient states [facility] has discharged him and he was under the impression that he still had his palliative care appointment with [facility] on 11/25/24.  CTN explained that the 11/25/24 appointment was cancelled as last report was he was with [facility].  Patient is receptive to CTN looking into options for him as he needs a palliative service for pain control.  -Patient states he is having multiple side effects with the new oral chemo medication (Nubeqa) which include tiredness, no appetite, and hot flashes.    -CTN will continue to follow for care transitions.    Plan of care updates since last contact:  Completed blood and cancer center appointment with Dr Matson on 11/11/24, follow up is 12/2/24 .  Completed PCP TCM/HFU on 11/15/24.  Ortho appointment scheduled with Dr Ortega.       Advance Care Planning:   Does patient have an Advance Directive: reviewed during previous call, see note.     Medication Review:  Full medication reconciliation completed during previous call.  Medications changed since last call, reviewed today-started on Nubeqa by blood/cancer provider.  Obtained vegan form of Vitamin D OTC and is taking. CTN updated med list in EMR.    Remote Patient Monitoring:  Offered patient enrollment in the Remote Patient Monitoring (RPM) program for in-home monitoring: Addressed on a prior call, see notes.    Assessments:  Care Transitions Subsequent and Final Call    Subsequent and Final Calls  Have your medications changed?: Yes  Patient Reports: 11/19/24-see note.  Do you have

## 2024-11-19 NOTE — CARE COORDINATION
-CTN phoned [facility] and received VM.  Message left requesting return call, CTN contact information provided.

## 2024-11-20 DIAGNOSIS — C61 PRIMARY PROSTATE CANCER WITH METASTASIS FROM PROSTATE TO OTHER SITE (HCC): Primary | ICD-10-CM

## 2024-11-21 ENCOUNTER — HOSPITAL ENCOUNTER (OUTPATIENT)
Dept: GENERAL RADIOLOGY | Age: 44
Discharge: HOME | End: 2024-11-23
Payer: MEDICAID

## 2024-11-21 ENCOUNTER — CARE COORDINATION (OUTPATIENT)
Dept: CARE COORDINATION | Age: 44
End: 2024-11-21

## 2024-11-21 ENCOUNTER — OFFICE VISIT (OUTPATIENT)
Dept: ORTHOPEDIC SURGERY | Age: 44
End: 2024-11-21
Payer: MEDICAID

## 2024-11-21 VITALS
BODY MASS INDEX: 25.58 KG/M2 | HEART RATE: 109 BPM | RESPIRATION RATE: 20 BRPM | SYSTOLIC BLOOD PRESSURE: 123 MMHG | TEMPERATURE: 98.1 F | HEIGHT: 67 IN | OXYGEN SATURATION: 98 % | DIASTOLIC BLOOD PRESSURE: 81 MMHG | WEIGHT: 163 LBS

## 2024-11-21 DIAGNOSIS — R52 PAIN: ICD-10-CM

## 2024-11-21 DIAGNOSIS — C61 PRIMARY PROSTATE CANCER WITH METASTASIS FROM PROSTATE TO OTHER SITE (HCC): Primary | ICD-10-CM

## 2024-11-21 PROCEDURE — 73502 X-RAY EXAM HIP UNI 2-3 VIEWS: CPT

## 2024-11-21 PROCEDURE — 1111F DSCHRG MED/CURRENT MED MERGE: CPT | Performed by: ORTHOPAEDIC SURGERY

## 2024-11-21 PROCEDURE — 1036F TOBACCO NON-USER: CPT | Performed by: ORTHOPAEDIC SURGERY

## 2024-11-21 PROCEDURE — G8427 DOCREV CUR MEDS BY ELIG CLIN: HCPCS | Performed by: ORTHOPAEDIC SURGERY

## 2024-11-21 PROCEDURE — 99213 OFFICE O/P EST LOW 20 MIN: CPT | Performed by: ORTHOPAEDIC SURGERY

## 2024-11-21 PROCEDURE — G8484 FLU IMMUNIZE NO ADMIN: HCPCS | Performed by: ORTHOPAEDIC SURGERY

## 2024-11-21 PROCEDURE — G8417 CALC BMI ABV UP PARAM F/U: HCPCS | Performed by: ORTHOPAEDIC SURGERY

## 2024-11-21 RX ORDER — OXYCODONE HYDROCHLORIDE 15 MG/1
15 TABLET ORAL EVERY 4 HOURS PRN
COMMUNITY
Start: 2024-11-15 | End: 2024-11-22 | Stop reason: ALTCHOICE

## 2024-11-21 RX ORDER — HYDROMORPHONE HYDROCHLORIDE 4 MG/1
6 TABLET ORAL
COMMUNITY
End: 2024-11-22 | Stop reason: SDUPTHER

## 2024-11-21 RX ORDER — OXYCODONE HYDROCHLORIDE 15 MG/1
15 TABLET ORAL EVERY 4 HOURS PRN
OUTPATIENT
Start: 2024-11-21

## 2024-11-21 RX ORDER — HYDROMORPHONE HYDROCHLORIDE 4 MG/1
4 TABLET ORAL
OUTPATIENT
Start: 2024-11-21

## 2024-11-21 NOTE — CARE COORDINATION
-CTN phoned patient and left message to inform patient that PCP did place a referral to [facility] palliative care.  CTN contact information provided if patient had any questions.  -Noted in EMR successful fax was sent to [facility].  -CTN will continue to follow for care transitions.

## 2024-11-21 NOTE — TELEPHONE ENCOUNTER
Last Appointment:  11/15/2024  Future Appointments   Date Time Provider Department Center   12/4/2024  2:00 PM Ty Shaikh MD [facility] [place] [facility] ECC DEP   1/23/2025  8:45 AM [facility]

## 2024-11-21 NOTE — PROGRESS NOTES
extremities.    Electronically Signed By  Jony Ortega DO  11/21/24    NOTE: This report was transcribed using voice recognition software. Every effort was made to ensure accuracy; however, inadvertent computerized transcription errors may be present

## 2024-11-22 ENCOUNTER — TELEPHONE (OUTPATIENT)
Dept: FAMILY MEDICINE CLINIC | Age: 44
End: 2024-11-22

## 2024-11-22 ENCOUNTER — ANESTHESIA EVENT (OUTPATIENT)
Dept: OPERATING ROOM | Age: 44
End: 2024-11-22
Payer: MEDICAID

## 2024-11-22 DIAGNOSIS — C61 PRIMARY PROSTATE CANCER WITH METASTASIS FROM PROSTATE TO OTHER SITE (HCC): Primary | ICD-10-CM

## 2024-11-22 RX ORDER — HYDROMORPHONE HYDROCHLORIDE 2 MG/1
TABLET ORAL
COMMUNITY
Start: 2024-11-20

## 2024-11-22 RX ORDER — HYDROMORPHONE HYDROCHLORIDE 4 MG/1
4 TABLET ORAL
Qty: 60 TABLET | Refills: 0 | Status: SHIPPED | OUTPATIENT
Start: 2024-11-22 | End: 2024-11-22 | Stop reason: ALTCHOICE

## 2024-11-22 RX ORDER — OXYCODONE HCL 20 MG/1
20 TABLET, FILM COATED, EXTENDED RELEASE ORAL EVERY 8 HOURS
Qty: 15 TABLET | Refills: 0 | Status: SHIPPED
Start: 2024-11-22 | End: 2024-11-22 | Stop reason: ALTCHOICE

## 2024-11-22 RX ORDER — OXYCODONE HYDROCHLORIDE 5 MG/1
5 TABLET ORAL EVERY 4 HOURS PRN
Qty: 12 TABLET | Refills: 0 | Status: SHIPPED | OUTPATIENT
Start: 2024-11-22 | End: 2024-11-27

## 2024-11-22 RX ORDER — OXYCODONE HCL 20 MG/1
20 TABLET, FILM COATED, EXTENDED RELEASE ORAL EVERY 8 HOURS
Qty: 15 TABLET | Refills: 0 | Status: SHIPPED | OUTPATIENT
Start: 2024-11-22 | End: 2024-11-22 | Stop reason: ALTCHOICE

## 2024-11-22 RX ORDER — HYDROMORPHONE HYDROCHLORIDE 4 MG/1
4 TABLET ORAL
Qty: 60 TABLET | Refills: 0 | Status: SHIPPED | OUTPATIENT
Start: 2024-11-22 | End: 2024-11-27
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
## 2024-11-22 NOTE — TELEPHONE ENCOUNTER
Received call from [facility] regarding recent pain regimen ordered.  Pharmacy reports they only have the brand Oxycodone 20 mg Q 2 hours PRN and not the generic. As a result the patient would need a prior authorization and will be unable to attain his short course of pain medications. Pharmacy recommends using Roxicodone in its place as they have them in stock.     Agreed with pharmacist to fill the following regimen: Dilaudid 4 mg Q 2 Hours PRN for 5 days and Roxicodone 5 mg Q 4 Hours PRN for 5 days. Orders placed.     Refer to telephone encounter with patient on 11/22/24 at 14.43 for supplementary information as well.     Ty Shaikh MD

## 2024-11-22 NOTE — PROGRESS NOTES
[facility] PRE-ADMISSION TESTING INSTRUCTIONS    The Preadmission Testing patient is instructed accordingly using the following criteria (check applicable):    ARRIVAL INSTRUCTIONS:  [x] Parking the day of Surgery is located in the Main Entrance lot.  Upon entering the door, make an immediate right to the surgery reception desk    [x] Bring photo ID and insurance card    [] Bring in a copy of Living will or Durable Power of  papers.    [x] Please be sure to arrange for responsible adult to provide transportation to and from the hospital    [x] Please arrange for responsible adult to be with you for the 24 hour period post procedure due to having anesthesia    [x] If you awake am of surgery not feeling well or have temperature >100 please call [phone]    GENERAL INSTRUCTIONS:    [x] May have clear liquids until 4 hours prior to surgery. Examples include water, fruit juices (no pulp), jello, popsicles, black coffee or tea, beef or chicken broth.               No gum, candy or mints.    [x] You may brush your teeth, but do not swallow any water    [x] Take medications as instructed with 1-2 oz of water    [x] Stop herbal supplements and vitamins 5 days prior to procedure    [] Follow preop dosing of blood thinners per physician instructions    [] Take 1/2 dose of evening insulin, but no insulin after midnight    [] No oral diabetic medications after midnight    [] If diabetic and have low blood sugar or feel symptomatic, take 1-2oz apple juice only    [] Bring inhalers day of surgery    [] Bring C-PAP/ Bi-Pap day of surgery    [] Bring urine specimen day of surgery    [x] Shower or bath with soap, lather and rinse well, AM of Surgery, no lotion, powders or creams to surgical site    [] Follow bowel prep as instructed per surgeon    [x] No tobacco products within 24 hours of surgery     [x] No alcohol or illegal drug use within 24 hours of surgery.    [x] Jewelry, body piercing's,  eyeglasses, contact lenses and dentures are not permitted into surgery (bring cases)      [] Please do not wear any nail polish, make up or hair products on the day of surgery    [x] You can expect a call the business day prior to procedure to notify you if your arrival time changes    [x] If you receive a survey after surgery we would greatly appreciate your comments    [] Parent/guardian of a minor must accompany their child and remain on the premises  the entire time they are under our care     [] Pediatric patients may bring favorite toy, blanket or comfort item with them    [] A caregiver or family member must remain with the patient during their stay if they are mentally handicapped, have dementia, disoriented or unable to use a call light or would be a safety concern if left unattended    [x] Please notify surgeon if you develop any illness between now and time of surgery (cold, cough, sore throat, fever, nausea, vomiting) or any signs of infections  including skin, wounds, and dental.    [x]  The Outpatient Pharmacy is available to fill your prescription here on your day of surgery, ask your preop nurse for details    [] Other instructions    EDUCATIONAL MATERIALS PROVIDED:    [] PAT Preoperative Education Packet/Booklet     [] Medication List    [] Transfusion bracelet given to pt. Pt instructed to place on wrist or bring to hospital day of surgery. Informed that if bracelet lost or forgotten at home, bloodwork will have to repeated which could cause a delay in surgery.    [] Shower with soap, lather and rinse well, and use CHG wipes provided the evening before surgery as instructed    [] Incentive spirometer with instructions

## 2024-11-25 ENCOUNTER — ANESTHESIA (OUTPATIENT)
Dept: OPERATING ROOM | Age: 44
End: 2024-11-25
Payer: MEDICAID

## 2024-11-25 ENCOUNTER — APPOINTMENT (OUTPATIENT)
Dept: GENERAL RADIOLOGY | Age: 44
End: 2024-11-25
Attending: SURGERY
Payer: MEDICAID

## 2024-11-25 ENCOUNTER — HOSPITAL ENCOUNTER (OUTPATIENT)
Age: 44
Setting detail: OUTPATIENT SURGERY
Discharge: HOME | End: 2024-11-25
Attending: SURGERY | Admitting: SURGERY
Payer: MEDICAID

## 2024-11-25 VITALS
WEIGHT: 163 LBS | BODY MASS INDEX: 25.58 KG/M2 | SYSTOLIC BLOOD PRESSURE: 103 MMHG | DIASTOLIC BLOOD PRESSURE: 66 MMHG | HEIGHT: 67 IN | OXYGEN SATURATION: 100 % | HEART RATE: 76 BPM | RESPIRATION RATE: 20 BRPM | TEMPERATURE: 96.3 F

## 2024-11-25 PROCEDURE — 6360000002 HC RX W HCPCS

## 2024-11-25 PROCEDURE — 7100000011 HC PHASE II RECOVERY - ADDTL 15 MIN: Performed by: SURGERY

## 2024-11-25 PROCEDURE — 36561 INSERT TUNNELED CV CATH: CPT | Performed by: SURGERY

## 2024-11-25 PROCEDURE — 3600000002 HC SURGERY LEVEL 2 BASE: Performed by: SURGERY

## 2024-11-25 PROCEDURE — 2580000003 HC RX 258: Performed by: SURGERY

## 2024-11-25 PROCEDURE — 2500000003 HC RX 250 WO HCPCS: Performed by: SURGERY

## 2024-11-25 PROCEDURE — 7100000010 HC PHASE II RECOVERY - FIRST 15 MIN: Performed by: SURGERY

## 2024-11-25 PROCEDURE — 2500000003 HC RX 250 WO HCPCS

## 2024-11-25 PROCEDURE — 3600000012 HC SURGERY LEVEL 2 ADDTL 15MIN: Performed by: SURGERY

## 2024-11-25 PROCEDURE — 71045 X-RAY EXAM CHEST 1 VIEW: CPT

## 2024-11-25 PROCEDURE — 2580000003 HC RX 258

## 2024-11-25 PROCEDURE — 2709999900 HC NON-CHARGEABLE SUPPLY: Performed by: SURGERY

## 2024-11-25 PROCEDURE — 77001 FLUOROGUIDE FOR VEIN DEVICE: CPT | Performed by: SURGERY

## 2024-11-25 PROCEDURE — 6360000002 HC RX W HCPCS: Performed by: SURGERY

## 2024-11-25 PROCEDURE — 6370000000 HC RX 637 (ALT 250 FOR IP): Performed by: ANESTHESIOLOGY

## 2024-11-25 PROCEDURE — 3700000000 HC ANESTHESIA ATTENDED CARE: Performed by: SURGERY

## 2024-11-25 PROCEDURE — 3700000001 HC ADD 15 MINUTES (ANESTHESIA): Performed by: SURGERY

## 2024-11-25 PROCEDURE — C1788 PORT, INDWELLING, IMP: HCPCS | Performed by: SURGERY

## 2024-11-25 DEVICE — VACCESS CT LOW-PROFILE POWER-INJECTABLE IMPLANTABLE PORT  6F (WITH SUTURE PLUGS) (6F)
Type: IMPLANTABLE DEVICE | Site: SUBCLAVIAN | Status: FUNCTIONAL
Brand: VACCESS

## 2024-11-25 RX ORDER — TRAMADOL HYDROCHLORIDE 50 MG/1
100 TABLET ORAL PRN
Status: DISCONTINUED | OUTPATIENT
Start: 2024-11-25 | End: 2024-11-25 | Stop reason: HOSPADM

## 2024-11-25 RX ORDER — KETAMINE HYDROCHLORIDE 10 MG/ML
INJECTION, SOLUTION INTRAMUSCULAR; INTRAVENOUS
Status: DISCONTINUED | OUTPATIENT
Start: 2024-11-25 | End: 2024-11-25 | Stop reason: SDUPTHER

## 2024-11-25 RX ORDER — PROPOFOL 10 MG/ML
INJECTION, EMULSION INTRAVENOUS
Status: DISCONTINUED | OUTPATIENT
Start: 2024-11-25 | End: 2024-11-25 | Stop reason: SDUPTHER

## 2024-11-25 RX ORDER — NALOXONE HYDROCHLORIDE 0.4 MG/ML
INJECTION, SOLUTION INTRAMUSCULAR; INTRAVENOUS; SUBCUTANEOUS PRN
Status: DISCONTINUED | OUTPATIENT
Start: 2024-11-25 | End: 2024-11-25 | Stop reason: HOSPADM

## 2024-11-25 RX ORDER — HEPARIN SODIUM 1000 [USP'U]/ML
INJECTION, SOLUTION INTRAVENOUS; SUBCUTANEOUS PRN
Status: DISCONTINUED | OUTPATIENT
Start: 2024-11-25 | End: 2024-11-25 | Stop reason: ALTCHOICE

## 2024-11-25 RX ORDER — HEPARIN 100 UNIT/ML
SYRINGE INTRAVENOUS PRN
Status: DISCONTINUED | OUTPATIENT
Start: 2024-11-25 | End: 2024-11-25 | Stop reason: ALTCHOICE

## 2024-11-25 RX ORDER — LIDOCAINE HYDROCHLORIDE 20 MG/ML
INJECTION, SOLUTION EPIDURAL; INFILTRATION; INTRACAUDAL; PERINEURAL
Status: DISCONTINUED | OUTPATIENT
Start: 2024-11-25 | End: 2024-11-25 | Stop reason: SDUPTHER

## 2024-11-25 RX ORDER — MIDAZOLAM HYDROCHLORIDE 1 MG/ML
INJECTION, SOLUTION INTRAMUSCULAR; INTRAVENOUS
Status: DISCONTINUED | OUTPATIENT
Start: 2024-11-25 | End: 2024-11-25 | Stop reason: SDUPTHER

## 2024-11-25 RX ORDER — SODIUM CHLORIDE 9 MG/ML
INJECTION, SOLUTION INTRAVENOUS PRN
Status: DISCONTINUED | OUTPATIENT
Start: 2024-11-25 | End: 2024-11-25 | Stop reason: HOSPADM

## 2024-11-25 RX ORDER — BUPIVACAINE HYDROCHLORIDE AND EPINEPHRINE 2.5; 5 MG/ML; UG/ML
INJECTION, SOLUTION EPIDURAL; INFILTRATION; INTRACAUDAL; PERINEURAL PRN
Status: DISCONTINUED | OUTPATIENT
Start: 2024-11-25 | End: 2024-11-25 | Stop reason: ALTCHOICE

## 2024-11-25 RX ORDER — SODIUM CHLORIDE 0.9 % (FLUSH) 0.9 %
5-40 SYRINGE (ML) INJECTION EVERY 12 HOURS SCHEDULED
Status: DISCONTINUED | OUTPATIENT
Start: 2024-11-25 | End: 2024-11-25 | Stop reason: HOSPADM

## 2024-11-25 RX ORDER — TRAMADOL HYDROCHLORIDE 50 MG/1
50 TABLET ORAL ONCE
Status: COMPLETED | OUTPATIENT
Start: 2024-11-25 | End: 2024-11-25

## 2024-11-25 RX ORDER — SODIUM CHLORIDE 0.9 % (FLUSH) 0.9 %
5-40 SYRINGE (ML) INJECTION PRN
Status: DISCONTINUED | OUTPATIENT
Start: 2024-11-25 | End: 2024-11-25 | Stop reason: HOSPADM

## 2024-11-25 RX ORDER — DIPHENHYDRAMINE HYDROCHLORIDE 50 MG/ML
INJECTION INTRAMUSCULAR; INTRAVENOUS
Status: DISCONTINUED | OUTPATIENT
Start: 2024-11-25 | End: 2024-11-25 | Stop reason: SDUPTHER

## 2024-11-25 RX ORDER — FENTANYL CITRATE 50 UG/ML
INJECTION, SOLUTION INTRAMUSCULAR; INTRAVENOUS
Status: DISCONTINUED | OUTPATIENT
Start: 2024-11-25 | End: 2024-11-25 | Stop reason: SDUPTHER

## 2024-11-25 RX ORDER — TRAMADOL HYDROCHLORIDE 50 MG/1
50 TABLET ORAL PRN
Status: DISCONTINUED | OUTPATIENT
Start: 2024-11-25 | End: 2024-11-25 | Stop reason: SDUPTHER

## 2024-11-25 RX ORDER — ONDANSETRON 2 MG/ML
INJECTION INTRAMUSCULAR; INTRAVENOUS
Status: DISCONTINUED | OUTPATIENT
Start: 2024-11-25 | End: 2024-11-25 | Stop reason: SDUPTHER

## 2024-11-25 RX ORDER — SODIUM CHLORIDE 9 MG/ML
INJECTION, SOLUTION INTRAVENOUS
Status: DISCONTINUED | OUTPATIENT
Start: 2024-11-25 | End: 2024-11-25 | Stop reason: SDUPTHER

## 2024-11-25 RX ADMIN — WATER 2000 MG: 1 INJECTION INTRAMUSCULAR; INTRAVENOUS; SUBCUTANEOUS at 06:55

## 2024-11-25 RX ADMIN — LIDOCAINE HYDROCHLORIDE 100 MG: 20 INJECTION, SOLUTION EPIDURAL; INFILTRATION; INTRACAUDAL; PERINEURAL at 07:08

## 2024-11-25 RX ADMIN — PROPOFOL 50 MG: 10 INJECTION, EMULSION INTRAVENOUS at 07:08

## 2024-11-25 RX ADMIN — SODIUM CHLORIDE: 9 INJECTION, SOLUTION INTRAVENOUS at 06:54

## 2024-11-25 RX ADMIN — FENTANYL CITRATE 50 MCG: 50 INJECTION, SOLUTION INTRAMUSCULAR; INTRAVENOUS at 07:12

## 2024-11-25 RX ADMIN — PROPOFOL 20 MG: 10 INJECTION, EMULSION INTRAVENOUS at 07:16

## 2024-11-25 RX ADMIN — TRAMADOL HYDROCHLORIDE 50 MG: 50 TABLET ORAL at 08:38

## 2024-11-25 RX ADMIN — DIPHENHYDRAMINE HYDROCHLORIDE 25 MG: 50 INJECTION, SOLUTION INTRAMUSCULAR; INTRAVENOUS at 06:54

## 2024-11-25 RX ADMIN — MIDAZOLAM 2 MG: 1 INJECTION INTRAMUSCULAR; INTRAVENOUS at 06:56

## 2024-11-25 RX ADMIN — PROPOFOL 150 MCG/KG/MIN: 10 INJECTION, EMULSION INTRAVENOUS at 07:09

## 2024-11-25 RX ADMIN — ONDANSETRON 4 MG: 2 INJECTION INTRAMUSCULAR; INTRAVENOUS at 07:26

## 2024-11-25 RX ADMIN — KETAMINE HYDROCHLORIDE 20 MG: 10 INJECTION INTRAMUSCULAR; INTRAVENOUS at 07:08

## 2024-11-25 RX ADMIN — FENTANYL CITRATE 50 MCG: 50 INJECTION, SOLUTION INTRAMUSCULAR; INTRAVENOUS at 07:08

## 2024-11-25 ASSESSMENT — PAIN DESCRIPTION - ORIENTATION
ORIENTATION: LEFT

## 2024-11-25 ASSESSMENT — PAIN DESCRIPTION - PAIN TYPE
TYPE: SURGICAL PAIN

## 2024-11-25 ASSESSMENT — PAIN DESCRIPTION - DESCRIPTORS
DESCRIPTORS: ACHING

## 2024-11-25 ASSESSMENT — PAIN - FUNCTIONAL ASSESSMENT
PAIN_FUNCTIONAL_ASSESSMENT: 0-10
PAIN_FUNCTIONAL_ASSESSMENT: 0-10

## 2024-11-25 ASSESSMENT — PAIN SCALES - GENERAL
PAINLEVEL_OUTOF10: 6
PAINLEVEL_OUTOF10: 6
PAINLEVEL_OUTOF10: 5

## 2024-11-25 ASSESSMENT — PAIN DESCRIPTION - LOCATION
LOCATION: CHEST

## 2024-11-25 ASSESSMENT — PAIN DESCRIPTION - FREQUENCY
FREQUENCY: CONTINUOUS

## 2024-11-25 NOTE — PROGRESS NOTES
0748 pt admitted to rm 29 iv ocnt nourishment offered c ray called for pt.  0967 Dr Washington called with x ray report okay for discahrge

## 2024-11-25 NOTE — ANESTHESIA PRE PROCEDURE
Department of Anesthesiology  Preprocedure Note       Name:  Joseph Bond   Age:  44 y.o.  :  1980                                          MRN:  [id number]         Date:  2024      Surgeon: Surgeon(s):  Felipe Washington MD    Procedure: Procedure(s):  MEDIPORT INSERTION    Medications prior to admission:   Prior to Admission medications    Medication Sig Start Date End Date Taking? Authorizing Provider   HYDROmorphone (DILAUDID) 4 MG tablet Take 1 tablet by mouth every 2 hours for 5 days. Max Daily Amount: 48 mg 24 Yes Ty Shaikh MD   oxyCODONE (ROXICODONE) 5 MG immediate release tablet Take 1 tablet by mouth every 4 hours as needed for Pain for up to 5 days. Intended supply: 3 days. Take lowest dose possible to manage pain Max Daily Amount: 30 mg 24 Yes yT Shaikh MD   HYDROmorphone (DILAUDID) 2 MG tablet TAKE 1 TABLET BY MOUTH EVERY 2 HOURS WITH 4 MG TABLET. MAX DAILY AMOUNT 24 MG. 24  Yes Johnson Tillman MD   naloxone 4 MG/0.1ML LIQD nasal spray ADMINISTER A SINGLE SPRAY IN ONE NOSTRIL UPON SIGNS OF OPIOID OVERDOSE. CALL 911. REPEAT AFTER 3 MINUTES IF NO RESPONSE. 24  Yes Johnson Tillman MD   VITAMIN D3 50 MCG ( UT) CAPS capsule Take 1 capsule by mouth daily 10/18/24  Yes Johnson Tillman MD   escitalopram (LEXAPRO) 5 MG tablet Take 1 tablet by mouth daily as needed   Yes Johnson Tillman MD   sennosides-docusate sodium (SENOKOT-S) 8.6-50 MG tablet Take 2 tablets by mouth 2 times daily as needed for Constipation 11/15/24   Ty Shaikh MD   NUBEQA 300 MG TABS Take 2 tablets by mouth 2 times daily 24   Johnson Tillman MD   methocarbamol (ROBAXIN) 750 MG tablet Take 1 tablet by mouth 4 times daily 10/18/24   Anastasia Helms MD   bicalutamide (CASODEX) 50 MG chemo tablet Take 1 tablet by mouth daily  Patient not taking: Reported on 2024 10/12/24   Anastasia Helms MD   sodium

## 2024-11-25 NOTE — DISCHARGE INSTRUCTIONS
[facility] AMBULATORY PROCEDURE DISCHARGE -   You may be drowsy or lightheaded after receiving sedation or anesthesia.    A responsible person should be with you for the next 24 hours.    Please follow the instructions checked below:    DIET INSTRUCTIONS:  [x]Start with light diet and progress to your normal diet as you feel like eating. If you experience nausea or repeated episodes of vomiting which persist beyond 12-24 hours, notify your doctor.  []Other     ACTIVITY INSTRUCTIONS:  [x]Rest today. Increase activity as tolerated    [x]No heavy lifting or strenuous activity for 2 weeks   [x]No driving for 1 day or while on narcotics  []Other     WOUND/DRESSING INSTRUCTIONS:  Always ensure you and your care giver clean hands before and after caring for the wound.  [x]May shower      []May bathe      [x]Derma bond dressing-Do not apply lotion, gel, or liquid to wound while the derma bond is in place.   []Other         MEDICATION INSTRUCTIONS:    []Prescriptions sent with you.  Use as directed.  When taking pain medications, you may experience dizziness or drowsiness.  Do not drink alcohol or drive when taking these medications.  [x]You may take a non-prescription “headache remedy”, preferably one that does not contain aspirin.    Other Instructions:      FOLLOW-UP CARE:  [x]Call the office for follow-up appointment as needed    Call physician if they or any other problems occur:  Fever over 101°    Redness, swelling, hardness or warmth at the operative site  Unrelieved nausea     Foul smelling or cloudy drainage at the operative site   Unrelieved pain    Blood soaked dressing. (Some oozing may be normal)                                  Keep mediport site clean and dry   Take Ibuprofen or tylenol for pain  May shower in 24 hours may bathe in 5 days   Mild swelling or bruising is normal- You may place ice for comfort  Call for bleeding or dramatic swelling or signs of infection erythema, warmth,

## 2024-11-25 NOTE — ANESTHESIA POSTPROCEDURE EVALUATION
Department of Anesthesiology  Postprocedure Note    Patient: Joseph Bond  MRN: [id number]  YOB: 1980  Date of evaluation: 11/25/2024    Procedure Summary       Date: 11/25/24 Room / Location: [address]    Anesthesia Start: 0654 Anesthesia Stop:     Procedure: MEDIPORT INSERTION (Chest) Diagnosis:       Prostate cancer (HCC)      Metastatic cancer to bone (HCC)      (Prostate cancer (HCC) [C61])      (Metastatic cancer to bone (HCC) [C79.51])    Surgeons: Felipe Washington MD Responsible Provider: Ramiro Melissa MD    Anesthesia Type: MAC ASA Status: 4            Anesthesia Type: No value filed.    Sherita Phase I: Sherita Score: 10    Sherita Phase II:      Anesthesia Post Evaluation    Patient location during evaluation: PACU  Patient participation: complete - patient participated  Level of consciousness: awake  Airway patency: patent  Nausea & Vomiting: no nausea and no vomiting  Cardiovascular status: hemodynamically stable  Respiratory status: acceptable  Hydration status: euvolemic  Pain management: adequate        No notable events documented.

## 2024-11-25 NOTE — H&P
GENERAL SURGERY  HISTORY AND PHYSICAL  11/25/2024    CC: metastatic prostate cancer    HPI  Joseph Bond is a 44 y.o. male with history of aggressive prostate cancer with metastasis to spine and lungs on chemotherapy  who presents for evaluation of mediport placement. He wishes to continue treatment for his cancer at this time. He desires a mediport due to several upcoming treatments and blood draws. Denies any changes to his medical history at this time.    Past Medical History:   Diagnosis Date    Cancer (HCC)     History of blood transfusion     History of opioid abuse (HCC)        Past Surgical History:   Procedure Laterality Date    BRONCHOSCOPY N/A 7/11/2024    BRONCHOSCOPY ENDOBRONCHIAL ULTRASOUND FINE NEEDLE ASPIRATION performed by [facility]jo [facility] at [facility]    BRONCHOSCOPY  7/11/2024    BRONCHOSCOPY DIAGNOSTIC OR CELL WASH ONLY performed by Ed Cornejo DO at [facility]       Prior to Admission medications    Medication Sig Start Date End Date Taking? Authorizing Provider   VITAMIN D3 50 MCG (2000 UT) CAPS capsule Take 1 capsule by mouth daily 10/18/24  Yes Johnson Tillman MD   HYDROmorphone (DILAUDID) 4 MG tablet Take 1 tablet by mouth every 2 hours for 5 days. Max Daily Amount: 48 mg 11/22/24 11/27/24  Ty Shaikh MD   oxyCODONE (ROXICODONE) 5 MG immediate release tablet Take 1 tablet by mouth every 4 hours as needed for Pain for up to 5 days. Intended supply: 3 days. Take lowest dose possible to manage pain Max Daily Amount: 30 mg 11/22/24 11/27/24  Ty Shaikh MD   HYDROmorphone (DILAUDID) 2 MG tablet TAKE 1 TABLET BY MOUTH EVERY 2 HOURS WITH 4 MG TABLET. MAX DAILY AMOUNT 24 MG. 11/20/24   ProviderJohnson MD   sennosides-docusate sodium (SENOKOT-S) 8.6-50 MG tablet Take 2 tablets by mouth 2 times daily as needed for Constipation 11/15/24   yT Shaikh MD   NUBEQA 300 MG TABS Take 2 tablets by mouth 2 times daily 11/11/24   Layne  and well perfused     ASSESSMENT/PLAN:  44 y.o. male with aggressive prostate cancer with mets to spine and lung in need of mediport placement.     - to OR today for mediport placement  - I have discussed the risk and benefits of this surgery/procedure and the alternatives with the patient. All questions answered to their satisfaction.    Plan discussed with Dr. Washington.    Zena Macedo MD  Surgery Resident PGY-4  11/25/2024  6:14 AM

## 2024-11-25 NOTE — OP NOTE
Operative Note    Joseph Bond  YOB: 1980  [id number]    Pre-operative Diagnosis: prostate cancer    Post-operative Diagnosis: Same    Procedure: Left Subclavian MedPort Insertion with Fluoroscopic Guidance    Implant Name Type Inv. Item Serial No.  Lot No. LRB No. Used Action   PORT INFUS L60CM OD6FR TI SUSIE FILL CT LO PROF PWR INJ SUT H - [id number]  PORT INFUS L60CM OD6FR TI SUSIE FILL CT LO PROF PWR INJ SUT H  [employer]- [id number] 1 Implanted         Surgeon: Felipe Washington MD    Assistants: Resident: Jad Gerardo DO      Anesthesia: MAC and Local    Estimated Blood Loss: less than 50     Complications: None    Specimens: Was Not Obtained    Findings: Adequate Fluoroscopic Placement of Port    Indications: Patient is a 44 y.o. male who was diagnosed with the above. Risks/Benefits/Alternatives were discussed with the patient, including bleeding, infection, pneumothorax. The patient agreed to undergo the procedure and informed consent was obtained.    Procedure: After informed consent, the patient was brought to the operating room and placed supine. MAC anesthesia was then induced which the patient tolerated well. Time out was performed to identify the correct patient and procedure. They received appropriate perioperative antibiotics. The chest and neck was prepped and draped in the usual sterile fashion.  After adequate anesthesia, introducer needle was used to cannulate the Left subclavian vein on the 1st attempt. Guidewire was then inserted and fluoroscopy was used to identify correct placement at cavo-atrial junction. A 2 cm skin incision was made just beneath the Left clavicle, encompassing the wire. A small pocket was made for the port both bluntly and with cautery.  The dilator was then placed over the guidewire in Seldinger technique the catheter, which was pre-flushed with heparinized saline, was inserted to approximately the cavo-atrial

## 2024-11-26 ENCOUNTER — CARE COORDINATION (OUTPATIENT)
Dept: CARE COORDINATION | Age: 44
End: 2024-11-26

## 2024-11-26 NOTE — CARE COORDINATION
Care Transitions Note    Follow Up Call     Attempted to reach patient for transitions of care follow up.  Unable to reach patient.      Outreach Attempts:   HIPAA compliant voicemail left for patient, first attempt     Care Summary Note:   -Noted in EMR patient completed ortho (Dr Ortega) appointment on 11/21/24 and had mediport insertion yesterday at [facility].    Follow Up Appointment:   Future Appointments         Provider Specialty Dept Phone    12/4/2024 2:00 PM Ty Shaikh MD Family Medicine [phone]    1/23/2025 8:45 AM Jony Ortega DO Orthopedic Surgery [phone]            Plan for follow-up call in 6-10 days based on severity of symptoms and risk factors.   Plan for next call: follow-up appointment-review any provider visits as applicable.   Status with [facility].  Any issues with mediport?  Final call.    Samina Morris RN

## 2024-11-27 ENCOUNTER — TELEPHONE (OUTPATIENT)
Dept: FAMILY MEDICINE CLINIC | Age: 44
End: 2024-11-27

## 2024-11-27 DIAGNOSIS — C61 PRIMARY PROSTATE CANCER WITH METASTASIS FROM PROSTATE TO OTHER SITE (HCC): Primary | ICD-10-CM

## 2024-11-27 DIAGNOSIS — C79.51 PROSTATE CANCER METASTATIC TO BONE (HCC): Primary | ICD-10-CM

## 2024-11-27 DIAGNOSIS — C61 PROSTATE CANCER METASTATIC TO BONE (HCC): Primary | ICD-10-CM

## 2024-11-27 RX ORDER — OXYCODONE HYDROCHLORIDE 5 MG/1
5 TABLET ORAL EVERY 6 HOURS PRN
Qty: 20 TABLET | Refills: 0 | Status: SHIPPED | OUTPATIENT
Start: 2024-11-27 | End: 2024-12-02

## 2024-11-27 RX ORDER — HYDROMORPHONE HYDROCHLORIDE 2 MG/1
2 TABLET ORAL EVERY 6 HOURS PRN
Qty: 20 TABLET | Refills: 0 | Status: SHIPPED | OUTPATIENT
Start: 2024-11-27 | End: 2024-12-02

## 2024-11-27 NOTE — TELEPHONE ENCOUNTER
Pt called office looking for oxycodone.  Prior auth done for script written on 11/22/24.  Call placed to pharmacy to verify need for prior auth.  Office informed by pharmacy medication was picked up and paid for on 11/22/24 by sister of pt.  In addition, 2 other medication picked up on and between 11/22/24-11/23/24 - Hydromorphone 4mg and Hydromorphone 2mg.  Pt told that prior auth would be done today 11/27/24.  Response from prior auth - Electronic prior authorization not required for NDC.   Pt contacted.  Pt states he is out of oxycodone 5mg.  Pt states he was taking 20mg of oxycodone.  Pt requesting refill on oxycodone.  Pt also states that palliative care has not contacted him to establish care.  Pt and sister left messages.  No response from palliative care.  [facility] called and LM for palliative to contact office in effort to assist pt.

## 2024-11-27 NOTE — TELEPHONE ENCOUNTER
I received notification from nursing staff that patient was inquiring about his pain regimen.  I called patient to further discuss.  Patient reports that he is running out of his pain medications at this time and that he has not received a call back from [facility] palliative care.  At this time, I recommended to the patient that we placed a new external referral to a new palliative care organization.    Patient was amenable.  External referral made to [facility] and hospice [place].  Regarding pain medications; I notified patient that I will not be able to prescribe him the prior Oxycodone and Dilaudid dosing that [facility] palliative medicine prescribed to him in th past.    Advised patient I will only be able to prescribe oxycodone 5 mg every 6 hours as needed and 2 mg Dilaudid every 6 hours as needed both medications for 5 days.  Patient was upset, but amenable to this.    Ty Shaikh MD

## 2024-12-04 ENCOUNTER — OFFICE VISIT (OUTPATIENT)
Dept: FAMILY MEDICINE CLINIC | Age: 44
End: 2024-12-04

## 2024-12-04 VITALS
DIASTOLIC BLOOD PRESSURE: 53 MMHG | BODY MASS INDEX: 26.21 KG/M2 | WEIGHT: 167 LBS | TEMPERATURE: 100 F | HEART RATE: 91 BPM | RESPIRATION RATE: 18 BRPM | HEIGHT: 67 IN | SYSTOLIC BLOOD PRESSURE: 111 MMHG | OXYGEN SATURATION: 97 %

## 2024-12-04 DIAGNOSIS — C61 PROSTATE CANCER METASTATIC TO BONE (HCC): Primary | ICD-10-CM

## 2024-12-04 DIAGNOSIS — C79.51 PROSTATE CANCER METASTATIC TO BONE (HCC): Primary | ICD-10-CM

## 2024-12-04 RX ORDER — HYDROMORPHONE HYDROCHLORIDE 2 MG/1
2 TABLET ORAL EVERY 6 HOURS PRN
Qty: 28 TABLET | Refills: 0 | Status: SHIPPED
Start: 2024-12-04 | End: 2024-12-11 | Stop reason: SDUPTHER

## 2024-12-04 RX ORDER — OXYCODONE HYDROCHLORIDE 5 MG/1
5 TABLET ORAL EVERY 6 HOURS PRN
Qty: 12 TABLET | Refills: 0 | Status: SHIPPED
Start: 2024-12-04 | End: 2024-12-11 | Stop reason: SDUPTHER

## 2024-12-04 NOTE — PROGRESS NOTES
S: 44 y.o. male presents today for:   Prostate cancer. Painful. Was seeing Palliative, issues. Set to see a new facility.     O: VS: BP (!) 111/53 (Site: Right Upper Arm, Position: Sitting, Cuff Size: Medium Adult)   Pulse 91   Temp 100 °F (37.8 °C) (Temporal)   Resp 18   Ht 1.702 m (5' 7\")   Wt 75.8 kg (167 lb)   SpO2 97%   BMI 26.16 kg/m²   AAO/NAD, appropriate affect for mood  CV:  RRR, no murmur  Resp: CTAB    Impression/Plan:   1) prostate cancer with mets- oncology, urology, palliative, short term meds    Attending Physician Statement  I have discussed the case, including pertinent history and exam findings with the resident.  I agree with the documented assessment and plan.      Kaci Estevez, DO

## 2024-12-04 NOTE — PROGRESS NOTES
[facility]  FAMILY MEDICINE RESIDENCY PROGRAM  DATE OF VISIT : 24       PATIENT:Joseph Bond    AGE:44 y.o.     :1980      MRN:[id number]   ___________________________________________________________________________________________________________________________________________________    ASSESSMENT AND PLAN    Prostate cancer metastatic to bone (HCC)  - Continue following with Urology and Oncology   -  Advised he will need to schedule with [facility] ASAP   -  Short course of pain regimen until seen by Palliative.   - HYDROmorphone (DILAUDID) 2 MG tablet; Take 1 tablet by mouth every 6 hours as needed for Pain for up to 7 days. Max Daily Amount: 8 mg  Dispense: 28 tablet; Refill: 0  - oxyCODONE (ROXICODONE) 5 MG immediate release tablet; Take 1 tablet by mouth every 6 hours as needed for Pain for up to 7 days. Intended supply: 3 days. Take lowest dose possible to manage pain Max Daily Amount: 20 mg  Dispense: 12 tablet; Refill: 0     Return to Office: Return in about 4 weeks (around 2025).     Ty Shaikh MD PGY-3  This case was discussed with Dr. Edgar Richardson    ______________________________________________________________________________________________________________________    CHIEF COMPLAINT  Chief Complaint   Patient presents with    2 Week Follow-Up    Discuss Medications      HPI:  History obtained from the patient. Joseph Bond  is a 44 y.o.  male who presents to clinic with complaints of poorly managed pain secondary to prostate cancer with mets.     Mediport placed 24 by Gen Surg without complications.    Follows with Oncology and Urology.   Undergoing chemotherapy.and on Nubeqa 600 mg BID.   Pending Palliative appt for pain control regimen. [facility] referrals have been made.   ______________________________________________________________________________________________________________________  REVIEW OF

## 2024-12-05 ENCOUNTER — CARE COORDINATION (OUTPATIENT)
Dept: CARE COORDINATION | Age: 44
End: 2024-12-05

## 2024-12-05 NOTE — CARE COORDINATION
Care Transitions Note    Follow Up Call     Attempted to reach patient for transitions of care follow up.  Unable to reach patient. Second attempt. VM left requesting call back. CTN sign off if no return call received.     Outreach Attempts:   Multiple attempts to contact patient at phone numbers on file.     Care Summary Note: Unable to reach for transitions call, second attempt. Left VM requesting call back. CTN sign off if no return call received.     Follow Up Appointment:   Future Appointments         Provider Specialty Dept Phone    1/9/2025 2:00 PM Ty Shaikh MD Family Medicine [phone]    1/23/2025 8:45 AM Jony Ortega DO Orthopedic Surgery [phone]            No further follow-up call indicated based on severity of symptoms and risk factors.     Cecilia Jon RN

## 2024-12-10 ENCOUNTER — TELEPHONE (OUTPATIENT)
Dept: FAMILY MEDICINE CLINIC | Age: 44
End: 2024-12-10

## 2024-12-10 NOTE — TELEPHONE ENCOUNTER
Margarita called in and she is stating that Joseph had his visit with the new pallative care company today and they will not be taking him on as a client as they are unable to fulfill his pain regiment.  He states that they told him that all he needs is a pain management office.  He is asking what he should do from here as he thought that was what they were suppose to do.    Please advise    Thank you

## 2024-12-11 DIAGNOSIS — C79.51 PROSTATE CANCER METASTATIC TO BONE (HCC): Primary | ICD-10-CM

## 2024-12-11 DIAGNOSIS — C61 PROSTATE CANCER METASTATIC TO BONE (HCC): Primary | ICD-10-CM

## 2024-12-11 RX ORDER — OXYCODONE HYDROCHLORIDE 5 MG/1
5 TABLET ORAL EVERY 6 HOURS PRN
Qty: 12 TABLET | Refills: 0 | Status: SHIPPED | OUTPATIENT
Start: 2024-12-11 | End: 2024-12-18

## 2024-12-11 RX ORDER — HYDROMORPHONE HYDROCHLORIDE 2 MG/1
2 TABLET ORAL EVERY 6 HOURS PRN
Qty: 28 TABLET | Refills: 0 | Status: SHIPPED | OUTPATIENT
Start: 2024-12-11 | End: 2024-12-18

## 2024-12-11 NOTE — TELEPHONE ENCOUNTER
I spoke with [facility] and the girl who answered the phone does not believe that he is scheduled until 12/23. He is out of his medications now though.    Please advise    Thank you

## 2024-12-12 NOTE — TELEPHONE ENCOUNTER
Notified Indra that medication was sent in and that we were reaching out to [facility]

## 2024-12-18 ENCOUNTER — TELEPHONE (OUTPATIENT)
Dept: FAMILY MEDICINE CLINIC | Age: 44
End: 2024-12-18

## 2024-12-18 DIAGNOSIS — C79.51 PROSTATE CANCER METASTATIC TO BONE (HCC): Primary | ICD-10-CM

## 2024-12-18 DIAGNOSIS — C61 PROSTATE CANCER METASTATIC TO BONE (HCC): Primary | ICD-10-CM

## 2024-12-18 RX ORDER — HYDROMORPHONE HYDROCHLORIDE 2 MG/1
2 TABLET ORAL EVERY 6 HOURS PRN
Qty: 20 TABLET | Refills: 0 | Status: SHIPPED | OUTPATIENT
Start: 2024-12-18 | End: 2024-12-23

## 2024-12-18 RX ORDER — OXYCODONE HYDROCHLORIDE 5 MG/1
5 TABLET ORAL EVERY 6 HOURS PRN
Qty: 12 TABLET | Refills: 0 | Status: SHIPPED | OUTPATIENT
Start: 2024-12-18 | End: 2024-12-23

## 2024-12-18 NOTE — TELEPHONE ENCOUNTER
Pt called to state insurance is requesting 2 wk to 1 month scripts on Dilaudid and Oxycodone. Pt has reached the max number of refills for 30 days. Spoke with pharmacy to confirm. Pt states he is out of medicine.  Pt states he was looking for pain management , not Palliative care.  Office explain that pain management was a part of palliative care.  Pt expressed understanding.   Office connected pt with [facility] on 12/18/24.  Appointment established for 12/23/24 @ 10am

## 2025-01-17 DIAGNOSIS — Z51.5 ENCOUNTER FOR PALLIATIVE CARE: ICD-10-CM

## 2025-01-17 DIAGNOSIS — C79.51 PROSTATE CANCER METASTATIC TO BONE (HCC): ICD-10-CM

## 2025-01-17 DIAGNOSIS — R10.2 PELVIC PAIN: Primary | ICD-10-CM

## 2025-01-17 DIAGNOSIS — C61 PROSTATE CANCER METASTATIC TO BONE (HCC): ICD-10-CM

## 2025-01-17 DIAGNOSIS — K59.00 CONSTIPATION, UNSPECIFIED CONSTIPATION TYPE: ICD-10-CM

## 2025-01-17 DIAGNOSIS — G89.3 CANCER-RELATED PAIN: ICD-10-CM

## 2025-01-17 RX ORDER — DOCUSATE SODIUM 100 MG/1
100 CAPSULE, LIQUID FILLED ORAL DAILY
Qty: 90 CAPSULE | Refills: 0 | Status: SHIPPED | OUTPATIENT
Start: 2025-01-17 | End: 2025-04-17

## 2025-01-17 RX ORDER — SENNA AND DOCUSATE SODIUM 50; 8.6 MG/1; MG/1
2 TABLET, FILM COATED ORAL DAILY
Qty: 180 TABLET | Refills: 0 | Status: SHIPPED | OUTPATIENT
Start: 2025-01-17 | End: 2025-04-17

## 2025-01-17 RX ORDER — METHOCARBAMOL 750 MG/1
750 TABLET, FILM COATED ORAL 4 TIMES DAILY
Qty: 120 TABLET | Refills: 0 | Status: SHIPPED | OUTPATIENT
Start: 2025-01-17 | End: 2025-02-16

## 2025-01-17 NOTE — TELEPHONE ENCOUNTER
Name of Medication(s) Requested:  Requested Prescriptions     Pending Prescriptions Disp Refills    docusate sodium (SUN STOOL SOFTENER) 100 MG capsule 30 capsule 0     Sig: Take 1 capsule by mouth daily    sennosides-docusate sodium (SENOKOT-S) 8.6-50 MG tablet 60 tablet 0     Sig: Take 2 tablets by mouth 2 times daily as needed for Constipation       Medication is on current medication list Yes    Dosage and directions were verified? Yes    Quantity verified: 30 day supply     Pharmacy Verified?  Yes    Last Appointment:  12/4/2024    Future appts:  Future Appointments   Date Time Provider Department Center   1/23/2025  8:45 AM Jony Ortega,  [facility]        (If no appt send self scheduling link. .REFILLAPPT)  Scheduling request sent?     [] Yes  [x] No    Does patient need updated?  [] Yes  [x] No

## 2025-01-17 NOTE — TELEPHONE ENCOUNTER
Name of Medication(s) Requested:  Requested Prescriptions     Pending Prescriptions Disp Refills    methocarbamol (ROBAXIN) 750 MG tablet 120 tablet 5     Sig: Take 1 tablet by mouth 4 times daily       Medication is on current medication list Yes    Dosage and directions were verified? Yes    Quantity verified: 30 day supply     Pharmacy Verified?  Yes    Last Appointment:  10/18/2024    Future appts:  Future Appointments   Date Time Provider Department Center   1/23/2025  8:45 AM Jony Ortega,  [facility]        (If no appt send self scheduling link. .REFILLAPPT)  Scheduling request sent?     [] Yes  [x] No    Does patient need updated?  [] Yes  [x] No

## 2025-02-11 ENCOUNTER — TRANSCRIBE ORDERS (OUTPATIENT)
Dept: ADMINISTRATIVE | Age: 45
End: 2025-02-11

## 2025-02-11 DIAGNOSIS — R11.0 NAUSEA: ICD-10-CM

## 2025-02-11 DIAGNOSIS — C61 MALIGNANT NEOPLASM OF PROSTATE (HCC): Primary | ICD-10-CM

## 2025-02-12 ENCOUNTER — TRANSCRIBE ORDERS (OUTPATIENT)
Dept: ADMINISTRATIVE | Age: 45
End: 2025-02-12

## 2025-02-12 DIAGNOSIS — C61 PROSTATE CANCER (HCC): Primary | ICD-10-CM

## 2025-02-26 ENCOUNTER — HOSPITAL ENCOUNTER (OUTPATIENT)
Dept: CT IMAGING | Age: 45
Discharge: HOME | End: 2025-02-28
Attending: INTERNAL MEDICINE
Payer: MEDICAID

## 2025-02-26 DIAGNOSIS — C61 PROSTATE CANCER (HCC): ICD-10-CM

## 2025-02-26 DIAGNOSIS — C61 MALIGNANT NEOPLASM OF PROSTATE (HCC): ICD-10-CM

## 2025-02-26 DIAGNOSIS — R11.0 NAUSEA: ICD-10-CM

## 2025-02-26 PROCEDURE — 71260 CT THORAX DX C+: CPT

## 2025-02-26 PROCEDURE — 6360000004 HC RX CONTRAST MEDICATION: Performed by: RADIOLOGY

## 2025-02-26 PROCEDURE — 74177 CT ABD & PELVIS W/CONTRAST: CPT

## 2025-02-26 RX ORDER — IOPAMIDOL 755 MG/ML
75 INJECTION, SOLUTION INTRAVASCULAR
Status: COMPLETED | OUTPATIENT
Start: 2025-02-26 | End: 2025-02-26

## 2025-02-26 RX ORDER — IOPAMIDOL 755 MG/ML
18 INJECTION, SOLUTION INTRAVASCULAR
Status: COMPLETED | OUTPATIENT
Start: 2025-02-26 | End: 2025-02-26

## 2025-02-26 RX ADMIN — IOPAMIDOL 75 ML: 755 INJECTION, SOLUTION INTRAVENOUS at 09:50

## 2025-02-26 RX ADMIN — IOPAMIDOL 18 ML: 755 INJECTION, SOLUTION INTRAVENOUS at 09:53

## 2025-03-03 LAB — PSA SERPL-MCNC: 0.28 NG/ML (ref 0–4)

## 2025-03-06 ENCOUNTER — HOSPITAL ENCOUNTER (OUTPATIENT)
Dept: GENERAL RADIOLOGY | Age: 45
Discharge: HOME | End: 2025-03-08
Payer: MEDICAID

## 2025-03-06 ENCOUNTER — OFFICE VISIT (OUTPATIENT)
Dept: ORTHOPEDIC SURGERY | Age: 45
End: 2025-03-06
Payer: MEDICAID

## 2025-03-06 VITALS
RESPIRATION RATE: 20 BRPM | WEIGHT: 167.11 LBS | SYSTOLIC BLOOD PRESSURE: 115 MMHG | DIASTOLIC BLOOD PRESSURE: 81 MMHG | HEART RATE: 86 BPM | HEIGHT: 67 IN | BODY MASS INDEX: 26.23 KG/M2 | TEMPERATURE: 97.8 F | OXYGEN SATURATION: 100 %

## 2025-03-06 DIAGNOSIS — C61 PRIMARY PROSTATE CANCER WITH METASTASIS FROM PROSTATE TO OTHER SITE (HCC): Primary | ICD-10-CM

## 2025-03-06 DIAGNOSIS — R10.2 PELVIC PAIN: ICD-10-CM

## 2025-03-06 DIAGNOSIS — M54.2 NECK PAIN: ICD-10-CM

## 2025-03-06 PROCEDURE — 73502 X-RAY EXAM HIP UNI 2-3 VIEWS: CPT

## 2025-03-06 PROCEDURE — 1036F TOBACCO NON-USER: CPT | Performed by: PHYSICIAN ASSISTANT

## 2025-03-06 PROCEDURE — 99213 OFFICE O/P EST LOW 20 MIN: CPT | Performed by: PHYSICIAN ASSISTANT

## 2025-03-06 PROCEDURE — G8427 DOCREV CUR MEDS BY ELIG CLIN: HCPCS | Performed by: PHYSICIAN ASSISTANT

## 2025-03-06 PROCEDURE — G8417 CALC BMI ABV UP PARAM F/U: HCPCS | Performed by: PHYSICIAN ASSISTANT

## 2025-03-06 PROCEDURE — 99212 OFFICE O/P EST SF 10 MIN: CPT | Performed by: PHYSICIAN ASSISTANT

## 2025-03-06 NOTE — PROGRESS NOTES
Chief Complaint   Patient presents with    Follow-up      2 mo f/u R hip pain JVG. Patient states pain lvl 8           Subjective:  Joseph Bond is following up for right hip pain with history of prostate cancer with mets.  Full weightbearing right lower extremity with a walker.  Follows up with oncology.  States that most of the pain is actually the cervical spine has not seen a spine specialist/neurosurgery yet.  States he has mild, intermittent pain 2 or 3 out of 10 to the right hip, but is tolerable.  Recent CT abdomen pelvis taken 2/26/2025 showing interval improvement of rami lesions but does have a newer lesion to the superior sacrum and stable lesions to L5 and L4.  CT chest take the same day describing multifocal progressive mets with thoracic spine.      Review of Systems -  all pertinent positives and negatives in HPI.      Objective:    General: Alert and oriented X 3, normocephalic atraumatic, external ears and eye normal, sclera clear, no acute distress, respirations easy and unlabored with no audible wheezes, skin warm and dry, speech and dress appropriate for noted age, affect euthymic.    Extremity:  Right Lower Extremity  Skin clean dry and intact, without signs of infection  No discomfort with passive IR of the hip.  Very mild anterolateral hip discomfort 2 or 3 out of 10 with passive ER and flexion of the hip.  Very mild lateral hip tenderness palpation  5/5 knee flexion extension and hip abduction and adduction and flexion  Compartments supple throughout thigh and leg  Calf supple and nontender  Demonstrates active knee flexion/extension, ankle plantar/dorsiflexion/great toe extension.   States sensation intact to touch in sural/deep peroneal/superficial peroneal/saphenous/posterior tibial nerve distributions to foot/ankle.   Palpable dorsalis pedis and posterior tibialis pulses, cap refill brisk in toes, foot warm/perfused.             Vitals:    03/06/25 1143   BP: 115/81   Site: Left Upper

## 2025-03-14 ENCOUNTER — OFFICE VISIT (OUTPATIENT)
Dept: FAMILY MEDICINE CLINIC | Age: 45
End: 2025-03-14
Payer: MEDICAID

## 2025-03-14 VITALS
RESPIRATION RATE: 16 BRPM | BODY MASS INDEX: 28.03 KG/M2 | DIASTOLIC BLOOD PRESSURE: 71 MMHG | OXYGEN SATURATION: 99 % | SYSTOLIC BLOOD PRESSURE: 122 MMHG | HEART RATE: 96 BPM | TEMPERATURE: 98 F | WEIGHT: 179 LBS

## 2025-03-14 DIAGNOSIS — C79.51 PROSTATE CANCER METASTATIC TO BONE (HCC): ICD-10-CM

## 2025-03-14 DIAGNOSIS — G62.9 NEUROPATHY: Primary | ICD-10-CM

## 2025-03-14 DIAGNOSIS — C61 PROSTATE CANCER METASTATIC TO BONE (HCC): ICD-10-CM

## 2025-03-14 PROCEDURE — 1036F TOBACCO NON-USER: CPT

## 2025-03-14 PROCEDURE — G8417 CALC BMI ABV UP PARAM F/U: HCPCS

## 2025-03-14 PROCEDURE — 99213 OFFICE O/P EST LOW 20 MIN: CPT

## 2025-03-14 PROCEDURE — G8427 DOCREV CUR MEDS BY ELIG CLIN: HCPCS

## 2025-03-14 RX ORDER — METHOCARBAMOL 750 MG/1
750 TABLET, FILM COATED ORAL 4 TIMES DAILY
COMMUNITY

## 2025-03-14 RX ORDER — ALPHA LIPOIC ACID 300 MG
1 CAPSULE ORAL 2 TIMES DAILY
COMMUNITY
Start: 2025-01-28

## 2025-03-14 NOTE — PROGRESS NOTES
Attending Physician Statement    S:   Chief Complaint   Patient presents with    Neuropathy     Hands and feet numbness and pain       Patient is a 44 year old male here for neuropathy .   Getting chemo and nubeqa orally daily. Has also had radiation in spine and prostate.   Seeing pain management and palliative care. He declined gabapentin from side effects.     O: Blood pressure 122/71, pulse 96, temperature 98 °F (36.7 °C), temperature source Temporal, resp. rate 16, weight 81.2 kg (179 lb), SpO2 99%.   Exam:   Heart - RRR   Lungs - clear     A: Neuropathy   P:  Most likely from chemo    Supportive care   Consider addition of Remeron given low appetite and possible depression    Follow-up as ordered    Attending Attestation   I have discussed the case, including pertinent history and exam findings with the resident. I agree with the documented assessment and plan.         
breath sounds.   Abdominal:      General: Abdomen is flat. Bowel sounds are normal.      Palpations: Abdomen is soft.   Musculoskeletal:         General: Normal range of motion.      Cervical back: Normal range of motion and neck supple.      Right lower leg: No edema.      Left lower leg: No edema.   Skin:     General: Skin is warm.      Capillary Refill: Capillary refill takes less than 2 seconds.      Comments: Finger nails brittle.    Neurological:      General: No focal deficit present.      Mental Status: He is alert and oriented to person, place, and time. Mental status is at baseline.      GCS: GCS eye subscore is 4. GCS verbal subscore is 5. GCS motor subscore is 6.      Cranial Nerves: Cranial nerves 2-12 are intact. No cranial nerve deficit, dysarthria or facial asymmetry.      Sensory: Sensory deficit present.      Motor: Motor function is intact. No weakness, tremor, atrophy, abnormal muscle tone, seizure activity or pronator drift.      Coordination: Coordination is intact. Coordination normal.      Gait: Gait is intact. Gait normal.      Deep Tendon Reflexes: Reflexes normal.      Comments:   Motor 5/5 for all limbs.   Sensation grossly intact for all limbs.   Paresthesia of all fingers. Patient can feel the sensation of touch (sharp and dull), but endorses it feels numbs.    Psychiatric:         Mood and Affect: Mood normal.         Behavior: Behavior normal.         Thought Content: Thought content normal.         Judgment: Judgment normal.

## 2025-03-15 PROBLEM — R11.2 NAUSEA AND VOMITING: Status: RESOLVED | Noted: 2024-08-21 | Resolved: 2025-03-15

## 2025-03-15 PROBLEM — R04.2 HEMOPTYSIS: Status: RESOLVED | Noted: 2024-09-30 | Resolved: 2025-03-15

## 2025-03-15 PROBLEM — M54.40 ACUTE MIDLINE LOW BACK PAIN WITH SCIATICA: Status: RESOLVED | Noted: 2024-07-10 | Resolved: 2025-03-15

## 2025-03-15 PROBLEM — R06.02 SHORTNESS OF BREATH: Status: RESOLVED | Noted: 2024-09-07 | Resolved: 2025-03-15

## 2025-03-15 PROBLEM — M54.9 BACK PAIN: Status: RESOLVED | Noted: 2024-08-21 | Resolved: 2025-03-15

## 2025-03-15 PROBLEM — J18.9 PNEUMONIA, UNSPECIFIED ORGANISM: Status: RESOLVED | Noted: 2024-09-07 | Resolved: 2025-03-15

## 2025-03-15 PROBLEM — E43 SEVERE PROTEIN-CALORIE MALNUTRITION: Status: RESOLVED | Noted: 2024-09-29 | Resolved: 2025-03-15

## 2025-04-07 ENCOUNTER — OFFICE VISIT (OUTPATIENT)
Dept: FAMILY MEDICINE CLINIC | Age: 45
End: 2025-04-07
Payer: MEDICAID

## 2025-04-07 VITALS
WEIGHT: 175 LBS | SYSTOLIC BLOOD PRESSURE: 129 MMHG | OXYGEN SATURATION: 99 % | DIASTOLIC BLOOD PRESSURE: 78 MMHG | HEIGHT: 67 IN | BODY MASS INDEX: 27.47 KG/M2 | RESPIRATION RATE: 18 BRPM | TEMPERATURE: 97 F | HEART RATE: 98 BPM

## 2025-04-07 DIAGNOSIS — C61 PRIMARY PROSTATE CANCER WITH METASTASIS FROM PROSTATE TO OTHER SITE (HCC): Primary | ICD-10-CM

## 2025-04-07 DIAGNOSIS — R07.9 CHEST PAIN, UNSPECIFIED TYPE: ICD-10-CM

## 2025-04-07 DIAGNOSIS — G62.9 NEUROPATHY: ICD-10-CM

## 2025-04-07 DIAGNOSIS — R07.81 RIB PAIN: ICD-10-CM

## 2025-04-07 DIAGNOSIS — S12.690A COMPRESSION FRACTURE OF C7 VERTEBRA, INITIAL ENCOUNTER (HCC): ICD-10-CM

## 2025-04-07 PROCEDURE — 1036F TOBACCO NON-USER: CPT

## 2025-04-07 PROCEDURE — G8417 CALC BMI ABV UP PARAM F/U: HCPCS

## 2025-04-07 PROCEDURE — G8428 CUR MEDS NOT DOCUMENT: HCPCS

## 2025-04-07 PROCEDURE — 99213 OFFICE O/P EST LOW 20 MIN: CPT

## 2025-04-07 RX ORDER — LIDOCAINE 50 MG/G
1 PATCH TOPICAL DAILY
Qty: 10 PATCH | Refills: 0 | Status: SHIPPED | OUTPATIENT
Start: 2025-04-07 | End: 2025-04-17

## 2025-04-07 SDOH — ECONOMIC STABILITY: FOOD INSECURITY: WITHIN THE PAST 12 MONTHS, THE FOOD YOU BOUGHT JUST DIDN'T LAST AND YOU DIDN'T HAVE MONEY TO GET MORE.: NEVER TRUE

## 2025-04-07 SDOH — ECONOMIC STABILITY: FOOD INSECURITY: WITHIN THE PAST 12 MONTHS, YOU WORRIED THAT YOUR FOOD WOULD RUN OUT BEFORE YOU GOT MONEY TO BUY MORE.: NEVER TRUE

## 2025-04-07 ASSESSMENT — PATIENT HEALTH QUESTIONNAIRE - PHQ9
1. LITTLE INTEREST OR PLEASURE IN DOING THINGS: NOT AT ALL
SUM OF ALL RESPONSES TO PHQ QUESTIONS 1-9: 0
2. FEELING DOWN, DEPRESSED OR HOPELESS: NOT AT ALL
SUM OF ALL RESPONSES TO PHQ QUESTIONS 1-9: 0

## 2025-04-07 NOTE — PROGRESS NOTES
S: 44 y.o. male here for metastatic prostate cancer. Follows with palliative. Some ble neuropathy. Has had fxs 2/2 mets. Not interested in further intervention for s/s at this time. Sees urology.     O: VS: /78   Pulse 98   Temp 97 °F (36.1 °C) (Temporal)   Resp 18   Ht 1.702 m (5' 7\")   Wt 79.4 kg (175 lb)   SpO2 99%   BMI 27.41 kg/m²    General: NAD, alert and interacting appropriately.   CV:  RRR, no gallops, rubs, or murmurs    Resp: CTAB   Ext:  No edema    Impression: metastatic prostate cancer. Local pain from LAD/fx.   Plan:   Lidocaine patch. Consider diclofenac gel  Counseling advised    Attending Physician Statement  I have discussed the case, including pertinent history and exam findings with the resident.  I agree with the documented assessment and plan.      
brittle.    Neurological:      General: No focal deficit present.      Mental Status: He is alert and oriented to person, place, and time. Mental status is at baseline.      GCS: GCS eye subscore is 4. GCS verbal subscore is 5. GCS motor subscore is 6.      Cranial Nerves: Cranial nerves 2-12 are intact. No cranial nerve deficit, dysarthria or facial asymmetry.      Sensory: Sensory deficit present.      Motor: Motor function is intact. No weakness, tremor, atrophy, abnormal muscle tone, seizure activity or pronator drift.      Coordination: Coordination is intact. Coordination normal.      Gait: Gait is intact. Gait normal.      Deep Tendon Reflexes: Reflexes normal.      Comments:   Motor 5/5 for all limbs.   Sensation grossly intact for all limbs.   Paresthesia of all fingers. Patient can feel the sensation of touch (sharp and dull), but endorses it feels numbs.    Psychiatric:         Mood and Affect: Mood normal.         Behavior: Behavior normal.         Thought Content: Thought content normal.         Judgment: Judgment normal.

## 2025-04-09 ENCOUNTER — HOSPITAL ENCOUNTER (OUTPATIENT)
Dept: MRI IMAGING | Age: 45
Discharge: HOME | End: 2025-04-11
Attending: NEUROLOGICAL SURGERY

## 2025-04-09 ENCOUNTER — OFFICE VISIT (OUTPATIENT)
Dept: NEUROSURGERY | Age: 45
End: 2025-04-09
Payer: MEDICAID

## 2025-04-09 VITALS
BODY MASS INDEX: 27.47 KG/M2 | DIASTOLIC BLOOD PRESSURE: 82 MMHG | RESPIRATION RATE: 18 BRPM | HEIGHT: 67 IN | OXYGEN SATURATION: 98 % | HEART RATE: 92 BPM | WEIGHT: 175 LBS | SYSTOLIC BLOOD PRESSURE: 102 MMHG

## 2025-04-09 DIAGNOSIS — C79.51 METASTASIS TO SPINAL COLUMN (HCC): ICD-10-CM

## 2025-04-09 DIAGNOSIS — C79.51 METASTASIS TO SPINAL COLUMN (HCC): Primary | ICD-10-CM

## 2025-04-09 PROCEDURE — 99204 OFFICE O/P NEW MOD 45 MIN: CPT | Performed by: NEUROLOGICAL SURGERY

## 2025-04-09 PROCEDURE — G8427 DOCREV CUR MEDS BY ELIG CLIN: HCPCS | Performed by: NEUROLOGICAL SURGERY

## 2025-04-09 PROCEDURE — 99204 OFFICE O/P NEW MOD 45 MIN: CPT

## 2025-04-09 PROCEDURE — 1036F TOBACCO NON-USER: CPT | Performed by: NEUROLOGICAL SURGERY

## 2025-04-09 PROCEDURE — G8417 CALC BMI ABV UP PARAM F/U: HCPCS | Performed by: NEUROLOGICAL SURGERY

## 2025-04-09 ASSESSMENT — ENCOUNTER SYMPTOMS
EYES NEGATIVE: 1
ALLERGIC/IMMUNOLOGIC NEGATIVE: 1
RESPIRATORY NEGATIVE: 1
GASTROINTESTINAL NEGATIVE: 1
BACK PAIN: 1

## 2025-04-09 NOTE — PROGRESS NOTES
reflexes are 2+ on the right side and 2+ on the left side.       Patellar reflexes are 2+ on the right side and 2+ on the left side.       Achilles reflexes are 2+ on the right side and 2+ on the left side.     Comments: 4/5 in RUE   Psychiatric:         Mood and Affect: Mood normal.         Behavior: Behavior normal.         Thought Content: Thought content normal.         Judgment: Judgment normal.                  An electronic signature was used to authenticate this note.    --Ann Silva MD

## 2025-04-10 ENCOUNTER — TELEPHONE (OUTPATIENT)
Dept: NEUROSURGERY | Age: 45
End: 2025-04-10

## 2025-04-10 ENCOUNTER — HOSPITAL ENCOUNTER (OUTPATIENT)
Dept: MRI IMAGING | Age: 45
Discharge: HOME | End: 2025-04-12
Attending: NEUROLOGICAL SURGERY
Payer: MEDICAID

## 2025-04-10 ENCOUNTER — HOSPITAL ENCOUNTER (OUTPATIENT)
Dept: GENERAL RADIOLOGY | Age: 45
Discharge: HOME | End: 2025-04-12
Attending: NEUROLOGICAL SURGERY
Payer: MEDICAID

## 2025-04-10 ENCOUNTER — HOSPITAL ENCOUNTER (OUTPATIENT)
Age: 45
Discharge: HOME | End: 2025-04-10
Attending: NEUROLOGICAL SURGERY
Payer: MEDICAID

## 2025-04-10 ENCOUNTER — HOSPITAL ENCOUNTER (OUTPATIENT)
Age: 45
Discharge: HOME | End: 2025-04-12
Attending: NEUROLOGICAL SURGERY
Payer: MEDICAID

## 2025-04-10 DIAGNOSIS — C79.51 METASTASIS TO SPINAL COLUMN (HCC): ICD-10-CM

## 2025-04-10 DIAGNOSIS — R07.81 RIB PAIN: ICD-10-CM

## 2025-04-10 DIAGNOSIS — R07.9 CHEST PAIN, UNSPECIFIED TYPE: ICD-10-CM

## 2025-04-10 LAB
ANION GAP SERPL CALCULATED.3IONS-SCNC: 14 MMOL/L (ref 7–16)
BUN SERPL-MCNC: 14 MG/DL (ref 6–20)
CALCIUM SERPL-MCNC: 9.5 MG/DL (ref 8.6–10.2)
CHLORIDE SERPL-SCNC: 102 MMOL/L (ref 98–107)
CO2 SERPL-SCNC: 21 MMOL/L (ref 22–29)
CREAT SERPL-MCNC: 0.7 MG/DL (ref 0.7–1.2)
GFR, ESTIMATED: >90 ML/MIN/1.73M2
GLUCOSE SERPL-MCNC: 91 MG/DL (ref 74–99)
POTASSIUM SERPL-SCNC: 4.4 MMOL/L (ref 3.5–5)
SODIUM SERPL-SCNC: 137 MMOL/L (ref 132–146)

## 2025-04-10 PROCEDURE — 71110 X-RAY EXAM RIBS BIL 3 VIEWS: CPT

## 2025-04-10 PROCEDURE — 72141 MRI NECK SPINE W/O DYE: CPT

## 2025-04-10 PROCEDURE — 80048 BASIC METABOLIC PNL TOTAL CA: CPT

## 2025-04-10 NOTE — TELEPHONE ENCOUNTER
I believe Trisha was taking care of this and is letting Dr. Silva know as we have never put this kind of order in

## 2025-04-10 NOTE — TELEPHONE ENCOUNTER
Spoke to Dr. Silva-he needs to know exactly what needs ordered-Called MRI and spoke to Sandra and she states that the patient is there now-it has been take care of. He was suppose to be scheduled on Friday. Called and explained to his sister.

## 2025-04-10 NOTE — TELEPHONE ENCOUNTER
Margarita called regarding lab test ordered by our office yesterday.  He has a port and they need an order to draw blood from his port.    The lab says they need the order by 3 pm  The staff that can access his port leaves at 3 pm is reason why.

## 2025-04-16 ENCOUNTER — RESULTS FOLLOW-UP (OUTPATIENT)
Dept: FAMILY MEDICINE CLINIC | Age: 45
End: 2025-04-16

## 2025-04-28 ENCOUNTER — APPOINTMENT (OUTPATIENT)
Dept: CT IMAGING | Age: 45
End: 2025-04-28
Payer: MEDICAID

## 2025-04-28 ENCOUNTER — HOSPITAL ENCOUNTER (EMERGENCY)
Age: 45
Discharge: HOME | End: 2025-04-29
Payer: MEDICAID

## 2025-04-28 DIAGNOSIS — H66.91 RIGHT OTITIS MEDIA, UNSPECIFIED OTITIS MEDIA TYPE: ICD-10-CM

## 2025-04-28 DIAGNOSIS — H92.01 OTALGIA OF RIGHT EAR: Primary | ICD-10-CM

## 2025-04-28 LAB
ANION GAP SERPL CALCULATED.3IONS-SCNC: 15 MMOL/L (ref 7–16)
BASOPHILS # BLD: 0.03 K/UL (ref 0–0.2)
BASOPHILS NFR BLD: 0 % (ref 0–2)
BUN SERPL-MCNC: 10 MG/DL (ref 6–20)
CALCIUM SERPL-MCNC: 9.8 MG/DL (ref 8.6–10)
CHLORIDE SERPL-SCNC: 96 MMOL/L (ref 98–107)
CO2 SERPL-SCNC: 21 MMOL/L (ref 22–29)
CREAT SERPL-MCNC: 0.7 MG/DL (ref 0.7–1.2)
EOSINOPHIL # BLD: 0.02 K/UL (ref 0.05–0.5)
EOSINOPHILS RELATIVE PERCENT: 0 % (ref 0–6)
ERYTHROCYTE [DISTWIDTH] IN BLOOD BY AUTOMATED COUNT: 16.5 % (ref 11.5–15)
GFR, ESTIMATED: >90 ML/MIN/1.73M2
GLUCOSE SERPL-MCNC: 103 MG/DL (ref 74–99)
HCT VFR BLD AUTO: 35.3 % (ref 37–54)
HGB BLD-MCNC: 11.7 G/DL (ref 12.5–16.5)
IMM GRANULOCYTES # BLD AUTO: <0.03 K/UL (ref 0–0.58)
IMM GRANULOCYTES NFR BLD: 0 % (ref 0–5)
LYMPHOCYTES NFR BLD: 0.67 K/UL (ref 1.5–4)
LYMPHOCYTES RELATIVE PERCENT: 9 % (ref 20–42)
MCH RBC QN AUTO: 29.3 PG (ref 26–35)
MCHC RBC AUTO-ENTMCNC: 33.1 G/DL (ref 32–34.5)
MCV RBC AUTO: 88.3 FL (ref 80–99.9)
MONOCYTES NFR BLD: 0.6 K/UL (ref 0.1–0.95)
MONOCYTES NFR BLD: 8 % (ref 2–12)
NEUTROPHILS NFR BLD: 83 % (ref 43–80)
NEUTS SEG NFR BLD: 6.28 K/UL (ref 1.8–7.3)
PLATELET # BLD AUTO: 276 K/UL (ref 130–450)
PMV BLD AUTO: 8.8 FL (ref 7–12)
POTASSIUM SERPL-SCNC: 4 MMOL/L (ref 3.5–5.1)
RBC # BLD AUTO: 4 M/UL (ref 3.8–5.8)
SODIUM SERPL-SCNC: 132 MMOL/L (ref 136–145)
WBC OTHER # BLD: 7.6 K/UL (ref 4.5–11.5)

## 2025-04-28 PROCEDURE — 6360000002 HC RX W HCPCS: Performed by: NURSE PRACTITIONER

## 2025-04-28 PROCEDURE — 72125 CT NECK SPINE W/O DYE: CPT

## 2025-04-28 PROCEDURE — 80048 BASIC METABOLIC PNL TOTAL CA: CPT

## 2025-04-28 PROCEDURE — 70450 CT HEAD/BRAIN W/O DYE: CPT

## 2025-04-28 PROCEDURE — 99284 EMERGENCY DEPT VISIT MOD MDM: CPT

## 2025-04-28 PROCEDURE — 96372 THER/PROPH/DIAG INJ SC/IM: CPT

## 2025-04-28 PROCEDURE — 6370000000 HC RX 637 (ALT 250 FOR IP): Performed by: NURSE PRACTITIONER

## 2025-04-28 PROCEDURE — 85025 COMPLETE CBC W/AUTO DIFF WBC: CPT

## 2025-04-28 RX ORDER — FENTANYL CITRATE 50 UG/ML
25 INJECTION, SOLUTION INTRAMUSCULAR; INTRAVENOUS ONCE
Refills: 0 | Status: COMPLETED | OUTPATIENT
Start: 2025-04-28 | End: 2025-04-28

## 2025-04-28 RX ORDER — ACETAMINOPHEN 500 MG
1000 TABLET ORAL ONCE
Status: COMPLETED | OUTPATIENT
Start: 2025-04-28 | End: 2025-04-29

## 2025-04-28 RX ORDER — CEFDINIR 300 MG/1
300 CAPSULE ORAL 2 TIMES DAILY
Qty: 14 CAPSULE | Refills: 0 | Status: SHIPPED | OUTPATIENT
Start: 2025-04-28 | End: 2025-05-05

## 2025-04-28 RX ORDER — FLUTICASONE PROPIONATE 50 MCG
2 SPRAY, SUSPENSION (ML) NASAL DAILY
Qty: 16 G | Refills: 0 | Status: SHIPPED | OUTPATIENT
Start: 2025-04-28

## 2025-04-28 RX ORDER — CEFDINIR 300 MG/1
300 CAPSULE ORAL ONCE
Status: COMPLETED | OUTPATIENT
Start: 2025-04-28 | End: 2025-04-28

## 2025-04-28 RX ORDER — 0.9 % SODIUM CHLORIDE 0.9 %
1000 INTRAVENOUS SOLUTION INTRAVENOUS ONCE
Status: COMPLETED | OUTPATIENT
Start: 2025-04-28 | End: 2025-04-29

## 2025-04-28 RX ADMIN — FENTANYL CITRATE 25 MCG: 50 INJECTION INTRAMUSCULAR; INTRAVENOUS at 23:30

## 2025-04-28 RX ADMIN — CEFDINIR 300 MG: 300 CAPSULE ORAL at 23:30

## 2025-04-28 RX ADMIN — FENTANYL CITRATE 25 MCG: 50 INJECTION INTRAMUSCULAR; INTRAVENOUS at 20:57

## 2025-04-28 ASSESSMENT — PAIN DESCRIPTION - DESCRIPTORS
DESCRIPTORS: ACHING
DESCRIPTORS: ACHING

## 2025-04-28 ASSESSMENT — PAIN DESCRIPTION - ORIENTATION
ORIENTATION: RIGHT
ORIENTATION: ANTERIOR

## 2025-04-28 ASSESSMENT — PAIN DESCRIPTION - LOCATION
LOCATION: NECK
LOCATION: EAR;HEAD

## 2025-04-28 ASSESSMENT — PAIN SCALES - GENERAL
PAINLEVEL_OUTOF10: 9
PAINLEVEL_OUTOF10: 9

## 2025-04-28 ASSESSMENT — PAIN - FUNCTIONAL ASSESSMENT: PAIN_FUNCTIONAL_ASSESSMENT: 0-10

## 2025-04-29 VITALS
SYSTOLIC BLOOD PRESSURE: 115 MMHG | RESPIRATION RATE: 18 BRPM | HEIGHT: 67 IN | BODY MASS INDEX: 27.47 KG/M2 | HEART RATE: 105 BPM | WEIGHT: 175 LBS | TEMPERATURE: 97.2 F | DIASTOLIC BLOOD PRESSURE: 73 MMHG | OXYGEN SATURATION: 99 %

## 2025-04-29 PROCEDURE — 96372 THER/PROPH/DIAG INJ SC/IM: CPT

## 2025-04-29 PROCEDURE — 6360000002 HC RX W HCPCS: Performed by: NURSE PRACTITIONER

## 2025-04-29 PROCEDURE — 6370000000 HC RX 637 (ALT 250 FOR IP): Performed by: NURSE PRACTITIONER

## 2025-04-29 PROCEDURE — 2580000003 HC RX 258: Performed by: NURSE PRACTITIONER

## 2025-04-29 RX ORDER — HYDROMORPHONE HYDROCHLORIDE 1 MG/ML
1 INJECTION, SOLUTION INTRAMUSCULAR; INTRAVENOUS; SUBCUTANEOUS ONCE
Status: COMPLETED | OUTPATIENT
Start: 2025-04-29 | End: 2025-04-29

## 2025-04-29 RX ADMIN — SODIUM CHLORIDE 1000 ML: 0.9 INJECTION, SOLUTION INTRAVENOUS at 01:10

## 2025-04-29 RX ADMIN — HYDROMORPHONE HYDROCHLORIDE 1 MG: 1 INJECTION, SOLUTION INTRAMUSCULAR; INTRAVENOUS; SUBCUTANEOUS at 00:48

## 2025-04-29 RX ADMIN — ACETAMINOPHEN 1000 MG: 500 TABLET ORAL at 00:48

## 2025-04-29 ASSESSMENT — PAIN DESCRIPTION - ORIENTATION: ORIENTATION: MID;LOWER

## 2025-04-29 ASSESSMENT — PAIN DESCRIPTION - LOCATION: LOCATION: BACK

## 2025-04-29 ASSESSMENT — PAIN DESCRIPTION - DESCRIPTORS: DESCRIPTORS: ACHING;DISCOMFORT

## 2025-04-29 ASSESSMENT — PAIN SCALES - GENERAL: PAINLEVEL_OUTOF10: 10

## 2025-04-29 NOTE — ED PROVIDER NOTES
metastatic prostate cancer did do imaging studies to rule out any evidence of mass.  Head neck were negative.  Laboratory studies not revealing any leukocytosis.  He does have some mild anemia.  He is not neutropenic.  Sodium is 132 chloride 96 CO2 to 21.  He was medicated here with fentanyl which did provide him some relief, however he is still complaining of significant pain.  Is noted that he is tachycardic.  Did recommend fluids as his CO2 is 21.  He reports he has not been eating significantly since developing the pain.  He does have a port for his cancer treatment.  This was accessed by myself in the usual sterile fashion.  He was given a liter of fluids and tachycardia significantly improved.  He was also given IM Dilaudid for pain and did have significant relief with this.  At home he is on Dilaudid as well as oxycodone and he may use this for continued relief.  He will be discharged on Omnicef as well as Flonase nasal spray.  He has no chest pain or shortness of breath.  His imaging is not demonstrating any metastatic disease in the area of his discomfort.  He is advised close follow-up with his family medical doctor    I have spoken with the patient/caregiver and discussed today’s results, in addition to providing specific details for the plan of care and counseling regarding the diagnosis and prognosis and are agreeable with the plan. All results reviewed with pt and all questions answered.  I discussed the differential, results and discharge plan with the patient and/or family/friend/caregiver if present.  I emphasized the importance of follow-up with the physician I referred them to in the timeframe recommended.  I explained reasons for the patient to return to the Emergency Department. Additional verbal discharge instructions were also given and discussed with the patient to supplement those generated by the EMR. We also discussed medications that were prescribed (if any) including common side effects

## 2025-05-05 LAB — PSA SERPL-MCNC: 1.51 NG/ML (ref 0–4)

## 2025-05-07 ENCOUNTER — TRANSCRIBE ORDERS (OUTPATIENT)
Dept: ADMINISTRATIVE | Age: 45
End: 2025-05-07

## 2025-05-07 DIAGNOSIS — C61 MALIGNANT NEOPLASM OF PROSTATE (HCC): ICD-10-CM

## 2025-05-07 DIAGNOSIS — R11.0 NAUSEA: Primary | ICD-10-CM

## 2025-05-08 ENCOUNTER — TRANSCRIBE ORDERS (OUTPATIENT)
Dept: ADMINISTRATIVE | Age: 45
End: 2025-05-08

## 2025-05-08 DIAGNOSIS — M54.9 BACK PAIN, UNSPECIFIED BACK LOCATION, UNSPECIFIED BACK PAIN LATERALITY, UNSPECIFIED CHRONICITY: ICD-10-CM

## 2025-05-08 DIAGNOSIS — C61 PROSTATE CANCER (HCC): Primary | ICD-10-CM

## 2025-05-19 ENCOUNTER — TRANSCRIBE ORDERS (OUTPATIENT)
Dept: ADMINISTRATIVE | Age: 45
End: 2025-05-19

## 2025-05-19 DIAGNOSIS — R11.0 NAUSEA: Primary | ICD-10-CM

## 2025-05-19 DIAGNOSIS — C61 MALIGNANT NEOPLASM OF PROSTATE (HCC): ICD-10-CM

## 2025-05-21 DIAGNOSIS — C79.51 METASTATIC CANCER TO BONE (HCC): Primary | ICD-10-CM

## 2025-05-21 DIAGNOSIS — M54.42 CHRONIC MIDLINE LOW BACK PAIN WITH LEFT-SIDED SCIATICA: ICD-10-CM

## 2025-05-21 DIAGNOSIS — C61 PROSTATE CANCER METASTATIC TO BONE (HCC): ICD-10-CM

## 2025-05-21 DIAGNOSIS — R52 INTRACTABLE PAIN: ICD-10-CM

## 2025-05-21 DIAGNOSIS — G89.29 CHRONIC MIDLINE LOW BACK PAIN WITH LEFT-SIDED SCIATICA: ICD-10-CM

## 2025-05-21 DIAGNOSIS — C79.51 PROSTATE CANCER METASTATIC TO BONE (HCC): ICD-10-CM

## 2025-05-22 ENCOUNTER — ANESTHESIA EVENT (OUTPATIENT)
Dept: MRI IMAGING | Age: 45
End: 2025-05-22
Payer: MEDICAID

## 2025-05-22 NOTE — ANESTHESIA PRE PROCEDURE
Department of Anesthesiology  Preprocedure Note       Name:  Joseph Bond   Age:  44 y.o.  :  1980                                          MRN:  [id number]         Date:  2025      Surgeon: * Surgery not found *    Procedure: MRI    Medications prior to admission:   Prior to Admission medications    Medication Sig Start Date End Date Taking? Authorizing Provider   fluticasone (FLONASE) 50 MCG/ACT nasal spray 2 sprays by Each Nostril route daily 25   Anne Ryder APRN - CNP   methocarbamol (ROBAXIN) 750 MG tablet Take 1 tablet by mouth 4 times daily    Johnson Tillman MD   Alpha-Lipoic Acid 300 MG CAPS Take 1 capsule by mouth 2 times daily 25   Johnson Tillman MD   predniSONE (DELTASONE) 10 MG tablet Take 1 tablet by mouth daily 24   Johnson Tillman MD   OXYCONTIN 20 MG extended release tablet Take 1 tablet by mouth. 24   Johnson Tillman MD   HYDROmorphone (DILAUDID) 4 MG tablet Take 1 tablet by mouth every 4 hours as needed for Pain. 24   Johnson Tillman MD   NUBEQA 300 MG TABS Take 2 tablets by mouth 2 times daily 24   Johnson Tillman MD   VITAMIN D3 50 MCG (2000 UT) CAPS capsule Take 1 capsule by mouth daily 10/18/24   Johnson Tillman MD   escitalopram (LEXAPRO) 5 MG tablet Take 1 tablet by mouth daily as needed    Johnson Tillman MD   ondansetron (ZOFRAN-ODT) 8 MG TBDP disintegrating tablet Place 1 tablet under the tongue every 8 hours as needed for Nausea or Vomiting 24   Johnson Tillman MD   polyethylene glycol (GLYCOLAX) 17 GM/SCOOP powder Take 17 g by mouth daily    Johnson Tillman MD       Current medications:    Current Outpatient Medications   Medication Sig Dispense Refill    fluticasone (FLONASE) 50 MCG/ACT nasal spray 2 sprays by Each Nostril route daily 16 g 0    methocarbamol (ROBAXIN) 750 MG tablet Take 1 tablet by mouth 4 times daily      Alpha-Lipoic Acid 300 MG CAPS Take 1 capsule

## 2025-05-23 ENCOUNTER — ANESTHESIA (OUTPATIENT)
Dept: MRI IMAGING | Age: 45
End: 2025-05-23
Payer: MEDICAID

## 2025-05-23 ENCOUNTER — HOSPITAL ENCOUNTER (OUTPATIENT)
Dept: MRI IMAGING | Age: 45
Discharge: HOME | End: 2025-05-25
Payer: MEDICAID

## 2025-05-23 VITALS
HEART RATE: 93 BPM | OXYGEN SATURATION: 95 % | SYSTOLIC BLOOD PRESSURE: 104 MMHG | DIASTOLIC BLOOD PRESSURE: 70 MMHG | RESPIRATION RATE: 20 BRPM

## 2025-05-23 VITALS
OXYGEN SATURATION: 95 % | DIASTOLIC BLOOD PRESSURE: 71 MMHG | HEART RATE: 94 BPM | SYSTOLIC BLOOD PRESSURE: 108 MMHG | RESPIRATION RATE: 20 BRPM

## 2025-05-23 DIAGNOSIS — C79.51 PROSTATE CANCER METASTATIC TO BONE (HCC): ICD-10-CM

## 2025-05-23 DIAGNOSIS — G89.29 CHRONIC MIDLINE LOW BACK PAIN WITH LEFT-SIDED SCIATICA: ICD-10-CM

## 2025-05-23 DIAGNOSIS — R52 INTRACTABLE PAIN: ICD-10-CM

## 2025-05-23 DIAGNOSIS — M54.42 CHRONIC MIDLINE LOW BACK PAIN WITH LEFT-SIDED SCIATICA: ICD-10-CM

## 2025-05-23 DIAGNOSIS — C61 PROSTATE CANCER (HCC): Primary | ICD-10-CM

## 2025-05-23 DIAGNOSIS — C79.51 METASTATIC CANCER TO BONE (HCC): ICD-10-CM

## 2025-05-23 DIAGNOSIS — C61 PROSTATE CANCER METASTATIC TO BONE (HCC): ICD-10-CM

## 2025-05-23 PROCEDURE — 3700000001 HC ADD 15 MINUTES (ANESTHESIA)

## 2025-05-23 PROCEDURE — 2580000003 HC RX 258

## 2025-05-23 PROCEDURE — 6360000002 HC RX W HCPCS: Performed by: INTERNAL MEDICINE

## 2025-05-23 PROCEDURE — 2500000003 HC RX 250 WO HCPCS

## 2025-05-23 PROCEDURE — 7100000010 HC PHASE II RECOVERY - FIRST 15 MIN

## 2025-05-23 PROCEDURE — 3700000000 HC ANESTHESIA ATTENDED CARE

## 2025-05-23 PROCEDURE — 6360000002 HC RX W HCPCS

## 2025-05-23 PROCEDURE — 72157 MRI CHEST SPINE W/O & W/DYE: CPT

## 2025-05-23 PROCEDURE — 2500000003 HC RX 250 WO HCPCS: Performed by: INTERNAL MEDICINE

## 2025-05-23 PROCEDURE — 72158 MRI LUMBAR SPINE W/O & W/DYE: CPT

## 2025-05-23 PROCEDURE — A9579 GAD-BASE MR CONTRAST NOS,1ML: HCPCS | Performed by: RADIOLOGY

## 2025-05-23 PROCEDURE — 6360000004 HC RX CONTRAST MEDICATION: Performed by: RADIOLOGY

## 2025-05-23 PROCEDURE — 7100000011 HC PHASE II RECOVERY - ADDTL 15 MIN

## 2025-05-23 RX ORDER — FENTANYL CITRATE 50 UG/ML
INJECTION, SOLUTION INTRAMUSCULAR; INTRAVENOUS
Status: DISCONTINUED | OUTPATIENT
Start: 2025-05-23 | End: 2025-05-23 | Stop reason: SDUPTHER

## 2025-05-23 RX ORDER — SODIUM CHLORIDE 9 MG/ML
INJECTION, SOLUTION INTRAVENOUS
Status: DISCONTINUED | OUTPATIENT
Start: 2025-05-23 | End: 2025-05-23 | Stop reason: SDUPTHER

## 2025-05-23 RX ORDER — SODIUM CHLORIDE 0.9 % (FLUSH) 0.9 %
10 SYRINGE (ML) INJECTION ONCE
Status: COMPLETED | OUTPATIENT
Start: 2025-05-23 | End: 2025-05-23

## 2025-05-23 RX ORDER — KETAMINE HYDROCHLORIDE 10 MG/ML
INJECTION, SOLUTION INTRAMUSCULAR; INTRAVENOUS
Status: DISCONTINUED | OUTPATIENT
Start: 2025-05-23 | End: 2025-05-23 | Stop reason: SDUPTHER

## 2025-05-23 RX ORDER — EPHEDRINE SULFATE/0.9% NACL/PF 25 MG/5 ML
SYRINGE (ML) INTRAVENOUS
Status: DISCONTINUED | OUTPATIENT
Start: 2025-05-23 | End: 2025-05-23 | Stop reason: SDUPTHER

## 2025-05-23 RX ORDER — MIDAZOLAM HYDROCHLORIDE 1 MG/ML
INJECTION, SOLUTION INTRAMUSCULAR; INTRAVENOUS
Status: DISCONTINUED | OUTPATIENT
Start: 2025-05-23 | End: 2025-05-23 | Stop reason: SDUPTHER

## 2025-05-23 RX ORDER — DEXMEDETOMIDINE HYDROCHLORIDE 100 UG/ML
INJECTION, SOLUTION INTRAVENOUS
Status: DISCONTINUED | OUTPATIENT
Start: 2025-05-23 | End: 2025-05-23 | Stop reason: SDUPTHER

## 2025-05-23 RX ORDER — HEPARIN 100 UNIT/ML
500 SYRINGE INTRAVENOUS ONCE
Status: COMPLETED | OUTPATIENT
Start: 2025-05-23 | End: 2025-05-23

## 2025-05-23 RX ADMIN — DEXMEDETOMIDINE HCL 12 MCG: 100 INJECTION INTRAVENOUS at 13:09

## 2025-05-23 RX ADMIN — DEXMEDETOMIDINE HCL 8 MCG: 100 INJECTION INTRAVENOUS at 12:55

## 2025-05-23 RX ADMIN — Medication 10 MG: at 13:09

## 2025-05-23 RX ADMIN — EPHEDRINE SULFATE 10 MG: 5 INJECTION INTRAVENOUS at 13:41

## 2025-05-23 RX ADMIN — SODIUM CHLORIDE, PRESERVATIVE FREE 10 ML: 5 INJECTION INTRAVENOUS at 14:15

## 2025-05-23 RX ADMIN — FENTANYL CITRATE 50 MCG: 50 INJECTION INTRAMUSCULAR; INTRAVENOUS at 12:29

## 2025-05-23 RX ADMIN — MIDAZOLAM 1 MG: 1 INJECTION INTRAMUSCULAR; INTRAVENOUS at 13:09

## 2025-05-23 RX ADMIN — DEXMEDETOMIDINE HCL 4 MCG: 100 INJECTION INTRAVENOUS at 13:25

## 2025-05-23 RX ADMIN — SODIUM CHLORIDE: 0.9 INJECTION, SOLUTION INTRAVENOUS at 12:23

## 2025-05-23 RX ADMIN — MIDAZOLAM 2 MG: 1 INJECTION INTRAMUSCULAR; INTRAVENOUS at 12:29

## 2025-05-23 RX ADMIN — MIDAZOLAM 2 MG: 1 INJECTION INTRAMUSCULAR; INTRAVENOUS at 12:32

## 2025-05-23 RX ADMIN — Medication 20 MG: at 12:29

## 2025-05-23 RX ADMIN — FENTANYL CITRATE 50 MCG: 50 INJECTION INTRAMUSCULAR; INTRAVENOUS at 12:38

## 2025-05-23 RX ADMIN — FENTANYL CITRATE 25 MCG: 50 INJECTION INTRAMUSCULAR; INTRAVENOUS at 13:25

## 2025-05-23 RX ADMIN — MIDAZOLAM 1 MG: 1 INJECTION INTRAMUSCULAR; INTRAVENOUS at 13:12

## 2025-05-23 RX ADMIN — FENTANYL CITRATE 50 MCG: 50 INJECTION INTRAMUSCULAR; INTRAVENOUS at 13:09

## 2025-05-23 RX ADMIN — DEXMEDETOMIDINE HCL 12 MCG: 100 INJECTION INTRAVENOUS at 12:29

## 2025-05-23 RX ADMIN — Medication 500 UNITS: at 14:16

## 2025-05-23 RX ADMIN — GADOTERIDOL 15 ML: 279.3 INJECTION, SOLUTION INTRAVENOUS at 13:44

## 2025-05-23 RX ADMIN — Medication 20 MG: at 12:55

## 2025-05-23 RX ADMIN — FENTANYL CITRATE 25 MCG: 50 INJECTION INTRAMUSCULAR; INTRAVENOUS at 13:13

## 2025-05-23 NOTE — ANESTHESIA POSTPROCEDURE EVALUATION
Department of Anesthesiology  Postprocedure Note    Patient: Joseph Bond  MRN: [id number]  YOB: 1980  Date of evaluation: 5/23/2025    Procedure Summary       Date: 05/23/25 Room / Location: [facility] MRI; [facility]    Anesthesia Start: 1223 Anesthesia Stop:     Procedure: MRI LUMBAR SPINE W WO CONTRAST Diagnosis:       Prostate cancer metastatic to bone (HCC)      Metastatic cancer to bone (HCC)      Chronic midline low back pain with left-sided sciatica      (Prostate cancer (HCC) [C61 (ICD-10-CM)]; Back pain, unspecified back location, unspecified back pain laterality, unspecified chronicity [M54.9 (ICD-10-CM)])    Scheduled Providers:  Responsible Provider: Albaro Ventura MD    Anesthesia Type: MAC ASA Status: 3            Anesthesia Type: No value filed.    Sherita Phase I:      Sherita Phase II:      Anesthesia Post Evaluation    Patient location during evaluation: PACU  Patient participation: complete - patient participated  Level of consciousness: awake  Pain score: 3  Airway patency: patent  Nausea & Vomiting: no nausea and no vomiting  Cardiovascular status: blood pressure returned to baseline  Respiratory status: acceptable  Hydration status: euvolemic      No notable events documented.

## 2025-05-23 NOTE — PROGRESS NOTES
1343 Patient returned from exam. Patient stable. No s/s of complications noted or reported.     1358 Patient eating and drinking well with no s/s of complications noted or reported.    1420 Patient discharged. Discharge papers reviewed with patient, questions answered. Patient taken to door via wheelchair. Patient in stable condition, no s/s of complications noted or reported.

## 2025-05-28 ENCOUNTER — TELEPHONE (OUTPATIENT)
Dept: RADIATION ONCOLOGY | Age: 45
End: 2025-05-28

## 2025-05-28 NOTE — TELEPHONE ENCOUNTER
Pt has decided against Radiation Tx's. Referral from Dr. Matson. Spoke with Haroon at their office to inform her of patients decision at this time.

## 2025-05-29 ENCOUNTER — APPOINTMENT (OUTPATIENT)
Dept: GENERAL RADIOLOGY | Age: 45
DRG: 861 | End: 2025-05-29
Payer: MEDICAID

## 2025-05-29 ENCOUNTER — HOSPITAL ENCOUNTER (INPATIENT)
Age: 45
LOS: 6 days | Discharge: HOME HEALTH | DRG: 861 | End: 2025-06-05
Attending: STUDENT IN AN ORGANIZED HEALTH CARE EDUCATION/TRAINING PROGRAM | Admitting: FAMILY MEDICINE
Payer: MEDICAID

## 2025-05-29 ENCOUNTER — APPOINTMENT (OUTPATIENT)
Dept: CT IMAGING | Age: 45
DRG: 861 | End: 2025-05-29
Payer: MEDICAID

## 2025-05-29 DIAGNOSIS — R52 INTRACTABLE PAIN: Primary | ICD-10-CM

## 2025-05-29 DIAGNOSIS — C79.51 METASTATIC CANCER TO BONE (HCC): ICD-10-CM

## 2025-05-29 LAB
ALBUMIN SERPL-MCNC: 3.5 G/DL (ref 3.5–5.2)
ALP SERPL-CCNC: 271 U/L (ref 40–129)
ALT SERPL-CCNC: 19 U/L (ref 0–50)
ANION GAP SERPL CALCULATED.3IONS-SCNC: 13 MMOL/L (ref 7–16)
AST SERPL-CCNC: 74 U/L (ref 0–50)
BASOPHILS # BLD: 0.02 K/UL (ref 0–0.2)
BASOPHILS NFR BLD: 0 % (ref 0–2)
BILIRUB SERPL-MCNC: 0.3 MG/DL (ref 0–1.2)
BUN SERPL-MCNC: 9 MG/DL (ref 6–20)
CALCIUM SERPL-MCNC: 9.6 MG/DL (ref 8.6–10)
CHLORIDE SERPL-SCNC: 99 MMOL/L (ref 98–107)
CO2 SERPL-SCNC: 25 MMOL/L (ref 22–29)
CREAT SERPL-MCNC: 0.7 MG/DL (ref 0.7–1.2)
EKG ATRIAL RATE: 114 BPM
EKG P AXIS: 52 DEGREES
EKG P-R INTERVAL: 136 MS
EKG Q-T INTERVAL: 328 MS
EKG QRS DURATION: 86 MS
EKG QTC CALCULATION (BAZETT): 452 MS
EKG R AXIS: 30 DEGREES
EKG T AXIS: 25 DEGREES
EKG VENTRICULAR RATE: 114 BPM
EOSINOPHIL # BLD: 0.02 K/UL (ref 0.05–0.5)
EOSINOPHILS RELATIVE PERCENT: 0 % (ref 0–6)
ERYTHROCYTE [DISTWIDTH] IN BLOOD BY AUTOMATED COUNT: 17.5 % (ref 11.5–15)
GFR, ESTIMATED: >90 ML/MIN/1.73M2
GLUCOSE SERPL-MCNC: 106 MG/DL (ref 74–99)
HCT VFR BLD AUTO: 30.6 % (ref 37–54)
HGB BLD-MCNC: 10.3 G/DL (ref 12.5–16.5)
IMM GRANULOCYTES # BLD AUTO: 0.08 K/UL (ref 0–0.58)
IMM GRANULOCYTES NFR BLD: 1 % (ref 0–5)
LYMPHOCYTES NFR BLD: 0.62 K/UL (ref 1.5–4)
LYMPHOCYTES RELATIVE PERCENT: 6 % (ref 20–42)
MAGNESIUM SERPL-MCNC: 2.1 MG/DL (ref 1.6–2.6)
MCH RBC QN AUTO: 28.1 PG (ref 26–35)
MCHC RBC AUTO-ENTMCNC: 33.7 G/DL (ref 32–34.5)
MCV RBC AUTO: 83.6 FL (ref 80–99.9)
MONOCYTES NFR BLD: 0.78 K/UL (ref 0.1–0.95)
MONOCYTES NFR BLD: 8 % (ref 2–12)
NEUTROPHILS NFR BLD: 85 % (ref 43–80)
NEUTS SEG NFR BLD: 8.67 K/UL (ref 1.8–7.3)
PLATELET # BLD AUTO: 306 K/UL (ref 130–450)
PMV BLD AUTO: 9 FL (ref 7–12)
POTASSIUM SERPL-SCNC: 4 MMOL/L (ref 3.5–5.1)
PROT SERPL-MCNC: 7.6 G/DL (ref 6.4–8.3)
RBC # BLD AUTO: 3.66 M/UL (ref 3.8–5.8)
SODIUM SERPL-SCNC: 137 MMOL/L (ref 136–145)
TROPONIN I SERPL HS-MCNC: 12 NG/L (ref 0–22)
WBC OTHER # BLD: 10.2 K/UL (ref 4.5–11.5)

## 2025-05-29 PROCEDURE — 96375 TX/PRO/DX INJ NEW DRUG ADDON: CPT

## 2025-05-29 PROCEDURE — G0378 HOSPITAL OBSERVATION PER HR: HCPCS

## 2025-05-29 PROCEDURE — 84484 ASSAY OF TROPONIN QUANT: CPT

## 2025-05-29 PROCEDURE — 2580000003 HC RX 258: Performed by: STUDENT IN AN ORGANIZED HEALTH CARE EDUCATION/TRAINING PROGRAM

## 2025-05-29 PROCEDURE — 96376 TX/PRO/DX INJ SAME DRUG ADON: CPT

## 2025-05-29 PROCEDURE — 99285 EMERGENCY DEPT VISIT HI MDM: CPT

## 2025-05-29 PROCEDURE — 6360000002 HC RX W HCPCS: Performed by: STUDENT IN AN ORGANIZED HEALTH CARE EDUCATION/TRAINING PROGRAM

## 2025-05-29 PROCEDURE — 71045 X-RAY EXAM CHEST 1 VIEW: CPT

## 2025-05-29 PROCEDURE — 93010 ELECTROCARDIOGRAM REPORT: CPT | Performed by: INTERNAL MEDICINE

## 2025-05-29 PROCEDURE — 6370000000 HC RX 637 (ALT 250 FOR IP): Performed by: STUDENT IN AN ORGANIZED HEALTH CARE EDUCATION/TRAINING PROGRAM

## 2025-05-29 PROCEDURE — 73521 X-RAY EXAM HIPS BI 2 VIEWS: CPT

## 2025-05-29 PROCEDURE — 96365 THER/PROPH/DIAG IV INF INIT: CPT

## 2025-05-29 PROCEDURE — 93005 ELECTROCARDIOGRAM TRACING: CPT | Performed by: STUDENT IN AN ORGANIZED HEALTH CARE EDUCATION/TRAINING PROGRAM

## 2025-05-29 PROCEDURE — 85025 COMPLETE CBC W/AUTO DIFF WBC: CPT

## 2025-05-29 PROCEDURE — 81001 URINALYSIS AUTO W/SCOPE: CPT

## 2025-05-29 PROCEDURE — 83735 ASSAY OF MAGNESIUM: CPT

## 2025-05-29 PROCEDURE — 80053 COMPREHEN METABOLIC PANEL: CPT

## 2025-05-29 PROCEDURE — 71275 CT ANGIOGRAPHY CHEST: CPT

## 2025-05-29 PROCEDURE — 6360000004 HC RX CONTRAST MEDICATION: Performed by: RADIOLOGY

## 2025-05-29 RX ORDER — HYDROMORPHONE HYDROCHLORIDE 1 MG/ML
1 INJECTION, SOLUTION INTRAMUSCULAR; INTRAVENOUS; SUBCUTANEOUS ONCE
Refills: 0 | Status: COMPLETED | OUTPATIENT
Start: 2025-05-29 | End: 2025-05-29

## 2025-05-29 RX ORDER — IOPAMIDOL 755 MG/ML
75 INJECTION, SOLUTION INTRAVASCULAR
Status: COMPLETED | OUTPATIENT
Start: 2025-05-29 | End: 2025-05-29

## 2025-05-29 RX ORDER — 0.9 % SODIUM CHLORIDE 0.9 %
1000 INTRAVENOUS SOLUTION INTRAVENOUS ONCE
Status: COMPLETED | OUTPATIENT
Start: 2025-05-29 | End: 2025-05-30

## 2025-05-29 RX ORDER — 0.9 % SODIUM CHLORIDE 0.9 %
1000 INTRAVENOUS SOLUTION INTRAVENOUS ONCE
Status: COMPLETED | OUTPATIENT
Start: 2025-05-29 | End: 2025-05-29

## 2025-05-29 RX ORDER — ONDANSETRON 2 MG/ML
4 INJECTION INTRAMUSCULAR; INTRAVENOUS ONCE
Status: COMPLETED | OUTPATIENT
Start: 2025-05-29 | End: 2025-05-29

## 2025-05-29 RX ORDER — ACETAMINOPHEN 500 MG
1000 TABLET ORAL ONCE
Status: COMPLETED | OUTPATIENT
Start: 2025-05-29 | End: 2025-05-29

## 2025-05-29 RX ADMIN — HYDROMORPHONE HYDROCHLORIDE 1 MG: 1 INJECTION, SOLUTION INTRAMUSCULAR; INTRAVENOUS; SUBCUTANEOUS at 16:45

## 2025-05-29 RX ADMIN — SODIUM CHLORIDE 1000 ML: 0.9 INJECTION, SOLUTION INTRAVENOUS at 14:37

## 2025-05-29 RX ADMIN — HYDROMORPHONE HYDROCHLORIDE 1 MG: 1 INJECTION, SOLUTION INTRAMUSCULAR; INTRAVENOUS; SUBCUTANEOUS at 20:52

## 2025-05-29 RX ADMIN — HYDROMORPHONE HYDROCHLORIDE 1 MG: 1 INJECTION, SOLUTION INTRAMUSCULAR; INTRAVENOUS; SUBCUTANEOUS at 14:39

## 2025-05-29 RX ADMIN — IOPAMIDOL 75 ML: 755 INJECTION, SOLUTION INTRAVENOUS at 22:03

## 2025-05-29 RX ADMIN — METHOCARBAMOL 1000 MG: 100 INJECTION INTRAMUSCULAR; INTRAVENOUS at 16:04

## 2025-05-29 RX ADMIN — ACETAMINOPHEN 1000 MG: 500 TABLET ORAL at 15:21

## 2025-05-29 RX ADMIN — HYDROMORPHONE HYDROCHLORIDE 1 MG: 1 INJECTION, SOLUTION INTRAMUSCULAR; INTRAVENOUS; SUBCUTANEOUS at 21:22

## 2025-05-29 RX ADMIN — HYDROMORPHONE HYDROCHLORIDE 1 MG: 1 INJECTION, SOLUTION INTRAMUSCULAR; INTRAVENOUS; SUBCUTANEOUS at 22:53

## 2025-05-29 RX ADMIN — SODIUM CHLORIDE 1000 ML: 0.9 INJECTION, SOLUTION INTRAVENOUS at 23:07

## 2025-05-29 RX ADMIN — ONDANSETRON HYDROCHLORIDE 4 MG: 2 INJECTION, SOLUTION INTRAMUSCULAR; INTRAVENOUS at 14:39

## 2025-05-29 RX ADMIN — HYDROMORPHONE HYDROCHLORIDE 1 MG: 1 INJECTION, SOLUTION INTRAMUSCULAR; INTRAVENOUS; SUBCUTANEOUS at 15:21

## 2025-05-29 ASSESSMENT — PAIN SCALES - GENERAL
PAINLEVEL_OUTOF10: 10

## 2025-05-29 ASSESSMENT — PAIN DESCRIPTION - ORIENTATION
ORIENTATION: MID;LOWER

## 2025-05-29 ASSESSMENT — PAIN DESCRIPTION - DESCRIPTORS
DESCRIPTORS: ACHING;STABBING
DESCRIPTORS: THROBBING;ACHING;STABBING
DESCRIPTORS: ACHING

## 2025-05-29 ASSESSMENT — PAIN DESCRIPTION - LOCATION
LOCATION: BACK;CHEST
LOCATION: CHEST;BACK
LOCATION: BACK;CHEST

## 2025-05-30 LAB
ANION GAP SERPL CALCULATED.3IONS-SCNC: 17 MMOL/L (ref 7–16)
BASOPHILS # BLD: 0.02 K/UL (ref 0–0.2)
BASOPHILS NFR BLD: 0 % (ref 0–2)
BILIRUB UR QL STRIP: NEGATIVE
BUN SERPL-MCNC: 8 MG/DL (ref 6–20)
CALCIUM SERPL-MCNC: 9.1 MG/DL (ref 8.6–10.2)
CHLORIDE SERPL-SCNC: 101 MMOL/L (ref 98–107)
CLARITY UR: CLEAR
CO2 SERPL-SCNC: 21 MMOL/L (ref 22–29)
COLOR UR: YELLOW
CREAT SERPL-MCNC: 0.6 MG/DL (ref 0.7–1.2)
CRP SERPL HS-MCNC: 238 MG/L (ref 0–5)
EOSINOPHIL # BLD: 0.02 K/UL (ref 0.05–0.5)
EOSINOPHILS RELATIVE PERCENT: 0 % (ref 0–6)
ERYTHROCYTE [DISTWIDTH] IN BLOOD BY AUTOMATED COUNT: 17.7 % (ref 11.5–15)
GFR, ESTIMATED: >90 ML/MIN/1.73M2
GLUCOSE SERPL-MCNC: 105 MG/DL (ref 74–99)
GLUCOSE UR STRIP-MCNC: NEGATIVE MG/DL
HCT VFR BLD AUTO: 29.5 % (ref 37–54)
HGB BLD-MCNC: 9.2 G/DL (ref 12.5–16.5)
HGB UR QL STRIP.AUTO: ABNORMAL
IMM GRANULOCYTES # BLD AUTO: 0.04 K/UL (ref 0–0.58)
IMM GRANULOCYTES NFR BLD: 1 % (ref 0–5)
KETONES UR STRIP-MCNC: NEGATIVE MG/DL
LACTATE BLDV-SCNC: 1.8 MMOL/L (ref 0.5–2.2)
LEUKOCYTE ESTERASE UR QL STRIP: NEGATIVE
LYMPHOCYTES NFR BLD: 0.7 K/UL (ref 1.5–4)
LYMPHOCYTES RELATIVE PERCENT: 8 % (ref 20–42)
MCH RBC QN AUTO: 26.9 PG (ref 26–35)
MCHC RBC AUTO-ENTMCNC: 31.2 G/DL (ref 32–34.5)
MCV RBC AUTO: 86.3 FL (ref 80–99.9)
MONOCYTES NFR BLD: 0.98 K/UL (ref 0.1–0.95)
MONOCYTES NFR BLD: 11 % (ref 2–12)
NEUTROPHILS NFR BLD: 80 % (ref 43–80)
NEUTS SEG NFR BLD: 6.81 K/UL (ref 1.8–7.3)
NITRITE UR QL STRIP: NEGATIVE
PH UR STRIP: 6 [PH] (ref 5–8)
PLATELET # BLD AUTO: 261 K/UL (ref 130–450)
PMV BLD AUTO: 8.7 FL (ref 7–12)
POTASSIUM SERPL-SCNC: 4.2 MMOL/L (ref 3.5–5)
PROCALCITONIN SERPL-MCNC: 0.11 NG/ML (ref 0–0.08)
PROT UR STRIP-MCNC: NEGATIVE MG/DL
RBC # BLD AUTO: 3.42 M/UL (ref 3.8–5.8)
RBC #/AREA URNS HPF: ABNORMAL /HPF
SODIUM SERPL-SCNC: 139 MMOL/L (ref 132–146)
SP GR UR STRIP: <1.005 (ref 1–1.03)
TROPONIN I SERPL HS-MCNC: 7 NG/L (ref 0–22)
UROBILINOGEN UR STRIP-ACNC: 0.2 EU/DL (ref 0–1)
WBC #/AREA URNS HPF: ABNORMAL /HPF
WBC OTHER # BLD: 8.6 K/UL (ref 4.5–11.5)

## 2025-05-30 PROCEDURE — 99222 1ST HOSP IP/OBS MODERATE 55: CPT

## 2025-05-30 PROCEDURE — 83605 ASSAY OF LACTIC ACID: CPT

## 2025-05-30 PROCEDURE — 85025 COMPLETE CBC W/AUTO DIFF WBC: CPT

## 2025-05-30 PROCEDURE — 6360000002 HC RX W HCPCS

## 2025-05-30 PROCEDURE — 99222 1ST HOSP IP/OBS MODERATE 55: CPT | Performed by: FAMILY MEDICINE

## 2025-05-30 PROCEDURE — 87040 BLOOD CULTURE FOR BACTERIA: CPT

## 2025-05-30 PROCEDURE — 84484 ASSAY OF TROPONIN QUANT: CPT

## 2025-05-30 PROCEDURE — 80048 BASIC METABOLIC PNL TOTAL CA: CPT

## 2025-05-30 PROCEDURE — 36415 COLL VENOUS BLD VENIPUNCTURE: CPT

## 2025-05-30 PROCEDURE — 2500000003 HC RX 250 WO HCPCS

## 2025-05-30 PROCEDURE — 6370000000 HC RX 637 (ALT 250 FOR IP)

## 2025-05-30 PROCEDURE — 96376 TX/PRO/DX INJ SAME DRUG ADON: CPT

## 2025-05-30 PROCEDURE — 86140 C-REACTIVE PROTEIN: CPT

## 2025-05-30 PROCEDURE — 1200000000 HC SEMI PRIVATE

## 2025-05-30 PROCEDURE — 84145 PROCALCITONIN (PCT): CPT

## 2025-05-30 PROCEDURE — G0378 HOSPITAL OBSERVATION PER HR: HCPCS

## 2025-05-30 PROCEDURE — 96375 TX/PRO/DX INJ NEW DRUG ADDON: CPT

## 2025-05-30 RX ORDER — SENNA AND DOCUSATE SODIUM 50; 8.6 MG/1; MG/1
2 TABLET, FILM COATED ORAL DAILY
Status: DISCONTINUED | OUTPATIENT
Start: 2025-05-31 | End: 2025-05-31

## 2025-05-30 RX ORDER — POLYETHYLENE GLYCOL 3350 17 G/17G
17 POWDER, FOR SOLUTION ORAL DAILY
Status: DISCONTINUED | OUTPATIENT
Start: 2025-05-30 | End: 2025-06-05 | Stop reason: HOSPADM

## 2025-05-30 RX ORDER — ENOXAPARIN SODIUM 100 MG/ML
40 INJECTION SUBCUTANEOUS DAILY
Status: DISCONTINUED | OUTPATIENT
Start: 2025-05-30 | End: 2025-06-05 | Stop reason: HOSPADM

## 2025-05-30 RX ORDER — ONDANSETRON 2 MG/ML
4 INJECTION INTRAMUSCULAR; INTRAVENOUS EVERY 6 HOURS PRN
Status: DISCONTINUED | OUTPATIENT
Start: 2025-05-30 | End: 2025-06-05 | Stop reason: HOSPADM

## 2025-05-30 RX ORDER — HYDROMORPHONE HYDROCHLORIDE 4 MG/1
4 TABLET ORAL
Refills: 0 | Status: DISCONTINUED | OUTPATIENT
Start: 2025-05-30 | End: 2025-06-05

## 2025-05-30 RX ORDER — PREGABALIN 75 MG/1
75 CAPSULE ORAL NIGHTLY
Status: DISCONTINUED | OUTPATIENT
Start: 2025-05-30 | End: 2025-05-30

## 2025-05-30 RX ORDER — PREDNISONE 5 MG/1
10 TABLET ORAL DAILY
Status: DISCONTINUED | OUTPATIENT
Start: 2025-05-30 | End: 2025-05-30

## 2025-05-30 RX ORDER — SODIUM CHLORIDE 9 MG/ML
INJECTION, SOLUTION INTRAVENOUS PRN
Status: DISCONTINUED | OUTPATIENT
Start: 2025-05-30 | End: 2025-06-05 | Stop reason: HOSPADM

## 2025-05-30 RX ORDER — SENNOSIDES 8.6 MG/1
1 TABLET ORAL DAILY
Status: DISCONTINUED | OUTPATIENT
Start: 2025-05-30 | End: 2025-05-30

## 2025-05-30 RX ORDER — METHOCARBAMOL 750 MG/1
750 TABLET, FILM COATED ORAL 4 TIMES DAILY
Status: DISCONTINUED | OUTPATIENT
Start: 2025-05-30 | End: 2025-06-05 | Stop reason: HOSPADM

## 2025-05-30 RX ORDER — SODIUM CHLORIDE 0.9 % (FLUSH) 0.9 %
5-40 SYRINGE (ML) INJECTION PRN
Status: DISCONTINUED | OUTPATIENT
Start: 2025-05-30 | End: 2025-06-05 | Stop reason: HOSPADM

## 2025-05-30 RX ORDER — BISACODYL 5 MG/1
5 TABLET, DELAYED RELEASE ORAL DAILY
Status: DISCONTINUED | OUTPATIENT
Start: 2025-05-30 | End: 2025-05-30

## 2025-05-30 RX ORDER — SODIUM CHLORIDE 0.9 % (FLUSH) 0.9 %
5-40 SYRINGE (ML) INJECTION EVERY 12 HOURS SCHEDULED
Status: DISCONTINUED | OUTPATIENT
Start: 2025-05-30 | End: 2025-06-05 | Stop reason: HOSPADM

## 2025-05-30 RX ORDER — POLYETHYLENE GLYCOL 3350 17 G/17G
17 POWDER, FOR SOLUTION ORAL DAILY PRN
Status: DISCONTINUED | OUTPATIENT
Start: 2025-05-30 | End: 2025-05-30

## 2025-05-30 RX ORDER — ACETAMINOPHEN 325 MG/1
650 TABLET ORAL EVERY 6 HOURS PRN
Status: DISCONTINUED | OUTPATIENT
Start: 2025-05-30 | End: 2025-06-05 | Stop reason: HOSPADM

## 2025-05-30 RX ORDER — HYDROMORPHONE HYDROCHLORIDE 4 MG/1
4 TABLET ORAL EVERY 4 HOURS
Status: DISCONTINUED | OUTPATIENT
Start: 2025-05-30 | End: 2025-05-30

## 2025-05-30 RX ORDER — ONDANSETRON 4 MG/1
4 TABLET, ORALLY DISINTEGRATING ORAL EVERY 8 HOURS PRN
Status: DISCONTINUED | OUTPATIENT
Start: 2025-05-30 | End: 2025-06-05 | Stop reason: HOSPADM

## 2025-05-30 RX ORDER — ACETAMINOPHEN 650 MG/1
650 SUPPOSITORY RECTAL EVERY 6 HOURS PRN
Status: DISCONTINUED | OUTPATIENT
Start: 2025-05-30 | End: 2025-06-05 | Stop reason: HOSPADM

## 2025-05-30 RX ORDER — HYDROMORPHONE HYDROCHLORIDE 1 MG/ML
1 INJECTION, SOLUTION INTRAMUSCULAR; INTRAVENOUS; SUBCUTANEOUS EVERY 4 HOURS PRN
Status: DISCONTINUED | OUTPATIENT
Start: 2025-05-30 | End: 2025-05-31

## 2025-05-30 RX ADMIN — METHOCARBAMOL 750 MG: 750 TABLET ORAL at 01:50

## 2025-05-30 RX ADMIN — HYDROMORPHONE HYDROCHLORIDE 4 MG: 4 TABLET ORAL at 15:18

## 2025-05-30 RX ADMIN — HYDROMORPHONE HYDROCHLORIDE 1 MG: 1 INJECTION, SOLUTION INTRAMUSCULAR; INTRAVENOUS; SUBCUTANEOUS at 18:21

## 2025-05-30 RX ADMIN — SENNOSIDES 8.6 MG: 8.6 TABLET, FILM COATED ORAL at 08:48

## 2025-05-30 RX ADMIN — HYDROMORPHONE HYDROCHLORIDE 4 MG: 4 TABLET ORAL at 21:40

## 2025-05-30 RX ADMIN — HYDROMORPHONE HYDROCHLORIDE 1 MG: 1 INJECTION, SOLUTION INTRAMUSCULAR; INTRAVENOUS; SUBCUTANEOUS at 09:45

## 2025-05-30 RX ADMIN — METHOCARBAMOL 750 MG: 750 TABLET ORAL at 20:28

## 2025-05-30 RX ADMIN — HYDROMORPHONE HYDROCHLORIDE 4 MG: 4 TABLET ORAL at 02:22

## 2025-05-30 RX ADMIN — HYDROMORPHONE HYDROCHLORIDE 4 MG: 4 TABLET ORAL at 19:37

## 2025-05-30 RX ADMIN — METHOCARBAMOL 750 MG: 750 TABLET ORAL at 12:57

## 2025-05-30 RX ADMIN — HYDROMORPHONE HYDROCHLORIDE 1 MG: 1 INJECTION, SOLUTION INTRAMUSCULAR; INTRAVENOUS; SUBCUTANEOUS at 01:14

## 2025-05-30 RX ADMIN — HYDROMORPHONE HYDROCHLORIDE 4 MG: 4 TABLET ORAL at 06:31

## 2025-05-30 RX ADMIN — HYDROMORPHONE HYDROCHLORIDE 1 MG: 1 INJECTION, SOLUTION INTRAMUSCULAR; INTRAVENOUS; SUBCUTANEOUS at 14:14

## 2025-05-30 RX ADMIN — SODIUM CHLORIDE, PRESERVATIVE FREE 10 ML: 5 INJECTION INTRAVENOUS at 08:52

## 2025-05-30 RX ADMIN — ACETAMINOPHEN 650 MG: 325 TABLET ORAL at 06:31

## 2025-05-30 RX ADMIN — ACETAMINOPHEN 650 MG: 325 TABLET ORAL at 01:50

## 2025-05-30 RX ADMIN — HYDROMORPHONE HYDROCHLORIDE 1 MG: 1 INJECTION, SOLUTION INTRAMUSCULAR; INTRAVENOUS; SUBCUTANEOUS at 05:20

## 2025-05-30 RX ADMIN — OXYCODONE HYDROCHLORIDE 30 MG: 20 TABLET, FILM COATED, EXTENDED RELEASE ORAL at 20:28

## 2025-05-30 RX ADMIN — ACETAMINOPHEN 650 MG: 325 TABLET ORAL at 12:57

## 2025-05-30 RX ADMIN — OXYCODONE HYDROCHLORIDE 30 MG: 20 TABLET, FILM COATED, EXTENDED RELEASE ORAL at 08:48

## 2025-05-30 RX ADMIN — METHOCARBAMOL 750 MG: 750 TABLET ORAL at 17:26

## 2025-05-30 RX ADMIN — METHYLPREDNISOLONE SODIUM SUCCINATE 40 MG: 40 INJECTION, POWDER, LYOPHILIZED, FOR SOLUTION INTRAMUSCULAR; INTRAVENOUS at 01:14

## 2025-05-30 RX ADMIN — POLYETHYLENE GLYCOL 3350 17 G: 17 POWDER, FOR SOLUTION ORAL at 19:37

## 2025-05-30 RX ADMIN — SODIUM CHLORIDE, PRESERVATIVE FREE 10 ML: 5 INJECTION INTRAVENOUS at 20:28

## 2025-05-30 RX ADMIN — HYDROMORPHONE HYDROCHLORIDE 4 MG: 4 TABLET ORAL at 10:59

## 2025-05-30 RX ADMIN — ACETAMINOPHEN 650 MG: 325 TABLET ORAL at 18:20

## 2025-05-30 RX ADMIN — HYDROMORPHONE HYDROCHLORIDE 4 MG: 4 TABLET ORAL at 17:26

## 2025-05-30 RX ADMIN — METHOCARBAMOL 750 MG: 750 TABLET ORAL at 08:48

## 2025-05-30 RX ADMIN — HYDROMORPHONE HYDROCHLORIDE 4 MG: 4 TABLET ORAL at 12:57

## 2025-05-30 RX ADMIN — HYDROMORPHONE HYDROCHLORIDE 4 MG: 4 TABLET ORAL at 04:17

## 2025-05-30 RX ADMIN — HYDROMORPHONE HYDROCHLORIDE 1 MG: 1 INJECTION, SOLUTION INTRAMUSCULAR; INTRAVENOUS; SUBCUTANEOUS at 22:45

## 2025-05-30 ASSESSMENT — PAIN DESCRIPTION - ONSET
ONSET: ON-GOING

## 2025-05-30 ASSESSMENT — PAIN SCALES - GENERAL
PAINLEVEL_OUTOF10: 9
PAINLEVEL_OUTOF10: 9
PAINLEVEL_OUTOF10: 10
PAINLEVEL_OUTOF10: 8
PAINLEVEL_OUTOF10: 9
PAINLEVEL_OUTOF10: 8
PAINLEVEL_OUTOF10: 8
PAINLEVEL_OUTOF10: 10
PAINLEVEL_OUTOF10: 9
PAINLEVEL_OUTOF10: 6
PAINLEVEL_OUTOF10: 10
PAINLEVEL_OUTOF10: 10
PAINLEVEL_OUTOF10: 9
PAINLEVEL_OUTOF10: 6
PAINLEVEL_OUTOF10: 9
PAINLEVEL_OUTOF10: 10
PAINLEVEL_OUTOF10: 8
PAINLEVEL_OUTOF10: 10
PAINLEVEL_OUTOF10: 9

## 2025-05-30 ASSESSMENT — PAIN DESCRIPTION - LOCATION
LOCATION: BACK
LOCATION: RIB CAGE
LOCATION: BACK
LOCATION: BACK;BUTTOCKS
LOCATION: RIB CAGE
LOCATION: BACK

## 2025-05-30 ASSESSMENT — PAIN - FUNCTIONAL ASSESSMENT
PAIN_FUNCTIONAL_ASSESSMENT: PREVENTS OR INTERFERES SOME ACTIVE ACTIVITIES AND ADLS

## 2025-05-30 ASSESSMENT — PAIN DESCRIPTION - FREQUENCY
FREQUENCY: CONTINUOUS

## 2025-05-30 ASSESSMENT — PAIN DESCRIPTION - DESCRIPTORS
DESCRIPTORS: ACHING;DISCOMFORT;SORE
DESCRIPTORS: DISCOMFORT;ACHING;SORE
DESCRIPTORS: ACHING;DISCOMFORT;SORE
DESCRIPTORS: ACHING;DISCOMFORT;SORE
DESCRIPTORS: ACHING;SORE;SHARP
DESCRIPTORS: ACHING;DISCOMFORT;SORE
DESCRIPTORS: ACHING;DISCOMFORT;SORE
DESCRIPTORS: ACHING;DISCOMFORT;SPASM

## 2025-05-30 ASSESSMENT — PAIN DESCRIPTION - PAIN TYPE
TYPE: CHRONIC PAIN

## 2025-05-30 ASSESSMENT — PAIN DESCRIPTION - ORIENTATION
ORIENTATION: LEFT;RIGHT;ANTERIOR;POSTERIOR
ORIENTATION: RIGHT;LOWER;MID
ORIENTATION: LEFT
ORIENTATION: RIGHT;LEFT
ORIENTATION: RIGHT;LEFT
ORIENTATION: LEFT;RIGHT;LOWER
ORIENTATION: LOWER
ORIENTATION: RIGHT;LEFT
ORIENTATION: RIGHT;LEFT;POSTERIOR;ANTERIOR;MID
ORIENTATION: RIGHT;LEFT
ORIENTATION: LEFT;RIGHT;MID
ORIENTATION: LEFT
ORIENTATION: RIGHT;LEFT

## 2025-05-30 NOTE — PROGRESS NOTES
4 Eyes Skin Assessment     NAME:  Joseph Bond  YOB: 1980  MEDICAL RECORD NUMBER:  [id number]    The patient is being assessed for  Admission    I agree that at least one RN has performed a thorough Head to Toe Skin Assessment on the patient. ALL assessment sites listed below have been assessed.      Areas assessed by both nurses:    Head, Face, Ears, Shoulders, Back, Chest, Arms, Elbows, Hands, Sacrum. Buttock, Coccyx, Ischium, Legs. Feet and Heels, and Under Medical Devices         Does the Patient have a Wound? No noted wound(s)       Simon Prevention initiated by RN: Yes  Wound Care Orders initiated by RN: No    Pressure Injury (Stage 3,4, Unstageable, DTI, NWPT, and Complex wounds) if present, place Wound referral order by RN under : No    New Ostomies, if present place, Ostomy referral order under : No     Nurse 1 eSignature: Electronically signed by Shayne Rice RN on 5/30/25 at 4:36 AM EDT    **SHARE this note so that the co-signing nurse can place an eSignature**    Nurse 2 eSignature: {Esignature:[id number]}

## 2025-05-30 NOTE — PLAN OF CARE
Problem: Discharge Planning  Goal: Discharge to home or other facility with appropriate resources  Outcome: Progressing     Problem: Safety - Adult  Goal: Free from fall injury  Outcome: Progressing     Problem: Musculoskeletal - Adult  Goal: Return mobility to safest level of function  Outcome: Progressing  Goal: Maintain proper alignment of affected body part  Outcome: Progressing  Goal: Return ADL status to a safe level of function  Outcome: Progressing     Problem: Pain  Goal: Verbalizes/displays adequate comfort level or baseline comfort level  Outcome: Progressing

## 2025-05-30 NOTE — PROGRESS NOTES
Rhythm: Normal rate and regular rhythm.      Pulses: Normal pulses.      Heart sounds: Normal heart sounds.   Pulmonary:      Effort: Pulmonary effort is normal.      Breath sounds: Normal breath sounds.   Abdominal:      General: Bowel sounds are normal. There is no distension.      Palpations: Abdomen is soft.      Tenderness: There is no abdominal tenderness.   Musculoskeletal:         General: No swelling.      Right lower leg: No edema.      Left lower leg: No edema.   Skin:     General: Skin is warm.   Neurological:      General: No focal deficit present.      Mental Status: He is alert.   Psychiatric:         Mood and Affect: Mood normal.         Behavior: Behavior normal.         Thought Content: Thought content normal.           Labs:  Na/K/Cl/CO2:  139/4.2/101/21 (05/30 0134)  BUN/Cr/glu/ALT/AST/amyl/lip:  8/0.6/--/--/--/--/-- (05/30 0134)  WBC/Hgb/Hct/Plts:  8.6/9.2/29.5/261 (05/30 0134)  estimated creatinine clearance is 157 mL/min (A) (based on SCr of 0.6 mg/dL (L)).  Other pertinent labs as noted below    Radiology:  XR HIP BILATERAL W AP PELVIS (2 VIEWS)   Final Result   No acute process.         CTA PULMONARY W CONTRAST   Final Result   1. No pulmonary emboli or acute intrathoracic process.   2. No significant interval change of extensive metastatic osseous lesions.         XR CHEST PORTABLE   Final Result   No acute process.               Resident Assessment and Plan       Generalized pain/LBP 2/2 prostate ca w mets causing acute pain vs infectious gastroenteritis vs worsening mets  Prostate ca w mets to spine  PSA 1.51 in 5/2025 was 19.4 10/2024  Did have MRI lumbar and thoracic spine under anesthesia in 5/2025  Pathological fx of T1, T6, T7, T8, L2, L4, L6 - seems to be comparison to CT 11/2024 but worse than last MRI 7/2024  Pt was not going to go through with radiation  Continues to take Nubeqa (immunotherapy), no more chemotherapy  States he did not receive Lupron (leuprolide)  Pain meds at  home per palliative: oxycontin 30 mg ER BID, Dilaudid 4mg PO q2h PRN and methacarbamol 750mg TID  While inpatient can add IV Dilaudid 1mg for breakthrough pain   Patient refuses to add Lyrica  Consult palliative for pain management  Zofran IV for nausea  Bowel regiment - Senokot-S and glycolax   Consider consulting Dr. Matson if needed     Elevated WBC from 7 to 10 - resolving   Infectious workup including LA, cultures, procal since he had increase leukocytosis but possibly due to steroid chronically  LA - N, procal - mildly elevated, CRP elevated  Cultures negative to date   IV solumedrol 125mg daily will be added for inflammation control  CBC daily      DVT prophylaxis: Lovenox  GI prophylaxis: Protonix  Antibiotics: none  Steroids: solumedrol   PT/OT: n/a  Diet: ADULT DIET; Regular  Consults: palliative care  Disposition: admitted       Electronically signed by Sharlene Ramirez MD on 5/30/2025 at 6:54 AM  Attending physician: Dr. Damon

## 2025-05-30 NOTE — PROGRESS NOTES
Patient came to unit from ED with port assessed has been having active  chemotherapy. Lab in attendance to do blood draw. Blood cultures, CBC and BMP drawn by lab. Spoke with Supervisor Antonette who has been made aware and advised that if it has already been assessed from emergency then it is okay to do thr remaining  blood draws from the port.

## 2025-05-30 NOTE — PROGRESS NOTES
Joseph Bond was ordered darolutamide which is a nonformulary medication.  This medication will need to be supplied by the patient as the pharmacy does not carry this non-formulary medication.    If the medication has not been administered by 1400 on the following day from the time the order was placed, a pharmacist will follow-up with the nurse of the patient to assess the capability of the patient to bring in the medication.    If it is determined that the patient cannot supply the medication and it is not available to be dispensed from the pharmacy, the provider will be notified.     Lyle Almendarez, PharmD

## 2025-05-30 NOTE — PLAN OF CARE
Problem: Discharge Planning  Goal: Discharge to home or other facility with appropriate resources  5/30/2025 1556 by Montserrat Hollis RN  Outcome: Progressing     Problem: Safety - Adult  Goal: Free from fall injury  5/30/2025 1556 by Montserrat Hollis RN  Outcome: Progressing     Problem: Musculoskeletal - Adult  Goal: Return mobility to safest level of function  5/30/2025 1556 by Montserrat Hollis RN  Outcome: Progressing     Problem: Pain  Goal: Verbalizes/displays adequate comfort level or baseline comfort level  5/30/2025 1556 by Montserrat Hollis RN  Outcome: Progressing

## 2025-05-30 NOTE — H&P
exertion, no paroxysmal nocturnal dyspnea, no orthopnea, no palpitation, no leg swelling.   Gastroenterology: No dysphagia, no reflux; no abdominal pain, no nausea or vomiting; no constipation or diarrhea. No hematochezia   Genitourinary: No dysuria, no frequency, hesitancy; no hematuria  Musculoskeletal: + generalized pain.  Neurology: no focal weakness in extremities, no slurred speech, no double vision, no tingling or numbness sensation  Endocrinology: no temperature intolerance, no polyphagia, polydipsia or polyuria  Hematology: no increased bleeding, no bruising, no lymphadenopathy  Skin: no skin changes noticed by patient  Psychology: no depressed mood, no suicidal ideation      Physical Exam   Vitals: /77   Pulse 99   Temp 98.6 °F (37 °C) (Oral)   Resp 14   SpO2 95%     Physical Exam:  General Appearance: No acute distress. Awake and conversant.   Neuro: Round symmetric pupil that react to light bilaterally   Cardiovascular: regular rate and rhythm, S1 and S2 heard, no murmurs appreciated   Respiratory: Clear to auscultation bilaterally. No wheezing or crackles appreciated.  Abdomen: Soft, non-tender; bowel sounds normal; no masses, no organomegaly, rebound or guarding  Extremities: Extremities normal, no cyanosis, distal pulses intact, no pedal edema.   Other: moves all extremities, sensation intact. Neuropathy of toes. DP 2+  Chest wall: TTP on the midsternal area and bilateral sides (pointing to ribs)     Labs and Imaging Studies   Basic Labs  CBC:   Recent Labs     05/29/25  1430   WBC 10.2   RBC 3.66*   HGB 10.3*   HCT 30.6*   MCV 83.6   MCH 28.1   MCHC 33.7   RDW 17.5*      MPV 9.0       BMP:    Recent Labs     05/29/25  1430      K 4.0   CL 99   CO2 25   BUN 9   CREATININE 0.7   GLUCOSE 106*   CALCIUM 9.6   BILITOT 0.3   ALKPHOS 271*   AST 74*   ALT 19       LIVER PROFILE:   Recent Labs     05/29/25  1430   AST 74*   ALT 19   BILITOT 0.3   ALKPHOS 271*       PT/INR:   No results  neural foraminal stenosis. L3-L4: Minimal disk bulge. No significant central canal, lateral recess, or neural foraminal stenosis. L4-L5: No significant disc herniation. Minimal chronic dorsal buckling of the inferior endplate of the L4 vertebral body. No significant central canal or lateral recess stenosis. Mild left neural foraminal stenosis. L5-L6: Minimal disc bulge. No significant central canal or lateral recess stenosis. Mild left neural foraminal stenosis. L6-S1: No significant disc herniation. No significant central canal, lateral recess, or neural foraminal stenosis. Tumor in the S1 vertebral body extends into the anterior epidural space resulting in moderate central canal stenosis.     1. Extensive metastatic disease throughout the lumbar spine with chronic compression fracture deformities in the L2, L4, and L6 vertebral bodies, developed since the prior MRI but comparison with the subsequent CT from 11/05/2024. 2. Tumor in the S1 vertebral body extends into the anterior epidural space, resulting in moderate central canal stenosis. 3. Mild left neural foraminal stenosis at L4-5 and L5-L6, resulting from degenerative changes. 4.  no significant central canal stenosis in the lumbar spine.     MRI THORACIC SPINE W WO CONTRAST  Result Date: 5/23/2025  EXAM: MRI THORACIC SPINE WITH AND WITHOUT INTRAVENOUS CONTRAST 05/23/2025 01:45:52 PM TECHNIQUE: Multiplanar multisequence MRI of the thoracic spine was performed with and without the administration of intravenous contrast. COMPARISON: CT of the chest with contrast, 07/26/2025. CLINICAL HISTORY: Prostate cancer metastatic to bone (HCC); Intractable pain. FINDINGS: BONES AND ALIGNMENT: Extensive metastatic disease osteopenia is seen throughout the visualized bones. There are pathological fractures of the T1, T6, T7, and T8 vertebral bodies. There is minimal buckling of the T4 and T10 endplates as well. The fractures are grossly stable as of the CT of the chest from

## 2025-05-30 NOTE — CONSULTS
Palliative Care Department  [phone]  Palliative Care Initial Consult  Provider Gely Banuelos, FANG - CNP      PATIENT: Joseph Bond  : 1980  MRN: [id number]  ADMISSION DATE: 2025  1:59 PM  Referring Provider:  Nasima Phelps Chi, MD    Palliative Medicine was consulted on hospital day 0 for assistance with Symptom management     HPI:     Clinical Summary:Joseph Bond is a 44 y.o. y/o male with a history of  prostate cancer with mediastinal lymphadenopathy, extensive metastasis of the spine, rib cage, pelvis, humerus and femurs ,(had refused systemic therapy, pursuing homeopathic) completed palliative radiation treatment to spine, polysubstance abuse including opioids and tobacco, abstinent for 5 years, diverticulitis, patient had MRI of the lumbar and thoracic spine on 2025, with evidence of extensive metastatic disease throughout the lumbar spine with chronic compression fracture deformities at the L2 , L4 , L3 vertebral bodies.  Tumor in the S1 vertebral body extends into the anterior epidural space, resulting in moderate central canal stenosis.  Who presented to [facility] on 2025 with generalized body ache.  Patient reported pain is similar to metastatic pain however more intense.  He had declining, patient gabapentin/Lyrica due to side effects.  Follows outpatient with [facility], reports his regimen is p.o. Dilaudid 4 mg every 2 hours as needed, OxyContin 30 mg ER daily, Robaxin-750 milligrams 4 times a day.  Chest x-ray was negative for acute findings, CTA pulmonary was negative for PE.  Significant laboratory findings show WBC 10.    ASSESSMENT/PLAN:     Pertinent Hospital Diagnoses     Intractable pain  Prostate cancer with metastasis to bones  Known to Dr. Matson  On Dena       Palliative Care Encounter / Counseling Regarding Goals of Care  Please see detailed goals of care discussion as below  At this time, Joseph Bond, Does have capacity  has been made to ensure accuracy; however, inadvertent computerized transcription errors may be present.

## 2025-05-30 NOTE — CARE COORDINATION
05/30/25 Transition of care: patient is observation due to intractable pain. Patient is a metastatic cancer. He is taking q2hr dilaudid. He is sleeping currently. He follows at home with [facility]. He continues to reside with his sister who is the HCPOA. He has stopped chemo. Last dose was in March 2025. His plan is to return home at discharge. He is unsure of whether or not he wants to continue treatments. Will follow. Electronically signed by Kamilla Perez RN CM on 5/30/2025 at 1:11 PM      Case Management Assessment  Initial Evaluation    Date/Time of Evaluation: 5/30/2025 1:12 PM  Assessment Completed by: Kamilla Perez RN    If patient is discharged prior to next notation, then this note serves as note for discharge by case management.    Patient Name: Joseph Bond                   YOB: 1980  Diagnosis: Intractable pain [R52]                   Date / Time: 5/29/2025  1:59 PM    Patient Admission Status: Inpatient   Readmission Risk (Low < 19, Mod (19-27), High > 27): Readmission Risk Score: 31.4    Current PCP: Ty Shaikh MD  PCP verified by ABHISHEK? Yes    Chart Reviewed: Yes      History Provided by: Medical Record  Patient Orientation: Alert and Oriented    Patient Cognition: Alert    Hospitalization in the last 30 days (Readmission):  No    If yes, Readmission Assessment in  Navigator will be completed.    Advance Directives:      Code Status: Full Code   Patient's Primary Decision Maker is: Patient Declined (Legal Next of Kin Remains as Decision Maker)    Primary Decision Maker: Margarita Bond - Brother/Sister - [phone]    Secondary Decision Maker: Jeri Bond - Brother/Sister - [phone]    Supplemental (Other) Decision Maker: Ryan Bond - Brother/Sister - [phone]    Discharge Planning:    Patient lives with:   Type of Home:    Primary Care Giver: Self  Patient Support Systems include: Family Members   Current Financial resources:

## 2025-05-31 LAB
ANION GAP SERPL CALCULATED.3IONS-SCNC: 12 MMOL/L (ref 7–16)
BUN SERPL-MCNC: 14 MG/DL (ref 6–20)
CALCIUM SERPL-MCNC: 9 MG/DL (ref 8.6–10)
CHLORIDE SERPL-SCNC: 102 MMOL/L (ref 98–107)
CO2 SERPL-SCNC: 26 MMOL/L (ref 22–29)
CREAT SERPL-MCNC: 0.6 MG/DL (ref 0.7–1.2)
GFR, ESTIMATED: >90 ML/MIN/1.73M2
GLUCOSE SERPL-MCNC: 94 MG/DL (ref 74–99)
MAGNESIUM SERPL-MCNC: 1.9 MG/DL (ref 1.6–2.6)
POTASSIUM SERPL-SCNC: 3.5 MMOL/L (ref 3.5–5.1)
SODIUM SERPL-SCNC: 140 MMOL/L (ref 136–145)

## 2025-05-31 PROCEDURE — 80048 BASIC METABOLIC PNL TOTAL CA: CPT

## 2025-05-31 PROCEDURE — 1200000000 HC SEMI PRIVATE

## 2025-05-31 PROCEDURE — 2500000003 HC RX 250 WO HCPCS

## 2025-05-31 PROCEDURE — 6360000002 HC RX W HCPCS

## 2025-05-31 PROCEDURE — 99232 SBSQ HOSP IP/OBS MODERATE 35: CPT | Performed by: FAMILY MEDICINE

## 2025-05-31 PROCEDURE — 83735 ASSAY OF MAGNESIUM: CPT

## 2025-05-31 PROCEDURE — 6370000000 HC RX 637 (ALT 250 FOR IP)

## 2025-05-31 PROCEDURE — 85025 COMPLETE CBC W/AUTO DIFF WBC: CPT

## 2025-05-31 RX ORDER — HYDROMORPHONE HYDROCHLORIDE 1 MG/ML
1 INJECTION, SOLUTION INTRAMUSCULAR; INTRAVENOUS; SUBCUTANEOUS EVERY 4 HOURS PRN
Status: DISCONTINUED | OUTPATIENT
Start: 2025-05-31 | End: 2025-06-03

## 2025-05-31 RX ORDER — SENNA AND DOCUSATE SODIUM 50; 8.6 MG/1; MG/1
2 TABLET, FILM COATED ORAL 2 TIMES DAILY
Status: DISCONTINUED | OUTPATIENT
Start: 2025-05-31 | End: 2025-06-05 | Stop reason: HOSPADM

## 2025-05-31 RX ADMIN — HYDROMORPHONE HYDROCHLORIDE 4 MG: 4 TABLET ORAL at 18:23

## 2025-05-31 RX ADMIN — SODIUM CHLORIDE, PRESERVATIVE FREE 10 ML: 5 INJECTION INTRAVENOUS at 20:42

## 2025-05-31 RX ADMIN — HYDROMORPHONE HYDROCHLORIDE 1 MG: 1 INJECTION, SOLUTION INTRAMUSCULAR; INTRAVENOUS; SUBCUTANEOUS at 03:25

## 2025-05-31 RX ADMIN — HYDROMORPHONE HYDROCHLORIDE 1 MG: 1 INJECTION, SOLUTION INTRAMUSCULAR; INTRAVENOUS; SUBCUTANEOUS at 08:11

## 2025-05-31 RX ADMIN — HYDROMORPHONE HYDROCHLORIDE 4 MG: 4 TABLET ORAL at 20:40

## 2025-05-31 RX ADMIN — HYDROMORPHONE HYDROCHLORIDE 4 MG: 4 TABLET ORAL at 15:40

## 2025-05-31 RX ADMIN — HYDROMORPHONE HYDROCHLORIDE 1 MG: 1 INJECTION, SOLUTION INTRAMUSCULAR; INTRAVENOUS; SUBCUTANEOUS at 12:28

## 2025-05-31 RX ADMIN — SODIUM CHLORIDE, PRESERVATIVE FREE 40 MG: 5 INJECTION INTRAVENOUS at 09:30

## 2025-05-31 RX ADMIN — METHOCARBAMOL 750 MG: 750 TABLET ORAL at 16:57

## 2025-05-31 RX ADMIN — METHOCARBAMOL 750 MG: 750 TABLET ORAL at 09:29

## 2025-05-31 RX ADMIN — HYDROMORPHONE HYDROCHLORIDE 4 MG: 4 TABLET ORAL at 02:17

## 2025-05-31 RX ADMIN — HYDROMORPHONE HYDROCHLORIDE 1 MG: 1 INJECTION, SOLUTION INTRAMUSCULAR; INTRAVENOUS; SUBCUTANEOUS at 16:58

## 2025-05-31 RX ADMIN — OXYCODONE HYDROCHLORIDE 30 MG: 20 TABLET, FILM COATED, EXTENDED RELEASE ORAL at 09:24

## 2025-05-31 RX ADMIN — ACETAMINOPHEN 650 MG: 325 TABLET ORAL at 02:20

## 2025-05-31 RX ADMIN — HYDROMORPHONE HYDROCHLORIDE 4 MG: 4 TABLET ORAL at 13:38

## 2025-05-31 RX ADMIN — ACETAMINOPHEN 650 MG: 325 TABLET ORAL at 11:33

## 2025-05-31 RX ADMIN — HYDROMORPHONE HYDROCHLORIDE 4 MG: 4 TABLET ORAL at 06:36

## 2025-05-31 RX ADMIN — HYDROMORPHONE HYDROCHLORIDE 4 MG: 4 TABLET ORAL at 00:13

## 2025-05-31 RX ADMIN — HYDROMORPHONE HYDROCHLORIDE 4 MG: 4 TABLET ORAL at 04:31

## 2025-05-31 RX ADMIN — SENNOSIDES AND DOCUSATE SODIUM 2 TABLET: 50; 8.6 TABLET ORAL at 23:38

## 2025-05-31 RX ADMIN — METHOCARBAMOL 750 MG: 750 TABLET ORAL at 20:42

## 2025-05-31 RX ADMIN — METHYLPREDNISOLONE SODIUM SUCCINATE 40 MG: 40 INJECTION, POWDER, LYOPHILIZED, FOR SOLUTION INTRAMUSCULAR; INTRAVENOUS at 09:30

## 2025-05-31 RX ADMIN — METHOCARBAMOL 750 MG: 750 TABLET ORAL at 13:13

## 2025-05-31 RX ADMIN — POLYETHYLENE GLYCOL 3350 17 G: 17 POWDER, FOR SOLUTION ORAL at 09:29

## 2025-05-31 RX ADMIN — OXYCODONE HYDROCHLORIDE 30 MG: 20 TABLET, FILM COATED, EXTENDED RELEASE ORAL at 21:41

## 2025-05-31 RX ADMIN — SODIUM CHLORIDE, PRESERVATIVE FREE 10 ML: 5 INJECTION INTRAVENOUS at 09:44

## 2025-05-31 RX ADMIN — HYDROMORPHONE HYDROCHLORIDE 1 MG: 1 INJECTION, SOLUTION INTRAMUSCULAR; INTRAVENOUS; SUBCUTANEOUS at 22:56

## 2025-05-31 RX ADMIN — HYDROMORPHONE HYDROCHLORIDE 4 MG: 4 TABLET ORAL at 11:28

## 2025-05-31 RX ADMIN — ACETAMINOPHEN 650 MG: 325 TABLET ORAL at 18:22

## 2025-05-31 RX ADMIN — SENNOSIDES AND DOCUSATE SODIUM 2 TABLET: 50; 8.6 TABLET ORAL at 09:29

## 2025-05-31 ASSESSMENT — PAIN DESCRIPTION - DESCRIPTORS
DESCRIPTORS: ACHING;DISCOMFORT;THROBBING
DESCRIPTORS: ACHING;DISCOMFORT;SORE
DESCRIPTORS: ACHING;THROBBING
DESCRIPTORS: ACHING;DISCOMFORT;THROBBING
DESCRIPTORS: ACHING;DISCOMFORT;SORE
DESCRIPTORS: ACHING;DISCOMFORT;DULL;THROBBING
DESCRIPTORS: ACHING;DISCOMFORT;THROBBING;POUNDING
DESCRIPTORS: ACHING;THROBBING;DISCOMFORT
DESCRIPTORS: THROBBING;ACHING;DISCOMFORT
DESCRIPTORS: ACHING;DISCOMFORT;SORE
DESCRIPTORS: ACHING;THROBBING
DESCRIPTORS: ACHING;DISCOMFORT;SORE
DESCRIPTORS: ACHING;DISCOMFORT;SORE
DESCRIPTORS: ACHING;DISCOMFORT;THROBBING
DESCRIPTORS: ACHING;THROBBING

## 2025-05-31 ASSESSMENT — PAIN DESCRIPTION - ONSET
ONSET: ON-GOING

## 2025-05-31 ASSESSMENT — PAIN DESCRIPTION - ORIENTATION
ORIENTATION: POSTERIOR;UPPER;LOWER;MID
ORIENTATION: LEFT
ORIENTATION: RIGHT;LEFT
ORIENTATION: RIGHT;LEFT
ORIENTATION: RIGHT;LEFT;LOWER;UPPER;POSTERIOR
ORIENTATION: LEFT
ORIENTATION: RIGHT;LEFT;UPPER;LOWER
ORIENTATION: RIGHT;LEFT
ORIENTATION: RIGHT;LEFT;LOWER;POSTERIOR
ORIENTATION: RIGHT;LEFT;LOWER;MID;POSTERIOR
ORIENTATION: RIGHT;LEFT;LOWER;MID;POSTERIOR

## 2025-05-31 ASSESSMENT — PAIN SCALES - GENERAL
PAINLEVEL_OUTOF10: 7
PAINLEVEL_OUTOF10: 7
PAINLEVEL_OUTOF10: 9
PAINLEVEL_OUTOF10: 10
PAINLEVEL_OUTOF10: 8
PAINLEVEL_OUTOF10: 7
PAINLEVEL_OUTOF10: 6
PAINLEVEL_OUTOF10: 6
PAINLEVEL_OUTOF10: 8
PAINLEVEL_OUTOF10: 10
PAINLEVEL_OUTOF10: 6
PAINLEVEL_OUTOF10: 10
PAINLEVEL_OUTOF10: 7
PAINLEVEL_OUTOF10: 10
PAINLEVEL_OUTOF10: 8
PAINLEVEL_OUTOF10: 8
PAINLEVEL_OUTOF10: 9
PAINLEVEL_OUTOF10: 8
PAINLEVEL_OUTOF10: 7
PAINLEVEL_OUTOF10: 4
PAINLEVEL_OUTOF10: 10
PAINLEVEL_OUTOF10: 8
PAINLEVEL_OUTOF10: 7
PAINLEVEL_OUTOF10: 10
PAINLEVEL_OUTOF10: 10
PAINLEVEL_OUTOF10: 8
PAINLEVEL_OUTOF10: 6

## 2025-05-31 ASSESSMENT — PAIN DESCRIPTION - LOCATION
LOCATION: BACK;RIB CAGE
LOCATION: OTHER (COMMENT)
LOCATION: RIB CAGE
LOCATION: OTHER (COMMENT)
LOCATION: RIB CAGE
LOCATION: RIB CAGE
LOCATION: PELVIS;BACK
LOCATION: OTHER (COMMENT)
LOCATION: RIB CAGE

## 2025-05-31 ASSESSMENT — PAIN DESCRIPTION - PAIN TYPE
TYPE: CHRONIC PAIN
TYPE: CHRONIC PAIN;INTRACTABLE PAIN
TYPE: CHRONIC PAIN
TYPE: CHRONIC PAIN;INTRACTABLE PAIN
TYPE: CHRONIC PAIN
TYPE: CHRONIC PAIN;INTRACTABLE PAIN
TYPE: CHRONIC PAIN;INTRACTABLE PAIN
TYPE: CHRONIC PAIN
TYPE: CHRONIC PAIN;INTRACTABLE PAIN
TYPE: CHRONIC PAIN
TYPE: CHRONIC PAIN;INTRACTABLE PAIN

## 2025-05-31 ASSESSMENT — PAIN DESCRIPTION - FREQUENCY
FREQUENCY: CONTINUOUS

## 2025-05-31 ASSESSMENT — PAIN - FUNCTIONAL ASSESSMENT
PAIN_FUNCTIONAL_ASSESSMENT: PREVENTS OR INTERFERES SOME ACTIVE ACTIVITIES AND ADLS
PAIN_FUNCTIONAL_ASSESSMENT: PREVENTS OR INTERFERES WITH MANY ACTIVE NOT PASSIVE ACTIVITIES
PAIN_FUNCTIONAL_ASSESSMENT: PREVENTS OR INTERFERES SOME ACTIVE ACTIVITIES AND ADLS
PAIN_FUNCTIONAL_ASSESSMENT: PREVENTS OR INTERFERES SOME ACTIVE ACTIVITIES AND ADLS
PAIN_FUNCTIONAL_ASSESSMENT: PREVENTS OR INTERFERES WITH MANY ACTIVE NOT PASSIVE ACTIVITIES
PAIN_FUNCTIONAL_ASSESSMENT: PREVENTS OR INTERFERES SOME ACTIVE ACTIVITIES AND ADLS

## 2025-05-31 NOTE — PROGRESS NOTES
[facility]  Family Medicine Attending    S: 44 y.o. male with metastatic prostate CA presented with uncontrolled pain, similar to previous pain from mets, more intense.  Feeling better with medications since admission, which were increased from home regimen.     Today says pain is a little worse.   Last BM 3 days ago  PTA 30 oxycodone ER, 4 mg dilaudid q2, daily prednisone  Inpt added iv dilaudid q4 prn, and has been getting nearly around the clock.       /60   Pulse 81   Temp 98 °F (36.7 °C) (Temporal)   Resp 16   Ht 1.702 m (5' 7\")   Wt 79.4 kg (175 lb)   SpO2 99%   BMI 27.41 kg/m²    Gen NAD, awake & alert, appears chronically ill, mild pallor  CV RRR  Resp CTAB, decreased      Impressions:   Principal Problem:    Intractable pain  Resolved Problems:    * No resolved hospital problems. *      Plan:     Uncontrolled pain due to metastatic prostate CA.  On darolutamide. Declined radiation.   Palliative recommendations appreciated.  Recommend wean iv dilaudid, increase muscle relaxer to 1.5 g qid, possible lidocaine patch. Will avoid nsaids for now given anemia and h/o transfusion.   With better control of symptoms, can plan to continue management outpatient.      Follow up with specialists as advised.     Anemia, likely hypoproliferative - trend H/H  Constipation - increase senna-s to bid, ensure miralax daily. If no BM by tomorrow, MOM     Attending Physician Statement  I have reviewed the chart, including any radiology or labs, and seen the patient with the resident(s).  I personally reviewed and performed key elements of the history and exam.  I agree with the assessment, plan and orders as documented by the resident.  Please refer to the resident note for additional information.      Electronically signed by Konstantin Zhang MD on 5/31/2025 at 6:51 AM

## 2025-05-31 NOTE — PLAN OF CARE
Problem: Discharge Planning  Goal: Discharge to home or other facility with appropriate resources  5/30/2025 2323 by Alicia Vega RN  Outcome: Progressing     Problem: Safety - Adult  Goal: Free from fall injury  5/30/2025 2323 by Alicia Vega RN  Outcome: Progressing     Problem: Musculoskeletal - Adult  Goal: Return mobility to safest level of function  5/30/2025 2323 by Alicia Vega RN  Outcome: Progressing     Problem: Musculoskeletal - Adult  Goal: Maintain proper alignment of affected body part  Outcome: Progressing     Problem: Musculoskeletal - Adult  Goal: Return ADL status to a safe level of function  Outcome: Progressing     Problem: Pain  Goal: Verbalizes/displays adequate comfort level or baseline comfort level  5/30/2025 2323 by Alicia Vega RN  Outcome: Progressing

## 2025-05-31 NOTE — PROGRESS NOTES
[facility] - Family Medicine Inpatient   Resident Progress Note    S:  Hospital day: 1   Brief Synopsis: Joseph Bond is a 44 y.o. male with a PMH of prostate ca w mets on Nubeqa s/p palliaitve radiation and chemotherapy who presents with acute pain/generalized that is more intense that his pain from metastasis. Also has neuropathic pain from chemotherapy, declines gabapentin/pregabalin because of fear of losing memory. MRI thoracic and lumbar done 5/2025 w mets that seems to be stable compared to last CT spine. Dr. Matson did place rad onc referral placed but pt does not want to do more radiation. Work-up was remarkable for elevated ALP, AST and mild leukocytosis. CTA did not show signs of PE. Palliative care was consulted for pain management.     Overnight/interim:   Patient states that his pain is improved from when he was admitted  States that the IV pain meds are primarily what is helping- aware he cannot go home with IV pain medication, states that he is not willing to go to facility where he can receive IV pain medication  No bowel movement since admission  Remainder of ROS negative       Cont meds:    sodium chloride       Scheduled meds:    sodium chloride flush  5-40 mL IntraVENous 2 times per day    enoxaparin  40 mg SubCUTAneous Daily    methocarbamol  750 mg Oral 4x Daily    oxyCODONE  30 mg Oral BID    pantoprazole (PROTONIX) 40 mg in sodium chloride (PF) 0.9 % 10 mL injection  40 mg IntraVENous Daily    methylPREDNISolone  40 mg IntraVENous Daily    Darolutamide  600 mg Oral BID    polyethylene glycol  17 g Oral Daily    sennosides-docusate sodium  2 tablet Oral Daily     PRN meds: sodium chloride flush, sodium chloride, ondansetron **OR** ondansetron, acetaminophen **OR** acetaminophen, HYDROmorphone, HYDROmorphone     I reviewed the patient's past medical and surgical history, Medications and Allergies.    O:  /60   Pulse 81   Temp 98 °F (36.7 °C) (Temporal)   Resp 16   Ht 1.702 m (5' 7\")

## 2025-05-31 NOTE — PROGRESS NOTES
I pulled the Oxy 10mg ERT, never had the prompt to pull the 20mg tablet, took it to the room to scan thinking it was a 30mg tablet and realized I still needed the 20mg.  I went back in the omni to pull the twenty, SKIPPED the 10mg prompt and grabbed the 20mg and administered with the 10mg that I pulled earlier.  Heike, Student RN was with me when I administered both.      Called Pharmacy to clarify if everything looked right on their end, spoke with Laurie, Pharmacist, who said it did look like I pulled 2-10mgs at two different times, even though I SKIPPED it the second time to get the 20mg out.  Kenia said to do a cycle count and resolve the discreptency, which I did with ANDREY MURPHY.  We did the cycle count, I let ANDREY MURPHY open the drawer and count the meds himself.  There were 5, not 4, as there were supposed to be.

## 2025-06-01 LAB
ANION GAP SERPL CALCULATED.3IONS-SCNC: 11 MMOL/L (ref 7–16)
BASOPHILS # BLD: 0 K/UL (ref 0–0.2)
BASOPHILS # BLD: 0.02 K/UL (ref 0–0.2)
BASOPHILS NFR BLD: 0 % (ref 0–2)
BASOPHILS NFR BLD: 0 % (ref 0–2)
BUN SERPL-MCNC: 15 MG/DL (ref 6–20)
CALCIUM SERPL-MCNC: 9.3 MG/DL (ref 8.6–10)
CHLORIDE SERPL-SCNC: 102 MMOL/L (ref 98–107)
CO2 SERPL-SCNC: 27 MMOL/L (ref 22–29)
CREAT SERPL-MCNC: 0.5 MG/DL (ref 0.7–1.2)
EOSINOPHIL # BLD: 0 K/UL (ref 0.05–0.5)
EOSINOPHIL # BLD: 0.09 K/UL (ref 0.05–0.5)
EOSINOPHILS RELATIVE PERCENT: 0 % (ref 0–6)
EOSINOPHILS RELATIVE PERCENT: 1 % (ref 0–6)
ERYTHROCYTE [DISTWIDTH] IN BLOOD BY AUTOMATED COUNT: 17.7 % (ref 11.5–15)
ERYTHROCYTE [DISTWIDTH] IN BLOOD BY AUTOMATED COUNT: 17.9 % (ref 11.5–15)
FERRITIN SERPL-MCNC: 734 NG/ML
GFR, ESTIMATED: >90 ML/MIN/1.73M2
GLUCOSE SERPL-MCNC: 96 MG/DL (ref 74–99)
HCT VFR BLD AUTO: 20.1 % (ref 37–54)
HCT VFR BLD AUTO: 26.1 % (ref 37–54)
HCT VFR BLD AUTO: 30.4 % (ref 37–54)
HGB BLD-MCNC: 6.4 G/DL (ref 12.5–16.5)
HGB BLD-MCNC: 8.2 G/DL (ref 12.5–16.5)
HGB BLD-MCNC: 9.6 G/DL (ref 12.5–16.5)
IMM GRANULOCYTES # BLD AUTO: 0.04 K/UL (ref 0–0.58)
IMM GRANULOCYTES NFR BLD: 1 % (ref 0–5)
IRON SATN MFR SERPL: 13 % (ref 20–55)
IRON SERPL-MCNC: 24 UG/DL (ref 61–157)
LYMPHOCYTES NFR BLD: 0.06 K/UL (ref 1.5–4)
LYMPHOCYTES NFR BLD: 0.74 K/UL (ref 1.5–4)
LYMPHOCYTES RELATIVE PERCENT: 1 % (ref 20–42)
LYMPHOCYTES RELATIVE PERCENT: 10 % (ref 20–42)
MCH RBC QN AUTO: 27.6 PG (ref 26–35)
MCH RBC QN AUTO: 27.9 PG (ref 26–35)
MCHC RBC AUTO-ENTMCNC: 31.4 G/DL (ref 32–34.5)
MCHC RBC AUTO-ENTMCNC: 31.8 G/DL (ref 32–34.5)
MCV RBC AUTO: 86.6 FL (ref 80–99.9)
MCV RBC AUTO: 88.8 FL (ref 80–99.9)
METAMYELOCYTES ABSOLUTE COUNT: 0.06 K/UL (ref 0–0.12)
METAMYELOCYTES: 1 % (ref 0–1)
MONOCYTES NFR BLD: 0.11 K/UL (ref 0.1–0.95)
MONOCYTES NFR BLD: 0.68 K/UL (ref 0.1–0.95)
MONOCYTES NFR BLD: 10 % (ref 2–12)
MONOCYTES NFR BLD: 2 % (ref 2–12)
NEUTROPHILS NFR BLD: 78 % (ref 43–80)
NEUTROPHILS NFR BLD: 96 % (ref 43–80)
NEUTS SEG NFR BLD: 5.6 K/UL (ref 1.8–7.3)
NEUTS SEG NFR BLD: 5.97 K/UL (ref 1.8–7.3)
PLATELET # BLD AUTO: 198 K/UL (ref 130–450)
PLATELET, FLUORESCENCE: 283 K/UL (ref 130–450)
PMV BLD AUTO: 9 FL (ref 7–12)
PMV BLD AUTO: 9.4 FL (ref 7–12)
POTASSIUM SERPL-SCNC: 3.7 MMOL/L (ref 3.5–5.1)
RBC # BLD AUTO: 2.32 M/UL (ref 3.8–5.8)
RBC # BLD AUTO: 2.94 M/UL (ref 3.8–5.8)
RBC # BLD: ABNORMAL 10*6/UL
SODIUM SERPL-SCNC: 141 MMOL/L (ref 136–145)
TIBC SERPL-MCNC: 178 UG/DL (ref 250–450)
WBC OTHER # BLD: 6.2 K/UL (ref 4.5–11.5)
WBC OTHER # BLD: 7.2 K/UL (ref 4.5–11.5)

## 2025-06-01 PROCEDURE — 6360000002 HC RX W HCPCS

## 2025-06-01 PROCEDURE — 2580000003 HC RX 258

## 2025-06-01 PROCEDURE — 99232 SBSQ HOSP IP/OBS MODERATE 35: CPT | Performed by: PHYSICIAN ASSISTANT

## 2025-06-01 PROCEDURE — 86901 BLOOD TYPING SEROLOGIC RH(D): CPT

## 2025-06-01 PROCEDURE — 2500000003 HC RX 250 WO HCPCS

## 2025-06-01 PROCEDURE — 30233N1 TRANSFUSION OF NONAUTOLOGOUS RED BLOOD CELLS INTO PERIPHERAL VEIN, PERCUTANEOUS APPROACH: ICD-10-PCS | Performed by: FAMILY MEDICINE

## 2025-06-01 PROCEDURE — 1200000000 HC SEMI PRIVATE

## 2025-06-01 PROCEDURE — 6370000000 HC RX 637 (ALT 250 FOR IP): Performed by: PHYSICIAN ASSISTANT

## 2025-06-01 PROCEDURE — P9016 RBC LEUKOCYTES REDUCED: HCPCS

## 2025-06-01 PROCEDURE — 99232 SBSQ HOSP IP/OBS MODERATE 35: CPT | Performed by: FAMILY MEDICINE

## 2025-06-01 PROCEDURE — 85014 HEMATOCRIT: CPT

## 2025-06-01 PROCEDURE — 36430 TRANSFUSION BLD/BLD COMPNT: CPT

## 2025-06-01 PROCEDURE — 85018 HEMOGLOBIN: CPT

## 2025-06-01 PROCEDURE — 83550 IRON BINDING TEST: CPT

## 2025-06-01 PROCEDURE — 85025 COMPLETE CBC W/AUTO DIFF WBC: CPT

## 2025-06-01 PROCEDURE — 6370000000 HC RX 637 (ALT 250 FOR IP)

## 2025-06-01 PROCEDURE — 80048 BASIC METABOLIC PNL TOTAL CA: CPT

## 2025-06-01 PROCEDURE — 86923 COMPATIBILITY TEST ELECTRIC: CPT

## 2025-06-01 PROCEDURE — 83540 ASSAY OF IRON: CPT

## 2025-06-01 PROCEDURE — 82728 ASSAY OF FERRITIN: CPT

## 2025-06-01 PROCEDURE — 86900 BLOOD TYPING SEROLOGIC ABO: CPT

## 2025-06-01 PROCEDURE — 86850 RBC ANTIBODY SCREEN: CPT

## 2025-06-01 RX ORDER — SODIUM CHLORIDE 9 MG/ML
INJECTION, SOLUTION INTRAVENOUS PRN
Status: DISCONTINUED | OUTPATIENT
Start: 2025-06-01 | End: 2025-06-05 | Stop reason: HOSPADM

## 2025-06-01 RX ADMIN — HYDROMORPHONE HYDROCHLORIDE 4 MG: 4 TABLET ORAL at 00:04

## 2025-06-01 RX ADMIN — ACETAMINOPHEN 650 MG: 325 TABLET ORAL at 12:51

## 2025-06-01 RX ADMIN — SODIUM CHLORIDE, PRESERVATIVE FREE 10 ML: 5 INJECTION INTRAVENOUS at 09:32

## 2025-06-01 RX ADMIN — SODIUM CHLORIDE, PRESERVATIVE FREE 40 MG: 5 INJECTION INTRAVENOUS at 09:33

## 2025-06-01 RX ADMIN — METHOCARBAMOL 750 MG: 750 TABLET ORAL at 17:03

## 2025-06-01 RX ADMIN — SENNOSIDES AND DOCUSATE SODIUM 2 TABLET: 50; 8.6 TABLET ORAL at 09:32

## 2025-06-01 RX ADMIN — SENNOSIDES AND DOCUSATE SODIUM 2 TABLET: 50; 8.6 TABLET ORAL at 19:52

## 2025-06-01 RX ADMIN — HYDROMORPHONE HYDROCHLORIDE 1 MG: 1 INJECTION, SOLUTION INTRAMUSCULAR; INTRAVENOUS; SUBCUTANEOUS at 03:03

## 2025-06-01 RX ADMIN — HYDROMORPHONE HYDROCHLORIDE 4 MG: 4 TABLET ORAL at 02:00

## 2025-06-01 RX ADMIN — HYDROMORPHONE HYDROCHLORIDE 1 MG: 1 INJECTION, SOLUTION INTRAMUSCULAR; INTRAVENOUS; SUBCUTANEOUS at 07:03

## 2025-06-01 RX ADMIN — POLYETHYLENE GLYCOL 3350 17 G: 17 POWDER, FOR SOLUTION ORAL at 09:31

## 2025-06-01 RX ADMIN — HYDROMORPHONE HYDROCHLORIDE 4 MG: 4 TABLET ORAL at 08:24

## 2025-06-01 RX ADMIN — ACETAMINOPHEN 650 MG: 325 TABLET ORAL at 00:09

## 2025-06-01 RX ADMIN — HYDROMORPHONE HYDROCHLORIDE 4 MG: 4 TABLET ORAL at 18:44

## 2025-06-01 RX ADMIN — ACETAMINOPHEN 650 MG: 325 TABLET ORAL at 05:58

## 2025-06-01 RX ADMIN — HYDROMORPHONE HYDROCHLORIDE 1 MG: 1 INJECTION, SOLUTION INTRAMUSCULAR; INTRAVENOUS; SUBCUTANEOUS at 15:36

## 2025-06-01 RX ADMIN — METHOCARBAMOL 750 MG: 750 TABLET ORAL at 12:51

## 2025-06-01 RX ADMIN — METHYLPREDNISOLONE SODIUM SUCCINATE 40 MG: 40 INJECTION, POWDER, LYOPHILIZED, FOR SOLUTION INTRAMUSCULAR; INTRAVENOUS at 09:33

## 2025-06-01 RX ADMIN — OXYCODONE HYDROCHLORIDE 30 MG: 20 TABLET, FILM COATED, EXTENDED RELEASE ORAL at 09:31

## 2025-06-01 RX ADMIN — METHOCARBAMOL 750 MG: 750 TABLET ORAL at 09:32

## 2025-06-01 RX ADMIN — HYDROMORPHONE HYDROCHLORIDE 4 MG: 4 TABLET ORAL at 04:03

## 2025-06-01 RX ADMIN — HYDROMORPHONE HYDROCHLORIDE 1 MG: 1 INJECTION, SOLUTION INTRAMUSCULAR; INTRAVENOUS; SUBCUTANEOUS at 19:48

## 2025-06-01 RX ADMIN — HYDROMORPHONE HYDROCHLORIDE 4 MG: 4 TABLET ORAL at 12:34

## 2025-06-01 RX ADMIN — HYDROMORPHONE HYDROCHLORIDE 4 MG: 4 TABLET ORAL at 05:59

## 2025-06-01 RX ADMIN — ACETAMINOPHEN 650 MG: 325 TABLET ORAL at 19:52

## 2025-06-01 RX ADMIN — HYDROMORPHONE HYDROCHLORIDE 1 MG: 1 INJECTION, SOLUTION INTRAMUSCULAR; INTRAVENOUS; SUBCUTANEOUS at 11:01

## 2025-06-01 RX ADMIN — METHOCARBAMOL 750 MG: 750 TABLET ORAL at 22:55

## 2025-06-01 RX ADMIN — SODIUM CHLORIDE, PRESERVATIVE FREE 10 ML: 5 INJECTION INTRAVENOUS at 19:52

## 2025-06-01 RX ADMIN — HYDROMORPHONE HYDROCHLORIDE 4 MG: 4 TABLET ORAL at 20:48

## 2025-06-01 RX ADMIN — OXYCODONE HYDROCHLORIDE 30 MG: 20 TABLET, FILM COATED, EXTENDED RELEASE ORAL at 17:04

## 2025-06-01 RX ADMIN — HYDROMORPHONE HYDROCHLORIDE 4 MG: 4 TABLET ORAL at 22:55

## 2025-06-01 ASSESSMENT — PAIN SCALES - GENERAL
PAINLEVEL_OUTOF10: 8
PAINLEVEL_OUTOF10: 9
PAINLEVEL_OUTOF10: 6
PAINLEVEL_OUTOF10: 8
PAINLEVEL_OUTOF10: 7
PAINLEVEL_OUTOF10: 6
PAINLEVEL_OUTOF10: 9
PAINLEVEL_OUTOF10: 10
PAINLEVEL_OUTOF10: 8
PAINLEVEL_OUTOF10: 8
PAINLEVEL_OUTOF10: 7
PAINLEVEL_OUTOF10: 6
PAINLEVEL_OUTOF10: 8
PAINLEVEL_OUTOF10: 6
PAINLEVEL_OUTOF10: 8
PAINLEVEL_OUTOF10: 8
PAINLEVEL_OUTOF10: 7
PAINLEVEL_OUTOF10: 4
PAINLEVEL_OUTOF10: 6
PAINLEVEL_OUTOF10: 4
PAINLEVEL_OUTOF10: 8
PAINLEVEL_OUTOF10: 6
PAINLEVEL_OUTOF10: 8
PAINLEVEL_OUTOF10: 9
PAINLEVEL_OUTOF10: 6
PAINLEVEL_OUTOF10: 8
PAINLEVEL_OUTOF10: 7
PAINLEVEL_OUTOF10: 8
PAINLEVEL_OUTOF10: 6
PAINLEVEL_OUTOF10: 8
PAINLEVEL_OUTOF10: 7

## 2025-06-01 ASSESSMENT — PAIN DESCRIPTION - DESCRIPTORS
DESCRIPTORS: ACHING;THROBBING
DESCRIPTORS: ACHING;THROBBING
DESCRIPTORS: THROBBING;ACHING
DESCRIPTORS: ACHING;DISCOMFORT;THROBBING;SORE
DESCRIPTORS: ACHING;THROBBING
DESCRIPTORS: ACHING;DISCOMFORT;THROBBING
DESCRIPTORS: ACHING;THROBBING
DESCRIPTORS: ACHING;DISCOMFORT;THROBBING;DULL
DESCRIPTORS: ACHING;THROBBING
DESCRIPTORS: ACHING;DISCOMFORT;THROBBING
DESCRIPTORS: ACHING;THROBBING
DESCRIPTORS: ACHING;DISCOMFORT;THROBBING

## 2025-06-01 ASSESSMENT — PAIN DESCRIPTION - LOCATION
LOCATION: OTHER (COMMENT)
LOCATION: BACK;LEG;PELVIS;RIB CAGE
LOCATION: OTHER (COMMENT)

## 2025-06-01 ASSESSMENT — PAIN DESCRIPTION - ORIENTATION
ORIENTATION: RIGHT;LEFT;LOWER;POSTERIOR
ORIENTATION: RIGHT;LEFT;POSTERIOR;MID;UPPER
ORIENTATION: RIGHT;LEFT;POSTERIOR;UPPER;LOWER
ORIENTATION: RIGHT;LEFT;LOWER;UPPER;POSTERIOR
ORIENTATION: RIGHT;LEFT;LOWER;POSTERIOR

## 2025-06-01 ASSESSMENT — PAIN - FUNCTIONAL ASSESSMENT
PAIN_FUNCTIONAL_ASSESSMENT: PREVENTS OR INTERFERES SOME ACTIVE ACTIVITIES AND ADLS

## 2025-06-01 ASSESSMENT — PAIN DESCRIPTION - ONSET
ONSET: ON-GOING

## 2025-06-01 ASSESSMENT — PAIN DESCRIPTION - FREQUENCY
FREQUENCY: CONTINUOUS

## 2025-06-01 ASSESSMENT — PAIN DESCRIPTION - PAIN TYPE
TYPE: CHRONIC PAIN;INTRACTABLE PAIN

## 2025-06-01 NOTE — PROGRESS NOTES
Palliative Care Department  [phone]  Palliative Care Progress Note  Provider Lara Lott PA-C      PATIENT: Joseph Bond  : 1980  MRN: [id number]  ADMISSION DATE: 2025  1:59 PM  Referring Provider:  Nasima Phelps Chi, MD    Palliative Medicine was consulted on hospital day 2 for assistance with Symptom management     HPI:     Clinical Summary:Joseph Bond is a 44 y.o. y/o male with a history of  prostate cancer with mediastinal lymphadenopathy, extensive metastasis of the spine, rib cage, pelvis, humerus and femurs ,(had refused systemic therapy, pursuing homeopathic) completed palliative radiation treatment to spine, polysubstance abuse including opioids and tobacco, abstinent for 5 years, diverticulitis, patient had MRI of the lumbar and thoracic spine on 2025, with evidence of extensive metastatic disease throughout the lumbar spine with chronic compression fracture deformities at the L2 , L4 , L3 vertebral bodies.  Tumor in the S1 vertebral body extends into the anterior epidural space, resulting in moderate central canal stenosis.  Who presented to [facility] on 2025 with generalized body ache.  Patient reported pain is similar to metastatic pain however more intense.  He had declining, patient gabapentin/Lyrica due to side effects.  Follows outpatient with [facility], reports his regimen is p.o. Dilaudid 4 mg every 2 hours as needed, OxyContin 30 mg ER daily, Robaxin-750 milligrams 4 times a day.  Chest x-ray was negative for acute findings, CTA pulmonary was negative for PE.  Significant laboratory findings show WBC 10.    ASSESSMENT/PLAN:     Pertinent Hospital Diagnoses     Intractable pain  Prostate cancer with metastasis to bones  Known to Dr. Matson  On Dena       Palliative Care Encounter / Counseling Regarding Goals of Care  Please see detailed goals of care discussion as below  At this time, Joseph Bond, Does have capacity for

## 2025-06-01 NOTE — PLAN OF CARE
Problem: Discharge Planning  Goal: Discharge to home or other facility with appropriate resources  6/1/2025 0252 by Marc Alvarez RN  Outcome: Progressing  5/31/2025 2035 by Kaelyn Alvarez RN  Outcome: Progressing     Problem: Safety - Adult  Goal: Free from fall injury  6/1/2025 0252 by Marc Alvarez RN  Outcome: Progressing  5/31/2025 2035 by Kaelyn Alvarez RN  Outcome: Progressing     Problem: Musculoskeletal - Adult  Goal: Return mobility to safest level of function  6/1/2025 0252 by Marc Alvarez RN  Outcome: Progressing  5/31/2025 2035 by Kaelyn Alvarez RN  Outcome: Progressing  Goal: Maintain proper alignment of affected body part  6/1/2025 0252 by Marc Alvarez RN  Outcome: Progressing  5/31/2025 2035 by Kaelyn Alvarez RN  Outcome: Progressing  Goal: Return ADL status to a safe level of function  6/1/2025 0252 by Marc Alvarez RN  Outcome: Progressing  5/31/2025 2035 by Kaelyn Alvarez RN  Outcome: Progressing     Problem: Musculoskeletal - Adult  Goal: Maintain proper alignment of affected body part  6/1/2025 0252 by Marc Alvarez RN  Outcome: Progressing  5/31/2025 2035 by Kaelyn Alvarez RN  Outcome: Progressing

## 2025-06-01 NOTE — PROGRESS NOTES
[facility] - Family Medicine Inpatient   Resident Progress Note    S:  Hospital day: 2   Brief Synopsis: Joseph Bond is a 44 y.o. male with a PMH of prostate ca w mets on Nubeqa s/p palliaitve radiation and chemotherapy who presents with acute pain/generalized that is more intense that his pain from metastasis. Also has neuropathic pain from chemotherapy, declines gabapentin/pregabalin because of fear of losing memory. MRI thoracic and lumbar done 5/2025 w mets that seems to be stable compared to last CT spine. Dr. Matson did place rad onc referral placed but pt does not want to do more radiation. Work-up was remarkable for elevated ALP, AST and mild leukocytosis. CTA did not show signs of PE. Palliative care was consulted for pain management.     Overnight/interim:   Pt Hb 6.4 this AM, has been steadily decreasing   Denies abnormal bruising/bleeding, denies hematochezia/melena/hematuria  States that his pain control has improved this AM   Had bowel movement yesterday       Cont meds:    sodium chloride       Scheduled meds:    sennosides-docusate sodium  2 tablet Oral BID    sodium chloride flush  5-40 mL IntraVENous 2 times per day    enoxaparin  40 mg SubCUTAneous Daily    methocarbamol  750 mg Oral 4x Daily    oxyCODONE  30 mg Oral BID    pantoprazole (PROTONIX) 40 mg in sodium chloride (PF) 0.9 % 10 mL injection  40 mg IntraVENous Daily    methylPREDNISolone  40 mg IntraVENous Daily    Darolutamide  600 mg Oral BID    polyethylene glycol  17 g Oral Daily     PRN meds: HYDROmorphone, sodium chloride flush, sodium chloride, ondansetron **OR** ondansetron, acetaminophen **OR** acetaminophen, HYDROmorphone     I reviewed the patient's past medical and surgical history, Medications and Allergies.    O:  /71   Pulse 74   Temp 98.1 °F (36.7 °C) (Oral)   Resp 16   Ht 1.702 m (5' 7\")   Wt 79.4 kg (175 lb)   SpO2 93%   BMI 27.41 kg/m²   24 hour I&O: I/O last 3 completed shifts:  In: 1314 [P.O.:1314]  Out: 1425

## 2025-06-01 NOTE — CONSENT
Informed Consent for Blood Component Transfusion Note    I have discussed with the patient the rationale for blood component transfusion; its benefits in treating or preventing fatigue, organ damage, or death; and its risk which includes mild transfusion reactions, rare risk of blood borne infection, or more serious but rare reactions. I have discussed the alternatives to transfusion, including the risk and consequences of not receiving transfusion. The patient had an opportunity to ask questions and had agreed to proceed with transfusion of blood components.    Electronically signed by Herbert Barreto MD on 6/1/25 at 7:23 AM EDT

## 2025-06-02 PROBLEM — E44.0 MODERATE PROTEIN-CALORIE MALNUTRITION: Chronic | Status: ACTIVE | Noted: 2025-06-02

## 2025-06-02 LAB
ABO/RH: NORMAL
ANION GAP SERPL CALCULATED.3IONS-SCNC: 10 MMOL/L (ref 7–16)
ANTIBODY SCREEN: NEGATIVE
ARM BAND NUMBER: NORMAL
BASOPHILS # BLD: 0.01 K/UL (ref 0–0.2)
BASOPHILS NFR BLD: 0 % (ref 0–2)
BLOOD BANK BLOOD PRODUCT EXPIRATION DATE: NORMAL
BLOOD BANK DISPENSE STATUS: NORMAL
BLOOD BANK ISBT PRODUCT BLOOD TYPE: 6200
BLOOD BANK PRODUCT CODE: NORMAL
BLOOD BANK SAMPLE EXPIRATION: NORMAL
BLOOD BANK UNIT TYPE AND RH: NORMAL
BPU ID: NORMAL
BUN SERPL-MCNC: 15 MG/DL (ref 6–20)
CALCIUM SERPL-MCNC: 8.6 MG/DL (ref 8.6–10)
CHLORIDE SERPL-SCNC: 103 MMOL/L (ref 98–107)
CO2 SERPL-SCNC: 29 MMOL/L (ref 22–29)
COMPONENT: NORMAL
CREAT SERPL-MCNC: 0.6 MG/DL (ref 0.7–1.2)
CROSSMATCH RESULT: NORMAL
EOSINOPHIL # BLD: 0.02 K/UL (ref 0.05–0.5)
EOSINOPHILS RELATIVE PERCENT: 0 % (ref 0–6)
ERYTHROCYTE [DISTWIDTH] IN BLOOD BY AUTOMATED COUNT: 17.2 % (ref 11.5–15)
GFR, ESTIMATED: >90 ML/MIN/1.73M2
GLUCOSE SERPL-MCNC: 98 MG/DL (ref 74–99)
HCT VFR BLD AUTO: 28.9 % (ref 37–54)
HGB BLD-MCNC: 9.4 G/DL (ref 12.5–16.5)
IMM GRANULOCYTES # BLD AUTO: 0.11 K/UL (ref 0–0.58)
IMM GRANULOCYTES NFR BLD: 2 % (ref 0–5)
LYMPHOCYTES NFR BLD: 0.79 K/UL (ref 1.5–4)
LYMPHOCYTES RELATIVE PERCENT: 11 % (ref 20–42)
MCH RBC QN AUTO: 27.2 PG (ref 26–35)
MCHC RBC AUTO-ENTMCNC: 32.5 G/DL (ref 32–34.5)
MCV RBC AUTO: 83.8 FL (ref 80–99.9)
MONOCYTES NFR BLD: 0.61 K/UL (ref 0.1–0.95)
MONOCYTES NFR BLD: 8 % (ref 2–12)
NEUTROPHILS NFR BLD: 79 % (ref 43–80)
NEUTS SEG NFR BLD: 5.87 K/UL (ref 1.8–7.3)
PLATELET # BLD AUTO: 257 K/UL (ref 130–450)
PMV BLD AUTO: 8.4 FL (ref 7–12)
POTASSIUM SERPL-SCNC: 3.6 MMOL/L (ref 3.5–5.1)
RBC # BLD AUTO: 3.45 M/UL (ref 3.8–5.8)
SODIUM SERPL-SCNC: 141 MMOL/L (ref 136–145)
TRANSFUSION STATUS: NORMAL
UNIT DIVISION: 0
UNIT ISSUE DATE/TIME: NORMAL
WBC OTHER # BLD: 7.4 K/UL (ref 4.5–11.5)

## 2025-06-02 PROCEDURE — 36591 DRAW BLOOD OFF VENOUS DEVICE: CPT

## 2025-06-02 PROCEDURE — 6370000000 HC RX 637 (ALT 250 FOR IP)

## 2025-06-02 PROCEDURE — 85025 COMPLETE CBC W/AUTO DIFF WBC: CPT

## 2025-06-02 PROCEDURE — 6360000002 HC RX W HCPCS

## 2025-06-02 PROCEDURE — 1200000000 HC SEMI PRIVATE

## 2025-06-02 PROCEDURE — 99232 SBSQ HOSP IP/OBS MODERATE 35: CPT | Performed by: PHYSICIAN ASSISTANT

## 2025-06-02 PROCEDURE — 2500000003 HC RX 250 WO HCPCS

## 2025-06-02 PROCEDURE — 99232 SBSQ HOSP IP/OBS MODERATE 35: CPT | Performed by: FAMILY MEDICINE

## 2025-06-02 PROCEDURE — 6370000000 HC RX 637 (ALT 250 FOR IP): Performed by: PHYSICIAN ASSISTANT

## 2025-06-02 PROCEDURE — 80048 BASIC METABOLIC PNL TOTAL CA: CPT

## 2025-06-02 RX ORDER — HYDROMORPHONE HYDROCHLORIDE 1 MG/ML
1 INJECTION, SOLUTION INTRAMUSCULAR; INTRAVENOUS; SUBCUTANEOUS ONCE
Status: COMPLETED | OUTPATIENT
Start: 2025-06-02 | End: 2025-06-02

## 2025-06-02 RX ORDER — PREDNISONE 20 MG/1
40 TABLET ORAL DAILY
Status: DISCONTINUED | OUTPATIENT
Start: 2025-06-03 | End: 2025-06-05 | Stop reason: HOSPADM

## 2025-06-02 RX ADMIN — HYDROMORPHONE HYDROCHLORIDE 4 MG: 4 TABLET ORAL at 04:01

## 2025-06-02 RX ADMIN — ACETAMINOPHEN 650 MG: 325 TABLET ORAL at 02:03

## 2025-06-02 RX ADMIN — HYDROMORPHONE HYDROCHLORIDE 4 MG: 4 TABLET ORAL at 06:05

## 2025-06-02 RX ADMIN — POLYETHYLENE GLYCOL 3350 17 G: 17 POWDER, FOR SOLUTION ORAL at 08:33

## 2025-06-02 RX ADMIN — METHOCARBAMOL 750 MG: 750 TABLET ORAL at 12:48

## 2025-06-02 RX ADMIN — HYDROMORPHONE HYDROCHLORIDE 4 MG: 4 TABLET ORAL at 02:03

## 2025-06-02 RX ADMIN — METHYLPREDNISOLONE SODIUM SUCCINATE 40 MG: 40 INJECTION, POWDER, LYOPHILIZED, FOR SOLUTION INTRAMUSCULAR; INTRAVENOUS at 08:33

## 2025-06-02 RX ADMIN — OXYCODONE HYDROCHLORIDE 30 MG: 20 TABLET, FILM COATED, EXTENDED RELEASE ORAL at 08:31

## 2025-06-02 RX ADMIN — HYDROMORPHONE HYDROCHLORIDE 4 MG: 4 TABLET ORAL at 20:47

## 2025-06-02 RX ADMIN — METHOCARBAMOL 750 MG: 750 TABLET ORAL at 20:11

## 2025-06-02 RX ADMIN — HYDROMORPHONE HYDROCHLORIDE 1 MG: 1 INJECTION, SOLUTION INTRAMUSCULAR; INTRAVENOUS; SUBCUTANEOUS at 13:40

## 2025-06-02 RX ADMIN — METHOCARBAMOL 750 MG: 750 TABLET ORAL at 16:38

## 2025-06-02 RX ADMIN — HYDROMORPHONE HYDROCHLORIDE 1 MG: 1 INJECTION, SOLUTION INTRAMUSCULAR; INTRAVENOUS; SUBCUTANEOUS at 21:46

## 2025-06-02 RX ADMIN — HYDROMORPHONE HYDROCHLORIDE 4 MG: 4 TABLET ORAL at 18:49

## 2025-06-02 RX ADMIN — HYDROMORPHONE HYDROCHLORIDE 1 MG: 1 INJECTION, SOLUTION INTRAMUSCULAR; INTRAVENOUS; SUBCUTANEOUS at 02:38

## 2025-06-02 RX ADMIN — HYDROMORPHONE HYDROCHLORIDE 1 MG: 1 INJECTION, SOLUTION INTRAMUSCULAR; INTRAVENOUS; SUBCUTANEOUS at 05:05

## 2025-06-02 RX ADMIN — SODIUM CHLORIDE, PRESERVATIVE FREE 10 ML: 5 INJECTION INTRAVENOUS at 08:34

## 2025-06-02 RX ADMIN — HYDROMORPHONE HYDROCHLORIDE 1 MG: 1 INJECTION, SOLUTION INTRAMUSCULAR; INTRAVENOUS; SUBCUTANEOUS at 09:38

## 2025-06-02 RX ADMIN — OXYCODONE HYDROCHLORIDE 30 MG: 20 TABLET, FILM COATED, EXTENDED RELEASE ORAL at 00:57

## 2025-06-02 RX ADMIN — ACETAMINOPHEN 650 MG: 325 TABLET ORAL at 08:46

## 2025-06-02 RX ADMIN — HYDROMORPHONE HYDROCHLORIDE 4 MG: 4 TABLET ORAL at 22:47

## 2025-06-02 RX ADMIN — HYDROMORPHONE HYDROCHLORIDE 1 MG: 1 INJECTION, SOLUTION INTRAMUSCULAR; INTRAVENOUS; SUBCUTANEOUS at 00:00

## 2025-06-02 RX ADMIN — SENNOSIDES AND DOCUSATE SODIUM 2 TABLET: 50; 8.6 TABLET ORAL at 08:33

## 2025-06-02 RX ADMIN — OXYCODONE HYDROCHLORIDE 30 MG: 20 TABLET, FILM COATED, EXTENDED RELEASE ORAL at 16:37

## 2025-06-02 RX ADMIN — METHOCARBAMOL 750 MG: 750 TABLET ORAL at 08:32

## 2025-06-02 RX ADMIN — HYDROMORPHONE HYDROCHLORIDE 4 MG: 4 TABLET ORAL at 14:43

## 2025-06-02 RX ADMIN — HYDROMORPHONE HYDROCHLORIDE 4 MG: 4 TABLET ORAL at 10:35

## 2025-06-02 RX ADMIN — ACETAMINOPHEN 650 MG: 325 TABLET ORAL at 14:43

## 2025-06-02 RX ADMIN — HYDROMORPHONE HYDROCHLORIDE 1 MG: 1 INJECTION, SOLUTION INTRAMUSCULAR; INTRAVENOUS; SUBCUTANEOUS at 17:46

## 2025-06-02 RX ADMIN — HYDROMORPHONE HYDROCHLORIDE 1 MG: 1 INJECTION, SOLUTION INTRAMUSCULAR; INTRAVENOUS; SUBCUTANEOUS at 07:30

## 2025-06-02 RX ADMIN — SODIUM CHLORIDE, PRESERVATIVE FREE 10 ML: 5 INJECTION INTRAVENOUS at 20:12

## 2025-06-02 RX ADMIN — HYDROMORPHONE HYDROCHLORIDE 4 MG: 4 TABLET ORAL at 12:48

## 2025-06-02 RX ADMIN — ACETAMINOPHEN 650 MG: 325 TABLET ORAL at 20:47

## 2025-06-02 RX ADMIN — SENNOSIDES AND DOCUSATE SODIUM 2 TABLET: 50; 8.6 TABLET ORAL at 20:11

## 2025-06-02 ASSESSMENT — PAIN SCALES - GENERAL
PAINLEVEL_OUTOF10: 9
PAINLEVEL_OUTOF10: 10
PAINLEVEL_OUTOF10: 10
PAINLEVEL_OUTOF10: 9
PAINLEVEL_OUTOF10: 10
PAINLEVEL_OUTOF10: 9
PAINLEVEL_OUTOF10: 9
PAINLEVEL_OUTOF10: 10
PAINLEVEL_OUTOF10: 6
PAINLEVEL_OUTOF10: 10
PAINLEVEL_OUTOF10: 9

## 2025-06-02 ASSESSMENT — PAIN DESCRIPTION - ORIENTATION
ORIENTATION: RIGHT
ORIENTATION: RIGHT;LEFT
ORIENTATION: RIGHT
ORIENTATION: RIGHT;LEFT
ORIENTATION: LEFT;RIGHT
ORIENTATION: RIGHT;LEFT

## 2025-06-02 ASSESSMENT — PAIN DESCRIPTION - LOCATION
LOCATION: HIP
LOCATION: OTHER (COMMENT);HIP
LOCATION: HIP
LOCATION: OTHER (COMMENT)
LOCATION: OTHER (COMMENT)
LOCATION: HIP
LOCATION: OTHER (COMMENT)
LOCATION: ABDOMEN;HIP
LOCATION: ABDOMEN;HIP
LOCATION: HIP
LOCATION: HIP
LOCATION: OTHER (COMMENT)
LOCATION: ABDOMEN;HIP

## 2025-06-02 ASSESSMENT — PAIN DESCRIPTION - PAIN TYPE
TYPE: CHRONIC PAIN

## 2025-06-02 ASSESSMENT — PAIN DESCRIPTION - DESCRIPTORS
DESCRIPTORS: ACHING;THROBBING
DESCRIPTORS: ACHING;DISCOMFORT;SORE
DESCRIPTORS: ACHING;THROBBING
DESCRIPTORS: ACHING;THROBBING
DESCRIPTORS: ACHING;DISCOMFORT;SORE
DESCRIPTORS: THROBBING;ACHING
DESCRIPTORS: ACHING;DISCOMFORT;SORE
DESCRIPTORS: ACHING;THROBBING
DESCRIPTORS: ACHING;THROBBING
DESCRIPTORS: ACHING;DISCOMFORT;SORE
DESCRIPTORS: ACHING;THROBBING
DESCRIPTORS: ACHING;DISCOMFORT;SORE
DESCRIPTORS: ACHING;THROBBING
DESCRIPTORS: ACHING;CRAMPING;SORE
DESCRIPTORS: ACHING;DISCOMFORT;SORE

## 2025-06-02 ASSESSMENT — PAIN - FUNCTIONAL ASSESSMENT
PAIN_FUNCTIONAL_ASSESSMENT: PREVENTS OR INTERFERES SOME ACTIVE ACTIVITIES AND ADLS

## 2025-06-02 ASSESSMENT — PAIN DESCRIPTION - ONSET
ONSET: ON-GOING

## 2025-06-02 ASSESSMENT — PAIN DESCRIPTION - FREQUENCY
FREQUENCY: CONTINUOUS

## 2025-06-02 NOTE — PROGRESS NOTES
Pt refused bath and stated they had gotten one yesterday so they would not like to take one today.

## 2025-06-02 NOTE — PROGRESS NOTES
Weight: 75.6 kg (166 lb 10.7 oz) (3/6, actual ; note wt of 87.7kg via BS on 9/26/24.)     % Weight Change (Calculated): 6  Weight Adjustment For: No Adjustment                 BMI Categories: Overweight (BMI 25.0-29.9)    Estimated Daily Nutrient Needs:  Energy Requirements Based On: Formula  Weight Used for Energy Requirements: Current  Energy (kcal/day): 5058-4817 kcals (MSJx1.3SF)  Weight Used for Protein Requirements: Ideal  Protein (g/day): 100-120 g (1.5-1.5-8g/kgIBW)  Method Used for Fluid Requirements: 1 ml/kcal  Fluid (ml/day): 6690-1747    Nutrition Diagnosis:   Moderate malnutrition, in context of chronic illness related to catabolic illness (hx prostate CA w/ mets to bone) as evidenced by criteria as identified in malnutrition assessment    Nutrition Interventions:   Food and/or Nutrient Delivery: Continue Current Diet (Educated / encouraged pt on the benefits of an ONS to promote oral intake, although pt declined ONS at this time; Encouraged oral intake)  Nutrition Education/Counseling: Education/Counseling completed (Educated / encouraged pt on the benefits of an ONS to promote oral intake, although pt declined ONS at this time; Encouraged oral intake)  Coordination of Nutrition Care: Continue to monitor while inpatient       Goals:  Goals: PO intake 75% or greater, by next RD assessment  Type of Goal: New goal  Previous Goal Met: New Goal    Nutrition Monitoring and Evaluation:   Behavioral-Environmental Outcomes: None Identified  Food/Nutrient Intake Outcomes: Food and Nutrient Intake  Physical Signs/Symptoms Outcomes: Biochemical Data, Chewing or Swallowing, GI Status, Fluid Status or Edema, Nutrition Focused Physical Findings, Skin, Weight    Discharge Planning:    Continue current diet     Gisselle Ruff  Contact: [phone]

## 2025-06-02 NOTE — PROGRESS NOTES
Pt complaining of severe pain, pain meds not due at the moment , message  has been sent to the attending team requesting for more pain meds to be charted , awaiting   response

## 2025-06-02 NOTE — PROGRESS NOTES
[facility] - Family Medicine Inpatient   Resident Progress Note    S:  Hospital day: 3   Brief Synopsis: Joseph Bond is a 44 y.o. male with a PMH of prostate ca w mets on Nubeqa s/p palliaitve radiation and chemotherapy who presents with acute pain/generalized that is more intense that his pain from metastasis. Also has neuropathic pain from chemotherapy, declines gabapentin/pregabalin because of fear of losing memory. MRI thoracic and lumbar done 5/2025 w mets that seems to be stable compared to last CT spine. Dr. Matson did place rad onc referral placed but pt does not want to do more radiation. Work-up was remarkable for elevated ALP, AST and mild leukocytosis. CTA did not show signs of PE. Palliative care was consulted for pain management. His Hgb did drop to 6.4 on 6/1 and he received 1 unit pRBC with a post transfusion Hgb of 9.6 that has remained stable.     Overnight/interim:   Complaining of break through pain ~ 3 pm s/p 1 mg Dilaudid. Reporting pain again in his lower back and hips this morning at 8 am s/p 1 mg dilaudid. No other acute events.   Patient was seen and examined at bedside this morning. Denies having any fever, chills, SOB, CP, abdominal pain, NV/D, abnormal bruising/bleeding, hematochezia/melena/hematuria, or any other acute complaints.  Last BM yesterday.     Cont meds:    sodium chloride      sodium chloride       Scheduled meds:    oxyCODONE  30 mg Oral Q8H    sennosides-docusate sodium  2 tablet Oral BID    sodium chloride flush  5-40 mL IntraVENous 2 times per day    [Held by provider] enoxaparin  40 mg SubCUTAneous Daily    methocarbamol  750 mg Oral 4x Daily    pantoprazole (PROTONIX) 40 mg in sodium chloride (PF) 0.9 % 10 mL injection  40 mg IntraVENous Daily    methylPREDNISolone  40 mg IntraVENous Daily    Darolutamide  600 mg Oral BID    polyethylene glycol  17 g Oral Daily     PRN meds: sodium chloride, HYDROmorphone, sodium chloride flush, sodium chloride, ondansetron **OR**

## 2025-06-02 NOTE — PLAN OF CARE
Problem: Discharge Planning  Goal: Discharge to home or other facility with appropriate resources  6/2/2025 0219 by Marc Alvarez RN  Outcome: Progressing  6/1/2025 2009 by Kaelyn Alvarez, RN  Outcome: Progressing     Problem: Musculoskeletal - Adult  Goal: Return mobility to safest level of function  Outcome: Progressing  Goal: Maintain proper alignment of affected body part  Outcome: Progressing  Goal: Return ADL status to a safe level of function  Outcome: Progressing     Problem: Musculoskeletal - Adult  Goal: Return ADL status to a safe level of function  Outcome: Progressing     Problem: Pain  Goal: Verbalizes/displays adequate comfort level or baseline comfort level  6/2/2025 0219 by Marc Alvarez RN  Outcome: Progressing  6/1/2025 2009 by Kaelyn Alvarez RN  Outcome: Progressing

## 2025-06-02 NOTE — PROGRESS NOTES
Patient crying , complaining of severe pain especiallyin the right hip , message was sent to the attending team to order pain meds , because pt is not due any pain meds at the moment, waiting  for response from the doctor

## 2025-06-02 NOTE — PROGRESS NOTES
Palliative Care Department  [phone]  Palliative Care Progress Note  Provider Lara Lott PA-C      PATIENT: Joseph Bond  : 1980  MRN: [id number]  ADMISSION DATE: 2025  1:59 PM  Referring Provider:  Nasima Phelps Chi, MD    Palliative Medicine was consulted on hospital day 3 for assistance with Symptom management     HPI:     Clinical Summary:Joseph Bond is a 44 y.o. y/o male with a history of  prostate cancer with mediastinal lymphadenopathy, extensive metastasis of the spine, rib cage, pelvis, humerus and femurs ,(had refused systemic therapy, pursuing homeopathic) completed palliative radiation treatment to spine, polysubstance abuse including opioids and tobacco, abstinent for 5 years, diverticulitis, patient had MRI of the lumbar and thoracic spine on 2025, with evidence of extensive metastatic disease throughout the lumbar spine with chronic compression fracture deformities at the L2 , L4 , L3 vertebral bodies.  Tumor in the S1 vertebral body extends into the anterior epidural space, resulting in moderate central canal stenosis.  Who presented to [facility] on 2025 with generalized body ache.  Patient reported pain is similar to metastatic pain however more intense.  He had declining, patient gabapentin/Lyrica due to side effects.  Follows outpatient with [facility], reports his regimen is p.o. Dilaudid 4 mg every 2 hours as needed, OxyContin 30 mg ER daily, Robaxin-750 milligrams 4 times a day.  Chest x-ray was negative for acute findings, CTA pulmonary was negative for PE.  Significant laboratory findings show WBC 10.    ASSESSMENT/PLAN:     Pertinent Hospital Diagnoses     Intractable pain  Prostate cancer with metastasis to bones  Known to Dr. Matson  On Dena       Palliative Care Encounter / Counseling Regarding Goals of Care  Please see detailed goals of care discussion as below  At this time, Joseph Bond, Does have capacity for  5 doses of IV Dilaudid in 24 hours.  Patient has a history of dependence on IV pain medication.  Pain is worsening despite escalation of opioids.  At this point would recommend evaluation for palliative radiation to bone mets if patient is agreeable.  I encouraged ambulation to help with muscle stiffness.  He is already on a muscle relaxer.    Prognosis: Guarded    OBJECTIVE:     /88   Pulse 83   Temp 97.5 °F (36.4 °C)   Resp 16   Ht 1.702 m (5' 7.01\")   Wt 80.1 kg (176 lb 9.4 oz)   SpO2 95%   BMI 27.65 kg/m²     Physical Examination:  Gen: Awake alert NAD  Lungs: Labored room air  Heart: regular rate  Extremities: Moving all extremities, no edema  Skin: warm, dry without rashes, lesions, bruising  Neuro: awake, alert, oriented x 3, follows commands, no gross neurologic deficit    Objective data reviewed: labs, images, records, medication use, vitals, and chart    Time/Communication  Greater than 50% of time spent, total 35 minutes in counseling and coordination of care at the bedside regarding goals of care and symptom management.    Thank you for allowing Palliative Medicine to participate in the care of Joseph Bond.    Note: This report was completed using [employer] voiced recognition software.  Every effort has been made to ensure accuracy; however, inadvertent computerized transcription errors may be present.

## 2025-06-02 NOTE — PROGRESS NOTES
Spiritual Health History and Assessment/Progress Note  [facility]    (P) Initial Encounter, Spiritual/Emotional Needs, Palliative Care,  ,  ,      Name: Joseph Bond MRN: [id number]    Age: 44 y.o.     Sex: male   Language: English   Rastafarian: None   Intractable pain     Date: 6/2/2025                           Spiritual Assessment began in [facility]        Referral/Consult From: (P) Rounding, Palliative Care   Encounter Overview/Reason: (P) Initial Encounter, Spiritual/Emotional Needs, Palliative Care  Service Provided For: (P) Patient    Gail, Belief, Meaning:   Patient identifies as spiritual, is connected with a gail tradition or spiritual practice, has beliefs or practices that help with coping during difficult times, and Other: Indra has strong spiritual beliefs that have helped him through his life and gilmore with cancer. He would describe himself as a Mormon.  Family/Friends No family/friends present      Importance and Influence:  Patient has spiritual/personal beliefs that influence decisions regarding their health  Family/Friends No family/friends present    Community:  Patient feels well-supported. Support system includes: Parent/s and Extended family  Family/Friends No family/friends present    Assessment and Plan of Care:     Patient Interventions include: Facilitated expression of thoughts and feelings, Explored spiritual coping/struggle/distress, Engaged in theological reflection, Affirmed coping skills/support systems, and Other: We talked about his cancer diagnosis. We also spoke about how he used to care for his girlfriend Nanette who has dementia.  He states that he has not been able to see her since last August.   Family/Friends Interventions include: No family/friends present    Patient Plan of Care: Spiritual Care available upon further referral  Family/Friends Plan of Care: No family/friends present    Electronically signed by NORMAN Don on 6/2/2025 at

## 2025-06-02 NOTE — PLAN OF CARE
Problem: Discharge Planning  Goal: Discharge to home or other facility with appropriate resources  6/2/2025 1417 by Montserrat Hollis RN  Outcome: Progressing     Problem: Safety - Adult  Goal: Free from fall injury  6/2/2025 1417 by Montserrat Hollis RN  Outcome: Progressing     Problem: Musculoskeletal - Adult  Goal: Return mobility to safest level of function  6/2/2025 1417 by Montserrat Hollis RN  Outcome: Progressing     Problem: Pain  Goal: Verbalizes/displays adequate comfort level or baseline comfort level  6/2/2025 1417 by Montserrat Hollis RN  Outcome: Progressing     Problem: Nutrition Deficit:  Goal: Optimize nutritional status  Outcome: Progressing

## 2025-06-03 ENCOUNTER — HOSPITAL ENCOUNTER (OUTPATIENT)
Dept: RADIATION ONCOLOGY | Age: 45
Discharge: HOME | End: 2025-06-03

## 2025-06-03 LAB
ANION GAP SERPL CALCULATED.3IONS-SCNC: 13 MMOL/L (ref 7–16)
BASOPHILS # BLD: 0.03 K/UL (ref 0–0.2)
BASOPHILS NFR BLD: 0 % (ref 0–2)
BUN SERPL-MCNC: 18 MG/DL (ref 6–20)
CALCIUM SERPL-MCNC: 9.3 MG/DL (ref 8.6–10)
CHLORIDE SERPL-SCNC: 100 MMOL/L (ref 98–107)
CO2 SERPL-SCNC: 28 MMOL/L (ref 22–29)
CREAT SERPL-MCNC: 0.6 MG/DL (ref 0.7–1.2)
EOSINOPHIL # BLD: 0.03 K/UL (ref 0.05–0.5)
EOSINOPHILS RELATIVE PERCENT: 0 % (ref 0–6)
ERYTHROCYTE [DISTWIDTH] IN BLOOD BY AUTOMATED COUNT: 17.3 % (ref 11.5–15)
GFR, ESTIMATED: >90 ML/MIN/1.73M2
GLUCOSE SERPL-MCNC: 92 MG/DL (ref 74–99)
HCT VFR BLD AUTO: 32.6 % (ref 37–54)
HGB BLD-MCNC: 10.7 G/DL (ref 12.5–16.5)
IMM GRANULOCYTES # BLD AUTO: 0.21 K/UL (ref 0–0.58)
IMM GRANULOCYTES NFR BLD: 2 % (ref 0–5)
LYMPHOCYTES NFR BLD: 0.93 K/UL (ref 1.5–4)
LYMPHOCYTES RELATIVE PERCENT: 9 % (ref 20–42)
MCH RBC QN AUTO: 27.6 PG (ref 26–35)
MCHC RBC AUTO-ENTMCNC: 32.8 G/DL (ref 32–34.5)
MCV RBC AUTO: 84.2 FL (ref 80–99.9)
MONOCYTES NFR BLD: 0.91 K/UL (ref 0.1–0.95)
MONOCYTES NFR BLD: 9 % (ref 2–12)
NEUTROPHILS NFR BLD: 79 % (ref 43–80)
NEUTS SEG NFR BLD: 7.96 K/UL (ref 1.8–7.3)
PLATELET # BLD AUTO: 270 K/UL (ref 130–450)
PMV BLD AUTO: 8.3 FL (ref 7–12)
POTASSIUM SERPL-SCNC: 3.6 MMOL/L (ref 3.5–5.1)
RBC # BLD AUTO: 3.87 M/UL (ref 3.8–5.8)
SODIUM SERPL-SCNC: 141 MMOL/L (ref 136–145)
WBC OTHER # BLD: 10.1 K/UL (ref 4.5–11.5)

## 2025-06-03 PROCEDURE — 6360000002 HC RX W HCPCS

## 2025-06-03 PROCEDURE — 99232 SBSQ HOSP IP/OBS MODERATE 35: CPT | Performed by: FAMILY MEDICINE

## 2025-06-03 PROCEDURE — 6370000000 HC RX 637 (ALT 250 FOR IP)

## 2025-06-03 PROCEDURE — 6370000000 HC RX 637 (ALT 250 FOR IP): Performed by: PHYSICIAN ASSISTANT

## 2025-06-03 PROCEDURE — 80048 BASIC METABOLIC PNL TOTAL CA: CPT

## 2025-06-03 PROCEDURE — 85025 COMPLETE CBC W/AUTO DIFF WBC: CPT

## 2025-06-03 PROCEDURE — 99222 1ST HOSP IP/OBS MODERATE 55: CPT | Performed by: NEUROLOGICAL SURGERY

## 2025-06-03 PROCEDURE — 6360000002 HC RX W HCPCS: Performed by: PHYSICIAN ASSISTANT

## 2025-06-03 PROCEDURE — 99233 SBSQ HOSP IP/OBS HIGH 50: CPT | Performed by: PHYSICIAN ASSISTANT

## 2025-06-03 PROCEDURE — 2500000003 HC RX 250 WO HCPCS

## 2025-06-03 PROCEDURE — 1200000000 HC SEMI PRIVATE

## 2025-06-03 RX ORDER — HYDROMORPHONE HYDROCHLORIDE 1 MG/ML
1 INJECTION, SOLUTION INTRAMUSCULAR; INTRAVENOUS; SUBCUTANEOUS ONCE
Status: COMPLETED | OUTPATIENT
Start: 2025-06-03 | End: 2025-06-03

## 2025-06-03 RX ORDER — HYDROMORPHONE HYDROCHLORIDE 1 MG/ML
1 INJECTION, SOLUTION INTRAMUSCULAR; INTRAVENOUS; SUBCUTANEOUS ONCE
Refills: 0 | Status: COMPLETED | OUTPATIENT
Start: 2025-06-03 | End: 2025-06-03

## 2025-06-03 RX ORDER — HYDROMORPHONE HYDROCHLORIDE 1 MG/ML
1 INJECTION, SOLUTION INTRAMUSCULAR; INTRAVENOUS; SUBCUTANEOUS
Status: COMPLETED | OUTPATIENT
Start: 2025-06-03 | End: 2025-06-03

## 2025-06-03 RX ORDER — DEXAMETHASONE SODIUM PHOSPHATE 4 MG/ML
4 INJECTION, SOLUTION INTRA-ARTICULAR; INTRALESIONAL; INTRAMUSCULAR; INTRAVENOUS; SOFT TISSUE EVERY 6 HOURS
Status: DISCONTINUED | OUTPATIENT
Start: 2025-06-03 | End: 2025-06-03

## 2025-06-03 RX ORDER — DEXAMETHASONE SODIUM PHOSPHATE 4 MG/ML
4 INJECTION, SOLUTION INTRA-ARTICULAR; INTRALESIONAL; INTRAMUSCULAR; INTRAVENOUS; SOFT TISSUE EVERY 6 HOURS
Status: DISCONTINUED | OUTPATIENT
Start: 2025-06-04 | End: 2025-06-05 | Stop reason: HOSPADM

## 2025-06-03 RX ADMIN — HYDROMORPHONE HYDROCHLORIDE 1 MG: 1 INJECTION, SOLUTION INTRAMUSCULAR; INTRAVENOUS; SUBCUTANEOUS at 12:28

## 2025-06-03 RX ADMIN — HYDROMORPHONE HYDROCHLORIDE 1 MG: 1 INJECTION, SOLUTION INTRAMUSCULAR; INTRAVENOUS; SUBCUTANEOUS at 07:39

## 2025-06-03 RX ADMIN — PREDNISONE 40 MG: 20 TABLET ORAL at 07:48

## 2025-06-03 RX ADMIN — METHOCARBAMOL 750 MG: 750 TABLET ORAL at 07:48

## 2025-06-03 RX ADMIN — POLYETHYLENE GLYCOL 3350 17 G: 17 POWDER, FOR SOLUTION ORAL at 07:49

## 2025-06-03 RX ADMIN — SODIUM CHLORIDE, PRESERVATIVE FREE 10 ML: 5 INJECTION INTRAVENOUS at 07:43

## 2025-06-03 RX ADMIN — Medication: at 13:48

## 2025-06-03 RX ADMIN — OXYCODONE HYDROCHLORIDE 30 MG: 20 TABLET, FILM COATED, EXTENDED RELEASE ORAL at 00:51

## 2025-06-03 RX ADMIN — HYDROMORPHONE HYDROCHLORIDE 1 MG: 1 INJECTION, SOLUTION INTRAMUSCULAR; INTRAVENOUS; SUBCUTANEOUS at 06:27

## 2025-06-03 RX ADMIN — ACETAMINOPHEN 650 MG: 325 TABLET ORAL at 10:11

## 2025-06-03 RX ADMIN — METHOCARBAMOL 750 MG: 750 TABLET ORAL at 21:21

## 2025-06-03 RX ADMIN — SENNOSIDES AND DOCUSATE SODIUM 2 TABLET: 50; 8.6 TABLET ORAL at 07:48

## 2025-06-03 RX ADMIN — HYDROMORPHONE HYDROCHLORIDE 1 MG: 1 INJECTION, SOLUTION INTRAMUSCULAR; INTRAVENOUS; SUBCUTANEOUS at 00:17

## 2025-06-03 RX ADMIN — OXYCODONE HYDROCHLORIDE 30 MG: 20 TABLET, FILM COATED, EXTENDED RELEASE ORAL at 10:05

## 2025-06-03 RX ADMIN — HYDROMORPHONE HYDROCHLORIDE 4 MG: 4 TABLET ORAL at 03:55

## 2025-06-03 RX ADMIN — SENNOSIDES AND DOCUSATE SODIUM 2 TABLET: 50; 8.6 TABLET ORAL at 21:21

## 2025-06-03 RX ADMIN — HYDROMORPHONE HYDROCHLORIDE 1 MG: 1 INJECTION, SOLUTION INTRAMUSCULAR; INTRAVENOUS; SUBCUTANEOUS at 11:04

## 2025-06-03 RX ADMIN — ACETAMINOPHEN 650 MG: 325 TABLET ORAL at 18:13

## 2025-06-03 RX ADMIN — HYDROMORPHONE HYDROCHLORIDE 1 MG: 1 INJECTION, SOLUTION INTRAMUSCULAR; INTRAVENOUS; SUBCUTANEOUS at 01:05

## 2025-06-03 RX ADMIN — HYDROMORPHONE HYDROCHLORIDE 1 MG: 1 INJECTION, SOLUTION INTRAMUSCULAR; INTRAVENOUS; SUBCUTANEOUS at 01:52

## 2025-06-03 RX ADMIN — ONDANSETRON 4 MG: 4 TABLET, ORALLY DISINTEGRATING ORAL at 10:11

## 2025-06-03 RX ADMIN — METHOCARBAMOL 750 MG: 750 TABLET ORAL at 16:44

## 2025-06-03 RX ADMIN — METHOCARBAMOL 750 MG: 750 TABLET ORAL at 12:28

## 2025-06-03 RX ADMIN — HYDROMORPHONE HYDROCHLORIDE 1 MG: 1 INJECTION, SOLUTION INTRAMUSCULAR; INTRAVENOUS; SUBCUTANEOUS at 02:55

## 2025-06-03 RX ADMIN — HYDROMORPHONE HYDROCHLORIDE 1 MG: 1 INJECTION, SOLUTION INTRAMUSCULAR; INTRAVENOUS; SUBCUTANEOUS at 09:02

## 2025-06-03 RX ADMIN — HYDROMORPHONE HYDROCHLORIDE 1 MG: 1 INJECTION, SOLUTION INTRAMUSCULAR; INTRAVENOUS; SUBCUTANEOUS at 04:55

## 2025-06-03 RX ADMIN — HYDROMORPHONE HYDROCHLORIDE 4 MG: 4 TABLET ORAL at 06:03

## 2025-06-03 ASSESSMENT — PAIN DESCRIPTION - DESCRIPTORS
DESCRIPTORS: ACHING;DISCOMFORT;DULL
DESCRIPTORS: ACHING;DISCOMFORT;SORE
DESCRIPTORS: ACHING;DISCOMFORT;DULL
DESCRIPTORS: ACHING;DISCOMFORT;DULL
DESCRIPTORS: ACHING;DISCOMFORT;SORE
DESCRIPTORS: ACHING;DISCOMFORT;DULL
DESCRIPTORS: ACHING;DISCOMFORT;SORE
DESCRIPTORS: ACHING;DISCOMFORT;SORE
DESCRIPTORS: ACHING;DISCOMFORT;DULL
DESCRIPTORS: ACHING;DISCOMFORT;SORE

## 2025-06-03 ASSESSMENT — PAIN DESCRIPTION - LOCATION
LOCATION: BACK;LEG;HIP
LOCATION: BACK;HIP
LOCATION: HIP
LOCATION: BACK;HIP
LOCATION: ABDOMEN
LOCATION: BACK;HIP
LOCATION: HIP
LOCATION: HIP
LOCATION: LEG;BACK
LOCATION: OTHER (COMMENT)

## 2025-06-03 ASSESSMENT — PAIN SCALES - GENERAL
PAINLEVEL_OUTOF10: 10
PAINLEVEL_OUTOF10: 7
PAINLEVEL_OUTOF10: 9
PAINLEVEL_OUTOF10: 8
PAINLEVEL_OUTOF10: 10
PAINLEVEL_OUTOF10: 7
PAINLEVEL_OUTOF10: 10
PAINLEVEL_OUTOF10: 8
PAINLEVEL_OUTOF10: 10
PAINLEVEL_OUTOF10: 6
PAINLEVEL_OUTOF10: 10
PAINLEVEL_OUTOF10: 10
PAINLEVEL_OUTOF10: 7
PAINLEVEL_OUTOF10: 10
PAINLEVEL_OUTOF10: 10
PAINLEVEL_OUTOF10: 9

## 2025-06-03 ASSESSMENT — PAIN DESCRIPTION - PAIN TYPE
TYPE: CHRONIC PAIN

## 2025-06-03 ASSESSMENT — PAIN DESCRIPTION - ORIENTATION
ORIENTATION: RIGHT;LEFT;MID;LOWER;INNER
ORIENTATION: RIGHT;LEFT;MID;LOWER
ORIENTATION: RIGHT;LEFT
ORIENTATION: RIGHT;LEFT;LOWER;MID
ORIENTATION: RIGHT;LEFT;MID;LOWER
ORIENTATION: RIGHT;LEFT
ORIENTATION: RIGHT;LEFT;MID;LOWER
ORIENTATION: RIGHT;LEFT;MID
ORIENTATION: RIGHT;LEFT;MID;LOWER
ORIENTATION: RIGHT;LEFT;MID;LOWER
ORIENTATION: RIGHT;LEFT
ORIENTATION: RIGHT;MID
ORIENTATION: RIGHT;LEFT

## 2025-06-03 ASSESSMENT — PAIN DESCRIPTION - FREQUENCY
FREQUENCY: CONTINUOUS

## 2025-06-03 ASSESSMENT — PAIN - FUNCTIONAL ASSESSMENT
PAIN_FUNCTIONAL_ASSESSMENT: PREVENTS OR INTERFERES SOME ACTIVE ACTIVITIES AND ADLS

## 2025-06-03 ASSESSMENT — PAIN DESCRIPTION - ONSET
ONSET: ON-GOING
ONSET: ON-GOING
ONSET: GRADUAL
ONSET: ON-GOING

## 2025-06-03 NOTE — PLAN OF CARE
Problem: Discharge Planning  Goal: Discharge to home or other facility with appropriate resources  Outcome: Progressing     Problem: Safety - Adult  Goal: Free from fall injury  Outcome: Progressing     Problem: Musculoskeletal - Adult  Goal: Return mobility to safest level of function  Outcome: Progressing     Problem: Pain  Goal: Verbalizes/displays adequate comfort level or baseline comfort level  Outcome: Progressing     Problem: Nutrition Deficit:  Goal: Optimize nutritional status  Outcome: Progressing

## 2025-06-03 NOTE — PROGRESS NOTES
[facility] Inpatient   Resident Progress Note    S:  Hospital day: 4   Brief Synopsis: Joseph Bond is a 44 y.o. male with a PMH of prostate ca w mets on Nubeqa s/p palliaitve radiation and chemotherapy who presents with acute pain/generalized that is more intense that his pain from metastasis. Also has neuropathic pain from chemotherapy, declines gabapentin/pregabalin because of fear of losing memory. MRI thoracic and lumbar done 5/2025 w mets that seems to be stable compared to last CT spine. Dr. Matson did place rad onc referral placed but pt does not want to do more radiation. Work-up was remarkable for elevated ALP, AST and mild leukocytosis. CTA did not show signs of PE. Palliative care was consulted for pain management. His Hgb did drop to 6.4 on 6/1 and he received 1 unit pRBC with a post transfusion Hgb of 9.6 that has remained stable. Palliative adjusted his pain medication regimen to Oxycontin 30 mg ER TID, Dilaudid 4mg PO Q2H PRN and Methacarbamol 750mg TID. His pain remained uncontrolled with this regimen so 1 mg IV dilaudid Q1H PRN with parameters was also added. Palliative recommended evaluation for palliative radiation to bone mets given uncontrolled pain.     Overnight/interim:   1 mg IV dilaudid Q1H PRN added overnight with parameters for breakthrough pain.   Patient was seen and examined at bedside this morning, crying in tears from the pain and requesting \"high dose IV dilaudid every hour.\" Otherwise, he denies having any fever, chills, SOB, CP, abdominal pain, NV/D, abnormal bruising/bleeding, hematochezia/melena/hematuria, or any other acute complaints.  Last BM was yesterday.  Reached out to Palliative this morning regarding options to optimize pain control, awaiting response.     Cont meds:    sodium chloride      sodium chloride       Scheduled meds:    predniSONE  40 mg Oral Daily    oxyCODONE  30 mg Oral Q8H    sennosides-docusate sodium  2 tablet Oral BID    sodium chloride  0610)  WBC/Hgb/Hct/Plts:  7.4/9.4/28.9/257 (06/02 0610)  estimated creatinine clearance is 158 mL/min (A) (based on SCr of 0.6 mg/dL (L)).  Other pertinent labs as noted below    Radiology:  XR HIP BILATERAL W AP PELVIS (2 VIEWS)   Final Result   No acute process.         CTA PULMONARY W CONTRAST   Final Result   1. No pulmonary emboli or acute intrathoracic process.   2. No significant interval change of extensive metastatic osseous lesions.         XR CHEST PORTABLE   Final Result   No acute process.               Resident Assessment and Plan       Generalized pain/LBP 2/2 prostate ca w mets causing acute pain vs infectious gastroenteritis vs worsening mets  Prostate ca w mets to spine  Multiple pathological fractures noted on recent MRI thoracic and lumbar spine   Pt was not going to go through with radiation  Continuing Nubeqa, declining radiation   Palliative on board, pain meds adjusted to: Oxycontin 30 mg ER TID, Dilaudid 4mg PO Q2H PRN and Methacarbamol 750mg TID  Discuss w/ palliative possible PO regimen to facilitate discharge   Per palliative, would not escalate oral dilaudid any more frequently than Q2H PRN. Recommending evaluation for palliative radiation to bone mets given uncontrolled pain.   1 mg IV dilaudid Q1H PRN added overnight with parameters for breakthrough pain.  Zofran IV for nausea  Bowel regiment - Senokot-S increased to BID and glycolax DAILY  Consider consulting Dr. Matson if needed    Normocytic anemia  Patient has known metastases to bone marrow  Suspect bone marrow failure d/t malignancy v. Anemia of chronic disease   Check iron studies   Hold Lovenox   S/p 1 unit pRBC transfused on 6/1 for Hgb 6.4. Stable since.       DVT prophylaxis: On hold for anemia. Resume as able. PCD's in place  GI prophylaxis: Protonix  Steroids: Prednisone   PT/OT: N/A  Diet: ADULT DIET; Regular  Consults: palliative care  Disposition: Continue current management       Electronically signed by Apryl Pleitez

## 2025-06-03 NOTE — PROGRESS NOTES
RADIATION ONCOLOGY NOTE      Patient went to our service  Chart reviewed  Acknowledge patient's desire to know except radiation therapy (previously refused)  Discussed with patient's primary radiation oncologist  Patient will be seen in and treated at [place] as an outpatient  Please contact that office when patient is discharged ([phone])          Antoine Mendez MD, KEVEN, FACRO, FACR, FCTSI, FASTRO    Department of Radiation Oncology  [facility] ([facility]: [phone] (FAX: [phone])  [facility]: [phone] (FAX: [phone])  [facility] ([facility]:  [phone] (FAX:  [phone])    NOTE: This report was transcribed using voice recognition software. Every effort was made to ensure accuracy; however, inadvertent computerized transcription errors may be present.

## 2025-06-03 NOTE — CARE COORDINATION
Chart reviewed and case reviewed in IDR and with Dr Silva and FANG Bermudez with Palliative care.  PCA pump started today to help determine medication regimen patient will need for pain management at home.  Radiation Oncology consulted and Dr Mendez reviewed chart.  Patient to follow-up in [place] office to initiate outpatient radiation treatments.  Neurosurgery also consulted re: compression fractures.  Await input.  Therapy consult received to assist with transition of care planning.  Will continue to follow for further transition of care planning needs.         Sofia Randall RN.  P:  [phone]

## 2025-06-03 NOTE — PROGRESS NOTES
Palliative Care Department  [phone]  Palliative Care Progress Note  Provider Lara Lott PA-C      PATIENT: Joseph Bond  : 1980  MRN: [id number]  ADMISSION DATE: 2025  1:59 PM  Referring Provider:  Nasima Phelps Chi, MD    Palliative Medicine was consulted on hospital day 4 for assistance with Symptom management     HPI:     Clinical Summary:Joseph Bond is a 44 y.o. y/o male with a history of  prostate cancer with mediastinal lymphadenopathy, extensive metastasis of the spine, rib cage, pelvis, humerus and femurs ,(had refused systemic therapy, pursuing homeopathic) completed palliative radiation treatment to spine, polysubstance abuse including opioids and tobacco, abstinent for 5 years, diverticulitis, patient had MRI of the lumbar and thoracic spine on 2025, with evidence of extensive metastatic disease throughout the lumbar spine with chronic compression fracture deformities at the L2 , L4 , L3 vertebral bodies.  Tumor in the S1 vertebral body extends into the anterior epidural space, resulting in moderate central canal stenosis.  Who presented to [facility] on 2025 with generalized body ache.  Patient reported pain is similar to metastatic pain however more intense.  He had declining, patient gabapentin/Lyrica due to side effects.  Follows outpatient with [facility], reports his regimen is p.o. Dilaudid 4 mg every 2 hours as needed, OxyContin 30 mg ER daily, Robaxin-750 milligrams 4 times a day.  Chest x-ray was negative for acute findings, CTA pulmonary was negative for PE.  Significant laboratory findings show WBC 10.    ASSESSMENT/PLAN:     Pertinent Hospital Diagnoses     Intractable pain  Prostate cancer with metastasis to bones  Known to Dr. Matson  On Lita and Lupron   Refused radiation       Palliative Care Encounter / Counseling Regarding Goals of Care  Please see detailed goals of care discussion as below  At this time, Joseph Bond, Does  have capacity for medical decision-making.  Capacity is time limited and situation/question specific  During encounter the patient was his own medical decision-maker  Outcome of goals of care meeting:  Continue current medical treatment  Continue full code  Following for symptom management while inpatient  Patient to resume palliative care at discharge through [facility] palliative care  Now agreeable to radiation    Code status Full Code  Advanced Directives: HCPOA in epic  Surrogate/Legal NOK:  Margarita Bond (Sister/POA) [phone]   Gaurav Womack (cousin) [phone]    Neoplasm related pain  # Home regimen: OxyContin ER 30 mg twice daily.  Dilaudid 4 mg p.o. every 2 hours as needed breakthrough pain  # Patient requiring 8 doses of oral Dilaudid and 11 doses of IV Dilaudid in the past 24 hours  # OxyContin extended release 30 mg 3 times daily  # Patient with pain crisis through the night requiring IV dilaudid 1mg every hour  # Seen at bedside moaning in pain  # Strongly recommend radiation oncology evaluation due to tumor burden in the spine and extension into epidural space  # Patient agreeable  # ?role for kyphoplasty with several compression fractures in lumbar spine  # Start dilaudid PCA for 24-48 hrs to assess OME requirements  # Start dex 4mg QID  # Discussed at length with primary service    Prophylactic stool softener  # Continue Senokot and MiraLAX  # Patient denies any difficulty moving his bowels    Nausea and vomiting  # Zofran as needed  # Nauseous this morning 2/2 pain    Spiritual assessment: no spiritual distress identified  Bereavement and grief: to be determined  Referrals to: none today    Thank you for the opportunity to participate in the care of Joseph Bond.     Lara Lott PA-C  Palliative Medicine     SUBJECTIVE:     Details of Conversation:     Patient seen at bedside.  Reports feeling nauseous.  Moaning in pain.  States he did not sleep at all last night.  States the pain

## 2025-06-03 NOTE — CARE COORDINATION
Chart reviewed and case reviewed in IDR.  Patient admitted with intractable pain due to Prostate cancer with metastasis to bones.   Met with patient to discuss transition of care plans.  Patient stated he live independently in a first floor apartment, 4 steps down to enter and plans to return when he can walk.  Patient stated his mother and sister, who is his health care POA assist often and someone is there daily, often staying the night if needed.  Patient would like a BSC for discharge planning.  Await further input from palliative medicine for recommendations for pain management.  Will continue to follow for further transition of care planning needs.         Sofia Randall RN.  P:  [phone]

## 2025-06-03 NOTE — PLAN OF CARE
Problem: Discharge Planning  Goal: Discharge to home or other facility with appropriate resources  6/2/2025 2113 by Chester Dejesus RN  Outcome: Progressing  6/2/2025 1417 by Montserrat Hollis RN  Outcome: Progressing     Problem: Safety - Adult  Goal: Free from fall injury  6/2/2025 2113 by Chester Dejesus RN  Outcome: Progressing  6/2/2025 1417 by Montserrat Hollis RN  Outcome: Progressing     Problem: Musculoskeletal - Adult  Goal: Return mobility to safest level of function  6/2/2025 2113 by Chester Dejesus RN  Outcome: Progressing  6/2/2025 1417 by Montserrat Hollis RN  Outcome: Progressing  Goal: Maintain proper alignment of affected body part  Outcome: Progressing  Goal: Return ADL status to a safe level of function  Outcome: Progressing     Problem: Pain  Goal: Verbalizes/displays adequate comfort level or baseline comfort level  6/2/2025 2113 by Chester Dejesus RN  Outcome: Progressing  6/2/2025 1417 by Montserrat Hollis RN  Outcome: Progressing     Problem: Nutrition Deficit:  Goal: Optimize nutritional status  6/2/2025 2113 by Chester Dejesus RN  Outcome: Progressing  6/2/2025 1417 by Montserrat Hollis RN  Outcome: Progressing

## 2025-06-04 LAB
ANION GAP SERPL CALCULATED.3IONS-SCNC: 10 MMOL/L (ref 7–16)
BASOPHILS # BLD: 0 K/UL (ref 0–0.2)
BASOPHILS NFR BLD: 0 % (ref 0–2)
BUN SERPL-MCNC: 16 MG/DL (ref 6–20)
CALCIUM SERPL-MCNC: 9.2 MG/DL (ref 8.6–10)
CHLORIDE SERPL-SCNC: 102 MMOL/L (ref 98–107)
CO2 SERPL-SCNC: 28 MMOL/L (ref 22–29)
CREAT SERPL-MCNC: 0.5 MG/DL (ref 0.7–1.2)
EOSINOPHIL # BLD: 0 K/UL (ref 0.05–0.5)
EOSINOPHILS RELATIVE PERCENT: 0 % (ref 0–6)
ERYTHROCYTE [DISTWIDTH] IN BLOOD BY AUTOMATED COUNT: 17.3 % (ref 11.5–15)
GFR, ESTIMATED: >90 ML/MIN/1.73M2
GLUCOSE SERPL-MCNC: 116 MG/DL (ref 74–99)
HCT VFR BLD AUTO: 30.2 % (ref 37–54)
HGB BLD-MCNC: 9.9 G/DL (ref 12.5–16.5)
LYMPHOCYTES NFR BLD: 0.53 K/UL (ref 1.5–4)
LYMPHOCYTES RELATIVE PERCENT: 5 % (ref 20–42)
MCH RBC QN AUTO: 27.8 PG (ref 26–35)
MCHC RBC AUTO-ENTMCNC: 32.8 G/DL (ref 32–34.5)
MCV RBC AUTO: 84.8 FL (ref 80–99.9)
MICROORGANISM SPEC CULT: NORMAL
MICROORGANISM SPEC CULT: NORMAL
MONOCYTES NFR BLD: 0.35 K/UL (ref 0.1–0.95)
MONOCYTES NFR BLD: 4 % (ref 2–12)
NEUTROPHILS NFR BLD: 91 % (ref 43–80)
NEUTS SEG NFR BLD: 9.31 K/UL (ref 1.8–7.3)
PLATELET # BLD AUTO: 250 K/UL (ref 130–450)
PMV BLD AUTO: 8.8 FL (ref 7–12)
POTASSIUM SERPL-SCNC: 4.3 MMOL/L (ref 3.5–5.1)
RBC # BLD AUTO: 3.56 M/UL (ref 3.8–5.8)
RBC # BLD: ABNORMAL 10*6/UL
SERVICE CMNT-IMP: NORMAL
SERVICE CMNT-IMP: NORMAL
SODIUM SERPL-SCNC: 140 MMOL/L (ref 136–145)
SPECIMEN DESCRIPTION: NORMAL
SPECIMEN DESCRIPTION: NORMAL
WBC OTHER # BLD: 10.2 K/UL (ref 4.5–11.5)

## 2025-06-04 PROCEDURE — 1200000000 HC SEMI PRIVATE

## 2025-06-04 PROCEDURE — 2500000003 HC RX 250 WO HCPCS

## 2025-06-04 PROCEDURE — 97530 THERAPEUTIC ACTIVITIES: CPT

## 2025-06-04 PROCEDURE — 2580000003 HC RX 258: Performed by: PHYSICIAN ASSISTANT

## 2025-06-04 PROCEDURE — 97535 SELF CARE MNGMENT TRAINING: CPT

## 2025-06-04 PROCEDURE — 99233 SBSQ HOSP IP/OBS HIGH 50: CPT | Performed by: PHYSICIAN ASSISTANT

## 2025-06-04 PROCEDURE — 6370000000 HC RX 637 (ALT 250 FOR IP)

## 2025-06-04 PROCEDURE — 6360000002 HC RX W HCPCS: Performed by: PHYSICIAN ASSISTANT

## 2025-06-04 PROCEDURE — 85025 COMPLETE CBC W/AUTO DIFF WBC: CPT

## 2025-06-04 PROCEDURE — 80048 BASIC METABOLIC PNL TOTAL CA: CPT

## 2025-06-04 PROCEDURE — 99232 SBSQ HOSP IP/OBS MODERATE 35: CPT | Performed by: FAMILY MEDICINE

## 2025-06-04 PROCEDURE — 97161 PT EVAL LOW COMPLEX 20 MIN: CPT

## 2025-06-04 PROCEDURE — 97165 OT EVAL LOW COMPLEX 30 MIN: CPT

## 2025-06-04 RX ORDER — FERROUS SULFATE 325(65) MG
325 TABLET ORAL
Status: DISCONTINUED | OUTPATIENT
Start: 2025-06-04 | End: 2025-06-05 | Stop reason: HOSPADM

## 2025-06-04 RX ADMIN — METHOCARBAMOL 750 MG: 750 TABLET ORAL at 18:37

## 2025-06-04 RX ADMIN — ACETAMINOPHEN 650 MG: 325 TABLET ORAL at 02:31

## 2025-06-04 RX ADMIN — POLYETHYLENE GLYCOL 3350 17 G: 17 POWDER, FOR SOLUTION ORAL at 09:08

## 2025-06-04 RX ADMIN — SODIUM CHLORIDE, PRESERVATIVE FREE 10 ML: 5 INJECTION INTRAVENOUS at 02:31

## 2025-06-04 RX ADMIN — Medication 325 MG: at 09:06

## 2025-06-04 RX ADMIN — ACETAMINOPHEN 650 MG: 325 TABLET ORAL at 09:06

## 2025-06-04 RX ADMIN — SENNOSIDES AND DOCUSATE SODIUM 2 TABLET: 50; 8.6 TABLET ORAL at 09:05

## 2025-06-04 RX ADMIN — DEXAMETHASONE SODIUM PHOSPHATE 4 MG: 4 INJECTION, SOLUTION INTRAMUSCULAR; INTRAVENOUS at 18:37

## 2025-06-04 RX ADMIN — DEXAMETHASONE SODIUM PHOSPHATE 4 MG: 4 INJECTION, SOLUTION INTRAMUSCULAR; INTRAVENOUS at 06:39

## 2025-06-04 RX ADMIN — METHOCARBAMOL 750 MG: 750 TABLET ORAL at 09:06

## 2025-06-04 RX ADMIN — SODIUM CHLORIDE, PRESERVATIVE FREE 10 ML: 5 INJECTION INTRAVENOUS at 21:25

## 2025-06-04 RX ADMIN — ACETAMINOPHEN 650 MG: 325 TABLET ORAL at 21:24

## 2025-06-04 RX ADMIN — ACETAMINOPHEN 650 MG: 325 TABLET ORAL at 14:00

## 2025-06-04 RX ADMIN — Medication: at 01:08

## 2025-06-04 RX ADMIN — SODIUM CHLORIDE, PRESERVATIVE FREE 10 ML: 5 INJECTION INTRAVENOUS at 09:06

## 2025-06-04 RX ADMIN — Medication: at 14:04

## 2025-06-04 RX ADMIN — DEXAMETHASONE SODIUM PHOSPHATE 4 MG: 4 INJECTION, SOLUTION INTRAMUSCULAR; INTRAVENOUS at 14:00

## 2025-06-04 RX ADMIN — DEXAMETHASONE SODIUM PHOSPHATE 4 MG: 4 INJECTION, SOLUTION INTRAMUSCULAR; INTRAVENOUS at 00:11

## 2025-06-04 RX ADMIN — DEXAMETHASONE SODIUM PHOSPHATE 4 MG: 4 INJECTION, SOLUTION INTRAMUSCULAR; INTRAVENOUS at 23:39

## 2025-06-04 RX ADMIN — METHOCARBAMOL 750 MG: 750 TABLET ORAL at 21:24

## 2025-06-04 RX ADMIN — SENNOSIDES AND DOCUSATE SODIUM 2 TABLET: 50; 8.6 TABLET ORAL at 21:24

## 2025-06-04 RX ADMIN — METHOCARBAMOL 750 MG: 750 TABLET ORAL at 14:01

## 2025-06-04 ASSESSMENT — PAIN DESCRIPTION - DESCRIPTORS
DESCRIPTORS: ACHING;DISCOMFORT;SORE
DESCRIPTORS: ACHING;DISCOMFORT;DULL
DESCRIPTORS: ACHING;DISCOMFORT;SORE
DESCRIPTORS: ACHING;DISCOMFORT;SORE

## 2025-06-04 ASSESSMENT — PAIN SCALES - GENERAL
PAINLEVEL_OUTOF10: 10
PAINLEVEL_OUTOF10: 10
PAINLEVEL_OUTOF10: 7
PAINLEVEL_OUTOF10: 8
PAINLEVEL_OUTOF10: 10
PAINLEVEL_OUTOF10: 8
PAINLEVEL_OUTOF10: 10

## 2025-06-04 ASSESSMENT — PAIN - FUNCTIONAL ASSESSMENT
PAIN_FUNCTIONAL_ASSESSMENT: PREVENTS OR INTERFERES SOME ACTIVE ACTIVITIES AND ADLS

## 2025-06-04 ASSESSMENT — PAIN DESCRIPTION - LOCATION
LOCATION: BACK;OTHER (COMMENT)
LOCATION: HIP;LEG;BACK
LOCATION: BACK;HIP;LEG

## 2025-06-04 ASSESSMENT — PAIN DESCRIPTION - FREQUENCY
FREQUENCY: CONTINUOUS
FREQUENCY: CONTINUOUS

## 2025-06-04 ASSESSMENT — PAIN DESCRIPTION - PAIN TYPE
TYPE: CHRONIC PAIN
TYPE: CHRONIC PAIN

## 2025-06-04 ASSESSMENT — PAIN DESCRIPTION - ORIENTATION
ORIENTATION: RIGHT;LEFT;LOWER
ORIENTATION: MID;LOWER
ORIENTATION: RIGHT;LEFT;MID

## 2025-06-04 ASSESSMENT — PAIN DESCRIPTION - ONSET
ONSET: ON-GOING
ONSET: ON-GOING

## 2025-06-04 NOTE — PROGRESS NOTES
0200- Patient informed me that he was taking oxycodone tablet when he was on the PCA pump before. Patient stated that he is still in pain.

## 2025-06-04 NOTE — PROGRESS NOTES
Palliative Care Department  [phone]  Palliative Care Progress Note  Provider Lara Lott PA-C      PATIENT: Joseph Bond  : 1980  MRN: [id number]  ADMISSION DATE: 2025  1:59 PM  Referring Provider:  Nasima Phelps Chi, MD    Palliative Medicine was consulted on hospital day 5 for assistance with Symptom management     HPI:     Clinical Summary:Joseph Bond is a 44 y.o. y/o male with a history of  prostate cancer with mediastinal lymphadenopathy, extensive metastasis of the spine, rib cage, pelvis, humerus and femurs ,(had refused systemic therapy, pursuing homeopathic) completed palliative radiation treatment to spine, polysubstance abuse including opioids and tobacco, abstinent for 5 years, diverticulitis, patient had MRI of the lumbar and thoracic spine on 2025, with evidence of extensive metastatic disease throughout the lumbar spine with chronic compression fracture deformities at the L2 , L4 , L3 vertebral bodies.  Tumor in the S1 vertebral body extends into the anterior epidural space, resulting in moderate central canal stenosis.  Who presented to [facility] on 2025 with generalized body ache.  Patient reported pain is similar to metastatic pain however more intense.  He had declining, patient gabapentin/Lyrica due to side effects.  Follows outpatient with [facility], reports his regimen is p.o. Dilaudid 4 mg every 2 hours as needed, OxyContin 30 mg ER daily, Robaxin-750 milligrams 4 times a day.  Chest x-ray was negative for acute findings, CTA pulmonary was negative for PE.  Significant laboratory findings show WBC 10.    ASSESSMENT/PLAN:     Pertinent Hospital Diagnoses     Intractable pain  Prostate cancer with metastasis to bones  Known to Dr. Matson  On Lita and Lupron   Refused radiation       Palliative Care Encounter / Counseling Regarding Goals of Care  Please see detailed goals of care discussion as below  At this time, Joseph Bond, Does  demanding his OxyContin on top of his PCA.  I explained that his 24-hour dose of MME's is calculated into Dilaudid infusion and he is not being underdosed.  I explained the risks of continuing OxyContin on top of the PSA.  I also expressed my concern that the OxyContin was not working which prompted admission and PCA--therefore little benefit to continuing it.  Patient has been unrealistic regarding his pain goals and only wants IV pain medications.  He is requesting to be discharged home on the PCA.  I explained the PCA is good for a pain crisis which allows us to assess his opioid requirements in a 24-hour period and adjust his oral regimen appropriately.  I advised transitioning to methadone with short acting Dilaudid as needed.  Patient refusing, he is unable to tell me why.  He wants me to contact [facility] palliative care to see if they will prescribe the Dilaudid PCA at home.  Left a message for their nurse practitioner to call me back.    Prognosis: Guarded    OBJECTIVE:     /79   Pulse 79   Temp (!) 96 °F (35.6 °C) (Temporal)   Resp 18   Ht 1.702 m (5' 7.01\")   Wt 80.1 kg (176 lb 9.4 oz)   SpO2 95%   BMI 27.65 kg/m²     Physical Examination:  Gen: Awake alert NAD  Lungs: Labored room air  Heart: regular rate  Extremities: Moving all extremities, no edema  Skin: warm, dry without rashes, lesions, bruising  Neuro: awake, alert, oriented x 3, follows commands, no gross neurologic deficit    Objective data reviewed: labs, images, records, medication use, vitals, and chart    Time/Communication  Greater than 50% of time spent, total 50 minutes in counseling and coordination of care at the bedside regarding goals of care and symptom management.    Thank you for allowing Palliative Medicine to participate in the care of Joseph Bond.    Note: This report was completed using computerize voiced recognition software.  Every effort has been made to ensure accuracy; however, inadvertent computerized

## 2025-06-04 NOTE — PLAN OF CARE
Problem: Discharge Planning  Goal: Discharge to home or other facility with appropriate resources  6/4/2025 1801 by Montserrat Hollis RN  Outcome: Progressing     Problem: Safety - Adult  Goal: Free from fall injury  6/4/2025 1801 by Montserrat Hollis RN  Outcome: Progressing     Problem: Musculoskeletal - Adult  Goal: Return mobility to safest level of function  6/4/2025 1801 by Montserrat Hollis RN  Outcome: Progressing     Problem: Pain  Goal: Verbalizes/displays adequate comfort level or baseline comfort level  6/4/2025 1801 by Montserrat Hollis RN  Outcome: Progressing     Problem: Nutrition Deficit:  Goal: Optimize nutritional status  6/4/2025 1801 by Montserrat Hollis RN  Outcome: Progressing

## 2025-06-04 NOTE — PLAN OF CARE
Problem: Discharge Planning  Goal: Discharge to home or other facility with appropriate resources  6/4/2025 0505 by Shayne Bragg RN  Outcome: Progressing  6/3/2025 1839 by Montserrat Hollis RN  Outcome: Progressing     Problem: Safety - Adult  Goal: Free from fall injury  6/4/2025 0505 by Shayne Bragg RN  Outcome: Progressing  6/3/2025 1839 by Montserrat Hollis RN  Outcome: Progressing     Problem: Musculoskeletal - Adult  Goal: Return mobility to safest level of function  6/4/2025 0505 by Shayne Bragg RN  Outcome: Progressing  6/3/2025 1839 by Montserrat Hollis RN  Outcome: Progressing  Goal: Maintain proper alignment of affected body part  Outcome: Progressing  Goal: Return ADL status to a safe level of function  Outcome: Progressing     Problem: Pain  Goal: Verbalizes/displays adequate comfort level or baseline comfort level  6/4/2025 0505 by Shayne Bragg RN  Outcome: Progressing  6/3/2025 1839 by Montserrat Hollis RN  Outcome: Progressing     Problem: Nutrition Deficit:  Goal: Optimize nutritional status  6/4/2025 0505 by Shayne Bragg RN  Outcome: Progressing  6/3/2025 1839 by Montserrat Hollis RN  Outcome: Progressing

## 2025-06-04 NOTE — CONSULTS
[facility]              [address] [place] [address]                              CONSULTATION      PATIENT NAME: BRIAN ANTHONY              : 1980  MED REC NO: [id number]                        ROOM: 5209  ACCOUNT NO: [id number]                       ADMIT DATE: 2025  PROVIDER: Ann Silva MD    NEUROSURGERY CONSULT    CONSULT DATE: 2025      REASON FOR CONSULT:  Spinal metastasis.    HISTORY OF PRESENT ILLNESS:  The patient is a 44-year-old gentleman with history of prostate cancer who presents with diffuse metastasis to the spine and generalized pain and weakness.  He states that the pain was significantly worse approximately 4 days ago, and he presented to the emergency room for further evaluation and management.  The pain is made worse with activity and better at rest.    PAST MEDICAL HISTORY:  Positive for neuropathy, prostate cancer, and depression.    PAST SURGICAL HISTORY:  Positive for bronchoscopy and port placement.    FAMILY HISTORY:  Positive for coronary artery disease in his grandfather.    SOCIAL HISTORY:  Negative for tobacco or alcohol use.    ALLERGIES:  HE HAS NO KNOWN DRUG ALLERGIES.      HOME MEDICATIONS:  Include Lexapro, Dilaudid, Robaxin, Zofran, OxyContin.    REVIEW OF SYSTEMS:  HEENT:  Negative for headache, double vision, or blurred vision.  CARDIOVASCULAR:  Negative for chest pain, arrhythmia, or palpitations.  RESPIRATORY:  Negative for shortness of breath, asthma, bronchitis, or pneumonia.  GASTROINTESTINAL:  Negative for heartburn, nausea, vomiting, diarrhea, or constipation.  GENITOURINARY:  Negative for hematuria or dysuria.  HEMATOLOGIC:  Positive for easy bruising.  INFECTIOUS:  Negative for any recent infection.  MUSCULOSKELETAL:  Positive for joint stiffness and swelling.  PSYCHIATRIC:  Positive for depression.  NEUROLOGIC:  Negative for seizure or stroke.  ENDOCRINE:  Negative for thyroid disorder

## 2025-06-04 NOTE — PROGRESS NOTES
OCCUPATIONAL THERAPY INITIAL EVALUATION    [facility]   [address], [place]       Date:2025                                                  Patient Name: Joseph Bond  MRN: [id number]  : 1980  Room: 5205209    Evaluating OT: Trish Appiah, DAVIDD, OTR/L; [id number]    Occupational therapy physician order:  Start   Ordering Provider    25 1545  OT eval and treat  Start:  25 1545,   End:  25 1545,   ONE TIME,   Standing Count:  1 Occurrences,   R         [place], Marilee Connor MD         Pt presents to ED with back pain    Diagnosis:   1. Intractable pain       Patient Active Problem List   Diagnosis    Retroperitoneal lymphadenopathy    Chronic midline low back pain with left-sided sciatica    Elevated PSA    Urinary hesitancy    Pelvic pain    Osteopenia of lumbar spine    Prostate cancer metastatic to bone (HCC)    Hemorrhoids    Anxiety    Cancer, metastatic to bone (HCC)    Primary prostate cancer with metastasis from prostate to other site (HCC)    Anemia, unspecified type    Cancer-related pain    Palliative care encounter    Metastatic cancer to bone (HCC)    Intractable pain    Prostate cancer (HCC)    Moderate protein-calorie malnutrition       Pertinent Medical History:   Past Medical History:   Diagnosis Date    History of blood transfusion     History of opioid abuse (HCC)     Neuropathy     Fingers and Toes likely from current chemotherapy.    Prostate cancer (HCC)     with mets to the spine and lungs. Dx in .        Surgery/Procedures: none this admission     Recommended adaptive equipment: TBD , extended tub bench vs shower chair, bsc    Precautions:  Fall Risk, spinal neutrality (spinal mets/tumors)    Assessment of current deficits    [x] Functional mobility  [x]ADLs  [x] Strength               [x]Cognition    [x] Functional transfers   [x] IADLs         [x] Safety Awareness   [x]Endurance     Dominance right    AROM (PROM) Strength /FMC Goal: (PRN)   RUE   WFL    grossly 4-/5   good  and wfl FMC/dexterity noted during ADL tasks   Improve overall RUE strength to 5/5 for participation in functional tasks       LUE WFL    grossly 4-/5   good  and wfl FMC/dexterity noted during ADL tasks   Improve overall LUE strength to 5/5 for participation in functional tasks         Fine Motor Coordination:  WFL  Hearing: WFL  Sensation:  numbness in BUEs and Les, ,light touch to BUEs wfl     Tone:  WFL    Edema: unremarkable      Comment: RN cleared patient for OT.  Upon arrival, patient was supine in bed and agreeable to OT session. no visitors present throughout session . At end of session, patient semi-supine in bed ; with call light and phone within reach; all lines and tubes intact.     Patient presents with decreased safety awareness, activity tolerance , balance , and coordination. Pt demonstrated decreased independence during ADLs, bed mobility , functional transfers, and functional mobility. Pt would benefit from continued skilled OT to increase safety and independence with completion of ADL tasks and functional mobility for improved quality of life and return to PLOF.       Treatment: OT treatment provided this date includes:  ADL- Instruction/training on safety and adapted techniques for completion of ADLs: Therapist facilitated & pt educated on activity modifications/adaptations to promote implementation of fall prevention strategies, EC/WS strategies, & safety awareness throughout ADLs.  Mobility- Instruction/training on safety and improved independence with bed mobility, functional transfers, and functional mobility.  Sitting EOB x 15 minutes to improve dynamic sitting balance and activity tolerance during ADLs.  Activity tolerance- Instruction/training on energy conservation/work simplification for completion of ADLs.  Skilled positioning/alignment- Therapist facilitated proper

## 2025-06-04 NOTE — PROGRESS NOTES
Physical Therapy  Physical Therapy Initial Assessment    Name: Joseph Bond  : 1980  MRN: [id number]      Date of Service: 2025    Evaluating PT:  Jhonny Carnes PT, DPT [id number]    Room #:  5209/5209-A  Diagnosis:  Intractable pain [R52]  PMHx/PSHx:   has a past medical history of History of blood transfusion, History of opioid abuse (HCC), Neuropathy, and Prostate cancer (HCC).   has a past surgical history that includes bronchoscopy (N/A, 2024); bronchoscopy (2024); and Port Surgery (N/A, 2024).  Procedure/Surgery:  None  Precautions:  Falls, spinal neutrality  Equipment Needs:  TBD    SUBJECTIVE:    Pt lives alone (mother visits often) in a 1st floor apartment with 4 stair(s) to enter and 0 rail(s).  Has hospital bed.  Pt received assistance from 2 people when negotiating stairs prior to admission.  Pt used FWW and wheelchair for mobility prior to admission.    OBJECTIVE:   Initial Evaluation  Date: 2025 Treatment Short Term/ Long Term   Goals   AM-PAC 6 Clicks      Was pt agreeable to Eval/treatment? Yes     Does pt have pain? /10 back; severe R hip; mild L hip     Bed Mobility  Rolling: NT  Supine to sit: Yoan (HOB elevated)  Sit to supine: Yoan (HOB elevated)  Scooting: NT  Rolling: Supervision  Supine to sit: Supervision  Sit to supine: Supervision  Scooting: Supervision   Transfers Sit to stand: SBA  Stand to sit: SBA  Stand pivot: NT  Sit to stand: Supervision  Stand to sit: Supervision  Stand pivot: Supervision with AAD   Ambulation    20 feet with WW SBA + chair follow  75 feet with AAD Supervision   Stair negotiation: ascended and descended  Attempted; unable  TBD   ROM BUE:  See OT note  BLE:  WFL     Strength BUE:  See OT note  BLE:  Unable to actively extend R knee due to pain; WFL elsewhere     Balance Sitting EOB:  SBA  Dynamic Standing:  SBA with WW  Sitting EOB:  Supervision  Dynamic Standing:  Supervision with AAD     Pt is A & O x 4  Sensation:  Pt

## 2025-06-04 NOTE — PROGRESS NOTES
[facility] - Family Medicine Inpatient   Resident Progress Note    S:  Hospital day: 5   Brief Synopsis: Joseph Bond is a 44 y.o. male with a PMH of prostate ca w mets on Nubeqa s/p palliaitve radiation and chemotherapy who presents with acute pain/generalized that is more intense that his pain from metastasis. Also has neuropathic pain from chemotherapy, declines gabapentin/pregabalin because of fear of losing memory. MRI thoracic and lumbar done 5/2025 w mets that seems to be stable compared to last CT spine. Dr. Matson did place rad onc referral placed but pt does not want to do more radiation. Work-up was remarkable for elevated ALP, AST and mild leukocytosis. CTA did not show signs of PE. Palliative care was consulted for pain management. His Hgb did drop to 6.4 on 6/1 and he received 1 unit pRBC with a post transfusion Hgb of 9.6 that has remained stable. Palliative adjusted his pain medication regimen to Oxycontin 30 mg ER TID, Dilaudid 4mg PO Q2H PRN and Methacarbamol 750mg TID. His pain remained uncontrolled with this regimen so 1 mg IV dilaudid Q1H PRN with parameters was also added. Palliative recommended evaluation for palliative radiation to bone mets given uncontrolled pain.  Radiation oncology was consulted with plans for patient to be seen and treated at [place] as an outpatient.  Neurosurgery was consulted for chronic compression deformities in the lumbar spine, and he was not deemed a candidate for any surgical intervention with recommendation for palliative care.  He was started on a Dilaudid PCA with plans to transition to oral regimen based on OME's.  He was also started on dexamethasone 4 mg QID    Overnight/interim:   No acute events overnight  Patient was seen and examined at bedside this morning, in no acute distress.  States that he hasn't been getting his Oxycontin or PO dilaudid since being on the dilaudid PCA. States that he was told by Palliative that \"she came up with a concoction to  [facility] palliative care for optimizing pain management on discharge  Radiation oncology consulted - Patient will be seen and treated at [place] as an outpatient.  Neurosurgery consulted - He is not a candidate for any surgical intervention  Zofran IV for nausea  Bowel regiment - Senokot-S increased to BID and glycolax DAILY  Consider consulting Dr. Matson if needed    Normocytic Anemia  Patient has known metastases to bone marrow  Suspect bone marrow failure d/t malignancy v. Anemia of chronic disease   Iron studies remarkable for low Iron, low TIBC, and low iron saturation. Supplementations started.  S/p 1 unit pRBC transfused on 6/1 for Hgb 6.4. Stable since.       DVT prophylaxis: Lovenox  GI prophylaxis: Protonix  Steroids: Dexamethasone 4 mg QID  PT/OT: N/A  Diet: ADULT DIET; Regular  Disposition: Continue current management       Electronically signed by Apryl Pleitez MD on 6/4/2025 at 6:35 AM  Attending physician: Dr. Avila

## 2025-06-04 NOTE — PROGRESS NOTES
PCA pump alerting low dose. 10cc in 60mls syringe. Pump will not allow 10mls to be completely administered. Spoke with Lyle from pharmacy.Controlled substance miscellaneous waste form completed. 10mls of Hydromorphone (Dilaudid) 1mg/ml in NS PCA syringe was wasted with ANDREY Khan. Completed form returned to  pharmacy.

## 2025-06-05 VITALS
OXYGEN SATURATION: 95 % | BODY MASS INDEX: 27.72 KG/M2 | RESPIRATION RATE: 16 BRPM | WEIGHT: 176.59 LBS | HEART RATE: 72 BPM | SYSTOLIC BLOOD PRESSURE: 117 MMHG | DIASTOLIC BLOOD PRESSURE: 87 MMHG | TEMPERATURE: 97.9 F | HEIGHT: 67 IN

## 2025-06-05 LAB
ANION GAP SERPL CALCULATED.3IONS-SCNC: 12 MMOL/L (ref 7–16)
BASOPHILS # BLD: 0 K/UL (ref 0–0.2)
BASOPHILS NFR BLD: 0 % (ref 0–2)
BUN SERPL-MCNC: 14 MG/DL (ref 6–20)
CALCIUM SERPL-MCNC: 9.2 MG/DL (ref 8.6–10)
CHLORIDE SERPL-SCNC: 99 MMOL/L (ref 98–107)
CO2 SERPL-SCNC: 27 MMOL/L (ref 22–29)
CREAT SERPL-MCNC: 0.4 MG/DL (ref 0.7–1.2)
EOSINOPHIL # BLD: 0 K/UL (ref 0.05–0.5)
EOSINOPHILS RELATIVE PERCENT: 0 % (ref 0–6)
ERYTHROCYTE [DISTWIDTH] IN BLOOD BY AUTOMATED COUNT: 17.3 % (ref 11.5–15)
GFR, ESTIMATED: >90 ML/MIN/1.73M2
GLUCOSE SERPL-MCNC: 146 MG/DL (ref 74–99)
HCT VFR BLD AUTO: 30.6 % (ref 37–54)
HGB BLD-MCNC: 10 G/DL (ref 12.5–16.5)
LYMPHOCYTES NFR BLD: 0.28 K/UL (ref 1.5–4)
LYMPHOCYTES RELATIVE PERCENT: 3 % (ref 20–42)
MCH RBC QN AUTO: 27.6 PG (ref 26–35)
MCHC RBC AUTO-ENTMCNC: 32.7 G/DL (ref 32–34.5)
MCV RBC AUTO: 84.5 FL (ref 80–99.9)
MONOCYTES NFR BLD: 0.09 K/UL (ref 0.1–0.95)
MONOCYTES NFR BLD: 1 % (ref 2–12)
NEUTROPHILS NFR BLD: 96 % (ref 43–80)
NEUTS SEG NFR BLD: 10.03 K/UL (ref 1.8–7.3)
PLATELET # BLD AUTO: 264 K/UL (ref 130–450)
PMV BLD AUTO: 9 FL (ref 7–12)
POTASSIUM SERPL-SCNC: 4 MMOL/L (ref 3.5–5.1)
RBC # BLD AUTO: 3.62 M/UL (ref 3.8–5.8)
RBC # BLD: ABNORMAL 10*6/UL
SODIUM SERPL-SCNC: 138 MMOL/L (ref 136–145)
WBC OTHER # BLD: 10.4 K/UL (ref 4.5–11.5)

## 2025-06-05 PROCEDURE — 99239 HOSP IP/OBS DSCHRG MGMT >30: CPT | Performed by: FAMILY MEDICINE

## 2025-06-05 PROCEDURE — 6360000002 HC RX W HCPCS: Performed by: PHYSICIAN ASSISTANT

## 2025-06-05 PROCEDURE — 2580000003 HC RX 258: Performed by: PHYSICIAN ASSISTANT

## 2025-06-05 PROCEDURE — 6370000000 HC RX 637 (ALT 250 FOR IP)

## 2025-06-05 PROCEDURE — 85025 COMPLETE CBC W/AUTO DIFF WBC: CPT

## 2025-06-05 PROCEDURE — 6360000002 HC RX W HCPCS: Performed by: FAMILY MEDICINE

## 2025-06-05 PROCEDURE — 36415 COLL VENOUS BLD VENIPUNCTURE: CPT

## 2025-06-05 PROCEDURE — 99233 SBSQ HOSP IP/OBS HIGH 50: CPT

## 2025-06-05 PROCEDURE — 80048 BASIC METABOLIC PNL TOTAL CA: CPT

## 2025-06-05 RX ORDER — HYDROMORPHONE HYDROCHLORIDE 2 MG/1
6 TABLET ORAL
Refills: 0 | Status: DISCONTINUED | OUTPATIENT
Start: 2025-06-05 | End: 2025-06-05 | Stop reason: HOSPADM

## 2025-06-05 RX ORDER — FERROUS SULFATE 325(65) MG
325 TABLET ORAL
Qty: 30 TABLET | Refills: 3 | Status: ON HOLD | OUTPATIENT
Start: 2025-06-06

## 2025-06-05 RX ORDER — DEXAMETHASONE 4 MG/1
4 TABLET ORAL EVERY 6 HOURS
Qty: 28 TABLET | Refills: 0 | Status: ON HOLD | OUTPATIENT
Start: 2025-06-05 | End: 2025-06-12

## 2025-06-05 RX ORDER — OXYCODONE HYDROCHLORIDE 30 MG/1
60 TABLET, FILM COATED, EXTENDED RELEASE ORAL EVERY 12 HOURS
Qty: 28 EACH | Refills: 0 | Status: ON HOLD | OUTPATIENT
Start: 2025-06-05 | End: 2025-06-12

## 2025-06-05 RX ORDER — HYDROMORPHONE HYDROCHLORIDE 4 MG/1
6 TABLET ORAL
Qty: 1 TABLET | Refills: 0 | Status: ON HOLD | OUTPATIENT
Start: 2025-06-05 | End: 2025-06-12

## 2025-06-05 RX ORDER — HEPARIN 100 UNIT/ML
100 SYRINGE INTRAVENOUS PRN
Status: DISCONTINUED | OUTPATIENT
Start: 2025-06-05 | End: 2025-06-05 | Stop reason: HOSPADM

## 2025-06-05 RX ORDER — SENNA AND DOCUSATE SODIUM 50; 8.6 MG/1; MG/1
2 TABLET, FILM COATED ORAL 2 TIMES DAILY
Qty: 30 TABLET | Refills: 0 | Status: ON HOLD | OUTPATIENT
Start: 2025-06-05

## 2025-06-05 RX ORDER — OXYCODONE HCL 10 MG/1
30 TABLET, FILM COATED, EXTENDED RELEASE ORAL EVERY 8 HOURS
Refills: 0 | Status: DISCONTINUED | OUTPATIENT
Start: 2025-06-05 | End: 2025-06-05 | Stop reason: HOSPADM

## 2025-06-05 RX ORDER — HYDROMORPHONE HYDROCHLORIDE 4 MG/1
6 TABLET ORAL
Refills: 0 | OUTPATIENT
Start: 2025-06-05 | End: 2025-06-12

## 2025-06-05 RX ADMIN — METHOCARBAMOL 750 MG: 750 TABLET ORAL at 12:30

## 2025-06-05 RX ADMIN — DEXAMETHASONE SODIUM PHOSPHATE 4 MG: 4 INJECTION, SOLUTION INTRAMUSCULAR; INTRAVENOUS at 17:07

## 2025-06-05 RX ADMIN — METHOCARBAMOL 750 MG: 750 TABLET ORAL at 16:03

## 2025-06-05 RX ADMIN — POLYETHYLENE GLYCOL 3350 17 G: 17 POWDER, FOR SOLUTION ORAL at 08:09

## 2025-06-05 RX ADMIN — Medication 325 MG: at 08:09

## 2025-06-05 RX ADMIN — Medication: at 04:30

## 2025-06-05 RX ADMIN — DEXAMETHASONE SODIUM PHOSPHATE 4 MG: 4 INJECTION, SOLUTION INTRAMUSCULAR; INTRAVENOUS at 11:48

## 2025-06-05 RX ADMIN — DEXAMETHASONE SODIUM PHOSPHATE 4 MG: 4 INJECTION, SOLUTION INTRAMUSCULAR; INTRAVENOUS at 05:48

## 2025-06-05 RX ADMIN — HYDROMORPHONE HYDROCHLORIDE 6 MG: 2 TABLET ORAL at 18:48

## 2025-06-05 RX ADMIN — SENNOSIDES AND DOCUSATE SODIUM 2 TABLET: 50; 8.6 TABLET ORAL at 08:09

## 2025-06-05 RX ADMIN — OXYCODONE HYDROCHLORIDE 30 MG: 10 TABLET, FILM COATED, EXTENDED RELEASE ORAL at 17:49

## 2025-06-05 RX ADMIN — Medication 100 UNITS: at 17:51

## 2025-06-05 RX ADMIN — METHOCARBAMOL 750 MG: 750 TABLET ORAL at 08:09

## 2025-06-05 ASSESSMENT — PAIN DESCRIPTION - ONSET
ONSET: ON-GOING

## 2025-06-05 ASSESSMENT — PAIN DESCRIPTION - LOCATION
LOCATION: GENERALIZED

## 2025-06-05 ASSESSMENT — PAIN DESCRIPTION - PAIN TYPE
TYPE: CHRONIC PAIN

## 2025-06-05 ASSESSMENT — PAIN DESCRIPTION - FREQUENCY
FREQUENCY: CONTINUOUS

## 2025-06-05 ASSESSMENT — PAIN SCALES - GENERAL
PAINLEVEL_OUTOF10: 8
PAINLEVEL_OUTOF10: 8
PAINLEVEL_OUTOF10: 4
PAINLEVEL_OUTOF10: 8
PAINLEVEL_OUTOF10: 7
PAINLEVEL_OUTOF10: 8
PAINLEVEL_OUTOF10: 8
PAINLEVEL_OUTOF10: 7
PAINLEVEL_OUTOF10: 7
PAINLEVEL_OUTOF10: 8
PAINLEVEL_OUTOF10: 8

## 2025-06-05 ASSESSMENT — PAIN DESCRIPTION - DESCRIPTORS
DESCRIPTORS: ACHING;DISCOMFORT;SORE

## 2025-06-05 NOTE — CARE COORDINATION
Chart reviewed and case reviewed in IDR.  Patient worked with therapy yesterday and patient unable to lift legs to complete stairs.  Patient is active with [facility] Palliative Care.  Met with the patient and discussed transition of care planning.  Patient is on Diluadid PCA pump.  Patient hopeful to have PCA pump at home.  Attempted to discuss alternative pain management regimens, as PCA may not be likely.  Patient would like to speak with FANG from [facility]iative [facility].  Call also placed to the patient's sister Margarita and brother Ryan and reviewed transition of care plans.  Family will discuss options with the patient and patient's family requested home health care to be arranged for nursing, PT/OT and aid services.  Home care referral sent to [facility] via [employer].  Spoke with Melanie, liaison with [facility] and she will take care of referral and reach out to the palliative Med team to call the patient.  Will continue to follow for further transition of care planning needs.           Sofia Randall RN.  P:  [phone]

## 2025-06-05 NOTE — PLAN OF CARE
Problem: Discharge Planning  Goal: Discharge to home or other facility with appropriate resources  Outcome: Progressing     Problem: Safety - Adult  Goal: Free from fall injury  Outcome: Progressing     Problem: Musculoskeletal - Adult  Goal: Return mobility to safest level of function  Outcome: Progressing     Problem: Musculoskeletal - Adult  Goal: Maintain proper alignment of affected body part  Outcome: Progressing     Problem: Musculoskeletal - Adult  Goal: Return ADL status to a safe level of function  Outcome: Progressing     Problem: Pain  Goal: Verbalizes/displays adequate comfort level or baseline comfort level  Outcome: Progressing     Problem: Nutrition Deficit:  Goal: Optimize nutritional status  Outcome: Progressing

## 2025-06-05 NOTE — DISCHARGE SUMMARY
Discharge Summary    Joseph Bond  :  1980  MRN:  [id number]    Admit date:  2025  Discharge date:  2025    Admitting Physician:  Sarahi Lan MD    Discharge Diagnoses:    Patient Active Problem List   Diagnosis    Retroperitoneal lymphadenopathy    Chronic midline low back pain with left-sided sciatica    Elevated PSA    Urinary hesitancy    Pelvic pain    Osteopenia of lumbar spine    Prostate cancer metastatic to bone (HCC)    Hemorrhoids    Anxiety    Cancer, metastatic to bone (HCC)    Primary prostate cancer with metastasis from prostate to other site (HCC)    Anemia, unspecified type    Cancer-related pain    Palliative care encounter    Metastatic cancer to bone (HCC)    Intractable pain    Prostate cancer (HCC)    Moderate protein-calorie malnutrition       Admission Condition:  fair    Discharged Condition:  stable    Hospital Course:  Joseph Bond is a 44 y.o. male with a PMH of prostate ca w mets on Nubeqa s/p palliaitve radiation and chemotherapy who presented to the ED with acute intractable pain localized to his lower back and hips.  MRI thoracic and lumbar done 2025 w mets that seems to be stable compared to last CT spine. Patient declined further radiation therapy prior to admission. Work-up was remarkable for elevated ALP, AST and mild leukocytosis. CTA did not show signs of PE. Palliative care was consulted for pain management. His Hgb did drop to 6.4 on  and he received 1 unit pRBC with a post transfusion Hgb of 9.6. His Hgb has remained stable since. His iron levels were low and iron supplements were initiated. Palliative adjusted his pain medication regimen to Oxycontin 30 mg ER TID, Dilaudid 4mg PO Q2H PRN and Methacarbamol 750mg TID. His pain remained uncontrolled with this regimen so 1 mg IV dilaudid Q1H PRN with parameters was also added. Palliative recommended evaluation for palliative radiation to bone mets given uncontrolled pain and patient was on board

## 2025-06-05 NOTE — DISCHARGE INSTRUCTIONS
=====================================  [facility]  DISCHARGE INSTRUCTIONS  =====================================    Take your medications as directed in this summary    Future scheduled appointments are listed below or recommended appointments are mentioned above.    Future Appointments   Date Time Provider Department Center   6/13/2025 10:45 AM Ty Shaikh MD [facility]   9/11/2025  1:00 PM Jony Ortega DO [facility]       Call [unfilled] to confirm or schedule your appointment with Dr. Shiakh,  as soon as possible and/or if there are any appointment changes or other issues.    It is important that you follow up with Dr. Shaikh for better monitoring of the reason of your hospitalization. Follow up with the specialists that saw you during your stay as well. If you have any questions call your PCP at [phone].    Once discharged from [facility], you can :     Return to work : Yes, you may return to work  Activity : As tolerated  Stairs : As tolerated  Exercise : As tolerated  Lifting : As tolerated   Sexual activity : Yes  Driving : With seat belt on. NO driving on narcotic pain medication if prescribed   Medications : Always take your medications as prescribed  Wound Care: none needed  Diet : You are asked to make an attempt to improve diet and exercise patterns to aid in medical management of your medical condition/problem.     Call [facility] with any further questions. Return to Emergency Department with any worsening of your condition and/or fever greater than 101 degrees, new weakness, shortness of breath or chest pain.

## 2025-06-05 NOTE — PROGRESS NOTES
Varun, Does have capacity for medical decision-making.  Capacity is time limited and situation/question specific  During encounter the patient was his own medical decision-maker  Outcome of goals of care meeting:  Continue current medical treatment  Continue full code  Transition to oral regimen for discharge, [facility] to f/u OP  Code status Full Code  Advanced Directives: HCPOA in epic  Surrogate/Legal NOK:  Margarita Bond (Sister/POA) [phone]   Gaurav Womack (cousin) [phone]    Neoplasm related pain  # Home regimen: OxyContin ER 30 mg twice daily.  Dilaudid 4 mg p.o. every 2 hours as needed breakthrough pain  # Dilaudid PCA started 6/3-->pain controlled  # Discussed case with [facility] palliative care, [facility] palliative care physicians, attending physicians   > Patient ready for discharge with oral regimen changes based on OMEs:   - increase Oxycontin to 60mg BID   - increase Dilaudid PO 6mg q2hr PRN for breakthrough pain   - Dexamethasone 4 mg p.o. every 6 hours for 7 days      Prophylactic stool softener  # Continue Senokot and MiraLAX  # Patient denies any difficulty moving his bowels    Nausea and vomiting  # Zofran as needed  # Nauseous this morning 2/2 pain    Spiritual assessment: no spiritual distress identified  Bereavement and grief: to be determined  Referrals to: none today    Thank you for the opportunity to participate in the care of Joseph Bond.     Bernadette Mason, APRN - [facility]   Palliative Medicine     SUBJECTIVE:     Details of Conversation:     Patient seen at bedside.  Reports pain controlled on the Dilaudid PCA. He has decreased his as needed bolus dose of IV dilaudid to 9mg in the past 24 hr. discussed with patient that we will be likely discharging him this evening with adjustments to his oral regimen based on OME's.  Spoke with [facility] NP Paulina, they confirmed that they do not manage PTAs in the outpatient setting but will continue to follow him on his oral

## 2025-06-05 NOTE — PROGRESS NOTES
[facility] - Family Medicine Inpatient   Resident Progress Note    S:  Hospital day: 6   Brief Synopsis: Joseph Bond is a 44 y.o. male with a PMH of prostate ca w mets on Nubeqa s/p palliaitve radiation and chemotherapy who presents with acute pain/generalized that is more intense that his pain from metastasis. Also has neuropathic pain from chemotherapy, declines gabapentin/pregabalin because of fear of losing memory. MRI thoracic and lumbar done 5/2025 w mets that seems to be stable compared to last CT spine. Dr. Matson did place rad onc referral placed but pt does not want to do more radiation. Work-up was remarkable for elevated ALP, AST and mild leukocytosis. CTA did not show signs of PE. Palliative care was consulted for pain management. His Hgb did drop to 6.4 on 6/1 and he received 1 unit pRBC with a post transfusion Hgb of 9.6 that has remained stable. Palliative adjusted his pain medication regimen to Oxycontin 30 mg ER TID, Dilaudid 4mg PO Q2H PRN and Methacarbamol 750mg TID. His pain remained uncontrolled with this regimen so 1 mg IV dilaudid Q1H PRN with parameters was also added. Palliative recommended evaluation for palliative radiation to bone mets given uncontrolled pain.  Radiation oncology was consulted with plans for patient to be seen and treated at [place] as an outpatient.  Neurosurgery was consulted for chronic compression deformities in the lumbar spine, and he was not deemed a candidate for any surgical intervention with recommendation for palliative care.  He was started on a Dilaudid PCA with plans to transition to oral regimen based on OME's.  He was also started on IV Dexamethasone 4 mg Q6H.    Overnight/interim:   No acute events overnight  Patient was seen and examined at bedside this morning, in no acute distress.  States that pain has improved today from yesterday, yet still rates it as 8/10 in severity.  He denies having any fever, chills, SOB, CP, abdominal pain, NV/D, abnormal  bruising/bleeding, hematochezia/melena/hematuria, or any other acute complaints.  Last BM was yesterday.    Cont meds:    HYDROmorphone      sodium chloride      sodium chloride       Scheduled meds:    ferrous sulfate  325 mg Oral Daily with breakfast    Darolutamide  600 mg Oral BID    dexAMETHasone  4 mg IntraVENous Q6H    [Held by provider] predniSONE  40 mg Oral Daily    [Held by provider] oxyCODONE  30 mg Oral Q8H    sennosides-docusate sodium  2 tablet Oral BID    sodium chloride flush  5-40 mL IntraVENous 2 times per day    enoxaparin  40 mg SubCUTAneous Daily    methocarbamol  750 mg Oral 4x Daily    pantoprazole (PROTONIX) 40 mg in sodium chloride (PF) 0.9 % 10 mL injection  40 mg IntraVENous Daily    polyethylene glycol  17 g Oral Daily     PRN meds: sodium chloride, sodium chloride flush, sodium chloride, ondansetron **OR** ondansetron, acetaminophen **OR** acetaminophen, [Held by provider] HYDROmorphone     I reviewed the patient's past medical and surgical history, Medications and Allergies.    O:  /79   Pulse 68   Temp 97.3 °F (36.3 °C) (Temporal)   Resp 18   Ht 1.702 m (5' 7.01\")   Wt 80.1 kg (176 lb 9.4 oz)   SpO2 96%   BMI 27.65 kg/m²   24 hour I&O: I/O last 3 completed shifts:  In: 540 [P.O.:540]  Out: 1600 [Urine:1600]  No intake/output data recorded.       Physical Exam  Constitutional:       Appearance: Normal appearance.   HENT:      Head: Normocephalic and atraumatic.   Cardiovascular:      Rate and Rhythm: Normal rate and regular rhythm.      Pulses: Normal pulses.      Heart sounds: Normal heart sounds.   Pulmonary:      Effort: Pulmonary effort is normal.      Breath sounds: Normal breath sounds.   Abdominal:      General: Bowel sounds are normal. There is no distension.      Palpations: Abdomen is soft.      Tenderness: There is no abdominal tenderness.   Musculoskeletal:         General: Tenderness (to palpation of spine, hips b/l) present. No swelling.      Right lower leg:

## 2025-06-06 ENCOUNTER — APPOINTMENT (OUTPATIENT)
Dept: GENERAL RADIOLOGY | Age: 45
End: 2025-06-06
Payer: MEDICAID

## 2025-06-06 ENCOUNTER — HOSPITAL ENCOUNTER (INPATIENT)
Age: 45
LOS: 14 days | Discharge: ANOTHER HOSPITAL | End: 2025-06-20
Attending: STUDENT IN AN ORGANIZED HEALTH CARE EDUCATION/TRAINING PROGRAM | Admitting: FAMILY MEDICINE
Payer: MEDICAID

## 2025-06-06 DIAGNOSIS — C61 PROSTATE CANCER METASTATIC TO BONE (HCC): ICD-10-CM

## 2025-06-06 DIAGNOSIS — C79.51 PROSTATE CANCER METASTATIC TO BONE (HCC): ICD-10-CM

## 2025-06-06 DIAGNOSIS — R52 INTRACTABLE PAIN: Primary | ICD-10-CM

## 2025-06-06 DIAGNOSIS — C79.51 METASTATIC CANCER TO BONE (HCC): ICD-10-CM

## 2025-06-06 LAB
ALBUMIN SERPL-MCNC: 4.1 G/DL (ref 3.5–5.2)
ALP SERPL-CCNC: 315 U/L (ref 40–129)
ALT SERPL-CCNC: 46 U/L (ref 0–50)
ANION GAP SERPL CALCULATED.3IONS-SCNC: 16 MMOL/L (ref 7–16)
AST SERPL-CCNC: 124 U/L (ref 0–50)
BASOPHILS # BLD: 0.02 K/UL (ref 0–0.2)
BASOPHILS NFR BLD: 0 % (ref 0–2)
BILIRUB SERPL-MCNC: 0.3 MG/DL (ref 0–1.2)
BUN SERPL-MCNC: 17 MG/DL (ref 6–20)
CALCIUM SERPL-MCNC: 10 MG/DL (ref 8.6–10)
CHLORIDE SERPL-SCNC: 99 MMOL/L (ref 98–107)
CK SERPL-CCNC: 670 U/L (ref 0–190)
CO2 SERPL-SCNC: 25 MMOL/L (ref 22–29)
CREAT SERPL-MCNC: 0.6 MG/DL (ref 0.7–1.2)
EOSINOPHIL # BLD: 0 K/UL (ref 0.05–0.5)
EOSINOPHILS RELATIVE PERCENT: 0 % (ref 0–6)
ERYTHROCYTE [DISTWIDTH] IN BLOOD BY AUTOMATED COUNT: 17.6 % (ref 11.5–15)
GFR, ESTIMATED: >90 ML/MIN/1.73M2
GLUCOSE SERPL-MCNC: 104 MG/DL (ref 74–99)
HCT VFR BLD AUTO: 41.4 % (ref 37–54)
HGB BLD-MCNC: 13.3 G/DL (ref 12.5–16.5)
IMM GRANULOCYTES # BLD AUTO: 0.5 K/UL (ref 0–0.58)
IMM GRANULOCYTES NFR BLD: 4 % (ref 0–5)
LYMPHOCYTES NFR BLD: 0.66 K/UL (ref 1.5–4)
LYMPHOCYTES RELATIVE PERCENT: 5 % (ref 20–42)
MCH RBC QN AUTO: 27.3 PG (ref 26–35)
MCHC RBC AUTO-ENTMCNC: 32.1 G/DL (ref 32–34.5)
MCV RBC AUTO: 84.8 FL (ref 80–99.9)
MONOCYTES NFR BLD: 0.92 K/UL (ref 0.1–0.95)
MONOCYTES NFR BLD: 6 % (ref 2–12)
NEUTROPHILS NFR BLD: 85 % (ref 43–80)
NEUTS SEG NFR BLD: 12.2 K/UL (ref 1.8–7.3)
PLATELET # BLD AUTO: 323 K/UL (ref 130–450)
PMV BLD AUTO: 9.1 FL (ref 7–12)
POTASSIUM SERPL-SCNC: 4.2 MMOL/L (ref 3.5–5.1)
PROT SERPL-MCNC: 8.1 G/DL (ref 6.4–8.3)
RBC # BLD AUTO: 4.88 M/UL (ref 3.8–5.8)
SODIUM SERPL-SCNC: 140 MMOL/L (ref 136–145)
WBC OTHER # BLD: 14.3 K/UL (ref 4.5–11.5)

## 2025-06-06 PROCEDURE — 1200000000 HC SEMI PRIVATE

## 2025-06-06 PROCEDURE — 80053 COMPREHEN METABOLIC PANEL: CPT

## 2025-06-06 PROCEDURE — 6360000002 HC RX W HCPCS

## 2025-06-06 PROCEDURE — 82550 ASSAY OF CK (CPK): CPT

## 2025-06-06 PROCEDURE — 96374 THER/PROPH/DIAG INJ IV PUSH: CPT

## 2025-06-06 PROCEDURE — 2580000003 HC RX 258

## 2025-06-06 PROCEDURE — 99285 EMERGENCY DEPT VISIT HI MDM: CPT

## 2025-06-06 PROCEDURE — 2500000003 HC RX 250 WO HCPCS

## 2025-06-06 PROCEDURE — 2580000003 HC RX 258: Performed by: STUDENT IN AN ORGANIZED HEALTH CARE EDUCATION/TRAINING PROGRAM

## 2025-06-06 PROCEDURE — 85025 COMPLETE CBC W/AUTO DIFF WBC: CPT

## 2025-06-06 PROCEDURE — 2500000003 HC RX 250 WO HCPCS: Performed by: STUDENT IN AN ORGANIZED HEALTH CARE EDUCATION/TRAINING PROGRAM

## 2025-06-06 PROCEDURE — 96376 TX/PRO/DX INJ SAME DRUG ADON: CPT

## 2025-06-06 PROCEDURE — 6370000000 HC RX 637 (ALT 250 FOR IP)

## 2025-06-06 PROCEDURE — 6360000002 HC RX W HCPCS: Performed by: STUDENT IN AN ORGANIZED HEALTH CARE EDUCATION/TRAINING PROGRAM

## 2025-06-06 PROCEDURE — 71045 X-RAY EXAM CHEST 1 VIEW: CPT

## 2025-06-06 RX ORDER — POLYETHYLENE GLYCOL 3350 17 G/17G
17 POWDER, FOR SOLUTION ORAL DAILY PRN
Status: DISCONTINUED | OUTPATIENT
Start: 2025-06-06 | End: 2025-06-06 | Stop reason: SDUPTHER

## 2025-06-06 RX ORDER — ENOXAPARIN SODIUM 100 MG/ML
40 INJECTION SUBCUTANEOUS DAILY
Status: DISCONTINUED | OUTPATIENT
Start: 2025-06-06 | End: 2025-06-20 | Stop reason: HOSPADM

## 2025-06-06 RX ORDER — SODIUM CHLORIDE 0.9 % (FLUSH) 0.9 %
5-40 SYRINGE (ML) INJECTION EVERY 12 HOURS SCHEDULED
Status: DISCONTINUED | OUTPATIENT
Start: 2025-06-06 | End: 2025-06-20 | Stop reason: HOSPADM

## 2025-06-06 RX ORDER — HYDROMORPHONE HYDROCHLORIDE 1 MG/ML
1 INJECTION, SOLUTION INTRAMUSCULAR; INTRAVENOUS; SUBCUTANEOUS
Status: DISCONTINUED | OUTPATIENT
Start: 2025-06-06 | End: 2025-06-07

## 2025-06-06 RX ORDER — SODIUM CHLORIDE 9 MG/ML
INJECTION, SOLUTION INTRAVENOUS PRN
Status: DISCONTINUED | OUTPATIENT
Start: 2025-06-06 | End: 2025-06-20 | Stop reason: HOSPADM

## 2025-06-06 RX ORDER — POLYETHYLENE GLYCOL 3350 17 G/17G
17 POWDER, FOR SOLUTION ORAL DAILY PRN
Status: DISCONTINUED | OUTPATIENT
Start: 2025-06-06 | End: 2025-06-09

## 2025-06-06 RX ORDER — ONDANSETRON 4 MG/1
4 TABLET, ORALLY DISINTEGRATING ORAL EVERY 8 HOURS PRN
Status: DISCONTINUED | OUTPATIENT
Start: 2025-06-06 | End: 2025-06-20 | Stop reason: HOSPADM

## 2025-06-06 RX ORDER — ACETAMINOPHEN 650 MG/1
650 SUPPOSITORY RECTAL EVERY 6 HOURS PRN
Status: DISCONTINUED | OUTPATIENT
Start: 2025-06-06 | End: 2025-06-20 | Stop reason: HOSPADM

## 2025-06-06 RX ORDER — HYDROMORPHONE HYDROCHLORIDE 2 MG/1
6 TABLET ORAL
Status: DISCONTINUED | OUTPATIENT
Start: 2025-06-06 | End: 2025-06-06

## 2025-06-06 RX ORDER — HYDROMORPHONE HYDROCHLORIDE 1 MG/ML
1 INJECTION, SOLUTION INTRAMUSCULAR; INTRAVENOUS; SUBCUTANEOUS ONCE
Status: COMPLETED | OUTPATIENT
Start: 2025-06-06 | End: 2025-06-06

## 2025-06-06 RX ORDER — HYDROMORPHONE HYDROCHLORIDE 1 MG/ML
1 INJECTION, SOLUTION INTRAMUSCULAR; INTRAVENOUS; SUBCUTANEOUS ONCE
Refills: 0 | Status: COMPLETED | OUTPATIENT
Start: 2025-06-06 | End: 2025-06-06

## 2025-06-06 RX ORDER — SODIUM CHLORIDE 0.9 % (FLUSH) 0.9 %
5-40 SYRINGE (ML) INJECTION PRN
Status: DISCONTINUED | OUTPATIENT
Start: 2025-06-06 | End: 2025-06-20 | Stop reason: HOSPADM

## 2025-06-06 RX ORDER — NALOXONE HYDROCHLORIDE 0.4 MG/ML
INJECTION, SOLUTION INTRAMUSCULAR; INTRAVENOUS; SUBCUTANEOUS PRN
Status: DISCONTINUED | OUTPATIENT
Start: 2025-06-06 | End: 2025-06-07

## 2025-06-06 RX ORDER — DEXAMETHASONE 4 MG/1
4 TABLET ORAL EVERY 6 HOURS
Status: DISCONTINUED | OUTPATIENT
Start: 2025-06-06 | End: 2025-06-18

## 2025-06-06 RX ORDER — SENNA AND DOCUSATE SODIUM 50; 8.6 MG/1; MG/1
2 TABLET, FILM COATED ORAL 2 TIMES DAILY
Status: DISCONTINUED | OUTPATIENT
Start: 2025-06-06 | End: 2025-06-20 | Stop reason: HOSPADM

## 2025-06-06 RX ORDER — 0.9 % SODIUM CHLORIDE 0.9 %
1000 INTRAVENOUS SOLUTION INTRAVENOUS ONCE
Status: COMPLETED | OUTPATIENT
Start: 2025-06-06 | End: 2025-06-06

## 2025-06-06 RX ORDER — ACETAMINOPHEN 325 MG/1
650 TABLET ORAL EVERY 6 HOURS PRN
Status: DISCONTINUED | OUTPATIENT
Start: 2025-06-06 | End: 2025-06-20 | Stop reason: HOSPADM

## 2025-06-06 RX ORDER — ONDANSETRON 2 MG/ML
4 INJECTION INTRAMUSCULAR; INTRAVENOUS EVERY 6 HOURS PRN
Status: DISCONTINUED | OUTPATIENT
Start: 2025-06-06 | End: 2025-06-20 | Stop reason: HOSPADM

## 2025-06-06 RX ORDER — OXYCODONE HYDROCHLORIDE 30 MG/1
60 TABLET, FILM COATED, EXTENDED RELEASE ORAL EVERY 12 HOURS
Status: DISCONTINUED | OUTPATIENT
Start: 2025-06-06 | End: 2025-06-07

## 2025-06-06 RX ORDER — METHOCARBAMOL 750 MG/1
750 TABLET, FILM COATED ORAL 4 TIMES DAILY
Status: DISCONTINUED | OUTPATIENT
Start: 2025-06-06 | End: 2025-06-09

## 2025-06-06 RX ORDER — FERROUS SULFATE 325(65) MG
325 TABLET ORAL
Status: DISCONTINUED | OUTPATIENT
Start: 2025-06-07 | End: 2025-06-20 | Stop reason: HOSPADM

## 2025-06-06 RX ADMIN — HYDROMORPHONE HYDROCHLORIDE 1 MG: 1 INJECTION, SOLUTION INTRAMUSCULAR; INTRAVENOUS; SUBCUTANEOUS at 16:42

## 2025-06-06 RX ADMIN — HYDROMORPHONE HYDROCHLORIDE 1 MG: 1 INJECTION, SOLUTION INTRAMUSCULAR; INTRAVENOUS; SUBCUTANEOUS at 19:57

## 2025-06-06 RX ADMIN — HYDROMORPHONE HYDROCHLORIDE 1 MG: 1 INJECTION, SOLUTION INTRAMUSCULAR; INTRAVENOUS; SUBCUTANEOUS at 18:13

## 2025-06-06 RX ADMIN — HYDROMORPHONE HYDROCHLORIDE 1 MG: 1 INJECTION, SOLUTION INTRAMUSCULAR; INTRAVENOUS; SUBCUTANEOUS at 22:25

## 2025-06-06 RX ADMIN — SENNOSIDES AND DOCUSATE SODIUM 2 TABLET: 8.6; 5 TABLET ORAL at 19:56

## 2025-06-06 RX ADMIN — SODIUM CHLORIDE, PRESERVATIVE FREE 10 ML: 5 INJECTION INTRAVENOUS at 22:31

## 2025-06-06 RX ADMIN — HYDROMORPHONE HYDROCHLORIDE 6 MG: 4 TABLET ORAL at 20:50

## 2025-06-06 RX ADMIN — SODIUM CHLORIDE 1000 ML: 0.9 INJECTION, SOLUTION INTRAVENOUS at 15:17

## 2025-06-06 RX ADMIN — METHOCARBAMOL 750 MG: 750 TABLET ORAL at 19:56

## 2025-06-06 RX ADMIN — HYDROMORPHONE HYDROCHLORIDE 1 MG: 1 INJECTION, SOLUTION INTRAMUSCULAR; INTRAVENOUS; SUBCUTANEOUS at 21:33

## 2025-06-06 RX ADMIN — ACETAMINOPHEN 650 MG: 325 TABLET ORAL at 23:23

## 2025-06-06 RX ADMIN — HYDROMORPHONE HYDROCHLORIDE 1 MG: 1 INJECTION, SOLUTION INTRAMUSCULAR; INTRAVENOUS; SUBCUTANEOUS at 15:34

## 2025-06-06 RX ADMIN — DEXAMETHASONE 4 MG: 4 TABLET ORAL at 19:57

## 2025-06-06 RX ADMIN — HYDROMORPHONE HYDROCHLORIDE 6 MG: 4 TABLET ORAL at 23:22

## 2025-06-06 RX ADMIN — Medication 25 MG: at 18:52

## 2025-06-06 RX ADMIN — HYDROMORPHONE HYDROCHLORIDE 1 MG: 1 INJECTION, SOLUTION INTRAMUSCULAR; INTRAVENOUS; SUBCUTANEOUS at 15:16

## 2025-06-06 ASSESSMENT — PAIN DESCRIPTION - LOCATION
LOCATION: GENERALIZED
LOCATION: BACK;HIP;GENERALIZED
LOCATION: GENERALIZED
LOCATION: OTHER (COMMENT)
LOCATION: GENERALIZED
LOCATION: LEG;BACK

## 2025-06-06 ASSESSMENT — PAIN SCALES - GENERAL
PAINLEVEL_OUTOF10: 10

## 2025-06-06 ASSESSMENT — PAIN DESCRIPTION - ORIENTATION
ORIENTATION: MID;RIGHT;LEFT
ORIENTATION: RIGHT;LEFT;LOWER;UPPER
ORIENTATION: OTHER (COMMENT)
ORIENTATION: MID;LEFT;RIGHT
ORIENTATION: MID;LOWER
ORIENTATION: RIGHT;LEFT;LOWER;UPPER

## 2025-06-06 ASSESSMENT — PAIN - FUNCTIONAL ASSESSMENT
PAIN_FUNCTIONAL_ASSESSMENT: 0-10
PAIN_FUNCTIONAL_ASSESSMENT: PREVENTS OR INTERFERES SOME ACTIVE ACTIVITIES AND ADLS
PAIN_FUNCTIONAL_ASSESSMENT: ACTIVITIES ARE NOT PREVENTED
PAIN_FUNCTIONAL_ASSESSMENT: ACTIVITIES ARE NOT PREVENTED

## 2025-06-06 ASSESSMENT — PAIN DESCRIPTION - DESCRIPTORS
DESCRIPTORS: ACHING;SHARP;DISCOMFORT
DESCRIPTORS: SHOOTING;SHARP
DESCRIPTORS: SHARP;SHOOTING;SORE
DESCRIPTORS: TIGHTNESS
DESCRIPTORS: PATIENT UNABLE TO DESCRIBE
DESCRIPTORS: SHARP;SHOOTING

## 2025-06-06 NOTE — ED PROVIDER NOTES
Department of Emergency Medicine   ED Provider Note  Admit Date/RoomTime: 6/6/2025  2:09 PM  ED Room: 08/08          History of Present Illness:  6/6/25, Time: 2:34 PM EDT      Patient is a 44 y.o. male presents with a chief complaint of body wide pain  This has been occurring for chronically but unchanged despite dilaudid and oxycodone at home.  Patient states that it gets better with nothing.  Patient states that it gets worse with nothing.  Patient states that it is severe in severity.  Patient states it was acute in onset.  He notes he has been having pain secondary to masses for the past several months that has been worsening.  He was admitted and discharged yesterday after being on Dilaudid drip.  Notes he has been having persistent pain despite taking his home medications.  No falls, injuries, no new numbness or tingling.  Pain is his chronic pain, unchanged.    Review of Systems:     Pertinent positives and negatives are stated within HPI.      --------------------------------------------- PAST HISTORY ---------------------------------------------  Past Medical History:  has a past medical history of History of blood transfusion, History of opioid abuse (HCC), Neuropathy, and Prostate cancer (HCC).    Past Surgical History:  has a past surgical history that includes bronchoscopy (N/A, 7/11/2024); bronchoscopy (7/11/2024); and Port Surgery (N/A, 11/25/2024).    Social History:  reports that he has quit smoking. His smoking use included cigarettes. He started smoking about 30 years ago. He has a 12 pack-year smoking history. He has been exposed to tobacco smoke. He has never used smokeless tobacco. He reports that he does not currently use drugs. He reports that he does not drink alcohol.    Family History: family history includes Coronary Art Dis in his maternal grandfather.     The patient’s home medications have been reviewed.    Allergies: Patient has no known

## 2025-06-06 NOTE — ED PROVIDER NOTES
[facility] EMERGENCY DEPARTMENT  EMERGENCY DEPARTMENT ENCOUNTER        Pt Name: Joseph Bond  MRN: [id number]  Birthdate 1980  Date of evaluation: 6/6/2025  Provider: Crow Recio MD  PCP: Ty Shaikh MD  Note Started: 2:12 PM EDT 6/6/25    CHIEF COMPLAINT       Chief Complaint   Patient presents with    Generalized Body Aches     Patient c/o generalized body pain , hx of CA       HISTORY OF PRESENT ILLNESS: 1 or more Elements        Limitations to history : None    Joseph Bond is a 44 y.o. male who presents to the emergency room for generalized bodyaches and pain.  Patient has a history of cancer.  Has prostate cancer with mets to his spine.  Was recently admitted for intractable pain, and was discharged home on an oral pain regimen which has not been controlling his pain.  This is what prompted her to come back to the emergency room today.  No saddle anesthesia.  No bowel or bladder incontinence.  Has difficulty moving his legs as he has significant pain when attempting to do so.    Nursing Notes were all reviewed and agreed with or any disagreements were addressed in the HPI.      REVIEW OF EXTERNAL NOTE :       As below    REVIEW OF SYSTEMS :      Positives and Pertinent negatives as per HPI.     SURGICAL HISTORY     Past Surgical History:   Procedure Laterality Date    BRONCHOSCOPY N/A 7/11/2024    BRONCHOSCOPY ENDOBRONCHIAL ULTRASOUND FINE NEEDLE ASPIRATION performed by Ed Cornejo DO [place] [facility]    BRONCHOSCOPY  7/11/2024    BRONCHOSCOPY DIAGNOSTIC OR CELL WASH ONLY performed by Ed Cornejo DO at [facility]    PORT SURGERY N/A 11/25/2024    MEDIPORT INSERTION performed by Felipe Washington MD at [facility] OR       CURRENTMEDICATIONS       Previous Medications    ALPHA-LIPOIC ACID 300 MG CAPS    Take 1 capsule by mouth 2 times daily    DEXAMETHASONE (DECADRON) 4 MG TABLET    Take 1 tablet by mouth every 6 hours for 7 days     Notable for the following components:    Glucose 104 (*)     Creatinine 0.6 (*)     Alkaline Phosphatase 315 (*)      (*)     All other components within normal limits   CK - Abnormal; Notable for the following components:    Total  (*)     All other components within normal limits       As interpreted by me, the above displayed labs are abnormal. All other labs obtained during this visit were within normal range or not returned as of this dictation.    RADIOLOGY:   Non-plain film images such as CT, Ultrasound and MRI are read by the radiologist. Plain radiographic images are visualized and preliminarily interpreted by the ED Provider with the findings documented in the ED Course.    Interpretation per the Radiologist below, if available at the time of this note:    No orders to display     No results found.    No results found.    PROCEDURES   Unless otherwise noted below, none       CRITICAL CARE TIME (.cct)   Please note that the withdrawal or failure to initiate urgent interventions for this patient would likely result in a life threatening deterioration or permanent disability.  Systems at risk for deterioration include: Systemic    Accordingly this patient received 31 minutes of critical care time, excluding separately billable procedures.      PAST MEDICAL HISTORY/Chronic Conditions Affecting Care      has a past medical history of History of blood transfusion, History of opioid abuse (HCC), Neuropathy, and Prostate cancer (HCC).     EMERGENCY DEPARTMENT COURSE    Vitals:    Vitals:    06/06/25 1417 06/06/25 1516   BP: (!) 132/101    Pulse: 95    Resp: 18 18   Temp: 98.1 °F (36.7 °C)    SpO2: 97%        Patient was given the following medications:  Medications   sodium chloride 0.9 % bolus 1,000 mL (1,000 mLs IntraVENous New Bag 6/6/25 1517)   HYDROmorphone (DILAUDID) injection 1 mg (has no administration in time range)   HYDROmorphone HCl PF (DILAUDID) injection 1 mg (1 mg IntraVENous Given

## 2025-06-07 LAB
ANION GAP SERPL CALCULATED.3IONS-SCNC: 13 MMOL/L (ref 7–16)
BASOPHILS # BLD: 0.03 K/UL (ref 0–0.2)
BASOPHILS NFR BLD: 0 % (ref 0–2)
BUN SERPL-MCNC: 15 MG/DL (ref 6–20)
CALCIUM SERPL-MCNC: 9.3 MG/DL (ref 8.6–10)
CHLORIDE SERPL-SCNC: 99 MMOL/L (ref 98–107)
CO2 SERPL-SCNC: 25 MMOL/L (ref 22–29)
CREAT SERPL-MCNC: 0.5 MG/DL (ref 0.7–1.2)
EOSINOPHIL # BLD: 0 K/UL (ref 0.05–0.5)
EOSINOPHILS RELATIVE PERCENT: 0 % (ref 0–6)
ERYTHROCYTE [DISTWIDTH] IN BLOOD BY AUTOMATED COUNT: 17.9 % (ref 11.5–15)
GFR, ESTIMATED: >90 ML/MIN/1.73M2
GLUCOSE SERPL-MCNC: 111 MG/DL (ref 74–99)
HCT VFR BLD AUTO: 34.2 % (ref 37–54)
HGB BLD-MCNC: 11 G/DL (ref 12.5–16.5)
IMM GRANULOCYTES # BLD AUTO: 0.37 K/UL (ref 0–0.58)
IMM GRANULOCYTES NFR BLD: 3 % (ref 0–5)
LYMPHOCYTES NFR BLD: 0.49 K/UL (ref 1.5–4)
LYMPHOCYTES RELATIVE PERCENT: 4 % (ref 20–42)
MCH RBC QN AUTO: 27 PG (ref 26–35)
MCHC RBC AUTO-ENTMCNC: 32.2 G/DL (ref 32–34.5)
MCV RBC AUTO: 84 FL (ref 80–99.9)
MONOCYTES NFR BLD: 0.84 K/UL (ref 0.1–0.95)
MONOCYTES NFR BLD: 7 % (ref 2–12)
NEUTROPHILS NFR BLD: 85 % (ref 43–80)
NEUTS SEG NFR BLD: 9.79 K/UL (ref 1.8–7.3)
PLATELET # BLD AUTO: 269 K/UL (ref 130–450)
PMV BLD AUTO: 9.7 FL (ref 7–12)
POTASSIUM SERPL-SCNC: 4.1 MMOL/L (ref 3.5–5.1)
RBC # BLD AUTO: 4.07 M/UL (ref 3.8–5.8)
RBC # BLD: ABNORMAL 10*6/UL
SODIUM SERPL-SCNC: 137 MMOL/L (ref 136–145)
WBC OTHER # BLD: 11.5 K/UL (ref 4.5–11.5)

## 2025-06-07 PROCEDURE — 6370000000 HC RX 637 (ALT 250 FOR IP)

## 2025-06-07 PROCEDURE — 6360000002 HC RX W HCPCS: Performed by: STUDENT IN AN ORGANIZED HEALTH CARE EDUCATION/TRAINING PROGRAM

## 2025-06-07 PROCEDURE — 6360000002 HC RX W HCPCS

## 2025-06-07 PROCEDURE — 80048 BASIC METABOLIC PNL TOTAL CA: CPT

## 2025-06-07 PROCEDURE — 36415 COLL VENOUS BLD VENIPUNCTURE: CPT

## 2025-06-07 PROCEDURE — 1200000000 HC SEMI PRIVATE

## 2025-06-07 PROCEDURE — 2580000003 HC RX 258

## 2025-06-07 PROCEDURE — 85025 COMPLETE CBC W/AUTO DIFF WBC: CPT

## 2025-06-07 PROCEDURE — 6360000002 HC RX W HCPCS: Performed by: NURSE PRACTITIONER

## 2025-06-07 PROCEDURE — 99223 1ST HOSP IP/OBS HIGH 75: CPT | Performed by: FAMILY MEDICINE

## 2025-06-07 PROCEDURE — 2500000003 HC RX 250 WO HCPCS

## 2025-06-07 RX ORDER — OXYCODONE HCL 20 MG/1
60 TABLET, FILM COATED, EXTENDED RELEASE ORAL EVERY 12 HOURS
Status: DISCONTINUED | OUTPATIENT
Start: 2025-06-07 | End: 2025-06-08

## 2025-06-07 RX ORDER — ACETAMINOPHEN 500 MG
1000 TABLET ORAL EVERY 6 HOURS PRN
COMMUNITY

## 2025-06-07 RX ADMIN — PANTOPRAZOLE SODIUM 40 MG: 40 INJECTION, POWDER, FOR SOLUTION INTRAVENOUS at 00:40

## 2025-06-07 RX ADMIN — FERROUS SULFATE TAB 325 MG (65 MG ELEMENTAL FE) 325 MG: 325 (65 FE) TAB at 07:50

## 2025-06-07 RX ADMIN — HYDROMORPHONE HYDROCHLORIDE 1 MG: 1 INJECTION, SOLUTION INTRAMUSCULAR; INTRAVENOUS; SUBCUTANEOUS at 15:33

## 2025-06-07 RX ADMIN — ENOXAPARIN SODIUM 40 MG: 100 INJECTION SUBCUTANEOUS at 07:50

## 2025-06-07 RX ADMIN — METHOCARBAMOL 750 MG: 750 TABLET ORAL at 07:50

## 2025-06-07 RX ADMIN — HYDROMORPHONE HYDROCHLORIDE 1 MG: 1 INJECTION, SOLUTION INTRAMUSCULAR; INTRAVENOUS; SUBCUTANEOUS at 11:19

## 2025-06-07 RX ADMIN — DEXAMETHASONE 4 MG: 4 TABLET ORAL at 17:10

## 2025-06-07 RX ADMIN — SODIUM CHLORIDE: 0.9 INJECTION, SOLUTION INTRAVENOUS at 20:28

## 2025-06-07 RX ADMIN — METHOCARBAMOL 750 MG: 750 TABLET ORAL at 17:10

## 2025-06-07 RX ADMIN — HYDROMORPHONE HYDROCHLORIDE 1 MG: 1 INJECTION, SOLUTION INTRAMUSCULAR; INTRAVENOUS; SUBCUTANEOUS at 03:06

## 2025-06-07 RX ADMIN — OXYCODONE HYDROCHLORIDE 60 MG: 20 TABLET, FILM COATED, EXTENDED RELEASE ORAL at 14:27

## 2025-06-07 RX ADMIN — HYDROMORPHONE HYDROCHLORIDE 6 MG: 4 TABLET ORAL at 01:36

## 2025-06-07 RX ADMIN — HYDROMORPHONE HYDROCHLORIDE 1 MG: 1 INJECTION, SOLUTION INTRAMUSCULAR; INTRAVENOUS; SUBCUTANEOUS at 18:27

## 2025-06-07 RX ADMIN — HYDROMORPHONE HYDROCHLORIDE 1 MG: 1 INJECTION, SOLUTION INTRAMUSCULAR; INTRAVENOUS; SUBCUTANEOUS at 13:38

## 2025-06-07 RX ADMIN — DEXAMETHASONE 4 MG: 4 TABLET ORAL at 01:36

## 2025-06-07 RX ADMIN — DEXAMETHASONE 4 MG: 4 TABLET ORAL at 07:53

## 2025-06-07 RX ADMIN — OXYCODONE HYDROCHLORIDE 60 MG: 20 TABLET, FILM COATED, EXTENDED RELEASE ORAL at 02:44

## 2025-06-07 RX ADMIN — HYDROMORPHONE HYDROCHLORIDE 6 MG: 4 TABLET ORAL at 07:49

## 2025-06-07 RX ADMIN — HYDROMORPHONE HYDROCHLORIDE 1 MG: 1 INJECTION, SOLUTION INTRAMUSCULAR; INTRAVENOUS; SUBCUTANEOUS at 17:11

## 2025-06-07 RX ADMIN — HYDROMORPHONE HYDROCHLORIDE 1 MG: 1 INJECTION, SOLUTION INTRAMUSCULAR; INTRAVENOUS; SUBCUTANEOUS at 04:09

## 2025-06-07 RX ADMIN — SENNOSIDES AND DOCUSATE SODIUM 2 TABLET: 8.6; 5 TABLET ORAL at 19:37

## 2025-06-07 RX ADMIN — ACETAMINOPHEN 650 MG: 325 TABLET ORAL at 06:04

## 2025-06-07 RX ADMIN — METHOCARBAMOL 750 MG: 750 TABLET ORAL at 19:37

## 2025-06-07 RX ADMIN — Medication: at 23:16

## 2025-06-07 RX ADMIN — DEXAMETHASONE 4 MG: 4 TABLET ORAL at 22:55

## 2025-06-07 RX ADMIN — SODIUM CHLORIDE, PRESERVATIVE FREE 10 ML: 5 INJECTION INTRAVENOUS at 19:38

## 2025-06-07 RX ADMIN — HYDROMORPHONE HYDROCHLORIDE 1 MG: 1 INJECTION, SOLUTION INTRAMUSCULAR; INTRAVENOUS; SUBCUTANEOUS at 00:33

## 2025-06-07 RX ADMIN — METHOCARBAMOL 750 MG: 750 TABLET ORAL at 12:31

## 2025-06-07 RX ADMIN — SENNOSIDES AND DOCUSATE SODIUM 2 TABLET: 8.6; 5 TABLET ORAL at 07:50

## 2025-06-07 RX ADMIN — HYDROMORPHONE HYDROCHLORIDE 1 MG: 1 INJECTION, SOLUTION INTRAMUSCULAR; INTRAVENOUS; SUBCUTANEOUS at 05:51

## 2025-06-07 RX ADMIN — SODIUM CHLORIDE, PRESERVATIVE FREE 10 ML: 5 INJECTION INTRAVENOUS at 07:58

## 2025-06-07 RX ADMIN — HYDROMORPHONE HYDROCHLORIDE 1 MG: 1 INJECTION, SOLUTION INTRAMUSCULAR; INTRAVENOUS; SUBCUTANEOUS at 01:52

## 2025-06-07 RX ADMIN — DEXAMETHASONE 4 MG: 4 TABLET ORAL at 11:19

## 2025-06-07 RX ADMIN — HYDROMORPHONE HYDROCHLORIDE 1 MG: 1 INJECTION, SOLUTION INTRAMUSCULAR; INTRAVENOUS; SUBCUTANEOUS at 10:07

## 2025-06-07 RX ADMIN — ENOXAPARIN SODIUM 40 MG: 100 INJECTION SUBCUTANEOUS at 02:12

## 2025-06-07 RX ADMIN — HYDROMORPHONE HYDROCHLORIDE 1 MG: 1 INJECTION, SOLUTION INTRAMUSCULAR; INTRAVENOUS; SUBCUTANEOUS at 12:32

## 2025-06-07 RX ADMIN — PANTOPRAZOLE SODIUM 40 MG: 40 INJECTION, POWDER, FOR SOLUTION INTRAVENOUS at 07:51

## 2025-06-07 ASSESSMENT — PAIN DESCRIPTION - DESCRIPTORS
DESCRIPTORS: ACHING;DULL;GNAWING
DESCRIPTORS: ACHING;SHARP;THROBBING
DESCRIPTORS: ACHING
DESCRIPTORS: ACHING;DISCOMFORT;TENDER
DESCRIPTORS: ACHING
DESCRIPTORS: STABBING;SHOOTING;SORE
DESCRIPTORS: ACHING;CRAMPING;DISCOMFORT
DESCRIPTORS: ACHING;DISCOMFORT;SORE
DESCRIPTORS: THROBBING;ACHING;DISCOMFORT
DESCRIPTORS: CRAMPING;DISCOMFORT;ACHING
DESCRIPTORS: ACHING;CRAMPING;DISCOMFORT
DESCRIPTORS: ACHING;JABBING;NAGGING
DESCRIPTORS: ACHING;JABBING;THROBBING
DESCRIPTORS: ACHING;THROBBING;STABBING
DESCRIPTORS: ACHING;CRAMPING;DISCOMFORT

## 2025-06-07 ASSESSMENT — PAIN SCALES - GENERAL
PAINLEVEL_OUTOF10: 10
PAINLEVEL_OUTOF10: 10
PAINLEVEL_OUTOF10: 0
PAINLEVEL_OUTOF10: 9
PAINLEVEL_OUTOF10: 10
PAINLEVEL_OUTOF10: 10
PAINLEVEL_OUTOF10: 9
PAINLEVEL_OUTOF10: 9
PAINLEVEL_OUTOF10: 10
PAINLEVEL_OUTOF10: 9
PAINLEVEL_OUTOF10: 10
PAINLEVEL_OUTOF10: 4
PAINLEVEL_OUTOF10: 10
PAINLEVEL_OUTOF10: 9

## 2025-06-07 ASSESSMENT — PAIN DESCRIPTION - ORIENTATION
ORIENTATION: RIGHT;LEFT
ORIENTATION: MID;LOWER;UPPER
ORIENTATION: RIGHT;LEFT

## 2025-06-07 ASSESSMENT — PAIN DESCRIPTION - LOCATION
LOCATION: BACK;OTHER (COMMENT)
LOCATION: BACK
LOCATION: GENERALIZED
LOCATION: BACK;RIB CAGE
LOCATION: GENERALIZED;BACK
LOCATION: GENERALIZED
LOCATION: BACK
LOCATION: GENERALIZED

## 2025-06-07 ASSESSMENT — PAIN - FUNCTIONAL ASSESSMENT
PAIN_FUNCTIONAL_ASSESSMENT: PREVENTS OR INTERFERES SOME ACTIVE ACTIVITIES AND ADLS
PAIN_FUNCTIONAL_ASSESSMENT: PREVENTS OR INTERFERES SOME ACTIVE ACTIVITIES AND ADLS

## 2025-06-07 ASSESSMENT — PAIN DESCRIPTION - FREQUENCY: FREQUENCY: CONTINUOUS

## 2025-06-07 ASSESSMENT — PAIN DESCRIPTION - PAIN TYPE: TYPE: ACUTE PAIN

## 2025-06-07 ASSESSMENT — PAIN DESCRIPTION - ONSET: ONSET: ON-GOING

## 2025-06-07 NOTE — PROGRESS NOTES
SE - Family Medicine Inpatient   Resident Progress Note    S:  Hospital day: 1   Brief Synopsis: Joseph Bond is a 44 y.o. male with a PMH of prostate cancer on Nubeqa who presents with intractable pain. Was admitted 5/29 to 6/5/2025 for same complaint. Palliative care was consulted and they increased his home regimen based on OME of the PCA. They communicated to [facility] palliative care who is patient's outpatient palliative care team. Patient states the regimen is not managing pain as well. Admitted for pain management.    Overnight/interim:   Spoke to Palliative Care team - recommended restarting PCA  ED unable to manage PCA pump - spoke to RN @ 7-8pm to restart home PO regimen and add IV Dilaudid 1mg 1h PRN  Oxycontin ER was not given until 2am; charge nurse aware and looking into it  Patient able to speak now and sit up from laying position  Denies N/V/D      Cont meds:    sodium chloride      [Held by provider] HYDROmorphone       Scheduled meds:    oxyCODONE  60 mg Oral Q12H    dexAMETHasone  4 mg Oral Q6H    ferrous sulfate  325 mg Oral Daily with breakfast    methocarbamol  750 mg Oral 4x Daily    Darolutamide  600 mg Oral BID    sennosides-docusate sodium  2 tablet Oral BID    sodium chloride flush  5-40 mL IntraVENous 2 times per day    enoxaparin  40 mg SubCUTAneous Daily    pantoprazole (PROTONIX) 40 mg in sodium chloride (PF) 0.9 % 10 mL injection  40 mg IntraVENous Daily     PRN meds: polyethylene glycol, sodium chloride flush, sodium chloride, ondansetron **OR** ondansetron, acetaminophen **OR** acetaminophen, naloxone 0.4 mg in 10 mL sodium chloride syringe, HYDROmorphone, HYDROmorphone, HYDROmorphone     I reviewed the patient's past medical and surgical history, Medications and Allergies.    O:  /86   Pulse 75   Temp 97.9 °F (36.6 °C) (Oral)   Resp 18   SpO2 95%   24 hour I&O: No intake/output data recorded.  No intake/output data recorded.       Physical Exam:  General

## 2025-06-07 NOTE — H&P
[facility]  Family Medicine Residency Program  History and Physical    Patient:  Joseph Bond 44 y.o. male MRN: [id number]     Date of Service: 6/6/2025    Hospital Day: 1      Chief complaint: had concerns including Generalized Body Aches (Patient c/o generalized body pain , hx of CA).    History of Present Illness   The patient is a 44 y.o. male with PMHx of prostate cancer on Nubeqa who presents with intractable pain. History was obtained from patient and brother.  He is coming from home.    Prior to admission:  Just admitted from 5/29 to 6/5/2025 for similar issue.  Palliative care was consulted, he needed Dilaudid PCA while inpatient.  Also treated with IV dexamethasone.  Follows with [facility] palliative care outpatient, who were notified by inpatient palliative care team regarding above.    Readmission for intractable pain.  Patient's brother states that patient did indeed start the new pain regimen per palliative yesterday night.    Pain regimen includes:  Oxycontin 60mg ER TID  Robaxin 750mg 4x a day  Dilaudid 6mg PO every 2 hours PRN for breakthrough pain    Denies any falls, fever, chills.  Did manage to get a couple hours of sleep after starting the regimen however this morning the pain was unbearable, unable to eat anything.  Denies nausea at the moment.    ED Course:   Vitals stable initially.  Physical exam limited due to pain.    Patient was given 2 times doses of Dilaudid 1 mg via IV and also a ketamine drip    Palliative care NP who saw him yesterday, Bernadette was consulted.  She states there is a possibility he needed a longer transition from pump to p.o. meds.  Advised to put patient back on PCA pump.    However, per nurse manager in the ED PCA pump is not able to be initiated.  Decision to restart home meds per above and at IV Dilaudid 1 mg every hour as needed until patient gets a bed upstairs.    Discussed with bedside nurse, she will page resident on-call when  MRI of the lumbar spine with and without contrast, 07/12/2024. CLINICAL HISTORY: Prostate cancer metastatic to bone (HCC); Chronic midline low back pain with left-sided sciatica. FINDINGS: BONES AND ALIGNMENT: 6 lumbar type vertebrae are noted (anatomic variant). Extensive metastatic disease is seen throughout the lumbar spine with chronic compression fracture deformities in the L2, L4 and L6 vertebral bodies. These fractures have developed in the interim since the MRI from 07/12/2024, but are grossly stabilized on the CT of the lumbar spine from 11/05/2024 L6 vertebral body. SPINAL CORD: The conus terminates normally. SOFT TISSUES: No abnormal enhancement is seen of the lumbar spine. No paraspinal mass identified. L1-L2: Minimal dorsal buckling of the posterior superior wall of the L2 vertebral body. No significant disc herniation. No significant central canal, lateral recess, or neural foraminal stenosis. L2-L3: No significant disc herniation. No significant central canal, lateral recess, or neural foraminal stenosis. L3-L4: Minimal disk bulge. No significant central canal, lateral recess, or neural foraminal stenosis. L4-L5: No significant disc herniation. Minimal chronic dorsal buckling of the inferior endplate of the L4 vertebral body. No significant central canal or lateral recess stenosis. Mild left neural foraminal stenosis. L5-L6: Minimal disc bulge. No significant central canal or lateral recess stenosis. Mild left neural foraminal stenosis. L6-S1: No significant disc herniation. No significant central canal, lateral recess, or neural foraminal stenosis. Tumor in the S1 vertebral body extends into the anterior epidural space resulting in moderate central canal stenosis.     1. Extensive metastatic disease throughout the lumbar spine with chronic compression fracture deformities in the L2, L4, and L6 vertebral bodies, developed since the prior MRI but comparison with the subsequent CT from 11/05/2024. 2.

## 2025-06-07 NOTE — PROGRESS NOTES
Joseph Bond was ordered Darolutamide (Nubeqa) which is a nonformulary medication.  This medication will need to be supplied by the patient as the pharmacy does not carry this non-formulary medication.    If the medication has not been administered by 1400 on the following day from the time the order was placed, a pharmacist will follow-up with the nurse of the patient to assess the capability of the patient to bring in the medication.    If it is determined that the patient cannot supply the medication and it is not available to be dispensed from the pharmacy, the provider will be notified.

## 2025-06-07 NOTE — H&P
[facility] - Family Medicine Resident Inpatient  History and Physical      CC: Generalized Body Aches (Patient c/o generalized body pain , hx of CA)  .    HPI: History obtained from patient. Patient is oriented to time, place, person x3  Joseph Bond is a 44 y.o. male with a PMH of Prostate cancer stage 4 with multiple bone mets (Dr. Matson).  on Nubeqa s/p palliaitve radiation and chemotherapy who presented to the ED with acute intractable pain localized to his lower back and hips who presents to ED for  intractable pain not relieved by oral pain medication. Pt discharged 6/5/2025 for similar problems. Pt sent home on IV pump dilaudid and was to transition to oral. Pt states the transition to oral was poorly timed and his pain is out of control. MRI in may shows extensive metastatic disease throughout the lumbar spine with chronic compression fracture deformities in the L2, L4, and L6 vertebral bodies. With multiple degenerative changes.   Pain regimen includes. Between discharge and reporting to the ED no falls or injuries.     Oxycodone 60 Q12H  Robaxin 750mg 4x/day  Dilaudid 4mg Q6H        ED Course:   The patient remained unchanged in pain level but is currently stable   Labs  CMP: CK elevated at 670, , Pro-BNP 2232 (up from 1170 last year),  (at or near baseline since 2024)   CBC: HbG 11.0, crit 34.2, RDW 17.9 differential pending.  CXR shows no signs of fracture    Pt admitted for pain management.       Patient was given IV Dilaudid 1 mg/ml IV and oxycodone 60 mg.   Pt admitted for intractable pain    Medications   dexAMETHasone (DECADRON) tablet 4 mg (4 mg Oral Given 6/7/25 0753)   ferrous sulfate (IRON 325) tablet 325 mg (325 mg Oral Given 6/7/25 0750)   methocarbamol (ROBAXIN) tablet 750 mg (750 mg Oral Given 6/7/25 0750)   Darolutamide TABS 600 mg (Patient Supplied) ( Oral Given 6/7/25 0754)   polyethylene glycol (GLYCOLAX) packet 17 g (has no administration in time range)

## 2025-06-08 LAB
ANION GAP SERPL CALCULATED.3IONS-SCNC: 12 MMOL/L (ref 7–16)
BASOPHILS # BLD: 0 K/UL (ref 0–0.2)
BASOPHILS NFR BLD: 0 % (ref 0–2)
BUN SERPL-MCNC: 16 MG/DL (ref 6–20)
CALCIUM SERPL-MCNC: 9.2 MG/DL (ref 8.6–10)
CHLORIDE SERPL-SCNC: 100 MMOL/L (ref 98–107)
CO2 SERPL-SCNC: 26 MMOL/L (ref 22–29)
CREAT SERPL-MCNC: 0.4 MG/DL (ref 0.7–1.2)
EOSINOPHIL # BLD: 0 K/UL (ref 0.05–0.5)
EOSINOPHILS RELATIVE PERCENT: 0 % (ref 0–6)
ERYTHROCYTE [DISTWIDTH] IN BLOOD BY AUTOMATED COUNT: 17.9 % (ref 11.5–15)
GFR, ESTIMATED: >90 ML/MIN/1.73M2
GLUCOSE SERPL-MCNC: 124 MG/DL (ref 74–99)
HCT VFR BLD AUTO: 30.5 % (ref 37–54)
HGB BLD-MCNC: 10 G/DL (ref 12.5–16.5)
LYMPHOCYTES NFR BLD: 0.74 K/UL (ref 1.5–4)
LYMPHOCYTES RELATIVE PERCENT: 6 % (ref 20–42)
MCH RBC QN AUTO: 27.3 PG (ref 26–35)
MCHC RBC AUTO-ENTMCNC: 32.8 G/DL (ref 32–34.5)
MCV RBC AUTO: 83.3 FL (ref 80–99.9)
MONOCYTES NFR BLD: 0.32 K/UL (ref 0.1–0.95)
MONOCYTES NFR BLD: 3 % (ref 2–12)
MYELOCYTES ABSOLUTE COUNT: 0.32 K/UL
MYELOCYTES: 3 %
NEUTROPHILS NFR BLD: 89 % (ref 43–80)
NEUTS SEG NFR BLD: 10.62 K/UL (ref 1.8–7.3)
PLATELET # BLD AUTO: 238 K/UL (ref 130–450)
PMV BLD AUTO: 9.2 FL (ref 7–12)
POTASSIUM SERPL-SCNC: 4.1 MMOL/L (ref 3.5–5.1)
RBC # BLD AUTO: 3.66 M/UL (ref 3.8–5.8)
RBC # BLD: ABNORMAL 10*6/UL
SODIUM SERPL-SCNC: 138 MMOL/L (ref 136–145)
WBC OTHER # BLD: 12 K/UL (ref 4.5–11.5)

## 2025-06-08 PROCEDURE — 1200000000 HC SEMI PRIVATE

## 2025-06-08 PROCEDURE — 2500000003 HC RX 250 WO HCPCS

## 2025-06-08 PROCEDURE — 99232 SBSQ HOSP IP/OBS MODERATE 35: CPT | Performed by: FAMILY MEDICINE

## 2025-06-08 PROCEDURE — 6360000002 HC RX W HCPCS

## 2025-06-08 PROCEDURE — 6370000000 HC RX 637 (ALT 250 FOR IP)

## 2025-06-08 PROCEDURE — 99223 1ST HOSP IP/OBS HIGH 75: CPT | Performed by: NURSE PRACTITIONER

## 2025-06-08 PROCEDURE — 85025 COMPLETE CBC W/AUTO DIFF WBC: CPT

## 2025-06-08 PROCEDURE — 6370000000 HC RX 637 (ALT 250 FOR IP): Performed by: NURSE PRACTITIONER

## 2025-06-08 PROCEDURE — 80048 BASIC METABOLIC PNL TOTAL CA: CPT

## 2025-06-08 RX ORDER — OXYCODONE HCL 20 MG/1
60 TABLET, FILM COATED, EXTENDED RELEASE ORAL EVERY 12 HOURS
Refills: 0 | Status: DISCONTINUED | OUTPATIENT
Start: 2025-06-08 | End: 2025-06-20 | Stop reason: HOSPADM

## 2025-06-08 RX ADMIN — SENNOSIDES AND DOCUSATE SODIUM 2 TABLET: 8.6; 5 TABLET ORAL at 08:25

## 2025-06-08 RX ADMIN — POLYETHYLENE GLYCOL 3350 17 G: 17 POWDER, FOR SOLUTION ORAL at 19:48

## 2025-06-08 RX ADMIN — METHOCARBAMOL 750 MG: 750 TABLET ORAL at 19:43

## 2025-06-08 RX ADMIN — DEXAMETHASONE 4 MG: 4 TABLET ORAL at 22:48

## 2025-06-08 RX ADMIN — ENOXAPARIN SODIUM 40 MG: 100 INJECTION SUBCUTANEOUS at 08:25

## 2025-06-08 RX ADMIN — METHOCARBAMOL 750 MG: 750 TABLET ORAL at 17:09

## 2025-06-08 RX ADMIN — METHOCARBAMOL 750 MG: 750 TABLET ORAL at 13:15

## 2025-06-08 RX ADMIN — HYDROMORPHONE HYDROCHLORIDE 6 MG: 4 TABLET ORAL at 14:24

## 2025-06-08 RX ADMIN — OXYCODONE HYDROCHLORIDE 60 MG: 20 TABLET, FILM COATED, EXTENDED RELEASE ORAL at 07:10

## 2025-06-08 RX ADMIN — FERROUS SULFATE TAB 325 MG (65 MG ELEMENTAL FE) 325 MG: 325 (65 FE) TAB at 08:25

## 2025-06-08 RX ADMIN — PANTOPRAZOLE SODIUM 40 MG: 40 INJECTION, POWDER, FOR SOLUTION INTRAVENOUS at 08:25

## 2025-06-08 RX ADMIN — METHOCARBAMOL 750 MG: 750 TABLET ORAL at 08:25

## 2025-06-08 RX ADMIN — SENNOSIDES AND DOCUSATE SODIUM 2 TABLET: 8.6; 5 TABLET ORAL at 19:43

## 2025-06-08 RX ADMIN — DEXAMETHASONE 4 MG: 4 TABLET ORAL at 18:32

## 2025-06-08 RX ADMIN — OXYCODONE HYDROCHLORIDE 60 MG: 20 TABLET, FILM COATED, EXTENDED RELEASE ORAL at 19:43

## 2025-06-08 RX ADMIN — DEXAMETHASONE 4 MG: 4 TABLET ORAL at 05:48

## 2025-06-08 RX ADMIN — HYDROMORPHONE HYDROCHLORIDE 6 MG: 4 TABLET ORAL at 18:32

## 2025-06-08 RX ADMIN — DEXAMETHASONE 4 MG: 4 TABLET ORAL at 13:15

## 2025-06-08 ASSESSMENT — PAIN SCALES - GENERAL
PAINLEVEL_OUTOF10: 9
PAINLEVEL_OUTOF10: 10
PAINLEVEL_OUTOF10: 8
PAINLEVEL_OUTOF10: 7
PAINLEVEL_OUTOF10: 10
PAINLEVEL_OUTOF10: 10

## 2025-06-08 ASSESSMENT — PAIN DESCRIPTION - FREQUENCY
FREQUENCY: CONTINUOUS
FREQUENCY: CONTINUOUS

## 2025-06-08 ASSESSMENT — PAIN DESCRIPTION - PAIN TYPE: TYPE: ACUTE PAIN

## 2025-06-08 ASSESSMENT — PAIN DESCRIPTION - ONSET: ONSET: ON-GOING

## 2025-06-08 ASSESSMENT — PAIN DESCRIPTION - ORIENTATION
ORIENTATION: MID
ORIENTATION: MID

## 2025-06-08 ASSESSMENT — PAIN DESCRIPTION - LOCATION
LOCATION: BACK
LOCATION: BACK
LOCATION: BACK;GENERALIZED

## 2025-06-08 ASSESSMENT — PAIN DESCRIPTION - DESCRIPTORS
DESCRIPTORS: ACHING;SORE;DISCOMFORT
DESCRIPTORS: ACHING;DISCOMFORT;TENDER
DESCRIPTORS: ACHING;DISCOMFORT

## 2025-06-08 NOTE — PROGRESS NOTES
SE - Family Medicine Inpatient   Resident Progress Note    S:  Hospital day: 2   Brief Synopsis: Joseph Bond is a 44 y.o. male with a PMH of prostate cancer on Nubeqa who presents with intractable pain. Was admitted 5/29 to 6/5/2025 for same complaint. Palliative care was consulted and they increased his home regimen based on OME of the PCA. They communicated to [facility] palliative care who is patient's outpatient palliative care team. Patient states the regimen is not managing pain as well. Admitted for pain management.  6/7 Spoke to Palliative Care team - recommended restarting PCA. ED unable to manage PCA pump - spoke to RN @ 7-8pm to restart home PO regimen and add IV Dilaudid 1mg 1h PRN. Oxycontin ER was not given until 2am; charge nurse aware and looking into it  6/8 Patient transferred upstairs in the afternoon and transitioned to PCA pump but oral meds were held? Palliative team able to see patient in the night and adjusted meds    Overnight/interim:   Resting comfortably; now that medications are reaching steady state  Denies N/V/D/F/C      Cont meds:    HYDROmorphone      sodium chloride 50 mL/hr at 06/07/25 2028     Scheduled meds:    oxyCODONE  60 mg Oral Q12H    dexAMETHasone  4 mg Oral Q6H    ferrous sulfate  325 mg Oral Daily with breakfast    methocarbamol  750 mg Oral 4x Daily    Darolutamide  600 mg Oral BID    sennosides-docusate sodium  2 tablet Oral BID    sodium chloride flush  5-40 mL IntraVENous 2 times per day    enoxaparin  40 mg SubCUTAneous Daily    pantoprazole (PROTONIX) 40 mg in sodium chloride (PF) 0.9 % 10 mL injection  40 mg IntraVENous Daily     PRN meds: polyethylene glycol, sodium chloride flush, sodium chloride, ondansetron **OR** ondansetron, acetaminophen **OR** acetaminophen, HYDROmorphone     I reviewed the patient's past medical and surgical history, Medications and Allergies.    O:  /82   Pulse 62   Temp 97.5 °F (36.4 °C) (Temporal)   Resp 20   Ht 1.702

## 2025-06-08 NOTE — PLAN OF CARE
Problem: Safety - Adult  Goal: Free from fall injury  6/7/2025 2155 by Jimmy Schmitt RN  Outcome: Progressing  6/7/2025 1847 by Holly Ruff RN  Outcome: Progressing     Problem: Pain  Goal: Verbalizes/displays adequate comfort level or baseline comfort level  6/7/2025 2155 by Jimmy Schmitt RN  Outcome: Progressing  6/7/2025 1847 by Holly Ruff, RN  Outcome: Progressing

## 2025-06-08 NOTE — CONSULTS
Palliative Care Department  [phone]  Palliative Care Note  Provider Leo Bradley, APRN - CNP      PATIENT: Joseph Bond  : 1980  MRN: [id number]  ADMISSION DATE: 2025  2:09 PM  Referring Provider:  Nasima Phelps Chi, MD    Palliative Medicine was consulted on hospital day 2 for assistance with Symptom management     HPI:     Clinical Summary:Joseph Bond is a 44 y.o. y/o male with a history of  prostate cancer with mediastinal lymphadenopathy, extensive metastasis of the spine, rib cage, pelvis, humerus and femurs ,(had refused systemic therapy, pursuing homeopathic) completed palliative radiation treatment to spine, with a history of polysubstance abuse including opioids and tobacco, reports abstinent for 5 years.  The patient had MRI of the lumbar and thoracic spine on 2025, with evidence of extensive metastatic disease throughout the lumbar spine with chronic compression fracture deformities at the L2 , L4 , L3 vertebral bodies.  Tumor in the S1 vertebral body extends into the anterior epidural space, resulting in moderate central canal stenosis.  He presented once again on 2025 with complaints of worsening and poorly controlled pain.   He follows outpatient with [facility], reports his regimen is p.o. Dilaudid 4 mg every 2 hours as needed, OxyContin 30 mg ER twice daily, Robaxin-750 milligrams 4 times a day.      ASSESSMENT/PLAN:     Pertinent Hospital Diagnoses   Intractable pain  Prostate cancer with metastasis to bones  Known to Dr. Matson  On Lita and Lupron     Palliative Care Encounter / Counseling Regarding Goals of Care  Please see detailed goals of care discussion as below  At this time, Joseph Bond, Does have capacity for medical decision-making.  Capacity is time limited and situation/question specific  During encounter the patient was his own medical decision-maker  Outcome of goals of care meeting:  Pain control  Continue full code  Transition to

## 2025-06-08 NOTE — PLAN OF CARE
Problem: Safety - Adult  Goal: Free from fall injury  6/8/2025 1106 by Holly Ruff, RN  Outcome: Progressing  6/7/2025 2155 by Jimmy Schmitt RN  Outcome: Progressing     Problem: Pain  Goal: Verbalizes/displays adequate comfort level or baseline comfort level  6/8/2025 1106 by Holly Ruff, RN  Outcome: Progressing  6/7/2025 2155 by Jimmy Schmitt RN  Outcome: Progressing

## 2025-06-09 LAB
ANION GAP SERPL CALCULATED.3IONS-SCNC: 12 MMOL/L (ref 7–16)
BASOPHILS # BLD: 0 K/UL (ref 0–0.2)
BASOPHILS NFR BLD: 0 % (ref 0–2)
BUN SERPL-MCNC: 15 MG/DL (ref 6–20)
CALCIUM SERPL-MCNC: 9.3 MG/DL (ref 8.6–10)
CHLORIDE SERPL-SCNC: 102 MMOL/L (ref 98–107)
CO2 SERPL-SCNC: 25 MMOL/L (ref 22–29)
CREAT SERPL-MCNC: 0.5 MG/DL (ref 0.7–1.2)
EOSINOPHIL # BLD: 0 K/UL (ref 0.05–0.5)
EOSINOPHILS RELATIVE PERCENT: 0 % (ref 0–6)
ERYTHROCYTE [DISTWIDTH] IN BLOOD BY AUTOMATED COUNT: 18.1 % (ref 11.5–15)
GFR, ESTIMATED: >90 ML/MIN/1.73M2
GLUCOSE SERPL-MCNC: 127 MG/DL (ref 74–99)
HCT VFR BLD AUTO: 30 % (ref 37–54)
HGB BLD-MCNC: 9.6 G/DL (ref 12.5–16.5)
LYMPHOCYTES NFR BLD: 0.65 K/UL (ref 1.5–4)
LYMPHOCYTES RELATIVE PERCENT: 6 % (ref 20–42)
MCH RBC QN AUTO: 27.2 PG (ref 26–35)
MCHC RBC AUTO-ENTMCNC: 32 G/DL (ref 32–34.5)
MCV RBC AUTO: 85 FL (ref 80–99.9)
METAMYELOCYTES ABSOLUTE COUNT: 0.09 K/UL (ref 0–0.12)
METAMYELOCYTES: 1 % (ref 0–1)
MONOCYTES NFR BLD: 0.46 K/UL (ref 0.1–0.95)
MONOCYTES NFR BLD: 4 % (ref 2–12)
NEUTROPHILS NFR BLD: 89 % (ref 43–80)
NEUTS SEG NFR BLD: 9.39 K/UL (ref 1.8–7.3)
PLATELET # BLD AUTO: 214 K/UL (ref 130–450)
PMV BLD AUTO: 8.5 FL (ref 7–12)
POTASSIUM SERPL-SCNC: 4.2 MMOL/L (ref 3.5–5.1)
RBC # BLD AUTO: 3.53 M/UL (ref 3.8–5.8)
RBC # BLD: ABNORMAL 10*6/UL
SODIUM SERPL-SCNC: 139 MMOL/L (ref 136–145)
WBC # BLD: ABNORMAL 10*3/UL
WBC OTHER # BLD: 10.6 K/UL (ref 4.5–11.5)

## 2025-06-09 PROCEDURE — 99232 SBSQ HOSP IP/OBS MODERATE 35: CPT | Performed by: FAMILY MEDICINE

## 2025-06-09 PROCEDURE — 6370000000 HC RX 637 (ALT 250 FOR IP): Performed by: NURSE PRACTITIONER

## 2025-06-09 PROCEDURE — 2500000003 HC RX 250 WO HCPCS

## 2025-06-09 PROCEDURE — 6360000002 HC RX W HCPCS: Performed by: NURSE PRACTITIONER

## 2025-06-09 PROCEDURE — 85025 COMPLETE CBC W/AUTO DIFF WBC: CPT

## 2025-06-09 PROCEDURE — 1200000000 HC SEMI PRIVATE

## 2025-06-09 PROCEDURE — 6370000000 HC RX 637 (ALT 250 FOR IP)

## 2025-06-09 PROCEDURE — 99233 SBSQ HOSP IP/OBS HIGH 50: CPT | Performed by: NURSE PRACTITIONER

## 2025-06-09 PROCEDURE — 6360000002 HC RX W HCPCS

## 2025-06-09 PROCEDURE — 80048 BASIC METABOLIC PNL TOTAL CA: CPT

## 2025-06-09 RX ORDER — HYDROMORPHONE HYDROCHLORIDE 1 MG/ML
1 INJECTION, SOLUTION INTRAMUSCULAR; INTRAVENOUS; SUBCUTANEOUS
Status: DISCONTINUED | OUTPATIENT
Start: 2025-06-09 | End: 2025-06-20 | Stop reason: HOSPADM

## 2025-06-09 RX ORDER — METHOCARBAMOL 750 MG/1
1500 TABLET, FILM COATED ORAL 3 TIMES DAILY
Status: DISCONTINUED | OUTPATIENT
Start: 2025-06-09 | End: 2025-06-20 | Stop reason: HOSPADM

## 2025-06-09 RX ORDER — POLYETHYLENE GLYCOL 3350 17 G/17G
17 POWDER, FOR SOLUTION ORAL DAILY
Status: DISCONTINUED | OUTPATIENT
Start: 2025-06-09 | End: 2025-06-20 | Stop reason: HOSPADM

## 2025-06-09 RX ADMIN — DEXAMETHASONE 4 MG: 4 TABLET ORAL at 05:32

## 2025-06-09 RX ADMIN — FERROUS SULFATE TAB 325 MG (65 MG ELEMENTAL FE) 325 MG: 325 (65 FE) TAB at 08:48

## 2025-06-09 RX ADMIN — HYDROMORPHONE HYDROCHLORIDE 1 MG: 1 INJECTION, SOLUTION INTRAMUSCULAR; INTRAVENOUS; SUBCUTANEOUS at 15:04

## 2025-06-09 RX ADMIN — HYDROMORPHONE HYDROCHLORIDE 6 MG: 4 TABLET ORAL at 18:36

## 2025-06-09 RX ADMIN — DEXAMETHASONE 4 MG: 4 TABLET ORAL at 17:34

## 2025-06-09 RX ADMIN — OXYCODONE HYDROCHLORIDE 60 MG: 20 TABLET, FILM COATED, EXTENDED RELEASE ORAL at 08:48

## 2025-06-09 RX ADMIN — METHOCARBAMOL 750 MG: 750 TABLET ORAL at 08:48

## 2025-06-09 RX ADMIN — POLYETHYLENE GLYCOL 3350 17 G: 17 POWDER, FOR SOLUTION ORAL at 12:01

## 2025-06-09 RX ADMIN — HYDROMORPHONE HYDROCHLORIDE 1 MG: 1 INJECTION, SOLUTION INTRAMUSCULAR; INTRAVENOUS; SUBCUTANEOUS at 19:44

## 2025-06-09 RX ADMIN — OXYCODONE HYDROCHLORIDE 60 MG: 20 TABLET, FILM COATED, EXTENDED RELEASE ORAL at 21:12

## 2025-06-09 RX ADMIN — ENOXAPARIN SODIUM 40 MG: 100 INJECTION SUBCUTANEOUS at 08:48

## 2025-06-09 RX ADMIN — SENNOSIDES AND DOCUSATE SODIUM 2 TABLET: 8.6; 5 TABLET ORAL at 08:48

## 2025-06-09 RX ADMIN — PANTOPRAZOLE SODIUM 40 MG: 40 INJECTION, POWDER, FOR SOLUTION INTRAVENOUS at 08:48

## 2025-06-09 RX ADMIN — SENNOSIDES AND DOCUSATE SODIUM 2 TABLET: 8.6; 5 TABLET ORAL at 19:44

## 2025-06-09 RX ADMIN — METHOCARBAMOL 750 MG: 750 TABLET ORAL at 12:01

## 2025-06-09 RX ADMIN — DEXAMETHASONE 4 MG: 4 TABLET ORAL at 23:17

## 2025-06-09 RX ADMIN — HYDROMORPHONE HYDROCHLORIDE 6 MG: 4 TABLET ORAL at 22:19

## 2025-06-09 RX ADMIN — METHOCARBAMOL 750 MG: 750 TABLET ORAL at 17:34

## 2025-06-09 RX ADMIN — HYDROMORPHONE HYDROCHLORIDE 1 MG: 1 INJECTION, SOLUTION INTRAMUSCULAR; INTRAVENOUS; SUBCUTANEOUS at 17:34

## 2025-06-09 RX ADMIN — SODIUM CHLORIDE, PRESERVATIVE FREE 10 ML: 5 INJECTION INTRAVENOUS at 19:45

## 2025-06-09 RX ADMIN — HYDROMORPHONE HYDROCHLORIDE 1 MG: 1 INJECTION, SOLUTION INTRAMUSCULAR; INTRAVENOUS; SUBCUTANEOUS at 23:17

## 2025-06-09 RX ADMIN — SODIUM CHLORIDE, PRESERVATIVE FREE 10 ML: 5 INJECTION INTRAVENOUS at 08:49

## 2025-06-09 RX ADMIN — HYDROMORPHONE HYDROCHLORIDE 6 MG: 4 TABLET ORAL at 14:02

## 2025-06-09 RX ADMIN — DEXAMETHASONE 4 MG: 4 TABLET ORAL at 12:01

## 2025-06-09 RX ADMIN — METHOCARBAMOL 1500 MG: 750 TABLET ORAL at 21:12

## 2025-06-09 RX ADMIN — HYDROMORPHONE HYDROCHLORIDE 6 MG: 4 TABLET ORAL at 16:23

## 2025-06-09 ASSESSMENT — PAIN DESCRIPTION - LOCATION
LOCATION: BACK;HIP
LOCATION: BACK;SHOULDER
LOCATION: BACK;SHOULDER;RIB CAGE
LOCATION: RIB CAGE
LOCATION: BACK;HIP
LOCATION: BACK;HIP;LEG
LOCATION: BACK;HIP

## 2025-06-09 ASSESSMENT — PAIN SCALES - GENERAL
PAINLEVEL_OUTOF10: 7
PAINLEVEL_OUTOF10: 8
PAINLEVEL_OUTOF10: 7
PAINLEVEL_OUTOF10: 4
PAINLEVEL_OUTOF10: 7
PAINLEVEL_OUTOF10: 7
PAINLEVEL_OUTOF10: 8
PAINLEVEL_OUTOF10: 7
PAINLEVEL_OUTOF10: 7
PAINLEVEL_OUTOF10: 8
PAINLEVEL_OUTOF10: 8

## 2025-06-09 ASSESSMENT — PAIN DESCRIPTION - ORIENTATION
ORIENTATION: RIGHT;LEFT;UPPER;LOWER
ORIENTATION: RIGHT;LEFT
ORIENTATION: RIGHT;LEFT
ORIENTATION: RIGHT;LEFT;MID
ORIENTATION: RIGHT;LEFT;MID;LOWER
ORIENTATION: RIGHT;LEFT;POSTERIOR
ORIENTATION: LOWER;UPPER;MID
ORIENTATION: UPPER;LOWER;LEFT;RIGHT

## 2025-06-09 ASSESSMENT — PAIN DESCRIPTION - DESCRIPTORS
DESCRIPTORS: ACHING;DISCOMFORT;SORE;SHOOTING
DESCRIPTORS: THROBBING;ACHING
DESCRIPTORS: THROBBING;ACHING;DISCOMFORT
DESCRIPTORS: ACHING;SPASM;STABBING
DESCRIPTORS: ACHING;THROBBING
DESCRIPTORS: ACHING;DISCOMFORT;SHOOTING;SPASM
DESCRIPTORS: ACHING;DISCOMFORT;STABBING

## 2025-06-09 ASSESSMENT — PAIN - FUNCTIONAL ASSESSMENT: PAIN_FUNCTIONAL_ASSESSMENT: PREVENTS OR INTERFERES SOME ACTIVE ACTIVITIES AND ADLS

## 2025-06-09 ASSESSMENT — PAIN DESCRIPTION - PAIN TYPE: TYPE: CHRONIC PAIN

## 2025-06-09 NOTE — PROGRESS NOTES
without rashes, lesions, bruising  Neuro: awake, alert, oriented x 3, follows commands, no gross neurologic deficit    Objective data reviewed: labs, images, records, medication use, vitals, and chart    Time/Communication  Greater than 50% of time spent, total 75 minutes in counseling and coordination of care at the bedside regarding goals of care and symptom management.    Thank you for allowing Palliative Medicine to participate in the care of Joseph Bond.    Note: This report was completed using computer[employer] voiced recognition software.  Every effort has been made to ensure accuracy; however, inadvertent computerized transcription errors may be present.

## 2025-06-09 NOTE — CARE COORDINATION
CM transition of care.  Patient presented to [facility] ER secondary to generalized body pain.  Recently discharged from this hospital on 6/5 to home.  Readmitted with intractable pain.  PMH of prostate cancer with mediastinal lymphadenopathy, extensive metastasis of the spine, rib cage, pelvis, humerus and femurs.  Palliative and radiation oncology consulted.  Dilaudid PCA stopped, heating pad ordered.  Met with patient at the bedside to discuss discharge planning.  Patient reports he did not feel he was ready for discharge last week.  He does live alone, his family assists and stays with him as needed.  He is active with [employer].  Patient plans to return home at discharge with [employer].  His family will provide transport home on day of discharge.  Return referral sent to [facility].  CM/ to follow.  Edwardo Leiva RN  [phone].      Case Management Assessment  Initial Evaluation    Date/Time of Evaluation: 6/9/2025 4:23 PM  Assessment Completed by: Courtney Leiva RN    If patient is discharged prior to next notation, then this note serves as note for discharge by case management.    Patient Name: Joseph Bond                   YOB: 1980  Diagnosis: Intractable pain [R52]  Prostate cancer metastatic to bone (HCC) [C61, C79.51]                   Date / Time: 6/6/2025  2:09 PM    Patient Admission Status: Inpatient   Readmission Risk (Low < 19, Mod (19-27), High > 27): Readmission Risk Score: 29.7    Current PCP: Ty Shaikh MD  PCP verified by ABHISHEK? Yes    Chart Reviewed: Yes      History Provided by: Patient, Medical Record  Patient Orientation: Alert and Oriented    Patient Cognition: Alert    Hospitalization in the last 30 days (Readmission):  Yes    If yes, Readmission Assessment in  Navigator will be completed.    Readmission Assessment  Number of Days since last admission?: 1-7 days  Previous Disposition: Home with Home Health  Who is being Interviewed: Patient  What was the

## 2025-06-09 NOTE — PLAN OF CARE
Problem: Safety - Adult  Goal: Free from fall injury  6/9/2025 0956 by Marquita Ruff RN  Outcome: Progressing  6/8/2025 2214 by Jimmy Schmitt RN  Outcome: Progressing     Problem: Pain  Goal: Verbalizes/displays adequate comfort level or baseline comfort level  6/9/2025 0956 by Marquita Ruff RN  Outcome: Progressing  6/8/2025 2214 by Jimmy Schmitt RN  Outcome: Progressing     Problem: Skin/Tissue Integrity  Goal: Skin integrity remains intact  Description: 1.  Monitor for areas of redness and/or skin breakdown2.  Assess vascular access sites hourly3.  Every 4-6 hours minimum:  Change oxygen saturation probe site4.  Every 4-6 hours:  If on nasal continuous positive airway pressure, respiratory therapy assess nares and determine need for appliance change or resting period  Outcome: Progressing     Problem: ABCDS Injury Assessment  Goal: Absence of physical injury  Outcome: Progressing

## 2025-06-09 NOTE — PROGRESS NOTES
Palliative Care Department  [phone]  Palliative Care Note  Provider Britany Guidry, APRN - CNP      PATIENT: Joseph Bond  : 1980  MRN: [id number]  ADMISSION DATE: 2025  2:09 PM  Referring Provider:  Nasima Phelps Chi, MD    Palliative Medicine was consulted on hospital day 3 for assistance with Symptom management     HPI:     Clinical Summary:Joseph Bond is a 44 y.o. y/o male with a history of  prostate cancer with mediastinal lymphadenopathy, extensive metastasis of the spine, rib cage, pelvis, humerus and femurs ,(had refused systemic therapy, pursuing homeopathic) completed palliative radiation treatment to spine, with a history of polysubstance abuse including opioids and tobacco, reports abstinent for 5 years.  The patient had MRI of the lumbar and thoracic spine on 2025, with evidence of extensive metastatic disease throughout the lumbar spine with chronic compression fracture deformities at the L2 , L4 , L3 vertebral bodies.  Tumor in the S1 vertebral body extends into the anterior epidural space, resulting in moderate central canal stenosis.  He presented once again on 2025 with complaints of worsening and poorly controlled pain.   He follows outpatient with [facility], reports his regimen is p.o. Dilaudid 4 mg every 2 hours as needed, OxyContin 30 mg ER twice daily, Robaxin-750 milligrams 4 times a day.      ASSESSMENT/PLAN:     Pertinent Hospital Diagnoses   Intractable pain  Prostate cancer with metastasis to bones  Known to Dr. Matson  On Dena     Palliative Care Encounter / Counseling Regarding Goals of Care  Please see detailed goals of care discussion as below  At this time, Joseph Bond, Does have capacity for medical decision-making.  Capacity is time limited and situation/question specific  During encounter the patient was his own medical decision-maker  Outcome of goals of care meeting:  Symptom management as below.   Patient will

## 2025-06-09 NOTE — PLAN OF CARE
Problem: Safety - Adult  Goal: Free from fall injury  6/8/2025 2214 by Jimmy Schmitt RN  Outcome: Progressing  6/8/2025 1106 by Holly Ruff RN  Outcome: Progressing     Problem: Pain  Goal: Verbalizes/displays adequate comfort level or baseline comfort level  6/8/2025 2214 by Jimmy Schmitt RN  Outcome: Progressing  6/8/2025 1106 by Holly Ruff RN  Outcome: Progressing

## 2025-06-09 NOTE — PROGRESS NOTES
SE - Family Medicine Inpatient   Resident Progress Note    S:  Hospital day: 3   Brief Synopsis: Joseph Bond is a 44 y.o. male with a PMH of prostate cancer on Nubeqa who presents with intractable pain. Was admitted 5/29 to 6/5/2025 for same complaint. During that admission, palliative care was consulted and they increased his home regimen based on OME of the PCA. Palliative was consulted this admission, dilaudid PCA pump was restarted in addition to his adjusted PO medications from last admission. While on the PCA pump, his pain regimen included Oxycontin 60 mg BID, Dilaudid 6 mg PO Q2H PRN for breakthrough pain, and Robaxin 750 mg QID, and Decadron 4 mg Q6H.     Overnight/interim:   No acute events overnight.   Patient was seen at bedside this morning in no acute distress. States that he used the PCA pump ~ 17 times since midnight. Rates pain as 7/10 this morning.  He denies having any CP, SOB, fever, chills, abdominal pain, N/V/D or dysuria.Tolerating diet and having regular BM's.  Agreeable to Rad-Onc consult to see if he can get radiation inpatient prior to discharge.     Cont meds:    HYDROmorphone      sodium chloride 50 mL/hr at 06/07/25 2028     Scheduled meds:    oxyCODONE  60 mg Oral Q12H    dexAMETHasone  4 mg Oral Q6H    ferrous sulfate  325 mg Oral Daily with breakfast    methocarbamol  750 mg Oral 4x Daily    Darolutamide  600 mg Oral BID    sennosides-docusate sodium  2 tablet Oral BID    sodium chloride flush  5-40 mL IntraVENous 2 times per day    enoxaparin  40 mg SubCUTAneous Daily    pantoprazole (PROTONIX) 40 mg in sodium chloride (PF) 0.9 % 10 mL injection  40 mg IntraVENous Daily     PRN meds: polyethylene glycol, sodium chloride flush, sodium chloride, ondansetron **OR** ondansetron, acetaminophen **OR** acetaminophen, HYDROmorphone     I reviewed the patient's past medical and surgical history, Medications and Allergies.    O:  /71   Pulse 64   Temp 98.6 °F (37 °C) (Oral)

## 2025-06-10 LAB
ANION GAP SERPL CALCULATED.3IONS-SCNC: 10 MMOL/L (ref 7–16)
BASOPHILS # BLD: 0 K/UL (ref 0–0.2)
BASOPHILS NFR BLD: 0 % (ref 0–2)
BUN SERPL-MCNC: 15 MG/DL (ref 6–20)
CALCIUM SERPL-MCNC: 9.2 MG/DL (ref 8.6–10)
CHLORIDE SERPL-SCNC: 101 MMOL/L (ref 98–107)
CO2 SERPL-SCNC: 26 MMOL/L (ref 22–29)
CREAT SERPL-MCNC: 0.5 MG/DL (ref 0.7–1.2)
EOSINOPHIL # BLD: 0 K/UL (ref 0.05–0.5)
EOSINOPHILS RELATIVE PERCENT: 0 % (ref 0–6)
ERYTHROCYTE [DISTWIDTH] IN BLOOD BY AUTOMATED COUNT: 17.9 % (ref 11.5–15)
GFR, ESTIMATED: >90 ML/MIN/1.73M2
GLUCOSE SERPL-MCNC: 125 MG/DL (ref 74–99)
HCT VFR BLD AUTO: 31.3 % (ref 37–54)
HGB BLD-MCNC: 9.9 G/DL (ref 12.5–16.5)
LYMPHOCYTES NFR BLD: 0.39 K/UL (ref 1.5–4)
LYMPHOCYTES RELATIVE PERCENT: 4 % (ref 20–42)
MCH RBC QN AUTO: 26.8 PG (ref 26–35)
MCHC RBC AUTO-ENTMCNC: 31.6 G/DL (ref 32–34.5)
MCV RBC AUTO: 84.8 FL (ref 80–99.9)
MONOCYTES NFR BLD: 0.39 K/UL (ref 0.1–0.95)
MONOCYTES NFR BLD: 4 % (ref 2–12)
MYELOCYTES ABSOLUTE COUNT: 0.2 K/UL
MYELOCYTES: 2 %
NEUTROPHILS NFR BLD: 91 % (ref 43–80)
NEUTS SEG NFR BLD: 10.32 K/UL (ref 1.8–7.3)
NUCLEATED RED BLOOD CELLS: 1 PER 100 WBC
PLATELET # BLD AUTO: 256 K/UL (ref 130–450)
PMV BLD AUTO: 8.7 FL (ref 7–12)
POTASSIUM SERPL-SCNC: 4.4 MMOL/L (ref 3.5–5.1)
RBC # BLD AUTO: 3.69 M/UL (ref 3.8–5.8)
RBC # BLD: ABNORMAL 10*6/UL
SODIUM SERPL-SCNC: 137 MMOL/L (ref 136–145)
WBC OTHER # BLD: 11.3 K/UL (ref 4.5–11.5)

## 2025-06-10 PROCEDURE — 2500000003 HC RX 250 WO HCPCS

## 2025-06-10 PROCEDURE — 6370000000 HC RX 637 (ALT 250 FOR IP)

## 2025-06-10 PROCEDURE — 2580000003 HC RX 258: Performed by: PHYSICIAN ASSISTANT

## 2025-06-10 PROCEDURE — 1200000000 HC SEMI PRIVATE

## 2025-06-10 PROCEDURE — 97530 THERAPEUTIC ACTIVITIES: CPT

## 2025-06-10 PROCEDURE — 6370000000 HC RX 637 (ALT 250 FOR IP): Performed by: NURSE PRACTITIONER

## 2025-06-10 PROCEDURE — 6360000002 HC RX W HCPCS: Performed by: FAMILY MEDICINE

## 2025-06-10 PROCEDURE — 97535 SELF CARE MNGMENT TRAINING: CPT

## 2025-06-10 PROCEDURE — 97165 OT EVAL LOW COMPLEX 30 MIN: CPT

## 2025-06-10 PROCEDURE — 6360000002 HC RX W HCPCS

## 2025-06-10 PROCEDURE — 99232 SBSQ HOSP IP/OBS MODERATE 35: CPT | Performed by: PHYSICIAN ASSISTANT

## 2025-06-10 PROCEDURE — 80048 BASIC METABOLIC PNL TOTAL CA: CPT

## 2025-06-10 PROCEDURE — 6360000002 HC RX W HCPCS: Performed by: NURSE PRACTITIONER

## 2025-06-10 PROCEDURE — 85025 COMPLETE CBC W/AUTO DIFF WBC: CPT

## 2025-06-10 PROCEDURE — 97161 PT EVAL LOW COMPLEX 20 MIN: CPT

## 2025-06-10 PROCEDURE — 99232 SBSQ HOSP IP/OBS MODERATE 35: CPT | Performed by: FAMILY MEDICINE

## 2025-06-10 PROCEDURE — 6360000002 HC RX W HCPCS: Performed by: PHYSICIAN ASSISTANT

## 2025-06-10 RX ORDER — DOCUSATE SODIUM 100 MG/1
100 CAPSULE, LIQUID FILLED ORAL 2 TIMES DAILY
Status: DISCONTINUED | OUTPATIENT
Start: 2025-06-10 | End: 2025-06-20 | Stop reason: HOSPADM

## 2025-06-10 RX ORDER — HEPARIN 100 UNIT/ML
500 SYRINGE INTRAVENOUS PRN
Status: DISCONTINUED | OUTPATIENT
Start: 2025-06-10 | End: 2025-06-20 | Stop reason: HOSPADM

## 2025-06-10 RX ORDER — DOCUSATE SODIUM 100 MG/1
100 CAPSULE, LIQUID FILLED ORAL DAILY
Status: DISCONTINUED | OUTPATIENT
Start: 2025-06-10 | End: 2025-06-10

## 2025-06-10 RX ADMIN — PANTOPRAZOLE SODIUM 40 MG: 40 INJECTION, POWDER, FOR SOLUTION INTRAVENOUS at 08:41

## 2025-06-10 RX ADMIN — HYDROMORPHONE HYDROCHLORIDE 1 MG: 1 INJECTION, SOLUTION INTRAMUSCULAR; INTRAVENOUS; SUBCUTANEOUS at 19:18

## 2025-06-10 RX ADMIN — HYDROMORPHONE HYDROCHLORIDE 6 MG: 4 TABLET ORAL at 00:20

## 2025-06-10 RX ADMIN — ZOLEDRONIC ACID 4 MG: 4 INJECTION, SOLUTION, CONCENTRATE INTRAVENOUS at 16:50

## 2025-06-10 RX ADMIN — HYDROMORPHONE HYDROCHLORIDE 1 MG: 1 INJECTION, SOLUTION INTRAMUSCULAR; INTRAVENOUS; SUBCUTANEOUS at 13:27

## 2025-06-10 RX ADMIN — SENNOSIDES AND DOCUSATE SODIUM 2 TABLET: 8.6; 5 TABLET ORAL at 08:40

## 2025-06-10 RX ADMIN — DEXAMETHASONE 4 MG: 4 TABLET ORAL at 11:58

## 2025-06-10 RX ADMIN — HYDROMORPHONE HYDROCHLORIDE 6 MG: 4 TABLET ORAL at 05:43

## 2025-06-10 RX ADMIN — HYDROMORPHONE HYDROCHLORIDE 6 MG: 4 TABLET ORAL at 18:11

## 2025-06-10 RX ADMIN — HYDROMORPHONE HYDROCHLORIDE 6 MG: 4 TABLET ORAL at 07:48

## 2025-06-10 RX ADMIN — SODIUM CHLORIDE, PRESERVATIVE FREE 10 ML: 5 INJECTION INTRAVENOUS at 08:41

## 2025-06-10 RX ADMIN — HYDROMORPHONE HYDROCHLORIDE 6 MG: 4 TABLET ORAL at 23:09

## 2025-06-10 RX ADMIN — HYDROMORPHONE HYDROCHLORIDE 1 MG: 1 INJECTION, SOLUTION INTRAMUSCULAR; INTRAVENOUS; SUBCUTANEOUS at 06:48

## 2025-06-10 RX ADMIN — DEXAMETHASONE 4 MG: 4 TABLET ORAL at 05:44

## 2025-06-10 RX ADMIN — METHOCARBAMOL 1500 MG: 750 TABLET ORAL at 15:16

## 2025-06-10 RX ADMIN — HYDROMORPHONE HYDROCHLORIDE 6 MG: 4 TABLET ORAL at 15:16

## 2025-06-10 RX ADMIN — HYDROMORPHONE HYDROCHLORIDE 6 MG: 4 TABLET ORAL at 03:32

## 2025-06-10 RX ADMIN — SENNOSIDES AND DOCUSATE SODIUM 2 TABLET: 8.6; 5 TABLET ORAL at 20:56

## 2025-06-10 RX ADMIN — HYDROMORPHONE HYDROCHLORIDE 6 MG: 4 TABLET ORAL at 11:58

## 2025-06-10 RX ADMIN — POLYETHYLENE GLYCOL 3350 17 G: 17 POWDER, FOR SOLUTION ORAL at 08:41

## 2025-06-10 RX ADMIN — OXYCODONE HYDROCHLORIDE 60 MG: 20 TABLET, FILM COATED, EXTENDED RELEASE ORAL at 08:40

## 2025-06-10 RX ADMIN — HYDROMORPHONE HYDROCHLORIDE 1 MG: 1 INJECTION, SOLUTION INTRAMUSCULAR; INTRAVENOUS; SUBCUTANEOUS at 01:25

## 2025-06-10 RX ADMIN — FERROUS SULFATE TAB 325 MG (65 MG ELEMENTAL FE) 325 MG: 325 (65 FE) TAB at 08:40

## 2025-06-10 RX ADMIN — HYDROMORPHONE HYDROCHLORIDE 1 MG: 1 INJECTION, SOLUTION INTRAMUSCULAR; INTRAVENOUS; SUBCUTANEOUS at 09:53

## 2025-06-10 RX ADMIN — METHOCARBAMOL 1500 MG: 750 TABLET ORAL at 08:40

## 2025-06-10 RX ADMIN — HEPARIN 500 UNITS: 100 SYRINGE at 18:12

## 2025-06-10 RX ADMIN — DEXAMETHASONE 4 MG: 4 TABLET ORAL at 16:47

## 2025-06-10 RX ADMIN — ENOXAPARIN SODIUM 40 MG: 100 INJECTION SUBCUTANEOUS at 08:41

## 2025-06-10 RX ADMIN — OXYCODONE HYDROCHLORIDE 60 MG: 20 TABLET, FILM COATED, EXTENDED RELEASE ORAL at 20:56

## 2025-06-10 RX ADMIN — DOCUSATE SODIUM 100 MG: 100 CAPSULE, LIQUID FILLED ORAL at 08:40

## 2025-06-10 RX ADMIN — SODIUM CHLORIDE, PRESERVATIVE FREE 10 ML: 5 INJECTION INTRAVENOUS at 20:59

## 2025-06-10 RX ADMIN — HYDROMORPHONE HYDROCHLORIDE 1 MG: 1 INJECTION, SOLUTION INTRAMUSCULAR; INTRAVENOUS; SUBCUTANEOUS at 16:47

## 2025-06-10 RX ADMIN — METHOCARBAMOL 1500 MG: 750 TABLET ORAL at 20:57

## 2025-06-10 RX ADMIN — HYDROMORPHONE HYDROCHLORIDE 1 MG: 1 INJECTION, SOLUTION INTRAMUSCULAR; INTRAVENOUS; SUBCUTANEOUS at 04:39

## 2025-06-10 RX ADMIN — HEPARIN 500 UNITS: 100 SYRINGE at 21:00

## 2025-06-10 RX ADMIN — DOCUSATE SODIUM 100 MG: 100 CAPSULE, LIQUID FILLED ORAL at 20:56

## 2025-06-10 RX ADMIN — HYDROMORPHONE HYDROCHLORIDE 1 MG: 1 INJECTION, SOLUTION INTRAMUSCULAR; INTRAVENOUS; SUBCUTANEOUS at 22:05

## 2025-06-10 ASSESSMENT — PAIN - FUNCTIONAL ASSESSMENT
PAIN_FUNCTIONAL_ASSESSMENT: PREVENTS OR INTERFERES SOME ACTIVE ACTIVITIES AND ADLS
PAIN_FUNCTIONAL_ASSESSMENT: ACTIVITIES ARE NOT PREVENTED
PAIN_FUNCTIONAL_ASSESSMENT: ACTIVITIES ARE NOT PREVENTED

## 2025-06-10 ASSESSMENT — PAIN DESCRIPTION - DESCRIPTORS
DESCRIPTORS: ACHING;THROBBING;SHARP;SHOOTING
DESCRIPTORS: ACHING;SORE;DISCOMFORT
DESCRIPTORS: SHARP;THROBBING;ACHING;DISCOMFORT
DESCRIPTORS: ACHING;DISCOMFORT;TENDER
DESCRIPTORS: ACHING;DISCOMFORT;SORE
DESCRIPTORS: ACHING;SORE;DISCOMFORT
DESCRIPTORS: ACHING;DISCOMFORT;SORE
DESCRIPTORS: ACHING;THROBBING;POUNDING
DESCRIPTORS: ACHING;DISCOMFORT;SORE;TENDER
DESCRIPTORS: THROBBING;DISCOMFORT
DESCRIPTORS: SHARP;THROBBING;DISCOMFORT
DESCRIPTORS: ACHING;DISCOMFORT;SHARP;THROBBING

## 2025-06-10 ASSESSMENT — PAIN SCALES - GENERAL
PAINLEVEL_OUTOF10: 7
PAINLEVEL_OUTOF10: 8
PAINLEVEL_OUTOF10: 10
PAINLEVEL_OUTOF10: 7
PAINLEVEL_OUTOF10: 8
PAINLEVEL_OUTOF10: 10
PAINLEVEL_OUTOF10: 8
PAINLEVEL_OUTOF10: 7
PAINLEVEL_OUTOF10: 8
PAINLEVEL_OUTOF10: 10
PAINLEVEL_OUTOF10: 9
PAINLEVEL_OUTOF10: 8
PAINLEVEL_OUTOF10: 9

## 2025-06-10 ASSESSMENT — PAIN DESCRIPTION - ORIENTATION
ORIENTATION: MID
ORIENTATION: RIGHT;LEFT
ORIENTATION: MID;RIGHT;LEFT;LOWER
ORIENTATION: MID
ORIENTATION: MID
ORIENTATION: POSTERIOR;LOWER
ORIENTATION: LOWER;RIGHT;LEFT
ORIENTATION: LOWER
ORIENTATION: LEFT
ORIENTATION: POSTERIOR;LOWER
ORIENTATION: LOWER
ORIENTATION: POSTERIOR

## 2025-06-10 ASSESSMENT — PAIN DESCRIPTION - LOCATION
LOCATION: BACK;RIB CAGE;SHOULDER
LOCATION: RIB CAGE;BACK
LOCATION: HIP;BACK
LOCATION: BACK;RIB CAGE;GENERALIZED
LOCATION: BACK
LOCATION: ABDOMEN
LOCATION: BACK
LOCATION: BACK;SHOULDER;RIB CAGE
LOCATION: BACK;PELVIS
LOCATION: BACK
LOCATION: BACK
LOCATION: BACK;HIP
LOCATION: BACK;RIB CAGE;HIP
LOCATION: BACK;HIP
LOCATION: BACK;HIP;RIB CAGE

## 2025-06-10 NOTE — CARE COORDINATION
CM update note.  Discharge plan is home with [facility] and Palliative medicine.  Will need new home care orders as patient was not opened up at last discharge.  Left message for Melanie with [facility] to check on status of the Palliative Med part.  His family will provide transport home on day of discharge.  Palliative following for symptom management.  Radiation oncology consult pending.   Patient was complaining of severe breakthrough pain overnight.   Had several doses of PRN IV and oral dilaudid.  PT/OT am-pac is 17.      1215:  will need MOLLY orders for [facility] Palliative at discharge.  CM/SW to follow.  Edwardo Leiva RN -[phone].

## 2025-06-10 NOTE — PROGRESS NOTES
[facility] - Family Medicine Inpatient   Resident Progress Note    S:  Hospital day: 4   Brief Synopsis: Joseph Bond is a 44 y.o. male with a PMH of prostate cancer on Nubeqa who presents with intractable pain. Was admitted 5/29 to 6/5/2025 for same complaint. During that admission, palliative care was consulted and they increased his home regimen based on OME of the PCA. Palliative was consulted this admission, dilaudid PCA pump was restarted in addition to his adjusted PO medications from last admission. While on the PCA pump, his pain regimen included Oxycontin 60 mg BID, Dilaudid 6 mg PO Q2H PRN for breakthrough pain, and Robaxin 750 mg QID, and Decadron 4 mg Q6H. PCA pump was discontinued and palliative added IV Dilaudid 1 mg Q2H PRN for pain crisis.     Overnight/interim:   PCA pump was discontinued yesterday and IV Dilaudid 1 mg Q2H PRN for pain crisis. Patient was complaining of severe breakthrough pain overnight.   No other acute overnight events.   Patient was seen at bedside this morning in no acute distress. States that he He denies having any CP, SOB, fever, chills, abdominal pain, N/V/D or dysuria.Tolerating diet. Last BM was 2 days ago. BR adjusted this am.    Cont meds:    sodium chloride 50 mL/hr at 06/07/25 2028     Scheduled meds:    polyethylene glycol  17 g Oral Daily    methocarbamol  1,500 mg Oral TID    oxyCODONE  60 mg Oral Q12H    dexAMETHasone  4 mg Oral Q6H    ferrous sulfate  325 mg Oral Daily with breakfast    Darolutamide  600 mg Oral BID    sennosides-docusate sodium  2 tablet Oral BID    sodium chloride flush  5-40 mL IntraVENous 2 times per day    enoxaparin  40 mg SubCUTAneous Daily    pantoprazole (PROTONIX) 40 mg in sodium chloride (PF) 0.9 % 10 mL injection  40 mg IntraVENous Daily     PRN meds: HYDROmorphone, sodium chloride flush, sodium chloride, ondansetron **OR** ondansetron, acetaminophen **OR** acetaminophen, HYDROmorphone     I reviewed the patient's past medical and  recommendations.        PT/OT evaluation: N/A  DVT prophylaxis: Lovenox  GI prophylaxis: Protonix  Diet: Adult, Regular  Code Status: FULL CODE   Disposition: Continue current management      Electronically signed by Apryl Pleitez MD on 6/10/2025 at 5:45 AM  Attending physician: Dr. Hernadnez

## 2025-06-10 NOTE — PROGRESS NOTES
Physician Progress Note      PATIENT:               BRIAN ANTHONY  [facility] #:                  [id number]  :                       1980  ADMIT DATE:       2025 1:59 PM  DISCH DATE:        2025 6:57 PM  RESPONDING  PROVIDER #:        RAKESH SELF          QUERY TEXT:    Suspected bone marrow failure was documented in the medical record 25 Dr. Barreto/Dr. Zhang internal medicine progress note thru 25 Dr. Self/Dr. Avila internal medicine progress note. The diagnosis was not noted in   subsequent documentation.  Please clarify the status of this condition.    The clinical indicators include:  --44 year old male with PMHx of prostate ca w mets on Nubeqa s/p palliative   radiation and chemo presents with acute pain (25 Dr. Phelps H&P)  --Assessment/plan: Generalized pain/LBP 2/2 prostate ca w mets causing acute   pain vs infectious gastroenteritis vs worsening mets. Multiple pathological   fractures noted on recent MRI thoracic and lumbar spine. Anemia: patient has   known metastases to bone marrow: suspect bone marrow failure d/t malignancy v   anemia of chronic disease. (25 Dr. Barreto/Dr. Zhang internal medicine   progress note thru 25 Dr. Self/Dr. Avila internal medicine progress   note)  --WBC 6.2-10.4; HGB 6.4-10.7; HCT 20.1-32.6; -270 (25-25)  --iron profile: iron 24, TIBC 178, Iron % saturation 13 (25)  --RBC transfusion (25)  Options provided:  -- After study, anemia due to suspected bone marrow failure.  -- After study, anemia due to chronic disease  -- After study, anemia multifactorial due to suspected bone marrow failure and   chronic disease.  -- Other - I will add my own diagnosis  -- Disagree - Not applicable / Not valid  -- Disagree - Clinically unable to determine / Unknown  -- Refer to Clinical Documentation Reviewer    PROVIDER RESPONSE TEXT:    Iron deficiency anemia    Query created by: Rose Alberts on 6/10/2025 8:12

## 2025-06-10 NOTE — PROGRESS NOTES
Physical Therapy  Initial Assessment     Name: Joseph Bond  : 1980  MRN: [id number]      Date of Service: 6/10/2025    Evaluating PT: Roger Cook, PT, DPT [id number]      Room #:  8413/8413-A  Diagnosis:  Intractable pain [R52]  Prostate cancer metastatic to bone (HCC) [C61, C79.51]  PMHx/PSHx:   has a past medical history of History of blood transfusion, History of opioid abuse (HCC), Neuropathy, and Prostate cancer (HCC).  Procedure/Surgery:  NA  Precautions:  Fall risk, Hx of prostate CA with mets to thoracic and lumbar spine, Spinal neutrality  Equipment Needs:  NA    SUBJECTIVE:    Pt lives alone in a 1 story apartment with 3 stair(s) and no rail(s) to enter. Pt ambulated with WW prior to admission. Pt's mother and sister visit daily to assist PRN.    OBJECTIVE:   Initial Evaluation  Date: 6/10/25 Treatment Date: Short Term/ Long Term   Goals   AM-PAC 6 Clicks      Was pt agreeable to Eval/treatment? Yes     Does pt have pain? 6/10 back and R hip pain     Bed Mobility  Rolling: NT  Supine to sit: Supervision  Sit to supine: NT  Scooting: Supervision  Rolling: Independent   Supine to sit: Independent   Sit to supine: Independent   Scooting: Independent    Transfers Sit to stand: SBA  Stand to sit: SBA  Stand pivot: SBA with WW  Sit to stand: Independent   Stand to sit: Independent   Stand pivot: Mod Independent with WW   Ambulation   50 feet with WW with SBA  >200 feet with WW Mod Independent    Stair negotiation: ascended and descended NT  >4 step(s) with 1 rail(s) Mod Independent    ROM BUE: Refer to OT note  BLE: WFL     Strength BUE: Refer to OT note  BLE: WFL     Balance Sitting EOB: SBA  Dynamic Standing: SBA with WW  Sitting EOB: Independent   Dynamic Standing: Mod Independent with WW     Pt is A & O x: 4 to person, place, month/year, and situation.   Sensation: Denies numbness and tingling of extremities.   Edema: Unremarkable    Patient education  Pt educated on PT role in acute care  setting.    Patient response to education:   Pt verbalized understanding Pt demonstrated skill Pt requires further education in this area   Yes NA No     ASSESSMENT:    Conditions Requiring Skilled Therapeutic Intervention:    [x]Decreased strength     []Decreased ROM  [x]Decreased functional mobility  [x]Decreased balance   [x]Decreased endurance   []Decreased posture  []Decreased sensation  []Decreased coordination   []Decreased vision  []Decreased safety awareness   [x]Increased pain       Comments:    Pt was in bed upon room entry; agreeable to PT evaluation. Pt completed all mobility noted above. No hands on assist required throughout session. Pt ambulated around room with WW. Gait was slow and mildly unsteady. No LOB occurred. Pt ambulated back to chair. O2 sat was 97% on RA with all activity. Pt was left in bed with all needs met at conclusion of session.    Treatment:  Patient practiced and was instructed in the following treatment:    Therapeutic activities:  Bed mobility: Cues for technique during bed mobility transfers.  Transfers: Cues for hand placement during sit <> stand transfers.   Ambulation: Cues for WW technique.  Dynamic sitting balance at EOB for 10+ minutes.  Vitals and symptoms were closely monitored throughout session.    Pt's/family goals:  1. To return home.    Prognosis is Good for reaching above PT goals.    Patient and or family understand(s) diagnosis, prognosis, and plan of care.  Yes    PHYSICAL THERAPY PLAN OF CARE:    PT POC is established based on physician order and patient diagnosis     Referring provider/PT Order:    Start   Ordering Provider    06/10/25 0900  PT eval and treat  Start:  06/10/25 0900,   End:  06/10/25 0900,   ONE TIME,   Standing Count:  1 Occurrences,   R         Apryl Pleitez MD      Diagnosis:  Intractable pain [R52]  Prostate cancer metastatic to bone (HCC) [C61, C79.51]  Specific instructions for next treatment:  Progress activity    Current Treatment

## 2025-06-10 NOTE — PROGRESS NOTES
OCCUPATIONAL THERAPY INITIAL EVALUATION    [facility] [address] [place], [place]     Date:6/10/2025                                                Patient Name: Joseph Bond  MRN: [id number]  : 1980  Room: [address] [place] [address]    Evaluating OT: Michel Gutierrez OTR/L; [id number]     Referring Provider: Apryl Pleitez MD   Specific Provider Orders/Date: OT Eval and Treat 06/10/25 0900     Diagnosis: Intractable pain      Surgery: None at this time.     Pertinent Medical History:  has a past medical history of History of blood transfusion, History of opioid abuse (HCC), Neuropathy, and Prostate cancer (HCC).     Recommended Adaptive Equipment: TBD    Precautions:  Fall Risk, +alarms, spinal neutrality, h/o prostate CA w/ mets thoracic & lumbar spine    Assessment of current deficits   [x] Functional mobility  [x]ADLs  [x] Strength               []Cognition   [x] Functional transfers   [x] IADLs         [x] Safety Awareness   [x]Endurance   [] Fine Coordination        [] ROM     [] Vision/perception   []Sensation    []Gross Motor Coordination [x] Balance   [] Delirium                  []Motor Control     [] Communication    OT PLAN OF CARE   OT POC based on physician orders, patient diagnosis and results of clinical assessment    Frequency/Duration 1-3 days/wk for 2 weeks PRN   Specific OT Treatment Interventions to include:   * Instruction/training on adapted ADL techniques and AE recommendations to increase functional independence within precautions       * Training on energy conservation strategies, correct breathing pattern and techniques to improve independence/tolerance for self-care routine  * Functional transfer/mobility training/DME recommendations for increased independence, safety, and fall prevention  * Patient/Family education to increase follow through with safety techniques and functional independence  * Recommendation of environmental

## 2025-06-10 NOTE — PROGRESS NOTES
Palliative Care Department  [phone]  Palliative Care Progress Note  Provider Lara Lott PA-C     Joseph Bond  [id number]  Hospital Day: 5  Date of Initial Consult: 06/06/2025  Referring Provider: Nasima Phelps Chi, MD     Palliative Medicine was consulted for assistance with: pain management    HPI:   Joseph Bond is a 44 y.o. y/o male with a history of  prostate cancer with mediastinal lymphadenopathy, extensive metastasis of the spine, rib cage, pelvis, humerus and femurs ,(had refused systemic therapy, pursuing homeopathic) completed palliative radiation treatment to spine, with a history of polysubstance abuse including opioids and tobacco, reports abstinent for 5 years.  The patient had MRI of the lumbar and thoracic spine on 5/23/2025, with evidence of extensive metastatic disease throughout the lumbar spine with chronic compression fracture deformities at the L2 , L4 , L3 vertebral bodies.  Tumor in the S1 vertebral body extends into the anterior epidural space, resulting in moderate central canal stenosis.  He presented once again on 6/6/2025 with complaints of worsening and poorly controlled pain.   He follows outpatient with [facility] palliative, reports his regimen is p.o. Dilaudid 4 mg every 2 hours as needed, OxyContin 30 mg ER twice daily, Robaxin-750 milligrams 4 times a day.    ASSESSMENT/PLAN:     Pertinent Hospital Diagnoses     Intractable pain  Prostate cancer with metastasis to bones  Known to Dr. Matson  On Dena       Palliative Care Encounter / Counseling Regarding Goals of Care  Please see detailed goals of care discussion as below  At this time, Joseph Bond, Does have capacity for medical decision-making.  Capacity is time limited and situation/question specific  Outcome of goals of care meeting:   Pain control  Agreeable to radiation  Follows with [facility] Palliative Medicine  Code status Full Code  Advanced Directives: no POA or living will in

## 2025-06-10 NOTE — PLAN OF CARE
Problem: Safety - Adult  Goal: Free from fall injury  6/9/2025 2238 by Silvana Rodriguez RN  Outcome: Progressing     Problem: Pain  Goal: Verbalizes/displays adequate comfort level or baseline comfort level  6/9/2025 2238 by Silvana Rodriguez RN  Outcome: Progressing     Problem: Skin/Tissue Integrity  Goal: Skin integrity remains intact  Description: 1.  Monitor for areas of redness and/or skin breakdown2.  Assess vascular access sites hourly3.  Every 4-6 hours minimum:  Change oxygen saturation probe site4.  Every 4-6 hours:  If on nasal continuous positive airway pressure, respiratory therapy assess nares and determine need for appliance change or resting period  6/9/2025 2238 by Silvana Rodriguez RN  Outcome: Progressing     Problem: ABCDS Injury Assessment  Goal: Absence of physical injury  6/9/2025 2238 by Silvana Rodriguez RN  Outcome: Progressing

## 2025-06-11 LAB
25(OH)D3 SERPL-MCNC: 19.9 NG/ML (ref 30–100)
ANION GAP SERPL CALCULATED.3IONS-SCNC: 16 MMOL/L (ref 7–16)
BASOPHILS # BLD: 0.06 K/UL (ref 0–0.2)
BASOPHILS NFR BLD: 0 % (ref 0–2)
BUN SERPL-MCNC: 12 MG/DL (ref 6–20)
CALCIUM SERPL-MCNC: 9.3 MG/DL (ref 8.6–10)
CHLORIDE SERPL-SCNC: 98 MMOL/L (ref 98–107)
CO2 SERPL-SCNC: 24 MMOL/L (ref 22–29)
CREAT SERPL-MCNC: 0.5 MG/DL (ref 0.7–1.2)
EOSINOPHIL # BLD: 0 K/UL (ref 0.05–0.5)
EOSINOPHILS RELATIVE PERCENT: 0 % (ref 0–6)
ERYTHROCYTE [DISTWIDTH] IN BLOOD BY AUTOMATED COUNT: 18.3 % (ref 11.5–15)
GFR, ESTIMATED: >90 ML/MIN/1.73M2
GLUCOSE SERPL-MCNC: 105 MG/DL (ref 74–99)
HCT VFR BLD AUTO: 36.6 % (ref 37–54)
HGB BLD-MCNC: 11.7 G/DL (ref 12.5–16.5)
IMM GRANULOCYTES # BLD AUTO: 0.93 K/UL (ref 0–0.58)
IMM GRANULOCYTES NFR BLD: 7 % (ref 0–5)
LYMPHOCYTES NFR BLD: 0.59 K/UL (ref 1.5–4)
LYMPHOCYTES RELATIVE PERCENT: 4 % (ref 20–42)
MCH RBC QN AUTO: 27.3 PG (ref 26–35)
MCHC RBC AUTO-ENTMCNC: 32 G/DL (ref 32–34.5)
MCV RBC AUTO: 85.3 FL (ref 80–99.9)
MONOCYTES NFR BLD: 0.65 K/UL (ref 0.1–0.95)
MONOCYTES NFR BLD: 5 % (ref 2–12)
NEUTROPHILS NFR BLD: 85 % (ref 43–80)
NEUTS SEG NFR BLD: 12.15 K/UL (ref 1.8–7.3)
PLATELET # BLD AUTO: 300 K/UL (ref 130–450)
PMV BLD AUTO: 9 FL (ref 7–12)
POTASSIUM SERPL-SCNC: 4.5 MMOL/L (ref 3.5–5.1)
RBC # BLD AUTO: 4.29 M/UL (ref 3.8–5.8)
RBC # BLD: ABNORMAL 10*6/UL
SODIUM SERPL-SCNC: 139 MMOL/L (ref 136–145)
WBC OTHER # BLD: 14.4 K/UL (ref 4.5–11.5)

## 2025-06-11 PROCEDURE — 85025 COMPLETE CBC W/AUTO DIFF WBC: CPT

## 2025-06-11 PROCEDURE — 6370000000 HC RX 637 (ALT 250 FOR IP): Performed by: NURSE PRACTITIONER

## 2025-06-11 PROCEDURE — 2500000003 HC RX 250 WO HCPCS

## 2025-06-11 PROCEDURE — 99232 SBSQ HOSP IP/OBS MODERATE 35: CPT | Performed by: PHYSICIAN ASSISTANT

## 2025-06-11 PROCEDURE — 6370000000 HC RX 637 (ALT 250 FOR IP)

## 2025-06-11 PROCEDURE — 6360000002 HC RX W HCPCS

## 2025-06-11 PROCEDURE — 82306 VITAMIN D 25 HYDROXY: CPT

## 2025-06-11 PROCEDURE — 6360000002 HC RX W HCPCS: Performed by: NURSE PRACTITIONER

## 2025-06-11 PROCEDURE — 99232 SBSQ HOSP IP/OBS MODERATE 35: CPT | Performed by: FAMILY MEDICINE

## 2025-06-11 PROCEDURE — 1200000000 HC SEMI PRIVATE

## 2025-06-11 PROCEDURE — 6360000002 HC RX W HCPCS: Performed by: FAMILY MEDICINE

## 2025-06-11 PROCEDURE — 80048 BASIC METABOLIC PNL TOTAL CA: CPT

## 2025-06-11 RX ORDER — VITAMIN B COMPLEX
1000 TABLET ORAL DAILY
Status: DISCONTINUED | OUTPATIENT
Start: 2025-06-11 | End: 2025-06-20 | Stop reason: HOSPADM

## 2025-06-11 RX ADMIN — DEXAMETHASONE 4 MG: 4 TABLET ORAL at 11:07

## 2025-06-11 RX ADMIN — SODIUM CHLORIDE, PRESERVATIVE FREE 10 ML: 5 INJECTION INTRAVENOUS at 08:16

## 2025-06-11 RX ADMIN — HYDROMORPHONE HYDROCHLORIDE 1 MG: 1 INJECTION, SOLUTION INTRAMUSCULAR; INTRAVENOUS; SUBCUTANEOUS at 09:31

## 2025-06-11 RX ADMIN — HYDROMORPHONE HYDROCHLORIDE 1 MG: 1 INJECTION, SOLUTION INTRAMUSCULAR; INTRAVENOUS; SUBCUTANEOUS at 12:29

## 2025-06-11 RX ADMIN — SENNOSIDES AND DOCUSATE SODIUM 2 TABLET: 8.6; 5 TABLET ORAL at 20:36

## 2025-06-11 RX ADMIN — HYDROMORPHONE HYDROCHLORIDE 1 MG: 1 INJECTION, SOLUTION INTRAMUSCULAR; INTRAVENOUS; SUBCUTANEOUS at 15:24

## 2025-06-11 RX ADMIN — Medication 1000 UNITS: at 10:05

## 2025-06-11 RX ADMIN — DEXAMETHASONE 4 MG: 4 TABLET ORAL at 18:00

## 2025-06-11 RX ADMIN — HYDROMORPHONE HYDROCHLORIDE 1 MG: 1 INJECTION, SOLUTION INTRAMUSCULAR; INTRAVENOUS; SUBCUTANEOUS at 03:33

## 2025-06-11 RX ADMIN — HYDROMORPHONE HYDROCHLORIDE 6 MG: 4 TABLET ORAL at 01:07

## 2025-06-11 RX ADMIN — FERROUS SULFATE TAB 325 MG (65 MG ELEMENTAL FE) 325 MG: 325 (65 FE) TAB at 08:16

## 2025-06-11 RX ADMIN — HYDROMORPHONE HYDROCHLORIDE 1 MG: 1 INJECTION, SOLUTION INTRAMUSCULAR; INTRAVENOUS; SUBCUTANEOUS at 06:02

## 2025-06-11 RX ADMIN — HYDROMORPHONE HYDROCHLORIDE 6 MG: 4 TABLET ORAL at 22:50

## 2025-06-11 RX ADMIN — OXYCODONE HYDROCHLORIDE 60 MG: 20 TABLET, FILM COATED, EXTENDED RELEASE ORAL at 20:36

## 2025-06-11 RX ADMIN — HYDROMORPHONE HYDROCHLORIDE 6 MG: 4 TABLET ORAL at 13:53

## 2025-06-11 RX ADMIN — METHOCARBAMOL 1500 MG: 750 TABLET ORAL at 20:36

## 2025-06-11 RX ADMIN — SODIUM CHLORIDE, PRESERVATIVE FREE 10 ML: 5 INJECTION INTRAVENOUS at 20:40

## 2025-06-11 RX ADMIN — DOCUSATE SODIUM 100 MG: 100 CAPSULE, LIQUID FILLED ORAL at 20:37

## 2025-06-11 RX ADMIN — ENOXAPARIN SODIUM 40 MG: 100 INJECTION SUBCUTANEOUS at 08:16

## 2025-06-11 RX ADMIN — DEXAMETHASONE 4 MG: 4 TABLET ORAL at 23:52

## 2025-06-11 RX ADMIN — HYDROMORPHONE HYDROCHLORIDE 6 MG: 4 TABLET ORAL at 07:24

## 2025-06-11 RX ADMIN — POLYETHYLENE GLYCOL 3350 17 G: 17 POWDER, FOR SOLUTION ORAL at 08:16

## 2025-06-11 RX ADMIN — METHOCARBAMOL 1500 MG: 750 TABLET ORAL at 08:16

## 2025-06-11 RX ADMIN — OXYCODONE HYDROCHLORIDE 60 MG: 20 TABLET, FILM COATED, EXTENDED RELEASE ORAL at 08:16

## 2025-06-11 RX ADMIN — DOCUSATE SODIUM 100 MG: 100 CAPSULE, LIQUID FILLED ORAL at 08:16

## 2025-06-11 RX ADMIN — SENNOSIDES AND DOCUSATE SODIUM 2 TABLET: 8.6; 5 TABLET ORAL at 08:16

## 2025-06-11 RX ADMIN — HYDROMORPHONE HYDROCHLORIDE 1 MG: 1 INJECTION, SOLUTION INTRAMUSCULAR; INTRAVENOUS; SUBCUTANEOUS at 21:41

## 2025-06-11 RX ADMIN — DEXAMETHASONE 4 MG: 4 TABLET ORAL at 01:07

## 2025-06-11 RX ADMIN — HYDROMORPHONE HYDROCHLORIDE 6 MG: 4 TABLET ORAL at 11:07

## 2025-06-11 RX ADMIN — ALTEPLASE 4 MG: 2.2 INJECTION, POWDER, LYOPHILIZED, FOR SOLUTION INTRAVENOUS at 10:05

## 2025-06-11 RX ADMIN — DEXAMETHASONE 4 MG: 4 TABLET ORAL at 06:01

## 2025-06-11 RX ADMIN — HYDROMORPHONE HYDROCHLORIDE 6 MG: 4 TABLET ORAL at 04:48

## 2025-06-11 RX ADMIN — HYDROMORPHONE HYDROCHLORIDE 1 MG: 1 INJECTION, SOLUTION INTRAMUSCULAR; INTRAVENOUS; SUBCUTANEOUS at 17:59

## 2025-06-11 RX ADMIN — METHOCARBAMOL 1500 MG: 750 TABLET ORAL at 13:53

## 2025-06-11 RX ADMIN — HYDROMORPHONE HYDROCHLORIDE 1 MG: 1 INJECTION, SOLUTION INTRAMUSCULAR; INTRAVENOUS; SUBCUTANEOUS at 23:52

## 2025-06-11 RX ADMIN — HYDROMORPHONE HYDROCHLORIDE 6 MG: 4 TABLET ORAL at 16:44

## 2025-06-11 RX ADMIN — HYDROMORPHONE HYDROCHLORIDE 6 MG: 4 TABLET ORAL at 19:36

## 2025-06-11 RX ADMIN — PANTOPRAZOLE SODIUM 40 MG: 40 INJECTION, POWDER, FOR SOLUTION INTRAVENOUS at 08:16

## 2025-06-11 ASSESSMENT — PAIN DESCRIPTION - DESCRIPTORS
DESCRIPTORS: ACHING;DISCOMFORT;SORE;TENDER
DESCRIPTORS: ACHING;THROBBING;POUNDING
DESCRIPTORS: ACHING;DISCOMFORT;SORE;TENDER
DESCRIPTORS: ACHING;THROBBING;POUNDING
DESCRIPTORS: ACHING;DISCOMFORT;SORE;TENDER;SHOOTING
DESCRIPTORS: SHOOTING;ACHING;THROBBING
DESCRIPTORS: SHOOTING;ACHING;THROBBING
DESCRIPTORS: ACHING;THROBBING;POUNDING
DESCRIPTORS: ACHING;DISCOMFORT;SORE

## 2025-06-11 ASSESSMENT — PAIN DESCRIPTION - ORIENTATION
ORIENTATION: RIGHT;LEFT;LOWER
ORIENTATION: MID
ORIENTATION: LEFT
ORIENTATION: RIGHT;LEFT;LOWER
ORIENTATION: RIGHT;LEFT;LOWER
ORIENTATION: LEFT
ORIENTATION: RIGHT;LEFT;LOWER;MID

## 2025-06-11 ASSESSMENT — PAIN SCALES - GENERAL
PAINLEVEL_OUTOF10: 10
PAINLEVEL_OUTOF10: 8
PAINLEVEL_OUTOF10: 8
PAINLEVEL_OUTOF10: 10
PAINLEVEL_OUTOF10: 8
PAINLEVEL_OUTOF10: 10
PAINLEVEL_OUTOF10: 8
PAINLEVEL_OUTOF10: 10
PAINLEVEL_OUTOF10: 10

## 2025-06-11 ASSESSMENT — PAIN DESCRIPTION - PAIN TYPE
TYPE: CHRONIC PAIN
TYPE: CHRONIC PAIN

## 2025-06-11 ASSESSMENT — PAIN DESCRIPTION - LOCATION
LOCATION: BACK
LOCATION: BACK;HIP
LOCATION: BACK;HIP
LOCATION: BACK
LOCATION: GENERALIZED
LOCATION: GENERALIZED
LOCATION: BACK;HIP
LOCATION: HIP;BACK
LOCATION: HIP;BACK
LOCATION: BACK;PELVIS;HIP
LOCATION: BACK;PELVIS;HIP
LOCATION: GENERALIZED

## 2025-06-11 NOTE — PROGRESS NOTES
Palliative Care Department  [phone]  Palliative Care Progress Note  Provider Lara Lott PA-C     Joseph Bond  [id number]  Hospital Day: 6  Date of Initial Consult: 06/06/2025  Referring Provider: Nasima Phelps Chi, MD     Palliative Medicine was consulted for assistance with: pain management    HPI:   Joseph Bond is a 44 y.o. y/o male with a history of  prostate cancer with mediastinal lymphadenopathy, extensive metastasis of the spine, rib cage, pelvis, humerus and femurs ,(had refused systemic therapy, pursuing homeopathic) completed palliative radiation treatment to spine, with a history of polysubstance abuse including opioids and tobacco, reports abstinent for 5 years.  The patient had MRI of the lumbar and thoracic spine on 5/23/2025, with evidence of extensive metastatic disease throughout the lumbar spine with chronic compression fracture deformities at the L2 , L4 , L3 vertebral bodies.  Tumor in the S1 vertebral body extends into the anterior epidural space, resulting in moderate central canal stenosis.  He presented once again on 6/6/2025 with complaints of worsening and poorly controlled pain.   He follows outpatient with [facility] palliative, reports his regimen is p.o. Dilaudid 4 mg every 2 hours as needed, OxyContin 30 mg ER twice daily, Robaxin-750 milligrams 4 times a day.    ASSESSMENT/PLAN:     Pertinent Hospital Diagnoses     Intractable pain  Prostate cancer with metastasis to bones  Known to Dr. Matson  On Dena       Palliative Care Encounter / Counseling Regarding Goals of Care  Please see detailed goals of care discussion as below  At this time, Joseph Bond, Does have capacity for medical decision-making.  Capacity is time limited and situation/question specific  Outcome of goals of care meeting:   Pain control  Agreeable to radiation  Follows with [facility] Palliative Medicine  Code status Full Code  Advanced Directives: no POA or living will in

## 2025-06-11 NOTE — CARE COORDINATION
CM update note.  Discharge plan is home with [facility] and Palliative medicine.  Will need new home care orders as patient was not opened up at last discharge.  Will need MOLLY orders for palliative medicine at discharge.  His family will provide transport home on day of discharge. Palliative following for symptom management. Radiation oncology consult pending, will likely see patient tomorrow.  Received 9 doses of the PO dilaudid and 9 doses of the IV dilaudid over the past 24 hours.  CM/SW to follow.  Edwardo Leiva RN -[phone].

## 2025-06-11 NOTE — PLAN OF CARE
Problem: Safety - Adult  Goal: Free from fall injury  6/11/2025 1650 by Holly Ruff, RN  Outcome: Progressing  6/11/2025 0442 by Cassy Foster RN  Outcome: Progressing     Problem: Pain  Goal: Verbalizes/displays adequate comfort level or baseline comfort level  6/11/2025 1650 by Holly Ruff, RN  Outcome: Progressing  6/11/2025 0442 by Cassy Foster RN  Outcome: Progressing     Problem: Skin/Tissue Integrity  Goal: Skin integrity remains intact  Description: 1.  Monitor for areas of redness and/or skin breakdown2.  Assess vascular access sites hourly3.  Every 4-6 hours minimum:  Change oxygen saturation probe site4.  Every 4-6 hours:  If on nasal continuous positive airway pressure, respiratory therapy assess nares and determine need for appliance change or resting period  6/11/2025 1650 by Holly Ruff, RN  Outcome: Progressing  6/11/2025 0442 by Cassy Foster, RN  Outcome: Progressing

## 2025-06-11 NOTE — PROGRESS NOTES
[facility] - Family Medicine Inpatient   Resident Progress Note    S:  Hospital day: 5   Brief Synopsis: Joseph Bond is a 44 y.o. male with a PMH of prostate cancer on Nubeqa who presents with intractable pain. Was admitted 5/29 to 6/5/2025 for same complaint. During that admission, palliative care was consulted and they increased his home regimen based on OME of the PCA. Palliative was consulted this admission, dilaudid PCA pump was restarted in addition to his adjusted PO medications from last admission. While on the PCA pump, his pain regimen included Oxycontin 60 mg BID, Dilaudid 6 mg PO Q2H PRN for breakthrough pain, and Robaxin 750 mg QID, and Decadron 4 mg Q6H. PCA pump was discontinued and palliative added IV Dilaudid 1 mg Q2H PRN for pain crisis.     Overnight/interim:   No acute events overnight.  Received 9 doses of the PO dilaudid and 9 doses of the IV dilaudid over the past 24 hours.  Patient was seen at bedside this morning in no acute distress. States that he He denies having any CP, SOB, fever, chills, abdominal pain, N/V/D or dysuria. Tolerating diet. Last BM was yesterday.    Cont meds:    sodium chloride 50 mL/hr at 06/07/25 2028     Scheduled meds:    docusate sodium  100 mg Oral BID    polyethylene glycol  17 g Oral Daily    methocarbamol  1,500 mg Oral TID    oxyCODONE  60 mg Oral Q12H    dexAMETHasone  4 mg Oral Q6H    ferrous sulfate  325 mg Oral Daily with breakfast    Darolutamide  600 mg Oral BID    sennosides-docusate sodium  2 tablet Oral BID    sodium chloride flush  5-40 mL IntraVENous 2 times per day    enoxaparin  40 mg SubCUTAneous Daily    pantoprazole (PROTONIX) 40 mg in sodium chloride (PF) 0.9 % 10 mL injection  40 mg IntraVENous Daily     PRN meds: heparin (PF), HYDROmorphone, sodium chloride flush, sodium chloride, ondansetron **OR** ondansetron, acetaminophen **OR** acetaminophen, HYDROmorphone     I reviewed the patient's past medical and surgical history, Medications and

## 2025-06-11 NOTE — PROGRESS NOTES
Per Dr. Underwood office he is not here on wednesdays but they will let him know and he will see the patient tomorrow.

## 2025-06-11 NOTE — PLAN OF CARE
Problem: Safety - Adult  Goal: Free from fall injury  6/11/2025 0442 by Cassy Foster, RN  Outcome: Progressing     Problem: Pain  Goal: Verbalizes/displays adequate comfort level or baseline comfort level  6/11/2025 0442 by Cassy Foster, RN  Outcome: Progressing     Problem: Skin/Tissue Integrity  Goal: Skin integrity remains intact  Description: 1.  Monitor for areas of redness and/or skin breakdown2.  Assess vascular access sites hourly3.  Every 4-6 hours minimum:  Change oxygen saturation probe site4.  Every 4-6 hours:  If on nasal continuous positive airway pressure, respiratory therapy assess nares and determine need for appliance change or resting period  6/11/2025 0442 by Cassy Foster, RN  Outcome: Progressing     Problem: ABCDS Injury Assessment  Goal: Absence of physical injury  6/11/2025 0442 by Cassy Foster, RN  Outcome: Progressing

## 2025-06-12 ENCOUNTER — CLINICAL DOCUMENTATION (OUTPATIENT)
Dept: RADIATION ONCOLOGY | Age: 45
End: 2025-06-12

## 2025-06-12 LAB
ANION GAP SERPL CALCULATED.3IONS-SCNC: 11 MMOL/L (ref 7–16)
BASOPHILS # BLD: 0 K/UL (ref 0–0.2)
BASOPHILS NFR BLD: 0 % (ref 0–2)
BUN SERPL-MCNC: 14 MG/DL (ref 6–20)
CA-I BLD-SCNC: 1.12 MMOL/L (ref 1.15–1.33)
CALCIUM SERPL-MCNC: 8 MG/DL (ref 8.6–10)
CHLORIDE SERPL-SCNC: 101 MMOL/L (ref 98–107)
CO2 SERPL-SCNC: 25 MMOL/L (ref 22–29)
CREAT SERPL-MCNC: 0.4 MG/DL (ref 0.7–1.2)
EOSINOPHIL # BLD: 0 K/UL (ref 0.05–0.5)
EOSINOPHILS RELATIVE PERCENT: 0 % (ref 0–6)
ERYTHROCYTE [DISTWIDTH] IN BLOOD BY AUTOMATED COUNT: 18.2 % (ref 11.5–15)
GFR, ESTIMATED: >90 ML/MIN/1.73M2
GLUCOSE SERPL-MCNC: 121 MG/DL (ref 74–99)
HCT VFR BLD AUTO: 29.8 % (ref 37–54)
HGB BLD-MCNC: 9.9 G/DL (ref 12.5–16.5)
LYMPHOCYTES NFR BLD: 0.21 K/UL (ref 1.5–4)
LYMPHOCYTES RELATIVE PERCENT: 2 % (ref 20–42)
MCH RBC QN AUTO: 27.9 PG (ref 26–35)
MCHC RBC AUTO-ENTMCNC: 33.2 G/DL (ref 32–34.5)
MCV RBC AUTO: 83.9 FL (ref 80–99.9)
METAMYELOCYTES ABSOLUTE COUNT: 0.31 K/UL (ref 0–0.12)
METAMYELOCYTES: 3 % (ref 0–1)
MONOCYTES NFR BLD: 0.51 K/UL (ref 0.1–0.95)
MONOCYTES NFR BLD: 4 % (ref 2–12)
MYELOCYTES ABSOLUTE COUNT: 0.1 K/UL
MYELOCYTES: 1 %
NEUTROPHILS NFR BLD: 90 % (ref 43–80)
NEUTS SEG NFR BLD: 10.67 K/UL (ref 1.8–7.3)
PLATELET # BLD AUTO: 228 K/UL (ref 130–450)
PMV BLD AUTO: 8.7 FL (ref 7–12)
POTASSIUM SERPL-SCNC: 4.1 MMOL/L (ref 3.5–5.1)
RBC # BLD AUTO: 3.55 M/UL (ref 3.8–5.8)
RBC # BLD: ABNORMAL 10*6/UL
SODIUM SERPL-SCNC: 137 MMOL/L (ref 136–145)
WBC OTHER # BLD: 11.8 K/UL (ref 4.5–11.5)

## 2025-06-12 PROCEDURE — 6360000002 HC RX W HCPCS

## 2025-06-12 PROCEDURE — 6370000000 HC RX 637 (ALT 250 FOR IP)

## 2025-06-12 PROCEDURE — 80048 BASIC METABOLIC PNL TOTAL CA: CPT

## 2025-06-12 PROCEDURE — 6360000002 HC RX W HCPCS: Performed by: NURSE PRACTITIONER

## 2025-06-12 PROCEDURE — 6370000000 HC RX 637 (ALT 250 FOR IP): Performed by: NURSE PRACTITIONER

## 2025-06-12 PROCEDURE — 99231 SBSQ HOSP IP/OBS SF/LOW 25: CPT | Performed by: FAMILY MEDICINE

## 2025-06-12 PROCEDURE — 6360000002 HC RX W HCPCS: Performed by: FAMILY MEDICINE

## 2025-06-12 PROCEDURE — 1200000000 HC SEMI PRIVATE

## 2025-06-12 PROCEDURE — 2500000003 HC RX 250 WO HCPCS

## 2025-06-12 PROCEDURE — 85025 COMPLETE CBC W/AUTO DIFF WBC: CPT

## 2025-06-12 PROCEDURE — 99233 SBSQ HOSP IP/OBS HIGH 50: CPT | Performed by: PHYSICIAN ASSISTANT

## 2025-06-12 PROCEDURE — 82330 ASSAY OF CALCIUM: CPT

## 2025-06-12 RX ADMIN — METHOCARBAMOL 1500 MG: 750 TABLET ORAL at 08:21

## 2025-06-12 RX ADMIN — HYDROMORPHONE HYDROCHLORIDE 1 MG: 1 INJECTION, SOLUTION INTRAMUSCULAR; INTRAVENOUS; SUBCUTANEOUS at 22:15

## 2025-06-12 RX ADMIN — HYDROMORPHONE HYDROCHLORIDE 6 MG: 4 TABLET ORAL at 19:25

## 2025-06-12 RX ADMIN — OXYCODONE HYDROCHLORIDE 60 MG: 20 TABLET, FILM COATED, EXTENDED RELEASE ORAL at 08:21

## 2025-06-12 RX ADMIN — HYDROMORPHONE HYDROCHLORIDE 6 MG: 4 TABLET ORAL at 06:29

## 2025-06-12 RX ADMIN — FERROUS SULFATE TAB 325 MG (65 MG ELEMENTAL FE) 325 MG: 325 (65 FE) TAB at 08:21

## 2025-06-12 RX ADMIN — DOCUSATE SODIUM 100 MG: 100 CAPSULE, LIQUID FILLED ORAL at 20:45

## 2025-06-12 RX ADMIN — HYDROMORPHONE HYDROCHLORIDE 1 MG: 1 INJECTION, SOLUTION INTRAMUSCULAR; INTRAVENOUS; SUBCUTANEOUS at 07:44

## 2025-06-12 RX ADMIN — HYDROMORPHONE HYDROCHLORIDE 1 MG: 1 INJECTION, SOLUTION INTRAMUSCULAR; INTRAVENOUS; SUBCUTANEOUS at 18:00

## 2025-06-12 RX ADMIN — OXYCODONE HYDROCHLORIDE 60 MG: 20 TABLET, FILM COATED, EXTENDED RELEASE ORAL at 20:46

## 2025-06-12 RX ADMIN — DOCUSATE SODIUM 100 MG: 100 CAPSULE, LIQUID FILLED ORAL at 08:21

## 2025-06-12 RX ADMIN — SODIUM CHLORIDE, PRESERVATIVE FREE 10 ML: 5 INJECTION INTRAVENOUS at 08:21

## 2025-06-12 RX ADMIN — DEXAMETHASONE 4 MG: 4 TABLET ORAL at 10:11

## 2025-06-12 RX ADMIN — Medication 1000 UNITS: at 08:25

## 2025-06-12 RX ADMIN — DEXAMETHASONE 4 MG: 4 TABLET ORAL at 04:55

## 2025-06-12 RX ADMIN — PANTOPRAZOLE SODIUM 40 MG: 40 INJECTION, POWDER, FOR SOLUTION INTRAVENOUS at 08:21

## 2025-06-12 RX ADMIN — HYDROMORPHONE HYDROCHLORIDE 1 MG: 1 INJECTION, SOLUTION INTRAMUSCULAR; INTRAVENOUS; SUBCUTANEOUS at 14:36

## 2025-06-12 RX ADMIN — DEXAMETHASONE 4 MG: 4 TABLET ORAL at 22:15

## 2025-06-12 RX ADMIN — HYDROMORPHONE HYDROCHLORIDE 1 MG: 1 INJECTION, SOLUTION INTRAMUSCULAR; INTRAVENOUS; SUBCUTANEOUS at 04:55

## 2025-06-12 RX ADMIN — HYDROMORPHONE HYDROCHLORIDE 6 MG: 4 TABLET ORAL at 13:02

## 2025-06-12 RX ADMIN — HEPARIN 500 UNITS: 100 SYRINGE at 20:46

## 2025-06-12 RX ADMIN — DEXAMETHASONE 4 MG: 4 TABLET ORAL at 16:34

## 2025-06-12 RX ADMIN — HYDROMORPHONE HYDROCHLORIDE 1 MG: 1 INJECTION, SOLUTION INTRAMUSCULAR; INTRAVENOUS; SUBCUTANEOUS at 11:37

## 2025-06-12 RX ADMIN — SODIUM CHLORIDE, PRESERVATIVE FREE 10 ML: 5 INJECTION INTRAVENOUS at 20:46

## 2025-06-12 RX ADMIN — METHOCARBAMOL 1500 MG: 750 TABLET ORAL at 13:02

## 2025-06-12 RX ADMIN — HYDROMORPHONE HYDROCHLORIDE 6 MG: 4 TABLET ORAL at 03:36

## 2025-06-12 RX ADMIN — POLYETHYLENE GLYCOL 3350 17 G: 17 POWDER, FOR SOLUTION ORAL at 08:21

## 2025-06-12 RX ADMIN — SENNOSIDES AND DOCUSATE SODIUM 2 TABLET: 8.6; 5 TABLET ORAL at 08:21

## 2025-06-12 RX ADMIN — METHOCARBAMOL 1500 MG: 750 TABLET ORAL at 20:45

## 2025-06-12 RX ADMIN — HYDROMORPHONE HYDROCHLORIDE 1 MG: 1 INJECTION, SOLUTION INTRAMUSCULAR; INTRAVENOUS; SUBCUTANEOUS at 02:03

## 2025-06-12 RX ADMIN — HYDROMORPHONE HYDROCHLORIDE 6 MG: 4 TABLET ORAL at 00:59

## 2025-06-12 RX ADMIN — HYDROMORPHONE HYDROCHLORIDE 6 MG: 4 TABLET ORAL at 16:34

## 2025-06-12 RX ADMIN — SENNOSIDES AND DOCUSATE SODIUM 2 TABLET: 8.6; 5 TABLET ORAL at 20:45

## 2025-06-12 RX ADMIN — HYDROMORPHONE HYDROCHLORIDE 6 MG: 4 TABLET ORAL at 10:11

## 2025-06-12 ASSESSMENT — PAIN SCALES - GENERAL
PAINLEVEL_OUTOF10: 10
PAINLEVEL_OUTOF10: 10
PAINLEVEL_OUTOF10: 9
PAINLEVEL_OUTOF10: 10
PAINLEVEL_OUTOF10: 9
PAINLEVEL_OUTOF10: 8
PAINLEVEL_OUTOF10: 8
PAINLEVEL_OUTOF10: 9
PAINLEVEL_OUTOF10: 9
PAINLEVEL_OUTOF10: 10
PAINLEVEL_OUTOF10: 10
PAINLEVEL_OUTOF10: 8
PAINLEVEL_OUTOF10: 8
PAINLEVEL_OUTOF10: 9
PAINLEVEL_OUTOF10: 8

## 2025-06-12 ASSESSMENT — PAIN DESCRIPTION - ORIENTATION
ORIENTATION: RIGHT;MID;LEFT
ORIENTATION: LEFT;RIGHT;MID
ORIENTATION: RIGHT;LEFT
ORIENTATION: RIGHT;LEFT;LOWER;MID
ORIENTATION: LEFT;RIGHT;MID
ORIENTATION: LOWER
ORIENTATION: RIGHT;LEFT;MID;UPPER
ORIENTATION: MID;RIGHT;LEFT
ORIENTATION: MID;RIGHT;LEFT

## 2025-06-12 ASSESSMENT — PAIN DESCRIPTION - LOCATION
LOCATION: BACK;HIP;PELVIS
LOCATION: BACK
LOCATION: BACK
LOCATION: BACK;HIP
LOCATION: BACK;HIP
LOCATION: BACK
LOCATION: BACK;HIP
LOCATION: BACK
LOCATION: BACK
LOCATION: BACK;HIP
LOCATION: BACK;HIP
LOCATION: BACK
LOCATION: BACK;HIP

## 2025-06-12 ASSESSMENT — PAIN DESCRIPTION - DESCRIPTORS
DESCRIPTORS: DISCOMFORT;ACHING;THROBBING
DESCRIPTORS: ACHING;DULL;DISCOMFORT
DESCRIPTORS: ACHING;DISCOMFORT;TENDER
DESCRIPTORS: ACHING;SORE;DISCOMFORT
DESCRIPTORS: ACHING;DISCOMFORT;DULL
DESCRIPTORS: ACHING;DISCOMFORT;SHARP
DESCRIPTORS: ACHING;DISCOMFORT;SORE
DESCRIPTORS: ACHING;DISCOMFORT;SORE
DESCRIPTORS: ACHING;DISCOMFORT;SHARP

## 2025-06-12 ASSESSMENT — PAIN - FUNCTIONAL ASSESSMENT
PAIN_FUNCTIONAL_ASSESSMENT: PREVENTS OR INTERFERES SOME ACTIVE ACTIVITIES AND ADLS

## 2025-06-12 ASSESSMENT — PAIN DESCRIPTION - FREQUENCY: FREQUENCY: CONTINUOUS

## 2025-06-12 ASSESSMENT — PAIN DESCRIPTION - PAIN TYPE: TYPE: ACUTE PAIN;CHRONIC PAIN

## 2025-06-12 ASSESSMENT — PAIN DESCRIPTION - ONSET: ONSET: ON-GOING

## 2025-06-12 NOTE — PLAN OF CARE
Problem: Safety - Adult  Goal: Free from fall injury  6/12/2025 0430 by Cassy Foster, RN  Outcome: Progressing     Problem: Pain  Goal: Verbalizes/displays adequate comfort level or baseline comfort level  6/12/2025 0430 by Cassy Foster, RN  Outcome: Progressing     Problem: Skin/Tissue Integrity  Goal: Skin integrity remains intact  Description: 1.  Monitor for areas of redness and/or skin breakdown2.  Assess vascular access sites hourly3.  Every 4-6 hours minimum:  Change oxygen saturation probe site4.  Every 4-6 hours:  If on nasal continuous positive airway pressure, respiratory therapy assess nares and determine need for appliance change or resting period  6/12/2025 0430 by Cassy Foster, RN  Outcome: Progressing     Problem: ABCDS Injury Assessment  Goal: Absence of physical injury  Outcome: Progressing

## 2025-06-12 NOTE — PROGRESS NOTES
[facility] - Family Medicine Inpatient   Resident Progress Note    S:  Hospital day: 6   Brief Synopsis: Joseph Bond is a 44 y.o. male with a PMH of prostate cancer on Nubeqa who presents with intractable pain. Was admitted 5/29 to 6/5/2025 for same complaint. During that admission, palliative care was consulted and they increased his home regimen based on OME of the PCA. Palliative was consulted this admission, dilaudid PCA pump was restarted in addition to his adjusted PO medications from last admission. While on the PCA pump, his pain regimen included Oxycontin 60 mg BID, Dilaudid 6 mg PO Q2H PRN for breakthrough pain, and Robaxin 750 mg QID, and Decadron 4 mg Q6H. PCA pump was discontinued and palliative added IV Dilaudid 1 mg Q2H PRN for pain crisis.     Overnight/interim:   No acute events overnight.  Received 8 doses of the PO dilaudid and 8 doses of the IV dilaudid over the past 24 hours.  Patient was seen at bedside this morning in no acute distress. States that his pain this morning is 9/10. He got up to sit in the chair yesterday and was only able to tolerate it for about 1 hour before his pain returned. He denies having any CP, SOB, fever, chills, abdominal pain, N/V/D or dysuria. Tolerating diet. Last BM was yesterday.    Cont meds:    sodium chloride 50 mL/hr at 06/07/25 2028     Scheduled meds:    Vitamin D  1,000 Units Oral Daily    docusate sodium  100 mg Oral BID    polyethylene glycol  17 g Oral Daily    methocarbamol  1,500 mg Oral TID    oxyCODONE  60 mg Oral Q12H    dexAMETHasone  4 mg Oral Q6H    ferrous sulfate  325 mg Oral Daily with breakfast    Darolutamide  600 mg Oral BID    sennosides-docusate sodium  2 tablet Oral BID    sodium chloride flush  5-40 mL IntraVENous 2 times per day    enoxaparin  40 mg SubCUTAneous Daily    pantoprazole (PROTONIX) 40 mg in sodium chloride (PF) 0.9 % 10 mL injection  40 mg IntraVENous Daily     PRN meds: heparin (PF), HYDROmorphone, sodium chloride flush,  sodium chloride, ondansetron **OR** ondansetron, acetaminophen **OR** acetaminophen, HYDROmorphone     I reviewed the patient's past medical and surgical history, Medications and Allergies.    O:  /72   Pulse 88   Temp 98.6 °F (37 °C) (Oral)   Resp 19   Ht 1.702 m (5' 7\")   Wt 79.8 kg (176 lb)   SpO2 97%   BMI 27.57 kg/m²   24 hour I&O: I/O last 3 completed shifts:  In: -   Out: 1100 [Urine:1100]  No intake/output data recorded.       Physical Exam:  General Appearance: Some distress. Awake and conversant.   Neuro: Round symmetric pupil that react to light bilaterally   Cardiovascular: regular rate and rhythm, S1 and S2 heard, no murmurs appreciated   Respiratory: Clear to auscultation bilaterally. No wheezing or crackles appreciated.  Abdomen: Soft, non-tender; bowel sounds normal; no masses, no organomegaly, rebound or guarding  Extremities: Extremities normal, no cyanosis, distal pulses intact, no pedal edema.   Skin: No sacral wound seen       Labs:  Na/K/Cl/CO2:  139/4.5/98/24 (06/11 1200)  BUN/Cr/glu/ALT/AST/amyl/lip:  12/0.5/--/--/--/--/-- (06/11 1200)  WBC/Hgb/Hct/Plts:  11.8/9.9/29.8/228 (06/12 0500)  estimated creatinine clearance is 189 mL/min (A) (based on SCr of 0.5 mg/dL (L)).  Other pertinent labs as noted below    Radiology:  XR CHEST 1 VIEW   Final Result   1. No signs of an acute cardiopulmonary process.   2. No signs of displaced rib fracture.               Resident Assessment and Plan       Intractable pain  Prostate cancer with mets to spine, multiple compression fractures  - Palliative care on board - Current pain regimen includes Oxycontin 60 mg BID, Dilaudid 6 mg PO Q2H PRN for breakthrough pain, and Robaxin 750 mg QID. Currently on Decadron 4 mg Q6H.   - IV Dilaudid 1 mg Q2H PRN for pain crisis added.  - Discussed with palliative care for slower transition to PO meds after acute pain is controlled.   - Received 1 dose of Zometa on 6/10 per palliative. Calcium WNL.  - Low Vitamin

## 2025-06-12 NOTE — PROGRESS NOTES
RADIATION ONCOLOGY NOTE      Patient's chart again reviewed.  He did previously been recommended to undergo palliative radiation but was noncompliant.  Patient has been readmitted to the hospital with progressive pain.  New imaging demonstrated progression of disease with multiple areas of compression fracture.  Unfortunately, radiation therapy is ineffective in resolving pain with compression fractures as these are mechanical in nature and not likely to be responsive to reirradiation.  Review of his MRI does not demonstrate cord compromise or impingement  Would consider Xofigo or Pluvicto which may reduce his global discomfort.  However, given the a mechanical compression fractures, these will likely not be responsive to a systemic radionucleotide.  Did discuss case with Dr. Matson his medical oncologist.  He will consider other systemic therapies and will work with palliative care to optimize his pain control  We remain available should he develop disease that is amenable to radiation therapy.      Antoine Mendez MD, KEVEN, FACRO, FACR, FCTSI, FASTRO    Department of Radiation Oncology  [facility]: [phone] (FAX: [phone])  [facility]: [phone] (FAX: [phone])  [facility] ([facility]:  [phone] (FAX:  [phone])    NOTE: This report was transcribed using voice recognition software. Every effort was made to ensure accuracy; however, inadvertent computerized transcription errors may be present.    
Angel Grady(Attending)

## 2025-06-12 NOTE — PLAN OF CARE
Problem: Safety - Adult  Goal: Free from fall injury  6/12/2025 1609 by Christel Zuñiga  Outcome: Progressing     Problem: Pain  Goal: Verbalizes/displays adequate comfort level or baseline comfort level  6/12/2025 0430 by Cassy Foster, RN  Outcome: Progressing     Problem: Skin/Tissue Integrity  Goal: Skin integrity remains intact  Description: 1.  Monitor for areas of redness and/or skin breakdown2.  Assess vascular access sites hourly3.  Every 4-6 hours minimum:  Change oxygen saturation probe site4.  Every 4-6 hours:  If on nasal continuous positive airway pressure, respiratory therapy assess nares and determine need for appliance change or resting period  6/12/2025 1609 by Christel Zuñiga  Outcome: Progressing     Problem: ABCDS Injury Assessment  Goal: Absence of physical injury  6/12/2025 1609 by Christel Zuñiga  Outcome: Progressing

## 2025-06-12 NOTE — CARE COORDINATION
CM update note. Discharge plan is home with [facility] and Palliative medicine.  Home care orders placed.  [facility] orders placed for Palliative placed.  Family will provide transport home on day of discharge.  Palliative following for symptom management.  Radiation oncology not recommending radiation therapy for pain from compression fractures.  May consider Xofigo/Pluvicto.  Continues to take IV and PO dilaudid PRN q 2 hour PRN.  CM/SW to follow.  Edwardo Leiva RN -[phone].

## 2025-06-12 NOTE — PROGRESS NOTES
Patient paging routinely for PRN pain medications, educated importance of trying to space out as much as he can, and to try to wean off of IV dilaudid as he cannot go home on it, pt states his pain is worsening and he needs to keep it under control, pain control kept up on throughout evening, vitals stable all shift long, call light and phone number to reach patient in reach.

## 2025-06-13 LAB
ANION GAP SERPL CALCULATED.3IONS-SCNC: 11 MMOL/L (ref 7–16)
BASOPHILS # BLD: 0 K/UL (ref 0–0.2)
BASOPHILS NFR BLD: 0 % (ref 0–2)
BUN SERPL-MCNC: 12 MG/DL (ref 6–20)
CALCIUM SERPL-MCNC: 8 MG/DL (ref 8.6–10)
CHLORIDE SERPL-SCNC: 101 MMOL/L (ref 98–107)
CO2 SERPL-SCNC: 25 MMOL/L (ref 22–29)
CREAT SERPL-MCNC: 0.4 MG/DL (ref 0.7–1.2)
EOSINOPHIL # BLD: 0 K/UL (ref 0.05–0.5)
EOSINOPHILS RELATIVE PERCENT: 0 % (ref 0–6)
ERYTHROCYTE [DISTWIDTH] IN BLOOD BY AUTOMATED COUNT: 18.5 % (ref 11.5–15)
GFR, ESTIMATED: >90 ML/MIN/1.73M2
GLUCOSE SERPL-MCNC: 124 MG/DL (ref 74–99)
HCT VFR BLD AUTO: 32.5 % (ref 37–54)
HGB BLD-MCNC: 10.2 G/DL (ref 12.5–16.5)
LYMPHOCYTES NFR BLD: 0.81 K/UL (ref 1.5–4)
LYMPHOCYTES RELATIVE PERCENT: 5 % (ref 20–42)
MCH RBC QN AUTO: 26.9 PG (ref 26–35)
MCHC RBC AUTO-ENTMCNC: 31.4 G/DL (ref 32–34.5)
MCV RBC AUTO: 85.8 FL (ref 80–99.9)
MONOCYTES NFR BLD: 1.22 K/UL (ref 0.1–0.95)
MONOCYTES NFR BLD: 8 % (ref 2–12)
MYELOCYTES ABSOLUTE COUNT: 0.27 K/UL
MYELOCYTES: 2 %
NEUTROPHILS NFR BLD: 84 % (ref 43–80)
NEUTS SEG NFR BLD: 13.16 K/UL (ref 1.8–7.3)
PLATELET # BLD AUTO: 238 K/UL (ref 130–450)
PMV BLD AUTO: 9.2 FL (ref 7–12)
POTASSIUM SERPL-SCNC: 4.3 MMOL/L (ref 3.5–5.1)
PROMYELOCYTES ABSOLUTE COUNT: 0.14 K/UL
PROMYELOCYTES: 1 %
RBC # BLD AUTO: 3.79 M/UL (ref 3.8–5.8)
RBC # BLD: ABNORMAL 10*6/UL
SODIUM SERPL-SCNC: 137 MMOL/L (ref 136–145)
WBC OTHER # BLD: 15.6 K/UL (ref 4.5–11.5)

## 2025-06-13 PROCEDURE — 6370000000 HC RX 637 (ALT 250 FOR IP)

## 2025-06-13 PROCEDURE — 6370000000 HC RX 637 (ALT 250 FOR IP): Performed by: NURSE PRACTITIONER

## 2025-06-13 PROCEDURE — 99232 SBSQ HOSP IP/OBS MODERATE 35: CPT | Performed by: PHYSICIAN ASSISTANT

## 2025-06-13 PROCEDURE — 80048 BASIC METABOLIC PNL TOTAL CA: CPT

## 2025-06-13 PROCEDURE — 2500000003 HC RX 250 WO HCPCS

## 2025-06-13 PROCEDURE — 1200000000 HC SEMI PRIVATE

## 2025-06-13 PROCEDURE — 85025 COMPLETE CBC W/AUTO DIFF WBC: CPT

## 2025-06-13 PROCEDURE — 99232 SBSQ HOSP IP/OBS MODERATE 35: CPT | Performed by: FAMILY MEDICINE

## 2025-06-13 PROCEDURE — 6360000002 HC RX W HCPCS: Performed by: NURSE PRACTITIONER

## 2025-06-13 PROCEDURE — 6360000002 HC RX W HCPCS

## 2025-06-13 RX ADMIN — METHOCARBAMOL 1500 MG: 750 TABLET ORAL at 20:29

## 2025-06-13 RX ADMIN — SENNOSIDES AND DOCUSATE SODIUM 2 TABLET: 8.6; 5 TABLET ORAL at 08:14

## 2025-06-13 RX ADMIN — HYDROMORPHONE HYDROCHLORIDE 1 MG: 1 INJECTION, SOLUTION INTRAMUSCULAR; INTRAVENOUS; SUBCUTANEOUS at 16:20

## 2025-06-13 RX ADMIN — PANTOPRAZOLE SODIUM 40 MG: 40 INJECTION, POWDER, FOR SOLUTION INTRAVENOUS at 08:16

## 2025-06-13 RX ADMIN — DEXAMETHASONE 4 MG: 4 TABLET ORAL at 10:31

## 2025-06-13 RX ADMIN — METHOCARBAMOL 1500 MG: 750 TABLET ORAL at 14:59

## 2025-06-13 RX ADMIN — HYDROMORPHONE HYDROCHLORIDE 1 MG: 1 INJECTION, SOLUTION INTRAMUSCULAR; INTRAVENOUS; SUBCUTANEOUS at 06:56

## 2025-06-13 RX ADMIN — Medication 1000 UNITS: at 08:14

## 2025-06-13 RX ADMIN — POLYETHYLENE GLYCOL 3350 17 G: 17 POWDER, FOR SOLUTION ORAL at 08:14

## 2025-06-13 RX ADMIN — HYDROMORPHONE HYDROCHLORIDE 6 MG: 4 TABLET ORAL at 23:56

## 2025-06-13 RX ADMIN — HYDROMORPHONE HYDROCHLORIDE 6 MG: 4 TABLET ORAL at 17:45

## 2025-06-13 RX ADMIN — ENOXAPARIN SODIUM 40 MG: 100 INJECTION SUBCUTANEOUS at 08:14

## 2025-06-13 RX ADMIN — HYDROMORPHONE HYDROCHLORIDE 6 MG: 4 TABLET ORAL at 05:37

## 2025-06-13 RX ADMIN — HYDROMORPHONE HYDROCHLORIDE 6 MG: 4 TABLET ORAL at 00:43

## 2025-06-13 RX ADMIN — DOCUSATE SODIUM 100 MG: 100 CAPSULE, LIQUID FILLED ORAL at 20:29

## 2025-06-13 RX ADMIN — HYDROMORPHONE HYDROCHLORIDE 6 MG: 4 TABLET ORAL at 03:02

## 2025-06-13 RX ADMIN — HYDROMORPHONE HYDROCHLORIDE 6 MG: 4 TABLET ORAL at 12:05

## 2025-06-13 RX ADMIN — METHOCARBAMOL 1500 MG: 750 TABLET ORAL at 08:14

## 2025-06-13 RX ADMIN — HYDROMORPHONE HYDROCHLORIDE 1 MG: 1 INJECTION, SOLUTION INTRAMUSCULAR; INTRAVENOUS; SUBCUTANEOUS at 10:31

## 2025-06-13 RX ADMIN — HYDROMORPHONE HYDROCHLORIDE 1 MG: 1 INJECTION, SOLUTION INTRAMUSCULAR; INTRAVENOUS; SUBCUTANEOUS at 01:53

## 2025-06-13 RX ADMIN — SENNOSIDES AND DOCUSATE SODIUM 2 TABLET: 8.6; 5 TABLET ORAL at 20:29

## 2025-06-13 RX ADMIN — DEXAMETHASONE 4 MG: 4 TABLET ORAL at 17:45

## 2025-06-13 RX ADMIN — OXYCODONE HYDROCHLORIDE 60 MG: 20 TABLET, FILM COATED, EXTENDED RELEASE ORAL at 20:29

## 2025-06-13 RX ADMIN — DOCUSATE SODIUM 100 MG: 100 CAPSULE, LIQUID FILLED ORAL at 08:14

## 2025-06-13 RX ADMIN — OXYCODONE HYDROCHLORIDE 60 MG: 20 TABLET, FILM COATED, EXTENDED RELEASE ORAL at 08:14

## 2025-06-13 RX ADMIN — HYDROMORPHONE HYDROCHLORIDE 1 MG: 1 INJECTION, SOLUTION INTRAMUSCULAR; INTRAVENOUS; SUBCUTANEOUS at 19:08

## 2025-06-13 RX ADMIN — HYDROMORPHONE HYDROCHLORIDE 6 MG: 4 TABLET ORAL at 14:59

## 2025-06-13 RX ADMIN — SODIUM CHLORIDE, PRESERVATIVE FREE 10 ML: 5 INJECTION INTRAVENOUS at 20:30

## 2025-06-13 RX ADMIN — HYDROMORPHONE HYDROCHLORIDE 1 MG: 1 INJECTION, SOLUTION INTRAMUSCULAR; INTRAVENOUS; SUBCUTANEOUS at 04:15

## 2025-06-13 RX ADMIN — DEXAMETHASONE 4 MG: 4 TABLET ORAL at 05:40

## 2025-06-13 RX ADMIN — FERROUS SULFATE TAB 325 MG (65 MG ELEMENTAL FE) 325 MG: 325 (65 FE) TAB at 08:14

## 2025-06-13 RX ADMIN — DEXAMETHASONE 4 MG: 4 TABLET ORAL at 23:56

## 2025-06-13 RX ADMIN — SODIUM CHLORIDE, PRESERVATIVE FREE 10 ML: 5 INJECTION INTRAVENOUS at 08:14

## 2025-06-13 RX ADMIN — HYDROMORPHONE HYDROCHLORIDE 6 MG: 4 TABLET ORAL at 21:38

## 2025-06-13 RX ADMIN — HYDROMORPHONE HYDROCHLORIDE 1 MG: 1 INJECTION, SOLUTION INTRAMUSCULAR; INTRAVENOUS; SUBCUTANEOUS at 13:28

## 2025-06-13 RX ADMIN — HYDROMORPHONE HYDROCHLORIDE 1 MG: 1 INJECTION, SOLUTION INTRAMUSCULAR; INTRAVENOUS; SUBCUTANEOUS at 22:54

## 2025-06-13 RX ADMIN — HYDROMORPHONE HYDROCHLORIDE 6 MG: 4 TABLET ORAL at 09:23

## 2025-06-13 ASSESSMENT — PAIN DESCRIPTION - LOCATION
LOCATION: GENERALIZED
LOCATION: BACK
LOCATION: GENERALIZED

## 2025-06-13 ASSESSMENT — PAIN SCALES - GENERAL
PAINLEVEL_OUTOF10: 8
PAINLEVEL_OUTOF10: 9
PAINLEVEL_OUTOF10: 9
PAINLEVEL_OUTOF10: 8
PAINLEVEL_OUTOF10: 9
PAINLEVEL_OUTOF10: 8

## 2025-06-13 ASSESSMENT — PAIN DESCRIPTION - DESCRIPTORS
DESCRIPTORS: DISCOMFORT;DULL;ACHING
DESCRIPTORS: ACHING;DISCOMFORT;DULL
DESCRIPTORS: ACHING;DULL;DISCOMFORT
DESCRIPTORS: ACHING;DISCOMFORT;DULL
DESCRIPTORS: ACHING;DULL;DISCOMFORT
DESCRIPTORS: ACHING;SORE;SHARP
DESCRIPTORS: DISCOMFORT;DULL;ACHING
DESCRIPTORS: ACHING;DULL;DISCOMFORT
DESCRIPTORS: ACHING;DULL;DISCOMFORT
DESCRIPTORS: DISCOMFORT;ACHING;DULL

## 2025-06-13 ASSESSMENT — PAIN DESCRIPTION - ORIENTATION: ORIENTATION: LOWER;ANTERIOR

## 2025-06-13 ASSESSMENT — PAIN SCALES - WONG BAKER: WONGBAKER_NUMERICALRESPONSE: NO HURT

## 2025-06-13 NOTE — PROGRESS NOTES
Palliative Care Department  [phone]  Palliative Care Progress Note  Provider Lara Lott PA-C     Joseph Bond  [id number]  Hospital Day: 8  Date of Initial Consult: 06/06/2025  Referring Provider: Nasima Phelps Chi, MD     Palliative Medicine was consulted for assistance with: pain management    HPI:   Joseph Bond is a 44 y.o. y/o male with a history of  prostate cancer with mediastinal lymphadenopathy, extensive metastasis of the spine, rib cage, pelvis, humerus and femurs ,(had refused systemic therapy, pursuing homeopathic) completed palliative radiation treatment to spine, with a history of polysubstance abuse including opioids and tobacco, reports abstinent for 5 years.  The patient had MRI of the lumbar and thoracic spine on 5/23/2025, with evidence of extensive metastatic disease throughout the lumbar spine with chronic compression fracture deformities at the L2 , L4 , L3 vertebral bodies.  Tumor in the S1 vertebral body extends into the anterior epidural space, resulting in moderate central canal stenosis.  He presented once again on 6/6/2025 with complaints of worsening and poorly controlled pain.   He follows outpatient with [facility] palliative, reports his regimen is p.o. Dilaudid 4 mg every 2 hours as needed, OxyContin 30 mg ER twice daily, Robaxin-750 milligrams 4 times a day.    ASSESSMENT/PLAN:     Pertinent Hospital Diagnoses     Intractable pain  Prostate cancer with metastasis to bones  Known to Dr. Matson  On Dena       Palliative Care Encounter / Counseling Regarding Goals of Care  Please see detailed goals of care discussion as below  At this time, Joseph Bond, Does have capacity for medical decision-making.  Capacity is time limited and situation/question specific  Outcome of goals of care meeting:   Pain control  Agreeable to cancer directed therapies  Follows with [facility] Palliative Medicine  Code status Full Code  Advanced Directives: no POA or

## 2025-06-13 NOTE — PLAN OF CARE
Problem: Safety - Adult  Goal: Free from fall injury  6/12/2025 2255 by Jimym Schmitt RN  Outcome: Progressing  6/12/2025 1609 by Christel Zuñiga  Outcome: Progressing     Problem: Pain  Goal: Verbalizes/displays adequate comfort level or baseline comfort level  6/12/2025 2255 by Jimmy Schmitt RN  Outcome: Progressing  6/12/2025 1609 by Christel Zuñiga  Outcome: Progressing     Problem: Skin/Tissue Integrity  Goal: Skin integrity remains intact  Description: 1.  Monitor for areas of redness and/or skin breakdown2.  Assess vascular access sites hourly3.  Every 4-6 hours minimum:  Change oxygen saturation probe site4.  Every 4-6 hours:  If on nasal continuous positive airway pressure, respiratory therapy assess nares and determine need for appliance change or resting period  6/12/2025 2255 by Jimmy Schmitt RN  Outcome: Progressing  6/12/2025 1609 by Christel Zuñiga  Outcome: Progressing

## 2025-06-13 NOTE — CARE COORDINATION
CM update note. Discharge plan is home with [facility] and Palliative medicine.  Home care orders and  [facility] orders for Palliative are on the chart.  Family will provide transport home on day of discharge.  Palliative following for symptom management.  They would like to wean off of IV dilaudid and possibly start on Methadone.  Family meeting today with Palliative.  CM/SW to follow.  Edwardo Leiva RN, [phone].

## 2025-06-13 NOTE — PROGRESS NOTES
[facility] - Family Medicine Inpatient   Resident Progress Note    S:  Hospital day: 7   Brief Synopsis: Joseph Bond is a 44 y.o. male with a PMH of prostate cancer on Nubeqa who presents with intractable pain. Was admitted 5/29 to 6/5/2025 for same complaint. During that admission, palliative care was consulted and they increased his home regimen based on OME of the PCA. Palliative was consulted this admission, dilaudid PCA pump was restarted in addition to his adjusted PO medications from last admission. While on the PCA pump, his pain regimen included Oxycontin 60 mg BID, Dilaudid 6 mg PO Q2H PRN for breakthrough pain, and Robaxin 750 mg QID, and Decadron 4 mg Q6H. PCA pump was discontinued and palliative added IV Dilaudid 1 mg Q2H PRN for pain crisis. Radiation Oncology was consulted regarding consideration for inpatient radiation, which is felt to be not effective in resolving compression fracture type pain. Will discuss with Oncology to consider other systemic therapies.     Overnight/interim:   No acute events overnight.  Received 8 doses of the PO dilaudid and 8 doses of the IV dilaudid over the past 24 hours.  Patient was seen at bedside this morning in no acute distress. States that his pain this morning is 9/10. He denies having any CP, SOB, fever, chills, abdominal pain, N/V/D or dysuria. Tolerating diet. Last BM was yesterday.    Cont meds:    sodium chloride 50 mL/hr at 06/07/25 2028     Scheduled meds:    Vitamin D  1,000 Units Oral Daily    docusate sodium  100 mg Oral BID    polyethylene glycol  17 g Oral Daily    methocarbamol  1,500 mg Oral TID    oxyCODONE  60 mg Oral Q12H    dexAMETHasone  4 mg Oral Q6H    ferrous sulfate  325 mg Oral Daily with breakfast    Darolutamide  600 mg Oral BID    sennosides-docusate sodium  2 tablet Oral BID    sodium chloride flush  5-40 mL IntraVENous 2 times per day    enoxaparin  40 mg SubCUTAneous Daily    pantoprazole (PROTONIX) 40 mg in sodium chloride (PF) 0.9 %

## 2025-06-13 NOTE — PROGRESS NOTES
Comprehensive Nutrition Assessment    Type and Reason for Visit:  Initial, LOS    Nutrition Recommendations/Plan:   Continue diet. Recommend and start ONS: Ensure + Once daily to optimize nutrient/PO intake and promote fx healing.     Malnutrition Assessment:  Malnutrition Status:  Moderate malnutrition (06/13/25 1204)    Context:  Chronic Illness     Findings of the 6 clinical characteristics of malnutrition:  Energy Intake:  75% or less estimated energy requirements for 1 month or longer (in setting of catabolic illness; increased nutrient needs)  Weight Loss:  No weight loss     Body Fat Loss:  Mild body fat loss Orbital   Muscle Mass Loss:  Mild muscle mass loss Clavicles (pectoralis & deltoids)  Fluid Accumulation:  No fluid accumulation     Strength:  Not Performed    Nutrition Assessment:    Pt. admitted for intractable pain in setting of prostate CA with mets to spine, multiple compression fractures. PMHx prostate CA w/ mets to bone/spine (dx 2024); s/p palliative radiation/chemo 3/2025, opiod abuse, diverticulits. Hx of malnutrition. Pt. does meet criteria for moderate malnutrition. Tolerating diet w/ noted % intakes. Will start ONS to optimize nutrient/PO intake and fx healing.    Nutrition Related Findings:    A&Ox4, -I/O(6L), abd/BS WDL, no edema, elevated ALK phos/AST, Wound Type: None       Current Nutrition Intake & Therapies:    Average Meal Intake: %  Average Supplements Intake: None Ordered  ADULT DIET; Regular    Anthropometric Measures:  Height: 170.2 cm (5' 7.01\")  Ideal Body Weight (IBW): 148 lbs (67 kg)       Current Body Weight: 80.1 kg (176 lb 9.4 oz) (recent admit wt from 6/2/25 as no updated CBW at this time), 119.3 % IBW. Weight Source: Other  Current BMI (kg/m2): 27.7  Usual Body Weight: 75.6 kg (166 lb 10.7 oz) (3/6, actual ; note wt of 87.7kg via BS on 9/26/24.)     % Weight Change (Calculated): 6  Weight Adjustment For: No Adjustment                 BMI Categories:

## 2025-06-13 NOTE — PROGRESS NOTES
Palliative Care Department  [phone]  Palliative Care Progress Note  Provider Lara Lott PA-C     Joseph Bond  [id number]  Hospital Day: 7  Date of Initial Consult: 06/06/2025  Referring Provider: Nasima Phelps Chi, MD     Palliative Medicine was consulted for assistance with: pain management    HPI:   Joseph Bond is a 44 y.o. y/o male with a history of  prostate cancer with mediastinal lymphadenopathy, extensive metastasis of the spine, rib cage, pelvis, humerus and femurs ,(had refused systemic therapy, pursuing homeopathic) completed palliative radiation treatment to spine, with a history of polysubstance abuse including opioids and tobacco, reports abstinent for 5 years.  The patient had MRI of the lumbar and thoracic spine on 5/23/2025, with evidence of extensive metastatic disease throughout the lumbar spine with chronic compression fracture deformities at the L2 , L4 , L3 vertebral bodies.  Tumor in the S1 vertebral body extends into the anterior epidural space, resulting in moderate central canal stenosis.  He presented once again on 6/6/2025 with complaints of worsening and poorly controlled pain.   He follows outpatient with [facility] palliative, reports his regimen is p.o. Dilaudid 4 mg every 2 hours as needed, OxyContin 30 mg ER twice daily, Robaxin-750 milligrams 4 times a day.    ASSESSMENT/PLAN:     Pertinent Hospital Diagnoses     Intractable pain  Prostate cancer with metastasis to bones  Known to Dr. Matson  On Dena       Palliative Care Encounter / Counseling Regarding Goals of Care  Please see detailed goals of care discussion as below  At this time, Joseph Bond, Does have capacity for medical decision-making.  Capacity is time limited and situation/question specific  Outcome of goals of care meeting:   Pain control  Agreeable to cancer directed therapies  Follows with [facility] Palliative Medicine  Code status Full Code  Advanced Directives: no POA or  regimen was helping him.  Patient is unable to determine this.  We discussed weaning/stopping the IV Dilaudid.  The only way to go home on IV Dilaudid is with a CADD pump with hospice.  Patient is not ready for hospice and wants to continue to discuss cancer directed therapies with his oncologist.  Therefore he needs to be transitioned to an oral regimen.  I strongly encouraged methadone as he has been on OxyContin and Dilaudid for a while and continues to be readmitted for pain crises.  States he will consider this and wants to regroup tomorrow morning when his mother and sister at bedside.  I do think a family discussion would be beneficial for the patient.  No changes in opioids for tonight.  Tomorrow we will start weaning IV Dilaudid and consider transitioning to methadone.  I discussed with nursing at length.  OBJECTIVE:   Prognosis: Guarded    Physical Exam:  /85   Pulse 80   Temp 97.4 °F (36.3 °C) (Temporal)   Resp 18   Ht 1.702 m (5' 7\")   Wt 79.8 kg (176 lb)   SpO2 96%   BMI 27.57 kg/m²   Constitutional: No acute distress  Eyes: no scleral icterus, normal lids  ENMT:  Normocephalic, atraumatic  Lungs: Nonlabored on room air  Heart:: Regular rate  Ext:  Moving all extremities, no edema  Skin:  Warm and dry, no rashes on visible skin  Psych: Flat  Neuro: No focal deficits    Objective data reviewed: labs, images, records, medication use, vitals, and chart    Discussed patient and the plan of care with the other IDT members: Primary Team and Patient    Time/Communication  Greater than 50% of time spent, total 50 minutes in counseling and coordination of care at the bedside regarding goals of care, symptom management, diagnosis and prognosis, and see above.    Thank you for allowing Palliative Medicine to participate in the care of Joseph Bond.

## 2025-06-14 LAB
ANION GAP SERPL CALCULATED.3IONS-SCNC: 13 MMOL/L (ref 7–16)
BASOPHILS # BLD: 0 K/UL (ref 0–0.2)
BASOPHILS NFR BLD: 0 % (ref 0–2)
BUN SERPL-MCNC: 13 MG/DL (ref 6–20)
CALCIUM SERPL-MCNC: 8 MG/DL (ref 8.6–10)
CHLORIDE SERPL-SCNC: 99 MMOL/L (ref 98–107)
CO2 SERPL-SCNC: 24 MMOL/L (ref 22–29)
CREAT SERPL-MCNC: 0.4 MG/DL (ref 0.7–1.2)
EOSINOPHIL # BLD: 0 K/UL (ref 0.05–0.5)
EOSINOPHILS RELATIVE PERCENT: 0 % (ref 0–6)
ERYTHROCYTE [DISTWIDTH] IN BLOOD BY AUTOMATED COUNT: 18.4 % (ref 11.5–15)
GFR, ESTIMATED: >90 ML/MIN/1.73M2
GLUCOSE SERPL-MCNC: 123 MG/DL (ref 74–99)
HCT VFR BLD AUTO: 31.6 % (ref 37–54)
HGB BLD-MCNC: 9.9 G/DL (ref 12.5–16.5)
LYMPHOCYTES NFR BLD: 0.23 K/UL (ref 1.5–4)
LYMPHOCYTES RELATIVE PERCENT: 2 % (ref 20–42)
MCH RBC QN AUTO: 27 PG (ref 26–35)
MCHC RBC AUTO-ENTMCNC: 31.3 G/DL (ref 32–34.5)
MCV RBC AUTO: 86.1 FL (ref 80–99.9)
MONOCYTES NFR BLD: 0.8 K/UL (ref 0.1–0.95)
MONOCYTES NFR BLD: 6 % (ref 2–12)
NEUTROPHILS NFR BLD: 92 % (ref 43–80)
NEUTS SEG NFR BLD: 12.27 K/UL (ref 1.8–7.3)
PLATELET # BLD AUTO: 230 K/UL (ref 130–450)
PMV BLD AUTO: 9.2 FL (ref 7–12)
POTASSIUM SERPL-SCNC: 4.5 MMOL/L (ref 3.5–5.1)
RBC # BLD AUTO: 3.67 M/UL (ref 3.8–5.8)
RBC # BLD: ABNORMAL 10*6/UL
SODIUM SERPL-SCNC: 136 MMOL/L (ref 136–145)
WBC OTHER # BLD: 13.3 K/UL (ref 4.5–11.5)

## 2025-06-14 PROCEDURE — 85025 COMPLETE CBC W/AUTO DIFF WBC: CPT

## 2025-06-14 PROCEDURE — 99232 SBSQ HOSP IP/OBS MODERATE 35: CPT | Performed by: FAMILY MEDICINE

## 2025-06-14 PROCEDURE — 6370000000 HC RX 637 (ALT 250 FOR IP)

## 2025-06-14 PROCEDURE — 6360000002 HC RX W HCPCS: Performed by: NURSE PRACTITIONER

## 2025-06-14 PROCEDURE — 6360000002 HC RX W HCPCS

## 2025-06-14 PROCEDURE — 1200000000 HC SEMI PRIVATE

## 2025-06-14 PROCEDURE — 2500000003 HC RX 250 WO HCPCS

## 2025-06-14 PROCEDURE — 6370000000 HC RX 637 (ALT 250 FOR IP): Performed by: NURSE PRACTITIONER

## 2025-06-14 PROCEDURE — 80048 BASIC METABOLIC PNL TOTAL CA: CPT

## 2025-06-14 RX ORDER — HYDROMORPHONE HYDROCHLORIDE 4 MG/1
6 TABLET ORAL
Refills: 0 | Status: DISCONTINUED | OUTPATIENT
Start: 2025-06-14 | End: 2025-06-20 | Stop reason: HOSPADM

## 2025-06-14 RX ADMIN — DEXAMETHASONE 4 MG: 4 TABLET ORAL at 16:52

## 2025-06-14 RX ADMIN — SODIUM CHLORIDE, PRESERVATIVE FREE 10 ML: 5 INJECTION INTRAVENOUS at 08:00

## 2025-06-14 RX ADMIN — HYDROMORPHONE HYDROCHLORIDE 1 MG: 1 INJECTION, SOLUTION INTRAMUSCULAR; INTRAVENOUS; SUBCUTANEOUS at 08:04

## 2025-06-14 RX ADMIN — OXYCODONE HYDROCHLORIDE 60 MG: 20 TABLET, FILM COATED, EXTENDED RELEASE ORAL at 20:17

## 2025-06-14 RX ADMIN — HYDROMORPHONE HYDROCHLORIDE 6 MG: 4 TABLET ORAL at 02:16

## 2025-06-14 RX ADMIN — ENOXAPARIN SODIUM 40 MG: 100 INJECTION SUBCUTANEOUS at 07:57

## 2025-06-14 RX ADMIN — HYDROMORPHONE HYDROCHLORIDE 6 MG: 4 TABLET ORAL at 11:36

## 2025-06-14 RX ADMIN — HYDROMORPHONE HYDROCHLORIDE 6 MG: 4 TABLET ORAL at 09:10

## 2025-06-14 RX ADMIN — HYDROMORPHONE HYDROCHLORIDE 6 MG: 4 TABLET ORAL at 22:29

## 2025-06-14 RX ADMIN — SENNOSIDES AND DOCUSATE SODIUM 2 TABLET: 8.6; 5 TABLET ORAL at 07:57

## 2025-06-14 RX ADMIN — HYDROMORPHONE HYDROCHLORIDE 1 MG: 1 INJECTION, SOLUTION INTRAMUSCULAR; INTRAVENOUS; SUBCUTANEOUS at 23:35

## 2025-06-14 RX ADMIN — HYDROMORPHONE HYDROCHLORIDE 6 MG: 4 TABLET ORAL at 19:19

## 2025-06-14 RX ADMIN — HYDROMORPHONE HYDROCHLORIDE 1 MG: 1 INJECTION, SOLUTION INTRAMUSCULAR; INTRAVENOUS; SUBCUTANEOUS at 03:22

## 2025-06-14 RX ADMIN — SODIUM CHLORIDE, PRESERVATIVE FREE 10 ML: 5 INJECTION INTRAVENOUS at 20:18

## 2025-06-14 RX ADMIN — OXYCODONE HYDROCHLORIDE 60 MG: 20 TABLET, FILM COATED, EXTENDED RELEASE ORAL at 08:46

## 2025-06-14 RX ADMIN — DEXAMETHASONE 4 MG: 4 TABLET ORAL at 23:35

## 2025-06-14 RX ADMIN — FERROUS SULFATE TAB 325 MG (65 MG ELEMENTAL FE) 325 MG: 325 (65 FE) TAB at 08:00

## 2025-06-14 RX ADMIN — HYDROMORPHONE HYDROCHLORIDE 1 MG: 1 INJECTION, SOLUTION INTRAMUSCULAR; INTRAVENOUS; SUBCUTANEOUS at 18:01

## 2025-06-14 RX ADMIN — DEXAMETHASONE 4 MG: 4 TABLET ORAL at 11:36

## 2025-06-14 RX ADMIN — HYDROMORPHONE HYDROCHLORIDE 6 MG: 4 TABLET ORAL at 14:21

## 2025-06-14 RX ADMIN — HYDROMORPHONE HYDROCHLORIDE 1 MG: 1 INJECTION, SOLUTION INTRAMUSCULAR; INTRAVENOUS; SUBCUTANEOUS at 12:48

## 2025-06-14 RX ADMIN — HYDROMORPHONE HYDROCHLORIDE 6 MG: 4 TABLET ORAL at 04:35

## 2025-06-14 RX ADMIN — HYDROMORPHONE HYDROCHLORIDE 1 MG: 1 INJECTION, SOLUTION INTRAMUSCULAR; INTRAVENOUS; SUBCUTANEOUS at 21:22

## 2025-06-14 RX ADMIN — HYDROMORPHONE HYDROCHLORIDE 1 MG: 1 INJECTION, SOLUTION INTRAMUSCULAR; INTRAVENOUS; SUBCUTANEOUS at 05:38

## 2025-06-14 RX ADMIN — METHOCARBAMOL 1500 MG: 750 TABLET ORAL at 20:17

## 2025-06-14 RX ADMIN — POLYETHYLENE GLYCOL 3350 17 G: 17 POWDER, FOR SOLUTION ORAL at 09:11

## 2025-06-14 RX ADMIN — HYDROMORPHONE HYDROCHLORIDE 1 MG: 1 INJECTION, SOLUTION INTRAMUSCULAR; INTRAVENOUS; SUBCUTANEOUS at 01:05

## 2025-06-14 RX ADMIN — DOCUSATE SODIUM 100 MG: 100 CAPSULE, LIQUID FILLED ORAL at 20:17

## 2025-06-14 RX ADMIN — HYDROMORPHONE HYDROCHLORIDE 6 MG: 4 TABLET ORAL at 06:51

## 2025-06-14 RX ADMIN — DEXAMETHASONE 4 MG: 4 TABLET ORAL at 05:38

## 2025-06-14 RX ADMIN — METHOCARBAMOL 1500 MG: 750 TABLET ORAL at 16:52

## 2025-06-14 RX ADMIN — HYDROMORPHONE HYDROCHLORIDE 6 MG: 4 TABLET ORAL at 16:50

## 2025-06-14 RX ADMIN — METHOCARBAMOL 1500 MG: 750 TABLET ORAL at 07:57

## 2025-06-14 RX ADMIN — DOCUSATE SODIUM 100 MG: 100 CAPSULE, LIQUID FILLED ORAL at 08:00

## 2025-06-14 RX ADMIN — SENNOSIDES AND DOCUSATE SODIUM 2 TABLET: 8.6; 5 TABLET ORAL at 20:17

## 2025-06-14 RX ADMIN — HYDROMORPHONE HYDROCHLORIDE 1 MG: 1 INJECTION, SOLUTION INTRAMUSCULAR; INTRAVENOUS; SUBCUTANEOUS at 15:45

## 2025-06-14 RX ADMIN — Medication 1000 UNITS: at 07:57

## 2025-06-14 RX ADMIN — PANTOPRAZOLE SODIUM 40 MG: 40 INJECTION, POWDER, FOR SOLUTION INTRAVENOUS at 07:57

## 2025-06-14 RX ADMIN — HYDROMORPHONE HYDROCHLORIDE 1 MG: 1 INJECTION, SOLUTION INTRAMUSCULAR; INTRAVENOUS; SUBCUTANEOUS at 10:17

## 2025-06-14 ASSESSMENT — PAIN DESCRIPTION - ORIENTATION
ORIENTATION: MID;LOWER;LEFT;RIGHT
ORIENTATION: LOWER
ORIENTATION: UPPER;LOWER
ORIENTATION: LOWER
ORIENTATION: RIGHT;LEFT
ORIENTATION: MID;LEFT
ORIENTATION: LEFT;RIGHT
ORIENTATION: LEFT;RIGHT
ORIENTATION: RIGHT;LEFT
ORIENTATION: MID;LOWER

## 2025-06-14 ASSESSMENT — PAIN DESCRIPTION - DESCRIPTORS
DESCRIPTORS: SHOOTING;STABBING;SHARP
DESCRIPTORS: ACHING;DISCOMFORT;DULL
DESCRIPTORS: ACHING;DISCOMFORT;DULL
DESCRIPTORS: ACHING;SORE;DISCOMFORT;DULL
DESCRIPTORS: ACHING;DISCOMFORT;DULL
DESCRIPTORS: ACHING;DISCOMFORT;DULL
DESCRIPTORS: ACHING;DULL;DISCOMFORT
DESCRIPTORS: ACHING;THROBBING;DISCOMFORT
DESCRIPTORS: ACHING;DULL;THROBBING
DESCRIPTORS: DISCOMFORT;DULL;ACHING
DESCRIPTORS: ACHING;DULL;THROBBING
DESCRIPTORS: ACHING;DISCOMFORT;SORE
DESCRIPTORS: ACHING;DISCOMFORT;DULL
DESCRIPTORS: ACHING;DISCOMFORT;DULL
DESCRIPTORS: ACHING;DISCOMFORT;DULL;THROBBING
DESCRIPTORS: ACHING;DULL;THROBBING
DESCRIPTORS: ACHING;DISCOMFORT;DULL

## 2025-06-14 ASSESSMENT — PAIN SCALES - WONG BAKER
WONGBAKER_NUMERICALRESPONSE: HURTS A LITTLE BIT
WONGBAKER_NUMERICALRESPONSE: HURTS LITTLE MORE
WONGBAKER_NUMERICALRESPONSE: HURTS A LITTLE BIT
WONGBAKER_NUMERICALRESPONSE: HURTS A LITTLE BIT
WONGBAKER_NUMERICALRESPONSE: HURTS LITTLE MORE
WONGBAKER_NUMERICALRESPONSE: HURTS LITTLE MORE
WONGBAKER_NUMERICALRESPONSE: HURTS A LITTLE BIT

## 2025-06-14 ASSESSMENT — PAIN SCALES - GENERAL
PAINLEVEL_OUTOF10: 8
PAINLEVEL_OUTOF10: 9
PAINLEVEL_OUTOF10: 8
PAINLEVEL_OUTOF10: 9
PAINLEVEL_OUTOF10: 8
PAINLEVEL_OUTOF10: 9
PAINLEVEL_OUTOF10: 9
PAINLEVEL_OUTOF10: 8
PAINLEVEL_OUTOF10: 9
PAINLEVEL_OUTOF10: 8
PAINLEVEL_OUTOF10: 8
PAINLEVEL_OUTOF10: 9
PAINLEVEL_OUTOF10: 8
PAINLEVEL_OUTOF10: 9
PAINLEVEL_OUTOF10: 8

## 2025-06-14 ASSESSMENT — PAIN DESCRIPTION - LOCATION
LOCATION: GENERALIZED
LOCATION: BACK;HIP
LOCATION: HIP;BACK
LOCATION: GENERALIZED;BACK
LOCATION: BACK;SHOULDER
LOCATION: GENERALIZED;BACK
LOCATION: BACK
LOCATION: BACK;GENERALIZED
LOCATION: HIP;BACK
LOCATION: BACK;HIP
LOCATION: HIP;BACK
LOCATION: BACK;GENERALIZED
LOCATION: BACK
LOCATION: LEG;GENERALIZED
LOCATION: LEG
LOCATION: BACK;HIP
LOCATION: HIP;BACK
LOCATION: HIP

## 2025-06-14 NOTE — PROGRESS NOTES
[facility] - Family Medicine Inpatient   Resident Progress Note    S:  Hospital day: 8   Brief Synopsis: Joseph Bond is a 44 y.o. male with a PMH of prostate cancer on Nubeqa who presents with intractable pain. Was admitted 5/29 to 6/5/2025 for same complaint. During that admission, palliative care was consulted and they increased his home regimen based on OME of the PCA. Palliative was consulted this admission, dilaudid PCA pump was restarted in addition to his adjusted PO medications from last admission. While on the PCA pump, his pain regimen included Oxycontin 60 mg BID, Dilaudid 6 mg PO Q2H PRN for breakthrough pain, and Robaxin 750 mg QID, and Decadron 4 mg Q6H. PCA pump was discontinued and palliative added IV Dilaudid 1 mg Q2H PRN for pain crisis. Radiation Oncology was consulted regarding consideration for inpatient radiation, which is felt to be not effective in resolving compression fracture type pain. Will discuss with Oncology to consider other systemic therapies.     Overnight/interim:   No acute events overnight.  Patient was seen at bedside this morning in no acute distress. States that his pain this morning is 8/10. He denies having any CP, SOB, fever, chills, abdominal pain, N/V/D or dysuria. Tolerating diet. Last BM was yesterday.    Cont meds:    sodium chloride 50 mL/hr at 06/07/25 2028     Scheduled meds:    Vitamin D  1,000 Units Oral Daily    docusate sodium  100 mg Oral BID    polyethylene glycol  17 g Oral Daily    methocarbamol  1,500 mg Oral TID    oxyCODONE  60 mg Oral Q12H    dexAMETHasone  4 mg Oral Q6H    ferrous sulfate  325 mg Oral Daily with breakfast    Darolutamide  600 mg Oral BID    sennosides-docusate sodium  2 tablet Oral BID    sodium chloride flush  5-40 mL IntraVENous 2 times per day    enoxaparin  40 mg SubCUTAneous Daily    pantoprazole (PROTONIX) 40 mg in sodium chloride (PF) 0.9 % 10 mL injection  40 mg IntraVENous Daily     PRN meds: heparin (PF), HYDROmorphone, sodium  chloride flush, sodium chloride, ondansetron **OR** ondansetron, acetaminophen **OR** acetaminophen, HYDROmorphone     I reviewed the patient's past medical and surgical history, Medications and Allergies.    O:  /69   Pulse 78   Temp 97.7 °F (36.5 °C) (Temporal)   Resp 16   Ht 1.702 m (5' 7.01\")   Wt 79.8 kg (176 lb)   SpO2 97%   BMI 27.56 kg/m²   24 hour I&O: I/O last 3 completed shifts:  In: 1840 [P.O.:1840]  Out: 4635 [Urine:4635]  No intake/output data recorded.       Physical Exam:  General Appearance:  Awake and conversant.   Neuro: Round symmetric pupil that react to light bilaterally   Cardiovascular: regular rate and rhythm, S1 and S2 heard, no murmurs appreciated   Respiratory: Clear to auscultation bilaterally. No wheezing or crackles appreciated.  Abdomen: Soft, non-tender; bowel sounds normal; no masses, no organomegaly, rebound or guarding  Extremities: Extremities normal, no cyanosis, distal pulses intact, no pedal edema.   Skin: No sacral wound seen       Labs:  Na/K/Cl/CO2:  136/4.5/99/24 (06/14 0445)  BUN/Cr/glu/ALT/AST/amyl/lip:  13/0.4/--/--/--/--/-- (06/14 0445)  WBC/Hgb/Hct/Plts:  13.3/9.9/31.6/230 (06/14 0445)  estimated creatinine clearance is 236 mL/min (A) (based on SCr of 0.4 mg/dL (L)).  Other pertinent labs as noted below    Radiology:  XR CHEST 1 VIEW   Final Result   1. No signs of an acute cardiopulmonary process.   2. No signs of displaced rib fracture.               Resident Assessment and Plan       Intractable pain  Prostate cancer with mets to spine, multiple compression fractures  - Palliative care on board - Current pain regimen includes Oxycontin 60 mg BID, Dilaudid 6 mg PO Q2H PRN for breakthrough pain,IV Dilaudid 1 mg Q2H PRN for pain crisis and Robaxin 1500 mg TID. Currently on Decadron 4 mg Q6H.  Palliative recommending transition to Methadone. Patient requesting intrathecal pain pump.  - Discussed with palliative care for slower transition to PO meds after

## 2025-06-14 NOTE — PLAN OF CARE
Problem: Safety - Adult  Goal: Free from fall injury  6/14/2025 1230 by Sehrrell Diamond RN  Outcome: Progressing  6/14/2025 1226 by Sherrell Diamond RN  Outcome: Progressing     Problem: Pain  Goal: Verbalizes/displays adequate comfort level or baseline comfort level  6/14/2025 1230 by Sherrell Diamond RN  Outcome: Progressing  6/14/2025 1226 by Sherrell Diamond RN  Outcome: Progressing  Flowsheets (Taken 6/14/2025 1215)  Verbalizes/displays adequate comfort level or baseline comfort level: Encourage patient to monitor pain and request assistance     Problem: Skin/Tissue Integrity  Goal: Skin integrity remains intact  Description: 1.  Monitor for areas of redness and/or skin breakdown2.  Assess vascular access sites hourly3.  Every 4-6 hours minimum:  Change oxygen saturation probe site4.  Every 4-6 hours:  If on nasal continuous positive airway pressure, respiratory therapy assess nares and determine need for appliance change or resting period  6/14/2025 1230 by Sherrell Diamond RN  Outcome: Progressing  6/14/2025 1226 by Sherrell Diamond RN  Outcome: Progressing     Problem: ABCDS Injury Assessment  Goal: Absence of physical injury  6/14/2025 1230 by Sherrell Diamond RN  Outcome: Progressing  6/14/2025 1226 by Sherrell Diamond RN  Outcome: Progressing     Problem: Nutrition Deficit:  Goal: Optimize nutritional status  6/14/2025 1230 by Sherrell Diamond RN  Outcome: Progressing  6/14/2025 1226 by Sherrell Diamond RN  Outcome: Progressing

## 2025-06-14 NOTE — PLAN OF CARE
Problem: Safety - Adult  Goal: Free from fall injury  Outcome: Progressing     Problem: Pain  Goal: Verbalizes/displays adequate comfort level or baseline comfort level  Outcome: Progressing     Problem: Skin/Tissue Integrity  Goal: Skin integrity remains intact  Description: 1.  Monitor for areas of redness and/or skin breakdown2.  Assess vascular access sites hourly3.  Every 4-6 hours minimum:  Change oxygen saturation probe site4.  Every 4-6 hours:  If on nasal continuous positive airway pressure, respiratory therapy assess nares and determine need for appliance change or resting period  Outcome: Progressing     Problem: ABCDS Injury Assessment  Goal: Absence of physical injury  Outcome: Progressing     Problem: Nutrition Deficit:  Goal: Optimize nutritional status  Outcome: Progressing

## 2025-06-14 NOTE — PLAN OF CARE
Problem: Safety - Adult  Goal: Free from fall injury  Outcome: Progressing     Problem: Pain  Goal: Verbalizes/displays adequate comfort level or baseline comfort level  Outcome: Progressing  Flowsheets (Taken 6/14/2025 1215)  Verbalizes/displays adequate comfort level or baseline comfort level: Encourage patient to monitor pain and request assistance     Problem: Skin/Tissue Integrity  Goal: Skin integrity remains intact  Description: 1.  Monitor for areas of redness and/or skin breakdown2.  Assess vascular access sites hourly3.  Every 4-6 hours minimum:  Change oxygen saturation probe site4.  Every 4-6 hours:  If on nasal continuous positive airway pressure, respiratory therapy assess nares and determine need for appliance change or resting period  Outcome: Progressing     Problem: ABCDS Injury Assessment  Goal: Absence of physical injury  Outcome: Progressing     Problem: Nutrition Deficit:  Goal: Optimize nutritional status  Outcome: Progressing

## 2025-06-15 LAB
ANION GAP SERPL CALCULATED.3IONS-SCNC: 10 MMOL/L (ref 7–16)
BASOPHILS # BLD: 0 K/UL (ref 0–0.2)
BASOPHILS NFR BLD: 0 % (ref 0–2)
BUN SERPL-MCNC: 15 MG/DL (ref 6–20)
CALCIUM SERPL-MCNC: 8.1 MG/DL (ref 8.6–10)
CHLORIDE SERPL-SCNC: 100 MMOL/L (ref 98–107)
CO2 SERPL-SCNC: 25 MMOL/L (ref 22–29)
CREAT SERPL-MCNC: 0.4 MG/DL (ref 0.7–1.2)
EOSINOPHIL # BLD: 0 K/UL (ref 0.05–0.5)
EOSINOPHILS RELATIVE PERCENT: 0 % (ref 0–6)
ERYTHROCYTE [DISTWIDTH] IN BLOOD BY AUTOMATED COUNT: 18.3 % (ref 11.5–15)
GFR, ESTIMATED: >90 ML/MIN/1.73M2
GLUCOSE SERPL-MCNC: 133 MG/DL (ref 74–99)
HCT VFR BLD AUTO: 30.7 % (ref 37–54)
HGB BLD-MCNC: 9.7 G/DL (ref 12.5–16.5)
LYMPHOCYTES NFR BLD: 0.29 K/UL (ref 1.5–4)
LYMPHOCYTES RELATIVE PERCENT: 3 % (ref 20–42)
MCH RBC QN AUTO: 26.9 PG (ref 26–35)
MCHC RBC AUTO-ENTMCNC: 31.6 G/DL (ref 32–34.5)
MCV RBC AUTO: 85 FL (ref 80–99.9)
METAMYELOCYTES ABSOLUTE COUNT: 0.1 K/UL (ref 0–0.12)
METAMYELOCYTES: 1 % (ref 0–1)
MONOCYTES NFR BLD: 0.29 K/UL (ref 0.1–0.95)
MONOCYTES NFR BLD: 3 % (ref 2–12)
NEUTROPHILS NFR BLD: 94 % (ref 43–80)
NEUTS SEG NFR BLD: 10.52 K/UL (ref 1.8–7.3)
NUCLEATED RED BLOOD CELLS: 1 PER 100 WBC
PLATELET # BLD AUTO: 204 K/UL (ref 130–450)
PMV BLD AUTO: 8.7 FL (ref 7–12)
POTASSIUM SERPL-SCNC: 4.7 MMOL/L (ref 3.5–5.1)
RBC # BLD AUTO: 3.61 M/UL (ref 3.8–5.8)
RBC # BLD: ABNORMAL 10*6/UL
SODIUM SERPL-SCNC: 135 MMOL/L (ref 136–145)
WBC OTHER # BLD: 11.2 K/UL (ref 4.5–11.5)

## 2025-06-15 PROCEDURE — 85025 COMPLETE CBC W/AUTO DIFF WBC: CPT

## 2025-06-15 PROCEDURE — 6370000000 HC RX 637 (ALT 250 FOR IP)

## 2025-06-15 PROCEDURE — 6370000000 HC RX 637 (ALT 250 FOR IP): Performed by: NURSE PRACTITIONER

## 2025-06-15 PROCEDURE — 6360000002 HC RX W HCPCS

## 2025-06-15 PROCEDURE — 80048 BASIC METABOLIC PNL TOTAL CA: CPT

## 2025-06-15 PROCEDURE — 2500000003 HC RX 250 WO HCPCS

## 2025-06-15 PROCEDURE — 6360000002 HC RX W HCPCS: Performed by: NURSE PRACTITIONER

## 2025-06-15 PROCEDURE — 1200000000 HC SEMI PRIVATE

## 2025-06-15 PROCEDURE — 99232 SBSQ HOSP IP/OBS MODERATE 35: CPT | Performed by: FAMILY MEDICINE

## 2025-06-15 RX ADMIN — HYDROMORPHONE HYDROCHLORIDE 1 MG: 1 INJECTION, SOLUTION INTRAMUSCULAR; INTRAVENOUS; SUBCUTANEOUS at 22:29

## 2025-06-15 RX ADMIN — HYDROMORPHONE HYDROCHLORIDE 6 MG: 4 TABLET ORAL at 16:38

## 2025-06-15 RX ADMIN — DOCUSATE SODIUM 100 MG: 100 CAPSULE, LIQUID FILLED ORAL at 20:13

## 2025-06-15 RX ADMIN — POLYETHYLENE GLYCOL 3350 17 G: 17 POWDER, FOR SOLUTION ORAL at 08:30

## 2025-06-15 RX ADMIN — HYDROMORPHONE HYDROCHLORIDE 1 MG: 1 INJECTION, SOLUTION INTRAMUSCULAR; INTRAVENOUS; SUBCUTANEOUS at 09:30

## 2025-06-15 RX ADMIN — SENNOSIDES AND DOCUSATE SODIUM 2 TABLET: 8.6; 5 TABLET ORAL at 08:30

## 2025-06-15 RX ADMIN — DOCUSATE SODIUM 100 MG: 100 CAPSULE, LIQUID FILLED ORAL at 08:31

## 2025-06-15 RX ADMIN — HYDROMORPHONE HYDROCHLORIDE 6 MG: 4 TABLET ORAL at 19:05

## 2025-06-15 RX ADMIN — HYDROMORPHONE HYDROCHLORIDE 1 MG: 1 INJECTION, SOLUTION INTRAMUSCULAR; INTRAVENOUS; SUBCUTANEOUS at 07:33

## 2025-06-15 RX ADMIN — SENNOSIDES AND DOCUSATE SODIUM 2 TABLET: 8.6; 5 TABLET ORAL at 20:13

## 2025-06-15 RX ADMIN — METHOCARBAMOL 1500 MG: 750 TABLET ORAL at 08:30

## 2025-06-15 RX ADMIN — HYDROMORPHONE HYDROCHLORIDE 6 MG: 4 TABLET ORAL at 00:57

## 2025-06-15 RX ADMIN — FERROUS SULFATE TAB 325 MG (65 MG ELEMENTAL FE) 325 MG: 325 (65 FE) TAB at 08:30

## 2025-06-15 RX ADMIN — METHOCARBAMOL 1500 MG: 750 TABLET ORAL at 14:36

## 2025-06-15 RX ADMIN — HYDROMORPHONE HYDROCHLORIDE 6 MG: 4 TABLET ORAL at 14:36

## 2025-06-15 RX ADMIN — HYDROMORPHONE HYDROCHLORIDE 1 MG: 1 INJECTION, SOLUTION INTRAMUSCULAR; INTRAVENOUS; SUBCUTANEOUS at 05:29

## 2025-06-15 RX ADMIN — SODIUM CHLORIDE, PRESERVATIVE FREE 10 ML: 5 INJECTION INTRAVENOUS at 20:19

## 2025-06-15 RX ADMIN — ENOXAPARIN SODIUM 40 MG: 100 INJECTION SUBCUTANEOUS at 08:30

## 2025-06-15 RX ADMIN — HYDROMORPHONE HYDROCHLORIDE 1 MG: 1 INJECTION, SOLUTION INTRAMUSCULAR; INTRAVENOUS; SUBCUTANEOUS at 20:13

## 2025-06-15 RX ADMIN — HYDROMORPHONE HYDROCHLORIDE 6 MG: 4 TABLET ORAL at 10:30

## 2025-06-15 RX ADMIN — OXYCODONE HYDROCHLORIDE 60 MG: 20 TABLET, FILM COATED, EXTENDED RELEASE ORAL at 08:30

## 2025-06-15 RX ADMIN — PANTOPRAZOLE SODIUM 40 MG: 40 INJECTION, POWDER, FOR SOLUTION INTRAVENOUS at 08:31

## 2025-06-15 RX ADMIN — HYDROMORPHONE HYDROCHLORIDE 1 MG: 1 INJECTION, SOLUTION INTRAMUSCULAR; INTRAVENOUS; SUBCUTANEOUS at 13:35

## 2025-06-15 RX ADMIN — HYDROMORPHONE HYDROCHLORIDE 6 MG: 4 TABLET ORAL at 23:44

## 2025-06-15 RX ADMIN — DEXAMETHASONE 4 MG: 4 TABLET ORAL at 05:29

## 2025-06-15 RX ADMIN — HYDROMORPHONE HYDROCHLORIDE 1 MG: 1 INJECTION, SOLUTION INTRAMUSCULAR; INTRAVENOUS; SUBCUTANEOUS at 11:34

## 2025-06-15 RX ADMIN — HYDROMORPHONE HYDROCHLORIDE 1 MG: 1 INJECTION, SOLUTION INTRAMUSCULAR; INTRAVENOUS; SUBCUTANEOUS at 15:33

## 2025-06-15 RX ADMIN — DEXAMETHASONE 4 MG: 4 TABLET ORAL at 11:35

## 2025-06-15 RX ADMIN — HYDROMORPHONE HYDROCHLORIDE 6 MG: 4 TABLET ORAL at 04:10

## 2025-06-15 RX ADMIN — DEXAMETHASONE 4 MG: 4 TABLET ORAL at 16:38

## 2025-06-15 RX ADMIN — METHOCARBAMOL 1500 MG: 750 TABLET ORAL at 20:13

## 2025-06-15 RX ADMIN — Medication 1000 UNITS: at 08:30

## 2025-06-15 RX ADMIN — DEXAMETHASONE 4 MG: 4 TABLET ORAL at 23:44

## 2025-06-15 RX ADMIN — HYDROMORPHONE HYDROCHLORIDE 1 MG: 1 INJECTION, SOLUTION INTRAMUSCULAR; INTRAVENOUS; SUBCUTANEOUS at 02:42

## 2025-06-15 RX ADMIN — OXYCODONE HYDROCHLORIDE 60 MG: 20 TABLET, FILM COATED, EXTENDED RELEASE ORAL at 21:19

## 2025-06-15 RX ADMIN — HYDROMORPHONE HYDROCHLORIDE 1 MG: 1 INJECTION, SOLUTION INTRAMUSCULAR; INTRAVENOUS; SUBCUTANEOUS at 17:44

## 2025-06-15 RX ADMIN — HYDROMORPHONE HYDROCHLORIDE 6 MG: 4 TABLET ORAL at 06:34

## 2025-06-15 ASSESSMENT — PAIN DESCRIPTION - DESCRIPTORS
DESCRIPTORS: ACHING;DISCOMFORT;DULL
DESCRIPTORS: DISCOMFORT;ACHING;SHOOTING
DESCRIPTORS: ACHING;DISCOMFORT;DULL

## 2025-06-15 ASSESSMENT — PAIN DESCRIPTION - ORIENTATION
ORIENTATION: RIGHT;LEFT
ORIENTATION: LOWER
ORIENTATION: LEFT;RIGHT;LOWER
ORIENTATION: RIGHT;LEFT

## 2025-06-15 ASSESSMENT — PAIN DESCRIPTION - LOCATION
LOCATION: HIP;BACK
LOCATION: HIP
LOCATION: BACK
LOCATION: BACK;HIP
LOCATION: BACK
LOCATION: ABDOMEN
LOCATION: HIP
LOCATION: BACK
LOCATION: HIP;BACK
LOCATION: BACK
LOCATION: HIP;BACK
LOCATION: HIP

## 2025-06-15 ASSESSMENT — PAIN SCALES - WONG BAKER
WONGBAKER_NUMERICALRESPONSE: HURTS A LITTLE BIT
WONGBAKER_NUMERICALRESPONSE: HURTS A LITTLE BIT
WONGBAKER_NUMERICALRESPONSE: NO HURT
WONGBAKER_NUMERICALRESPONSE: NO HURT
WONGBAKER_NUMERICALRESPONSE: HURTS A LITTLE BIT
WONGBAKER_NUMERICALRESPONSE: HURTS A LITTLE BIT
WONGBAKER_NUMERICALRESPONSE: NO HURT
WONGBAKER_NUMERICALRESPONSE: HURTS A LITTLE BIT

## 2025-06-15 ASSESSMENT — PAIN SCALES - GENERAL
PAINLEVEL_OUTOF10: 8
PAINLEVEL_OUTOF10: 7
PAINLEVEL_OUTOF10: 8
PAINLEVEL_OUTOF10: 7
PAINLEVEL_OUTOF10: 7
PAINLEVEL_OUTOF10: 8
PAINLEVEL_OUTOF10: 7
PAINLEVEL_OUTOF10: 8
PAINLEVEL_OUTOF10: 8
PAINLEVEL_OUTOF10: 7
PAINLEVEL_OUTOF10: 8
PAINLEVEL_OUTOF10: 7
PAINLEVEL_OUTOF10: 8
PAINLEVEL_OUTOF10: 7
PAINLEVEL_OUTOF10: 9

## 2025-06-15 ASSESSMENT — PAIN DESCRIPTION - PAIN TYPE
TYPE: ACUTE PAIN;CHRONIC PAIN
TYPE: ACUTE PAIN;CHRONIC PAIN

## 2025-06-15 NOTE — PLAN OF CARE
Problem: Safety - Adult  Goal: Free from fall injury  6/15/2025 1200 by Sherrell Diamond RN  Outcome: Progressing  6/15/2025 0249 by Cassy Foster RN  Outcome: Progressing     Problem: Pain  Goal: Verbalizes/displays adequate comfort level or baseline comfort level  6/15/2025 1200 by Sherrell Diamond RN  Outcome: Progressing  Flowsheets (Taken 6/15/2025 0945)  Verbalizes/displays adequate comfort level or baseline comfort level: Encourage patient to monitor pain and request assistance  6/15/2025 0249 by Cassy Foster RN  Outcome: Progressing     Problem: Skin/Tissue Integrity  Goal: Skin integrity remains intact  Description: 1.  Monitor for areas of redness and/or skin breakdown2.  Assess vascular access sites hourly3.  Every 4-6 hours minimum:  Change oxygen saturation probe site4.  Every 4-6 hours:  If on nasal continuous positive airway pressure, respiratory therapy assess nares and determine need for appliance change or resting period  6/15/2025 1200 by Sherrell Diamond RN  Outcome: Progressing  6/15/2025 0249 by Cassy Foster RN  Outcome: Progressing     Problem: ABCDS Injury Assessment  Goal: Absence of physical injury  6/15/2025 1200 by Sherrell Diamond RN  Outcome: Progressing  6/15/2025 0249 by Cassy Foster RN  Outcome: Progressing     Problem: Nutrition Deficit:  Goal: Optimize nutritional status  6/15/2025 1200 by Sherrell Diamond RN  Outcome: Progressing  6/15/2025 0249 by Cassy Foster RN  Outcome: Progressing

## 2025-06-15 NOTE — PROGRESS NOTES
[facility] - Family Medicine Inpatient   Resident Progress Note    S:  Hospital day: 9   Brief Synopsis: Joseph Bond is a 44 y.o. male with a PMH of prostate cancer on Nubeqa who presents with intractable pain. Was admitted 5/29 to 6/5/2025 for same complaint. During that admission, palliative care was consulted and they increased his home regimen based on OME of the PCA. Palliative was consulted this admission, dilaudid PCA pump was restarted in addition to his adjusted PO medications from last admission. While on the PCA pump, his pain regimen included Oxycontin 60 mg BID, Dilaudid 6 mg PO Q2H PRN for breakthrough pain, and Robaxin 750 mg QID, and Decadron 4 mg Q6H. PCA pump was discontinued and palliative added IV Dilaudid 1 mg Q2H PRN for pain crisis. Radiation Oncology was consulted regarding consideration for inpatient radiation, which is felt to be not effective in resolving compression fracture type pain. Will discuss with Oncology to consider other systemic therapies.     Overnight/interim:   No acute events overnight  Patient was seen at bedside this morning in no acute distress.   States that he did not get much sleep overnight  Pain has not changed   Denies having any CP, SOB, fever, chills, abdominal pain, N/V/D or dysuria. Tolerating diet. Last BM was yesterday.    Cont meds:    sodium chloride 50 mL/hr at 06/07/25 2028     Scheduled meds:    Vitamin D  1,000 Units Oral Daily    docusate sodium  100 mg Oral BID    polyethylene glycol  17 g Oral Daily    methocarbamol  1,500 mg Oral TID    oxyCODONE  60 mg Oral Q12H    dexAMETHasone  4 mg Oral Q6H    ferrous sulfate  325 mg Oral Daily with breakfast    Darolutamide  600 mg Oral BID    sennosides-docusate sodium  2 tablet Oral BID    sodium chloride flush  5-40 mL IntraVENous 2 times per day    enoxaparin  40 mg SubCUTAneous Daily    pantoprazole (PROTONIX) 40 mg in sodium chloride (PF) 0.9 % 10 mL injection  40 mg IntraVENous Daily     PRN meds:

## 2025-06-16 LAB
ANION GAP SERPL CALCULATED.3IONS-SCNC: 11 MMOL/L (ref 7–16)
BASOPHILS # BLD: 0 K/UL (ref 0–0.2)
BASOPHILS NFR BLD: 0 % (ref 0–2)
BUN SERPL-MCNC: 15 MG/DL (ref 6–20)
CALCIUM SERPL-MCNC: 8.3 MG/DL (ref 8.6–10)
CHLORIDE SERPL-SCNC: 102 MMOL/L (ref 98–107)
CO2 SERPL-SCNC: 24 MMOL/L (ref 22–29)
CREAT SERPL-MCNC: 0.4 MG/DL (ref 0.7–1.2)
EOSINOPHIL # BLD: 0 K/UL (ref 0.05–0.5)
EOSINOPHILS RELATIVE PERCENT: 0 % (ref 0–6)
ERYTHROCYTE [DISTWIDTH] IN BLOOD BY AUTOMATED COUNT: 18.5 % (ref 11.5–15)
GFR, ESTIMATED: >90 ML/MIN/1.73M2
GLUCOSE SERPL-MCNC: 128 MG/DL (ref 74–99)
HCT VFR BLD AUTO: 30.7 % (ref 37–54)
HGB BLD-MCNC: 9.8 G/DL (ref 12.5–16.5)
LYMPHOCYTES NFR BLD: 0.68 K/UL (ref 1.5–4)
LYMPHOCYTES RELATIVE PERCENT: 6 % (ref 20–42)
MCH RBC QN AUTO: 27.4 PG (ref 26–35)
MCHC RBC AUTO-ENTMCNC: 31.9 G/DL (ref 32–34.5)
MCV RBC AUTO: 85.8 FL (ref 80–99.9)
MONOCYTES NFR BLD: 0.78 K/UL (ref 0.1–0.95)
MONOCYTES NFR BLD: 7 % (ref 2–12)
NEUTROPHILS NFR BLD: 86 % (ref 43–80)
NEUTS SEG NFR BLD: 9.64 K/UL (ref 1.8–7.3)
NUCLEATED RED BLOOD CELLS: 1 PER 100 WBC
PLATELET # BLD AUTO: 225 K/UL (ref 130–450)
PMV BLD AUTO: 9 FL (ref 7–12)
POTASSIUM SERPL-SCNC: 4.4 MMOL/L (ref 3.5–5.1)
PROMYELOCYTES ABSOLUTE COUNT: 0.1 K/UL
PROMYELOCYTES: 1 %
RBC # BLD AUTO: 3.58 M/UL (ref 3.8–5.8)
RBC # BLD: ABNORMAL 10*6/UL
SODIUM SERPL-SCNC: 137 MMOL/L (ref 136–145)
WBC OTHER # BLD: 11.2 K/UL (ref 4.5–11.5)

## 2025-06-16 PROCEDURE — 6370000000 HC RX 637 (ALT 250 FOR IP)

## 2025-06-16 PROCEDURE — 6360000002 HC RX W HCPCS

## 2025-06-16 PROCEDURE — 80048 BASIC METABOLIC PNL TOTAL CA: CPT

## 2025-06-16 PROCEDURE — 6360000002 HC RX W HCPCS: Performed by: NURSE PRACTITIONER

## 2025-06-16 PROCEDURE — 99232 SBSQ HOSP IP/OBS MODERATE 35: CPT | Performed by: PHYSICIAN ASSISTANT

## 2025-06-16 PROCEDURE — 6360000002 HC RX W HCPCS: Performed by: FAMILY MEDICINE

## 2025-06-16 PROCEDURE — 1200000000 HC SEMI PRIVATE

## 2025-06-16 PROCEDURE — 2500000003 HC RX 250 WO HCPCS

## 2025-06-16 PROCEDURE — 99232 SBSQ HOSP IP/OBS MODERATE 35: CPT | Performed by: FAMILY MEDICINE

## 2025-06-16 PROCEDURE — 85025 COMPLETE CBC W/AUTO DIFF WBC: CPT

## 2025-06-16 PROCEDURE — 6370000000 HC RX 637 (ALT 250 FOR IP): Performed by: NURSE PRACTITIONER

## 2025-06-16 RX ORDER — PANTOPRAZOLE SODIUM 40 MG/1
40 TABLET, DELAYED RELEASE ORAL
Status: DISCONTINUED | OUTPATIENT
Start: 2025-06-17 | End: 2025-06-20 | Stop reason: HOSPADM

## 2025-06-16 RX ADMIN — ENOXAPARIN SODIUM 40 MG: 100 INJECTION SUBCUTANEOUS at 09:00

## 2025-06-16 RX ADMIN — DEXAMETHASONE 4 MG: 4 TABLET ORAL at 16:14

## 2025-06-16 RX ADMIN — PANTOPRAZOLE SODIUM 40 MG: 40 INJECTION, POWDER, FOR SOLUTION INTRAVENOUS at 09:01

## 2025-06-16 RX ADMIN — OXYCODONE HYDROCHLORIDE 60 MG: 20 TABLET, FILM COATED, EXTENDED RELEASE ORAL at 19:43

## 2025-06-16 RX ADMIN — METHOCARBAMOL 1500 MG: 750 TABLET ORAL at 09:03

## 2025-06-16 RX ADMIN — OXYCODONE HYDROCHLORIDE 60 MG: 20 TABLET, FILM COATED, EXTENDED RELEASE ORAL at 09:01

## 2025-06-16 RX ADMIN — HEPARIN 500 UNITS: 100 SYRINGE at 19:43

## 2025-06-16 RX ADMIN — HYDROMORPHONE HYDROCHLORIDE 6 MG: 4 TABLET ORAL at 11:08

## 2025-06-16 RX ADMIN — DEXAMETHASONE 4 MG: 4 TABLET ORAL at 05:13

## 2025-06-16 RX ADMIN — Medication 1000 UNITS: at 09:01

## 2025-06-16 RX ADMIN — HYDROMORPHONE HYDROCHLORIDE 1 MG: 1 INJECTION, SOLUTION INTRAMUSCULAR; INTRAVENOUS; SUBCUTANEOUS at 10:04

## 2025-06-16 RX ADMIN — SENNOSIDES AND DOCUSATE SODIUM 2 TABLET: 8.6; 5 TABLET ORAL at 19:43

## 2025-06-16 RX ADMIN — SODIUM CHLORIDE, PRESERVATIVE FREE 10 ML: 5 INJECTION INTRAVENOUS at 09:04

## 2025-06-16 RX ADMIN — HYDROMORPHONE HYDROCHLORIDE 6 MG: 4 TABLET ORAL at 13:14

## 2025-06-16 RX ADMIN — HYDROMORPHONE HYDROCHLORIDE 6 MG: 4 TABLET ORAL at 04:12

## 2025-06-16 RX ADMIN — HYDROMORPHONE HYDROCHLORIDE 1 MG: 1 INJECTION, SOLUTION INTRAMUSCULAR; INTRAVENOUS; SUBCUTANEOUS at 05:13

## 2025-06-16 RX ADMIN — HYDROMORPHONE HYDROCHLORIDE 1 MG: 1 INJECTION, SOLUTION INTRAMUSCULAR; INTRAVENOUS; SUBCUTANEOUS at 17:18

## 2025-06-16 RX ADMIN — HYDROMORPHONE HYDROCHLORIDE 6 MG: 4 TABLET ORAL at 22:17

## 2025-06-16 RX ADMIN — HYDROMORPHONE HYDROCHLORIDE 1 MG: 1 INJECTION, SOLUTION INTRAMUSCULAR; INTRAVENOUS; SUBCUTANEOUS at 14:39

## 2025-06-16 RX ADMIN — DEXAMETHASONE 4 MG: 4 TABLET ORAL at 22:17

## 2025-06-16 RX ADMIN — HYDROMORPHONE HYDROCHLORIDE 1 MG: 1 INJECTION, SOLUTION INTRAMUSCULAR; INTRAVENOUS; SUBCUTANEOUS at 20:52

## 2025-06-16 RX ADMIN — DOCUSATE SODIUM 100 MG: 100 CAPSULE, LIQUID FILLED ORAL at 19:43

## 2025-06-16 RX ADMIN — METHOCARBAMOL 1500 MG: 750 TABLET ORAL at 19:43

## 2025-06-16 RX ADMIN — HYDROMORPHONE HYDROCHLORIDE 6 MG: 4 TABLET ORAL at 06:26

## 2025-06-16 RX ADMIN — FERROUS SULFATE TAB 325 MG (65 MG ELEMENTAL FE) 325 MG: 325 (65 FE) TAB at 09:01

## 2025-06-16 RX ADMIN — DOCUSATE SODIUM 100 MG: 100 CAPSULE, LIQUID FILLED ORAL at 09:02

## 2025-06-16 RX ADMIN — HYDROMORPHONE HYDROCHLORIDE 1 MG: 1 INJECTION, SOLUTION INTRAMUSCULAR; INTRAVENOUS; SUBCUTANEOUS at 23:30

## 2025-06-16 RX ADMIN — HYDROMORPHONE HYDROCHLORIDE 6 MG: 4 TABLET ORAL at 16:14

## 2025-06-16 RX ADMIN — HYDROMORPHONE HYDROCHLORIDE 6 MG: 4 TABLET ORAL at 18:24

## 2025-06-16 RX ADMIN — HYDROMORPHONE HYDROCHLORIDE 1 MG: 1 INJECTION, SOLUTION INTRAMUSCULAR; INTRAVENOUS; SUBCUTANEOUS at 03:07

## 2025-06-16 RX ADMIN — SENNOSIDES AND DOCUSATE SODIUM 2 TABLET: 8.6; 5 TABLET ORAL at 09:01

## 2025-06-16 RX ADMIN — HYDROMORPHONE HYDROCHLORIDE 1 MG: 1 INJECTION, SOLUTION INTRAMUSCULAR; INTRAVENOUS; SUBCUTANEOUS at 07:49

## 2025-06-16 RX ADMIN — DEXAMETHASONE 4 MG: 4 TABLET ORAL at 10:04

## 2025-06-16 RX ADMIN — POLYETHYLENE GLYCOL 3350 17 G: 17 POWDER, FOR SOLUTION ORAL at 09:00

## 2025-06-16 RX ADMIN — HYDROMORPHONE HYDROCHLORIDE 6 MG: 4 TABLET ORAL at 01:59

## 2025-06-16 RX ADMIN — HYDROMORPHONE HYDROCHLORIDE 1 MG: 1 INJECTION, SOLUTION INTRAMUSCULAR; INTRAVENOUS; SUBCUTANEOUS at 00:42

## 2025-06-16 RX ADMIN — METHOCARBAMOL 1500 MG: 750 TABLET ORAL at 13:14

## 2025-06-16 RX ADMIN — HYDROMORPHONE HYDROCHLORIDE 1 MG: 1 INJECTION, SOLUTION INTRAMUSCULAR; INTRAVENOUS; SUBCUTANEOUS at 12:09

## 2025-06-16 RX ADMIN — SODIUM CHLORIDE, PRESERVATIVE FREE 10 ML: 5 INJECTION INTRAVENOUS at 19:43

## 2025-06-16 ASSESSMENT — PAIN DESCRIPTION - LOCATION
LOCATION: BACK

## 2025-06-16 ASSESSMENT — PAIN DESCRIPTION - DESCRIPTORS
DESCRIPTORS: ACHING;CRAMPING;DISCOMFORT
DESCRIPTORS: ACHING;DISCOMFORT;DULL
DESCRIPTORS: ACHING;DISCOMFORT;TENDER
DESCRIPTORS: ACHING;CRAMPING;DISCOMFORT

## 2025-06-16 ASSESSMENT — PAIN SCALES - GENERAL
PAINLEVEL_OUTOF10: 9
PAINLEVEL_OUTOF10: 8
PAINLEVEL_OUTOF10: 9
PAINLEVEL_OUTOF10: 8
PAINLEVEL_OUTOF10: 8
PAINLEVEL_OUTOF10: 10
PAINLEVEL_OUTOF10: 8
PAINLEVEL_OUTOF10: 9

## 2025-06-16 ASSESSMENT — PAIN DESCRIPTION - ORIENTATION
ORIENTATION: RIGHT;LEFT
ORIENTATION: RIGHT;LEFT

## 2025-06-16 ASSESSMENT — PAIN DESCRIPTION - ONSET: ONSET: ON-GOING

## 2025-06-16 ASSESSMENT — PAIN DESCRIPTION - FREQUENCY: FREQUENCY: CONTINUOUS

## 2025-06-16 ASSESSMENT — PAIN SCALES - WONG BAKER: WONGBAKER_NUMERICALRESPONSE: NO HURT

## 2025-06-16 ASSESSMENT — PAIN - FUNCTIONAL ASSESSMENT: PAIN_FUNCTIONAL_ASSESSMENT: PREVENTS OR INTERFERES SOME ACTIVE ACTIVITIES AND ADLS

## 2025-06-16 ASSESSMENT — PAIN DESCRIPTION - PAIN TYPE: TYPE: CHRONIC PAIN

## 2025-06-16 NOTE — PROGRESS NOTES
Palliative Care Department  [phone]  Palliative Care Progress Note  Provider Lara Lott PA-C     Joseph Bond  [id number]  Hospital Day: 11  Date of Initial Consult: 06/06/2025  Referring Provider: Nasima Phelps Chi, MD     Palliative Medicine was consulted for assistance with: pain management    HPI:   Joseph Bond is a 44 y.o. y/o male with a history of  prostate cancer with mediastinal lymphadenopathy, extensive metastasis of the spine, rib cage, pelvis, humerus and femurs ,(had refused systemic therapy, pursuing homeopathic) completed palliative radiation treatment to spine, with a history of polysubstance abuse including opioids and tobacco, reports abstinent for 5 years.  The patient had MRI of the lumbar and thoracic spine on 5/23/2025, with evidence of extensive metastatic disease throughout the lumbar spine with chronic compression fracture deformities at the L2 , L4 , L3 vertebral bodies.  Tumor in the S1 vertebral body extends into the anterior epidural space, resulting in moderate central canal stenosis.  He presented once again on 6/6/2025 with complaints of worsening and poorly controlled pain.   He follows outpatient with [facility], reports his regimen is p.o. Dilaudid 4 mg every 2 hours as needed, OxyContin 30 mg ER twice daily, Robaxin-750 milligrams 4 times a day.    ASSESSMENT/PLAN:     Pertinent Hospital Diagnoses     Intractable pain  Prostate cancer with metastasis to bones  Known to Dr. Matson  On Dena       Palliative Care Encounter / Counseling Regarding Goals of Care  Please see detailed goals of care discussion as below  At this time, Joseph Bond, Does have capacity for medical decision-making.  Capacity is time limited and situation/question specific  Outcome of goals of care meeting:   Pain control  Agreeable to cancer directed therapies  Requesting intrathecal pain pump inpatient-->not offered here, transfer to [place] per family

## 2025-06-16 NOTE — PLAN OF CARE
Problem: Safety - Adult  Goal: Free from fall injury  6/16/2025 0925 by Gisselle Mariscal RN  Outcome: Progressing  6/16/2025 0001 by Cassy Foster RN  Outcome: Progressing     Problem: Pain  Goal: Verbalizes/displays adequate comfort level or baseline comfort level  6/16/2025 0925 by Gisselle Mariscal RN  Outcome: Progressing  6/16/2025 0001 by Cassy Foster RN  Outcome: Progressing     Problem: Skin/Tissue Integrity  Goal: Skin integrity remains intact  Description: 1.  Monitor for areas of redness and/or skin breakdown2.  Assess vascular access sites hourly3.  Every 4-6 hours minimum:  Change oxygen saturation probe site4.  Every 4-6 hours:  If on nasal continuous positive airway pressure, respiratory therapy assess nares and determine need for appliance change or resting period  6/16/2025 0925 by Gisselle Mariscal RN  Outcome: Progressing  6/16/2025 0001 by Cassy Foster RN  Outcome: Progressing     Problem: ABCDS Injury Assessment  Goal: Absence of physical injury  6/16/2025 0925 by Gisselle Mariscal RN  Outcome: Progressing  6/16/2025 0001 by Cassy Foster RN  Outcome: Progressing     Problem: Nutrition Deficit:  Goal: Optimize nutritional status  6/16/2025 0925 by Gisselle Mariscal RN  Outcome: Progressing  6/16/2025 0001 by Cassy oFster RN  Outcome: Progressing

## 2025-06-16 NOTE — PLAN OF CARE
Problem: Safety - Adult  Goal: Free from fall injury  6/16/2025 0001 by Cassy Foster, RN  Outcome: Progressing     Problem: Pain  Goal: Verbalizes/displays adequate comfort level or baseline comfort level  6/16/2025 0001 by Cassy Foster, RN  Outcome: Progressing     Problem: Skin/Tissue Integrity  Goal: Skin integrity remains intact  Description: 1.  Monitor for areas of redness and/or skin breakdown2.  Assess vascular access sites hourly3.  Every 4-6 hours minimum:  Change oxygen saturation probe site4.  Every 4-6 hours:  If on nasal continuous positive airway pressure, respiratory therapy assess nares and determine need for appliance change or resting period  6/16/2025 0001 by Cassy Foster, RN  Outcome: Progressing

## 2025-06-16 NOTE — PROGRESS NOTES
[facility] - Family Medicine Inpatient   Resident Progress Note    S:  Hospital day: 10   Brief Synopsis: Joseph Bond is a 44 y.o. male with a PMH of prostate cancer on Nubeqa who presents with intractable pain. Was admitted 5/29 to 6/5/2025 for same complaint. During that admission, palliative care was consulted and they increased his home regimen based on OME of the PCA. Palliative was consulted this admission, dilaudid PCA pump was restarted in addition to his adjusted PO medications from last admission. While on the PCA pump, his pain regimen included Oxycontin 60 mg BID, Dilaudid 6 mg PO Q2H PRN for breakthrough pain, and Robaxin 750 mg QID, and Decadron 4 mg Q6H. PCA pump was discontinued and palliative added IV Dilaudid 1 mg Q2H PRN for pain crisis. Radiation Oncology was consulted regarding consideration for inpatient radiation, which is felt to be not effective in resolving compression fracture type pain. Will discuss with Oncology to consider other systemic therapies.     Overnight/interim:   Received 8 doses of PO dilaudid and 11 doses of IV dilaudid over past 24 hours.   No acute events overnight  Patient was seen at bedside this morning in no acute distress. Rates pain 8/10 this morning. Denies having fever, chills, CP/SOB, AP/N/V/D. Last BM was Saturday night, would like to hold off on suppository at this time pending he gets out of bed later this morning.   Once again not agreeable to Methadone discussion given that it's a PO medication. Requesting an intrathecal pain pump which requires interventional pain specialist. Patient informed that this isn't an inpatient specialty. Waiting to talk to his family this morning regarding possible transfer to another facility with inpatient interventional pain specialist if available.   Pending further palliative recommendations for management.     Cont meds:    sodium chloride 50 mL/hr at 06/07/25 2028     Scheduled meds:    Vitamin D  1,000 Units Oral Daily

## 2025-06-16 NOTE — CARE COORDINATION
CM update note.  Discharge plan is home with [facility] and Palliative medicine VS transfer to tertiary center.  Home care orders and [facility] orders for Palliative are on the chart.  Family will provide transport home on day of discharge.  Palliative following for symptom management.  Per Palliative Med, attending to initiate transfer to [place] for Interventional pain specialist.  Await acceptance.  Ambulance form completed and placed on the soft chart.  CM/SW to follow.  Edwardo Leiva RN -[phone].

## 2025-06-17 LAB
ANION GAP SERPL CALCULATED.3IONS-SCNC: 15 MMOL/L (ref 7–16)
BASOPHILS # BLD: 0 K/UL (ref 0–0.2)
BASOPHILS NFR BLD: 0 % (ref 0–2)
BUN SERPL-MCNC: 15 MG/DL (ref 6–20)
CALCIUM SERPL-MCNC: 8.6 MG/DL (ref 8.6–10)
CHLORIDE SERPL-SCNC: 99 MMOL/L (ref 98–107)
CO2 SERPL-SCNC: 25 MMOL/L (ref 22–29)
CREAT SERPL-MCNC: 0.5 MG/DL (ref 0.7–1.2)
EOSINOPHIL # BLD: 0 K/UL (ref 0.05–0.5)
EOSINOPHILS RELATIVE PERCENT: 0 % (ref 0–6)
ERYTHROCYTE [DISTWIDTH] IN BLOOD BY AUTOMATED COUNT: 19 % (ref 11.5–15)
GFR, ESTIMATED: >90 ML/MIN/1.73M2
GLUCOSE SERPL-MCNC: 120 MG/DL (ref 74–99)
HCT VFR BLD AUTO: 31.8 % (ref 37–54)
HGB BLD-MCNC: 10.2 G/DL (ref 12.5–16.5)
LYMPHOCYTES NFR BLD: 0.79 K/UL (ref 1.5–4)
LYMPHOCYTES RELATIVE PERCENT: 6 % (ref 20–42)
MCH RBC QN AUTO: 27.2 PG (ref 26–35)
MCHC RBC AUTO-ENTMCNC: 32.1 G/DL (ref 32–34.5)
MCV RBC AUTO: 84.8 FL (ref 80–99.9)
MONOCYTES NFR BLD: 0.56 K/UL (ref 0.1–0.95)
MONOCYTES NFR BLD: 4 % (ref 2–12)
MYELOCYTES ABSOLUTE COUNT: 0.22 K/UL
MYELOCYTES: 2 %
NEUTROPHILS NFR BLD: 88 % (ref 43–80)
NEUTS SEG NFR BLD: 11.33 K/UL (ref 1.8–7.3)
PLATELET # BLD AUTO: 214 K/UL (ref 130–450)
PMV BLD AUTO: 8.9 FL (ref 7–12)
POTASSIUM SERPL-SCNC: 4.5 MMOL/L (ref 3.5–5.1)
RBC # BLD AUTO: 3.75 M/UL (ref 3.8–5.8)
RBC # BLD: ABNORMAL 10*6/UL
SODIUM SERPL-SCNC: 138 MMOL/L (ref 136–145)
WBC OTHER # BLD: 12.9 K/UL (ref 4.5–11.5)

## 2025-06-17 PROCEDURE — 2500000003 HC RX 250 WO HCPCS

## 2025-06-17 PROCEDURE — 6370000000 HC RX 637 (ALT 250 FOR IP)

## 2025-06-17 PROCEDURE — 80048 BASIC METABOLIC PNL TOTAL CA: CPT

## 2025-06-17 PROCEDURE — 6360000002 HC RX W HCPCS: Performed by: NURSE PRACTITIONER

## 2025-06-17 PROCEDURE — 6370000000 HC RX 637 (ALT 250 FOR IP): Performed by: NURSE PRACTITIONER

## 2025-06-17 PROCEDURE — 6360000002 HC RX W HCPCS: Performed by: FAMILY MEDICINE

## 2025-06-17 PROCEDURE — 99232 SBSQ HOSP IP/OBS MODERATE 35: CPT | Performed by: FAMILY MEDICINE

## 2025-06-17 PROCEDURE — 1200000000 HC SEMI PRIVATE

## 2025-06-17 PROCEDURE — 6360000002 HC RX W HCPCS

## 2025-06-17 PROCEDURE — 85025 COMPLETE CBC W/AUTO DIFF WBC: CPT

## 2025-06-17 RX ADMIN — DOCUSATE SODIUM 100 MG: 100 CAPSULE, LIQUID FILLED ORAL at 08:10

## 2025-06-17 RX ADMIN — PANTOPRAZOLE SODIUM 40 MG: 40 TABLET, DELAYED RELEASE ORAL at 05:48

## 2025-06-17 RX ADMIN — HYDROMORPHONE HYDROCHLORIDE 6 MG: 4 TABLET ORAL at 23:34

## 2025-06-17 RX ADMIN — SENNOSIDES AND DOCUSATE SODIUM 2 TABLET: 8.6; 5 TABLET ORAL at 08:10

## 2025-06-17 RX ADMIN — HYDROMORPHONE HYDROCHLORIDE 6 MG: 4 TABLET ORAL at 03:13

## 2025-06-17 RX ADMIN — HYDROMORPHONE HYDROCHLORIDE 1 MG: 1 INJECTION, SOLUTION INTRAMUSCULAR; INTRAVENOUS; SUBCUTANEOUS at 09:22

## 2025-06-17 RX ADMIN — HYDROMORPHONE HYDROCHLORIDE 6 MG: 4 TABLET ORAL at 20:58

## 2025-06-17 RX ADMIN — DEXAMETHASONE 4 MG: 4 TABLET ORAL at 05:48

## 2025-06-17 RX ADMIN — DOCUSATE SODIUM 100 MG: 100 CAPSULE, LIQUID FILLED ORAL at 19:55

## 2025-06-17 RX ADMIN — SODIUM CHLORIDE, PRESERVATIVE FREE 10 ML: 5 INJECTION INTRAVENOUS at 08:10

## 2025-06-17 RX ADMIN — HYDROMORPHONE HYDROCHLORIDE 1 MG: 1 INJECTION, SOLUTION INTRAMUSCULAR; INTRAVENOUS; SUBCUTANEOUS at 01:54

## 2025-06-17 RX ADMIN — HYDROMORPHONE HYDROCHLORIDE 1 MG: 1 INJECTION, SOLUTION INTRAMUSCULAR; INTRAVENOUS; SUBCUTANEOUS at 22:01

## 2025-06-17 RX ADMIN — DEXAMETHASONE 4 MG: 4 TABLET ORAL at 10:35

## 2025-06-17 RX ADMIN — HYDROMORPHONE HYDROCHLORIDE 1 MG: 1 INJECTION, SOLUTION INTRAMUSCULAR; INTRAVENOUS; SUBCUTANEOUS at 19:56

## 2025-06-17 RX ADMIN — HEPARIN 500 UNITS: 100 SYRINGE at 19:55

## 2025-06-17 RX ADMIN — POLYETHYLENE GLYCOL 3350 17 G: 17 POWDER, FOR SOLUTION ORAL at 08:10

## 2025-06-17 RX ADMIN — SENNOSIDES AND DOCUSATE SODIUM 2 TABLET: 8.6; 5 TABLET ORAL at 19:55

## 2025-06-17 RX ADMIN — ENOXAPARIN SODIUM 40 MG: 100 INJECTION SUBCUTANEOUS at 08:10

## 2025-06-17 RX ADMIN — HYDROMORPHONE HYDROCHLORIDE 1 MG: 1 INJECTION, SOLUTION INTRAMUSCULAR; INTRAVENOUS; SUBCUTANEOUS at 14:42

## 2025-06-17 RX ADMIN — METHOCARBAMOL 1500 MG: 750 TABLET ORAL at 08:10

## 2025-06-17 RX ADMIN — FERROUS SULFATE TAB 325 MG (65 MG ELEMENTAL FE) 325 MG: 325 (65 FE) TAB at 08:10

## 2025-06-17 RX ADMIN — OXYCODONE HYDROCHLORIDE 60 MG: 20 TABLET, FILM COATED, EXTENDED RELEASE ORAL at 08:09

## 2025-06-17 RX ADMIN — METHOCARBAMOL 1500 MG: 750 TABLET ORAL at 19:55

## 2025-06-17 RX ADMIN — HYDROMORPHONE HYDROCHLORIDE 6 MG: 4 TABLET ORAL at 18:47

## 2025-06-17 RX ADMIN — DEXAMETHASONE 4 MG: 4 TABLET ORAL at 17:30

## 2025-06-17 RX ADMIN — HYDROMORPHONE HYDROCHLORIDE 1 MG: 1 INJECTION, SOLUTION INTRAMUSCULAR; INTRAVENOUS; SUBCUTANEOUS at 17:29

## 2025-06-17 RX ADMIN — HYDROMORPHONE HYDROCHLORIDE 1 MG: 1 INJECTION, SOLUTION INTRAMUSCULAR; INTRAVENOUS; SUBCUTANEOUS at 04:27

## 2025-06-17 RX ADMIN — Medication 1000 UNITS: at 08:10

## 2025-06-17 RX ADMIN — HYDROMORPHONE HYDROCHLORIDE 1 MG: 1 INJECTION, SOLUTION INTRAMUSCULAR; INTRAVENOUS; SUBCUTANEOUS at 06:56

## 2025-06-17 RX ADMIN — HYDROMORPHONE HYDROCHLORIDE 6 MG: 4 TABLET ORAL at 00:43

## 2025-06-17 RX ADMIN — HYDROMORPHONE HYDROCHLORIDE 6 MG: 4 TABLET ORAL at 10:35

## 2025-06-17 RX ADMIN — HYDROMORPHONE HYDROCHLORIDE 1 MG: 1 INJECTION, SOLUTION INTRAMUSCULAR; INTRAVENOUS; SUBCUTANEOUS at 12:01

## 2025-06-17 RX ADMIN — METHOCARBAMOL 1500 MG: 750 TABLET ORAL at 13:35

## 2025-06-17 RX ADMIN — DEXAMETHASONE 4 MG: 4 TABLET ORAL at 23:34

## 2025-06-17 RX ADMIN — HYDROMORPHONE HYDROCHLORIDE 6 MG: 4 TABLET ORAL at 16:15

## 2025-06-17 RX ADMIN — HYDROMORPHONE HYDROCHLORIDE 6 MG: 4 TABLET ORAL at 13:35

## 2025-06-17 RX ADMIN — HYDROMORPHONE HYDROCHLORIDE 6 MG: 4 TABLET ORAL at 05:48

## 2025-06-17 RX ADMIN — SODIUM CHLORIDE, PRESERVATIVE FREE 10 ML: 5 INJECTION INTRAVENOUS at 19:57

## 2025-06-17 RX ADMIN — OXYCODONE HYDROCHLORIDE 60 MG: 20 TABLET, FILM COATED, EXTENDED RELEASE ORAL at 20:58

## 2025-06-17 ASSESSMENT — PAIN SCALES - GENERAL
PAINLEVEL_OUTOF10: 9
PAINLEVEL_OUTOF10: 8
PAINLEVEL_OUTOF10: 9
PAINLEVEL_OUTOF10: 8
PAINLEVEL_OUTOF10: 10
PAINLEVEL_OUTOF10: 9
PAINLEVEL_OUTOF10: 8
PAINLEVEL_OUTOF10: 9
PAINLEVEL_OUTOF10: 9
PAINLEVEL_OUTOF10: 10
PAINLEVEL_OUTOF10: 9
PAINLEVEL_OUTOF10: 8
PAINLEVEL_OUTOF10: 8
PAINLEVEL_OUTOF10: 0
PAINLEVEL_OUTOF10: 9
PAINLEVEL_OUTOF10: 8

## 2025-06-17 ASSESSMENT — PAIN DESCRIPTION - LOCATION
LOCATION: GENERALIZED
LOCATION: BACK;GENERALIZED
LOCATION: GENERALIZED

## 2025-06-17 ASSESSMENT — PAIN DESCRIPTION - DESCRIPTORS
DESCRIPTORS: ACHING;DULL;DISCOMFORT
DESCRIPTORS: SORE;SPASM;THROBBING
DESCRIPTORS: ACHING;DULL;DISCOMFORT
DESCRIPTORS: THROBBING;DULL;DISCOMFORT
DESCRIPTORS: ACHING;DULL;DISCOMFORT

## 2025-06-17 NOTE — PROGRESS NOTES
[facility] - Family Medicine Inpatient   Resident Progress Note    S:  Hospital day: 11   Brief Synopsis: Joseph Bond is a 44 y.o. male with a PMH of prostate cancer on Nubeqa who presents with intractable pain. Was admitted 5/29 to 6/5/2025 for same complaint. During that admission, palliative care was consulted and they increased his home regimen based on OME of the PCA. Palliative was consulted this admission, dilaudid PCA pump was restarted in addition to his adjusted PO medications from last admission. While on the PCA pump, his pain regimen included Oxycontin 60 mg BID, Dilaudid 6 mg PO Q2H PRN for breakthrough pain, and Robaxin 750 mg QID, and Decadron 4 mg Q6H. PCA pump was discontinued and palliative added IV Dilaudid 1 mg Q2H PRN for pain crisis. Radiation Oncology was consulted regarding consideration for inpatient radiation, which is felt to be not effective in resolving compression fracture type pain. Will discuss with Oncology to consider other systemic therapies.     Overnight/interim:   Received 9 doses of PO dilaudid and 10 doses of IV dilaudid over past 24 hours.  No acute events overnight  Patient was seen at bedside this morning in no acute distress. Rates pain 8-9/10 this morning. Denies having fever, chills, CP/SOB, AP/N/V/D. Last BM was last night.  Agreeable to [facility] transfer this morning for interventional pain specialist eval of requested intrathecal pain pump. Will initiate transfer this am.     Cont meds:    sodium chloride 50 mL/hr at 06/07/25 2028     Scheduled meds:    pantoprazole  40 mg Oral QAM AC    Vitamin D  1,000 Units Oral Daily    docusate sodium  100 mg Oral BID    polyethylene glycol  17 g Oral Daily    methocarbamol  1,500 mg Oral TID    oxyCODONE  60 mg Oral Q12H    dexAMETHasone  4 mg Oral Q6H    ferrous sulfate  325 mg Oral Daily with breakfast    Darolutamide  600 mg Oral BID    sennosides-docusate sodium  2 tablet Oral BID    sodium chloride flush  5-40 mL IntraVENous 2

## 2025-06-17 NOTE — CARE COORDINATION
CM update note.  Patient accepted as transfer to [facility] for Interventional pain specialist eval for intrathecal pain pump.  Waiting on bed assignment.  Ambulance form with envelope is on the soft chart.  CM/SW to follow.  Edwardo Leiva RN -[phone].

## 2025-06-17 NOTE — PROGRESS NOTES
Chart reviewed. Discussed with family medicine. Patient accepeted at [facility] for intrathecal pain pump--awaiting bed assignment. Continue current pain regimen.

## 2025-06-17 NOTE — PLAN OF CARE
Problem: Safety - Adult  Goal: Free from fall injury  Outcome: Progressing     Problem: Pain  Goal: Verbalizes/displays adequate comfort level or baseline comfort level  Outcome: Progressing     Problem: Skin/Tissue Integrity  Goal: Skin integrity remains intact  Description: 1.  Monitor for areas of redness and/or skin breakdown2.  Assess vascular access sites hourly3.  Every 4-6 hours minimum:  Change oxygen saturation probe site4.  Every 4-6 hours:  If on nasal continuous positive airway pressure, respiratory therapy assess nares and determine need for appliance change or resting period  Outcome: Progressing     Problem: Nutrition Deficit:  Goal: Optimize nutritional status  Outcome: Progressing

## 2025-06-17 NOTE — PROGRESS NOTES
Consultation - Cardiology   Lamonte Live 77 y o  female MRN: [id number]  Unit/Bed#: [facility] 675-33 Encounter: [id number]  12/07/19  12:55 PM      Assessment:  Principal Problem: Aortoiliac occlusive disease (HCC)  Active Problems:    Atherosclerosis of artery of extremity with rest pain (HCC)    Nicotine dependence    Benign essential HTN    CKD (chronic kidney disease) stage 3, GFR 30-59 ml/min (HCC)    Mass of spine    Chronic diastolic heart failure ([address] )      Plan:    1  Preop CV  No prior history of CAD and no exertional symptoms prior to admisson  Given relatively urgent nature of surgery, would not delay for further cardiac testing, she is at moderate risk given her age and comorbidities, can proceed with vascular surgery when able  2  HTN  On amlodipine 10, Coreg 12 5 bid, Lisinopril 20, spironolactone 25, and Torsemide 5  BP mostly controlled, monitor  3  HL  On Atorvastatin 80  Lipid panel 9/19 total cholesterol 110, TG 24, HDL 82, LDL 23      4  Tobacco use  Ordered for nicotine patch  5  PAD  On aspirin, statin, and IV heparin  6  Grade II diastolic dysfunction with moderate pulmonary HTN by echo 10/19  On Torsemide 5 mg daily  Appears euvolemic, monitor perioperatively for volume overload requiring IV diuresis  Follows with Dr Jaguar Rubio as outpt  History of Present Illness   Physician Requesting Consult: Colton Santos MD  Reason for Consult / Principal Problem: Preop CV evaluation prior to aortobifemoral bypass    HPI: Lamonte Live is a 77 y  o female with a history of HTN, HL, tobacco use, PAD, who is admitted with bilateral lower extremity numbness, CTA showed thrombosis of infrarenal abdominal aorta  She is being evaluated for aortobifemoral bypass, currently on IV Heparin  Echo 10/19 showed normal LV size and function, eF 60%, mild LVH, grade II diastolic dysfunction  Mild MR, mild TR with PASP 45-50 mm Hg  Follows with Dr Jaguar Rubio as outpt, for HTN   No reported No bed at f    history of CAD, patient also denies any history of stents or bypass  Does take Torsemide 5 mg daily as outpt for LE edema  Long term smoker, 1 ppd  At baseline she reports one month ago was able to walk, go up a flight of stairs if needed, and do some mild dancing to the oldies without chest pain or significant SOB  No orthopnea  No PND  No dizziness or lightheadedness  Consults    Review of Systems:    Review of Systems   Constitution: Negative for chills, decreased appetite, diaphoresis, fever, malaise/fatigue, night sweats, weight gain and weight loss  HENT: Negative for ear pain, hearing loss, hoarse voice, nosebleeds, sore throat and tinnitus  Eyes: Negative for blurred vision and pain  Cardiovascular: Positive for leg swelling  Negative for chest pain, claudication, cyanosis, dyspnea on exertion, irregular heartbeat, near-syncope, orthopnea, palpitations, paroxysmal nocturnal dyspnea and syncope  Respiratory: Negative for cough, hemoptysis, shortness of breath, sleep disturbances due to breathing, snoring, sputum production and wheezing  Hematologic/Lymphatic: Negative for adenopathy and bleeding problem  Does not bruise/bleed easily  Skin: Negative for color change, dry skin, flushing, itching, poor wound healing and rash  Musculoskeletal: Positive for myalgias  Negative for arthritis, back pain, falls, joint pain, muscle cramps, muscle weakness and neck pain  Gastrointestinal: Negative for abdominal pain, constipation, diarrhea, dysphagia, heartburn, hematemesis, hematochezia, melena, nausea and vomiting  Genitourinary: Negative for dysuria, frequency, hematuria, hesitancy, non-menstrual bleeding and urgency  Neurological: Negative for excessive daytime sleepiness, dizziness, focal weakness, headaches, light-headedness, loss of balance, numbness, paresthesias, tremors, vertigo and weakness  Psychiatric/Behavioral: Negative for altered mental status, depression and memory loss   The patient does not have insomnia and is not nervous/anxious  Allergic/Immunologic: Negative for environmental allergies and persistent infections  Historical Information   Past Medical History:   Diagnosis Date    Aortic stenosis     Arthritis     Benign essential hypertension     CAD (coronary artery disease)     Disease of thyroid gland     Dizziness     Edema of leg     Hyperlipidemia     Hypertension     Other disorder of circulatory system (CODE)      Past Surgical History:   Procedure Laterality Date    BREAST SURGERY      bxs,benign    COLONOSCOPY      HYSTERECTOMY      INCISIONAL BREAST BIOPSY      x5, 1964, 1965, 1985 and 1990     Social History     Substance and Sexual Activity   Alcohol Use Yes    Comment: manhattans 2-3 every day for 50 years     Social History     Substance and Sexual Activity   Drug Use No     Social History     Tobacco Use   Smoking Status Current Every Day Smoker    Packs/day: 1 00   Smokeless Tobacco Never Used   Tobacco Comment    last cigarette was 1 week ago       Family History:   Family History   Problem Relation Age of Onset    Hypertension Father     Coronary artery disease Family     Arthritis Family        Meds/Allergies   PTA meds:   Prior to Admission Medications   Prescriptions Last Dose Informant Patient Reported? Taking?    B Complex Vitamins (VITAMIN B-COMPLEX) TABS  Self Yes No   Sig: Take by mouth   Bioflavonoid Products (VITAMIN C PLUS PO)  Self Yes No   Sig: Take by mouth daily   GARLIC PO  Self Yes No   Sig: Take by mouth daily    Multiple Minerals (CALCIUM-MAGNESIUM-ZINC) TABS  Self Yes No   Sig: Take by mouth daily   Multiple Vitamins-Minerals (CENTRUM SILVER PO)  Self Yes No   Sig: Take by mouth   albuterol (VENTOLIN HFA) 90 mcg/act inhaler  Self No No   Sig: Inhale 2 puffs every 6 (six) hours as needed for wheezing   amLODIPine (NORVASC) 5 mg tablet  Self No No   Sig: Take 1 tablet (5 mg total) by mouth daily   aspirin 81 MG tablet Self Yes No   Sig: Take 2 tablets by mouth daily   atorvastatin (LIPITOR) 80 mg tablet  Self No No   Sig: take 1 tablet by mouth once daily   carvedilol (COREG) 12 5 mg tablet  Self No No   Sig: Take 1 tablet (12 5 mg total) by mouth 2 (two) times a day with meals   Patient not taking: Reported on 2019   enalapril (VASOTEC) 20 mg tablet  Self No No   Sig: Take 1 tablet (20 mg total) by mouth 2 (two) times a day   Patient not taking: Reported on 2019   fluticasone (FLONASE) 50 mcg/act nasal spray  Self No No   Si spray into each nostril daily   levothyroxine 25 mcg tablet  Self No No   Sig: Take 1 tablet (25 mcg total) by mouth daily in the early morning   magnesium oxide (MAG-OX) 400 mg  Self No No   Sig: Take 1 tablet (400 mg total) by mouth daily   nicotine (NICODERM CQ) 14 mg/24hr TD 24 hr patch  Self No No   Sig: Place 1 patch on the skin every 24 hours   potassium chloride (MICRO-K) 8 mEq CR capsule  Self Yes No   Sig: Take 8 mEq by mouth 2 (two) times a day   spironolactone (ALDACTONE) 25 mg tablet  Self No No   Sig: Take 1 tablet (25 mg total) by mouth daily   torsemide (DEMADEX) 5 MG tablet  Self No No   Sig: Take 1 tablet (5 mg total) by mouth daily      Facility-Administered Medications: None     Allergies   Allergen Reactions    Thiazide-Type Diuretics      Hyponatremia       Objective   Vitals: Blood pressure 158/60, pulse 89, temperature 98 1 °F (36 7 °C), temperature source Oral, resp  rate 16, height 5' 4" (1 626 m), weight 53 9 kg (118 lb 12 8 oz), SpO2 98 %  , Body mass index is 20 39 kg/m² ,   Orthostatic Blood Pressures      Most Recent Value   Blood Pressure  158/60 filed at 2019 0806   Patient Position - Orthostatic VS  Lying filed at 2019 7462          Systolic (29UEP), ZVN:350 , Min:140 , CKV:469     Diastolic (77ARO), ENU:54, Min:53, Max:97        Intake/Output Summary (Last 24 hours) at 2019 1255  Last data filed at 2019 0806  Gross per 24 hour   Intake 0 ml   Output 800 ml   Net -800 ml       Invasive Devices     Peripheral Intravenous Line            Peripheral IV 12/06/19 Right Antecubital 1 day                    Physical Exam:  GEN: Alert and oriented x 3, in no acute distress  HEENT: Sclera anicteric, conjunctivae pink, mucous membranes moist   NECK: Supple, no carotid bruits, no significant JVD  HEART: Regular rhythm, normal S1 and S2, no murmurs, clicks, gallops or rubs  LUNGS: Clear to auscultation bilaterally; no wheezes, rales, or rhonchi   ABDOMEN: Soft, nontender, nondistended, normoactive bowel sounds  EXTREMITIES: Skin warm and well perfused, no clubbing, cyanosis, or edema  NEURO: No focal findings  SKIN: Normal without suspicious lesions on exposed skin      Lab Results:     Troponins:       CBC with diff:   Results from last 7 days   Lab Units 12/07/19  0506 12/06/19  1850 12/06/19  1231   WBC Thousand/uL 9 73 9 10 8 97   HEMOGLOBIN g/dL 9 7* 10 6* 10 0*   HEMATOCRIT % 29 4* 31 8* 30 5*   MCV fL 103* 103* 103*   PLATELETS Thousands/uL 339 352 369   MCH pg 33 9 34 2 33 7   MCHC g/dL 33 0 33 3 32 8   RDW % 15 4* 15 4* 15 3*   MPV fL 10 9 11 1 10 7   NRBC AUTO /100 WBCs  --   --  0         CMP:   Results from last 7 days   Lab Units 12/07/19  0506 12/06/19  1231   POTASSIUM mmol/L 3 5 4 2   CHLORIDE mmol/L 108 102   CO2 mmol/L 27 27   BUN mg/dL 31* 32*   CREATININE mg/dL 0 95 0 86   CALCIUM mg/dL 9 4 9 3   AST U/L  --  50*   ALT U/L  --  48   ALK PHOS U/L  --  86   EGFR ml/min/1 73sq m 57 64       Magnesium:   Results from last 7 days   Lab Units 12/07/19  0506 12/06/19  1231   MAGNESIUM mg/dL 2 0 1 8       Coags:   Results from last 7 days   Lab Units 12/07/19  1030 12/07/19  0233 12/06/19  1850 12/06/19  1231   PTT seconds 58* 51* 93* 24   INR   --   --  1 14 1 02       TSH:       Lipid Profile:       Hgb A1c:       Cardiac testing:   Results for orders placed during the hospital encounter of 10/11/19   Echo complete with contrast if indicated    [address]  [phone]    Transthoracic Echocardiogram  2D, M-mode, Doppler, and Color Doppler    Study date:  11-Oct-2019    Patient: Margarette Bloch  MR number: [id number]  Account number: [de-identified]  : 1941  Age: 77 years  Gender: Female  Status: Outpatient  Location: Echo lab  Height: 64 in  Weight: 128 7 lb  BP: 175/ 91 mmHg    Indications: Aortoiliac occlusive Disease  Diagnoses: I74 9 - Embolism and thrombosis of unspecified artery    Sonographer:  DRE Melendez  Primary Physician: Delores Valderrama DO  Referring Physician:  Len Gayle MD  Group:  [facility]  Interpreting Physician:  Camilo Valle MD    SUMMARY    LEFT VENTRICLE:  Systolic function was normal  Ejection fraction was estimated in the range of 55 % to 60 % to be 60 %  There were no regional wall motion abnormalities  Wall thickness was mildly to moderately increased  There was mild concentric hypertrophy  Features were consistent with a pseudonormal left ventricular filling pattern, with concomitant abnormal relaxation and increased filling pressure (grade 2 diastolic dysfunction)  LEFT ATRIUM:  The atrium was mildly to moderately dilated  RIGHT ATRIUM:  The atrium was mildly dilated  MITRAL VALVE:  There was mild annular calcification  There was mild regurgitation  TRICUSPID VALVE:  There was mild regurgitation  Estimated peak PA pressure was 48 mmHg  PULMONIC VALVE:  There was mild regurgitation  COMPARISONS:  Comparison was made with the previous study of 31-Aug-2017  Pulmonary artery pressure has increased  HISTORY: PRIOR HISTORY: Chronic kidney Disease,HTN,Murmur,Hypothyroidism,Carotid stenosis,Dyslipidemia,CAD,Hyperlipidemia  PROCEDURE: The procedure was performed in the echo lab  This was a routine study  The transthoracic approach was used   The study included complete 2D imaging, M-mode, complete spectral Doppler, and color Doppler  The heart rate was 62 bpm,  at the start of the study  Images were obtained from the parasternal, apical, subcostal, and suprasternal notch acoustic windows  Image quality was adequate  LEFT VENTRICLE: Size was normal  Systolic function was normal  Ejection fraction was estimated in the range of 55 % to 60 % to be 60 %  There were no regional wall motion abnormalities  Wall thickness was mildly to moderately increased  There was mild concentric hypertrophy  DOPPLER: Features were consistent with a pseudonormal left ventricular filling pattern, with concomitant abnormal relaxation and increased filling pressure (grade 2 diastolic dysfunction)  RIGHT VENTRICLE: The size was normal  Systolic function was normal     LEFT ATRIUM: The atrium was mildly to moderately dilated  RIGHT ATRIUM: The atrium was mildly dilated  MITRAL VALVE: There was mild annular calcification  There was normal leaflet separation  DOPPLER: The transmitral velocity was within the normal range  There was no evidence for stenosis  There was mild regurgitation  AORTIC VALVE: The valve was trileaflet  Leaflets exhibited mildly increased thickness and normal cuspal separation  DOPPLER: Transaortic velocity was within the normal range  There was no evidence for stenosis  There was no regurgitation  TRICUSPID VALVE: DOPPLER: There was mild regurgitation  Estimated peak PA pressure was 48 mmHg  PULMONIC VALVE: DOPPLER: There was mild regurgitation  PERICARDIUM: There was no thickening or calcification  There was no pericardial effusion  AORTA: The root exhibited normal size  SYSTEMIC VEINS: IVC: The inferior vena cava was normal in size   Respirophasic changes were normal     SYSTEM MEASUREMENT TABLES    2D mode  AoR Diam 2D: 3 2 cm  LA Diam (2D): 4 1 cm  LA/Ao (2D): 1 28  FS (2D Teich): 27 4 %  IVSd (2D): 1 05 cm  LVDEV: 69 2 cmï¾³  LVESV: 31 9 cmï¾³  LVIDd(2D): 3 98 cm  LVISd (2D): 2 89 cm  LVOT Area 2D: 2 84 cmï¾²  LVPWd (2D): 1 07 cm  SV (Teich): 37 3 cmï¾³    Apical four chamber  LVEF A4C: 56 %    Unspecified Scan Mode  GEOFFREY Cont Eq (Peak Kael): 2 55 cmï¾²  LVOT Diam : 1 9 cm  LVOT Vmax: 1160 mm/s  LVOT Vmax; Mean: 1160 mm/s  Peak Grad ; Mean: 5 mm[Hg]  MV Peak A Kael: 879 mm/s  MV Peak E Kael  Mean: 933 mm/s  MVA (PHT): 4 23 cmï¾²  PHT: 52 ms  Max P mm[Hg]  V Max: 3280 mm/s  Vmax: 3190 mm/s  RA Area: 18 2 cmï¾²  RA Volume: 48 9 cmï¾³  TAPSE: 2 cm    [facility]    Prepared and electronically signed by    Uriel Gaines MD  Signed 14-Oct-2019 07:38:16         Imaging:  Xr Chest 1 View Portable    Result Date: 2019  Narrative: CHEST INDICATION:   Sepsis  COMPARISON:  2018 EXAM PERFORMED/VIEWS:  XR CHEST PORTABLE 1 FINDINGS: Cardiomediastinal silhouette appears unremarkable  The lungs are clear  No pneumothorax or pleural effusion  Osseous structures appear within normal limits for patient age  Impression: No acute cardiopulmonary disease  Workstation performed: [id number]     Cta Abdominal W Run Off W Wo Contrast    Result Date: 2019  Narrative: CT ANGIOGRAM OF THE AORTA AND LOWER EXTREMITIES WITH IV CONTRAST INDICATION:  Increasing bilateral lower extremity pain  COMPARISON: 2019 TECHNIQUE:  CT angiogram examination of the abdomen, pelvis, and lower extremities was performed according to standard protocol with intravenous contrast   This examination, like all CT scans performed in the [facility], was performed utilizing techniques to minimize radiation dose exposure, including the use of iterative reconstruction and automated exposure control  3D reconstructions were performed an independent workstation, and are supplied for review     Rad dose 1809 83 mGy-cm IV Contrast:  150 mL of iohexol (OMNIPAQUE)  FINDINGS: VASCULAR STRUCTURES:   The distal thoracic aorta is atherosclerotic  The abdominal aorta is diffusely atherosclerotic  The celiac artery has a high-grade ostial stenosis  The left gastric artery arises directly from the aorta  The superior and inferior mesenteric arteries are patent  Single renal arteries are patent bilaterally with calcified plaque  Thrombus is present within the infrarenal aorta, beginning proximal to the ANDRAE origin  There is occlusion of the aorta distal to the origin of  the inferior mesenteric artery with collateral reconstitution of the bilateral common femoral arteries and branches of the internal iliac arteries  The right common femoral artery has a mild stenosis due to posterior calcified plaque  The superficial femoral artery, profunda femoris artery, and popliteal artery are patent supplying three-vessel runoff to the foot  The left common femoral artery has a mild stenosis due to posterior calcified plaque  The left profunda femoris artery, superficial femoral artery, and popliteal artery are patent supplying three-vessel runoff to the foot  OTHER FINDINGS ABDOMEN LOWER CHEST:  No significant abnormality in the lung bases  LIVER/BILIARY TREE:  Unremarkable  GALLBLADDER:  No calcified gallstones  No pericholecystic inflammatory change  SPLEEN:  Unremarkable  Normal size  PANCREAS:  Unremarkable  ADRENAL GLANDS: Unremarkable  KIDNEYS/URETERS:  No solid renal mass  No hydronephrosis  No urinary tract calculi  PELVIS REPRODUCTIVE ORGANS:  Unremarkable for patient's age  URINARY BLADDER:  Unremarkable  ADDITIONAL ABDOMINAL AND PELVIC STRUCTURES STOMACH AND BOWEL:  Colonic diverticulosis without diverticulitis  ABDOMINOPELVIC CAVITY:   No pathologically enlarged mesenteric or retroperitoneal lymph nodes  No ascites or free intraperitoneal air  ABDOMINAL WALL/INGUINAL REGIONS:  Unremarkable  OSSEOUS STRUCTURES:  Calcified ventral epidural mass at the level of L1 is again identified and is unchanged  Impression: Interval thrombosis of the infrarenal abdominal aorta and left iliofemoral segment with persistent thrombosis of the right iliofemoral segment  Stable bilateral three-vessel distal runoff  Diverticulosis   Stable calcified ventral epidural mass at the level of L1  * I personally telephoned this result to [facility] on 12/6/2019 2:25 PM  Workstation performed: [id number]       EKG: sinus tachy,  BPM

## 2025-06-17 NOTE — PLAN OF CARE
Problem: Safety - Adult  Goal: Free from fall injury  6/17/2025 0722 by Margarita Henderson RN  Outcome: Progressing  6/17/2025 0107 by Jimmy Schmitt RN  Outcome: Progressing     Problem: Pain  Goal: Verbalizes/displays adequate comfort level or baseline comfort level  6/17/2025 0722 by Margarita Henderson RN  Outcome: Progressing  6/17/2025 0107 by Jimmy Schmitt RN  Outcome: Progressing     Problem: Skin/Tissue Integrity  Goal: Skin integrity remains intact  Description: 1.  Monitor for areas of redness and/or skin breakdown2.  Assess vascular access sites hourly3.  Every 4-6 hours minimum:  Change oxygen saturation probe site4.  Every 4-6 hours:  If on nasal continuous positive airway pressure, respiratory therapy assess nares and determine need for appliance change or resting period  6/17/2025 0722 by Margarita Henderson RN  Outcome: Progressing  6/17/2025 0107 by Jimmy Schmitt RN  Outcome: Progressing     Problem: ABCDS Injury Assessment  Goal: Absence of physical injury  Outcome: Progressing     Problem: Nutrition Deficit:  Goal: Optimize nutritional status  6/17/2025 0722 by Margarita Henderson RN  Outcome: Progressing  6/17/2025 0107 by Jimmy Schmitt RN  Outcome: Progressing

## 2025-06-18 LAB
ANION GAP SERPL CALCULATED.3IONS-SCNC: 11 MMOL/L (ref 7–16)
BASOPHILS # BLD: 0 K/UL (ref 0–0.2)
BASOPHILS NFR BLD: 0 % (ref 0–2)
BUN SERPL-MCNC: 16 MG/DL (ref 6–20)
CALCIUM SERPL-MCNC: 8.6 MG/DL (ref 8.6–10)
CHLORIDE SERPL-SCNC: 100 MMOL/L (ref 98–107)
CO2 SERPL-SCNC: 25 MMOL/L (ref 22–29)
CREAT SERPL-MCNC: 0.5 MG/DL (ref 0.7–1.2)
EOSINOPHIL # BLD: 0 K/UL (ref 0.05–0.5)
EOSINOPHILS RELATIVE PERCENT: 0 % (ref 0–6)
ERYTHROCYTE [DISTWIDTH] IN BLOOD BY AUTOMATED COUNT: 19.2 % (ref 11.5–15)
GFR, ESTIMATED: >90 ML/MIN/1.73M2
GLUCOSE SERPL-MCNC: 119 MG/DL (ref 74–99)
HCT VFR BLD AUTO: 32.3 % (ref 37–54)
HGB BLD-MCNC: 10.2 G/DL (ref 12.5–16.5)
LYMPHOCYTES NFR BLD: 0.84 K/UL (ref 1.5–4)
LYMPHOCYTES RELATIVE PERCENT: 6 % (ref 20–42)
MCH RBC QN AUTO: 27.1 PG (ref 26–35)
MCHC RBC AUTO-ENTMCNC: 31.6 G/DL (ref 32–34.5)
MCV RBC AUTO: 85.7 FL (ref 80–99.9)
METAMYELOCYTES ABSOLUTE COUNT: 0.24 K/UL (ref 0–0.12)
METAMYELOCYTES: 2 % (ref 0–1)
MONOCYTES NFR BLD: 0.12 K/UL (ref 0.1–0.95)
MONOCYTES NFR BLD: 1 % (ref 2–12)
NEUTROPHILS NFR BLD: 91 % (ref 43–80)
NEUTS SEG NFR BLD: 12.41 K/UL (ref 1.8–7.3)
PLATELET # BLD AUTO: 201 K/UL (ref 130–450)
PMV BLD AUTO: 9.1 FL (ref 7–12)
POTASSIUM SERPL-SCNC: 4.7 MMOL/L (ref 3.5–5.1)
RBC # BLD AUTO: 3.77 M/UL (ref 3.8–5.8)
RBC # BLD: ABNORMAL 10*6/UL
SODIUM SERPL-SCNC: 137 MMOL/L (ref 136–145)
WBC # BLD: ABNORMAL 10*3/UL
WBC OTHER # BLD: 13.6 K/UL (ref 4.5–11.5)

## 2025-06-18 PROCEDURE — 6370000000 HC RX 637 (ALT 250 FOR IP)

## 2025-06-18 PROCEDURE — 85025 COMPLETE CBC W/AUTO DIFF WBC: CPT

## 2025-06-18 PROCEDURE — 99231 SBSQ HOSP IP/OBS SF/LOW 25: CPT | Performed by: PHYSICIAN ASSISTANT

## 2025-06-18 PROCEDURE — 6360000002 HC RX W HCPCS

## 2025-06-18 PROCEDURE — 6360000002 HC RX W HCPCS: Performed by: PHYSICIAN ASSISTANT

## 2025-06-18 PROCEDURE — 6360000002 HC RX W HCPCS: Performed by: NURSE PRACTITIONER

## 2025-06-18 PROCEDURE — 80048 BASIC METABOLIC PNL TOTAL CA: CPT

## 2025-06-18 PROCEDURE — 99232 SBSQ HOSP IP/OBS MODERATE 35: CPT | Performed by: FAMILY MEDICINE

## 2025-06-18 PROCEDURE — 2500000003 HC RX 250 WO HCPCS

## 2025-06-18 PROCEDURE — 1200000000 HC SEMI PRIVATE

## 2025-06-18 PROCEDURE — 6370000000 HC RX 637 (ALT 250 FOR IP): Performed by: NURSE PRACTITIONER

## 2025-06-18 RX ORDER — DEXAMETHASONE 4 MG/1
4 TABLET ORAL EVERY 8 HOURS SCHEDULED
Status: DISCONTINUED | OUTPATIENT
Start: 2025-06-18 | End: 2025-06-20 | Stop reason: HOSPADM

## 2025-06-18 RX ADMIN — HYDROMORPHONE HYDROCHLORIDE 6 MG: 4 TABLET ORAL at 15:05

## 2025-06-18 RX ADMIN — HYDROMORPHONE HYDROCHLORIDE 6 MG: 4 TABLET ORAL at 13:06

## 2025-06-18 RX ADMIN — HYDROMORPHONE HYDROCHLORIDE 1 MG: 1 INJECTION, SOLUTION INTRAMUSCULAR; INTRAVENOUS; SUBCUTANEOUS at 16:05

## 2025-06-18 RX ADMIN — HYDROMORPHONE HYDROCHLORIDE 6 MG: 4 TABLET ORAL at 19:07

## 2025-06-18 RX ADMIN — DEXAMETHASONE 4 MG: 4 TABLET ORAL at 11:09

## 2025-06-18 RX ADMIN — SODIUM CHLORIDE, PRESERVATIVE FREE 10 ML: 5 INJECTION INTRAVENOUS at 20:12

## 2025-06-18 RX ADMIN — DOCUSATE SODIUM 100 MG: 100 CAPSULE, LIQUID FILLED ORAL at 09:50

## 2025-06-18 RX ADMIN — HYDROMORPHONE HYDROCHLORIDE 1 MG: 1 INJECTION, SOLUTION INTRAMUSCULAR; INTRAVENOUS; SUBCUTANEOUS at 03:47

## 2025-06-18 RX ADMIN — SENNOSIDES AND DOCUSATE SODIUM 2 TABLET: 8.6; 5 TABLET ORAL at 20:08

## 2025-06-18 RX ADMIN — OXYCODONE HYDROCHLORIDE 60 MG: 20 TABLET, FILM COATED, EXTENDED RELEASE ORAL at 09:49

## 2025-06-18 RX ADMIN — HYDROMORPHONE HYDROCHLORIDE 6 MG: 4 TABLET ORAL at 07:20

## 2025-06-18 RX ADMIN — HYDROMORPHONE HYDROCHLORIDE 6 MG: 4 TABLET ORAL at 17:07

## 2025-06-18 RX ADMIN — METHOCARBAMOL 1500 MG: 750 TABLET ORAL at 14:05

## 2025-06-18 RX ADMIN — HYDROMORPHONE HYDROCHLORIDE 1 MG: 1 INJECTION, SOLUTION INTRAMUSCULAR; INTRAVENOUS; SUBCUTANEOUS at 14:06

## 2025-06-18 RX ADMIN — Medication 1000 UNITS: at 09:50

## 2025-06-18 RX ADMIN — HYDROMORPHONE HYDROCHLORIDE 6 MG: 4 TABLET ORAL at 21:07

## 2025-06-18 RX ADMIN — HYDROMORPHONE HYDROCHLORIDE 6 MG: 4 TABLET ORAL at 02:40

## 2025-06-18 RX ADMIN — SODIUM CHLORIDE, PRESERVATIVE FREE 10 ML: 5 INJECTION INTRAVENOUS at 10:00

## 2025-06-18 RX ADMIN — HYDROMORPHONE HYDROCHLORIDE 6 MG: 4 TABLET ORAL at 23:07

## 2025-06-18 RX ADMIN — METHOCARBAMOL 1500 MG: 750 TABLET ORAL at 20:08

## 2025-06-18 RX ADMIN — METHOCARBAMOL 1500 MG: 750 TABLET ORAL at 09:48

## 2025-06-18 RX ADMIN — ENOXAPARIN SODIUM 40 MG: 100 INJECTION SUBCUTANEOUS at 09:50

## 2025-06-18 RX ADMIN — DEXAMETHASONE 4 MG: 4 TABLET ORAL at 04:53

## 2025-06-18 RX ADMIN — HYDROMORPHONE HYDROCHLORIDE 1 MG: 1 INJECTION, SOLUTION INTRAMUSCULAR; INTRAVENOUS; SUBCUTANEOUS at 20:09

## 2025-06-18 RX ADMIN — HYDROMORPHONE HYDROCHLORIDE 1 MG: 1 INJECTION, SOLUTION INTRAMUSCULAR; INTRAVENOUS; SUBCUTANEOUS at 05:51

## 2025-06-18 RX ADMIN — HYDROMORPHONE HYDROCHLORIDE 6 MG: 4 TABLET ORAL at 11:09

## 2025-06-18 RX ADMIN — HYDROMORPHONE HYDROCHLORIDE 1 MG: 1 INJECTION, SOLUTION INTRAMUSCULAR; INTRAVENOUS; SUBCUTANEOUS at 18:02

## 2025-06-18 RX ADMIN — HYDROMORPHONE HYDROCHLORIDE 1 MG: 1 INJECTION, SOLUTION INTRAMUSCULAR; INTRAVENOUS; SUBCUTANEOUS at 12:06

## 2025-06-18 RX ADMIN — SENNOSIDES AND DOCUSATE SODIUM 2 TABLET: 8.6; 5 TABLET ORAL at 09:48

## 2025-06-18 RX ADMIN — HYDROMORPHONE HYDROCHLORIDE 1 MG: 1 INJECTION, SOLUTION INTRAMUSCULAR; INTRAVENOUS; SUBCUTANEOUS at 22:07

## 2025-06-18 RX ADMIN — HYDROMORPHONE HYDROCHLORIDE 6 MG: 4 TABLET ORAL at 04:53

## 2025-06-18 RX ADMIN — DOCUSATE SODIUM 100 MG: 100 CAPSULE, LIQUID FILLED ORAL at 20:08

## 2025-06-18 RX ADMIN — DEXAMETHASONE 4 MG: 4 TABLET ORAL at 20:09

## 2025-06-18 RX ADMIN — FERROUS SULFATE TAB 325 MG (65 MG ELEMENTAL FE) 325 MG: 325 (65 FE) TAB at 09:50

## 2025-06-18 RX ADMIN — OXYCODONE HYDROCHLORIDE 60 MG: 20 TABLET, FILM COATED, EXTENDED RELEASE ORAL at 20:08

## 2025-06-18 RX ADMIN — HYDROMORPHONE HYDROCHLORIDE 1 MG: 1 INJECTION, SOLUTION INTRAMUSCULAR; INTRAVENOUS; SUBCUTANEOUS at 00:38

## 2025-06-18 RX ADMIN — HYDROMORPHONE HYDROCHLORIDE 1 MG: 1 INJECTION, SOLUTION INTRAMUSCULAR; INTRAVENOUS; SUBCUTANEOUS at 09:49

## 2025-06-18 RX ADMIN — PANTOPRAZOLE SODIUM 40 MG: 40 TABLET, DELAYED RELEASE ORAL at 04:54

## 2025-06-18 ASSESSMENT — PAIN SCALES - GENERAL
PAINLEVEL_OUTOF10: 9
PAINLEVEL_OUTOF10: 8
PAINLEVEL_OUTOF10: 10
PAINLEVEL_OUTOF10: 9
PAINLEVEL_OUTOF10: 8
PAINLEVEL_OUTOF10: 10
PAINLEVEL_OUTOF10: 9
PAINLEVEL_OUTOF10: 10
PAINLEVEL_OUTOF10: 9
PAINLEVEL_OUTOF10: 10
PAINLEVEL_OUTOF10: 10
PAINLEVEL_OUTOF10: 9

## 2025-06-18 ASSESSMENT — PAIN DESCRIPTION - DESCRIPTORS
DESCRIPTORS: THROBBING;ACHING;SORE
DESCRIPTORS: ACHING;THROBBING;STABBING;DISCOMFORT
DESCRIPTORS: DISCOMFORT;ACHING;THROBBING
DESCRIPTORS: DISCOMFORT;STABBING;THROBBING

## 2025-06-18 ASSESSMENT — PAIN DESCRIPTION - LOCATION
LOCATION: BACK
LOCATION: ABDOMEN
LOCATION: GENERALIZED;BACK
LOCATION: BACK
LOCATION: BACK;GENERALIZED
LOCATION: BACK
LOCATION: GENERALIZED;BACK
LOCATION: BACK
LOCATION: GENERALIZED
LOCATION: BACK

## 2025-06-18 ASSESSMENT — PAIN SCALES - WONG BAKER
WONGBAKER_NUMERICALRESPONSE: HURTS WHOLE LOT

## 2025-06-18 ASSESSMENT — PAIN DESCRIPTION - ORIENTATION
ORIENTATION: RIGHT;LEFT;INNER
ORIENTATION: RIGHT;LEFT;INNER;MID;LOWER
ORIENTATION: RIGHT;LEFT;MID
ORIENTATION: RIGHT;LEFT;INNER;MID

## 2025-06-18 NOTE — PROGRESS NOTES
[facility] - Family Medicine Inpatient   Resident Progress Note    S:  Hospital day: 12   Brief Synopsis: Joseph Bond is a 44 y.o. male with a PMH of prostate cancer on [place] who presents with intractable pain. Was admitted 5/29 to 6/5/2025 for same complaint. During that admission, palliative care was consulted and they increased his home regimen based on OME of the PCA. Palliative was consulted this admission, dilaudid PCA pump was restarted in addition to his adjusted PO medications from last admission. While on the PCA pump, his pain regimen included Oxycontin 60 mg BID, Dilaudid 6 mg PO Q2H PRN for breakthrough pain, and Robaxin 750 mg QID, and Decadron 4 mg Q6H. PCA pump was discontinued and palliative added IV Dilaudid 1 mg Q2H PRN for pain crisis. Radiation Oncology was consulted regarding consideration for inpatient radiation, which is felt to be not effective in resolving compression fracture type pain.  Discussion was had with patient regarding therapeutic benefits of starting methadone, but he refused it multiple times solely based on the fact that it was an oral medication.  Due to intractable pain, patient was requesting an intrathecal pain pump; service requiring an interventional pain specialist that is not present in the hospital.  He was agreeable to transfer to [facility].  Transfer was initiated on 6/17 and he was accepted.  Currently awaiting bed availability.    Overnight/interim:   Received 9 doses of PO dilaudid and 10 doses of IV dilaudid over past 24 hours.  No acute events overnight  Patient was seen at bedside this morning in no acute distress. Rates pain 8-9/10 this morning. Denies having fever, chills, CP/SOB, AP/N/V/D. Last BM was yesterday.  [facility] transfer initiated on 6/17, patient accepted, awaiting bed availability.    Cont meds:    sodium chloride 50 mL/hr at 06/07/25 2028     Scheduled meds:    pantoprazole  40 mg Oral QAM AC    Vitamin D  1,000 Units Oral Daily    docusate sodium  100 mg

## 2025-06-18 NOTE — PROGRESS NOTES
Palliative Care Department  [phone]  Palliative Care Progress Note  Provider Lara Lott PA-C     Joseph Bond  [id number]  Hospital Day: 13  Date of Initial Consult: 06/06/2025  Referring Provider: Nasima Phelps Chi, MD     Palliative Medicine was consulted for assistance with: pain management    HPI:   Joseph Bond is a 44 y.o. y/o male with a history of  prostate cancer with mediastinal lymphadenopathy, extensive metastasis of the spine, rib cage, pelvis, humerus and femurs ,(had refused systemic therapy, pursuing homeopathic) completed palliative radiation treatment to spine, with a history of polysubstance abuse including opioids and tobacco, reports abstinent for 5 years.  The patient had MRI of the lumbar and thoracic spine on 5/23/2025, with evidence of extensive metastatic disease throughout the lumbar spine with chronic compression fracture deformities at the L2 , L4 , L3 vertebral bodies.  Tumor in the S1 vertebral body extends into the anterior epidural space, resulting in moderate central canal stenosis.  He presented once again on 6/6/2025 with complaints of worsening and poorly controlled pain.   He follows outpatient with [facility], reports his regimen is p.o. Dilaudid 4 mg every 2 hours as needed, OxyContin 30 mg ER twice daily, Robaxin-750 milligrams 4 times a day.    ASSESSMENT/PLAN:     Pertinent Hospital Diagnoses     Intractable pain  Prostate cancer with metastasis to bones  Known to Dr. Matson  On Dena       Palliative Care Encounter / Counseling Regarding Goals of Care  Please see detailed goals of care discussion as below  At this time, Joseph Bond, Does have capacity for medical decision-making.  Capacity is time limited and situation/question specific  Outcome of goals of care meeting:   Pain control  Agreeable to cancer directed therapies  Requesting intrathecal pain pump inpatient-->not offered here, transfer to [place] per family

## 2025-06-18 NOTE — CARE COORDINATION
CM update note.  Patient accepted as transfer to [facility] for Interventional pain specialist eval for intrathecal pain pump.  Waiting on bed assignment.  Ambulance form with envelope is on the soft chart.  CM/SW to follow.  Edwardo Leiva RN [phone].

## 2025-06-18 NOTE — PROGRESS NOTES
Spiritual Health History and Assessment/Progress Note  [facility] [place]    (P) Initial Encounter, Spiritual/Emotional Needs,  ,  ,      Name: Joseph Bond MRN: [id number]    Age: 44 y.o.     Sex: male   Language: English   Voodoo: Jewish   Intractable pain     Date: 6/18/2025                           Spiritual Assessment began in [facility]        Referral/Consult From: (P) Rounding   Encounter Overview/Reason: (P) Initial Encounter, Spiritual/Emotional Needs  Service Provided For: (P) Patient    Gail, Belief, Meaning:   Patient identifies as spiritual  Family/Friends identify as spiritual      Importance and Influence:  Patient has has spiritual beliefs  Family/Friends have spiritual/personal beliefs that influence decisions regarding the patient's health    Community:  Patient is connected with a spiritual community  Family/Friends No family/friends present    Assessment and Plan of Care:     Patient Interventions include: Affirmed coping skills/support systems  Family/Friends Interventions include: No family/friends present    Patient Plan of Care: No spiritual needs identified for follow-up  Family/Friends Plan of Care: No family/friends present    Electronically signed by Chaplain ADRIENNE on 6/18/2025 at 2:19 PM

## 2025-06-18 NOTE — PLAN OF CARE
Problem: Safety - Adult  Goal: Free from fall injury  6/17/2025 2120 by Rose Paez RN  Outcome: Progressing  6/17/2025 0722 by Margarita Henderson RN  Outcome: Progressing     Problem: Pain  Goal: Verbalizes/displays adequate comfort level or baseline comfort level  6/17/2025 2120 by Rose Paez RN  Outcome: Progressing  6/17/2025 0722 by Margarita Henderson RN  Outcome: Progressing     Problem: Skin/Tissue Integrity  Goal: Skin integrity remains intact  Description: 1.  Monitor for areas of redness and/or skin breakdown2.  Assess vascular access sites hourly3.  Every 4-6 hours minimum:  Change oxygen saturation probe site4.  Every 4-6 hours:  If on nasal continuous positive airway pressure, respiratory therapy assess nares and determine need for appliance change or resting period  6/17/2025 2120 by Rose Paez RN  Outcome: Progressing  6/17/2025 0722 by Margarita Henderson RN  Outcome: Progressing     Problem: ABCDS Injury Assessment  Goal: Absence of physical injury  6/17/2025 2120 by Rose Paez RN  Outcome: Progressing  6/17/2025 0722 by Margarita Henderson RN  Outcome: Progressing     Problem: Nutrition Deficit:  Goal: Optimize nutritional status  6/17/2025 2120 by Rose Paez RN  Outcome: Progressing  6/17/2025 0722 by Margarita Henderson RN  Outcome: Progressing

## 2025-06-19 PROBLEM — E44.0 MODERATE PROTEIN-CALORIE MALNUTRITION: Status: ACTIVE | Noted: 2025-06-02

## 2025-06-19 LAB
ANION GAP SERPL CALCULATED.3IONS-SCNC: 10 MMOL/L (ref 7–16)
ANION GAP SERPL CALCULATED.3IONS-SCNC: 10 MMOL/L (ref 7–16)
BASOPHILS # BLD: 0 K/UL (ref 0–0.2)
BASOPHILS NFR BLD: 0 % (ref 0–2)
BUN SERPL-MCNC: 13 MG/DL (ref 6–20)
BUN SERPL-MCNC: 14 MG/DL (ref 6–20)
CALCIUM SERPL-MCNC: 8.1 MG/DL (ref 8.6–10)
CALCIUM SERPL-MCNC: 8.1 MG/DL (ref 8.6–10)
CHLORIDE SERPL-SCNC: 100 MMOL/L (ref 98–107)
CHLORIDE SERPL-SCNC: 99 MMOL/L (ref 98–107)
CO2 SERPL-SCNC: 26 MMOL/L (ref 22–29)
CO2 SERPL-SCNC: 27 MMOL/L (ref 22–29)
CREAT SERPL-MCNC: 0.4 MG/DL (ref 0.7–1.2)
CREAT SERPL-MCNC: 0.4 MG/DL (ref 0.7–1.2)
EOSINOPHIL # BLD: 0.1 K/UL (ref 0.05–0.5)
EOSINOPHILS RELATIVE PERCENT: 1 % (ref 0–6)
ERYTHROCYTE [DISTWIDTH] IN BLOOD BY AUTOMATED COUNT: 19.7 % (ref 11.5–15)
GFR, ESTIMATED: >90 ML/MIN/1.73M2
GFR, ESTIMATED: >90 ML/MIN/1.73M2
GLUCOSE SERPL-MCNC: 105 MG/DL (ref 74–99)
GLUCOSE SERPL-MCNC: 120 MG/DL (ref 74–99)
HCT VFR BLD AUTO: 31.6 % (ref 37–54)
HGB BLD-MCNC: 10 G/DL (ref 12.5–16.5)
LYMPHOCYTES NFR BLD: 0.31 K/UL (ref 1.5–4)
LYMPHOCYTES RELATIVE PERCENT: 3 % (ref 20–42)
MCH RBC QN AUTO: 27.2 PG (ref 26–35)
MCHC RBC AUTO-ENTMCNC: 31.6 G/DL (ref 32–34.5)
MCV RBC AUTO: 85.9 FL (ref 80–99.9)
METAMYELOCYTES ABSOLUTE COUNT: 0.1 K/UL (ref 0–0.12)
METAMYELOCYTES: 1 % (ref 0–1)
MONOCYTES NFR BLD: 0.51 K/UL (ref 0.1–0.95)
MONOCYTES NFR BLD: 4 % (ref 2–12)
MYELOCYTES ABSOLUTE COUNT: 0.1 K/UL
MYELOCYTES: 1 %
NEUTROPHILS NFR BLD: 90 % (ref 43–80)
NEUTS SEG NFR BLD: 10.58 K/UL (ref 1.8–7.3)
PLATELET # BLD AUTO: 188 K/UL (ref 130–450)
PMV BLD AUTO: 9 FL (ref 7–12)
POTASSIUM SERPL-SCNC: 4.6 MMOL/L (ref 3.5–5.1)
POTASSIUM SERPL-SCNC: 6 MMOL/L (ref 3.5–5.1)
RBC # BLD AUTO: 3.68 M/UL (ref 3.8–5.8)
RBC # BLD: ABNORMAL 10*6/UL
SODIUM SERPL-SCNC: 136 MMOL/L (ref 136–145)
SODIUM SERPL-SCNC: 136 MMOL/L (ref 136–145)
WBC OTHER # BLD: 11.7 K/UL (ref 4.5–11.5)

## 2025-06-19 PROCEDURE — 80048 BASIC METABOLIC PNL TOTAL CA: CPT

## 2025-06-19 PROCEDURE — 6370000000 HC RX 637 (ALT 250 FOR IP)

## 2025-06-19 PROCEDURE — 99232 SBSQ HOSP IP/OBS MODERATE 35: CPT | Performed by: FAMILY MEDICINE

## 2025-06-19 PROCEDURE — 2500000003 HC RX 250 WO HCPCS

## 2025-06-19 PROCEDURE — 6360000002 HC RX W HCPCS: Performed by: PHYSICIAN ASSISTANT

## 2025-06-19 PROCEDURE — 6370000000 HC RX 637 (ALT 250 FOR IP): Performed by: NURSE PRACTITIONER

## 2025-06-19 PROCEDURE — 85025 COMPLETE CBC W/AUTO DIFF WBC: CPT

## 2025-06-19 PROCEDURE — 6360000002 HC RX W HCPCS: Performed by: NURSE PRACTITIONER

## 2025-06-19 PROCEDURE — 1200000000 HC SEMI PRIVATE

## 2025-06-19 PROCEDURE — 6360000002 HC RX W HCPCS

## 2025-06-19 RX ADMIN — HYDROMORPHONE HYDROCHLORIDE 1 MG: 1 INJECTION, SOLUTION INTRAMUSCULAR; INTRAVENOUS; SUBCUTANEOUS at 04:15

## 2025-06-19 RX ADMIN — Medication 1000 UNITS: at 07:33

## 2025-06-19 RX ADMIN — SENNOSIDES AND DOCUSATE SODIUM 2 TABLET: 8.6; 5 TABLET ORAL at 20:31

## 2025-06-19 RX ADMIN — HYDROMORPHONE HYDROCHLORIDE 1 MG: 1 INJECTION, SOLUTION INTRAMUSCULAR; INTRAVENOUS; SUBCUTANEOUS at 18:31

## 2025-06-19 RX ADMIN — HYDROMORPHONE HYDROCHLORIDE 1 MG: 1 INJECTION, SOLUTION INTRAMUSCULAR; INTRAVENOUS; SUBCUTANEOUS at 00:05

## 2025-06-19 RX ADMIN — OXYCODONE HYDROCHLORIDE 60 MG: 20 TABLET, FILM COATED, EXTENDED RELEASE ORAL at 07:32

## 2025-06-19 RX ADMIN — DEXAMETHASONE 4 MG: 4 TABLET ORAL at 14:33

## 2025-06-19 RX ADMIN — HYDROMORPHONE HYDROCHLORIDE 6 MG: 4 TABLET ORAL at 15:27

## 2025-06-19 RX ADMIN — HYDROMORPHONE HYDROCHLORIDE 6 MG: 4 TABLET ORAL at 03:15

## 2025-06-19 RX ADMIN — DEXAMETHASONE 4 MG: 4 TABLET ORAL at 20:31

## 2025-06-19 RX ADMIN — FERROUS SULFATE TAB 325 MG (65 MG ELEMENTAL FE) 325 MG: 325 (65 FE) TAB at 07:33

## 2025-06-19 RX ADMIN — DEXAMETHASONE 4 MG: 4 TABLET ORAL at 05:15

## 2025-06-19 RX ADMIN — SODIUM CHLORIDE, PRESERVATIVE FREE 10 ML: 5 INJECTION INTRAVENOUS at 07:33

## 2025-06-19 RX ADMIN — HYDROMORPHONE HYDROCHLORIDE 1 MG: 1 INJECTION, SOLUTION INTRAMUSCULAR; INTRAVENOUS; SUBCUTANEOUS at 20:29

## 2025-06-19 RX ADMIN — HYDROMORPHONE HYDROCHLORIDE 6 MG: 4 TABLET ORAL at 23:37

## 2025-06-19 RX ADMIN — METHOCARBAMOL 1500 MG: 750 TABLET ORAL at 14:33

## 2025-06-19 RX ADMIN — HYDROMORPHONE HYDROCHLORIDE 6 MG: 4 TABLET ORAL at 11:44

## 2025-06-19 RX ADMIN — HYDROMORPHONE HYDROCHLORIDE 6 MG: 4 TABLET ORAL at 09:28

## 2025-06-19 RX ADMIN — HYDROMORPHONE HYDROCHLORIDE 6 MG: 4 TABLET ORAL at 21:35

## 2025-06-19 RX ADMIN — METHOCARBAMOL 1500 MG: 750 TABLET ORAL at 07:33

## 2025-06-19 RX ADMIN — HYDROMORPHONE HYDROCHLORIDE 1 MG: 1 INJECTION, SOLUTION INTRAMUSCULAR; INTRAVENOUS; SUBCUTANEOUS at 14:33

## 2025-06-19 RX ADMIN — HYDROMORPHONE HYDROCHLORIDE 6 MG: 4 TABLET ORAL at 17:25

## 2025-06-19 RX ADMIN — HYDROMORPHONE HYDROCHLORIDE 1 MG: 1 INJECTION, SOLUTION INTRAMUSCULAR; INTRAVENOUS; SUBCUTANEOUS at 12:37

## 2025-06-19 RX ADMIN — PANTOPRAZOLE SODIUM 40 MG: 40 TABLET, DELAYED RELEASE ORAL at 05:15

## 2025-06-19 RX ADMIN — HYDROMORPHONE HYDROCHLORIDE 6 MG: 4 TABLET ORAL at 19:23

## 2025-06-19 RX ADMIN — SODIUM CHLORIDE, PRESERVATIVE FREE 10 ML: 5 INJECTION INTRAVENOUS at 20:33

## 2025-06-19 RX ADMIN — DOCUSATE SODIUM 100 MG: 100 CAPSULE, LIQUID FILLED ORAL at 07:33

## 2025-06-19 RX ADMIN — OXYCODONE HYDROCHLORIDE 60 MG: 20 TABLET, FILM COATED, EXTENDED RELEASE ORAL at 20:31

## 2025-06-19 RX ADMIN — HYDROMORPHONE HYDROCHLORIDE 1 MG: 1 INJECTION, SOLUTION INTRAMUSCULAR; INTRAVENOUS; SUBCUTANEOUS at 22:41

## 2025-06-19 RX ADMIN — HYDROMORPHONE HYDROCHLORIDE 1 MG: 1 INJECTION, SOLUTION INTRAMUSCULAR; INTRAVENOUS; SUBCUTANEOUS at 08:24

## 2025-06-19 RX ADMIN — HYDROMORPHONE HYDROCHLORIDE 1 MG: 1 INJECTION, SOLUTION INTRAMUSCULAR; INTRAVENOUS; SUBCUTANEOUS at 06:16

## 2025-06-19 RX ADMIN — HYDROMORPHONE HYDROCHLORIDE 1 MG: 1 INJECTION, SOLUTION INTRAMUSCULAR; INTRAVENOUS; SUBCUTANEOUS at 10:39

## 2025-06-19 RX ADMIN — POLYETHYLENE GLYCOL 3350 17 G: 17 POWDER, FOR SOLUTION ORAL at 07:34

## 2025-06-19 RX ADMIN — HYDROMORPHONE HYDROCHLORIDE 1 MG: 1 INJECTION, SOLUTION INTRAMUSCULAR; INTRAVENOUS; SUBCUTANEOUS at 02:07

## 2025-06-19 RX ADMIN — HYDROMORPHONE HYDROCHLORIDE 6 MG: 4 TABLET ORAL at 01:18

## 2025-06-19 RX ADMIN — ENOXAPARIN SODIUM 40 MG: 100 INJECTION SUBCUTANEOUS at 07:33

## 2025-06-19 RX ADMIN — HYDROMORPHONE HYDROCHLORIDE 6 MG: 4 TABLET ORAL at 07:14

## 2025-06-19 RX ADMIN — METHOCARBAMOL 1500 MG: 750 TABLET ORAL at 20:31

## 2025-06-19 RX ADMIN — HYDROMORPHONE HYDROCHLORIDE 6 MG: 4 TABLET ORAL at 05:14

## 2025-06-19 RX ADMIN — DOCUSATE SODIUM 100 MG: 100 CAPSULE, LIQUID FILLED ORAL at 20:31

## 2025-06-19 RX ADMIN — HYDROMORPHONE HYDROCHLORIDE 1 MG: 1 INJECTION, SOLUTION INTRAMUSCULAR; INTRAVENOUS; SUBCUTANEOUS at 16:36

## 2025-06-19 RX ADMIN — SENNOSIDES AND DOCUSATE SODIUM 2 TABLET: 8.6; 5 TABLET ORAL at 07:33

## 2025-06-19 ASSESSMENT — PAIN SCALES - GENERAL
PAINLEVEL_OUTOF10: 9
PAINLEVEL_OUTOF10: 9
PAINLEVEL_OUTOF10: 10
PAINLEVEL_OUTOF10: 8
PAINLEVEL_OUTOF10: 10
PAINLEVEL_OUTOF10: 8
PAINLEVEL_OUTOF10: 10
PAINLEVEL_OUTOF10: 9
PAINLEVEL_OUTOF10: 10
PAINLEVEL_OUTOF10: 9
PAINLEVEL_OUTOF10: 10
PAINLEVEL_OUTOF10: 8
PAINLEVEL_OUTOF10: 10
PAINLEVEL_OUTOF10: 9
PAINLEVEL_OUTOF10: 10
PAINLEVEL_OUTOF10: 9
PAINLEVEL_OUTOF10: 9
PAINLEVEL_OUTOF10: 8
PAINLEVEL_OUTOF10: 10
PAINLEVEL_OUTOF10: 8
PAINLEVEL_OUTOF10: 9
PAINLEVEL_OUTOF10: 10
PAINLEVEL_OUTOF10: 9
PAINLEVEL_OUTOF10: 9
PAINLEVEL_OUTOF10: 8
PAINLEVEL_OUTOF10: 10
PAINLEVEL_OUTOF10: 9
PAINLEVEL_OUTOF10: 10

## 2025-06-19 ASSESSMENT — PAIN DESCRIPTION - DESCRIPTORS
DESCRIPTORS: THROBBING;STABBING;DISCOMFORT
DESCRIPTORS: SHARP;THROBBING;DISCOMFORT
DESCRIPTORS: THROBBING;STABBING;DISCOMFORT
DESCRIPTORS: STABBING;THROBBING
DESCRIPTORS: STABBING;THROBBING;DISCOMFORT
DESCRIPTORS: THROBBING;STABBING;DISCOMFORT
DESCRIPTORS: THROBBING;DISCOMFORT;STABBING

## 2025-06-19 ASSESSMENT — PAIN DESCRIPTION - LOCATION
LOCATION: BACK;HIP
LOCATION: BACK;HIP
LOCATION: GENERALIZED;BACK
LOCATION: BACK;GENERALIZED
LOCATION: BACK;GENERALIZED
LOCATION: GENERALIZED;BACK
LOCATION: BACK;GENERALIZED
LOCATION: BACK;GENERALIZED
LOCATION: BACK
LOCATION: BACK;RIB CAGE
LOCATION: BACK
LOCATION: BACK;RIB CAGE;HIP
LOCATION: BACK
LOCATION: BACK;GENERALIZED
LOCATION: BACK
LOCATION: BACK;GENERALIZED
LOCATION: BACK
LOCATION: GENERALIZED;BACK
LOCATION: BACK;GENERALIZED

## 2025-06-19 ASSESSMENT — PAIN SCALES - WONG BAKER
WONGBAKER_NUMERICALRESPONSE: HURTS WHOLE LOT

## 2025-06-19 ASSESSMENT — PAIN DESCRIPTION - ORIENTATION
ORIENTATION: LEFT;RIGHT;LOWER
ORIENTATION: RIGHT;LEFT;LOWER;INNER;MID
ORIENTATION: RIGHT;LEFT;INNER;LOWER;MID;OUTER
ORIENTATION: RIGHT;LEFT;INNER;LOWER;MID

## 2025-06-19 ASSESSMENT — PAIN DESCRIPTION - ONSET: ONSET: ON-GOING

## 2025-06-19 ASSESSMENT — PAIN DESCRIPTION - PAIN TYPE: TYPE: CHRONIC PAIN

## 2025-06-19 ASSESSMENT — PAIN DESCRIPTION - FREQUENCY: FREQUENCY: CONTINUOUS

## 2025-06-19 NOTE — PLAN OF CARE
Problem: Safety - Adult  Goal: Free from fall injury  6/18/2025 2318 by Nanette Barajas RN  Outcome: Progressing     Problem: Pain  Goal: Verbalizes/displays adequate comfort level or baseline comfort level  6/18/2025 2318 by Nanette Barajas RN  Outcome: Progressing     Problem: Skin/Tissue Integrity  Goal: Skin integrity remains intact  Description: 1.  Monitor for areas of redness and/or skin breakdown2.  Assess vascular access sites hourly3.  Every 4-6 hours minimum:  Change oxygen saturation probe site4.  Every 4-6 hours:  If on nasal continuous positive airway pressure, respiratory therapy assess nares and determine need for appliance change or resting period  6/18/2025 2318 by Nanette Barajas RN  Outcome: Progressing     Problem: ABCDS Injury Assessment  Goal: Absence of physical injury  6/18/2025 2318 by Nanette Barajas RN  Outcome: Progressing     Problem: Nutrition Deficit:  Goal: Optimize nutritional status  6/18/2025 2318 by Nanette Barajas RN  Outcome: Progressing

## 2025-06-19 NOTE — PROGRESS NOTES
Spoke to Laura with pain management. They do no have anyone in there service that offers pain pumps with in all three [facility]. He said that the only physician that did offer it retired and now the closest option is [facility] pain clinic which only offers outpatient procedures. The next option would be [facility] or . Paulette from Case management has already spoken to patient and is reaching out to access center to expand search for facility.

## 2025-06-19 NOTE — PROGRESS NOTES
[facility] - Family Medicine Inpatient   Resident Progress Note    S:  Hospital day: 13   Brief Synopsis: Joseph Bond is a 44 y.o. male with a PMH of prostate cancer on Nubeqa who presents with intractable pain. Was admitted 5/29 to 6/5/2025 for same complaint. During that admission, palliative care was consulted and they increased his home regimen based on OME of the PCA. Palliative was consulted this admission, dilaudid PCA pump was restarted in addition to his adjusted PO medications from last admission. While on the PCA pump, his pain regimen included Oxycontin 60 mg BID, Dilaudid 6 mg PO Q2H PRN for breakthrough pain, and Robaxin 750 mg QID, and Decadron 4 mg Q6H. PCA pump was discontinued and palliative added IV Dilaudid 1 mg Q2H PRN for pain crisis. Radiation Oncology was consulted regarding consideration for inpatient radiation, which is felt to be not effective in resolving compression fracture type pain.  Discussion was had with patient regarding therapeutic benefits of starting methadone, but he refused it multiple times solely based on the fact that it was an oral medication.  Due to intractable pain, patient was requesting an intrathecal pain pump; service requiring an interventional pain specialist that is not present in the hospital.  He was agreeable to transfer to [facility].  Transfer was initiated on 6/17 and he was accepted.  Currently awaiting bed availability.    Overnight/interim:   Received 11 doses of PO dilaudid and 11 doses of IV dilaudid over past 24 hours.  No acute events overnight  Patient was seen at bedside this morning in no acute distress. Rates pain 10/10 this morning. Denies having fever, chills, CP/SOB, AP/N/V/D. Last BM was day before yesterday.  [facility] transfer initiated on 6/17, patient accepted, awaiting bed availability.    Cont meds:    sodium chloride 50 mL/hr at 06/07/25 2028     Scheduled meds:    dexAMETHasone  4 mg Oral 3 times per day    pantoprazole  40 mg Oral QAM AC

## 2025-06-19 NOTE — CARE COORDINATION
Spoke with Dr. Damon and Lara anand/ Palliative Medicine regarding [facility] transfer (currently awaiting a bed).  Patient has [place] Medicaid, will look at additional facilities in [place] that are able to provide the needed service for pain management.  Met with patient at the bedside and he is agreeable to this plan.  Called Access center, spoke with Yadira RN and requested to expand transfer to [facility], , [facility], [facility] and [facility].  ABHISHEK Brooke updated.

## 2025-06-19 NOTE — PLAN OF CARE
Problem: Safety - Adult  Goal: Free from fall injury  6/19/2025 0736 by Lydia Cat RN  Outcome: Progressing  6/18/2025 2318 by Nanette Barajas RN  Outcome: Progressing     Problem: Pain  Goal: Verbalizes/displays adequate comfort level or baseline comfort level  6/19/2025 0736 by Lydia Cat RN  Outcome: Progressing  6/18/2025 2318 by Nanette Barajas RN  Outcome: Progressing     Problem: Skin/Tissue Integrity  Goal: Skin integrity remains intact  Description: 1.  Monitor for areas of redness and/or skin breakdown2.  Assess vascular access sites hourly3.  Every 4-6 hours minimum:  Change oxygen saturation probe site4.  Every 4-6 hours:  If on nasal continuous positive airway pressure, respiratory therapy assess nares and determine need for appliance change or resting period  6/19/2025 0736 by Lydia Cat RN  Outcome: Progressing  6/18/2025 2318 by Nanette Barajas RN  Outcome: Progressing     Problem: ABCDS Injury Assessment  Goal: Absence of physical injury  6/19/2025 0736 by Lydia Cat RN  Outcome: Progressing  6/18/2025 2318 by Nanette Barajas RN  Outcome: Progressing     Problem: Nutrition Deficit:  Goal: Optimize nutritional status  6/19/2025 0736 by Lydia Cat RN  Outcome: Progressing  6/18/2025 2318 by Nanette Barajas RN  Outcome: Progressing

## 2025-06-19 NOTE — PROGRESS NOTES
Comprehensive Nutrition Assessment    Type and Reason for Visit:  Reassess    Nutrition Recommendations/Plan:     1.) Continue Current Diet  2.) Continue Oral Nutrition Supplement       Malnutrition Assessment:  Malnutrition Status:  Moderate malnutrition (06/13/25 1204)    Context:  Chronic Illness     Findings of the 6 clinical characteristics of malnutrition:  Energy Intake:  75% or less estimated energy requirements for 1 month or longer (in setting of catabolic illness; increased nutrient needs)  Weight Loss:  No weight loss     Body Fat Loss:  Mild body fat loss Orbital   Muscle Mass Loss:  Mild muscle mass loss Clavicles (pectoralis & deltoids)  Fluid Accumulation:  No fluid accumulation    Strength:  Not Performed    Nutrition Assessment:    Pt status is stable pending transfer to [facility] for intrathecal pain pump. Admit w/ intractable pain in setting of prostate CA (dx 2024) w/ mets to spine +multiple compression fractures s/p palliative radiation/chemo 3/2025. PMHx opiod abuse 7 diverticulits. Pt meets criteria for Moderate Malnutrition. Po intake remains stable ~75% average meals. Can continue Ensure daily as ordered. Will follow    Nutrition Related Findings:    Pt A&Ox4, -I/O's 17L, no edema, active BS, noted constipation Wound Type: None       Current Nutrition Intake & Therapies:    Average Meal Intake: % (most meals)  Average Supplements Intake: None Ordered  ADULT DIET; Regular  ADULT ORAL NUTRITION SUPPLEMENT; Breakfast; Standard High Calorie/High Protein Oral Supplement    Anthropometric Measures:  Height: 170.2 cm (5' 7.01\")  Ideal Body Weight (IBW): 148 lbs (67 kg)       Current Body Weight: 80.1 kg (176 lb 9.4 oz) (recent admit wt from 6/2/25 as no updated CBW at this time), 119.3 % IBW. Weight Source: Other  Current BMI (kg/m2): 27.7  Usual Body Weight: 75.6 kg (166 lb 10.7 oz) (3/6, actual ; note wt of 87.7kg via BS on 9/26/24.)  % Weight Change (Calculated): 6  BMI Categories:

## 2025-06-20 VITALS
BODY MASS INDEX: 27.62 KG/M2 | OXYGEN SATURATION: 95 % | TEMPERATURE: 97.7 F | HEIGHT: 67 IN | HEART RATE: 89 BPM | DIASTOLIC BLOOD PRESSURE: 76 MMHG | WEIGHT: 176 LBS | RESPIRATION RATE: 16 BRPM | SYSTOLIC BLOOD PRESSURE: 114 MMHG

## 2025-06-20 LAB
ANION GAP SERPL CALCULATED.3IONS-SCNC: 11 MMOL/L (ref 7–16)
ANION GAP SERPL CALCULATED.3IONS-SCNC: 13 MMOL/L (ref 7–16)
BASOPHILS # BLD: 0.01 K/UL (ref 0–0.2)
BASOPHILS NFR BLD: 0 % (ref 0–2)
BUN SERPL-MCNC: 14 MG/DL (ref 6–20)
BUN SERPL-MCNC: 15 MG/DL (ref 6–20)
CALCIUM SERPL-MCNC: 8.3 MG/DL (ref 8.6–10)
CALCIUM SERPL-MCNC: 8.8 MG/DL (ref 8.6–10)
CHLORIDE SERPL-SCNC: 100 MMOL/L (ref 98–107)
CHLORIDE SERPL-SCNC: 99 MMOL/L (ref 98–107)
CO2 SERPL-SCNC: 23 MMOL/L (ref 22–29)
CO2 SERPL-SCNC: 26 MMOL/L (ref 22–29)
CREAT SERPL-MCNC: 0.4 MG/DL (ref 0.7–1.2)
CREAT SERPL-MCNC: 0.4 MG/DL (ref 0.7–1.2)
EOSINOPHIL # BLD: 0.02 K/UL (ref 0.05–0.5)
EOSINOPHILS RELATIVE PERCENT: 0 % (ref 0–6)
ERYTHROCYTE [DISTWIDTH] IN BLOOD BY AUTOMATED COUNT: 19.9 % (ref 11.5–15)
GFR, ESTIMATED: >90 ML/MIN/1.73M2
GFR, ESTIMATED: >90 ML/MIN/1.73M2
GLUCOSE SERPL-MCNC: 127 MG/DL (ref 74–99)
GLUCOSE SERPL-MCNC: 96 MG/DL (ref 74–99)
HCT VFR BLD AUTO: 30.6 % (ref 37–54)
HGB BLD-MCNC: 10 G/DL (ref 12.5–16.5)
IMM GRANULOCYTES # BLD AUTO: 0.39 K/UL (ref 0–0.58)
IMM GRANULOCYTES NFR BLD: 4 % (ref 0–5)
LYMPHOCYTES NFR BLD: 0.6 K/UL (ref 1.5–4)
LYMPHOCYTES RELATIVE PERCENT: 6 % (ref 20–42)
MCH RBC QN AUTO: 27.6 PG (ref 26–35)
MCHC RBC AUTO-ENTMCNC: 32.7 G/DL (ref 32–34.5)
MCV RBC AUTO: 84.5 FL (ref 80–99.9)
MONOCYTES NFR BLD: 0.6 K/UL (ref 0.1–0.95)
MONOCYTES NFR BLD: 6 % (ref 2–12)
NEUTROPHILS NFR BLD: 85 % (ref 43–80)
NEUTS SEG NFR BLD: 8.93 K/UL (ref 1.8–7.3)
PLATELET # BLD AUTO: 145 K/UL (ref 130–450)
PMV BLD AUTO: 9.6 FL (ref 7–12)
POTASSIUM SERPL-SCNC: 4.5 MMOL/L (ref 3.5–5.1)
POTASSIUM SERPL-SCNC: 6.2 MMOL/L (ref 3.5–5.1)
RBC # BLD AUTO: 3.62 M/UL (ref 3.8–5.8)
SODIUM SERPL-SCNC: 134 MMOL/L (ref 136–145)
SODIUM SERPL-SCNC: 137 MMOL/L (ref 136–145)
WBC OTHER # BLD: 10.6 K/UL (ref 4.5–11.5)

## 2025-06-20 PROCEDURE — 6370000000 HC RX 637 (ALT 250 FOR IP)

## 2025-06-20 PROCEDURE — 6370000000 HC RX 637 (ALT 250 FOR IP): Performed by: NURSE PRACTITIONER

## 2025-06-20 PROCEDURE — 6360000002 HC RX W HCPCS

## 2025-06-20 PROCEDURE — 6360000002 HC RX W HCPCS: Performed by: NURSE PRACTITIONER

## 2025-06-20 PROCEDURE — 2500000003 HC RX 250 WO HCPCS

## 2025-06-20 PROCEDURE — 6360000002 HC RX W HCPCS: Performed by: PHYSICIAN ASSISTANT

## 2025-06-20 PROCEDURE — 99232 SBSQ HOSP IP/OBS MODERATE 35: CPT

## 2025-06-20 PROCEDURE — 80048 BASIC METABOLIC PNL TOTAL CA: CPT

## 2025-06-20 PROCEDURE — 85025 COMPLETE CBC W/AUTO DIFF WBC: CPT

## 2025-06-20 RX ORDER — METHOCARBAMOL 750 MG/1
1500 TABLET, FILM COATED ORAL 3 TIMES DAILY
Qty: 60 TABLET | Refills: 0 | Status: SHIPPED | OUTPATIENT
Start: 2025-06-20 | End: 2025-06-30

## 2025-06-20 RX ORDER — DEXAMETHASONE 4 MG/1
4 TABLET ORAL EVERY 8 HOURS SCHEDULED
Qty: 30 TABLET | Refills: 0 | Status: SHIPPED | OUTPATIENT
Start: 2025-06-20 | End: 2025-06-30

## 2025-06-20 RX ORDER — PSEUDOEPHEDRINE HCL 30 MG
100 TABLET ORAL 2 TIMES DAILY
Qty: 30 CAPSULE | Refills: 0 | Status: SHIPPED | OUTPATIENT
Start: 2025-06-20

## 2025-06-20 RX ORDER — HYDROMORPHONE HYDROCHLORIDE 2 MG/1
6 TABLET ORAL
Qty: 30 TABLET | Refills: 0 | Status: SHIPPED | OUTPATIENT
Start: 2025-06-20 | End: 2025-07-04

## 2025-06-20 RX ADMIN — HYDROMORPHONE HYDROCHLORIDE 1 MG: 1 INJECTION, SOLUTION INTRAMUSCULAR; INTRAVENOUS; SUBCUTANEOUS at 04:41

## 2025-06-20 RX ADMIN — HYDROMORPHONE HYDROCHLORIDE 1 MG: 1 INJECTION, SOLUTION INTRAMUSCULAR; INTRAVENOUS; SUBCUTANEOUS at 10:41

## 2025-06-20 RX ADMIN — PANTOPRAZOLE SODIUM 40 MG: 40 TABLET, DELAYED RELEASE ORAL at 05:42

## 2025-06-20 RX ADMIN — POLYETHYLENE GLYCOL 3350 17 G: 17 POWDER, FOR SOLUTION ORAL at 07:57

## 2025-06-20 RX ADMIN — HYDROMORPHONE HYDROCHLORIDE 6 MG: 4 TABLET ORAL at 01:40

## 2025-06-20 RX ADMIN — HYDROMORPHONE HYDROCHLORIDE 1 MG: 1 INJECTION, SOLUTION INTRAMUSCULAR; INTRAVENOUS; SUBCUTANEOUS at 02:42

## 2025-06-20 RX ADMIN — METHOCARBAMOL 1500 MG: 750 TABLET ORAL at 13:37

## 2025-06-20 RX ADMIN — HYDROMORPHONE HYDROCHLORIDE 1 MG: 1 INJECTION, SOLUTION INTRAMUSCULAR; INTRAVENOUS; SUBCUTANEOUS at 13:38

## 2025-06-20 RX ADMIN — OXYCODONE HYDROCHLORIDE 60 MG: 20 TABLET, FILM COATED, EXTENDED RELEASE ORAL at 07:57

## 2025-06-20 RX ADMIN — SODIUM CHLORIDE, PRESERVATIVE FREE 10 ML: 5 INJECTION INTRAVENOUS at 07:58

## 2025-06-20 RX ADMIN — Medication 1000 UNITS: at 07:57

## 2025-06-20 RX ADMIN — HYDROMORPHONE HYDROCHLORIDE 1 MG: 1 INJECTION, SOLUTION INTRAMUSCULAR; INTRAVENOUS; SUBCUTANEOUS at 06:44

## 2025-06-20 RX ADMIN — HYDROMORPHONE HYDROCHLORIDE 6 MG: 4 TABLET ORAL at 03:45

## 2025-06-20 RX ADMIN — FERROUS SULFATE TAB 325 MG (65 MG ELEMENTAL FE) 325 MG: 325 (65 FE) TAB at 07:57

## 2025-06-20 RX ADMIN — HYDROMORPHONE HYDROCHLORIDE 1 MG: 1 INJECTION, SOLUTION INTRAMUSCULAR; INTRAVENOUS; SUBCUTANEOUS at 08:44

## 2025-06-20 RX ADMIN — HYDROMORPHONE HYDROCHLORIDE 6 MG: 4 TABLET ORAL at 05:42

## 2025-06-20 RX ADMIN — HYDROMORPHONE HYDROCHLORIDE 6 MG: 4 TABLET ORAL at 16:28

## 2025-06-20 RX ADMIN — DEXAMETHASONE 4 MG: 4 TABLET ORAL at 13:37

## 2025-06-20 RX ADMIN — SENNOSIDES AND DOCUSATE SODIUM 2 TABLET: 8.6; 5 TABLET ORAL at 07:57

## 2025-06-20 RX ADMIN — HYDROMORPHONE HYDROCHLORIDE 1 MG: 1 INJECTION, SOLUTION INTRAMUSCULAR; INTRAVENOUS; SUBCUTANEOUS at 00:39

## 2025-06-20 RX ADMIN — METHOCARBAMOL 1500 MG: 750 TABLET ORAL at 07:57

## 2025-06-20 RX ADMIN — DOCUSATE SODIUM 100 MG: 100 CAPSULE, LIQUID FILLED ORAL at 07:57

## 2025-06-20 RX ADMIN — DEXAMETHASONE 4 MG: 4 TABLET ORAL at 05:42

## 2025-06-20 RX ADMIN — HYDROMORPHONE HYDROCHLORIDE 6 MG: 4 TABLET ORAL at 09:49

## 2025-06-20 RX ADMIN — ENOXAPARIN SODIUM 40 MG: 100 INJECTION SUBCUTANEOUS at 07:57

## 2025-06-20 RX ADMIN — HYDROMORPHONE HYDROCHLORIDE 1 MG: 1 INJECTION, SOLUTION INTRAMUSCULAR; INTRAVENOUS; SUBCUTANEOUS at 15:31

## 2025-06-20 RX ADMIN — HYDROMORPHONE HYDROCHLORIDE 6 MG: 4 TABLET ORAL at 07:56

## 2025-06-20 RX ADMIN — HYDROMORPHONE HYDROCHLORIDE 6 MG: 4 TABLET ORAL at 14:39

## 2025-06-20 RX ADMIN — HYDROMORPHONE HYDROCHLORIDE 6 MG: 4 TABLET ORAL at 12:35

## 2025-06-20 ASSESSMENT — PAIN DESCRIPTION - DESCRIPTORS
DESCRIPTORS: SQUEEZING;THROBBING;STABBING
DESCRIPTORS: CRUSHING;SHOOTING;SHARP
DESCRIPTORS: THROBBING;SHOOTING;STABBING
DESCRIPTORS: ACHING;SORE;DISCOMFORT

## 2025-06-20 ASSESSMENT — PAIN SCALES - WONG BAKER
WONGBAKER_NUMERICALRESPONSE: HURTS WHOLE LOT

## 2025-06-20 ASSESSMENT — PAIN DESCRIPTION - LOCATION
LOCATION: BACK;RIB CAGE
LOCATION: BACK;HIP;GENERALIZED
LOCATION: BACK;GENERALIZED
LOCATION: BACK;GENERALIZED
LOCATION: BACK;RIB CAGE;NECK
LOCATION: BACK;RIB CAGE;HIP
LOCATION: BACK;PELVIS;RIB CAGE
LOCATION: BACK;GENERALIZED
LOCATION: GENERALIZED;BACK
LOCATION: GENERALIZED;BACK
LOCATION: BACK;HIP;RIB CAGE
LOCATION: GENERALIZED;BACK
LOCATION: RIB CAGE;BACK
LOCATION: GENERALIZED
LOCATION: BACK
LOCATION: BACK;RIB CAGE;HIP

## 2025-06-20 ASSESSMENT — PAIN SCALES - GENERAL
PAINLEVEL_OUTOF10: 9
PAINLEVEL_OUTOF10: 7
PAINLEVEL_OUTOF10: 9
PAINLEVEL_OUTOF10: 10
PAINLEVEL_OUTOF10: 10
PAINLEVEL_OUTOF10: 9
PAINLEVEL_OUTOF10: 9
PAINLEVEL_OUTOF10: 10
PAINLEVEL_OUTOF10: 9
PAINLEVEL_OUTOF10: 10
PAINLEVEL_OUTOF10: 9

## 2025-06-20 ASSESSMENT — PAIN DESCRIPTION - ORIENTATION
ORIENTATION: RIGHT;LEFT
ORIENTATION: LEFT
ORIENTATION: RIGHT;LEFT;MID;LOWER
ORIENTATION: RIGHT;LEFT
ORIENTATION: RIGHT;LEFT;INNER;LOWER;MID;OUTER;UPPER
ORIENTATION: RIGHT;LEFT

## 2025-06-20 ASSESSMENT — PAIN DESCRIPTION - PAIN TYPE: TYPE: CHRONIC PAIN

## 2025-06-20 NOTE — PLAN OF CARE
Spoke to  and nursing staff.  Discharge orders placed for transfer to [facility] today, with pickup time for 3:30 PM.  However, repeat BMP ordered from this morning was not collected.  Patient currently has a potassium of 6.2 as result of a hemolyzed specimen from this morning.  Repeat BMP was placed again and was collected just a few moments ago per bedside nurse.  Informed  and nursing staff that patient can not leave until his potassium results and is WNL.    Electronically signed by Apryl Pleitez MD on 6/20/2025 at 2:45 PM

## 2025-06-20 NOTE — DISCHARGE SUMMARY
Discharge Summary    Joseph Bond  :  1980  MRN:  [id number]    Admit date:  2025  Discharge date:  2025    Admitting Physician:  Kofi Hernandez MD    Discharge Diagnoses:    Patient Active Problem List   Diagnosis    Retroperitoneal lymphadenopathy    Chronic midline low back pain with left-sided sciatica    Elevated PSA    Urinary hesitancy    Pelvic pain    Osteopenia of lumbar spine    Prostate cancer metastatic to bone (HCC)    Hemorrhoids    Anxiety    Cancer, metastatic to bone (HCC)    Primary prostate cancer with metastasis from prostate to other site (HCC)    Anemia, unspecified type    Cancer-related pain    Palliative care encounter    Metastatic cancer to bone (HCC)    Intractable pain    Prostate cancer (HCC)    Moderate protein-calorie malnutrition       Admission Condition:  poor    Discharged Condition:  stable    Hospital Course:  Joseph Bond is a 44 y.o. male with a PMHx of prostate cancer on Nubeqa who presented to the ED with intractable pain. He was admitted  to 2025 for the same complaint. During that admission, palliative care was consulted and they increased his home regimen based on OME of the PCA. Palliative was consulted this admission, dilaudid PCA pump was restarted in addition to his adjusted PO medications from last admission. While on the PCA pump, his pain regimen included Oxycontin 60 mg BID, Dilaudid 6 mg PO Q2H PRN for breakthrough pain, and Robaxin 750 mg QID, and Decadron 4 mg Q6H. PCA pump was discontinued and palliative added IV Dilaudid 1 mg Q2H PRN for pain crisis. Radiation Oncology was consulted regarding consideration for inpatient radiation, which was felt to be not effective in resolving his compression fracture type pain.  Discussion was had with patient regarding therapeutic benefits of starting methadone, but he refused it multiple times solely based on the fact that it was an oral medication, stating that he does not experience any

## 2025-06-20 NOTE — DISCHARGE INSTR - COC
Continuity of Care Form    Patient Name: Joseph Bond   :  1980  MRN:  [id number]    Admit date:  2025  Discharge date:  2025    Code Status Order: Full Code   Advance Directives:     Admitting Physician:  Kofi Hernandez MD  PCP: Ty Shaikh MD    Discharging Nurse: Lydia BALDERAS  Discharging Hospital Unit/Room#: 8413/8413-A  Discharging Unit Phone Number: [phone]    Emergency Contact:   Extended Emergency Contact Information  Primary Emergency Contact: Margarita Bond  Home Phone: [phone]  Relation: Brother/Sister   needed? No  Secondary Emergency Contact: BondJeri           [place]  Home Phone: [phone]  Mobile Phone: [phone]  Relation: Brother/Sister    Past Surgical History:  Past Surgical History:   Procedure Laterality Date    BRONCHOSCOPY N/A 2024    BRONCHOSCOPY ENDOBRONCHIAL ULTRASOUND FINE NEEDLE ASPIRATION performed by Ed Cornejo DO at [facility] ENDOSCOPY    BRONCHOSCOPY  2024    BRONCHOSCOPY DIAGNOSTIC OR CELL WASH ONLY performed by Ed Cornejo DO at [facility] ENDOSCOPY    PORT SURGERY N/A 2024    MEDIPORT INSERTION performed by Felipe Washington MD at [facility] OR       Immunization History:     There is no immunization history on file for this patient.    Active Problems:  Patient Active Problem List   Diagnosis Code    Retroperitoneal lymphadenopathy R59.0    Chronic midline low back pain with left-sided sciatica M54.42, G89.29    Elevated PSA R97.20    Urinary hesitancy R39.11    Pelvic pain R10.2    Osteopenia of lumbar spine M85.88    Prostate cancer metastatic to bone (HCC) C61, C79.51    Hemorrhoids K64.9    Anxiety F41.9    Cancer, metastatic to bone (HCC) C79.51    Primary prostate cancer with metastasis from prostate to other site (HCC) C61    Anemia, unspecified type D64.9    Cancer-related pain G89.3    Palliative care encounter Z51.5    Metastatic cancer to bone (HCC) C79.51

## 2025-06-20 NOTE — PLAN OF CARE
Problem: Safety - Adult  Goal: Free from fall injury  Outcome: Progressing     Problem: Pain  Goal: Verbalizes/displays adequate comfort level or baseline comfort level  Outcome: Progressing     Problem: Skin/Tissue Integrity  Goal: Skin integrity remains intact  Description: 1.  Monitor for areas of redness and/or skin breakdown2.  Assess vascular access sites hourly3.  Every 4-6 hours minimum:  Change oxygen saturation probe site4.  Every 4-6 hours:  If on nasal continuous positive airway pressure, respiratory therapy assess nares and determine need for appliance change or resting period  Outcome: Progressing     Problem: ABCDS Injury Assessment  Goal: Absence of physical injury  Outcome: Progressing     Problem: Nutrition Deficit:  Goal: Optimize nutritional status  Outcome: Progressing  Flowsheets (Taken 6/19/2025 1402 by Debbie Hurd, RD, LD)  Nutrient intake appropriate for improving, restoring, or maintaining nutritional needs: Recommend appropriate diets, oral nutritional supplements, and vitamin/mineral supplements

## 2025-06-20 NOTE — CARE COORDINATION
CM update note.  Patient has a bed at the [facility] main.  Accepting doctor is Dr Min.  Patient will go to bed G70-1.  Nurse to nurse number is [phone].  Transport has been set up via stretcher with [facility].  ETA for  is 1530.  Patient updated on room number.  He will notify his family.  Ambulance form with envelope placed on the soft chart.  Family med resident notified and will place discharge orders.  Patient needs repeat potassium prior to leaving.  Nursing to draw now.  Edwardo Leiva RN -[phone].

## 2025-06-20 NOTE — DISCHARGE INSTRUCTIONS
=====================================  [facility]  DISCHARGE INSTRUCTIONS  =====================================    Take your medications as directed in this summary    Future scheduled appointments are listed below or recommended appointments are mentioned above.    Future Appointments   Date Time Provider Department Center   9/11/2025  1:00 PM Jony Ortega DO [facility]       Call to confirm or schedule your appointment, as soon as possible and/or if there are any appointment changes or other issues.    It is important that you follow up for better monitoring of the reason of your hospitalization. Follow up with the specialists that saw you during your stay as well. If you have any questions call your PCP at [phone].    Once discharged from [facility], you can :     Return to work : Yes, you may return to work  Activity : As tolerated  Stairs : As tolerated  Exercise : As tolerated  Lifting : As tolerated   Sexual activity : Yes  Driving : With seat belt on. NO driving on narcotic pain medication if prescribed   Medications : Always take your medications as prescribed  Wound Care: none needed  Diet : You are asked to make an attempt to improve diet and exercise patterns to aid in medical management of your medical condition/problem.     Call [facility] with any further questions. Return to Emergency Department with any worsening of your condition and/or fever greater than 101 degrees, new weakness, shortness of breath or chest pain.

## 2025-06-20 NOTE — PLAN OF CARE
Problem: Safety - Adult  Goal: Free from fall injury  6/20/2025 0802 by Lydia Cat RN  Outcome: Progressing  6/20/2025 0047 by Nanette Barajas RN  Outcome: Progressing     Problem: Pain  Goal: Verbalizes/displays adequate comfort level or baseline comfort level  6/20/2025 0802 by Lydia Cat RN  Outcome: Progressing  6/20/2025 0047 by Nanette Barajas RN  Outcome: Progressing     Problem: Skin/Tissue Integrity  Goal: Skin integrity remains intact  Description: 1.  Monitor for areas of redness and/or skin breakdown2.  Assess vascular access sites hourly3.  Every 4-6 hours minimum:  Change oxygen saturation probe site4.  Every 4-6 hours:  If on nasal continuous positive airway pressure, respiratory therapy assess nares and determine need for appliance change or resting period  6/20/2025 0802 by Lydia Cat RN  Outcome: Progressing  6/20/2025 0047 by Nanette Barajas RN  Outcome: Progressing     Problem: ABCDS Injury Assessment  Goal: Absence of physical injury  6/20/2025 0802 by Lydia Cat RN  Outcome: Progressing  6/20/2025 0047 by Nanette Barajas RN  Outcome: Progressing     Problem: Nutrition Deficit:  Goal: Optimize nutritional status  6/20/2025 0802 by Lydia Cat RN  Outcome: Progressing  6/20/2025 0047 by Nanette Barajas RN  Outcome: Progressing  Flowsheets (Taken 6/19/2025 1402 by Debbie Hurd, RD, LD)  Nutrient intake appropriate for improving, restoring, or maintaining nutritional needs: Recommend appropriate diets, oral nutritional supplements, and vitamin/mineral supplements

## 2025-06-20 NOTE — PROGRESS NOTES
[facility] - Family Medicine Inpatient   Resident Progress Note    S:  Hospital day: 14   Brief Synopsis: Joseph Bond is a 44 y.o. male with a PMH of prostate cancer on Nubeqa who presents with intractable pain. Was admitted 5/29 to 6/5/2025 for same complaint. During that admission, palliative care was consulted and they increased his home regimen based on OME of the PCA. Palliative was consulted this admission, dilaudid PCA pump was restarted in addition to his adjusted PO medications from last admission. While on the PCA pump, his pain regimen included Oxycontin 60 mg BID, Dilaudid 6 mg PO Q2H PRN for breakthrough pain, and Robaxin 750 mg QID, and Decadron 4 mg Q6H. PCA pump was discontinued and palliative added IV Dilaudid 1 mg Q2H PRN for pain crisis. Radiation Oncology was consulted regarding consideration for inpatient radiation, which is felt to be not effective in resolving compression fracture type pain.  Discussion was had with patient regarding therapeutic benefits of starting methadone, but he refused it multiple times solely based on the fact that it was an oral medication.  Due to intractable pain, patient was requesting an intrathecal pain pump; service requiring an interventional pain specialist that is not present in the hospital.  He was agreeable to transfer to [facility].  Transfer was initiated on 6/17 and he was accepted.  Currently awaiting bed availability.    Overnight/interim:   [facility] declined transfer.  Increasing uncontrolled pain overnight, declined Gabapentin, Lyrica or Tylenol.   Received 12 doses of PO dilaudid and 12 doses of IV dilaudid over past 24 hours.  Patient was seen at bedside this morning in no acute distress. Rates pain 9/10 this morning. Requesting dilaudid PCA. Denies having fever, chills, CP/SOB, AP/N/V/D. Last BM was last night.  [facility] transfer initiated on 6/17, patient accepted, awaiting bed availability.    Cont meds:    sodium chloride 50 mL/hr at 06/07/25 2028

## 2025-06-24 ENCOUNTER — TELEPHONE (OUTPATIENT)
Dept: RADIATION ONCOLOGY | Age: 45
End: 2025-06-24

## 2025-06-24 NOTE — PROGRESS NOTES
Physician Progress Note      PATIENT:               BRIAN ANTHONY  CSN #:                  [id number]  :                       1980  ADMIT DATE:       2025 2:09 PM  DISCH DATE:        2025 5:33 PM  RESPONDING  PROVIDER #:        RAKESH SELF          QUERY TEXT:    Please clarify the patient?s nutritional status:    The clinical indicators include:  -pt admitted with intractable pain, hx of prostate cancer and mets (H/P    Dr. Phelps)  -RD assessment:  Moderate malnutrition in the context of chronic illness with   75% or less estimated energy requirements for 1 month or longer, mild body fat   loss/mild muscle mass loss (RD assessment )  -RD assessment, ONS (RD assessment )  Options provided:  -- Protein calorie malnutrition moderate  -- Other - I will add my own diagnosis  -- Disagree - Not applicable / Not valid  -- Disagree - Clinically unable to determine / Unknown  -- Refer to Clinical Documentation Reviewer    PROVIDER RESPONSE TEXT:    Provider disagreed with this query.    Query created by: Herbert Ramírez on 2025 6:22 AM      Electronically signed by:  RAKESH SELF 2025 3:39 PM

## 2025-06-24 NOTE — TELEPHONE ENCOUNTER
As per patient ... Patient was admitted to [facility] on 6-19 and is unsure of discharge date at this time. Patient states he will contact us once he leaves .

## 2025-07-01 ENCOUNTER — TELEPHONE (OUTPATIENT)
Dept: RADIATION ONCOLOGY | Age: 45
End: 2025-07-01

## 2025-07-14 ENCOUNTER — TELEPHONE (OUTPATIENT)
Dept: RADIATION ONCOLOGY | Age: 45
End: 2025-07-14

## (undated) DEVICE — 4-PORT MANIFOLD: Brand: NEPTUNE 2

## (undated) DEVICE — NEEDLE HYPO 25GA L1.5IN BLU POLYPR HUB S STL REG BVL STR

## (undated) DEVICE — LIQUIBAND RAPID ADHESIVE 36/CS 0.8ML: Brand: MEDLINE

## (undated) DEVICE — SYRINGE MED 10ML LUERLOCK TIP W/O SFTY DISP

## (undated) DEVICE — APPLICATOR MEDICATED 26 CC SOLUTION HI LT ORNG CHLORAPREP

## (undated) DEVICE — BRONCHOSCOPY PACK: Brand: MEDLINE INDUSTRIES, INC.

## (undated) DEVICE — DRAPE,CHEST,FENES,15X10,STERIL: Brand: MEDLINE

## (undated) DEVICE — DRAPE C ARM W41XL74IN UNIV MOB W RUBBERBAND CLP

## (undated) DEVICE — SOLUTION IV 250ML 0.9% SOD CHL PH 5 INJ USP VIAFLX PLAS

## (undated) DEVICE — BOOT,SUTURE,STANDARD,YELLOW-IN-BLUE: Brand: MEDLINE

## (undated) DEVICE — GLOVE,SURG,SIGNATURE LTX MICR,LTX,PF,7.0: Brand: MEDLINE

## (undated) DEVICE — ELECTRODE PT RET AD L9FT HI MOIST COND ADH HYDRGEL CORDED

## (undated) DEVICE — SINGLE USE SUCTION VALVE MAJ-209: Brand: SINGLE USE SUCTION VALVE (STERILE)

## (undated) DEVICE — DECANTER: Brand: UNBRANDED

## (undated) DEVICE — SINGLE USE BIOPSY VALVE MAJ-210: Brand: SINGLE USE BIOPSY VALVE (STERILE)

## (undated) DEVICE — ADAPTER TBNG DIA15MM SWVL FBROPT BRONCHSCP TERM 2 AXIS PEEP

## (undated) DEVICE — NEEDLE ASPIR 22GA L700MM US GUID TREAT DST END FOR

## (undated) DEVICE — TOWEL,OR,DSP,ST,BLUE,STD,6/PK,12PK/CS: Brand: MEDLINE

## (undated) DEVICE — Device

## (undated) DEVICE — SYRINGE MED 10ML TRNSLUC BRL PLUNG BLK MRK POLYPR CTRL

## (undated) DEVICE — DOUBLE BASIN SET: Brand: MEDLINE INDUSTRIES, INC.

## (undated) DEVICE — SPONGE,LAP,4"X18",XR,ST,5/PK,40PK/CS: Brand: MEDLINE INDUSTRIES, INC.